# Patient Record
Sex: FEMALE | Race: OTHER | HISPANIC OR LATINO | Employment: OTHER | URBAN - METROPOLITAN AREA
[De-identification: names, ages, dates, MRNs, and addresses within clinical notes are randomized per-mention and may not be internally consistent; named-entity substitution may affect disease eponyms.]

---

## 2017-01-17 ENCOUNTER — TELEPHONE (OUTPATIENT)
Dept: FAMILY MEDICINE | Facility: CLINIC | Age: 77
End: 2017-01-17

## 2017-01-17 NOTE — TELEPHONE ENCOUNTER
----- Message from Joshua Aleman sent at 1/17/2017  1:03 PM CST -----  Contact: [phone]/self  Pt would like to be seen on Wednesday.  Please advise

## 2017-01-18 ENCOUNTER — OFFICE VISIT (OUTPATIENT)
Dept: FAMILY MEDICINE | Facility: CLINIC | Age: 77
End: 2017-01-18
Payer: MEDICARE

## 2017-01-18 ENCOUNTER — HOSPITAL ENCOUNTER (OUTPATIENT)
Dept: RADIOLOGY | Facility: HOSPITAL | Age: 77
Discharge: HOME | End: 2017-01-18
Attending: FAMILY MEDICINE
Payer: MEDICARE

## 2017-01-18 VITALS
SYSTOLIC BLOOD PRESSURE: 122 MMHG | HEIGHT: 61 IN | BODY MASS INDEX: 26.73 KG/M2 | HEART RATE: 76 BPM | DIASTOLIC BLOOD PRESSURE: 58 MMHG | TEMPERATURE: 99 F | WEIGHT: 141.56 LBS | OXYGEN SATURATION: 94 %

## 2017-01-18 DIAGNOSIS — K21.9 GASTROESOPHAGEAL REFLUX DISEASE, ESOPHAGITIS PRESENCE NOT SPECIFIED: ICD-10-CM

## 2017-01-18 DIAGNOSIS — N18.30 CHRONIC KIDNEY DISEASE, STAGE III (MODERATE): ICD-10-CM

## 2017-01-18 DIAGNOSIS — J41.0 SIMPLE CHRONIC BRONCHITIS: ICD-10-CM

## 2017-01-18 DIAGNOSIS — I67.9 CVD (CEREBROVASCULAR DISEASE): ICD-10-CM

## 2017-01-18 DIAGNOSIS — R73.02 GLUCOSE INTOLERANCE (IMPAIRED GLUCOSE TOLERANCE): ICD-10-CM

## 2017-01-18 DIAGNOSIS — R10.10 PAIN OF UPPER ABDOMEN: Primary | ICD-10-CM

## 2017-01-18 DIAGNOSIS — I73.9 PVD (PERIPHERAL VASCULAR DISEASE) WITH CLAUDICATION: ICD-10-CM

## 2017-01-18 DIAGNOSIS — E03.9 ACQUIRED HYPOTHYROIDISM: ICD-10-CM

## 2017-01-18 DIAGNOSIS — F41.9 ANXIETY: ICD-10-CM

## 2017-01-18 DIAGNOSIS — E78.5 HYPERLIPIDEMIA, UNSPECIFIED HYPERLIPIDEMIA TYPE: ICD-10-CM

## 2017-01-18 DIAGNOSIS — R53.83 FATIGUE, UNSPECIFIED TYPE: ICD-10-CM

## 2017-01-18 DIAGNOSIS — R10.10 PAIN OF UPPER ABDOMEN: ICD-10-CM

## 2017-01-18 PROCEDURE — 99999 PR PBB SHADOW E&M-EST. PATIENT-LVL III: CPT | Mod: PBBFAC,,, | Performed by: FAMILY MEDICINE

## 2017-01-18 PROCEDURE — 74150 CT ABDOMEN W/O CONTRAST: CPT | Mod: 26,,, | Performed by: RADIOLOGY

## 2017-01-18 PROCEDURE — 74150 CT ABDOMEN W/O CONTRAST: CPT | Mod: TC

## 2017-01-18 PROCEDURE — 99214 OFFICE O/P EST MOD 30 MIN: CPT | Mod: S$GLB,,, | Performed by: FAMILY MEDICINE

## 2017-01-18 PROCEDURE — 99499 UNLISTED E&M SERVICE: CPT | Mod: S$GLB,,, | Performed by: FAMILY MEDICINE

## 2017-01-18 PROCEDURE — 1160F RVW MEDS BY RX/DR IN RCRD: CPT | Mod: S$GLB,,, | Performed by: FAMILY MEDICINE

## 2017-01-18 PROCEDURE — 3078F DIAST BP <80 MM HG: CPT | Mod: S$GLB,,, | Performed by: FAMILY MEDICINE

## 2017-01-18 PROCEDURE — 1157F ADVNC CARE PLAN IN RCRD: CPT | Mod: S$GLB,,, | Performed by: FAMILY MEDICINE

## 2017-01-18 PROCEDURE — 3074F SYST BP LT 130 MM HG: CPT | Mod: S$GLB,,, | Performed by: FAMILY MEDICINE

## 2017-01-18 PROCEDURE — 1159F MED LIST DOCD IN RCRD: CPT | Mod: S$GLB,,, | Performed by: FAMILY MEDICINE

## 2017-01-18 PROCEDURE — 1126F AMNT PAIN NOTED NONE PRSNT: CPT | Mod: S$GLB,,, | Performed by: FAMILY MEDICINE

## 2017-01-18 RX ORDER — HYDROCODONE BITARTRATE AND ACETAMINOPHEN 5; 325 MG/1; MG/1
1 TABLET ORAL EVERY 8 HOURS PRN
Qty: 21 TABLET | Refills: 0 | Status: SHIPPED | OUTPATIENT
Start: 2017-01-18 | End: 2017-08-28 | Stop reason: SDUPTHER

## 2017-01-18 NOTE — PROGRESS NOTES
Subjective:       Patient ID: Brittany Haile is a 76 y.o. female.    Chief Complaint: Fatigue    HPI Comments: 76 yr old pleasant female with CVA, Hypothyroidism, HTN, HLD, COPD, GERD, carotid artery disease/stenosis, anxiety, presents today for her 3 month follow up and nc/o upper abdominal pain. Onset 1 month ago and gradually worsening. She made appointment with GI next week but she could not wait. The pain is aching type, 8/10 in severity and aggravated by palpation, lying down and no relieving factors. She tried some GI meds with no relief. She has nausea but no vomiting/diarrhea/constipation. Details as follows -    CVA - had stroke many years ago and has no deficits - it was TIA and they could not find any problem - she is continuing to smoke and no desire to quit as of yet    CKD III - follows nephrology - labs done today      Hypothyroidism - controlled -   TSH                      1.663               01/20/2016            -      on synthroid daily - compliant - no side effects      HTN - controlled- on ACE and CCB - no side effects - labs reassuring      HLD - controlled - was on statin - she was asked to stop and she will decide after she has an appointment with vascular medicine      PAD - she had ultrasound done to check her legs and she is found to have blockage in superficial femoral artery on right - she is currently c/o leg pain which is worse with walking - will follow vascular medicine - still c/o myalgia - pain med and muscle relaxant not helping    COPD - controlled - on advair daily and home oxygen - still smokes and no desire to quit - she drops her sats when she walks and she will benefit from ambulatory or portable oxygen      History as below - reviewed      Health maintenance  -labs UTD  -vaccines due                        Leg Pain    There was no injury mechanism. The pain is present in the left leg and right leg. The quality of the pain is described as aching. The pain is at a severity of  8/10. The pain is severe. The pain has been intermittent since onset. Pertinent negatives include no inability to bear weight, loss of motion, muscle weakness or numbness. The symptoms are aggravated by movement. She has tried nothing for the symptoms. The treatment provided no relief.   Medication Refill   This is a chronic problem. The current episode started more than 1 year ago. The problem occurs constantly. The problem has been gradually improving. Associated symptoms include abdominal pain, arthralgias, myalgias, nausea and neck pain. Pertinent negatives include no chest pain, chills, congestion, diaphoresis, fatigue, headaches, numbness, rash, sore throat or weakness. Nothing aggravates the symptoms. Treatments tried: as below. The treatment provided significant relief.   Hypertension   This is a chronic problem. The current episode started more than 1 year ago. The problem has been gradually improving since onset. The problem is controlled. Associated symptoms include anxiety and neck pain. Pertinent negatives include no chest pain, headaches, malaise/fatigue, orthopnea, palpitations, peripheral edema or shortness of breath. There are no associated agents to hypertension. Past treatments include ACE inhibitors and calcium channel blockers. The current treatment provides significant improvement. There are no compliance problems.  Hypertensive end-organ damage includes a thyroid problem. There is no history of angina, CAD/MI, CVA, left ventricular hypertrophy, PVD, renovascular disease or retinopathy. There is no history of chronic renal disease, coarctation of the aorta, hypercortisolism, hyperparathyroidism or pheochromocytoma.   Hyperlipidemia   This is a chronic problem. The current episode started more than 1 year ago. The problem is controlled. Recent lipid tests were reviewed and are normal. She has no history of chronic renal disease, diabetes, hypothyroidism or liver disease. There are no known  factors aggravating her hyperlipidemia. Associated symptoms include leg pain and myalgias. Pertinent negatives include no chest pain or shortness of breath. Current antihyperlipidemic treatment includes statins. The current treatment provides significant improvement of lipids. There are no compliance problems.  Risk factors for coronary artery disease include dyslipidemia, hypertension and post-menopausal.   Thyroid Problem   Presents for follow-up visit. Patient reports no anxiety, cold intolerance, constipation, depressed mood, diaphoresis, diarrhea, dry skin, fatigue, hair loss, hoarse voice, menstrual problem, nail problem, palpitations, tremors or weight loss. The symptoms have been stable. Past treatments include levothyroxine. The treatment provided significant relief. Prior procedures include thyroid ultrasound. The following procedures have not been performed: thyroid FNA. Her past medical history is significant for hyperlipidemia. There is no history of diabetes, Graves' ophthalmopathy or neuropathy. There are no known risk factors.   Gastroesophageal Reflux   She complains of abdominal pain, heartburn and nausea. She reports no chest pain, no choking, no dysphagia, no early satiety, no hoarse voice, no sore throat, no tooth decay or no wheezing. This is a chronic problem. The current episode started more than 1 year ago. The problem occurs constantly. The problem has been gradually improving. The heartburn is located in the abdomen. The heartburn is of moderate intensity. The heartburn does not wake her from sleep. The heartburn does not limit her activity. The heartburn doesn't change with position. Nothing aggravates the symptoms. Pertinent negatives include no fatigue, muscle weakness or weight loss. She has tried a PPI for the symptoms. The treatment provided significant relief. Past procedures do not include an abdominal ultrasound, esophageal manometry or H. pylori antibody titer. Past invasive  treatments do not include gastroplasty, gastroplication or reflux surgery.   Anxiety   Presents for follow-up visit. Symptoms include nausea. Patient reports no chest pain, confusion, decreased concentration, depressed mood, dizziness, excessive worry, feeling of choking, impotence, irritability, nervous/anxious behavior, palpitations, shortness of breath or suicidal ideas. Symptoms occur occasionally. The severity of symptoms is mild. Nothing aggravates the symptoms. The quality of sleep is fair. Nighttime awakenings: occasional.     There are no known risk factors. Her past medical history is significant for anxiety/panic attacks. There is no history of anemia, asthma, bipolar disorder, CAD, chronic lung disease, depression, hyperthyroidism or suicide attempts. Past treatments include SSRIs. The treatment provided significant relief. Compliance with prior treatments has been good. Compliance with medications is %.   Neck Pain    This is a chronic problem. The current episode started more than 1 year ago. The problem occurs constantly. The problem has been gradually worsening. The pain is associated with nothing. The pain is present in the occipital region. The quality of the pain is described as shooting and cramping. The pain is at a severity of 7/10. The pain is severe. The symptoms are aggravated by bending, twisting and stress. The pain is worse during the night. Associated symptoms include leg pain. Pertinent negatives include no chest pain, headaches, numbness, weakness or weight loss. She has tried heat, muscle relaxants and NSAIDs for the symptoms. The treatment provided mild relief.   Back Pain   This is a chronic problem. The current episode started more than 1 year ago. The problem occurs constantly. The problem has been gradually worsening since onset. The pain is present in the lumbar spine. The quality of the pain is described as aching. The pain radiates to the right foot and left foot. The  pain is at a severity of 8/10. The pain is severe. The pain is worse during the night. The symptoms are aggravated by bending, sitting and twisting. Associated symptoms include abdominal pain and leg pain. Pertinent negatives include no chest pain, dysuria, headaches, numbness, pelvic pain, weakness or weight loss. She has tried heat, ice, muscle relaxant and NSAIDs for the symptoms. The treatment provided moderate relief.   Abdominal Pain   This is a chronic problem. The current episode started more than 1 month ago. The onset quality is gradual. The problem occurs constantly. The problem has been unchanged. The pain is located in the epigastric region and RUQ. The pain is at a severity of 8/10. The pain is severe. The quality of the pain is aching, colicky and cramping. The abdominal pain does not radiate. Associated symptoms include arthralgias, myalgias and nausea. Pertinent negatives include no constipation, diarrhea, dysuria, headaches or weight loss. The pain is aggravated by certain positions, movement and belching. The pain is relieved by nothing. She has tried nothing for the symptoms. The treatment provided no relief. Prior diagnostic workup includes CT scan. Her past medical history is significant for GERD. There is no history of gallstones, irritable bowel syndrome, pancreatitis or PUD.     Review of Systems   Constitutional: Negative for chills, diaphoresis, fatigue, irritability, malaise/fatigue and weight loss.   HENT: Negative for congestion, hoarse voice and sore throat.    Respiratory: Negative for choking, shortness of breath and wheezing.    Cardiovascular: Negative for chest pain, palpitations and orthopnea.   Gastrointestinal: Positive for abdominal pain, heartburn and nausea. Negative for constipation, diarrhea and dysphagia.   Endocrine: Negative for cold intolerance.   Genitourinary: Negative for dysuria, impotence, menstrual problem and pelvic pain.   Musculoskeletal: Positive for  arthralgias, back pain, myalgias and neck pain. Negative for muscle weakness.   Skin: Negative for rash.   Neurological: Negative for dizziness, tremors, weakness, numbness and headaches.   Psychiatric/Behavioral: Negative for confusion, decreased concentration and suicidal ideas. The patient is not nervous/anxious.        PMH/PSH/FH/SH/MED/ALLERGY reviewed    Objective:       Vitals:    01/18/17 0903   BP: (!) 122/58   Pulse: 76   Temp: 98.8 °F (37.1 °C)       Physical Exam   Constitutional: She is oriented to person, place, and time. She appears well-developed and well-nourished. No distress.   HENT:   Head: Normocephalic and atraumatic.   Right Ear: External ear normal.   Left Ear: External ear normal.   Nose: Nose normal.   Mouth/Throat: Oropharynx is clear and moist. No oropharyngeal exudate.   Eyes: Conjunctivae and EOM are normal. Pupils are equal, round, and reactive to light. Right eye exhibits no discharge. Left eye exhibits no discharge. No scleral icterus.   Neck: Normal range of motion. Neck supple. No JVD present. No tracheal deviation present. No thyromegaly present.   Cardiovascular: Normal rate, regular rhythm, normal heart sounds and intact distal pulses.  Exam reveals no gallop and no friction rub.    No murmur heard.  Pulmonary/Chest: Effort normal and breath sounds normal. No stridor. She has no wheezes. She has no rales. She exhibits no tenderness.   Abdominal: Soft. Bowel sounds are normal. She exhibits no distension and no mass. There is no hepatosplenomegaly. There is tenderness in the right upper quadrant and epigastric area. There is no rigidity, no rebound, no guarding and no CVA tenderness. No hernia.       Musculoskeletal: Normal range of motion. She exhibits no edema or tenderness.   Lymphadenopathy:     She has no cervical adenopathy.   Neurological: She is alert and oriented to person, place, and time. She has normal reflexes. She displays normal reflexes. No cranial nerve deficit.  She exhibits normal muscle tone. Coordination normal.   Skin: Skin is warm and dry. No rash noted. She is not diaphoretic. No erythema. No pallor.   Psychiatric: She has a normal mood and affect. Her behavior is normal. Judgment and thought content normal.       CT Abdomen Without Contrast 01/18/2017 upper abdominal pain          RESULTS:    Clinical indication: 76 year-old female with upper abdominal pain.     Comparison: CT abdomen and pelvis at 8/24/16.     Technique: 5 mm transaxial images were obtained through the abdomen without the use of p.o. or IV contrast.     Findings:  Visualized portion of the heart is unremarkable. Lung bases are clear.     Calcified granuloma noted within the anterior aspect of the liver. No focal abnormalities are identified. Gallbladder has been removed. Spleen, pancreas, and left adrenal gland are unremarkable. There is stable thickening of the right adrenal gland.     There is diffuse gastric wall thickening throughout the gastric fundus and antrum which may in part relate to incomplete distention, however gastritis or potential neoplastic process are possibilities. Small hiatal hernia noted.     Right kidney is unremarkable. Simple cyst is visualized within the left kidney. No evidence of hydronephrosis.     Visualized loops of small large bowel show no evidence of obstruction or inflammation.     There is extensive atherosclerosis throughout the aorta and its branch vessels with suspected severe stenosis involving the celiac and SMA origins. Significant luminal narrowing visualized within the infrarenal aorta (greater than 50%).     Degenerative changes are seen within the spine. Subcutaneous soft tissues are unremarkable.    IMPRESSION:      1. Diffuse abnormal gastric wall thickening which may in part relate to incomplete distention, however potential gastritis or underlying neoplastic process should be considered. Small hiatal hernia also visualized. Consider further  evaluation with upper endoscopy.     2. Severe atherosclerosis involving aorta and its branch vessels with suspected severe hemodynamically stenosis at the celiac and SMA origins.       Assessment:       1. Pain of upper abdomen    2. Anxiety    3. CVD (cerebrovascular disease)    4. Simple chronic bronchitis    5. PVD (peripheral vascular disease) with claudication    6. Chronic kidney disease, stage III (moderate)    7. Acquired hypothyroidism    8. Gastroesophageal reflux disease, esophagitis presence not specified    9. Hyperlipidemia, unspecified hyperlipidemia type    10. Glucose intolerance (impaired glucose tolerance)    11. Fatigue, unspecified type        Plan:       Brittany was seen today for fatigue.    Diagnoses and all orders for this visit:    Pain of upper abdomen  -     hydrocodone-acetaminophen 5-325mg (NORCO) 5-325 mg per tablet; Take 1 tablet by mouth every 8 (eight) hours as needed for Pain.  -     CBC auto differential; Future  -     Comprehensive metabolic panel; Future  -     CT Abdomen Without Contrast; Future    Anxiety    CVD (cerebrovascular disease)    Simple chronic bronchitis    PVD (peripheral vascular disease) with claudication    Chronic kidney disease, stage III (moderate)    Acquired hypothyroidism    Gastroesophageal reflux disease, esophagitis presence not specified    Hyperlipidemia, unspecified hyperlipidemia type  -     CBC auto differential; Future  -     Comprehensive metabolic panel; Future    Glucose intolerance (impaired glucose tolerance)    Fatigue, unspecified type  -     Vitamin B12; Future  -     Vitamin D; Future  -     TSH; Future      Abdominal pain  -CT showed thickening of gastric wall  -follow GI next week  -pain med until then  -ER precautions given    HTN  -controlled  -continue benicar and norvasc    carotod artery disease/stenosis/bruit/PAD  -follows vascular medicine/cardiology  -statin for HLD    GERD  -controlled  -refilled  nexium    Hypothyroidism  -controlled    CVA  -stable and baseline and no deficits    COPD  -stable and controlled  -on advair/spiriva  -recommend portable or ambulatory oxygen    Muscle spasm  -flexeril prn nightly    CKD III  -baseline  -follows nephrology    Osteopenia  -vit D and dexa    Spent adequate time in obtaining history and explaining differentials    40 minutes spent during this visit of which greater than 50% devoted to face-face counseling and coordination of care regarding diagnosis and management plan    Return in about 3 months (around 4/18/2017), or if symptoms worsen or fail to improve.

## 2017-01-19 ENCOUNTER — TELEPHONE (OUTPATIENT)
Dept: FAMILY MEDICINE | Facility: CLINIC | Age: 77
End: 2017-01-19

## 2017-01-19 NOTE — TELEPHONE ENCOUNTER
Called patient to review lab results. The house number on file is the wrong number. The mobile number listed is continuously busy

## 2017-01-25 ENCOUNTER — TELEPHONE (OUTPATIENT)
Dept: NEUROLOGY | Facility: HOSPITAL | Age: 77
End: 2017-01-25

## 2017-01-25 ENCOUNTER — OFFICE VISIT (OUTPATIENT)
Dept: NEUROLOGY | Facility: HOSPITAL | Age: 77
End: 2017-01-25
Attending: INTERNAL MEDICINE
Payer: MEDICARE

## 2017-01-25 VITALS
SYSTOLIC BLOOD PRESSURE: 133 MMHG | WEIGHT: 141 LBS | DIASTOLIC BLOOD PRESSURE: 53 MMHG | TEMPERATURE: 97 F | HEIGHT: 61 IN | BODY MASS INDEX: 26.62 KG/M2 | HEART RATE: 72 BPM

## 2017-01-25 DIAGNOSIS — D50.9 IRON DEFICIENCY ANEMIA, UNSPECIFIED IRON DEFICIENCY ANEMIA TYPE: Primary | ICD-10-CM

## 2017-01-25 PROCEDURE — 99215 OFFICE O/P EST HI 40 MIN: CPT | Performed by: INTERNAL MEDICINE

## 2017-01-25 RX ORDER — POLYETHYLENE GLYCOL 3350, SODIUM SULFATE ANHYDROUS, SODIUM BICARBONATE, SODIUM CHLORIDE, POTASSIUM CHLORIDE 236; 22.74; 6.74; 5.86; 2.97 G/4L; G/4L; G/4L; G/4L; G/4L
4 POWDER, FOR SOLUTION ORAL ONCE
Qty: 4000 ML | Refills: 0 | Status: SHIPPED | OUTPATIENT
Start: 2017-01-25 | End: 2017-01-25

## 2017-01-25 NOTE — PROGRESS NOTES
"[facility]    CC: abdominal pain    HPI 76 y.o. female with past medical history of PVD, COPD, hypothyroidism, DELIA on oral iron therapy who presents with one month of progressively worsening, severe generalized cramping and stabbing abdominal pain not associated with meals, nausea, vomiting, melena or hematochezia.    Previous records reviewed.    Collateral information obtained from her daughter present during the patient interview.      Past Medical History   Diagnosis Date    Chronic kidney disease, stage III (moderate) 8/19/2015    COPD (chronic obstructive pulmonary disease)     COPD (chronic obstructive pulmonary disease)     CVA (cerebral infarction)     GERD (gastroesophageal reflux disease)     Hyperlipidemia     Hypertension     PVD (peripheral vascular disease) with claudication 10/13/2015    Stroke     Thyroid disease          Review of Systems  General ROS: negative for chills, fever or weight loss  Cardiovascular ROS: no chest pain or dyspnea on exertion  Gastrointestinal ROS: positive for abdominal pain, no nausea or vomiting    Physical Examination  Visit Vitals    BP (!) 133/53    Pulse 72    Temp 97 °F (36.1 °C) (Oral)    Ht 5' 1" (1.549 m)    Wt 64 kg (141 lb)    BMI 26.64 kg/m2     General appearance: alert, cooperative, no distress  HENT: Normocephalic, atraumatic, neck symmetrical, no nasal discharge   Lungs: clear to auscultation bilaterally, no dullness to percussion bilaterally  Heart: regular rate and rhythm without rub; no displacement of the PMI   Abdomen: soft, non-tender; bowel sounds normoactive; no organomegaly  Extremities: extremities symmetric; no clubbing, cyanosis, or edema  Neurologic: Alert and oriented X 3, normal strength, normal coordination and gait    Labs:  Alb 4.1  BUN/Cr 18/1.1  H/H 13.6/42.2  MCV 98  2015 ferritin 7    Imaging:  CT abdomen and pelvis without contrast 1/18/17 - thickening of the stomach wall noted but this is possibly secondary " to under distension.  Also positive for atherosclerosis of her aorta, celiac and SMA.    Assessment:   The patient is a 75 yo female with iron deficiency anemia on oral iron who presents with one month of severe progressively worsening abdominal pain.  Her recent abdominal imaging shows thickening of the stomach wall and severe atherosclerosis of her celiac and SMA.  Her previous colonoscopy in 2014 showed patchy erythema of the ascending and transverse colon.  Biopsies showed chronic colitis.  Her abdominal pain is possibly secondary to gastritis but also could be secondary to ischemia vs IBD vs malignancy vs IBS. Her symptoms have actually been present for 3-4 years but have only been a little worse over the past month. She has not observed improvement in the past with bentyl. She takes a daily PPI which improves her symptoms somewhat.     Plan:  Start trial of carafate and I will ask the patient if she has had improvement in her symptoms with carafate when endoscopy is performed.    I will schedule her for EGD and colonoscopy on March 2, 2017 to further evaluate her abnormal gastric imaging, abdominal pain and DELIA.    Nulytely prep sent to her pharmacy.    Cecil Crooks MD   [address] [place] [address], [address]   [place] [id number] [phone]

## 2017-01-25 NOTE — MR AVS SNAPSHOT
[facility] [address]  [place] [address]  Phone: [phone]  Fax: [phone]                  Brittany Haile   2017 9:45 AM   Office Visit    Descripción:  Female : 1940   Personal Médico:  Cecil Crooks MD   Departamento:  [facility]           Razón de la jono     Follow-up           Diagnósticos de Esta Visita        Comentarios    Iron deficiency anemia, unspecified iron deficiency anemia type    -  Primario            Lista de tareas           Metas (5 Years of Data)     Ninguna      Recetas para recoger        Disp Refills Start End    polyethylene glycol (GOLYTELY,NULYTELY) 236-22.74-6.74 -5.86 gram suspension 4000 mL 0 2017    Take 4,000 mLs (4 L total) by mouth once. - Oral    [facility]arm[facility]: [email or web address] [address] - [place]  [address] AT [facility] No. de tlfo: #: [phone]         Aprilthelma en Llamada     [facility] L[facility]nea de Enfermeras - Asistencia   Enfermeras registradas de [facility] pueden ayudarle a reservar keegan jono, proveer educación para la martinez, asesoría clínica, y otros servicios de asesoramiento.   Llame para preethi servicio gratuito a [phone].             Medicamentos           Mensaje sobre Medicamentos     Verificar los cambios y / o adiciones a merritt régimen de medicación son los mismos que discutir con merritt médico. Si cualquiera de estos cambios o adiciones son incorrectos, por favor notifique a merritt proveedor de atención médica.        EMPEZAR a janet estos medicamentos NUEVOS        Refills    polyethylene glycol (GOLYTELY,NULYTELY) 236-22.74-6.74 -5.86 gram suspension 0    Sig: Take 4,000 mLs (4 L total) by mouth once.    Categoría: Normal    Vía: Oral           Verifique que la siguiente lista de medicamentos es keegan representación exacta de los medicamentos que está tomando actualmente. Si no hay ningunos reportados, la lista puede estar en acevedo. Si no es  correcta, por favor póngase en contacto con merritt proveedor de atención médica. Lleve esta lista con usted en rafaela de emergencia.           Medicamentos Actuales     albuterol (PROVENTIL HFA) 90 mcg/actuation inhaler Inhale 2 puffs into the lungs every 4 to 6 hours as needed.    amlodipine (NORVASC) 5 MG tablet Take 2 tablets (10 mg total) by mouth once daily.    dicyclomine (BENTYL) 20 mg tablet Take 1 tablet (20 mg total) by mouth 4 (four) times daily as needed (abdominal pain/spasm).    dipyridamole-aspirin 200-25 mg (AGGRENOX)  mg CM12 Take 1 capsule by mouth 2 (two) times daily.    docusate sodium (COLACE) 100 MG capsule Take 1 capsule (100 mg total) by mouth 2 (two) times daily as needed for Constipation.    DUREZOL 0.05 % Drop ophthalmic solution     escitalopram oxalate (LEXAPRO) 5 MG Tab TAKE 1 TABLET BY MOUTH DAILY    ferrous sulfate 325 mg (65 mg iron) Tab tablet     fluticasone-salmeterol 250-50 mcg/dose (ADVAIR) 250-50 mcg/dose diskus inhaler Inhale 1 puff into the lungs 2 (two) times daily.    hydrochlorothiazide (MICROZIDE) 12.5 mg capsule Take 1 capsule (12.5 mg total) by mouth once daily.    hydrocodone-acetaminophen 5-325mg (NORCO) 5-325 mg per tablet Take 1 tablet by mouth every 8 (eight) hours as needed for Pain.    levothyroxine (SYNTHROID) 88 MCG tablet TAKE 1 TABLET BY MOUTH DAILY BEFORE BREAKFAST    meclizine (ANTIVERT) 25 mg tablet Take 1 tablet (25 mg total) by mouth every 6 (six) hours as needed for Dizziness.    NEXIUM 40 mg capsule Take 1 capsule (40 mg total) by mouth once daily.    olmesartan (BENICAR) 40 MG tablet Take 1 tablet (40 mg total) by mouth once daily.    tiotropium (SPIRIVA WITH HANDIHALER) 18 mcg inhalation capsule Inhale 1 capsule (18 mcg total) into the lungs once daily.    fluticasone (FLONASE) 50 mcg/actuation nasal spray SPRAY ONCE IN EACH NOSTRIL ONCE DAILY    polyethylene glycol (GOLYTELY,NULYTELY) 236-22.74-6.74 -5.86 gram suspension Take 4,000 mLs (4 L total)  "by mouth once.           Información de referencia clínica           Signos vitales - más recientes  Última actualización: 1/25/2017 10:09 AM por Briana Kaiser MA    PS Pulso Temperatura [place] Peso BMI (IMC)    (!) 133/53 72 97 °F (36.1 °C) (Oral) 5' 1" (1.549 m) 64 kg (141 lb) 26.64 kg/m2      Blood Pressure          Most Recent Value    BP  (!)  133/53      Alergias     A partir del:  1/25/2017        No Known Allergies      Vacunas     Administradas en la fecha de la visita:  1/25/2017        None      Orders Placed During Today's Visit      Órdenes normales de esta visita    Case request GI: ESOPHAGOGASTRODUODENOSCOPY (EGD), COLONOSCOPY       Registrarse para [facility]     La activación de merritt cuenta [facility] es tan fácil kwaku 1-2-3!    1) Ir a [email or web address], seleccione Registrarse Ahora, meter el código de activación y merritt fecha de nacimiento, y seleccione Próximo.    [id number]  Expires: 3/4/2017  9:33 AM      2) Crear un nombre de usuario y contraseña para usar cuando se visita [facility] en el futuro y selecciona keegan pregunta de seguridad en rafaela de que pierda merritt contraseña y seleccione Próximo.    3) Introduzca merritt dirección de correo electrónico y ainsley clic en Registrarse!    Información Adicional  Si tiene alguna pregunta, por favor, e-mail [email or web address] o llame al [phone] para hablar con nuestro personal. Recuerde, [facility] no debe ser usada para necesidades urgentes. En rafaela de emergencia médica, llame al [phone].        Instrucciones    Nulytely Colonoscopy Prep Instructions    [facility]   [address] [place] [address]  [phone]       PROCEDURE DAY:3/2/17    *Your procedure time many change.  The hospital will contact you the day prior to   the procedure to confirm your procedure time and arrival.       CLEAR LIQUID DIET (START THE DAY BEFORE PROCEDURE): 3/1/17      Clear Liquid Diet means any liquid from the list below that is not red or purple in " color:   Gatorade, Js-Aid, Lemonade (Yellow ONLY)-Gatorade is the preferred liquid   Tea (no milk or dairy)   Carbonated beverages (soft drink), regular or diet   Apple juice, white grape juice, white cranberry juice   Jell-O (orange, lemon, or lime flavors ONLY)   Clear, fat-free, beef or chicken broth   Bouillon, clear consommé   Snowball, Popsicles (NOT red or purple)  * No Solid Food or Alcohol     ITEMS TO BE PURCHASED FOR PREP (Nu-Lytely requires a prescription):         Nu-Lytely preparation solution.          Gas tablets (Gas-X, Mylanta Gas, Simethicone)    BOWEL PREP INSTRUCTIONS THE DAY BEFORE THE EXAM: 3/1/17  1. Drink only clear liquids (see the above diet) all day. Gatorade is the best liquid.   Drink an extra 8 ounces of clear liquid every hour from 11am to 5pm.         2.   At 6pm, mix Nu-Lytely powder according to the directions on the container and               drink 8 ounces of solution every 10 minutes until about half of the solution is                consumed. Place the remainder of the solution in the refrigerator.         3.   At 9pm, take two gas tablets with 8 ounces of clear liquid.        4.   At 10pm, take two gas tablets with 8 ounces of clear liquid.      THE DAY OF THE EXAM: 3/2/17        1.  Beginning 5 hours before your procedure time, drink the remaining half of              Nu-Lytely solution. Drink 8 ounces of solution every 10 minutes until the solution              is gone.              *If your procedure is scheduled for the early morning, you will need to get up in               the middle of the night to take this dose of preparation. The correct timing of this               dose is essential to an effective preparation. If you do not take this dose, your               exam may be incomplete and need to be repeated.         2.  Have nothing to eat or drink for 3 hours before the procedure.         3.  Take your morning medications, if any, with a small sip of  water.         4.  Bring someone to drive you home (you should not drive for 12 hrs after the exam)        5.  Report to Admitting, 1st floor hospital entrance 2 hours before procedure time.       If you are diabetic, do not take insulin or oral medications the morning of the procedure. Take only a half dose of insulin the day before your procedure. Do not take your diabetic pills the day of your procedure               Colonoscopy is used to view the inside of your lower digestive tract (colon and rectum). It can help screen for colon cancer and can also help find the source of abdominal pain, bleeding, and changes in bowel habits. The test is usually done in the hospital on an outpatient basis. During the exam, the doctor can remove a small tissue sample ( a biopsy) for testing. Small growths, such as polyps, may also be removed during colonoscopy.     A camera attached to a flexible tube with a viewing lens is used to take video pictures.    Getting Ready    Be sure to tell your doctor about any medications you take. Also tell your doctor about any health conditions you may have.   Discuss the risks of the test with your doctor. These include bleeding and bowel puncture.   Your rectum and colon must be empty for the test. So be sure to follow the diet and bowel prep instructions exactly. If you dont, the test may need to be rescheduled.   You will need to have a friend or family member prepared to drive you home after the test.     Colonoscopy provides an inside view of the entire colon.    During the Test    You are given sedating (relaxing) medication through an IV line. You may be drowsy or completely asleep.   The procedure takes 30 minutes or longer.   The doctor performs a digital rectal exam to check for anal and rectal problems. The rectum is lubricated and the scope inserted.   If you are awake, you may have a feeling similar to needing to have a bowel movement. You may also feel pressure as air  is pumped into the colon. Its okay to pass gas during the procedure.  After the Test    You may discuss the results with your doctor right away or at a future visit.   Try to pass all the gas right after the test to help prevent bloating and cramping.   After the test, you can go back to your normal eating and other activities.  Risks and Possible Complications Include:   Bleeding  A puncture or tear in the colon  Risks of anesthesia         Please come to first floor admissions desk the day of the procedure.    Endoscopy Dept. Will call you a day or so before your procedure to let you know what time you need to report to the hospital.          Smoking Cessation     Si desea dejar de fumar:  · Usted puede ser elegible para recibir servicios gratuitos si usted es un residente de [place] y comenzó a fumar cigarrillos antes del 1988. Llame al Smoking Cessation Trust (SCT) a [phone] o [phone].   Llame -QUIT-NOW si usted no cumple con los criterios anteriores.                     Brittany Haile   2017 9:45 AM   Office Visit    Description:  Female : 1940   Provider:  Cecil Crooks MD   Department:  [facility]           Reason for Visit     Follow-up           Diagnoses this Visit        Comments    Iron deficiency anemia, unspecified iron deficiency anemia type    -  Primary            To Do List           Goals     None       These Medications        Disp Refills Start End    polyethylene glycol (GOLYTELY,NULYTELY) 236-22.74-6.74 -5.86 gram suspension 4000 mL 0 2017    Take 4,000 mLs (4 L total) by mouth once. - Oral    Pharmacy: [facility] - [place] - [address] AT [facility] [address]  #: [phone]         [facility]     [facility] Care [facility] -  Assistance  Registered nurses in the [facility] provide clinical advisement, health education,  appointment booking, and other advisory services.  Call for this free service at [phone].             Medications           Message regarding Medications     Verify the changes and/or additions to your medication regime listed below are the same as discussed with your clinician today.  If any of these changes or additions are incorrect, please notify your healthcare provider.        START taking these NEW medications        Refills    polyethylene glycol (GOLYTELY,NULYTELY) 236-22.74-6.74 -5.86 gram suspension 0    Sig: Take 4,000 mLs (4 L total) by mouth once.    Class: Normal    Route: Oral           Verify that the below list of medications is an accurate representation of the medications you are currently taking.  If none reported, the list may be blank. If incorrect, please contact your healthcare provider. Carry this list with you in case of emergency.           Current Medications     albuterol (PROVENTIL HFA) 90 mcg/actuation inhaler Inhale 2 puffs into the lungs every 4 to 6 hours as needed.    amlodipine (NORVASC) 5 MG tablet Take 2 tablets (10 mg total) by mouth once daily.    dicyclomine (BENTYL) 20 mg tablet Take 1 tablet (20 mg total) by mouth 4 (four) times daily as needed (abdominal pain/spasm).    dipyridamole-aspirin 200-25 mg (AGGRENOX)  mg CM12 Take 1 capsule by mouth 2 (two) times daily.    docusate sodium (COLACE) 100 MG capsule Take 1 capsule (100 mg total) by mouth 2 (two) times daily as needed for Constipation.    DUREZOL 0.05 % Drop ophthalmic solution     escitalopram oxalate (LEXAPRO) 5 MG Tab TAKE 1 TABLET BY MOUTH DAILY    ferrous sulfate 325 mg (65 mg iron) Tab tablet     fluticasone-salmeterol 250-50 mcg/dose (ADVAIR) 250-50 mcg/dose diskus inhaler Inhale 1 puff into the lungs 2 (two) times daily.    hydrochlorothiazide (MICROZIDE) 12.5 mg capsule Take 1 capsule (12.5 mg total) by mouth once daily.    hydrocodone-acetaminophen 5-325mg (NORCO) 5-325 mg per tablet Take 1  "tablet by mouth every 8 (eight) hours as needed for Pain.    levothyroxine (SYNTHROID) 88 MCG tablet TAKE 1 TABLET BY MOUTH DAILY BEFORE BREAKFAST    meclizine (ANTIVERT) 25 mg tablet Take 1 tablet (25 mg total) by mouth every 6 (six) hours as needed for Dizziness.    NEXIUM 40 mg capsule Take 1 capsule (40 mg total) by mouth once daily.    olmesartan (BENICAR) 40 MG tablet Take 1 tablet (40 mg total) by mouth once daily.    tiotropium (SPIRIVA WITH HANDIHALER) 18 mcg inhalation capsule Inhale 1 capsule (18 mcg total) into the lungs once daily.    fluticasone (FLONASE) 50 mcg/actuation nasal spray SPRAY ONCE IN EACH NOSTRIL ONCE DAILY    polyethylene glycol (GOLYTELY,NULYTELY) 236-22.74-6.74 -5.86 gram suspension Take 4,000 mLs (4 L total) by mouth once.           Clinical Reference Information           Vital Signs - Last Recorded  Most recent update: 1/25/2017 10:09 AM by Briana Kaiser MA    BP Pulse Temp Ht Wt BMI    (!) 133/53 72 97 °F (36.1 °C) (Oral) 5' 1" (1.549 m) 64 kg (141 lb) 26.64 kg/m2      Blood Pressure          Most Recent Value    BP  (!)  133/53      Allergies as of 1/25/2017     No Known Allergies      Immunizations Administered on Date of Encounter - 1/25/2017     None      Orders Placed During Today's Visit      Normal Orders This Visit    Case request GI: ESOPHAGOGASTRODUODENOSCOPY (EGD), COLONOSCOPY       [facility] Sign-Up     Activating your [facility] account is as easy as 1-2-3!     1) Visit [email or web address], select Sign Up Now, enter this activation code and your date of birth, then select Next.  [email or web address]  Expires: 3/4/2017  9:33 AM      2) Create a username and password to use when you visit [facility] in the future and select a security question in case you lose your password and select Next.    3) Enter your e-mail address and click Sign Up!    Additional Information  If you have questions, please e-mail [email or web address] or call [phone] to talk to our [facility] staff. " Remember, [facility] is NOT to be used for urgent needs. For medical emergencies, dial 911.         Instructions    Nulytely Colonoscopy Prep Instructions    [facility]   [address] [place] [address]  [phone]       PROCEDURE DAY:3/2/17    *Your procedure time many change.  The hospital will contact you the day prior to   the procedure to confirm your procedure time and arrival.       CLEAR LIQUID DIET (START THE DAY BEFORE PROCEDURE): 3/1/17      Clear Liquid Diet means any liquid from the list below that is not red or purple in color:   Gatorade, Js-Aid, Lemonade (Yellow ONLY)-Gatorade is the preferred liquid   Tea (no milk or dairy)   Carbonated beverages (soft drink), regular or diet   Apple juice, white grape juice, white cranberry juice   Jell-O (orange, lemon, or lime flavors ONLY)   Clear, fat-free, beef or chicken broth   Bouillon, clear consommé   Snowball, Popsicles (NOT red or purple)  * No Solid Food or Alcohol     ITEMS TO BE PURCHASED FOR PREP (Nu-Lytely requires a prescription):         Nu-Lytely preparation solution.          Gas tablets (Gas-X, Mylanta Gas, Simethicone)    BOWEL PREP INSTRUCTIONS THE DAY BEFORE THE EXAM: 3/1/17  1. Drink only clear liquids (see the above diet) all day. Gatorade is the best liquid.   Drink an extra 8 ounces of clear liquid every hour from 11am to 5pm.         2.   At 6pm, mix Nu-Lytely powder according to the directions on the container and               drink 8 ounces of solution every 10 minutes until about half of the solution is                consumed. Place the remainder of the solution in the refrigerator.         3.   At 9pm, take two gas tablets with 8 ounces of clear liquid.        4.   At 10pm, take two gas tablets with 8 ounces of clear liquid.      THE DAY OF THE EXAM: 3/2/17        1.  Beginning 5 hours before your procedure time, drink the remaining half of              Nu-Lytely solution. Drink 8 ounces  of solution every 10 minutes until the solution              is gone.              *If your procedure is scheduled for the early morning, you will need to get up in               the middle of the night to take this dose of preparation. The correct timing of this               dose is essential to an effective preparation. If you do not take this dose, your               exam may be incomplete and need to be repeated.         2.  Have nothing to eat or drink for 3 hours before the procedure.         3.  Take your morning medications, if any, with a small sip of water.         4.  Bring someone to drive you home (you should not drive for 12 hrs after the exam)        5.  Report to Admitting, 1st floor hospital entrance 2 hours before procedure time.       If you are diabetic, do not take insulin or oral medications the morning of the procedure. Take only a half dose of insulin the day before your procedure. Do not take your diabetic pills the day of your procedure               Colonoscopy is used to view the inside of your lower digestive tract (colon and rectum). It can help screen for colon cancer and can also help find the source of abdominal pain, bleeding, and changes in bowel habits. The test is usually done in the hospital on an outpatient basis. During the exam, the doctor can remove a small tissue sample ( a biopsy) for testing. Small growths, such as polyps, may also be removed during colonoscopy.     A camera attached to a flexible tube with a viewing lens is used to take video pictures.    Getting Ready    Be sure to tell your doctor about any medications you take. Also tell your doctor about any health conditions you may have.   Discuss the risks of the test with your doctor. These include bleeding and bowel puncture.   Your rectum and colon must be empty for the test. So be sure to follow the diet and bowel prep instructions exactly. If you dont, the test may need to be rescheduled.   You will  need to have a friend or family member prepared to drive you home after the test.     Colonoscopy provides an inside view of the entire colon.    During the Test    You are given sedating (relaxing) medication through an IV line. You may be drowsy or completely asleep.   The procedure takes 30 minutes or longer.   The doctor performs a digital rectal exam to check for anal and rectal problems. The rectum is lubricated and the scope inserted.   If you are awake, you may have a feeling similar to needing to have a bowel movement. You may also feel pressure as air is pumped into the colon. Its okay to pass gas during the procedure.  After the Test    You may discuss the results with your doctor right away or at a future visit.   Try to pass all the gas right after the test to help prevent bloating and cramping.   After the test, you can go back to your normal eating and other activities.  Risks and Possible Complications Include:   Bleeding  A puncture or tear in the colon  Risks of anesthesia         Please come to first floor admissions desk the day of the procedure.    Endoscopy Dept. Will call you a day or so before your procedure to let you know what time you need to report to the hospital.          Smoking Cessation     If you would like to quit smoking:   You may be eligible for free services if you are a [place] resident and started smoking cigarettes before September 1, 1988.  Call the Smoking Cessation Trust (SCT) toll free at ([phone] 770-2268 or [phone].   Call 5-282-QUIT-NOW if you do not meet the above criteria.

## 2017-01-25 NOTE — PATIENT INSTRUCTIONS
Nulytely Colonoscopy Prep Instructions    [facility]   [address], [place] [address]  [phone]       PROCEDURE DAY:3/2/17    *Your procedure time many change.  The hospital will contact you the day prior to   the procedure to confirm your procedure time and arrival.       CLEAR LIQUID DIET (START THE DAY BEFORE PROCEDURE): 3/1/17      Clear Liquid Diet means any liquid from the list below that is not red or purple in color:   Gatorade, Js-Aid, Lemonade (Yellow ONLY)-Gatorade is the preferred liquid   Tea (no milk or dairy)   Carbonated beverages (soft drink), regular or diet   Apple juice, white grape juice, white cranberry juice   Jell-O (orange, lemon, or lime flavors ONLY)   Clear, fat-free, beef or chicken broth   Bouillon, clear consommé   Snowball, Popsicles (NOT red or purple)  * No Solid Food or Alcohol     ITEMS TO BE PURCHASED FOR PREP (Nu-Lytely requires a prescription):         Nu-Lytely preparation solution.          Gas tablets (Gas-X, Mylanta Gas, Simethicone)    BOWEL PREP INSTRUCTIONS THE DAY BEFORE THE EXAM: 3/1/17  1. Drink only clear liquids (see the above diet) all day. Gatorade is the best liquid.   Drink an extra 8 ounces of clear liquid every hour from 11am to 5pm.         2.   At 6pm, mix Nu-Lytely powder according to the directions on the container and               drink 8 ounces of solution every 10 minutes until about half of the solution is                consumed. Place the remainder of the solution in the refrigerator.         3.   At 9pm, take two gas tablets with 8 ounces of clear liquid.        4.   At 10pm, take two gas tablets with 8 ounces of clear liquid.      THE DAY OF THE EXAM: 3/2/17        1.  Beginning 5 hours before your procedure time, drink the remaining half of              Nu-Lytely solution. Drink 8 ounces of solution every 10 minutes until the solution              is gone.              *If your procedure is scheduled  for the early morning, you will need to get up in               the middle of the night to take this dose of preparation. The correct timing of this               dose is essential to an effective preparation. If you do not take this dose, your               exam may be incomplete and need to be repeated.         2.  Have nothing to eat or drink for 3 hours before the procedure.         3.  Take your morning medications, if any, with a small sip of water.         4.  Bring someone to drive you home (you should not drive for 12 hrs after the exam)        5.  Report to Admitting, 1st floor hospital entrance 2 hours before procedure time.       If you are diabetic, do not take insulin or oral medications the morning of the procedure. Take only a half dose of insulin the day before your procedure. Do not take your diabetic pills the day of your procedure               Colonoscopy is used to view the inside of your lower digestive tract (colon and rectum). It can help screen for colon cancer and can also help find the source of abdominal pain, bleeding, and changes in bowel habits. The test is usually done in the hospital on an outpatient basis. During the exam, the doctor can remove a small tissue sample ( a biopsy) for testing. Small growths, such as polyps, may also be removed during colonoscopy.     A camera attached to a flexible tube with a viewing lens is used to take video pictures.    Getting Ready    Be sure to tell your doctor about any medications you take. Also tell your doctor about any health conditions you may have.   Discuss the risks of the test with your doctor. These include bleeding and bowel puncture.   Your rectum and colon must be empty for the test. So be sure to follow the diet and bowel prep instructions exactly. If you dont, the test may need to be rescheduled.   You will need to have a friend or family member prepared to drive you home after the test.     Colonoscopy provides an inside  view of the entire colon.    During the Test    You are given sedating (relaxing) medication through an IV line. You may be drowsy or completely asleep.   The procedure takes 30 minutes or longer.   The doctor performs a digital rectal exam to check for anal and rectal problems. The rectum is lubricated and the scope inserted.   If you are awake, you may have a feeling similar to needing to have a bowel movement. You may also feel pressure as air is pumped into the colon. Its okay to pass gas during the procedure.  After the Test    You may discuss the results with your doctor right away or at a future visit.   Try to pass all the gas right after the test to help prevent bloating and cramping.   After the test, you can go back to your normal eating and other activities.  Risks and Possible Complications Include:   Bleeding  A puncture or tear in the colon  Risks of anesthesia         Please come to first floor admissions desk the day of the procedure.    Endoscopy Dept. Will call you a day or so before your procedure to let you know what time you need to report to the hospital.

## 2017-01-25 NOTE — TELEPHONE ENCOUNTER
----- Message from Shauna Angel sent at 1/25/2017 11:45 AM CST -----  Contact: Thierry MORALES- Pharmacy called looking carfate prescription. Pharmacy call back number is [phone]

## 2017-01-26 ENCOUNTER — TELEPHONE (OUTPATIENT)
Dept: NEUROLOGY | Facility: HOSPITAL | Age: 77
End: 2017-01-26

## 2017-01-26 NOTE — TELEPHONE ENCOUNTER
Spoke with pt and informed her that prescription for Carafate was sent over to the pharmacy on file. No other concerns noted.

## 2017-01-26 NOTE — TELEPHONE ENCOUNTER
----- Message from Shauna Angel sent at 1/26/2017  9:37 AM CST -----  Contact: Thierry Dempsey      ----- Message -----     From: Shauna Angel     Sent: 1/25/2017  11:45 AM       To: Daksha Jacob Staff    GI- Pharmacy called looking carfate prescription. Pharmacy call back number is [phone]

## 2017-03-01 ENCOUNTER — ANESTHESIA EVENT (OUTPATIENT)
Dept: ENDOSCOPY | Facility: HOSPITAL | Age: 77
End: 2017-03-01
Payer: MEDICARE

## 2017-03-02 ENCOUNTER — SURGERY (OUTPATIENT)
Age: 77
End: 2017-03-02

## 2017-03-02 ENCOUNTER — HOSPITAL ENCOUNTER (OUTPATIENT)
Facility: HOSPITAL | Age: 77
Discharge: HOME | End: 2017-03-02
Attending: INTERNAL MEDICINE | Admitting: INTERNAL MEDICINE
Payer: MEDICARE

## 2017-03-02 ENCOUNTER — ANESTHESIA (OUTPATIENT)
Dept: ENDOSCOPY | Facility: HOSPITAL | Age: 77
End: 2017-03-02
Payer: MEDICARE

## 2017-03-02 DIAGNOSIS — R10.9 ABDOMINAL PAIN: ICD-10-CM

## 2017-03-02 DIAGNOSIS — K21.9 GASTROESOPHAGEAL REFLUX DISEASE, ESOPHAGITIS PRESENCE NOT SPECIFIED: Primary | ICD-10-CM

## 2017-03-02 DIAGNOSIS — R10.84 GENERALIZED ABDOMINAL PAIN: ICD-10-CM

## 2017-03-02 DIAGNOSIS — K29.70 GASTRITIS, PRESENCE OF BLEEDING UNSPECIFIED, UNSPECIFIED CHRONICITY, UNSPECIFIED GASTRITIS TYPE: ICD-10-CM

## 2017-03-02 DIAGNOSIS — E03.4 HYPOTHYROIDISM DUE TO ACQUIRED ATROPHY OF THYROID: ICD-10-CM

## 2017-03-02 PROCEDURE — 45378 DIAGNOSTIC COLONOSCOPY: CPT | Performed by: INTERNAL MEDICINE

## 2017-03-02 PROCEDURE — 25000003 PHARM REV CODE 250: Performed by: NURSE ANESTHETIST, CERTIFIED REGISTERED

## 2017-03-02 PROCEDURE — 88305 TISSUE EXAM BY PATHOLOGIST: CPT | Mod: 26,,, | Performed by: PATHOLOGY

## 2017-03-02 PROCEDURE — 43239 EGD BIOPSY SINGLE/MULTIPLE: CPT | Performed by: INTERNAL MEDICINE

## 2017-03-02 PROCEDURE — 37000008 HC ANESTHESIA 1ST 15 MINUTES: Performed by: INTERNAL MEDICINE

## 2017-03-02 PROCEDURE — 63600175 PHARM REV CODE 636 W HCPCS: Performed by: NURSE ANESTHETIST, CERTIFIED REGISTERED

## 2017-03-02 PROCEDURE — 27201012 HC FORCEPS, HOT/COLD, DISP: Performed by: INTERNAL MEDICINE

## 2017-03-02 PROCEDURE — 88305 TISSUE EXAM BY PATHOLOGIST: CPT | Performed by: PATHOLOGY

## 2017-03-02 PROCEDURE — 37000009 HC ANESTHESIA EA ADD 15 MINS: Performed by: INTERNAL MEDICINE

## 2017-03-02 PROCEDURE — 25000003 PHARM REV CODE 250: Performed by: INTERNAL MEDICINE

## 2017-03-02 RX ORDER — PROPOFOL 10 MG/ML
VIAL (ML) INTRAVENOUS CONTINUOUS PRN
Status: DISCONTINUED | OUTPATIENT
Start: 2017-03-02 | End: 2017-03-02

## 2017-03-02 RX ORDER — SUCRALFATE 1 G/1
1 TABLET ORAL 4 TIMES DAILY
Qty: 120 TABLET | Refills: 1 | Status: SHIPPED | OUTPATIENT
Start: 2017-03-02 | End: 2017-03-29

## 2017-03-02 RX ORDER — PROPOFOL 10 MG/ML
VIAL (ML) INTRAVENOUS
Status: DISCONTINUED | OUTPATIENT
Start: 2017-03-02 | End: 2017-03-02

## 2017-03-02 RX ORDER — LEVOTHYROXINE SODIUM 88 UG/1
TABLET ORAL
Qty: 90 TABLET | Refills: 0 | Status: SHIPPED | OUTPATIENT
Start: 2017-03-02 | End: 2017-03-29 | Stop reason: SDUPTHER

## 2017-03-02 RX ORDER — SODIUM CHLORIDE 9 MG/ML
INJECTION, SOLUTION INTRAVENOUS CONTINUOUS
Status: DISCONTINUED | OUTPATIENT
Start: 2017-03-02 | End: 2017-03-02 | Stop reason: HOSPADM

## 2017-03-02 RX ORDER — LIDOCAINE HCL/PF 100 MG/5ML
SYRINGE (ML) INTRAVENOUS
Status: DISCONTINUED | OUTPATIENT
Start: 2017-03-02 | End: 2017-03-02

## 2017-03-02 RX ORDER — ESCITALOPRAM OXALATE 5 MG/1
TABLET ORAL
Qty: 90 TABLET | Refills: 0 | Status: SHIPPED | OUTPATIENT
Start: 2017-03-02 | End: 2017-04-07 | Stop reason: SDUPTHER

## 2017-03-02 RX ADMIN — PROPOFOL 20 MG: 10 INJECTION, EMULSION INTRAVENOUS at 08:03

## 2017-03-02 RX ADMIN — TOPICAL ANESTHETIC 1 EACH: 200 SPRAY DENTAL; PERIODONTAL at 07:03

## 2017-03-02 RX ADMIN — PROPOFOL 100 MCG/KG/MIN: 10 INJECTION, EMULSION INTRAVENOUS at 07:03

## 2017-03-02 RX ADMIN — SODIUM CHLORIDE: 0.9 INJECTION, SOLUTION INTRAVENOUS at 07:03

## 2017-03-02 RX ADMIN — LIDOCAINE HYDROCHLORIDE 50 MG: 20 INJECTION, SOLUTION INTRAVENOUS at 07:03

## 2017-03-02 RX ADMIN — PROPOFOL 30 MG: 10 INJECTION, EMULSION INTRAVENOUS at 08:03

## 2017-03-02 RX ADMIN — PROPOFOL 40 MG: 10 INJECTION, EMULSION INTRAVENOUS at 07:03

## 2017-03-02 RX ADMIN — PROPOFOL 20 MG: 10 INJECTION, EMULSION INTRAVENOUS at 07:03

## 2017-03-02 NOTE — IP AVS SNAPSHOT
[facility]  [address]  [place] [address]  Phone: [phone]           Instrucciones de Eva de Pacientes    Nuestro objetivo es que te prepara para el éxito. Thelma paquete incluye información sobre merritt enfermedad, medicamentos y atención médica a domicilio. [place] le ayudará a cuidar y que no se enferman más y necesita volver al hospital.     Por favor, pregunte a merritt enfermera si tiene alguna pregunta.       Hay muchos detalles a recordar cuando se prepara para salir del hospital. [place] es lo que necesita hacer:    1. [place] merritt medicina. Si usted tiene keegan receta, revise la lista de medicamentos en las siguientes páginas. Es posible que tenga nuevos medicamentos para recoger en la farmacia y otros que tendrá que dejar de janet. Revise las instrucciones sobre cómo y cuándo janet michael medicamentos. Consulte con el médico o el enfermeras si no está seguro de qué hacer.    2. Ir a michael citas de seguimiento. La información específica de seguimiento aparece en las siguientes páginas. Usted puede ser contactado por keegan enfermera o proveedor clínico sobre las próximas citas. Estar seguro de que tiene todos los números de teléfono para comunicarse si es necesario. Por favor, póngase en contacto con la oficina de merritt profesional médico si no puede hacer keegan jono.     3. Esté atento a señales de advertencia. El médico o la enfermera le dará señales de alarma detallados que debe observar y cuándo llamar para la ayuda. Estas instrucciones también pueden incluir información educativa acerca de merritt condición. Si usted experimenta cualquiera de las señales de advertencia para merritt martinez, llame a merritt médico.           ** Verificar la lista de medicamentos es correcta y está actualizada. Llevar esto con usted en rafaela de emergencia. Si michael medicamentos figueroa cambiado, por favor notifique a merritt proveedor de atención médica.             Lista de medicamentos      EMPIECE a janet estos medicamentos        Additional Info                       sucralfate 1 gram tablet   También conocido kwaku:  CARAFATE   Cantidad:  120 tablet   Recargas:  1   Dosis:  1 g    Instrucciones:  Take 1 tablet (1 g total) by mouth 4 (four) times daily.     Begin Date    AM    Noon    PM    Bedtime         SIGA tomando estos medicamentos        Additional Info                      albuterol 90 mcg/actuation inhaler   También conocido kwaku:  PROVENTIL HFA   Cantidad:  8.5 g   Recargas:  6   Dosis:  2 puff    Instrucciones:  Inhale 2 puffs into the lungs every 4 to 6 hours as needed.     Begin Date    AM    Noon    PM    Bedtime       amlodipine 5 MG tablet   También conocido kwaku:  NORVASC   Cantidad:  180 tablet   Recargas:  3   Dosis:  10 mg    Instrucciones:  Take 2 tablets (10 mg total) by mouth once daily.     Begin Date    AM    Noon    PM    Bedtime       dicyclomine 20 mg tablet   También conocido kwaku:  BENTYL   Cantidad:  30 tablet   Recargas:  1   Dosis:  20 mg    Instrucciones:  Take 1 tablet (20 mg total) by mouth 4 (four) times daily as needed (abdominal pain/spasm).     Begin Date    AM    Noon    PM    Bedtime       dipyridamole-aspirin 200-25 mg  mg Cm12   También conocido kwaku:  AGGRENOX   Cantidad:  180 capsule   Recargas:  3   Dosis:  1 capsule    Instrucciones:  Take 1 capsule by mouth 2 (two) times daily.     Begin Date    AM    Noon    PM    Bedtime       docusate sodium 100 MG capsule   También conocido kwaku:  COLACE   Cantidad:  90 capsule   Recargas:  3   Dosis:  100 mg    Instrucciones:  Take 1 capsule (100 mg total) by mouth 2 (two) times daily as needed for Constipation.     Begin Date    AM    Noon    PM    Bedtime       DUREZOL 0.05 % Drop ophthalmic solution   Recargas:  0   Medicamento genérico:  difluprednate      Begin Date    AM    Noon    PM    Bedtime       escitalopram oxalate 5 MG Tab   También conocido kwaku:  LEXAPRO   Cantidad:  90 tablet   Recargas:  3    Instrucciones:  TAKE 1 TABLET BY MOUTH DAILY     Begin Date     AM    Noon    PM    Bedtime       ferrous sulfate 325 mg (65 mg iron) Tab tablet   Recargas:  3      Begin Date    AM    Noon    PM    Bedtime       fluticasone 50 mcg/actuation nasal spray   También conocido kwaku:  FLONASE   Cantidad:  48 g   Recargas:  1   Comentarios:  **Patient requests 90 days supply**    Instrucciones:  SPRAY ONCE IN EACH NOSTRIL ONCE DAILY     Begin Date    AM    Noon    PM    Bedtime       fluticasone-salmeterol 250-50 mcg/dose 250-50 mcg/dose diskus inhaler   También conocido kwaku:  ADVAIR   Cantidad:  60 each   Recargas:  6   Dosis:  1 puff    Instrucciones:  Inhale 1 puff into the lungs 2 (two) times daily.     Begin Date    AM    Noon    PM    Bedtime       hydrochlorothiazide 12.5 mg capsule   También conocido kwaku:  MICROZIDE   Cantidad:  90 capsule   Recargas:  3   Dosis:  12.5 mg    Instrucciones:  Take 1 capsule (12.5 mg total) by mouth once daily.     Begin Date    AM    Noon    PM    Bedtime       hydrocodone-acetaminophen 5-325mg 5-325 mg per tablet   También conocido kwaku:  NORCO   Cantidad:  21 tablet   Recargas:  0   Dosis:  1 tablet    Instrucciones:  Take 1 tablet by mouth every 8 (eight) hours as needed for Pain.     Begin Date    AM    Noon    PM    Bedtime       levothyroxine 88 MCG tablet   También conocido kwaku:  SYNTHROID   Cantidad:  90 tablet   Recargas:  0    Instrucciones:  TAKE 1 TABLET BY MOUTH DAILY BEFORE BREAKFAST     Begin Date    AM    Noon    PM    Bedtime       meclizine 25 mg tablet   También conocido kwaku:  ANTIVERT   Cantidad:  30 tablet   Recargas:  0   Dosis:  25 mg    Instrucciones:  Take 1 tablet (25 mg total) by mouth every 6 (six) hours as needed for Dizziness.     Begin Date    AM    Noon    PM    Bedtime       NEXIUM 40 MG capsule   Cantidad:  90 capsule   Recargas:  3   Dosis:  40 mg   Comentarios:  BRAND NAME ONLY   Medicamento genérico:  esomeprazole    Instrucciones:  Take 1 capsule (40 mg total) by mouth once daily.     Begin Date    AM     Noon    PM    Bedtime       olmesartan 40 MG tablet   También conocido kwaku:  BENICAR   Cantidad:  90 tablet   Recargas:  3   Dosis:  40 mg    Instrucciones:  Take 1 tablet (40 mg total) by mouth once daily.     Begin Date    AM    Noon    PM    Bedtime       tiotropium 18 mcg inhalation capsule   También conocido kwaku:  SPIRIVA WITH HANDIHALER   Cantidad:  90 capsule   Recargas:  3   Dosis:  18 mcg    Instrucciones:  Inhale 1 capsule (18 mcg total) into the lungs once daily.     Begin Date    AM    Noon    PM    Bedtime            Dónde puede recoger shaniqua medicamentos      Estos medicamentos fueron enviados a [facility] [employer] [address] [place] [address] VARGAS LUCAS  [address] AT [facility]  [address] [address]    Horas:  24-horas Teléfono:  [phone]     sucralfate 1 gram tablet                  Por favor traiga a todos las citas de seguimiento:    1. Emmie copia de las instrucciones de shaina.  2. Todos los medicamentos que está tomando preethi momento, en shaniqua envases originales.  3. Identificación y tarjeta de seguro.    Por favor llegue 15 minutos antes de la hora de la jono.    Por favor llame con 24 horas de antelación si tiene que cambiar merritt jono y / o tiempo.        Shaniqua Citas Programadas     Apr 21, 2017 12:00 PM CDT   Follow Up with Kurtis Stockton MD   [facility] ([facility])    [address]  [address]  [place] [address] [phone]              Información de seguimiento     Realice un seguimiento con:  Wayne Frazier MD    Especialidad:  Internal Medicine    Por qué:  As needed    Información de contacto:    [address]  [address]  [place] [address] [phone]          Instrucciones de shaina     [place]es [place]    CT Virtual Colonoscopy Screening     Preguntas:    Reason for Exam:      Is the patient allergic to iodine or contrast? Has a steroid / antihistamine prep been administered?:  No    Is the patient on ANY Metformin  drug such as Glugophage/Glucovance?           Should be off drug 48 hours after contrast. Check renal function before restart.:  No    Age > 60 years?:  Yes    History of Kidney Disease - including: decreased kidney function, dialysis, kidney transplay, single kidney, kidney cancer, kidney surgery?:  None    Does the patient have high blood preasure requiring medical treatment?:  Yes    Diabetes?:  No    May the Radiologist modify the order per protocol to meet the clinical needs of the patient?:  Yes    Recist criteria?:  No    Will this service be billed to a Worker's Comp policy?:  No    Activity as tolerated     Diet general     Preguntas:    Total calories:      Fat restriction, if any:      Protein restriction, if any:      Na restriction, if any:      Fluid restriction:      Additional restrictions:          Instrucciones a mary de shaina       Discharge Summary/Instructions for EGD and Colonoscopy  Brittany Haile  3/2/2017  Cecil Crooks MD    Restrictions on Activity:    - Do not drive car or operate machinery until the day after the procedure.  - The following day: return to full activity including work.  - For 3 days: No heavy lifting, straining or running.  - Diet: Eat and drink normally unless instructed otherwise.    Treatment for Common Side Effects:  - Mild abdominal pain and bloating or excessive gas: rest, eat lightly and use a heating pad.   -Sore Throat - treat with throat lozenges, gargle with warm salt water.    Symptoms to watch for and report to your physician:  1. Severe abdominal pain.  2. Fever within 24 hours after a procedure.  3. A large amount of rectal bleeding. (A small amount of blood from the rectum is not serious, especially if hemorrhoids are present.)  4. Because air was put into your colon during the procedure, expelling large amount of air from your rectum is normal.  5. You may not have a bowel movement for 1-3 days because of the colonoscopy prep. This is normal.  6. Do not  take any products containing aspirin for 10 days.  7. Go directly to the emergency room if you notice any of the following:     Chills and/orfever over 101   Persistent vomiting   Severe abdominal pain, other than gas cramps   Severe chest pain   Black, tarry stools   Any bleeding - exceeding one tablespoon    If you have any questions or problems, please call your Physician:    Cecil Crooks MD    Lab Results: Contact Physician's Office    If a complication or emergency situation arises and you are unable to reach your Physician - GO TO THE EMERGENCY ROOM.          Información de Admisión     Fecha y hora Proveedor Departamento CSN    3/2/2017  6:36 AM Cecil Crooks MD [facility] [id number]      Los proveedores de cuidado     Personal Médico Rol Especialidad Teléfono principal de la oficina    Cecil Crooks MD Attending Provider Gastroenterology [phone]    Henri Ramos MD Consulting Physician  Anesthesiology [phone]    Cecil Crooks MD Surgeon  Gastroenterology [phone]      Shaniqua signos vitales yazmin     PS                   129/46 (BP Location: Right arm, Patient Position: Lying, BP Method: Automatic)           Recent Lab Values        10/21/2015 1/20/2016 11/18/2016                    10:30 PM  9:41 AM  9:41 AM         A1C 6.5 (H) 5.5 5.4         Comment for A1C at  9:41 AM on 11/18/2016:  According to ADA guidelines, hemoglobin A1C <7.0% represents  optimal control in non-pregnant diabetic patients.  Different  metrics may apply to specific populations.   Standards of Medical Care in Diabetes - 2016.  For the purpose of screening for the presence of diabetes:  <5.7%     Consistent with the absence of diabetes  5.7-6.4%  Consistent with increasing risk for diabetes   (prediabetes)  >or=6.5%  Consistent with diabetes  Currently no consensus exists for use of hemoglobin A1C  for diagnosis of diabetes for children.        Pending Labs     Order Current Status     Specimen to Pathology - Surgery Collected (03/02/17 1956)      Alergias     A partir del:  3/2/2017        No Known Allergies      [facility]     [facility] - 24/7    Si merritt médico no le ha indicado a lo contrário, por favor contactar a Ambersthelma de Radha, nuestra línea de cuidado de enfermeras está disponible para asistirle 24/7.    Enfermeras registradas de [facility] pueden ayudarle a reservar emmie jono, proveer educación para la martinez, asesoría clínica, y otros servicios de asesoramiento.     Llame para preethi servicio gratuito a [phone].        Directiva Anticipada     Emmie directiva anticipada es un documento que, en rafaela de que ya no capaz de hacer decisiones por sí mismo, le dice a merritt equipo médico qué tipo de tratamiento quiere o no quiere recibir, o que le gustaría jnaet esas decisiones para usted . Si actualmente no tiene emmie directiva anticipada, [facility] le anima a crear zina. Para más información llame al: [phone] ([phone]), [phone] ([phone]), o entrando en [email or web address].        Smoking Cessation     Si desea dejar de fumar:  · Usted puede ser elegible para recibir servicios gratuitos si usted es un residente de [place] y comenzó a fumar cigarrillos antes del 1 de septiembre de 1988. Llame al Smoking Cessation Trust (SCT) a [phone] o [phone].   Llame 1-800-QUIT-NOW si usted no cumple con los criterios anteriores.            Language Assistance Services     ATTENTION: Language assistance services are available, free of charge. Please call [phone].      ATENCIÓN: Si habla español, tiene a merritt disposición servicios gratuitos de asistencia lingüística. Llame al [phone].     THALIA Ý: N?u b?n nói Ti?ng Vi?t, có các d?ch v? h? tr? ngôn ng? mi?n phí dành cho b?n. G?i s? [phone].        Educación para accidente cerebrovascular       Instrucciones de shaina para un ataque cerebral  Le figueroa diagnosticado un ataque cerebral (llamado también  accidente cerebrovascular). Terrence un ataque cerebral, la cesia su de circular en parte del cerebro, lo cual puede dañar zonas del cerebro que controlan otras partes del cuerpo. Los síntomas después de un ataque cerebral dependen de la parte del cerebro afectada.  Factores de riesgo para un ataque cerebral  Emmie vez que ha tenido un ataque cerebral, tiene mayor riesgo de tener otro. A continuación, se enumeran otros factores que pueden aumentar merritt riesgo de tener otro ataque cerebral:  · Presión arterial shaina  · Colesterol en cesia alto  · Fumar cigarrillos o cigarros  · Diabetes  · Enfermedad de la arteria carótida u otras arterias  · Fibrilación auricular, aleteo auricular u otra enfermedad cardíaca  · Falta de actividad física  · Obesidad  · Ciertos trastornos de la cesia (kwaku la anemia de células falciformes)  · Consumo excesivo de alcohol  · Abuso de drogas ilegales  · Merritt edilberto  · Merritt sexo  · Antecedentes familiares de ataque cerebral  · Emmie dieta con alto contenido de alimentos fritos, grasosos o salados  Cambios en la rutina diaria  Realizar michael tareas habituales quizás le resulte difícil después de bernardo tenido un ataque cerebral, rosalie puede aprender nuevas maneras de manejar michael actividades diarias. De hecho, hacer las actividades diarias puede ayudarle a recuperar la fortaleza muscular y a devolverle el funcionamiento a las extremidades afectadas. Sea paciente, dese tiempo para adaptarse y valore el avance que vaya haciendo.  Actividades diarias    Puede correr el riesgo de caerse. Hkai cambios en merritt casa que le permitan caminar con mayor facilidad. Un terapeuta decidirá si necesita algún dispositivo auxiliar para caminar de manera kc.  Quizás deba adolfo a un terapeuta ocupacional o un fisioterapeuta para aprender nuevas maneras de hacer las cosas. Por ejemplo, puede que necesite hacer ajustes al darse un baño o vestirse.  · Tenga en cuenta las siguientes sugerencias al ducharse o bañarse:  ¨ Pruebe  la temperatura del agua con la mano o el pie no afectados en el ataque cerebral.  ¨ Emplee barras de sujeción, un asiento para la ducha, un regador manual y un cepillo de christina michael.  · Tenga en cuenta las siguientes sugerencias al vestirse:  ¨ Vístase sentado, comenzando por la extremidad o el lado afectados.  ¨ Use camisetas que pueda quitarse fácilmente por encima de la lucy y pantalones o faldas con cintura elástica.  ¨ Use cierres con jose f amarrados a la lengüeta del cierre.  Cambios en merritt estilo de zan  · [place] michael medicamentos exactamente según lo indicado. No se saltee ninguna dosis.  · Merritt proveedor de atención médica le dará información sobre los cambios en la dieta que probablemente necesite hacer según merritt situación. Probablemente le recomiende que consulte a un dietista certificado para que le ayude con esos cambios. Los cambios pueden incluir:  ¨ Reducir el consumo de grasas y colesterol  ¨ Reducir el consumo de sodio (sal), especialmente si tiene la presión arterial shaina  ¨ Julian más verduras y frutas frescas  ¨ Julian proteínas magras, de blake tales kwaku pescado, carne de ave y legumbres (frijoles y chícharos o arvejas) y comer menos lisette sue y procesadas  ¨ Consumir productos lácteos con menos contenido de grasa  ¨ Consumir aceites vegetales o de nueces en cantidad moderada  ¨ Limitar los dulces y los alimentos procesados tales kwaku las patricia fritas (chips), las galletas o los alimentos panificados  · Comience un programa de ejercicios. Consulte con merritt médico sobre la forma en que puede comenzar y qué cantidad de actividad le recomienda que ainsley por día o por semana. Actividades simples, kwaku caminar o trabajar en el jardín, pueden serle de mucho beneficio.  · Reduzca la cantidad de bebidas alcohólicas que lisa. Los hombres no deben beber más de dos copas de bebidas alcohólicas al día. Las mujeres deben limitarse a keegan copa de bebida alcohólica al día.  · Conozca cuál es merritt nivel de  colesterol. Siga las recomendaciones de merritt médico sobre cómo mantener merritt nivel de colesterol controlado.  · Si fuma, deje de hacerlo. Inscríbase en un programa para dejar de fumar, lo que le permitirá tener mayores probabilidades de lograrlo. Hable con merritt médico para adolfo qué medicamentos o qué otros métodos pueden ayudarle a dejar de fumar.  · Aprenda técnicas de manejo del estrés que le ayudarán a sobrellevar la tensión del hogar y del trabajo.  Visitas de control  · Cumpla con michael citas médicas. Un seguimiento cercano es importante para la rehabilitación y recuperación después de un ataque cerebral.  · Algunos medicamentos requieren análisis de cesia para adolfo cuál es merritt avance o si surge algún problema. Cumpla las citas de control para hacerse los análisis de cesia ordenados por michael médicos.     Cuándo debe buscar atención médica  Llame al 911 de inmediato si tiene cualquiera de estos síntomas:  · Debilidad, cosquilleo o pérdida de sensación en un lado de merritt eliel o merritt cuerpo.  · Repentina visión doble o dificultad para adolfo con zina o los dos ojos.  · Problema repentino para hablar o hablar arrastrando las palabras.  · Problemas para comprender lo que otros dicen.  · Dolor de lucy barby y repentino.  · Mareo, pérdida del equilibrio o sensación de caerse.  · Desmayos o convulsiones.  Hay marie signos principales que le indicarán que puede tratarse de un ataque cerebral. Si los ve, tiene que llamar al 911 rápido. Estos signos son:  Caída de la eliel: Un lado de la eliel se  o está adormecido. Cuando la persona sonríe, merritt sonrisa no es uniforme.  Debilidad en un brazo: Un brazo está débil o adormecido. Cuando la persona levanta ambos brazos al mismo tiempo, zina puede caerse.  Dificultad para hablar: Puede notar que la persona tiene problemas para hablar o que arrastra las palabras. La persona no puede repetir correctamente keegan oración simple cuando se lo piden.  Momento de llamar al 911: Si alguien tiene  cualquiera de esos síntomas, incluso aunque desaparezcan, llamen al 911 de inmediato. [place] nota de la hora en la que aparecieron los síntomas por primera vez.   Date Last Reviewed: 8/26/2015  © 0027-2139 [employer]. [address] [place] [address]. Todos los derechos reservados. Esta información no pretende sustituir la atención médica profesional. Sólo merritt médico puede diagnosticar y tratar un problema de martinez.              Instrucciones de shaina para Pneumonmia       Instrucciones de shaina para la neumonía  A usted le figueroa diagnosticado neumonía, keegan infección pulmonar grave. La mayoría de los casos de neumonía son causados por bacterias. La neumonía es más frecuente en las personas de edad avanzada, los niños pequeños y las personas con problemas de martinez crónicos.  Cuidados en la casa  · [place] los medicamentos exactamente kwaku le indiquen, sin omitir ninguna dosis. Continúe tomando los antibióticos kwaku le indiquen hasta que se terminen, aun cuando comience a sentirse mejor, a fin de prevenir la reaparición de la neumonía.  · [place] kwaku mínimo 8 vasos de agua diarios para diluir las secreciones a fin de facilitar merritt expectoración.  · Utilice un humidificador de vapor frío en merritt habitación y límpielo todos los días.  · La expulsión de mucosidad al toser es normal. No tome medicamentos para suprimir la tos, a menos que la tos sea demasiado seca, le produzca dolor o le impida dormir. Podría usar un expectorante si merritt médico lo prescribe.  · Aprenda las técnicas de percusión y drenaje postural. Estas técnicas le ayudarán a expulsar más mucosidad, la cual puede atrapar microbios en los pulmones. Pida instrucciones a merritt proveedor de atención médica y utilice estos métodos 3 veces por día hasta que michael pulmones estén limpios.  · Puede usar compresas tibias o keegan almohadilla calefactora graduada en el nivel más bajo para aliviar el malestar en el pecho. Utilícelas varias veces al día neal 15-20  minutos cada vez. (Para prevenir lesiones a merritt piel, asegúrese de que la temperatura de las compresas o la almohadilla sea tibia y no caliente.)  · Descanse lo suficiente hasta que la fiebre, la falta de aliento y el dolor en el pecho desaparezcan.  · Vacúnese contra la gripe keegan vez al año.  · Consulte con merritt médico sobre la posibilidad de vacunarse contra la neumonía.  Visitas de control  Programe keegan visita de control según le indique el personal médico.     Cuándo debe obtener atención médica  Llame de inmediato al 911 si tiene cualquiera de estos síntomas:  · Dolor en el pecho  · Dificultad para respirar  · Labios o uñas azulados  En otros casos, llame a merritt médico si tiene cualquiera de estos síntomas:  · Fiebre por encima de 101.5°F  · Secreción amarillenta, verdosa, con cesia o maloliente  · Mucosidad excesiva  · Vómito   Date Last Reviewed: 8/7/2014  © 1952-0621 [employer]. [address] [place] [address]. Todos los derechos reservados. Esta información no pretende sustituir la atención médica profesional. Sólo merritt médico puede diagnosticar y tratar un problema de martinez.              Educación Enfermedad Renal Crónica       Instrucciones de shaina para la enfermedad renal crónica  A usted le figueroa diagnosticado enfermedad renal crónica. La enfermedad renal puede ocurrir por muchas causas que incluyen infecciones, diabetes, presión arterial shaina, cálculos renales, problemas circulatorios y reacciones a medicamentos. Tener enfermedad renal significa que deberá hacer muchos cambios en merritt zan. Aprenda todo lo que pueda acerca de la enfermedad renal para adaptarse mejor a esos cambios. Es importante recordar que el objetivo principal del tratatimiento es detener la enfemedad renal crónica (CKD, por michael siglas en inglés) para que no avance hasta provocar insuficiencia renal total. Los tratamientos pueden variar según el avance de esta afección, así que siga siempre las indicaciones de merritt  médico con respecto a los cuidados y al manejo de enfermedad. Estas son algunas medidas que puede janet para ayudar a aliviar merritt enfermedad.  Cambios en la dieta  Siempre hable de merritt dieta con merritt médico antes de hacer cualquier cambio.  Sal (sodio) en merritt dieta  · Según merritt afección, se le puede recomendar comer 1,500 miligramos o menos de sodio por día  · Limite los alimentos procesados, tales kwaku:  ¨ Comidas congeladas y empaquetadas  ¨ Conservas de pescado y lisette  ¨ Alimentos en escabeche  ¨ Aperitivos salados  ¨ Fiambres  ¨ Salsas  ¨ La mayoría de los quesos  ¨ Las comidas rápidas          · No agregue sal a michael alimentos mientras cocina ni antes de comer en la almanza.  · Coma alimentos sin procesar para disminuir el sodio, tales kwaku:  ¨ Lantry y johan fresco  ¨ Carne de res magra  ¨ Atún sin sal  ¨ Pescado fresco  ¨ Frutas y verduras frescas  · Condimente los alimentos con hierbas frescas, ajo, cebollas, cítricos, vinagres aromatizados y mezclas de especias sin sodio en lugar de sal cuando cocine.  · Evite el uso de sustitutos de la sal que son altos en potasio. Consulte con merritt médico o con un dietista registrado sobre el uso de un sustituto de la sal aceptable en merritt dieta.  · Evite beber agua mineralizada debido a merritt contenido de sodio. Asegúrese de leer la etiqueta de la botella de agua para conocer merritt contenido de sodio.  · Evite los medicamentos de venta katherine que contienen bicarbonato de sodio o carbonato de sodio. Susana las etiquetas cuidadosamente.  Potasio en merritt dieta  · Según merritt afección, se le puede recomendar que coma menos de 1,500 miligramos a 2,700 miligramos de potasio al día.  · Vacíe siempre el líquido de los alimentos enlatados (verduras, frutas y lisette) antes de servirlos.  · Evite los panes integrales, el salvado de mavis y los cereales.  · Evite la leche, el gosia de leche y el yogur.  · Evite las nueces, las semillas, la mantequilla de maní, los frijoles secos y los chícharos  (arvejas).  · Evite las galletas de higo, el chocolate y la melaza.  · Evite el uso de sustitutos de la sal que son altos en potasio. Consulte con merritt médico o con un dietista registrado sobre el uso de un sustituto de la sal aceptable en merritt dieta.  Proteína en merritt dieta  · Según merritt afección, merritt médico le explicará las ventajas y desventajas sobre la limitación de proteína de merritt dieta.  · Reduzca el consumo de proteínas. Coma menos carne, productos lácteos, yogur, huevos y queso.  Fósforo en merritt dieta  · Evite la cerveza, el cacao, los refrescos de cola oscura, la cerveza, las bebidas de chocolate, y los tés fríos enlatados.  · Evite el queso, la leche, el helado, el pudín y el yogur.  · Evite el hígado (carne de res, johan), lisette de órganos, ostras, cangrejos y barbara.  · Evite los frijoles (soja, riñón, latoya, garbanzo y del norte), chícharos (johan y split), cereales de salvado, las nueces y los caramelos de leche.  Coma comidas pequeñas y frecuentes que tengan mucha fibra y calorías. También es posible que le recomienden limitar merritt consumo de líquidos.  Otros cuidados en la casa  · Evite desgastarse o fatigarse en extremo.  · Descanse mucho y aumente la cantidad de horas de sueño nocturno.   · Muévase y flexione michael piernas para evitar coágulos de cesia cuando descansa mucho tiempo.   · Pésese todos los días, a la misma hora y con el mismo tipo de ropa. Lleve un registro de merritt peso diario.  · [place] michael medicamentos exactamente kwaku le indiquen.  · Asista a todas michael visitas de control.    · [place] medidas para controlar merritt presión arterial shaina o diabetes. Pídale consejo a merritt médico.  · Hable con merritt médico acerca de la diálisis. Es posible que se beneficie de preethi procedimiento si merritt enfemedad renal crónica está avanzando hacia keegan enfemedad renal terminal.  Visitas de control  Programe keegan visita de control según le indique nuestro personal.  Cuándo debe llamar a merritt médico  Llame a merritt médico de inmediato si nota  cualquiera de estos síntomas:  · Dificultad para comer o janet líquidos  · Pérdida de peso de más de 2 libras en 24 horas o más de 5 libras en 7 días  · [place] o ninguna cantidad de orina  · Dificultad para respirar  · Jessica musculares  · Fiebre de 100.4°F o más shaina, o escalofríos  · Elvis en la orina o en las heces  · Flujo sanguinolento de merritt nariz, boca u oídos  · Dolor de lucy severo o convulsiones  · Vómitos  · Hinchazón de las piernas o los tobillos  · Dolor de pecho (steven simms 911)   Date Last Reviewed: 1/6/2015  © 9038-1666 [employer]. [address] [place] [address]. Todos los derechos reservados. Esta información no pretende sustituir la atención médica profesional. Sólo merritt médico puede diagnosticar y tratar un problema de martinez.              Registrarse para [facility]     La activación de merritt cuenta [facility] es tan fácil kwaku 1-2-3!    1) Ir a [email or web address], seleccione Registrarse Ahora, meter el código de activación y merritt fecha de nacimiento, y seleccione Próximo.    [id number]  Expires: 3/4/2017  9:33 AM      2) Crear un nombre de usuario y contraseña para usar cuando se visita [facility] en el futuro y selecciona keegan pregunta de seguridad en rafaela de que pierda merritt contraseña y seleccione Próximo.    3) Introduzca merritt dirección de correo electrónico y ainsley clic en Registrarse!    Información Adicional  Si tiene alguna pregunta, por favor, e-mail [email or web address] o steven [email or web address] [phone] para hablar con nuestro personal. Recuerde, [facility] no debe ser usada para necesidades urgentes. En rafaela de emergencia médica, llame al 911.         [facility] cumple con las leyes federales aplicables de derechos civiles y no discrimina por motivos de edilberto, color, origen nacional, edad, discapacidad, o sexo.                        [facility]  [address]  [place] [address]  Phone: [phone]           Patient Discharge Instructions     Our goal is to  set you up for success. This packet includes information on your condition, medications, and your home care. It will help you to care for yourself so you don't get sicker and need to go back to the hospital.     Please ask your nurse if you have any questions.        There are many details to remember when preparing to leave the hospital. Here is what you will need to do:    1. Take your medicine. If you are prescribed medications, review your Medication List in the following pages. You may have new medications to  at the pharmacy and others that you'll need to stop taking. Review the instructions for how and when to take your medications. Talk with your doctor or nurses if you are unsure of what to do.     2. Go to your follow-up appointments. Specific follow-up information is listed in the following pages. Your may be contacted by a transition nurse or clinical provider about future appointments. Be sure we have all of the phone numbers to reach you, if needed. Please contact your provider's office if you are unable to make an appointment.     3. Watch for warning signs. Your doctor or nurse will give you detailed warning signs to watch for and when to call for assistance. These instructions may also include educational information about your condition. If you experience any of warning signs to your health, call your doctor.           ** Verificar la lista de medicamentos es correcta y está actualizada. Llevar esto con usted en rafaela de emergencia. Si michael medicamentos figueroa cambiado, por favor notifique a merritt proveedor de atención médica.             Medication List      START taking these medications        Additional Info                      sucralfate 1 gram tablet   Commonly known as:  CARAFATE   Quantity:  120 tablet   Refills:  1   Dose:  1 g    Instructions:  Take 1 tablet (1 g total) by mouth 4 (four) times daily.     Begin Date    AM    Noon    PM    Bedtime         CONTINUE taking these medications         Additional Info                      albuterol 90 mcg/actuation inhaler   Commonly known as:  PROVENTIL HFA   Quantity:  8.5 g   Refills:  6   Dose:  2 puff    Instructions:  Inhale 2 puffs into the lungs every 4 to 6 hours as needed.     Begin Date    AM    Noon    PM    Bedtime       amlodipine 5 MG tablet   Commonly known as:  NORVASC   Quantity:  180 tablet   Refills:  3   Dose:  10 mg    Instructions:  Take 2 tablets (10 mg total) by mouth once daily.     Begin Date    AM    Noon    PM    Bedtime       dicyclomine 20 mg tablet   Commonly known as:  BENTYL   Quantity:  30 tablet   Refills:  1   Dose:  20 mg    Instructions:  Take 1 tablet (20 mg total) by mouth 4 (four) times daily as needed (abdominal pain/spasm).     Begin Date    AM    Noon    PM    Bedtime       dipyridamole-aspirin 200-25 mg  mg Cm12   Commonly known as:  AGGRENOX   Quantity:  180 capsule   Refills:  3   Dose:  1 capsule    Instructions:  Take 1 capsule by mouth 2 (two) times daily.     Begin Date    AM    Noon    PM    Bedtime       docusate sodium 100 MG capsule   Commonly known as:  COLACE   Quantity:  90 capsule   Refills:  3   Dose:  100 mg    Instructions:  Take 1 capsule (100 mg total) by mouth 2 (two) times daily as needed for Constipation.     Begin Date    AM    Noon    PM    Bedtime       DUREZOL 0.05 % Drop ophthalmic solution   Refills:  0   Generic drug:  difluprednate      Begin Date    AM    Noon    PM    Bedtime       escitalopram oxalate 5 MG Tab   Commonly known as:  LEXAPRO   Quantity:  90 tablet   Refills:  3    Instructions:  TAKE 1 TABLET BY MOUTH DAILY     Begin Date    AM    Noon    PM    Bedtime       ferrous sulfate 325 mg (65 mg iron) Tab tablet   Refills:  3      Begin Date    AM    Noon    PM    Bedtime       fluticasone 50 mcg/actuation nasal spray   Commonly known as:  FLONASE   Quantity:  48 g   Refills:  1   Comments:  **Patient requests 90 days supply**    Instructions:  SPRAY ONCE IN EACH  NOSTRIL ONCE DAILY     Begin Date    AM    Noon    PM    Bedtime       fluticasone-salmeterol 250-50 mcg/dose 250-50 mcg/dose diskus inhaler   Commonly known as:  ADVAIR   Quantity:  60 each   Refills:  6   Dose:  1 puff    Instructions:  Inhale 1 puff into the lungs 2 (two) times daily.     Begin Date    AM    Noon    PM    Bedtime       hydrochlorothiazide 12.5 mg capsule   Commonly known as:  MICROZIDE   Quantity:  90 capsule   Refills:  3   Dose:  12.5 mg    Instructions:  Take 1 capsule (12.5 mg total) by mouth once daily.     Begin Date    AM    Noon    PM    Bedtime       hydrocodone-acetaminophen 5-325mg 5-325 mg per tablet   Commonly known as:  NORCO   Quantity:  21 tablet   Refills:  0   Dose:  1 tablet    Instructions:  Take 1 tablet by mouth every 8 (eight) hours as needed for Pain.     Begin Date    AM    Noon    PM    Bedtime       levothyroxine 88 MCG tablet   Commonly known as:  SYNTHROID   Quantity:  90 tablet   Refills:  0    Instructions:  TAKE 1 TABLET BY MOUTH DAILY BEFORE BREAKFAST     Begin Date    AM    Noon    PM    Bedtime       meclizine 25 mg tablet   Commonly known as:  ANTIVERT   Quantity:  30 tablet   Refills:  0   Dose:  25 mg    Instructions:  Take 1 tablet (25 mg total) by mouth every 6 (six) hours as needed for Dizziness.     Begin Date    AM    Noon    PM    Bedtime       NEXIUM 40 MG capsule   Quantity:  90 capsule   Refills:  3   Dose:  40 mg   Comments:  BRAND NAME ONLY   Generic drug:  esomeprazole    Instructions:  Take 1 capsule (40 mg total) by mouth once daily.     Begin Date    AM    Noon    PM    Bedtime       olmesartan 40 MG tablet   Commonly known as:  BENICAR   Quantity:  90 tablet   Refills:  3   Dose:  40 mg    Instructions:  Take 1 tablet (40 mg total) by mouth once daily.     Begin Date    AM    Noon    PM    Bedtime       tiotropium 18 mcg inhalation capsule   Commonly known as:  SPIRIVA WITH HANDIHALER   Quantity:  90 capsule   Refills:  3   Dose:  18 mcg     Instructions:  Inhale 1 capsule (18 mcg total) into the lungs once daily.     Begin Date    AM    Noon    PM    Bedtime            Where to Get Your Medications      These medications were sent to [id number] - [place] - [address] AT [facility]  [address] [address]    Hours:  24-hours Phone:  [phone]     sucralfate 1 gram tablet                  Por favor traiga a todos las citas de seguimiento:    1. Emmie copia de las instrucciones de shaina.  2. Todos los medicamentos que está tomando preethi momento, en michael envases originales.  3. Identificación y tarjeta de seguro.    Por favor llegue 15 minutos antes de la hora de la jono.    Por favor llame con 24 horas de antelación si tiene que cambiar merritt jono y / o tiempo.        Your Scheduled Appointments     Apr 21, 2017 12:00 PM CDT   Follow Up with Kurtis Stockton MD   [facility]    [address]  [address]  [place] [address] [phone]              Follow-up Information     Follow up with Wayne Frazier MD.    Specialty:  Internal Medicine    Why:  As needed    Contact information:    [address]  [address]  [place] [address] [phone]          Discharge Instructions     Future Orders    CT Virtual Colonoscopy Screening     Questions:    Reason for Exam:      Is the patient allergic to iodine or contrast? Has a steroid / antihistamine prep been administered?:  No    Is the patient on ANY Metformin drug such as Glugophage/Glucovance?           Should be off drug 48 hours after contrast. Check renal function before restart.:  No    Age > 60 years?:  Yes    History of Kidney Disease - including: decreased kidney function, dialysis, kidney transplay, single kidney, kidney cancer, kidney surgery?:  None    Does the patient have high blood preasure requiring medical treatment?:  Yes    Diabetes?:  No    May the Radiologist modify the order per  protocol to meet the clinical needs of the patient?:  Yes    Recist criteria?:  No    Will this service be billed to a Worker's Comp policy?:  No    Activity as tolerated     Diet general     Questions:    Total calories:      Fat restriction, if any:      Protein restriction, if any:      Na restriction, if any:      Fluid restriction:      Additional restrictions:          Discharge Instructions       Discharge Summary/Instructions for EGD and Colonoscopy  Brittany Haile  3/2/2017  Cecil Crooks MD    Restrictions on Activity:    - Do not drive car or operate machinery until the day after the procedure.  - The following day: return to full activity including work.  - For 3 days: No heavy lifting, straining or running.  - Diet: Eat and drink normally unless instructed otherwise.    Treatment for Common Side Effects:  - Mild abdominal pain and bloating or excessive gas: rest, eat lightly and use a heating pad.   -Sore Throat - treat with throat lozenges, gargle with warm salt water.    Symptoms to watch for and report to your physician:  1. Severe abdominal pain.  2. Fever within 24 hours after a procedure.  3. A large amount of rectal bleeding. (A small amount of blood from the rectum is not serious, especially if hemorrhoids are present.)  4. Because air was put into your colon during the procedure, expelling large amount of air from your rectum is normal.  5. You may not have a bowel movement for 1-3 days because of the colonoscopy prep. This is normal.  6. Do not take any products containing aspirin for 10 days.  7. Go directly to the emergency room if you notice any of the following:     Chills and/orfever over 101   Persistent vomiting   Severe abdominal pain, other than gas cramps   Severe chest pain   Black, tarry stools   Any bleeding - exceeding one tablespoon    If you have any questions or problems, please call your Physician:    Cecil Crooks MD    Lab Results: Contact Physician's Office    If a  complication or emergency situation arises and you are unable to reach your Physician - GO TO THE EMERGENCY ROOM.          Admission Information     Date & Time Provider Department CSN    3/2/2017  6:36 AM Cecil Crooks MD [facility] [id number]      Care Providers     Provider Role Specialty Primary office phone    Cecil Crooks MD Attending Provider Gastroenterology [phone]    Henri Ramos MD Consulting Physician  Anesthesiology [phone]    Cecil Crooks MD Surgeon  Gastroenterology [phone]      Your Vitals Were     BP                   129/46 (BP Location: Right arm, Patient Position: Lying, BP Method: Automatic)           Recent Lab Values        10/21/2015 1/20/2016 11/18/2016                    10:30 PM  9:41 AM  9:41 AM         A1C 6.5 (H) 5.5 5.4         Comment for A1C at  9:41 AM on 11/18/2016:  According to ADA guidelines, hemoglobin A1C <7.0% represents  optimal control in non-pregnant diabetic patients.  Different  metrics may apply to specific populations.   Standards of Medical Care in Diabetes - 2016.  For the purpose of screening for the presence of diabetes:  <5.7%     Consistent with the absence of diabetes  5.7-6.4%  Consistent with increasing risk for diabetes   (prediabetes)  >or=6.5%  Consistent with diabetes  Currently no consensus exists for use of hemoglobin A1C  for diagnosis of diabetes for children.        Pending Labs     Order Current Status    Specimen to Pathology - Surgery Collected (03/02/17 0830)      Allergies as of 3/2/2017     No Known Allergies      [facility]     [facility] Nurse Care Line - 24/7 Assistance  Unless otherwise directed by your provider, please contact [facility], our nurse care line that is available for 24/7 assistance.     Registered nurses in the [facility] provide clinical advisement, health education, appointment booking, and other advisory services.  Call for this free service at  [phone].        Advance Directives     An advance directive is a document which, in the event you are no longer able to make decisions for yourself, tells your healthcare team what kind of treatment you do or do not want to receive, or who you would like to make those decisions for you.  If you do not currently have an advance directive, [facility] encourages you to create one.  For more information call:  [phone]), [phone] ([phone]),  or log on to [email or web address].        Smoking Cessation     If you would like to quit smoking:   You may be eligible for free services if you are a [place] resident and started smoking cigarettes before September 1, 1988.  Call the Smoking Cessation Trust (SCT) toll free at (166) 792-8164 or (860) 240-1807.   Call 1-684-QUIT-NOW if you do not meet the above criteria.            Language Assistance Services     ATTENTION: Language assistance services are available, free of charge. Please call [phone].      ATENCIÓN: Si habla español, tiene a merritt disposición servicios gratuitos de asistencia lingüística. Llame al [phone].     CHÚ Ý: N?u b?n nói Ti?ng Vi?t, có các d?ch v? h? tr? ngôn ng? mi?n phí dành cho b?n. G?i s? [phone].        Stroke Education              Pneumonmia Discharge Instructions                Chronic [place] Disease Education             [facility] Sign-Up     Activating your [facility] account is as easy as 1-2-3!     1) Visit [email or web address], select Sign Up Now, enter this activation code and your date of birth, then select Next.  [email or web address]  Expires: 3/4/2017  9:33 AM      2) Create a username and password to use when you visit [facility] in the future and select a security question in case you lose your password and select Next.    3) Enter your e-mail address and click Sign Up!    Additional Information  If you have questions, please e-mail [email or web address] or call [phone] to talk to our  [facility] staff. Remember, [facility] is NOT to be used for urgent needs. For medical emergencies, dial 911.          [facility] complies with applicable Federal civil rights laws and does not discriminate on the basis of race, color, national origin, age, disability, or sex.

## 2017-03-02 NOTE — H&P
[facility]    CC: abdominal pain    HPI 76 y.o. female   with past medical history of PVD, COPD, hypothyroidism, DELIA on oral iron therapy who presents with one month of progressively worsening, severe generalized cramping and stabbing abdominal pain not associated with meals, nausea, vomiting, melena or hematochezia.    Past Medical History:   Diagnosis Date    Chronic kidney disease, stage III (moderate) 8/19/2015    COPD (chronic obstructive pulmonary disease)     COPD (chronic obstructive pulmonary disease)     CVA (cerebral infarction)     GERD (gastroesophageal reflux disease)     Hyperlipidemia     Hypertension     PVD (peripheral vascular disease) with claudication 10/13/2015    Stroke     Thyroid disease          Review of Systems  General ROS: negative for chills, fever or weight loss  Cardiovascular ROS: no chest pain or dyspnea on exertion  Gastrointestinal ROS: positive for abdominal pain, change in bowel habits, or black/ bloody stools    Physical Examination  There were no vitals taken for this visit.  General appearance: alert, cooperative, no distress  HENT: Normocephalic, atraumatic, neck symmetrical, no nasal discharge   Lungs: clear to auscultation bilaterally, no dullness to percussion bilaterally  Heart: regular rate and rhythm without rub; no displacement of the PMI   Abdomen: soft, non-tender; bowel sounds normoactive; no organomegaly  Extremities: extremities symmetric; no clubbing, cyanosis, or edema  Neurologic: Alert and oriented X 3, normal strength, normal coordination and gait    Labs:  Lab Results   Component Value Date    WBC 7.43 01/18/2017    HGB 13.6 01/18/2017    HCT 42.2 01/18/2017    MCV 98 01/18/2017     01/18/2017         Imaging:    Assessment:    The patient is a 75 yo female with iron deficiency anemia on oral iron who presents with one month of severe progressively worsening abdominal pain. 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 Her previous colonoscopy in 2014 showed patchy erythema of the ascending and transverse colon. Biopsies showed chronic colitis. Her abdominal pain is possibly secondary to gastritis but also could be secondary to ischemia vs IBD vs malignancy vs IBS. Her symptoms have actually been present for 3-4 years but have only been a little worse over the past month. She has not observed improvement in the past with bentyl. She takes a daily PPI which improves her symptoms somewhat.      Plan:  1. EGD/Colonoscopy today      Cecil Crooks MD   [address] [place] [address], [address]   [place] [address] [phone]

## 2017-03-02 NOTE — DISCHARGE INSTRUCTIONS
Discharge Summary/Instructions for EGD and Colonoscopy  Brittany Haile  3/2/2017  Cecil Crooks MD    Restrictions on Activity:    - Do not drive car or operate machinery until the day after the procedure.  - The following day: return to full activity including work.  - For 3 days: No heavy lifting, straining or running.  - Diet: Eat and drink normally unless instructed otherwise.    Treatment for Common Side Effects:  - Mild abdominal pain and bloating or excessive gas: rest, eat lightly and use a heating pad.   -Sore Throat - treat with throat lozenges, gargle with warm salt water.    Symptoms to watch for and report to your physician:  1. Severe abdominal pain.  2. Fever within 24 hours after a procedure.  3. A large amount of rectal bleeding. (A small amount of blood from the rectum is not serious, especially if hemorrhoids are present.)  4. Because air was put into your colon during the procedure, expelling large amount of air from your rectum is normal.  5. You may not have a bowel movement for 1-3 days because of the colonoscopy prep. This is normal.  6. Do not take any products containing aspirin for 10 days.  7. Go directly to the emergency room if you notice any of the following:     Chills and/orfever over 101   Persistent vomiting   Severe abdominal pain, other than gas cramps   Severe chest pain   Black, tarry stools   Any bleeding - exceeding one tablespoon    If you have any questions or problems, please call your Physician:    Cecil Crooks MD    Lab Results: Contact Physician's Office    If a complication or emergency situation arises and you are unable to reach your Physician - GO TO THE EMERGENCY ROOM.

## 2017-03-02 NOTE — ANESTHESIA PREPROCEDURE EVALUATION
2017  Brittany Haile is a 76 y.o., female w Fe deficiency anemia for EGD & colonoscopy.    PRIOR ANES IN EPIC  None    ANES-RELATED MED/SURG  HTN  HLD  Carotid stenosis  Cerebrovascular disease   - S/p CVA  Subclavian artery stenosis L  COPD  Hypothyroid  Glucose intolerance  CKD III    Past Surgical History:   Procedure Laterality Date     SECTION      CORONARY ANGIOPLASTY  2006    GALLBLADDER SURGERY      HYSTERECTOMY      OOPHORECTOMY       ALLERGIES 2017  No Known Allergies    ANES-RELATED HOME Rx  HCTZ   Spriva  Olmesaratan  Tiotropiuim     Fluticasone-salmeterol (Advair)     Albuterol    [facility] Anesthesia Evaluation         Review of Systems    Wt Readings from Last 3 Encounters:   17 64 kg (141 lb)   17 64.2 kg (141 lb 8.6 oz)   16 65 kg (143 lb 4.8 oz)     Temp Readings from Last 3 Encounters:   17 36.1 °C (97 °F) (Oral)   17 37.1 °C (98.8 °F) (Oral)   16 36.6 °C (97.9 °F) (Oral)     BP Readings from Last 3 Encounters:   17 (!) 133/53   17 (!) 122/58   16 (!) 162/74     Pulse Readings from Last 3 Encounters:   17 72   17 76   16 80         Lab Results   Component Value Date    WBC 7.43 2017    HGB 13.6 2017    HCT 42.2 2017    MCV 98 2017     2017       Chemistry        Component Value Date/Time     2017 0953    K 4.6 2017 0953     2017 0953    CO2 27 2017 0953    BUN 18 2017 0953    CREATININE 1.1 2017 0953    GLU 98 2017 0953        Component Value Date/Time    CALCIUM 9.7 2017 0953    ALKPHOS 116 2017 0953    AST 20 2017 0953    ALT 13 2017 0953    BILITOT 0.2 2017 0953        Lab Results   Component Value Date    ALBUMIN 4.1 2017     CXR 2015-12-10  1.  No significant changes in the  two small pulmonary nodules in the right lung.  2.  No acute cardiopulmonary disease.    EKG 2015-10-21  Normal sinus rhythm  Nonspecific T wave abnormality  Abnormal ECG  When compared with ECG of 21-OCT-2015 14:40,  No significant change was found  Confirmed by Vorhoff    ECHO 2015-10-22    1 - Normal left ventricular systolic function (EF 65-70%).     2 - Left ventricular diastolic dysfunction.     3 - Normal right ventricular systolic function .     4 - The estimated PA systolic pressure is 19 mmHg.      Anesthesia Plan  Type of Anesthesia, risks & benefits discussed:  Anesthesia Type:  MAC  Patient's Preference: MAC  Intra-op Monitoring Plan:   Intra-op Monitoring Plan Comments:   Post Op Pain Control Plan:   Post Op Pain Control Plan Comments:   Induction:   IV  Beta Blocker:         Informed Consent: Patient understands risks and agrees with Anesthesia plan.  Questions answered. Anesthesia consent signed with patient.  ASA Score: 2     Day of Surgery Review of History & Physical:        Anesthesia Plan Notes: 2017-03-01   - no anemia on recent labs   - subclavian artery stenosis L (maybe BP more accurate in R arm)        Ready For Surgery From Anesthesia Perspective.

## 2017-03-02 NOTE — TRANSFER OF CARE
"Anesthesia Transfer of Care Note    Patient: Brittany Haile    Procedure(s) Performed: Procedure(s) (LRB):  ESOPHAGOGASTRODUODENOSCOPY (EGD) (N/A)  COLONOSCOPY (N/A)    Patient location: GI    Anesthesia Type: MAC    Transport from OR: Transported from OR on room air with adequate spontaneous ventilation    Post pain: adequate analgesia    Post assessment: no apparent anesthetic complications    Post vital signs: stable    Level of consciousness: awake and oriented    Nausea/Vomiting: no nausea/vomiting    Complications: none          Last vitals:   Visit Vitals    BP (!) 150/65 (Patient Position: Lying, BP Method: Automatic)    Pulse 77    Temp 36.8 °C (98.3 °F) (Oral)    Resp 20    Ht 5' 1" (1.549 m)    Wt 64.9 kg (143 lb)    SpO2 (!) 92%    BMI 27.02 kg/m2     "

## 2017-03-02 NOTE — ANESTHESIA POSTPROCEDURE EVALUATION
"Anesthesia Post Evaluation    Patient: Brittany Haile    Procedure(s) Performed: Procedure(s) (LRB):  ESOPHAGOGASTRODUODENOSCOPY (EGD) (N/A)  COLONOSCOPY (N/A)    Final Anesthesia Type: MAC  Patient location during evaluation: GI PACU  Patient participation: Yes- Able to Participate  Level of consciousness: oriented and awake  Post-procedure vital signs: reviewed and stable  Pain management: adequate  Airway patency: patent  PONV status at discharge: No PONV  Anesthetic complications: no      Cardiovascular status: blood pressure returned to baseline and hemodynamically stable  Respiratory status: unassisted, spontaneous ventilation and room air  Hydration status: euvolemic  Follow-up not needed.        Visit Vitals    BP (!) 150/65 (Patient Position: Lying, BP Method: Automatic)    Pulse 77    Temp 36.8 °C (98.3 °F) (Oral)    Resp 20    Ht 5' 1" (1.549 m)    Wt 64.9 kg (143 lb)    SpO2 (!) 92%    BMI 27.02 kg/m2       Pain/Cruz Score: Presence of Pain: leeanne (3/2/2017  7:25 AM)      "

## 2017-03-03 VITALS
OXYGEN SATURATION: 94 % | WEIGHT: 143 LBS | BODY MASS INDEX: 27 KG/M2 | TEMPERATURE: 98 F | HEIGHT: 61 IN | SYSTOLIC BLOOD PRESSURE: 140 MMHG | DIASTOLIC BLOOD PRESSURE: 47 MMHG | HEART RATE: 89 BPM | RESPIRATION RATE: 18 BRPM

## 2017-03-07 ENCOUNTER — TELEPHONE (OUTPATIENT)
Dept: NEUROLOGY | Facility: HOSPITAL | Age: 77
End: 2017-03-07

## 2017-03-07 NOTE — TELEPHONE ENCOUNTER
----- Message from Cecil Crooks MD sent at 3/7/2017 10:49 AM CST -----  Please call patient and let her know her stomach biopsy was normal.  She will need to be scheduled for virtual colonoscopy which has been ordered.

## 2017-03-11 DIAGNOSIS — K21.9 GASTROESOPHAGEAL REFLUX DISEASE WITHOUT ESOPHAGITIS: ICD-10-CM

## 2017-03-12 RX ORDER — ESOMEPRAZOLE MAGNESIUM 40 MG/1
CAPSULE, DELAYED RELEASE ORAL
Qty: 90 CAPSULE | Refills: 0 | Status: SHIPPED | OUTPATIENT
Start: 2017-03-12 | End: 2017-03-29 | Stop reason: SDUPTHER

## 2017-03-15 ENCOUNTER — TELEPHONE (OUTPATIENT)
Dept: FAMILY MEDICINE | Facility: CLINIC | Age: 77
End: 2017-03-15

## 2017-03-15 DIAGNOSIS — K21.9 GASTROESOPHAGEAL REFLUX DISEASE WITHOUT ESOPHAGITIS: ICD-10-CM

## 2017-03-15 NOTE — TELEPHONE ENCOUNTER
Called [facility] to verify prescription was received.  Script ready per [facility].  Called patient to let her know she could  her prescription from there at any time.  Patient verbalized understanding.

## 2017-03-15 NOTE — TELEPHONE ENCOUNTER
----- Message from Pia Shabazz sent at 3/15/2017  9:31 AM CDT -----  Contact: [phone] Tiki (daughter)  The patient is out of her Nexium. The pharmacy has been trying to get in touch with Dr. Frazier's staff for a few days now. She has a pain in her stomach from not having her medication. The patient would like a return call.

## 2017-03-27 ENCOUNTER — TELEPHONE (OUTPATIENT)
Dept: NEUROLOGY | Facility: HOSPITAL | Age: 77
End: 2017-03-27

## 2017-03-27 NOTE — TELEPHONE ENCOUNTER
----- Message from Rossi Leon sent at 3/27/2017  9:32 AM CDT -----  GI- Patient called stating she is having a lot of stomach pain. She states she doesn't have a ride to come in today. Please call patient back at [phone]. Thanks

## 2017-03-29 ENCOUNTER — OFFICE VISIT (OUTPATIENT)
Dept: NEUROLOGY | Facility: HOSPITAL | Age: 77
End: 2017-03-29
Attending: INTERNAL MEDICINE
Payer: MEDICARE

## 2017-03-29 VITALS
BODY MASS INDEX: 25.86 KG/M2 | DIASTOLIC BLOOD PRESSURE: 59 MMHG | HEART RATE: 83 BPM | SYSTOLIC BLOOD PRESSURE: 142 MMHG | TEMPERATURE: 98 F | HEIGHT: 61 IN | WEIGHT: 137 LBS

## 2017-03-29 DIAGNOSIS — R10.10 PAIN OF UPPER ABDOMEN: Primary | ICD-10-CM

## 2017-03-29 PROCEDURE — 99215 OFFICE O/P EST HI 40 MIN: CPT | Performed by: INTERNAL MEDICINE

## 2017-03-29 RX ORDER — CHLORDIAZEPOXIDE HYDROCHLORIDE AND CLIDINIUM BROMIDE 5; 2.5 MG/1; MG/1
1 CAPSULE ORAL EVERY 8 HOURS PRN
Qty: 50 CAPSULE | Refills: 2 | Status: SHIPPED | OUTPATIENT
Start: 2017-03-29 | End: 2017-05-23

## 2017-03-29 NOTE — PROGRESS NOTES
[facility]    CC: abdominal pain    HPI 76 y.o. female with past medical history of PVD, COPD, hypothyroidism, DELIA on oral iron therapy who presents with 4 year history of progressively worsening, severe, generalized cramping and stabbing abdominal pain not associated with vomiting, melena or hematochezia.  Pain is associated with nausea, no vomiting.  She states she has chronically experienced early satiety and is only able to eat small eats or she will have abdominal discomfort and eventually pain if she overeats. Her symptoms have actually been present for 3-4 years but have only been a little worse over the past month.    Patient was seen for the same issue almost a month ago where she had a EGD showing erosive gastritis and colonoscopy showing extensive diverticulosis and resultant poorly visualized R colon.  Patient has a virtual colonoscopy pending for eval of right colon.    Chart history reviewed.    Past Medical History:   Diagnosis Date    Chronic kidney disease, stage III (moderate) 8/19/2015    COPD (chronic obstructive pulmonary disease)     COPD (chronic obstructive pulmonary disease)     CVA (cerebral infarction)     GERD (gastroesophageal reflux disease)     Hyperlipidemia     Hypertension     PVD (peripheral vascular disease) with claudication 10/13/2015    Stroke     Thyroid disease      Review of Systems  General ROS: negative for chills, fever or weight loss  Cardiovascular ROS: no chest pain or dyspnea on exertion  Gastrointestinal ROS: positive abdominal pain; no vomiting     Physical Examination    General appearance: alert, cooperative, no distress  HENT: Normocephalic, atraumatic, neck symmetrical, no nasal discharge   Lungs: clear to auscultation bilaterally, no dullness to percussion bilaterally  Heart: regular rate and rhythm without rub; no displacement of the PMI   Abdomen: soft, non-tender; bowel sounds normoactive; no organomegaly  Extremities: extremities  symmetric; no clubbing, cyanosis, or edema  Neurologic: Alert and oriented X 3, normal strength    Labs:  Lab Results   Component Value Date    WBC 7.43 01/18/2017    HGB 13.6 01/18/2017    HCT 42.2 01/18/2017    MCV 98 01/18/2017     01/18/2017     BMP  Lab Results   Component Value Date     01/18/2017    K 4.6 01/18/2017     01/18/2017    CO2 27 01/18/2017    BUN 18 01/18/2017    CREATININE 1.1 01/18/2017    CALCIUM 9.7 01/18/2017    ANIONGAP 12 01/18/2017    ESTGFRAFRICA 56 (A) 01/18/2017    EGFRNONAA 48 (A) 01/18/2017     Imaging:  Previous EGD and Cscope reports reviewed.    Ct Abd reviewed. Radiology read reviewed.    CT virtual cscope pending    Assessment:     The patient is a 77 yo female with iron deficiency anemia on oral iron who presents with 4 years history of recurrent abdominal pain. 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  Concern that symptoms are ischemia related.  She has not observed improvement in the past with bentyl. She takes a daily PPI which improves her symptoms somewhat.     Plan:  Stop bentyl and carafate   Trial of Librax   Refer to Dr. Brooks for evaluation for possible mesenteric artery stent although her symptoms are somewhat atypical  Trial of NTG potentially in the future then TCA         Cecil Crooks MD   [address] [place] [address], [address]   [place] [id number] [phone]

## 2017-03-29 NOTE — MR AVS SNAPSHOT
[facility] [address]  [place] [address]  Phone: [phone]  Fax: [phone]                  Brittany Haile   3/29/2017 1:15 PM   Office Visit    Descripción:  Female : 1940   Personal Médico:  Cecil Crooks MD   Departamento:  [facility]           Razón de la jono     Follow-up           Diagnósticos de Esta Visita        Comentarios    Pain of upper abdomen    -  Primario            Lista de tareas           Metas (5 Years of Data)     Ninguna      Recetas para recoger        Disp Refills Start End    chlordiazepoxide-clidinium 5-2.5 mg (LIBRAX) 5-2.5 mg Cap 50 capsule 2 3/29/2017     Take 1 capsule by mouth every 8 (eight) hours as needed. - Oral    Farmacia: [facility] - [place], [place] - [address] AT [facility] No[address] de tlfo: #: [phone]         Aprilthelma en Llamada     [facility] de Enfermeras - Asistencia   Enfermeras registradas de [facility] pueden ayudarle a reservar keegan jono, proveer educación para la martinez, asesoría clínica, y otros servicios de asesoramiento.   Llame para preethi servicio gratuito a [phone].             Medicamentos           Mensaje sobre Medicamentos     Verificar los cambios y / o adiciones a merritt régimen de medicación son los mismos que discutir con merritt médico. Si cualquiera de estos cambios o adiciones son incorrectos, por favor notifique a merritt proveedor de atención médica.        EMPEZAR a janet estos medicamentos NUEVOS        Refills    chlordiazepoxide-clidinium 5-2.5 mg (LIBRAX) 5-2.5 mg Cap 2    Sig: Take 1 capsule by mouth every 8 (eight) hours as needed.    Categoría: Print    Vía: Oral      DEJAR de janet estos medicamentos     dicyclomine (BENTYL) 20 mg tablet Take 1 tablet (20 mg total) by mouth 4 (four) times daily as needed (abdominal pain/spasm).    sucralfate (CARAFATE) 1 gram tablet Take 1 tablet (1 g total) by mouth 4 (four) times daily.     olmesartan (BENICAR) 40 MG tablet Take 1 tablet (40 mg total) by mouth once daily.           Verifique que la siguiente lista de medicamentos es keegan representación exacta de los medicamentos que está tomando actualmente. Si no hay ningunos reportados, la lista puede estar en acevedo. Si no es correcta, por favor póngase en contacto con merritt proveedor de atención médica. Lleve esta lista con usted en rafaela de emergencia.           Medicamentos Actuales     albuterol (PROVENTIL HFA) 90 mcg/actuation inhaler Inhale 2 puffs into the lungs every 4 to 6 hours as needed.    amlodipine (NORVASC) 5 MG tablet Take 2 tablets (10 mg total) by mouth once daily.    chlordiazepoxide-clidinium 5-2.5 mg (LIBRAX) 5-2.5 mg Cap Take 1 capsule by mouth every 8 (eight) hours as needed.    dipyridamole-aspirin 200-25 mg (AGGRENOX)  mg CM12 Take 1 capsule by mouth 2 (two) times daily.    docusate sodium (COLACE) 100 MG capsule Take 1 capsule (100 mg total) by mouth 2 (two) times daily as needed for Constipation.    DUREZOL 0.05 % Drop ophthalmic solution     escitalopram oxalate (LEXAPRO) 5 MG Tab TAKE 1 TABLET BY MOUTH DAILY    ferrous sulfate 325 mg (65 mg iron) Tab tablet     fluticasone (FLONASE) 50 mcg/actuation nasal spray SPRAY ONCE IN EACH NOSTRIL ONCE DAILY    fluticasone-salmeterol 250-50 mcg/dose (ADVAIR) 250-50 mcg/dose diskus inhaler Inhale 1 puff into the lungs 2 (two) times daily.    hydrochlorothiazide (MICROZIDE) 12.5 mg capsule Take 1 capsule (12.5 mg total) by mouth once daily.    hydrocodone-acetaminophen 5-325mg (NORCO) 5-325 mg per tablet Take 1 tablet by mouth every 8 (eight) hours as needed for Pain.    levothyroxine (SYNTHROID) 88 MCG tablet TAKE 1 TABLET BY MOUTH DAILY BEFORE BREAKFAST    meclizine (ANTIVERT) 25 mg tablet Take 1 tablet (25 mg total) by mouth every 6 (six) hours as needed for Dizziness.    NEXIUM 40 mg capsule Take 1 capsule (40 mg total) by mouth once daily.    tiotropium (SPIRIVA WITH  "HANDIHALER) 18 mcg inhalation capsule Inhale 1 capsule (18 mcg total) into the lungs once daily.           Información de referencia clínica           Shaniqua signos vitales yazmin     PS Pulso Temperatura [place] Peso BMI (IMC)    142/59 83 97.6 °F (36.4 °C) (Oral) 5' 1" (1.549 m) 62.1 kg (137 lb) 25.89 kg/m2      Blood Pressure          Most Recent Value    BP  (!)  142/59      Alergias     A partir del:  3/29/2017        No Known Allergies      Vacunas     Administradas en la fecha de la visita:  3/29/2017        None      Orders Placed During Today's Visit      Órdenes normales de esta visita    Ambulatory Consult to Cardiology       Registrarse para [facility]     La activación de merritt cuenta [facility] es tan fácil kwaku 1-2-3!    1) Ir a [email or web address], seleccione Registrarse Ahora, meter el código de activación y merritt fecha de nacimiento, y seleccione Próximo.    [id number]  Expires: 5/13/2017  2:29 PM      2) Crear un nombre de usuario y contraseña para usar cuando se visita [facility] en el futuro y selecciona keegan pregunta de seguridad en rafaela de que pierda merritt contraseña y seleccione Próximo.    3) Introduzca merritt dirección de correo electrónico y [facility] en Registrarse!    Información Adicional  Si tiene alguna pregunta, por favor, e-mail [email or web address] o llame al [phone] para hablar con nuestro personal. Recuerde, [facility] no debe ser usada para necesidades urgentes. En rafaela de emergencia médica, llame al [phone].        Instrucciones    RX given for Librax to bring to your pharmacy    Someone from Cardiology will contact you to schedule an appointment with Dr. Brooks            Smoking Cessation     Si desea dejar de fumar:  · Usted puede ser elegible para recibir servicios gratuitos si usted es un residente de [place] y comenzó a fumar cigarrillos antes del 1 de septiembre de 1988. Llame al Smoking Cessation Trust (SCT) a [phone] o [phone].   Llame 1-800-QUIT-NOW si usted no cumple " con los criterios anteriores.            Language Assistance Services     ATTENTION: Language assistance services are available, free of charge. Please call [phone].      ATENCIÓN: Si habla estuardo, tiene a merritt disposición servicios gratuitos de asistencia lingüística. Llame al [phone].     CHÚ Ý: N?u b?n nói Ti?ng Vi?t, có các d?ch v? h? tr? ngôn ng? mi?n phí dành cho b?n. G?i s? [phone].         [facility] cumple con las leyes federales aplicables de derechos civiles y no discrimina por motivos de edilberto, color, origen nacional, edad, discapacidad, o sexo.                 Brittany Haile   3/29/2017 1:15 PM   Office Visit    Description:  Female : 1940   Provider:  Cecil Crooks MD   Department:  [facility]           Reason for Visit     Follow-up           Diagnoses this Visit        Comments    Pain of upper abdomen    -  Primary            To Do List           Goals     None       These Medications        Disp Refills Start End    chlordiazepoxide-clidinium 5-2.5 mg (LIBRAX) 5-2.5 mg Cap 50 capsule 2 3/29/2017     Take 1 capsule by mouth every 8 (eight) hours as needed. - Oral    Pharmacy: [facility] [address] [place] [address] [place] [address] AT [facility] [address] Ph #: [phone]         [facility]     [facility] Nurse Care Line -  Assistance  Unless otherwise directed by your provider, please contact [facility], our nurse care line that is available for  assistance.     Registered nurses in the [facility] provide: appointment scheduling, clinical advisement, health education, and other advisory services.  Call: [phone] (toll free)               Medications           Message regarding Medications     Verify the changes and/or additions to your medication regime listed below are the same as discussed with your clinician today.  If any of these changes or additions are  incorrect, please notify your healthcare provider.        START taking these NEW medications        Refills    chlordiazepoxide-clidinium 5-2.5 mg (LIBRAX) 5-2.5 mg Cap 2    Sig: Take 1 capsule by mouth every 8 (eight) hours as needed.    Class: Print    Route: Oral      STOP taking these medications     dicyclomine (BENTYL) 20 mg tablet Take 1 tablet (20 mg total) by mouth 4 (four) times daily as needed (abdominal pain/spasm).    sucralfate (CARAFATE) 1 gram tablet Take 1 tablet (1 g total) by mouth 4 (four) times daily.    olmesartan (BENICAR) 40 MG tablet Take 1 tablet (40 mg total) by mouth once daily.           Verify that the below list of medications is an accurate representation of the medications you are currently taking.  If none reported, the list may be blank. If incorrect, please contact your healthcare provider. Carry this list with you in case of emergency.           Current Medications     albuterol (PROVENTIL HFA) 90 mcg/actuation inhaler Inhale 2 puffs into the lungs every 4 to 6 hours as needed.    amlodipine (NORVASC) 5 MG tablet Take 2 tablets (10 mg total) by mouth once daily.    chlordiazepoxide-clidinium 5-2.5 mg (LIBRAX) 5-2.5 mg Cap Take 1 capsule by mouth every 8 (eight) hours as needed.    dipyridamole-aspirin 200-25 mg (AGGRENOX)  mg CM12 Take 1 capsule by mouth 2 (two) times daily.    docusate sodium (COLACE) 100 MG capsule Take 1 capsule (100 mg total) by mouth 2 (two) times daily as needed for Constipation.    DUREZOL 0.05 % Drop ophthalmic solution     escitalopram oxalate (LEXAPRO) 5 MG Tab TAKE 1 TABLET BY MOUTH DAILY    ferrous sulfate 325 mg (65 mg iron) Tab tablet     fluticasone (FLONASE) 50 mcg/actuation nasal spray SPRAY ONCE IN EACH NOSTRIL ONCE DAILY    fluticasone-salmeterol 250-50 mcg/dose (ADVAIR) 250-50 mcg/dose diskus inhaler Inhale 1 puff into the lungs 2 (two) times daily.    hydrochlorothiazide (MICROZIDE) 12.5 mg capsule Take 1 capsule (12.5 mg total) by  "mouth once daily.    hydrocodone-acetaminophen 5-325mg (NORCO) 5-325 mg per tablet Take 1 tablet by mouth every 8 (eight) hours as needed for Pain.    levothyroxine (SYNTHROID) 88 MCG tablet TAKE 1 TABLET BY MOUTH DAILY BEFORE BREAKFAST    meclizine (ANTIVERT) 25 mg tablet Take 1 tablet (25 mg total) by mouth every 6 (six) hours as needed for Dizziness.    NEXIUM 40 mg capsule Take 1 capsule (40 mg total) by mouth once daily.    tiotropium (SPIRIVA WITH HANDIHALER) 18 mcg inhalation capsule Inhale 1 capsule (18 mcg total) into the lungs once daily.           Clinical Reference Information           Your Vitals Were     BP Pulse Temp Height Weight BMI    142/59 83 97.6 °F (36.4 °C) (Oral) 5' 1" (1.549 m) 62.1 kg (137 lb) 25.89 kg/m2      Blood Pressure          Most Recent Value    BP  (!)  142/59      Allergies as of 3/29/2017     No Known Allergies      Immunizations Administered on Date of Encounter - 3/29/2017     None      Orders Placed During Today's Visit      Normal Orders This Visit    Ambulatory Consult to Cardiology       [facility] Sign-Up     Activating your [facility] account is as easy as 1-2-3!     1) Visit [email or web address], select Sign Up Now, enter this activation code and your date of birth, then select Next.  [id number]  Expires: 5/13/2017  2:29 PM      2) Create a username and password to use when you visit [facility] in the future and select a security question in case you lose your password and select Next.    3) Enter your e-mail address and click Sign Up!    Additional Information  If you have questions, please e-mail [email or web address] or call [phone] to talk to our [facility] staff. Remember, [facility] is NOT to be used for urgent needs. For medical emergencies, dial 911.         Instructions    RX given for Librax to bring to your pharmacy    Someone from Cardiology will contact you to schedule an appointment with Dr. Brooks            Smoking Cessation     If you would like to " quit smoking:   You may be eligible for free services if you are a [place] resident and started smoking cigarettes before September 1, 1988.  Call the Smoking Cessation Trust (SCT) toll free at (767) 222-3411 or ([phone] [phone].   Call 1-800-QUIT-NOW if you do not meet the above criteria.   Contact us via email: [email or web address]   View our website for more information: [email or web address]        Language Assistance Services     ATTENTION: Language assistance services are available, free of charge. Please call [phone].      ATENCIÓN: Si habla español, tiene a merritt disposición servicios gratuitos de asistencia lingüística. Llame al [phone].     CHÚ Ý: N?u b?n nói Ti?ng Vi?t, có các d?ch v? h? tr? ngôn ng? mi?n phí dành cho b?n. G?i s? [phone].         [facility] complies with applicable Federal civil rights laws and does not discriminate on the basis of race, color, national origin, age, disability, or sex.

## 2017-03-29 NOTE — PATIENT INSTRUCTIONS
RX given for Librax to bring to your pharmacy    Someone from Cardiology will contact you to schedule an appointment with Dr. Brooks

## 2017-03-30 ENCOUNTER — TELEPHONE (OUTPATIENT)
Dept: CARDIOLOGY | Facility: CLINIC | Age: 77
End: 2017-03-30

## 2017-03-30 NOTE — TELEPHONE ENCOUNTER
----- Message from Marilee Metcalf LPN sent at 3/30/2017  2:13 PM CDT -----  Pt is in need of cardiology consult per request of Dr. Crooks for concern for Ischemia driven abdominal pain, consider stent. Please contact pt to schedule for an appointment as soon as possible.     Thanks!   Marilee

## 2017-04-07 ENCOUNTER — TELEPHONE (OUTPATIENT)
Dept: FAMILY MEDICINE | Facility: CLINIC | Age: 77
End: 2017-04-07

## 2017-04-07 RX ORDER — ESCITALOPRAM OXALATE 10 MG/1
10 TABLET ORAL DAILY
Qty: 30 TABLET | Refills: 2 | Status: SHIPPED | OUTPATIENT
Start: 2017-04-07 | End: 2017-10-03

## 2017-04-07 NOTE — TELEPHONE ENCOUNTER
Patient extremely depressed. She is currently on Lexapro 5 mg every day. Would you consider increasing her dose. She did verbalize that she is not suicidal.

## 2017-04-07 NOTE — TELEPHONE ENCOUNTER
----- Message from Milagros Spears sent at 4/7/2017  8:43 AM CDT -----  Patient's daughter, [place], called.   No. [phone]   Patient is depressed.  [place] would like to speak to you about adjusting her medication.   She is on Lexapro.

## 2017-04-13 ENCOUNTER — OFFICE VISIT (OUTPATIENT)
Dept: CARDIOLOGY | Facility: CLINIC | Age: 77
End: 2017-04-13
Payer: MEDICARE

## 2017-04-13 VITALS
SYSTOLIC BLOOD PRESSURE: 146 MMHG | WEIGHT: 133.38 LBS | HEIGHT: 61 IN | DIASTOLIC BLOOD PRESSURE: 70 MMHG | OXYGEN SATURATION: 94 % | HEART RATE: 80 BPM | BODY MASS INDEX: 25.18 KG/M2

## 2017-04-13 DIAGNOSIS — E78.2 MIXED HYPERLIPIDEMIA: ICD-10-CM

## 2017-04-13 DIAGNOSIS — R10.84 GENERALIZED ABDOMINAL PAIN: ICD-10-CM

## 2017-04-13 DIAGNOSIS — Z72.0 TOBACCO ABUSE: ICD-10-CM

## 2017-04-13 DIAGNOSIS — I73.9 PVD (PERIPHERAL VASCULAR DISEASE) WITH CLAUDICATION: ICD-10-CM

## 2017-04-13 DIAGNOSIS — K55.1 ANGINA MESENTERIC: ICD-10-CM

## 2017-04-13 DIAGNOSIS — I25.10 CORONARY ARTERY DISEASE INVOLVING NATIVE CORONARY ARTERY OF NATIVE HEART WITHOUT ANGINA PECTORIS: ICD-10-CM

## 2017-04-13 DIAGNOSIS — I65.22 STENOSIS OF LEFT CAROTID ARTERY: ICD-10-CM

## 2017-04-13 DIAGNOSIS — N18.30 CHRONIC KIDNEY DISEASE, STAGE III (MODERATE): ICD-10-CM

## 2017-04-13 DIAGNOSIS — I77.1 SUBCLAVIAN ARTERY STENOSIS, LEFT: Primary | ICD-10-CM

## 2017-04-13 PROCEDURE — 3077F SYST BP >= 140 MM HG: CPT | Mod: S$GLB,,, | Performed by: INTERNAL MEDICINE

## 2017-04-13 PROCEDURE — 1125F AMNT PAIN NOTED PAIN PRSNT: CPT | Mod: S$GLB,,, | Performed by: INTERNAL MEDICINE

## 2017-04-13 PROCEDURE — 1160F RVW MEDS BY RX/DR IN RCRD: CPT | Mod: S$GLB,,, | Performed by: INTERNAL MEDICINE

## 2017-04-13 PROCEDURE — 3078F DIAST BP <80 MM HG: CPT | Mod: S$GLB,,, | Performed by: INTERNAL MEDICINE

## 2017-04-13 PROCEDURE — 99999 PR PBB SHADOW E&M-EST. PATIENT-LVL III: CPT | Mod: PBBFAC,,, | Performed by: INTERNAL MEDICINE

## 2017-04-13 PROCEDURE — 1159F MED LIST DOCD IN RCRD: CPT | Mod: S$GLB,,, | Performed by: INTERNAL MEDICINE

## 2017-04-13 PROCEDURE — 99499 UNLISTED E&M SERVICE: CPT | Mod: S$GLB,,, | Performed by: INTERNAL MEDICINE

## 2017-04-13 PROCEDURE — 99204 OFFICE O/P NEW MOD 45 MIN: CPT | Mod: S$GLB,,, | Performed by: INTERNAL MEDICINE

## 2017-04-13 PROCEDURE — 1157F ADVNC CARE PLAN IN RCRD: CPT | Mod: S$GLB,,, | Performed by: INTERNAL MEDICINE

## 2017-04-13 RX ORDER — OLMESARTAN MEDOXOMIL 20 MG/1
20 TABLET, FILM COATED ORAL DAILY
Refills: 1 | COMMUNITY
Start: 2017-04-06 | End: 2017-05-23

## 2017-04-13 NOTE — PROGRESS NOTES
Subjective:   Patient ID:  Brittany Haile is a 76 y.o. female who presents for evaluation and treatment of Claudication; Hypertension; Hyperlipidemia; Peripheral Arterial Disease; and mesenteric angina      HPI:       She is here today with her daughter [place] by recommendation of Dr. Crooks (GI), Dr. Frazier (PCP), and Dr. Stockton (CAR) for treatment of abdominal pain concerning for mesenteric angina. It mostly occurs after meals. She lost 20 lbs in the past 18 months. She has extensive PAD. She is s/p R CEA. She has bilateral marlee IIb claudication. She has L subclavian stenosis with a 50 mmHg gradient with intermittent claudication. She has coronary artery calcification noted on CT scan. She has CT scan without contrast that revealed aortic atherosclerosis but unable to tell for sure about the degree of stenosis. She smokes. She has HTN, HLP, PAD, and glucose intolerance. She did not tolerate lipitor in the past.        Echo and stress test 10/2015 -negative ischemic work up        I reviewed her CT of abdomen and previous arterial ultrasound            Patient Active Problem List    Diagnosis Date Noted    Angina mesenteric 04/13/2017    Tobacco abuse 04/13/2017    Coronary artery disease involving native coronary artery of native heart without angina pectoris 04/13/2017         Arterial calcification noted incidentally on CT scan  No angina            Abdominal pain 03/02/2017    Gastritis 03/02/2017    CVD (cerebrovascular disease) 10/14/2016    Subclavian artery stenosis, left 04/15/2016    Glucose intolerance (impaired glucose tolerance) 11/11/2015    Leg pain, bilateral 10/13/2015    PVD (peripheral vascular disease) with claudication 10/13/2015         Diminished pedal pulses        Chronic kidney disease, stage III (moderate) 08/19/2015    COPD (chronic obstructive pulmonary disease) 06/04/2015    HLD (hyperlipidemia) 06/04/2015    Incidental pulmonary nodule, > 3mm and < 8mm 08/01/2013     Stroke 2013    Carotid stenosis 2013     \    S/p L CEA Dr. Mckinley      Gastroesophageal reflux disease 2013    Hypothyroid 2013    Hypertension 2013    Anxiety 2013           Right Arm BP - Sittin/70  Left Arm BP - Sittin/40        LABS    LAST HbA1c  Lab Results   Component Value Date    HGBA1C 5.4 2016       Lipid panel  Lab Results   Component Value Date    CHOL 256 (H) 2016    CHOL 160 10/22/2015    CHOL 160 2013     Lab Results   Component Value Date    HDL 53 2016    HDL 45 10/22/2015    HDL 35 (L) 2013     Lab Results   Component Value Date    LDLCALC 175.6 (H) 2016    LDLCALC 85.2 10/22/2015    LDLCALC 108.2 2013     Lab Results   Component Value Date    TRIG 137 2016    TRIG 149 10/22/2015    TRIG 84 2013     Lab Results   Component Value Date    CHOLHDL 20.7 2016    CHOLHDL 28.1 10/22/2015    CHOLHDL 21.9 2013            Review of Systems   Constitution: Positive for weight loss (20 lbs in the past 18 months). Negative for diaphoresis, weakness, night sweats and weight gain.   HENT: Negative for congestion.    Eyes: Negative for blurred vision, discharge and double vision.   Cardiovascular: Positive for claudication (bilteral legs and L arm). Negative for chest pain, cyanosis, dyspnea on exertion, irregular heartbeat, leg swelling, near-syncope, orthopnea, palpitations, paroxysmal nocturnal dyspnea and syncope.   Respiratory: Negative for cough, shortness of breath and wheezing.    Endocrine: Negative for cold intolerance, heat intolerance and polyphagia.   Hematologic/Lymphatic: Negative for adenopathy and bleeding problem. Does not bruise/bleed easily.   Skin: Negative for dry skin and nail changes.   Musculoskeletal: Negative for arthritis, back pain, falls, joint pain, myalgias and neck pain.   Gastrointestinal: Positive for abdominal pain (after meals). Negative for bloating, change  in bowel habit and constipation.   Genitourinary: Negative for bladder incontinence, dysuria, flank pain, genital sores and missed menses.   Neurological: Negative for aphonia, brief paralysis, difficulty with concentration and dizziness.   Psychiatric/Behavioral: Negative for altered mental status and memory loss. The patient does not have insomnia.    Allergic/Immunologic: Negative for environmental allergies.       Objective:   Physical Exam   Constitutional: She is oriented to person, place, and time. She appears well-developed and well-nourished. She is not intubated.   Uses a wheelchair to ambulate   HENT:   Head: Normocephalic and atraumatic.   Right Ear: External ear normal.   Left Ear: External ear normal.   Mouth/Throat: Oropharynx is clear and moist.   Eyes: Conjunctivae and EOM are normal. Pupils are equal, round, and reactive to light. Right eye exhibits no discharge. Left eye exhibits no discharge. No scleral icterus.   Neck: Normal range of motion. Neck supple. Normal carotid pulses, no hepatojugular reflux and no JVD present. Carotid bruit is not present. No tracheal deviation present. No thyromegaly present.   Cardiovascular: Normal rate, regular rhythm, S1 normal and S2 normal.   No extrasystoles are present. PMI is not displaced.  Exam reveals no gallop, no S3, no distant heart sounds, no friction rub and no midsystolic click.    No murmur heard.  Pulses:       Carotid pulses are 2+ on the right side with bruit, and 2+ on the left side with bruit.       Radial pulses are 2+ on the right side, and 1+ on the left side.        Femoral pulses are 2+ on the right side, and 2+ on the left side.       Popliteal pulses are 2+ on the right side, and 2+ on the left side.        Dorsalis pedis pulses are 0 on the right side, and 0 on the left side.        Posterior tibial pulses are 0 on the right side, and 0 on the left side.   Monophasic R DP without  R PT doppler signals    Monophasic L DP and biphasic L  PT doppler signals   Pulmonary/Chest: Effort normal and breath sounds normal. No accessory muscle usage or stridor. No apnea, no tachypnea and no bradypnea. She is not intubated. No respiratory distress. She has no decreased breath sounds. She has no wheezes. She has no rales. She exhibits no tenderness and no bony tenderness.   Abdominal: She exhibits no distension, no pulsatile liver, no abdominal bruit, no ascites, no pulsatile midline mass and no mass. There is no tenderness. There is no rebound and no guarding.   Musculoskeletal: Normal range of motion. She exhibits no edema or tenderness.   Lymphadenopathy:     She has no cervical adenopathy.   Neurological: She is alert and oriented to person, place, and time. She has normal reflexes. No cranial nerve deficit. Coordination normal.   Skin: Skin is warm. No rash noted. No erythema. No pallor.   Dependent rubor    No ulcers   Psychiatric: She has a normal mood and affect. Her behavior is normal. Judgment and thought content normal.       Assessment:     1. Subclavian artery stenosis, left    2. PVD (peripheral vascular disease) with claudication    3. Stenosis of left carotid artery    4. Mixed hyperlipidemia    5. Generalized abdominal pain    6. Chronic kidney disease, stage III (moderate)    7. Angina mesenteric    8. Tobacco abuse    9. Coronary artery disease involving native coronary artery of native heart without angina pectoris            Abdominal pain is consistent with mesenteric angina   Weight loss   Fear of eating   Pain after each meal        Plan:           Smoking cessation  Mesenteric artery ultrasound   If ultrasound is inconclusive, she will have a CTA of abdominal aorta      Angioplasty via R radial access  125 MP guide  150 cm shaft balloons and stents      Later she will have left subclavian PTAS for arm claudication        Statin therapy  She will ultimately stop aggrenox  She will start aspirin and plavix after PTAS        Continue with  current medical plan and lifestyle changes.  Return sooner for concerns or questions. If symptoms persist go to the ED  I have reviewed all pertinent data on this patient       I have reviewed the patient's medical history in detail and updated the computerized patient record.    Orders Placed This Encounter   Procedures    US Upper Extremity Arteries Bilateral     Standing Status:   Future     Standing Expiration Date:   4/13/2018    US Lower Extremity Arteries Bilateral     Standing Status:   Future     Standing Expiration Date:   4/13/2018    US Mesenteric Ischemia Study (xpd)     Standing Status:   Future     Standing Expiration Date:   4/13/2018     Order Specific Question:   May the Radiologist modify the order per protocol to meet the clinical needs of the patient?     Answer:   Yes    Cardiology Lab [facility] Resting, Lower Extremities     Standing Status:   Future     Standing Expiration Date:   4/13/2018       Follow up as scheduled. Return sooner for concerns or questions            She expressed verbal understanding and agreed with the plan        Patient's Medications   New Prescriptions    No medications on file   Previous Medications    ALBUTEROL (PROVENTIL HFA) 90 MCG/ACTUATION INHALER    Inhale 2 puffs into the lungs every 4 to 6 hours as needed.    AMLODIPINE (NORVASC) 5 MG TABLET    Take 2 tablets (10 mg total) by mouth once daily.    BENICAR 20 MG TABLET    TK 2 T PO D    CHLORDIAZEPOXIDE-CLIDINIUM 5-2.5 MG (LIBRAX) 5-2.5 MG CAP    Take 1 capsule by mouth every 8 (eight) hours as needed.    DIPYRIDAMOLE-ASPIRIN 200-25 MG (AGGRENOX)  MG CM12    Take 1 capsule by mouth 2 (two) times daily.    DOCUSATE SODIUM (COLACE) 100 MG CAPSULE    Take 1 capsule (100 mg total) by mouth 2 (two) times daily as needed for Constipation.    DUREZOL 0.05 % DROP OPHTHALMIC SOLUTION        ESCITALOPRAM OXALATE (LEXAPRO) 10 MG TABLET    Take 1 tablet (10 mg total) by mouth once daily.    FERROUS SULFATE 325 MG (65  MG IRON) TAB TABLET        FLUTICASONE-SALMETEROL 250-50 MCG/DOSE (ADVAIR) 250-50 MCG/DOSE DISKUS INHALER    Inhale 1 puff into the lungs 2 (two) times daily.    HYDROCODONE-ACETAMINOPHEN 5-325MG (NORCO) 5-325 MG PER TABLET    Take 1 tablet by mouth every 8 (eight) hours as needed for Pain.    LEVOTHYROXINE (SYNTHROID) 88 MCG TABLET    TAKE 1 TABLET BY MOUTH DAILY BEFORE BREAKFAST    MECLIZINE (ANTIVERT) 25 MG TABLET    Take 1 tablet (25 mg total) by mouth every 6 (six) hours as needed for Dizziness.    NEXIUM 40 MG CAPSULE    Take 1 capsule (40 mg total) by mouth once daily.    TIOTROPIUM (SPIRIVA WITH HANDIHALER) 18 MCG INHALATION CAPSULE    Inhale 1 capsule (18 mcg total) into the lungs once daily.   Modified Medications    No medications on file   Discontinued Medications    FLUTICASONE (FLONASE) 50 MCG/ACTUATION NASAL SPRAY    SPRAY ONCE IN EACH NOSTRIL ONCE DAILY    HYDROCHLOROTHIAZIDE (MICROZIDE) 12.5 MG CAPSULE    Take 1 capsule (12.5 mg total) by mouth once daily.

## 2017-04-16 ENCOUNTER — HOSPITAL ENCOUNTER (INPATIENT)
Facility: HOSPITAL | Age: 77
LOS: 2 days | Discharge: HOME | DRG: 357 | End: 2017-04-20
Attending: EMERGENCY MEDICINE | Admitting: INTERNAL MEDICINE
Payer: MEDICARE

## 2017-04-16 DIAGNOSIS — K55.1 MESENTERIC ANGINA: Primary | ICD-10-CM

## 2017-04-16 DIAGNOSIS — I50.20 SYSTOLIC CONGESTIVE HEART FAILURE: ICD-10-CM

## 2017-04-16 DIAGNOSIS — I63.9 CEREBRAL INFARCTION: ICD-10-CM

## 2017-04-16 DIAGNOSIS — I73.9 PVD (PERIPHERAL VASCULAR DISEASE) WITH CLAUDICATION: ICD-10-CM

## 2017-04-16 DIAGNOSIS — K55.1 ANGINA MESENTERIC: ICD-10-CM

## 2017-04-16 DIAGNOSIS — I10 ESSENTIAL HYPERTENSION: ICD-10-CM

## 2017-04-16 DIAGNOSIS — R10.9 ABDOMINAL PAIN: ICD-10-CM

## 2017-04-16 DIAGNOSIS — Z72.0 TOBACCO ABUSE: ICD-10-CM

## 2017-04-16 PROCEDURE — 96365 THER/PROPH/DIAG IV INF INIT: CPT

## 2017-04-16 PROCEDURE — 99285 EMERGENCY DEPT VISIT HI MDM: CPT | Mod: 25

## 2017-04-16 PROCEDURE — 11000001 HC ACUTE MED/SURG PRIVATE ROOM

## 2017-04-16 PROCEDURE — 93010 ELECTROCARDIOGRAM REPORT: CPT | Mod: ,,, | Performed by: INTERNAL MEDICINE

## 2017-04-16 PROCEDURE — 93005 ELECTROCARDIOGRAM TRACING: CPT

## 2017-04-16 PROCEDURE — 96366 THER/PROPH/DIAG IV INF ADDON: CPT

## 2017-04-16 NOTE — IP AVS SNAPSHOT
[facility]  [address]  [place] [address]  Phone: [phone]           Instrucciones de Eva de Pacientes  Nuestra meta es prepararlo para el éxito. Thelma paquete incluye información sobre merritt condición, medicamentos e instrucciones para cuidados en el hogar. [place] le ayudará a cuidarse y prevenir la necesidad de volver al hospital.     Por favor, hable con merritt enfermero o enfermera si tiene alguna pregunta..     Hay muchos detalles a recordar cuando se prepara para salir del hospital. [place] es lo que necesita hacer:    1. [place] merritt medicina. Si usted tiene keegan receta, revise la lista de medicamentos en las siguientes páginas. Es posible que tenga nuevos medicamentos para recoger en la farmacia y otros que tendrá que dejar de janet. Revise las instrucciones sobre cómo y cuándo janet michael medicamentos. Consulte con el médico o el enfermeras si no está seguro de qué hacer.    2. Ir a michael citas de seguimiento. La información específica de seguimiento aparece en las siguientes páginas. Usted puede ser contactado por keegan enfermera o proveedor clínico sobre las próximas citas. Estar seguro de que tiene todos los números de teléfono para comunicarse si es necesario. Por favor, póngase en contacto con la oficina de merritt profesional médico si no puede hacer keegan jono.     3. Esté atento a señales de advertencia. El médico o la enfermera le dará señales de alarma detallados que debe observar y cuándo llamar para la ayuda. Estas instrucciones también pueden incluir información educativa acerca de merritt condición. Si usted experimenta cualquiera de las señales de advertencia para merritt martinez, llame a merritt médico.             [facility]    Si merritt médico no le ha indicado a lo contrário, por favor   contactar a [facility], nuestra línea de cuidado de enfermeras está disponible para asistirle 24/7.    [phone] ([facility]icio [facility])    Enfermeras registradas de [facility] pueden ayudarle a reservar keegan jono,  proveer educación para la martinez, asesoría clínica, y otros servicios de asesoramiento.                  ** Verificar la lista de medicamentos es correcta y está actualizada. Llevar esto con usted en rafaela de emergencia. Si michael medicamentos figueroa cambiado, por favor notifique a merritt proveedor de atención médica.             Lista de medicamentos      EMPIECE a janet estos medicamentos        Additional Info                      aspirin 81 MG EC tablet   También conocido kwaku:  ECOTRIN   Recargas:  0   Dosis:  81 mg    Última administración:  81 mg on 4/20/2017  9:23 AM   Instrucciones:  Take 1 tablet (81 mg total) by mouth once daily.     Begin Date    AM    Noon    PM    Bedtime       atorvastatin 40 MG tablet   También conocido kwaku:  LIPITOR   Cantidad:  90 tablet   Recargas:  5   Dosis:  40 mg    Última administración:  40 mg on 4/20/2017  9:23 AM   Instrucciones:  Take 1 tablet (40 mg total) by mouth once daily.     Begin Date    AM    Noon    PM    Bedtime       clopidogrel 75 mg tablet   También conocido kwaku:  PLAVIX   Cantidad:  90 tablet   Recargas:  3   Dosis:  75 mg    Última administración:  75 mg on 4/20/2017  9:23 AM   Instrucciones:  Take 1 tablet (75 mg total) by mouth once daily.     Begin Date    AM    Noon    PM    Bedtime         SIGA tomando estos medicamentos        Additional Info                      albuterol 90 mcg/actuation inhaler   También conocido kwaku:  PROVENTIL HFA   Cantidad:  8.5 g   Recargas:  6   Dosis:  2 puff    Instrucciones:  Inhale 2 puffs into the lungs every 4 to 6 hours as needed.     Begin Date    AM    Noon    PM    Bedtime       amlodipine 5 MG tablet   También conocido kwaku:  NORVASC   Cantidad:  180 tablet   Recargas:  3   Dosis:  10 mg    Última administración:  10 mg on 4/20/2017  9:23 AM   Instrucciones:  Take 2 tablets (10 mg total) by mouth once daily.     Begin Date    AM    Noon    PM    Bedtime       BENICAR 20 MG tablet   Recargas:  1   Medicamento genérico:   olmesartan    Última administración:  20 mg on 4/20/2017  9:23 AM   Instrucciones:  TK 2 T PO D     Begin Date    AM    Noon    PM    Bedtime       chlordiazepoxide-clidinium 5-2.5 mg 5-2.5 mg Cap   También conocido kwaku:  LIBRAX   Cantidad:  50 capsule   Recargas:  2   Dosis:  1 capsule    Última administración:  1 capsule on 4/19/2017  8:06 AM   Instrucciones:  Take 1 capsule by mouth every 8 (eight) hours as needed.     Begin Date    AM    Noon    PM    Bedtime       docusate sodium 100 MG capsule   También conocido kwaku:  COLACE   Cantidad:  90 capsule   Recargas:  3   Dosis:  100 mg    Instrucciones:  Take 1 capsule (100 mg total) by mouth 2 (two) times daily as needed for Constipation.     Begin Date    AM    Noon    PM    Bedtime       DUREZOL 0.05 % Drop ophthalmic solution   Recargas:  0   Medicamento genérico:  difluprednate      Begin Date    AM    Noon    PM    Bedtime       escitalopram oxalate 10 MG tablet   También conocido kwaku:  LEXAPRO   Cantidad:  30 tablet   Recargas:  2   Dosis:  10 mg    Última administración:  10 mg on 4/20/2017  9:23 AM   Instrucciones:  Take 1 tablet (10 mg total) by mouth once daily.     Begin Date    AM    Noon    PM    Bedtime       ferrous sulfate 325 mg (65 mg iron) Tab tablet   Recargas:  3      Begin Date    AM    Noon    PM    Bedtime       fluticasone-salmeterol 250-50 mcg/dose 250-50 mcg/dose diskus inhaler   También conocido kwaku:  ADVAIR   Cantidad:  60 each   Recargas:  6   Dosis:  1 puff    Instrucciones:  Inhale 1 puff into the lungs 2 (two) times daily.     Begin Date    AM    Noon    PM    Bedtime       hydrocodone-acetaminophen 5-325mg 5-325 mg per tablet   También conocido kwaku:  NORCO   Cantidad:  21 tablet   Recargas:  0   Dosis:  1 tablet    Instrucciones:  Take 1 tablet by mouth every 8 (eight) hours as needed for Pain.     Begin Date    AM    Noon    PM    Bedtime       levothyroxine 88 MCG tablet   También conocido kwaku:  SYNTHROID   Cantidad:  90  tablet   Recargas:  0    Última administración:  88 mcg on 4/20/2017  6:47 AM   Instrucciones:  TAKE 1 TABLET BY MOUTH DAILY BEFORE BREAKFAST     Begin Date    AM    Noon    PM    Bedtime       meclizine 25 mg tablet   También conocido kwaku:  ANTIVERT   Cantidad:  30 tablet   Recargas:  0   Dosis:  25 mg    Instrucciones:  Take 1 tablet (25 mg total) by mouth every 6 (six) hours as needed for Dizziness.     Begin Date    AM    Noon    PM    Bedtime       NEXIUM 40 MG capsule   Cantidad:  90 capsule   Recargas:  3   Dosis:  40 mg   Comentarios:  BRAND NAME ONLY   Medicamento genérico:  esomeprazole    Instrucciones:  Take 1 capsule (40 mg total) by mouth once daily.     Begin Date    AM    Noon    PM    Bedtime       tiotropium 18 mcg inhalation capsule   También conocido kwaku:  SPIRIVA WITH HANDIHALER   Cantidad:  90 capsule   Recargas:  3   Dosis:  18 mcg    Última administración:  18 mcg on 4/20/2017  8:14 AM   Instrucciones:  Inhale 1 capsule (18 mcg total) into the lungs once daily.     Begin Date    AM    Noon    PM    Bedtime         DEJE de janet estos medicamentos     dipyridamole-aspirin 200-25 mg  mg Cm12   También conocido kwaku:  AGGRENOX            Dónde puede recoger michael medicamentos      Estos medicamentos fueron enviados a [facility] [place] [place] - [address]  [address], [place] [address]     Teléfono:  [phone]     atorvastatin 40 MG tablet    clopidogrel 75 mg tablet         Puede obtener estos medicamentos en cualquier farmacia     No necesita receta para estos medicamentos     aspirin 81 MG EC tablet                  Por favor traiga a todos las citas de seguimiento:    1. Emmie copia de las instrucciones de shaina.  2. Todos los medicamentos que está tomando preethi momento, en michael envases originales.  3. Identificación y tarjeta de seguro.    Por favor llegue 15 minutos antes de la hora de la jono.    Por favor llame con 24 horas de  antelación si tiene que cambiar merritt jono y / o tiempo.        Shaniqua Citas Programadas     Apr 21, 2017 12:00 PM CDT   Follow Up with Kurtis Stockton MD   [facility] ([facility])    [address]  [place] [address]   [phone]            May 01, 2017 11:00 AM CDT   Hospital Follow Up with Carol Robertson MD   [place] [facility] ([facility])    [address]  [place] [address]   [phone]            May 04, 2017  8:45 AM CDT   Ankle Brachial Resting with CARDIOLOGY, STRESS/TILT TABLE/JUAN CARLOS   [facility] ([facility])    [address]  [place] [address]   [phone]            May 04, 2017  9:45 AM CDT   Us Extremity Artery Bilat with [facility] US2   [facility] ([facility]    [address]  [place] [address]   [phone]            May 04, 2017 10:45 AM CDT   Us Extremity Artery Bilat with [facility] US2   [facility] ([facility])    [address]  [place] [address]   [phone]              Información de seguimiento     Realice un seguimiento con:  Anthony Brooks MD    Cuándo:  5/8/2017    Especialidad:  Cardiology    Por qué:  11am    Información de contacto:    [address]  [place] [address]  [phone]          Realice un seguimiento con:  Cecil Crooks MD    Cuándo:  5/15/2017    Especialidad:  Gastroenterology    Por qué:  2pm    Información de contacto:    [address]  [place] [address]  [phone]          Realice un seguimiento con:  Wayne Frazier MD    Cuándo:  5/17/2017    Especialidad:  Internal Medicine    Por qué:  2:20pm    Información de contacto:    [address]  [address]  [place] [address]  [phone]          Instrucciones de shaina     [place]    Activity as tolerated     Diet general     Preguntas:    Total calories:      Fat restriction, if any:       Protein restriction, if any:      Na restriction, if any:      Fluid restriction:      Additional restrictions:  Cardiac (Low Na/Chol)        Instrucciones a mary de shaina       -No driving the day of procedure  -Remove dressing tomorrow after shower. May apply band-aid for 2 days to the site.  -If radial, do not use arm at all for remainder of day  -No strenuous activity or exercise for 3 days following procedure  -No tub bath; do not submerge wound (access site) in water for 3 days  -Do not lift anything over 5lbs for 3 days after procedure  -If site swells or bleeds hold direct pressure to area for 10 full minutes. IF SITE CONTINUES TO BLEED, HAVE SOMEONE BRING YOU TO THE ER OR CALL 911.    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      Información de Admisión     Fecha y hora Proveedor Departamento CSN    4/16/2017 11:51 PM Anthony Brooks MD [facility] [id number]      Los proveedores de cuidado     Personal Médico Rol Especialidad Teléfono principal de la oficina    Anthony Brooks MD Attending Provider Cardiology [phone]    Anthony Brooks MD Consulting Physician  Cardiology [phone]    Bruce Montilla MD Consulting Physician  INTERVENTIONAL CARDIOLOGY [phone]    Jaskaran Ugarte MD Consulting Physician  Cardiology [phone]    Jayy Holland MD Consulting Physician  Cardiology [phone]      Shaniqua signos vitales yazmin     PS Pulso Temperatura Resp [place]    116/55 (BP Location: Right arm, Patient Position: Lying, BP Method: Automatic) 70 99 °F (37.2 °C) (Oral) 20 5' (1.524 m) 59 kg (130 lb)    SpO2 BMI (IM[facility])                90% 25.39 kg/m2          Recent Lab Values        10/21/2015 1/20/2016  11/18/2016                    10:30 PM  9:41 AM  9:41 AM         A1C 6.5 (H) 5.5 5.4         Comment for A1C at  9:41 AM on 11/18/2016:  According to ADA guidelines, hemoglobin A1C <7.0% represents  optimal control in non-pregnant diabetic patients.  Different  metrics may apply to specific populations.   Standards of Medical Care in Diabetes - 2016.  For the purpose of screening for the presence of diabetes:  <5.7%     Consistent with the absence of diabetes  5.7-6.4%  Consistent with increasing risk for diabetes   (prediabetes)  >or=6.5%  Consistent with diabetes  Currently no consensus exists for use of hemoglobin A1C  for diagnosis of diabetes for children.        Pending Labs     Order Current Status    Urine culture In process      Alergias     A partir del:  4/20/2017        No Known Allergies      Directiva Anticipada     Emmie directiva anticipada es un documento que, en rafaela de que ya no capaz de hacer decisiones por sí mismo, le dice a merritt equipo médico qué tipo de tratamiento quiere o no quiere recibir, o que le gustaría janet esas decisiones para usted . Si actualmente no tiene emmie directiva anticipada, [facility] le anima a crear zina. Para más información llame al: [phone]), [phone] ([phone]), o entrando en [email or web address].        Smoking Cessation     Si desea dejar de fumar:  · Usted puede ser elegible para recibir servicios gratuitos si usted es un residente de [place] y comenzó a fumar cigarrillos antes del 1 de septiembre de 1988. Llame al Smoking Cessation Trust (SCT) a [phone] o [phone].   Llame 1-800-QUIT-NOW si usted no cumple con los criterios anteriores.   Póngase en contacto con nosotros por correo electrónico: [email or web address]   Visite nuestro sitio web para obtener más información: [email or web address]            Language Assistance Services     ATTENTION: Language assistance services are available, free of charge. Please  call [phone].      ATENCIÓN: Si habla español, tiene a merritt disposición servicios gratuitos de asistencia lingüística. Llame al [phone].     CHÚ Ý: N?u b?n nói Ti?ng Vi?t, có các d?ch v? h? tr? ngôn ng? mi?n phí dành cho b?n. G?i s? [phone].        Educación para accidente cerebrovascular       Instrucciones de shaina para un ataque cerebral  Le figueroa diagnosticado un ataque cerebral (llamado también accidente cerebrovascular). [place] un ataque cerebral, la cesia su de circular en parte del cerebro, lo cual puede dañar zonas del cerebro que controlan otras partes del cuerpo. Los síntomas después de un ataque cerebral dependen de la parte del cerebro afectada.  Factores de riesgo para un ataque cerebral  Emmie vez que ha tenido un ataque cerebral, tiene mayor riesgo de tener otro. A continuación, se enumeran otros factores que pueden aumentar merritt riesgo de tener otro ataque cerebral:  · Presión arterial shaina  · Colesterol en cesia alto  · Fumar cigarrillos o cigarros  · Diabetes  · Enfermedad de la arteria carótida u otras arterias  · Fibrilación auricular, aleteo auricular u otra enfermedad cardíaca  · Falta de actividad física  · Obesidad  · Ciertos trastornos de la cesia (kwaku la anemia de células falciformes)  · Consumo excesivo de alcohol  · Abuso de drogas ilegales  · Merritt edilberto  · Merritt sexo  · Antecedentes familiares de ataque cerebral  · Emmie dieta con alto contenido de alimentos fritos, grasosos o salados  Cambios en la rutina diaria  Realizar michael tareas habituales quizás le resulte difícil después de bernardo tenido un ataque cerebral, rosalie puede aprender nuevas maneras de manejar michael actividades diarias. De hecho, hacer las actividades diarias puede ayudarle a recuperar la fortaleza muscular y a devolverle el funcionamiento a las extremidades afectadas. Sea paciente, dese tiempo para adaptarse y valore el avance que vaya haciendo.  Actividades diarias    Puede correr el riesgo de caerse. Khai  cambios en valdez casa que le permitan caminar con mayor facilidad. Un terapeuta decidirá si necesita algún dispositivo auxiliar para caminar de manera kc.  Quizás deba adolfo a un terapeuta ocupacional o un fisioterapeuta para aprender nuevas maneras de hacer las cosas. Por ejemplo, puede que necesite hacer ajustes al darse un baño o vestirse.  · Tenga en cuenta las siguientes sugerencias al ducharse o bañarse:  ¨ Pruebe la temperatura del agua con la mano o el pie no afectados en el ataque cerebral.  ¨ Emplee barras de sujeción, un asiento para la ducha, un regador manual y un cepillo de christina michael.  · Tenga en cuenta las siguientes sugerencias al vestirse:  ¨ Vístase sentado, comenzando por la extremidad o el lado afectados.  ¨ Use camisetas que pueda quitarse fácilmente por encima de la lucy y pantalones o faldas con cintura elástica.  ¨ Use cierres con jose f amarrados a la lengüeta del cierre.  Cambios en valdez estilo de zan  · [place] michael medicamentos exactamente según lo indicado. No se saltee ninguna dosis.  · Valdez proveedor de atención médica le dará información sobre los cambios en la dieta que probablemente necesite hacer según valdez situación. Probablemente le recomiende que consulte a un dietista certificado para que le ayude con esos cambios. Los cambios pueden incluir:  ¨ Reducir el consumo de grasas y colesterol  ¨ Reducir el consumo de sodio (sal), especialmente si tiene la presión arterial shaina  ¨ [place] más verduras y frutas frescas  ¨ [place] proteínas magras, de blake tales kwaku pescado, carne de ave y legumbres (frijoles y chícharos o arvejas) y comer menos lisette sue y procesadas  ¨ Consumir productos lácteos con menos contenido de grasa  ¨ Consumir aceites vegetales o de nueces en cantidad moderada  ¨ Limitar los dulces y los alimentos procesados tales kwaku las patricia fritas (chips), las galletas o los alimentos panificados  · Comience un programa de ejercicios. Consulte con valdez médico sobre la forma en  que puede comenzar y qué cantidad de actividad le recomienda que ainsley por día o por semana. Actividades simples, kwaku caminar o trabajar en el socorro, pueden serle de mucho beneficio.  · Reduzca la cantidad de bebidas alcohólicas que lisa. Los hombres no deben beber más de dos copas de bebidas alcohólicas al día. Las mujeres deben limitarse a keegan copa de bebida alcohólica al día.  · Conozca cuál es merritt nivel de colesterol. Siga las recomendaciones de merritt médico sobre cómo mantener merritt nivel de colesterol controlado.  · Si fuma, deje de hacerlo. Inscríbase en un programa para dejar de fumar, lo que le permitirá tener mayores probabilidades de lograrlo. Hable con merritt médico para adolfo qué medicamentos o qué otros métodos pueden ayudarle a dejar de fumar.  · Aprenda técnicas de manejo del estrés que le ayudarán a sobrellevar la tensión del hogar y del trabajo.  Visitas de control  · Cumpla con michael citas médicas. Un seguimiento cercano es importante para la rehabilitación y recuperación después de un ataque cerebral.  · Algunos medicamentos requieren análisis de cesia para adolfo cuál es merritt avance o si surge algún problema. Cumpla las citas de control para hacerse los análisis de cesia ordenados por michael médicos.     Cuándo debe buscar atención médica  Llame al 911 de inmediato si tiene cualquiera de estos síntomas:  · Debilidad, cosquilleo o pérdida de sensación en un lado de merritt eliel o merritt cuerpo.  · Repentina visión doble o dificultad para adolfo con zina o los dos ojos.  · Problema repentino para hablar o hablar arrastrando las palabras.  · Problemas para comprender lo que otros dicen.  · Dolor de lucy barby y repentino.  · Mareo, pérdida del equilibrio o sensación de caerse.  · Desmayos o convulsiones.  Hay marie signos principales que le indicarán que puede tratarse de un ataque cerebral. Si los ve, tiene que llamar al 911 rápido. Estos signos son:  Caída de la eliel: Un lado de la eliel se  o está adormecido. Cuando la  persona sonríe, merritt sonrisa no es uniforme.  Debilidad en un brazo: Un brazo está débil o adormecido. Cuando la persona levanta ambos brazos al mismo tiempo, zina puede caerse.  Dificultad para hablar: Puede notar que la persona tiene problemas para hablar o que arrastra las palabras. La persona no puede repetir correctamente keegan oración simple cuando se lo piden.  Momento de llamar al 911: Si alguien tiene cualquiera de esos síntomas, incluso aunque desaparezcan, llamen al 911 de inmediato. [place] nota de la hora en la que aparecieron los síntomas por primera vez.   Date Last Reviewed: 8/26/2015  © 7637-3596 [employer]. [address] [place] [address]. Todos los derechos reservados. Esta información no pretende sustituir la atención médica profesional. Sólo merritt médico puede diagnosticar y tratar un problema de martinez.              Instrucciones de shaina para Pneumonmia       Instrucciones de shaina para la neumonía  A usted le figueroa diagnosticado neumonía, keegan infección pulmonar grave. La mayoría de los casos de neumonía son causados por bacterias. La neumonía es más frecuente en las personas de edad avanzada, los niños pequeños y las personas con problemas de martinez crónicos.  Cuidados en la casa  · [place] los medicamentos exactamente kwaku le indiquen, sin omitir ninguna dosis. Continúe tomando los antibióticos kwaku le indiquen hasta que se terminen, aun cuando comience a sentirse mejor, a fin de prevenir la reaparición de la neumonía.  · [place] kwaku mínimo 8 vasos de agua diarios para diluir las secreciones a fin de facilitar merritt expectoración.  · Utilice un humidificador de vapor frío en merritt habitación y límpielo todos los días.  · La expulsión de mucosidad al toser es normal. No tome medicamentos para suprimir la tos, a menos que la tos sea demasiado seca, le produzca dolor o le impida dormir. Podría usar un expectorante si merritt médico lo prescribe.  · Aprenda las técnicas de percusión y drenaje postural.  Estas técnicas le ayudarán a expulsar más mucosidad, la cual puede atrapar microbios en los pulmones. Pida instrucciones a merritt proveedor de atención médica y utilice estos métodos 3 veces por día hasta que michael pulmones estén limpios.  · Puede usar compresas tibias o keegan almohadilla calefactora graduada en el nivel más bajo para aliviar el malestar en el pecho. Utilícelas varias veces al día neal 15-20 minutos cada vez. (Para prevenir lesiones a merritt piel, asegúrese de que la temperatura de las compresas o la almohadilla sea tibia y no caliente.)  · Descanse lo suficiente hasta que la fiebre, la falta de aliento y el dolor en el pecho desaparezcan.  · Vacúnese contra la gripe keegan vez al año.  · Consulte con merritt médico sobre la posibilidad de vacunarse contra la neumonía.  Visitas de control  Programe keegan visita de control según le indique el personal médico.     Cuándo debe obtener atención médica  Llame de inmediato al 911 si tiene cualquiera de estos síntomas:  · Dolor en el pecho  · Dificultad para respirar  · Labios o uñas azulados  En otros casos, llame a merritt médico si tiene cualquiera de estos síntomas:  · Fiebre por encima de 101.5°F  · Secreción amarillenta, verdosa, con cesia o maloliente  · Mucosidad excesiva  · Vómito   Date Last Reviewed: 8/7/2014 © 2000-2016 [employer]. [address] [place] [address]. Todos los derechos reservados. Esta información no pretende sustituir la atención médica profesional. Sólo merritt médico puede diagnosticar y tratar un problema de martinez.              Educación Enfermedad Renal Crónica       Instrucciones de shaina para la enfermedad renal crónica  A usted le figueroa diagnosticado enfermedad renal crónica. La enfermedad renal puede ocurrir por muchas causas que incluyen infecciones, diabetes, presión arterial shaina, cálculos renales, problemas circulatorios y reacciones a medicamentos. Tener enfermedad renal significa que deberá hacer muchos cambios en merritt  zan. Aprenda todo lo que pueda acerca de la enfermedad renal para adaptarse mejor a esos cambios. Es importante recordar que el objetivo principal del tratatimiento es detener la enfemedad renal crónica (CKD, por imchael siglas en inglés) para que no avance hasta provocar insuficiencia renal total. Los tratamientos pueden variar según el avance de esta afección, así que siga siempre las indicaciones de merritt médico con respecto a los cuidados y al manejo de enfermedad. Estas son algunas medidas que puede janet para ayudar a aliviar merritt enfermedad.  Cambios en la dieta  Siempre hable de merritt dieta con merritt médico antes de hacer cualquier cambio.  Sal (sodio) en merritt dieta  · Según merritt afección, se le puede recomendar comer 1,500 miligramos o menos de sodio por día  · Limite los alimentos procesados, tales kwaku:  ¨ Comidas congeladas y empaquetadas  ¨ Conservas de pescado y lisette  ¨ Alimentos en escabeche  ¨ Aperitivos salados  ¨ Fiambres  ¨ Salsas  ¨ La mayoría de los quesos  ¨ Las comidas rápidas          · No agregue sal a michael alimentos mientras cocina ni antes de comer en la almanza.  · Coma alimentos sin procesar para disminuir el sodio, tales kwaku:  ¨ [place] y johan fresco  ¨ Carne de res magra  ¨ Atún sin sal  ¨ Pescado fresco  ¨ Frutas y verduras frescas  · Condimente los alimentos con hierbas frescas, ajo, cebollas, cítricos, vinagres aromatizados y mezclas de especias sin sodio en lugar de sal cuando cocine.  · Evite el uso de sustitutos de la sal que son altos en potasio. Consulte con merritt médico o con un dietista registrado sobre el uso de un sustituto de la sal aceptable en merritt dieta.  · Evite beber agua mineralizada debido a merritt contenido de sodio. Asegúrese de leer la etiqueta de la botella de agua para conocer merritt contenido de sodio.  · Evite los medicamentos de venta katherine que contienen bicarbonato de sodio o carbonato de sodio. Susana las etiquetas cuidadosamente.  Potasio en merritt dieta  · Según merritt afección, se le puede  recomendar que coma menos de 1,500 miligramos a 2,700 miligramos de potasio al día.  · Vacíe siempre el líquido de los alimentos enlatados (verduras, frutas y lisette) antes de servirlos.  · Evite los panes integrales, el salvado de mavis y los cereales.  · Evite la leche, el gosia de leche y el yogur.  · Evite las nueces, las semillas, la mantequilla de maní, los frijoles secos y los chícharos (arvejas).  · Evite las galletas de higo, el chocolate y la melaza.  · Evite el uso de sustitutos de la sal que son altos en potasio. Consulte con merritt médico o con un dietista registrado sobre el uso de un sustituto de la sal aceptable en merritt dieta.  Proteína en merritt dieta  · Según merritt afección, merritt médico le explicará las ventajas y desventajas sobre la limitación de proteína de merritt dieta.  · Reduzca el consumo de proteínas. Coma menos carne, productos lácteos, yogur, huevos y queso.  Fósforo en merritt dieta  · Evite la cerveza, el cacao, los refrescos de cola oscura, la cerveza, las bebidas de chocolate, y los tés fríos enlatados.  · Evite el queso, la leche, el helado, el pudín y el yogur.  · Evite el hígado (carne de res, johan), lisette de órganos, ostras, cangrejos y barbara.  · Evite los frijoles (soja, riñón, latoya, garbanzo y del norte), chícharos (johan y split), cereales de salvado, las nueces y los caramelos de leche.  Coma comidas pequeñas y frecuentes que tengan mucha fibra y calorías. También es posible que le recomienden limitar merritt consumo de líquidos.  Otros cuidados en la casa  · Evite desgastarse o fatigarse en extremo.  · Descanse mucho y aumente la cantidad de horas de sueño nocturno.   · Muévase y flexione michael piernas para evitar coágulos de cesia cuando descansa mucho tiempo.   · Pésese todos los días, a la misma hora y con el mismo tipo de ropa. Lleve un registro de merritt peso diario.  · [place] michael medicamentos exactamente kwaku le indiquen.  · Asista a todas michael visitas de control.    · [place] medidas para controlar merritt  presión arterial shaina o diabetes. Pídale consejo a merritt médico.  · Hable con merritt médico acerca de la diálisis. Es posible que se beneficie de preethi procedimiento si merritt enfemedad renal crónica está avanzando hacia keegan enfemedad renal terminal.  Visitas de control  Programe keegan visita de control según le indique nuestro personal.  Cuándo debe llamar a merritt médico  Llame a merritt médico de inmediato si nota cualquiera de estos síntomas:  · Dificultad para comer o janet líquidos  · Pérdida de peso de más de 2 libras en 24 horas o más de 5 libras en 7 días  · [place] o ninguna cantidad de orina  · Dificultad para respirar  · Jessica musculares  · Fiebre de 100.4°F o más shaina, o escalofríos  · Elvis en la orina o en las heces  · Flujo sanguinolento de merritt nariz, boca u oídos  · Dolor de lucy severo o convulsiones  · Vómitos  · Hinchazón de las piernas o los tobillos  · Dolor de pecho (llame al 911)   Date Last Reviewed: 1/6/2015  © 7147-0919 [employer]. [address] [place] [id number]. Todos los derechos reservados. Esta información no pretende sustituir la atención médica profesional. Sólo merritt médico puede diagnosticar y tratar un problema de martinez.              Registrarse para [facility]     La activación de mreritt cuenta [facility] es tan fácil kwaku 1-2-3!    1) Ir a [email or web address], seleccione Registrarse Ahora, meter el código de activación y merritt fecha de nacimiento, y seleccione Próximo.    [id number]  Expires: 5/13/2017  2:29 PM      2) Crear un nombre de usuario y contraseña para usar cuando se visita [facility] en el futuro y selecciona keegan pregunta de seguridad en rafaela de que pierda merritt contraseña y seleccione Próximo.    3) Introduzca merritt dirección de correo electrónico y ainsley radha en Registrarse!    Información Adicional  Si tiene alguna pregunta, por favor, e-mail [email or web address] o llame al [phone] para hablar con nuestro personal. Recuerde, [facility] no debe ser usada para necesidades  urgentes. En rafaela de emergencia médica, llame al 911.         [facility] cumple con las leyes federales aplicables de derechos civiles y no discrimina por motivos de edilberto, color, origen nacional, edad, discapacidad, o sexo.                        \[facility]\""  [address]  [place] [address]  Phone: [phone]           Patient Discharge Instructions   Our goal is to set you up for success. This packet includes information on your condition, medications, and your home care.  It will help you care for yourself to prevent having to return to the hospital.     Please ask your nurse if you have any questions.      There are many details to remember when preparing to leave the hospital. Here is what you will need to do:    1. Take your medicine. If you are prescribed medications, review your Medication List on the following pages. You may have new medications to  at the pharmacy and others that you'll need to stop taking. Review the instructions for how and when to take your medications. Talk with your doctor or nurses if you are unsure of what to do.     2. Go to your follow-up appointments. Specific follow-up information is listed in the following pages. Your may be contacted by a nurse or clinical provider about future appointments. Be sure we have all of the phone numbers to reach you. Please contact your provider's office if you are unable to make an appointment.     3. Watch for warning signs. Your doctor or nurse will give you detailed warning signs to watch for and when to call for assistance. These instructions may also include educational information about your condition. If you experience any of warning signs to your health, call your doctor.           [facility]  Unless otherwise directed by your provider, please   contact [facility], our nurse care line   that is available for 24/7 assistance.     [phone] (toll-free)     Registered nurses in the [facility]   provide: appointment scheduling, clinical advisement, health education, and other advisory services.              ** Verificar la lista de medicamentos es correcta y está actualizada. Llevar esto con usted en rafaela de emergencia. Si michael medicamentos figueroa cambiado, por favor notifique a merritt proveedor de atención médica.             Medication List      START taking these medications        Additional Info                      aspirin 81 MG EC tablet   Commonly known as:  ECOTRIN   Refills:  0   Dose:  81 mg    Last time this was given:  81 mg on 4/20/2017  9:23 AM   Instructions:  Take 1 tablet (81 mg total) by mouth once daily.     Begin Date    AM    Noon    PM    Bedtime       atorvastatin 40 MG tablet   Commonly known as:  LIPITOR   Quantity:  90 tablet   Refills:  5   Dose:  40 mg    Last time this was given:  40 mg on 4/20/2017  9:23 AM   Instructions:  Take 1 tablet (40 mg total) by mouth once daily.     Begin Date    AM    Noon    PM    Bedtime       clopidogrel 75 mg tablet   Commonly known as:  PLAVIX   Quantity:  90 tablet   Refills:  3   Dose:  75 mg    Last time this was given:  75 mg on 4/20/2017  9:23 AM   Instructions:  Take 1 tablet (75 mg total) by mouth once daily.     Begin Date    AM    Noon    PM    Bedtime         CONTINUE taking these medications        Additional Info                      albuterol 90 mcg/actuation inhaler   Commonly known as:  PROVENTIL HFA   Quantity:  8.5 g   Refills:  6   Dose:  2 puff    Instructions:  Inhale 2 puffs into the lungs every 4 to 6 hours as needed.     Begin Date    AM    Noon    PM    Bedtime       amlodipine 5 MG tablet   Commonly known as:  NORVASC   Quantity:  180 tablet   Refills:  3   Dose:  10 mg    Last time this was given:  10 mg on 4/20/2017  9:23 AM   Instructions:  Take 2 tablets (10 mg total) by mouth once daily.     Begin Date    AM    Noon    PM    Bedtime       BENICAR 20 MG tablet   Refills:  1   Generic drug:  olmesartan    Last  time this was given:  20 mg on 4/20/2017  9:23 AM   Instructions:  TK 2 T PO D     Begin Date    AM    Noon    PM    Bedtime       chlordiazepoxide-clidinium 5-2.5 mg 5-2.5 mg Cap   Commonly known as:  LIBRAX   Quantity:  50 capsule   Refills:  2   Dose:  1 capsule    Last time this was given:  1 capsule on 4/19/2017  8:06 AM   Instructions:  Take 1 capsule by mouth every 8 (eight) hours as needed.     Begin Date    AM    Noon    PM    Bedtime       docusate sodium 100 MG capsule   Commonly known as:  COLACE   Quantity:  90 capsule   Refills:  3   Dose:  100 mg    Instructions:  Take 1 capsule (100 mg total) by mouth 2 (two) times daily as needed for Constipation.     Begin Date    AM    Noon    PM    Bedtime       DUREZOL 0.05 % Drop ophthalmic solution   Refills:  0   Generic drug:  difluprednate      Begin Date    AM    Noon    PM    Bedtime       escitalopram oxalate 10 MG tablet   Commonly known as:  LEXAPRO   Quantity:  30 tablet   Refills:  2   Dose:  10 mg    Last time this was given:  10 mg on 4/20/2017  9:23 AM   Instructions:  Take 1 tablet (10 mg total) by mouth once daily.     Begin Date    AM    Noon    PM    Bedtime       ferrous sulfate 325 mg (65 mg iron) Tab tablet   Refills:  3      Begin Date    AM    Noon    PM    Bedtime       fluticasone-salmeterol 250-50 mcg/dose 250-50 mcg/dose diskus inhaler   Commonly known as:  ADVAIR   Quantity:  60 each   Refills:  6   Dose:  1 puff    Instructions:  Inhale 1 puff into the lungs 2 (two) times daily.     Begin Date    AM    Noon    PM    Bedtime       hydrocodone-acetaminophen 5-325mg 5-325 mg per tablet   Commonly known as:  NORCO   Quantity:  21 tablet   Refills:  0   Dose:  1 tablet    Instructions:  Take 1 tablet by mouth every 8 (eight) hours as needed for Pain.     Begin Date    AM    Noon    PM    Bedtime       levothyroxine 88 MCG tablet   Commonly known as:  SYNTHROID   Quantity:  90 tablet   Refills:  0    Last time this was given:  88 mcg on  4/20/2017  6:47 AM   Instructions:  TAKE 1 TABLET BY MOUTH DAILY BEFORE BREAKFAST     Begin Date    AM    Noon    PM    Bedtime       meclizine 25 mg tablet   Commonly known as:  ANTIVERT   Quantity:  30 tablet   Refills:  0   Dose:  25 mg    Instructions:  Take 1 tablet (25 mg total) by mouth every 6 (six) hours as needed for Dizziness.     Begin Date    AM    Noon    PM    Bedtime       NEXIUM 40 MG capsule   Quantity:  90 capsule   Refills:  3   Dose:  40 mg   Comments:  BRAND NAME ONLY   Generic drug:  esomeprazole    Instructions:  Take 1 capsule (40 mg total) by mouth once daily.     Begin Date    AM    Noon    PM    Bedtime       tiotropium 18 mcg inhalation capsule   Commonly known as:  SPIRIVA WITH HANDIHALER   Quantity:  90 capsule   Refills:  3   Dose:  18 mcg    Last time this was given:  18 mcg on 4/20/2017  8:14 AM   Instructions:  Inhale 1 capsule (18 mcg total) into the lungs once daily.     Begin Date    AM    Noon    PM    Bedtime         STOP taking these medications     dipyridamole-aspirin 200-25 mg  mg Cm12   Commonly known as:  AGGRENOX            Where to Get Your Medications      These medications were sent to [facility] - [address]  [address], [place] [address]     Phone:  [phone]     atorvastatin 40 MG tablet    clopidogrel 75 mg tablet         You can get these medications from any pharmacy     You don't need a prescription for these medications     aspirin 81 MG EC tablet                  Por favor traiga a todos las citas de seguimiento:    1. Emmie copia de las instrucciones de shaina.  2. Todos los medicamentos que está tomando preethi momento, en michael envases originales.  3. Identificación y tarjeta de seguro.    Por favor llegue 15 minutos antes de la hora de la jono.    Por favor llame con 24 horas de antelación si tiene que cambiar merritt jono y / o tiempo.        Your Scheduled Appointments     Apr 21, 2017  12:00 PM CDT   Follow Up with Kurtis Stockton MD   [facility] KURTIS STOCKTON M.D. [place] ([facility])    [address]  [place] [address]   [phone]            May 01, 2017 11:00 AM CDT   Hospital Follow Up with Carol Robertson MD   [place] [facility] ([facility])    [address]  [place] [address] [phone]            May 04, 2017  8:45 AM CDT   Ankle Brachial Resting with CARDIOLOGY, STRESS/TILT TABLE/JUAN CARLOS   [facility] ([facility])    [address]  [place] [address] [phone]            May 04, 2017  9:45 AM CDT   Us Extremity Artery Bilat with [facility]   [facility]    [address]  [place] [address] [phone]            May 04, 2017 10:45 AM CDT   Us Extremity Artery Bilat with [facility] ([facility]    [address]  [place] [address] [phone]              Follow-up Information     Follow up with Anthony Brooks MD On 5/8/2017.    Specialty:  Cardiology    Why:  11am    Contact information:    [address]  [place] [address]  [phone]          Follow up with Cecil Crooks MD On 5/15/2017.    Specialty:  Gastroenterology    Why:  2pm    Contact information:    [address]  [place] [address]  [phone]          Follow up with Wayne Frazier MD On 5/17/2017.    Specialty:  Internal Medicine    Why:  2:20pm    Contact information:    [address]  [address]  [place] [address]  [phone]          Discharge Instructions     Future Orders    Activity as tolerated     Diet general     Questions:    Total calories:      Fat restriction, if any:      Protein restriction, if any:      Na restriction, if any:      Fluid restriction:      Additional restrictions:  Cardiac (Low Na/Chol)        Discharge Instructions       -No driving the day of  procedure  -Remove dressing tomorrow after shower. May apply band-aid for 2 days to the site.  -If radial, do not use arm at all for remainder of day  -No strenuous activity or exercise for 3 days following procedure  -No tub bath; do not submerge wound (access site) in water for 3 days  -Do not lift anything over 5lbs for 3 days after procedure  -If site swells or bleeds hold direct pressure to area for 10 full minutes. IF SITE CONTINUES TO BLEED, HAVE SOMEONE BRING YOU TO THE ER OR CALL 911.    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      Admission Information     Date & Time Provider Department CSN    4/16/2017 11:51 PM Anthony Brooks MD [facility] [id number]      Care Providers     Provider Role Specialty Primary office phone    Anthony Brooks MD Attending Provider Cardiology [phone]    Anthony Brooks MD Consulting Physician  Cardiology [phone]    Bruce Montilla MD Consulting Physician  INTERVENTIONAL CARDIOLOGY [phone]    Jaskaran Ugarte MD Consulting Physician  Cardiology [phone]    Jayy Holland MD Consulting Physician  Cardiology [phone]      Your Vitals Were     BP Pulse Temp Resp Height Weight    116/55 (BP Location: Right arm, Patient Position: Lying, BP Method: Automatic) 70 99 °F (37.2 °C) (Oral) 20 5' (1.524 m) 59 kg (130 lb)    SpO2 BMI                90% 25.39 kg/m2          Recent Lab Values        10/21/2015 1/20/2016 11/18/2016                    10:30 PM  9:41 AM  9:41 AM         A1C 6.5 (H) 5.5 5.4         Comment for A1C at  9:41 AM on 11/18/2016:  According to ADA guidelines, hemoglobin A1C <7.0% represents  optimal control in non-pregnant diabetic patients.   Different  metrics may apply to specific populations.   Standards of Medical Care in Diabetes - 2016.  For the purpose of screening for the presence of diabetes:  <5.7%     Consistent with the absence of diabetes  5.7-6.4%  Consistent with increasing risk for diabetes   (prediabetes)  >or=6.5%  Consistent with diabetes  Currently no consensus exists for use of hemoglobin A1C  for diagnosis of diabetes for children.        Pending Labs     Order Current Status    Urine culture In process      Allergies as of 4/20/2017     No Known Allergies      Advance Directives     An advance directive is a document which, in the event you are no longer able to make decisions for yourself, tells your healthcare team what kind of treatment you do or do not want to receive, or who you would like to make those decisions for you.  If you do not currently have an advance directive, [facility] encourages you to create one.  For more information call:  [phone]), [phone] ([phone]),  or log on to [email or web address].        Smoking Cessation     If you would like to quit smoking:   You may be eligible for free services if you are a [place] resident and started smoking cigarettes before September 1, 1988.  Call the Smoking Cessation Trust ([facility]) toll free at [phone] or [phone].   Call 0-914-QUIT-NOW if you do not meet the above criteria.   Contact us via email: [email or web address]   View our website for more information: [email or web address]        Language Assistance Services     ATTENTION: Language assistance services are available, free of charge. Please call [phone].      ATENCIÓN: Si habla español, tiene a merritt disposición servicios gratuitos de asistencia lingüística. Llame al [phone].     CHÚ Ý: N?u b?n nói Ti?ng Vi?t, có các d?ch v? h? tr? ngôn ng? mi?n phí dành cho b?n. G?i s? [phone].        Stroke Education              Pneumonmia Discharge  Instructions                Chronic [place] Disease Education             [facility] Sign-Up     Activating your [facility] account is as easy as 1-2-3!     1) Visit [email or web address], select Sign Up Now, enter this activation code and your date of birth, then select Next.  [email or web address]  Expires: 5/13/2017  2:29 PM      2) Create a username and password to use when you visit [facility] in the future and select a security question in case you lose your password and select Next.    3) Enter your e-mail address and click Sign Up!    Additional Information  If you have questions, please e-mail [employer] [email or web address] or call [phone] to talk to our [facility] staff. Remember, [facility] is NOT to be used for urgent needs. For medical emergencies, dial 911.          [facility] complies with applicable Federal civil rights laws and does not discriminate on the basis of race, color, national origin, age, disability, or sex.

## 2017-04-17 PROBLEM — K55.1 MESENTERIC ANGINA: Status: ACTIVE | Noted: 2017-04-17

## 2017-04-17 LAB
ALBUMIN SERPL BCP-MCNC: 4 G/DL
ALP SERPL-CCNC: 108 U/L
ALT SERPL W/O P-5'-P-CCNC: 11 U/L
ANION GAP SERPL CALC-SCNC: 11 MMOL/L
APTT BLDCRRT: 32.1 SEC
AST SERPL-CCNC: 18 U/L
BASOPHILS # BLD AUTO: 0.03 K/UL
BASOPHILS NFR BLD: 0.4 %
BILIRUB SERPL-MCNC: 0.3 MG/DL
BUN SERPL-MCNC: 17 MG/DL
CALCIUM SERPL-MCNC: 9.5 MG/DL
CHLORIDE SERPL-SCNC: 98 MMOL/L
CO2 SERPL-SCNC: 29 MMOL/L
CREAT SERPL-MCNC: 1 MG/DL
DIASTOLIC DYSFUNCTION: YES
DIFFERENTIAL METHOD: ABNORMAL
EOSINOPHIL # BLD AUTO: 0.3 K/UL
EOSINOPHIL NFR BLD: 3.9 %
ERYTHROCYTE [DISTWIDTH] IN BLOOD BY AUTOMATED COUNT: 14.3 %
EST. GFR  (AFRICAN AMERICAN): >60 ML/MIN/1.73 M^2
EST. GFR  (NON AFRICAN AMERICAN): 54 ML/MIN/1.73 M^2
ESTIMATED PA SYSTOLIC PRESSURE: 7.16
GLUCOSE SERPL-MCNC: 110 MG/DL
HCT VFR BLD AUTO: 44.1 %
HGB BLD-MCNC: 14.7 G/DL
INR PPP: 1
LACTATE SERPL-SCNC: 0.7 MMOL/L
LIPASE SERPL-CCNC: 22 U/L
LYMPHOCYTES # BLD AUTO: 2.3 K/UL
LYMPHOCYTES NFR BLD: 29.6 %
MCH RBC QN AUTO: 32.2 PG
MCHC RBC AUTO-ENTMCNC: 33.3 %
MCV RBC AUTO: 97 FL
MITRAL VALVE MOBILITY: NORMAL
MONOCYTES # BLD AUTO: 0.9 K/UL
MONOCYTES NFR BLD: 11.1 %
NEUTROPHILS # BLD AUTO: 4.2 K/UL
NEUTROPHILS NFR BLD: 54.6 %
PLATELET # BLD AUTO: 313 K/UL
PMV BLD AUTO: 10 FL
POTASSIUM SERPL-SCNC: 3.9 MMOL/L
PROT SERPL-MCNC: 7.2 G/DL
PROTHROMBIN TIME: 10.2 SEC
RBC # BLD AUTO: 4.56 M/UL
RETIRED EF AND QEF - SEE NOTES: 65 (ref 55–65)
SODIUM SERPL-SCNC: 138 MMOL/L
TRICUSPID VALVE REGURGITATION: ABNORMAL
WBC # BLD AUTO: 7.69 K/UL

## 2017-04-17 PROCEDURE — 93306 TTE W/DOPPLER COMPLETE: CPT

## 2017-04-17 PROCEDURE — 25000003 PHARM REV CODE 250: Performed by: NURSE PRACTITIONER

## 2017-04-17 PROCEDURE — 25000003 PHARM REV CODE 250: Performed by: EMERGENCY MEDICINE

## 2017-04-17 PROCEDURE — 27000221 HC OXYGEN, UP TO 24 HOURS

## 2017-04-17 PROCEDURE — 93010 ELECTROCARDIOGRAM REPORT: CPT | Mod: 76,,, | Performed by: INTERNAL MEDICINE

## 2017-04-17 PROCEDURE — 85025 COMPLETE CBC W/AUTO DIFF WBC: CPT

## 2017-04-17 PROCEDURE — 99223 1ST HOSP IP/OBS HIGH 75: CPT | Mod: ,,, | Performed by: INTERNAL MEDICINE

## 2017-04-17 PROCEDURE — 11000001 HC ACUTE MED/SURG PRIVATE ROOM

## 2017-04-17 PROCEDURE — 85730 THROMBOPLASTIN TIME PARTIAL: CPT

## 2017-04-17 PROCEDURE — 83605 ASSAY OF LACTIC ACID: CPT

## 2017-04-17 PROCEDURE — 63600175 PHARM REV CODE 636 W HCPCS: Performed by: EMERGENCY MEDICINE

## 2017-04-17 PROCEDURE — 83690 ASSAY OF LIPASE: CPT

## 2017-04-17 PROCEDURE — 80053 COMPREHEN METABOLIC PANEL: CPT

## 2017-04-17 PROCEDURE — 93005 ELECTROCARDIOGRAM TRACING: CPT

## 2017-04-17 PROCEDURE — 94761 N-INVAS EAR/PLS OXIMETRY MLT: CPT

## 2017-04-17 PROCEDURE — 25500020 PHARM REV CODE 255: Performed by: EMERGENCY MEDICINE

## 2017-04-17 PROCEDURE — 93010 ELECTROCARDIOGRAM REPORT: CPT | Mod: ,,, | Performed by: INTERNAL MEDICINE

## 2017-04-17 PROCEDURE — 93306 TTE W/DOPPLER COMPLETE: CPT | Mod: 26,,, | Performed by: INTERNAL MEDICINE

## 2017-04-17 PROCEDURE — 85610 PROTHROMBIN TIME: CPT

## 2017-04-17 PROCEDURE — C9113 INJ PANTOPRAZOLE SODIUM, VIA: HCPCS | Performed by: EMERGENCY MEDICINE

## 2017-04-17 RX ORDER — HYDROMORPHONE HYDROCHLORIDE 1 MG/ML
1 INJECTION, SOLUTION INTRAMUSCULAR; INTRAVENOUS; SUBCUTANEOUS
Status: COMPLETED | OUTPATIENT
Start: 2017-04-17 | End: 2017-04-17

## 2017-04-17 RX ORDER — MORPHINE SULFATE 2 MG/ML
2 INJECTION, SOLUTION INTRAMUSCULAR; INTRAVENOUS EVERY 4 HOURS PRN
Status: DISCONTINUED | OUTPATIENT
Start: 2017-04-17 | End: 2017-04-19

## 2017-04-17 RX ORDER — DOCUSATE SODIUM 100 MG/1
100 CAPSULE, LIQUID FILLED ORAL 2 TIMES DAILY PRN
Status: DISCONTINUED | OUTPATIENT
Start: 2017-04-17 | End: 2017-04-20 | Stop reason: HOSPADM

## 2017-04-17 RX ORDER — PANTOPRAZOLE SODIUM 40 MG/10ML
40 INJECTION, POWDER, LYOPHILIZED, FOR SOLUTION INTRAVENOUS DAILY
Status: DISCONTINUED | OUTPATIENT
Start: 2017-04-17 | End: 2017-04-17

## 2017-04-17 RX ORDER — HYDROMORPHONE HYDROCHLORIDE 1 MG/ML
1 INJECTION, SOLUTION INTRAMUSCULAR; INTRAVENOUS; SUBCUTANEOUS EVERY 4 HOURS PRN
Status: DISCONTINUED | OUTPATIENT
Start: 2017-04-17 | End: 2017-04-19

## 2017-04-17 RX ORDER — OLMESARTAN MEDOXOMIL 20 MG/1
20 TABLET ORAL DAILY
Status: DISCONTINUED | OUTPATIENT
Start: 2017-04-18 | End: 2017-04-20 | Stop reason: HOSPADM

## 2017-04-17 RX ORDER — SODIUM CHLORIDE 9 MG/ML
INJECTION, SOLUTION INTRAVENOUS CONTINUOUS
Status: DISCONTINUED | OUTPATIENT
Start: 2017-04-17 | End: 2017-04-19

## 2017-04-17 RX ORDER — AMLODIPINE BESYLATE 5 MG/1
10 TABLET ORAL DAILY
Status: DISCONTINUED | OUTPATIENT
Start: 2017-04-18 | End: 2017-04-20 | Stop reason: HOSPADM

## 2017-04-17 RX ORDER — FLUTICASONE FUROATE AND VILANTEROL 100; 25 UG/1; UG/1
1 POWDER RESPIRATORY (INHALATION) DAILY
Status: DISCONTINUED | OUTPATIENT
Start: 2017-04-18 | End: 2017-04-20 | Stop reason: HOSPADM

## 2017-04-17 RX ORDER — TIOTROPIUM BROMIDE 18 UG/1
18 CAPSULE ORAL; RESPIRATORY (INHALATION) DAILY
Status: DISCONTINUED | OUTPATIENT
Start: 2017-04-18 | End: 2017-04-20 | Stop reason: HOSPADM

## 2017-04-17 RX ORDER — ONDANSETRON 2 MG/ML
4 INJECTION INTRAMUSCULAR; INTRAVENOUS EVERY 12 HOURS PRN
Status: DISCONTINUED | OUTPATIENT
Start: 2017-04-17 | End: 2017-04-20 | Stop reason: HOSPADM

## 2017-04-17 RX ORDER — ASPIRIN AND DIPYRIDAMOLE 25; 200 MG/1; MG/1
1 CAPSULE, EXTENDED RELEASE ORAL 2 TIMES DAILY
Status: DISCONTINUED | OUTPATIENT
Start: 2017-04-17 | End: 2017-04-18

## 2017-04-17 RX ORDER — ONDANSETRON 2 MG/ML
4 INJECTION INTRAMUSCULAR; INTRAVENOUS
Status: COMPLETED | OUTPATIENT
Start: 2017-04-17 | End: 2017-04-17

## 2017-04-17 RX ORDER — ALBUTEROL SULFATE 90 UG/1
2 AEROSOL, METERED RESPIRATORY (INHALATION) EVERY 6 HOURS PRN
Status: DISCONTINUED | OUTPATIENT
Start: 2017-04-17 | End: 2017-04-20 | Stop reason: HOSPADM

## 2017-04-17 RX ORDER — ESCITALOPRAM OXALATE 10 MG/1
10 TABLET ORAL DAILY
Status: DISCONTINUED | OUTPATIENT
Start: 2017-04-18 | End: 2017-04-20 | Stop reason: HOSPADM

## 2017-04-17 RX ORDER — CHLORDIAZEPOXIDE HYDROCHLORIDE AND CLIDINIUM BROMIDE 5; 2.5 MG/1; MG/1
1 CAPSULE ORAL
Status: DISCONTINUED | OUTPATIENT
Start: 2017-04-17 | End: 2017-04-20 | Stop reason: HOSPADM

## 2017-04-17 RX ORDER — MORPHINE SULFATE 2 MG/ML
4 INJECTION, SOLUTION INTRAMUSCULAR; INTRAVENOUS
Status: COMPLETED | OUTPATIENT
Start: 2017-04-17 | End: 2017-04-17

## 2017-04-17 RX ORDER — LEVOTHYROXINE SODIUM 88 UG/1
88 TABLET ORAL
Status: DISCONTINUED | OUTPATIENT
Start: 2017-04-18 | End: 2017-04-20 | Stop reason: HOSPADM

## 2017-04-17 RX ORDER — PANTOPRAZOLE SODIUM 40 MG/1
40 TABLET, DELAYED RELEASE ORAL DAILY
Status: DISCONTINUED | OUTPATIENT
Start: 2017-04-18 | End: 2017-04-20 | Stop reason: HOSPADM

## 2017-04-17 RX ORDER — DIFLUPREDNATE OPHTHALMIC 0.5 MG/ML
1 EMULSION OPHTHALMIC DAILY
Status: DISCONTINUED | OUTPATIENT
Start: 2017-04-18 | End: 2017-04-17

## 2017-04-17 RX ADMIN — PROMETHAZINE HYDROCHLORIDE 25 MG: 25 INJECTION, SOLUTION INTRAMUSCULAR; INTRAVENOUS at 04:04

## 2017-04-17 RX ADMIN — MORPHINE SULFATE 4 MG: 2 INJECTION, SOLUTION INTRAMUSCULAR; INTRAVENOUS at 02:04

## 2017-04-17 RX ADMIN — SODIUM CHLORIDE: 0.9 INJECTION, SOLUTION INTRAVENOUS at 11:04

## 2017-04-17 RX ADMIN — CHLORDIAZEPOXIDE HYDROCHLORIDE AND CLIDINIUM BROMIDE 1 CAPSULE: 5; 2.5 CAPSULE ORAL at 05:04

## 2017-04-17 RX ADMIN — SODIUM CHLORIDE: 0.9 INJECTION, SOLUTION INTRAVENOUS at 06:04

## 2017-04-17 RX ADMIN — ASPIRIN AND DIPYRIDAMOLE 1 CAPSULE: 25; 200 CAPSULE, EXTENDED RELEASE ORAL at 09:04

## 2017-04-17 RX ADMIN — PANTOPRAZOLE SODIUM 40 MG: 40 INJECTION, POWDER, FOR SOLUTION INTRAVENOUS at 10:04

## 2017-04-17 RX ADMIN — IOHEXOL 100 ML: 350 INJECTION, SOLUTION INTRAVENOUS at 03:04

## 2017-04-17 RX ADMIN — HYDROMORPHONE HYDROCHLORIDE 1 MG: 1 INJECTION, SOLUTION INTRAMUSCULAR; INTRAVENOUS; SUBCUTANEOUS at 03:04

## 2017-04-17 RX ADMIN — ONDANSETRON HYDROCHLORIDE 4 MG: 2 SOLUTION INTRAMUSCULAR; INTRAVENOUS at 02:04

## 2017-04-17 NOTE — H&P
[facility]  Cardiology  History and Physical     Patient Name: Brittany Haile  MRN: [id number]  Admission Date: 4/16/2017  Code Status: Full Code   Attending Provider: Anthony Brooks MD   Primary Care Physician: Wayne Frazier MD  Principal Problem:<principal problem not specified>    Patient information was obtained from patient and ER records.     Subjective:     Chief Complaint:  Abdominal pain with nausea      HPI: Brittany Haile is a 76 y.o. Female who presented to the ED with worsening abdominal pain and nausea.  She lost 20 lbs in the past 18 months. She has extensive PAD. She is s/p R CEA. She has bilateral marlee IIb claudication. She has Left subclavian stenosis with a 50 mmHg gradient with intermittent claudication. She has coronary artery calcification noted on CT scan. She is a smoker. Abdominal imaging revealed extensive calcified plaque throughout the aortic lumen, with associated ectasia. Infrarenal abdominal aorta lumen appears to narrow to approximately 0.8 cm. Extensive atherosclerotic calcification at the origin of the celiac artery with associated high grade stenosis.  Extensive atherosclerosis at the origin of the superior mesenteric artery, as well as along the proximal aspect of the vessel resulting in high-grade stenosis, and poststenotic dilatation.  The DENISE appears patent.  Moderate high-grade stenosis of the right renal artery and high grade stenosis of the origin of the left renal artery.  There is extensive atherosclerosis through the iliac vessels, most notably at the origin of the aortic bifurcation. Patient admitted with mesenteric angina and is NPO for angiogram this afternoon. Patient was given Dilaudid in the ED for pain and is groggy at time of interview.     Past Medical History:   Diagnosis Date    Chronic kidney disease, stage III (moderate) 8/19/2015    COPD (chronic obstructive pulmonary disease)     COPD (chronic obstructive pulmonary disease)     CVA  (cerebral infarction)     GERD (gastroesophageal reflux disease)     Hyperlipidemia     Hypertension     PVD (peripheral vascular disease) with claudication 10/13/2015    Stroke     Thyroid disease        Past Surgical History:   Procedure Laterality Date     SECTION      COLONOSCOPY N/A 3/2/2017    Procedure: COLONOSCOPY;  Surgeon: Cecil Crooks MD;  Location: [facility];  Service: Endoscopy;  Laterality: N/A;    CORONARY ANGIOPLASTY  2006    GALLBLADDER SURGERY      HYSTERECTOMY      OOPHORECTOMY         Review of patient's allergies indicates:  No Known Allergies    No current facility-administered medications on file prior to encounter.      Current Outpatient Prescriptions on File Prior to Encounter   Medication Sig    albuterol (PROVENTIL HFA) 90 mcg/actuation inhaler Inhale 2 puffs into the lungs every 4 to 6 hours as needed.    amlodipine (NORVASC) 5 MG tablet Take 2 tablets (10 mg total) by mouth once daily.    BENICAR 20 mg tablet TK 2 T PO D    chlordiazepoxide-clidinium 5-2.5 mg (LIBRAX) 5-2.5 mg Cap Take 1 capsule by mouth every 8 (eight) hours as needed.    dipyridamole-aspirin 200-25 mg (AGGRENOX)  mg CM12 Take 1 capsule by mouth 2 (two) times daily.    docusate sodium (COLACE) 100 MG capsule Take 1 capsule (100 mg total) by mouth 2 (two) times daily as needed for Constipation.    DUREZOL 0.05 % Drop ophthalmic solution     escitalopram oxalate (LEXAPRO) 10 MG tablet Take 1 tablet (10 mg total) by mouth once daily.    ferrous sulfate 325 mg (65 mg iron) Tab tablet     fluticasone-salmeterol 250-50 mcg/dose (ADVAIR) 250-50 mcg/dose diskus inhaler Inhale 1 puff into the lungs 2 (two) times daily.    hydrocodone-acetaminophen 5-325mg (NORCO) 5-325 mg per tablet Take 1 tablet by mouth every 8 (eight) hours as needed for Pain.    levothyroxine (SYNTHROID) 88 MCG tablet TAKE 1 TABLET BY MOUTH DAILY BEFORE BREAKFAST    meclizine (ANTIVERT) 25 mg tablet Take 1 tablet  (25 mg total) by mouth every 6 (six) hours as needed for Dizziness.    NEXIUM 40 mg capsule Take 1 capsule (40 mg total) by mouth once daily.    tiotropium (SPIRIVA WITH HANDIHALER) 18 mcg inhalation capsule Inhale 1 capsule (18 mcg total) into the lungs once daily.     Family History     None        Social History Main Topics    Smoking status: Current Every Day Smoker     Packs/day: 0.50     Years: 60.00     Types: Cigarettes    Smokeless tobacco: Never Used    Alcohol use No    Drug use: No    Sexual activity: Not on file     Review of Systems   Unable to perform ROS: other     Objective:     Vital Signs (Most Recent):  Temp: 97.7 °F (36.5 °C) (04/17/17 1236)  Pulse: 73 (04/17/17 1236)  Resp: 20 (04/17/17 1236)  BP: (!) 175/69 (04/17/17 1236)  SpO2: 99 % (04/17/17 1139) Vital Signs (24h Range):  Temp:  [97.5 °F (36.4 °C)-98.1 °F (36.7 °C)] 97.7 °F (36.5 °C)  Pulse:  [73-95] 73  Resp:  [15-24] 20  SpO2:  [92 %-99 %] 99 %  BP: (162-196)/(52-75) 175/69     Weight: 59 kg (130 lb)  Body mass index is 25.39 kg/(m^2).    SpO2: 99 %  O2 Device (Oxygen Therapy): nasal cannula    No intake or output data in the 24 hours ending 04/17/17 1237    Lines/Drains/Airways          No matching active lines, drains, or airways          Physical Exam   Constitutional: She is sleeping. She is easily aroused. No distress.   HENT:   Head: Normocephalic and atraumatic.   Eyes: Right eye exhibits no discharge. Left eye exhibits no discharge.   Neck: No JVD present.   Cardiovascular: Normal rate and regular rhythm.    Pulmonary/Chest: Effort normal and breath sounds normal.   Abdominal: Bowel sounds are normal. There is tenderness.   Musculoskeletal: She exhibits no edema.   Neurological: She is easily aroused.   Skin: Skin is warm and dry. She is not diaphoretic.       Significant Labs:   BMP:   Recent Labs  Lab 04/17/17  0221         K 3.9   CL 98   CO2 29   BUN 17   CREATININE 1.0   CALCIUM 9.5   , CMP   Recent  Labs  Lab 04/17/17 0221      K 3.9   CL 98   CO2 29      BUN 17   CREATININE 1.0   CALCIUM 9.5   PROT 7.2   ALBUMIN 4.0   BILITOT 0.3   ALKPHOS 108   AST 18   ALT 11   ANIONGAP 11   ESTGFRAFRICA >60   EGFRNONAA 54*   , CBC   Recent Labs  Lab 04/17/17 0221   WBC 7.69   HGB 14.7   HCT 44.1      , INR   Recent Labs  Lab 04/17/17 0221   INR 1.0    and All pertinent lab results from the last 24 hours have been reviewed.    Significant Imaging: Echocardiogram:   2D echo with color flow doppler:   Results for orders placed or performed during the hospital encounter of 04/16/17   2D echo with color flow doppler   Result Value Ref Range    EF 65 55 - 65    Diastolic Dysfunction Yes (A)     Est. PA Systolic Pressure 7.16     Mitral Valve Mobility NORMAL     Tricuspid Valve Regurgitation TRIVIAL      Assessment and Plan:     Active Diagnoses:    Diagnosis Date Noted POA    Mesenteric angina [K55.1] Patient with 20 pound weight loss over past 18 mo. Presented to the ED with worsening diffuse abd post prandial abdominal pain and nausea; Abdominal imaging revealed extensive calcified plaque throughout the aortic lumen, with associated ectasia. Infrarenal abdominal aorta lumen appears to narrow to approximately 0.8 cm. Extensive atherosclerotic calcification at the origin of the celiac artery with associated high grade stenosis.  Extensive atherosclerosis at the origin of the superior mesenteric artery, as well as along the proximal aspect of the vessel resulting in high-grade stenosis, and poststenotic dilatation.  The EDNISE appears patent.  Moderate high-grade stenosis of the right renal artery and high grade stenosis of the origin of the left renal artery.  There is extensive atherosclerosis through the iliac vessels, most notably at the origin of the aortic bifurcation. Patient admitted with mesenteric angina and is NPO for angiogram this afternoon. 04/17/2017 Yes      Problems Resolved During this  Admission:    Diagnosis Date Noted Date Resolved POA       VTE Risk Mitigation         Ordered     Medium Risk of VTE  Once      04/17/17 1055     Place RAFAELA hose  Until discontinued      04/17/17 1055          Ad Montanez NP  Cardiology   [facility]        I have seen the patient with Nurse Practitioner Ad Montanez. I agree with her assessment and plan as written above in the note.            Seen patient this afternoon  Reviewed her CTA        She will have revascularization tomorrow  Access via R radial  6 fr sheath  6 fr 125 cm MP guide  150 cm shaft balloons and stents        Risks, benefits and alternatives of peripheral catheterization and possible intervention were discussed with the patient. All questions were answered and informed consent obtained.     I discussed the importance of compliance with dual antiplatelet therapy with the patient to prevent acute or late stent thrombosis with premature discontinuation of the therapy.

## 2017-04-17 NOTE — ED NOTES
Pt resting in bed, eyes closed. No acute distress noted. Respirations even and unlabored. Family updated on plan of care. Will continue to monitor.

## 2017-04-17 NOTE — ED NOTES
Pt presents to ED c/o abd pain X months. Reports the pain has gotten progressively worse. Pt reports nausea, but denies vomiting today. Pt reports being prescribed Norco recently, states last dose was at 2100 this evening. Pt states that the Norco did not relieve her pain. Reports seeing Dr. Henning earlier this week. Family reports that cardiologist believes that the abd pain is due to decreased blood flow to abd organs from abd arteries. Family reports that cardiologist was to schedule appointment for ultrasound, but they were not called with appointment schedule yet. Pt reports that pain is over entire abd, but states pain is worse at epigastric region, and radiates to RUQ and RLQ. Denies urinary symptoms. Pt denies diarrhea. Pt reports left sided flank pain. Pt reports pain is 10/10, described as sharp pain.

## 2017-04-17 NOTE — PLAN OF CARE
Future Appointments  Date Time Provider Department Center   4/21/2017 12:00 PM Kurtis Stockton MD [facility]   5/4/2017 8:45 AM CARDIOLOGY, STRESS/TILT TABLE/JUAN CARLOS [facility]   5/4/2017 9:45 AM [address] [facility] [place] [facility]   5/4/2017 10:45 AM [facility]   5/4/2017 11:30 AM [address] Clini       Just started seeing Dr. Henning. Lives at home with son. Has 3 daughters that can help her at home.    [facility] brochure given to patient.       04/17/17 0000   Discharge Assessment   Assessment Type Discharge Planning Assessment   Confirmed/corrected address and phone number on facesheet? Yes   Assessment information obtained from? Patient   Communicated expected length of stay with patient/caregiver yes   Type of Healthcare Directive Received Durable power of  for health care;Living will  (daughter Mee James [phone])   If Healthcare Directive is received, is it scanned into Epic? no (comment)  (instructed daughter to bring copy of HCPOA)   Prior to hospitilization cognitive status: Alert/Oriented   Prior to hospitalization functional status: Independent;Assistive Equipment   Current cognitive status: Alert/Oriented   Current Functional Status: Independent   Arrived From home or self-care   Lives With child(martha), adult  (patient is primary caretaker of son)   Able to Return to Prior Arrangements yes   Is patient able to care for self after discharge? Yes   Patient's perception of discharge disposition home or selfcare   Readmission Within The Last 30 Days no previous admission in last 30 days   Patient currently being followed by outpatient case management? No   Patient currently receives home health services? No   Does the patient currently use HME? No   Patient currently receives private duty nursing? No   Patient currently receives any other outside agency services? No   Equipment Currently Used at Home walker,  rolling;wheelchair   Do you have any problems affording any of your prescribed medications? Yes  (nexium too expensive)   Is the patient taking medications as prescribed? yes   Do you have any financial concerns preventing you from receiving the healthcare you need? No   Does the patient have transportation to healthcare appointments? Yes   Transportation Available family or friend will provide   On Dialysis? No   Does the patient receive services at the [facility]? No   Discharge Plan A Home;Home with family   Discharge Plan B Home;Home Health;Home with family   Patient/Family In Agreement With Plan yes     Ana Hankins RN, CCM, CMSRN  RN Transition Navigator  [phone]

## 2017-04-17 NOTE — ED NOTES
Pt resting in bed, eyes closed. No acute distress noted. Respirations even and unlabored. Will continue to monitor.

## 2017-04-17 NOTE — ED NOTES
Pt lying in bed, moaning in pain. States that the pain is becoming worse than it was prior. Dr. Fracisco herrmann.

## 2017-04-18 ENCOUNTER — SURGERY (OUTPATIENT)
Age: 77
End: 2017-04-18

## 2017-04-18 LAB
BASOPHILS # BLD AUTO: 0.02 K/UL
BASOPHILS # BLD AUTO: 0.02 K/UL
BASOPHILS NFR BLD: 0.3 %
BASOPHILS NFR BLD: 0.4 %
DIFFERENTIAL METHOD: ABNORMAL
DIFFERENTIAL METHOD: ABNORMAL
EOSINOPHIL # BLD AUTO: 0.2 K/UL
EOSINOPHIL # BLD AUTO: 0.2 K/UL
EOSINOPHIL NFR BLD: 1.9 %
EOSINOPHIL NFR BLD: 3.1 %
ERYTHROCYTE [DISTWIDTH] IN BLOOD BY AUTOMATED COUNT: 14 %
ERYTHROCYTE [DISTWIDTH] IN BLOOD BY AUTOMATED COUNT: 14 %
HCT VFR BLD AUTO: 27.6 %
HCT VFR BLD AUTO: 33.5 %
HGB BLD-MCNC: 11 G/DL
HGB BLD-MCNC: 8.9 G/DL
LYMPHOCYTES # BLD AUTO: 1 K/UL
LYMPHOCYTES # BLD AUTO: 1.8 K/UL
LYMPHOCYTES NFR BLD: 21.4 %
LYMPHOCYTES NFR BLD: 22.8 %
MCH RBC QN AUTO: 31.3 PG
MCH RBC QN AUTO: 32 PG
MCHC RBC AUTO-ENTMCNC: 32.2 %
MCHC RBC AUTO-ENTMCNC: 32.8 %
MCV RBC AUTO: 97 FL
MCV RBC AUTO: 97 FL
MONOCYTES # BLD AUTO: 0.3 K/UL
MONOCYTES # BLD AUTO: 0.5 K/UL
MONOCYTES NFR BLD: 6.5 %
MONOCYTES NFR BLD: 6.8 %
NEUTROPHILS # BLD AUTO: 3.3 K/UL
NEUTROPHILS # BLD AUTO: 5.3 K/UL
NEUTROPHILS NFR BLD: 68.1 %
NEUTROPHILS NFR BLD: 68.2 %
PLATELET # BLD AUTO: 189 K/UL
PLATELET # BLD AUTO: 234 K/UL
PMV BLD AUTO: 9.1 FL
PMV BLD AUTO: 9.5 FL
RBC # BLD AUTO: 2.84 M/UL
RBC # BLD AUTO: 3.44 M/UL
WBC # BLD AUTO: 4.82 K/UL
WBC # BLD AUTO: 7.8 K/UL

## 2017-04-18 PROCEDURE — 04713DZ DILATION OF CELIAC ARTERY WITH INTRALUMINAL DEVICE, PERCUTANEOUS APPROACH: ICD-10-PCS | Performed by: INTERNAL MEDICINE

## 2017-04-18 PROCEDURE — 63600175 PHARM REV CODE 636 W HCPCS: Performed by: EMERGENCY MEDICINE

## 2017-04-18 PROCEDURE — 75726 ARTERY X-RAYS ABDOMEN: CPT | Mod: 26,59,, | Performed by: INTERNAL MEDICINE

## 2017-04-18 PROCEDURE — 99152 MOD SED SAME PHYS/QHP 5/>YRS: CPT | Mod: ,,, | Performed by: INTERNAL MEDICINE

## 2017-04-18 PROCEDURE — 94640 AIRWAY INHALATION TREATMENT: CPT

## 2017-04-18 PROCEDURE — 25000003 PHARM REV CODE 250

## 2017-04-18 PROCEDURE — 94761 N-INVAS EAR/PLS OXIMETRY MLT: CPT

## 2017-04-18 PROCEDURE — 25500020 PHARM REV CODE 255

## 2017-04-18 PROCEDURE — 86900 BLOOD TYPING SEROLOGIC ABO: CPT

## 2017-04-18 PROCEDURE — 25000003 PHARM REV CODE 250: Performed by: INTERNAL MEDICINE

## 2017-04-18 PROCEDURE — 37236 OPEN/PERQ PLACE STENT 1ST: CPT | Mod: ,,, | Performed by: INTERNAL MEDICINE

## 2017-04-18 PROCEDURE — 36415 COLL VENOUS BLD VENIPUNCTURE: CPT

## 2017-04-18 PROCEDURE — 37237 OPEN/PERQ PLACE STENT EA ADD: CPT | Mod: ,,, | Performed by: INTERNAL MEDICINE

## 2017-04-18 PROCEDURE — 04753DZ DILATION OF SUPERIOR MESENTERIC ARTERY WITH INTRALUMINAL DEVICE, PERCUTANEOUS APPROACH: ICD-10-PCS | Performed by: INTERNAL MEDICINE

## 2017-04-18 PROCEDURE — 85025 COMPLETE CBC W/AUTO DIFF WBC: CPT

## 2017-04-18 PROCEDURE — 63600175 PHARM REV CODE 636 W HCPCS

## 2017-04-18 PROCEDURE — 36245 INS CATH ABD/L-EXT ART 1ST: CPT | Mod: ,,, | Performed by: INTERNAL MEDICINE

## 2017-04-18 PROCEDURE — G0378 HOSPITAL OBSERVATION PER HR: HCPCS

## 2017-04-18 PROCEDURE — 25000242 PHARM REV CODE 250 ALT 637 W/ HCPCS: Performed by: NURSE PRACTITIONER

## 2017-04-18 PROCEDURE — 86850 RBC ANTIBODY SCREEN: CPT

## 2017-04-18 PROCEDURE — 27000221 HC OXYGEN, UP TO 24 HOURS

## 2017-04-18 PROCEDURE — 25000003 PHARM REV CODE 250: Performed by: NURSE PRACTITIONER

## 2017-04-18 PROCEDURE — 11000001 HC ACUTE MED/SURG PRIVATE ROOM

## 2017-04-18 PROCEDURE — C1769 GUIDE WIRE: HCPCS

## 2017-04-18 PROCEDURE — B410YZZ FLUOROSCOPY OF ABDOMINAL AORTA USING OTHER CONTRAST: ICD-10-PCS | Performed by: INTERNAL MEDICINE

## 2017-04-18 RX ORDER — SODIUM CHLORIDE 9 MG/ML
3 INJECTION, SOLUTION INTRAVENOUS CONTINUOUS
Status: ACTIVE | OUTPATIENT
Start: 2017-04-18 | End: 2017-04-18

## 2017-04-18 RX ORDER — ASPIRIN 81 MG/1
81 TABLET ORAL DAILY
Refills: 0 | COMMUNITY
Start: 2017-04-19 | End: 2019-07-31

## 2017-04-18 RX ORDER — ATORVASTATIN CALCIUM 40 MG/1
40 TABLET, FILM COATED ORAL DAILY
Qty: 90 TABLET | Refills: 5 | Status: SHIPPED | OUTPATIENT
Start: 2017-04-18 | End: 2018-04-20 | Stop reason: SDUPTHER

## 2017-04-18 RX ORDER — CLOPIDOGREL BISULFATE 75 MG/1
300 TABLET ORAL ONCE
Status: COMPLETED | OUTPATIENT
Start: 2017-04-18 | End: 2017-04-18

## 2017-04-18 RX ORDER — PANTOPRAZOLE SODIUM 40 MG/1
40 TABLET, DELAYED RELEASE ORAL ONCE
Status: COMPLETED | OUTPATIENT
Start: 2017-04-18 | End: 2017-04-18

## 2017-04-18 RX ORDER — ACETAMINOPHEN 325 MG/1
650 TABLET ORAL EVERY 6 HOURS PRN
Status: DISCONTINUED | OUTPATIENT
Start: 2017-04-18 | End: 2017-04-20 | Stop reason: HOSPADM

## 2017-04-18 RX ORDER — ASPIRIN 81 MG/1
81 TABLET ORAL DAILY
Status: DISCONTINUED | OUTPATIENT
Start: 2017-04-19 | End: 2017-04-20 | Stop reason: HOSPADM

## 2017-04-18 RX ORDER — CLOPIDOGREL BISULFATE 75 MG/1
75 TABLET ORAL DAILY
Status: DISCONTINUED | OUTPATIENT
Start: 2017-04-19 | End: 2017-04-20 | Stop reason: HOSPADM

## 2017-04-18 RX ORDER — CLOPIDOGREL BISULFATE 75 MG/1
75 TABLET ORAL DAILY
Qty: 90 TABLET | Refills: 3 | Status: SHIPPED | OUTPATIENT
Start: 2017-04-19 | End: 2017-10-25 | Stop reason: SDUPTHER

## 2017-04-18 RX ORDER — ASPIRIN 325 MG
325 TABLET ORAL ONCE
Status: COMPLETED | OUTPATIENT
Start: 2017-04-18 | End: 2017-04-18

## 2017-04-18 RX ADMIN — ACETAMINOPHEN 650 MG: 325 TABLET ORAL at 11:04

## 2017-04-18 RX ADMIN — PANTOPRAZOLE SODIUM 40 MG: 40 TABLET, DELAYED RELEASE ORAL at 10:04

## 2017-04-18 RX ADMIN — SODIUM CHLORIDE 3 ML/KG/HR: 0.9 INJECTION, SOLUTION INTRAVENOUS at 03:04

## 2017-04-18 RX ADMIN — PANTOPRAZOLE SODIUM 40 MG: 40 TABLET, DELAYED RELEASE ORAL at 09:04

## 2017-04-18 RX ADMIN — OLMESARTAN MEDOXOMIL 20 MG: 20 TABLET, FILM COATED ORAL at 09:04

## 2017-04-18 RX ADMIN — ASPIRIN 325 MG ORAL TABLET 325 MG: 325 PILL ORAL at 09:04

## 2017-04-18 RX ADMIN — AMLODIPINE BESYLATE 10 MG: 5 TABLET ORAL at 09:04

## 2017-04-18 RX ADMIN — TIOTROPIUM BROMIDE 18 MCG: 18 CAPSULE ORAL; RESPIRATORY (INHALATION) at 07:04

## 2017-04-18 RX ADMIN — HYDROMORPHONE HYDROCHLORIDE 1 MG: 1 INJECTION, SOLUTION INTRAMUSCULAR; INTRAVENOUS; SUBCUTANEOUS at 10:04

## 2017-04-18 RX ADMIN — LEVOTHYROXINE SODIUM 88 MCG: 0.09 TABLET ORAL at 06:04

## 2017-04-18 RX ADMIN — ESCITALOPRAM 10 MG: 10 TABLET, FILM COATED ORAL at 09:04

## 2017-04-18 RX ADMIN — CLOPIDOGREL BISULFATE 300 MG: 75 TABLET ORAL at 09:04

## 2017-04-18 RX ADMIN — FLUTICASONE FUROATE AND VILANTEROL TRIFENATATE 1 PUFF: 100; 25 POWDER RESPIRATORY (INHALATION) at 07:04

## 2017-04-18 NOTE — ED PROVIDER NOTES
Encounter Date: 4/16/2017       History     Chief Complaint   Patient presents with    Abdominal Pain     reports abd pain for 3 months, that has become increasing worse tonight, reports decresed blood flow to abd per cardilogist      Review of patient's allergies indicates:  No Known Allergies  HPI Comments: 77 y/o F with history of severe CAD and atherosclerotic disease who presents with severe worsening abdominal pain with suspicion of intestinal angina as yet unconfirmed by imaging. Patient's symptoms have rapidly worsened and she is in distress from pain.  Taking pain medication at home with little to no relief at this point.     Patient is a 76 y.o. female presenting with the following complaint: abdominal pain.   Abdominal Pain   The current episode started several weeks ago. The onset of the illness was gradual. The problem has been rapidly worsening. The abdominal pain is located in the LUQ, epigastric region and RUQ. The abdominal pain does not radiate. The severity of the abdominal pain is 10/10. The other symptoms of the illness include fatigue, nausea and vomiting. The other symptoms of the illness do not include fever, shortness of breath or dysuria. Primary symptoms comment: And weight loss of 20 lbs. .   Risk factors for an acute abdominal problem include being elderly (And severe athersclerotic disease, smoker. ). Symptoms associated with the illness do not include back pain. Significant associated medical issues include cardiac disease.     Past Medical History:   Diagnosis Date    Chronic kidney disease, stage III (moderate) 8/19/2015    COPD (chronic obstructive pulmonary disease)     COPD (chronic obstructive pulmonary disease)     CVA (cerebral infarction)     GERD (gastroesophageal reflux disease)     Hyperlipidemia     Hypertension     PVD (peripheral vascular disease) with claudication 10/13/2015    Stroke     Thyroid disease      Past Surgical History:   Procedure Laterality Date      SECTION      COLONOSCOPY N/A 3/2/2017    Procedure: COLONOSCOPY;  Surgeon: Cecil Crooks MD;  Location: [facility];  Service: Endoscopy;  Laterality: N/A;    CORONARY ANGIOPLASTY  2006    GALLBLADDER SURGERY      HYSTERECTOMY      OOPHORECTOMY       History reviewed. No pertinent family history.  Social History   Substance Use Topics    Smoking status: Current Every Day Smoker     Packs/day: 0.50     Years: 60.00     Types: Cigarettes    Smokeless tobacco: Never Used    Alcohol use No     Review of Systems   Constitutional: Positive for fatigue. Negative for fever.   HENT: Negative for sore throat.    Respiratory: Negative for chest tightness and shortness of breath.    Cardiovascular: Negative for chest pain.   Gastrointestinal: Positive for abdominal pain, nausea and vomiting.   Genitourinary: Negative for dysuria.   Musculoskeletal: Negative for back pain.   Skin: Negative for rash.   Neurological: Negative for weakness.   Hematological: Does not bruise/bleed easily.   All other systems reviewed and are negative.      Physical Exam   Initial Vitals   BP Pulse Resp Temp SpO2   17 2353 17 2353 17 2353 17 2353 17 2353   180/75 77 24 98.1 °F (36.7 °C) 99 %     Physical Exam    Nursing note and vitals reviewed.  Constitutional: She appears well-developed and well-nourished. She appears distressed.   HENT:   Head: Normocephalic and atraumatic.   Eyes: Conjunctivae and EOM are normal. Pupils are equal, round, and reactive to light. Right eye exhibits no discharge. Left eye exhibits no discharge. No scleral icterus.   Neck: Normal range of motion. Neck supple.   Cardiovascular: Normal rate, regular rhythm and normal heart sounds. Exam reveals no gallop and no friction rub.    No murmur heard.  Pulmonary/Chest: Breath sounds normal. No stridor. No respiratory distress. She has no wheezes. She has no rhonchi. She has no rales.   Abdominal: Soft. Bowel sounds are normal.  She exhibits no distension and no mass. There is no hepatosplenomegaly. There is tenderness in the right upper quadrant, epigastric area and left upper quadrant. There is no rebound, no guarding, no tenderness at McBurney's point and negative Gross's sign. No hernia.   Neurological: She is alert and oriented to person, place, and time. She has normal strength. No cranial nerve deficit or sensory deficit.   Skin: Skin is warm and dry.   Psychiatric: She has a normal mood and affect. Her behavior is normal. Judgment and thought content normal.         ED Course   Procedures  Labs Reviewed   CBC W/ AUTO DIFFERENTIAL - Abnormal; Notable for the following:        Result Value    MCH 32.2 (*)     All other components within normal limits   COMPREHENSIVE METABOLIC PANEL - Abnormal; Notable for the following:     eGFR if non  54 (*)     All other components within normal limits   APTT - Abnormal; Notable for the following:     aPTT 32.1 (*)     All other components within normal limits   LIPASE   PROTIME-INR   LACTIC ACID, PLASMA     EKG Readings: (Independently Interpreted)   Initial Reading: No STEMI.     ECG Results         EKG 12-lead (Final result) Result time:  04/17/17 16:48:53    Final result by Interface, Lab In [facility] (04/17/17 16:48:53)    Narrative:    Test Reason : R10.9  Vent. Rate : 090 BPM     Atrial Rate : 090 BPM     P-R Int : 146 ms          QRS Dur : 072 ms      QT Int : 388 ms       P-R-T Axes : 042 063 071 degrees     QTc Int : 474 ms    Normal sinus rhythm  Normal ECG    Confirmed by Jayy Holland MD ([id number]) on 4/17/2017 4:48:44 PM    Referred By: GEOVANNA SANCHEZ           Confirmed By:Jayy Holland MD            EKG 12-LEAD (Final result) Result time:  04/17/17 15:50:48    Final result by Interface, Lab In [facility] (04/17/17 15:50:48)    Narrative:    Test Reason : R10.9    Vent. Rate : 083 BPM     Atrial Rate : 083 BPM     P-R Int : 176 ms          QRS Dur : 072 ms      QT Int : 376 ms        P-R-T Axes : 069 060 077 degrees     QTc Int : 441 ms    Normal sinus rhythm  Nonspecific T wave abnormality  Abnormal ECG    Confirmed by Jayy Holland MD [id number]) on 4/17/2017 3:50:38 PM    Referred By: [email or web address]   SELF           Confirmed By:Jayy Holland MD            X-Rays:   Independently Interpreted Readings:   Abdomen:   Abdomen and Pelvis CT with Contrast - CTA done with evidence of stenosis of celiac and SMA     Imaging Results         X-Ray Chest AP Portable (Final result) Result time:  04/17/17 04:02:02    Final result by Blanca Villarreal MD (04/17/17 04:02:02)    Impression:      No acute abnormality on this single radiographic view of the chest.        Electronically signed by: BLANCA VILLARREAL  Date:     04/17/17  Time:    04:02     Narrative:    Comparison:Chest radiograph 12/10/2015.    Technique: Single AP portable chest radiograph.    Findings: Cardiomediastinal silhouette is within normal limits.  There is atherosclerosis of the aorta.  There are coarse interstitial markings, relatively unchanged in prior examination.  There is redemonstration of a pulmonary nodule within the right midlung zone, similar to prior exam.  No evidence of focal consolidation, significant pleural effusion or pneumothorax.  Osseous structures demonstrate no acute abnormality.            CTA Abdomen and Pelvis (Final result) Result time:  04/17/17 04:30:47    Final result by Blanca Villarreal MD (04/17/17 04:30:47)    Impression:       1.  Extensive atherosclerosis of the aortic branch vessels and visceral vessels.  Subsequent hemodynamically significant high grade stenosis at the origins of the celiac artery and the superior mesenteric artery as detailed above.  There is also significant stenosis involving the origins of the bilateral renal arteries.    2.  Extensive calcified and noncalcified atheromatous plaque of the abdominal aorta with subsequent severe luminal narrowing of the infrarenal abdominal aorta     3. Nonspecific  gastric wall thickening.  Clinical correlation and further evaluation as warranted.  The  4.  Multiple additional findings as above.      Electronically signed by: ARAM YADAV  Date:     04/17/17  Time:    04:30     Narrative:    TECHNIQUE: 2.5 mm axial CT images of the pelvis were obtained before and after the administration of 100 cc Omni 350 IV contrast.  Coronal and sagittal reformatted images reviewed.    COMPARISON: CT 1/18/2017.    FINDINGS:     VASCULAR:   There is extensive calcified plaque throughout the aortic lumen, with associated ectasia.  There are multiple small outpouchings within regions of atheromatous plaque involving the thoracic aorta.  This atherosclerosis results in severe aortic luminal narrowing, most notably within the region of the infrarenal abdominal aorta, where the lumen appears to narrow to approximately 0.8 cm.      There is extensive atherosclerotic calcification at the origin of the celiac artery with associated high grade stenosis.  Artifact from extensive atherosclerosis does limit evaluation of the lumen.  However there does appear to be contrast within celiac branch vessels.  There is also extensive atherosclerosis at the origin of the superior mesenteric artery, as well as along the proximal aspect of the vessel resulting in high-grade stenosis, and poststenotic dilatation.  The DENISE appears patent.      The renal arteries also demonstrate extensive atherosclerosis at their origins (left greater than right).  Moderate high-grade stenosis of the right renal artery and high grade stenosis of the origin of the left renal artery.  There is extensive atherosclerosis through the iliac vessels, most notably at the origin of the aortic bifurcation.    NON-VASCULAR:     There is emphysematous change of the pulmonary parenchyma.  Visualized portions of heart and pericardium are unremarkable.    The liver is enlarged.  There is a calcified hepatic granuloma once again identified.  The  gallbladder is surgically absent.  There is mild-extrahepatic biliary ductal dilatation, likely relating to patient's cholecystectomy status.  The pancreas, spleen, and left adrenal gland are unremarkable.  There is a relatively unchanged appearing right adrenal gland adenoma.    The left kidney is slightly diminutive in size compared to the right.  No hydroureteronephrosis.  There is a stable left renal cysts.  The urinary bladder is markedly distended.  The uterus is not visualized, likely surgically absent.      The visualized loops of large and small bowel demonstrate no evidence of obstruction or inflammatory change.  Colonic diverticula are identified.  There is a large volume of retained fecal material throughout the colon.  Findings could be seen with constipation.    Visualized osseous structures demonstrate multilevel degenerative change.  Extraperitoneal soft tissues are unremarkable.              Medical Decision Making:   Initial Assessment:   Severe episodic abdominal pain c/w intestinal angina in this patient with severe diffuse atherosclerotic disease who continues to smoke. Will tx pain and obtain more definitive imaging with CTA of abdomen.    Differential Diagnosis:   Intestinal angina, Bowel ischemia, GERD, billiary disease, SBO, pancreatitis, MI, gastritis   Clinical Tests:   Lab Tests: Ordered and Reviewed  Other:   I have discussed this case with another health care provider.       <> Summary of the Discussion: Dr. 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 Diagnoses of Abdominal pain and Systolic congestive heart failure were also pertinent to this visit.    Disposition:   Disposition: Admitted  Condition: Stable       Lelo Yap MD  04/18/17 1171

## 2017-04-18 NOTE — INTERVAL H&P NOTE
The patient has been examined and the H&P has been reviewed:        Anesthesia/Surgery risks, benefits and alternative options discussed and understood by patient/family.          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

## 2017-04-18 NOTE — PLAN OF CARE
Problem: Patient Care Overview  Goal: Plan of Care Review  Outcome: Ongoing (interventions implemented as appropriate)  Reviewed plan of care with pt. Pt denies any pain or nausea. Pt NPO since midnight. Safety measures are in place, bed low and in lock position, call light in reach, bed alarm is on, and family remains at the bedside. Pt verbalizes full understanding of their plan of care.

## 2017-04-18 NOTE — PLAN OF CARE
Problem: Patient Care Overview  Goal: Plan of Care Review  Outcome: Ongoing (interventions implemented as appropriate)  Pt on documented O2, no respiratory distress noted. Will continue to monitor.

## 2017-04-18 NOTE — PROGRESS NOTES
Pt arrived to cath lab recovery, aaox4, denies pain or discomfort. Denies NV. R radial access cdi, vasc band in place. No bleeding or hematoma. Vss. See flow sheet. Will cont to monitor.

## 2017-04-18 NOTE — PROGRESS NOTES
.Pharmacy New Medication Education    Patient accepted medication education.    Pharmacy educated patient on the following medications, using the teach-back method.   Albuterol  Amlodipine  Asa  Librax  Plavix  Colace  Lexapro  Breo  Dilaudid  Synthroid  Morphine  Olmesartan  Zofran  Pantoprazole  Phenergan  spirva  Learners of pharmacy medication education included:  patient    Patient +/- learner response:  verbalize understanding

## 2017-04-18 NOTE — PROGRESS NOTES
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

## 2017-04-19 LAB
ABO + RH BLD: NORMAL
ALBUMIN SERPL BCP-MCNC: 3 G/DL
ALP SERPL-CCNC: 103 U/L
ALT SERPL W/O P-5'-P-CCNC: 32 U/L
ANION GAP SERPL CALC-SCNC: 6 MMOL/L
ANION GAP SERPL CALC-SCNC: 7 MMOL/L
AST SERPL-CCNC: 28 U/L
BASOPHILS # BLD AUTO: 0.02 K/UL
BASOPHILS # BLD AUTO: 0.02 K/UL
BASOPHILS NFR BLD: 0.3 %
BASOPHILS NFR BLD: 0.3 %
BILIRUB SERPL-MCNC: 0.3 MG/DL
BLD GP AB SCN CELLS X3 SERPL QL: NORMAL
BUN SERPL-MCNC: 18 MG/DL
BUN SERPL-MCNC: 20 MG/DL
CALCIUM SERPL-MCNC: 8.1 MG/DL
CALCIUM SERPL-MCNC: 8.3 MG/DL
CHLORIDE SERPL-SCNC: 103 MMOL/L
CHLORIDE SERPL-SCNC: 103 MMOL/L
CO2 SERPL-SCNC: 24 MMOL/L
CO2 SERPL-SCNC: 26 MMOL/L
CREAT SERPL-MCNC: 0.7 MG/DL
CREAT SERPL-MCNC: 0.9 MG/DL
DIFFERENTIAL METHOD: ABNORMAL
DIFFERENTIAL METHOD: ABNORMAL
EOSINOPHIL # BLD AUTO: 0.1 K/UL
EOSINOPHIL # BLD AUTO: 0.2 K/UL
EOSINOPHIL NFR BLD: 1.6 %
EOSINOPHIL NFR BLD: 2.6 %
ERYTHROCYTE [DISTWIDTH] IN BLOOD BY AUTOMATED COUNT: 13.7 %
ERYTHROCYTE [DISTWIDTH] IN BLOOD BY AUTOMATED COUNT: 13.8 %
EST. GFR  (AFRICAN AMERICAN): >60 ML/MIN/1.73 M^2
EST. GFR  (AFRICAN AMERICAN): >60 ML/MIN/1.73 M^2
EST. GFR  (NON AFRICAN AMERICAN): >60 ML/MIN/1.73 M^2
EST. GFR  (NON AFRICAN AMERICAN): >60 ML/MIN/1.73 M^2
GLUCOSE SERPL-MCNC: 102 MG/DL
GLUCOSE SERPL-MCNC: 98 MG/DL
HCT VFR BLD AUTO: 30.9 %
HCT VFR BLD AUTO: 31.1 %
HGB BLD-MCNC: 10.2 G/DL
HGB BLD-MCNC: 10.2 G/DL
LACTATE SERPL-SCNC: 0.7 MMOL/L
LYMPHOCYTES # BLD AUTO: 0.9 K/UL
LYMPHOCYTES # BLD AUTO: 1.7 K/UL
LYMPHOCYTES NFR BLD: 14.8 %
LYMPHOCYTES NFR BLD: 25.2 %
MCH RBC QN AUTO: 31.3 PG
MCH RBC QN AUTO: 31.6 PG
MCHC RBC AUTO-ENTMCNC: 32.8 %
MCHC RBC AUTO-ENTMCNC: 33 %
MCV RBC AUTO: 95 FL
MCV RBC AUTO: 96 FL
MONOCYTES # BLD AUTO: 0.6 K/UL
MONOCYTES # BLD AUTO: 0.6 K/UL
MONOCYTES NFR BLD: 10 %
MONOCYTES NFR BLD: 9.2 %
NEUTROPHILS # BLD AUTO: 4.1 K/UL
NEUTROPHILS # BLD AUTO: 4.6 K/UL
NEUTROPHILS NFR BLD: 62.5 %
NEUTROPHILS NFR BLD: 73.1 %
PLATELET # BLD AUTO: 214 K/UL
PLATELET # BLD AUTO: 229 K/UL
PMV BLD AUTO: 9 FL
PMV BLD AUTO: 9.1 FL
POCT GLUCOSE: 119 MG/DL (ref 70–110)
POTASSIUM SERPL-SCNC: 3.7 MMOL/L
POTASSIUM SERPL-SCNC: 3.8 MMOL/L
PROT SERPL-MCNC: 5.3 G/DL
RBC # BLD AUTO: 3.23 M/UL
RBC # BLD AUTO: 3.26 M/UL
SODIUM SERPL-SCNC: 134 MMOL/L
SODIUM SERPL-SCNC: 135 MMOL/L
WBC # BLD AUTO: 6.27 K/UL
WBC # BLD AUTO: 6.62 K/UL

## 2017-04-19 PROCEDURE — 85025 COMPLETE CBC W/AUTO DIFF WBC: CPT

## 2017-04-19 PROCEDURE — 80048 BASIC METABOLIC PNL TOTAL CA: CPT

## 2017-04-19 PROCEDURE — 27000221 HC OXYGEN, UP TO 24 HOURS

## 2017-04-19 PROCEDURE — 80053 COMPREHEN METABOLIC PANEL: CPT

## 2017-04-19 PROCEDURE — 94640 AIRWAY INHALATION TREATMENT: CPT

## 2017-04-19 PROCEDURE — 36415 COLL VENOUS BLD VENIPUNCTURE: CPT

## 2017-04-19 PROCEDURE — 63600175 PHARM REV CODE 636 W HCPCS: Performed by: EMERGENCY MEDICINE

## 2017-04-19 PROCEDURE — 99233 SBSQ HOSP IP/OBS HIGH 50: CPT | Mod: ,,, | Performed by: INTERNAL MEDICINE

## 2017-04-19 PROCEDURE — 83605 ASSAY OF LACTIC ACID: CPT

## 2017-04-19 PROCEDURE — 11000001 HC ACUTE MED/SURG PRIVATE ROOM

## 2017-04-19 PROCEDURE — 94761 N-INVAS EAR/PLS OXIMETRY MLT: CPT

## 2017-04-19 PROCEDURE — 99217 PR OBSERVATION CARE DISCHARGE: CPT | Mod: ,,, | Performed by: NURSE PRACTITIONER

## 2017-04-19 PROCEDURE — 25000003 PHARM REV CODE 250: Performed by: NURSE PRACTITIONER

## 2017-04-19 PROCEDURE — 25000003 PHARM REV CODE 250: Performed by: INTERNAL MEDICINE

## 2017-04-19 RX ORDER — IBUPROFEN 200 MG
1 TABLET ORAL DAILY
Status: DISCONTINUED | OUTPATIENT
Start: 2017-04-20 | End: 2017-04-19

## 2017-04-19 RX ORDER — ATORVASTATIN CALCIUM 40 MG/1
40 TABLET, FILM COATED ORAL DAILY
Status: DISCONTINUED | OUTPATIENT
Start: 2017-04-20 | End: 2017-04-20 | Stop reason: HOSPADM

## 2017-04-19 RX ORDER — SODIUM CHLORIDE 9 MG/ML
INJECTION, SOLUTION INTRAVENOUS CONTINUOUS
Status: DISCONTINUED | OUTPATIENT
Start: 2017-04-19 | End: 2017-04-19

## 2017-04-19 RX ORDER — IBUPROFEN 200 MG
1 TABLET ORAL DAILY
Status: DISCONTINUED | OUTPATIENT
Start: 2017-04-19 | End: 2017-04-20 | Stop reason: HOSPADM

## 2017-04-19 RX ADMIN — CHLORDIAZEPOXIDE HYDROCHLORIDE AND CLIDINIUM BROMIDE 1 CAPSULE: 5; 2.5 CAPSULE ORAL at 08:04

## 2017-04-19 RX ADMIN — ESCITALOPRAM 10 MG: 10 TABLET, FILM COATED ORAL at 09:04

## 2017-04-19 RX ADMIN — LEVOTHYROXINE SODIUM 88 MCG: 0.09 TABLET ORAL at 06:04

## 2017-04-19 RX ADMIN — TIOTROPIUM BROMIDE 18 MCG: 18 CAPSULE ORAL; RESPIRATORY (INHALATION) at 08:04

## 2017-04-19 RX ADMIN — ACETAMINOPHEN 650 MG: 325 TABLET ORAL at 06:04

## 2017-04-19 RX ADMIN — ONDANSETRON 4 MG: 2 INJECTION INTRAMUSCULAR; INTRAVENOUS at 06:04

## 2017-04-19 RX ADMIN — MORPHINE SULFATE 2 MG: 2 INJECTION, SOLUTION INTRAMUSCULAR; INTRAVENOUS at 07:04

## 2017-04-19 RX ADMIN — ASPIRIN 81 MG: 81 TABLET, COATED ORAL at 09:04

## 2017-04-19 RX ADMIN — OLMESARTAN MEDOXOMIL 20 MG: 20 TABLET, FILM COATED ORAL at 09:04

## 2017-04-19 RX ADMIN — SODIUM CHLORIDE: 0.9 INJECTION, SOLUTION INTRAVENOUS at 11:04

## 2017-04-19 RX ADMIN — PANTOPRAZOLE SODIUM 40 MG: 40 TABLET, DELAYED RELEASE ORAL at 09:04

## 2017-04-19 RX ADMIN — CLOPIDOGREL BISULFATE 75 MG: 75 TABLET ORAL at 09:04

## 2017-04-19 RX ADMIN — AMLODIPINE BESYLATE 10 MG: 5 TABLET ORAL at 09:04

## 2017-04-19 RX ADMIN — FLUTICASONE FUROATE AND VILANTEROL TRIFENATATE 1 PUFF: 100; 25 POWDER RESPIRATORY (INHALATION) at 08:04

## 2017-04-19 NOTE — PLAN OF CARE
Patient refuses nicotine patch.  Her daughter said the patient said she is going down stairs to smoke and no one is going to stop her.  Patient is sitting in the room right now with her son-in-law.  Daughter advised against bring the patient downstairs to smoke.  I will continue to monitor.

## 2017-04-19 NOTE — PLAN OF CARE
11:15 Patient's bed alarm went off the student nurse was nearby and went into the room.  The patient was leaning on the bathroom door confused, and off O2 SATS 88. Patient placed back on O2 .  SATS 93%. Nurse called to the room.  Patient with a mental status change.  Patient confused and now only speaking Kiswahili, but mainly mumbling.    B/P 100/44 on monitor, Manually 90/40.  11:13 Myriam NP and Harley NP called to bedside.  Japanese speaking nurse talking to patient.  Patient does not know her name or date of birth.  This is a mental status change from patient baseline. Blood sugar 119.  Patient placed on tele monitor.  11:30 Patient's b/p 110/50  11:35 Fluids hung per order at 250 ml/hr  11:45 Patient wanting to get up and go into the kitchen.  Patient reoriented to being in the hospital.    11:50 Patient's daughter arrived at bedside, and filled in on the situation.  12:00 Patient taken for STAT CT.

## 2017-04-19 NOTE — NURSING
Upon arrival to floor pt AAOX4, w/o distress. R radial site dressing CDI. No hematoma or ecchymosis noted. Respirations even and unlabored. S1 S2 heart sounds. Pt denies pain. +2 bilateral radial pulses. <3 sec cap refill.  ml/hr infusing. VSS stable.

## 2017-04-19 NOTE — DISCHARGE INSTRUCTIONS
-No driving the day of procedure  -Remove dressing tomorrow after shower. May apply band-aid for 2 days to the site.  -If radial, do not use arm at all for remainder of day  -No strenuous activity or exercise for 3 days following procedure  -No tub bath; do not submerge wound (access site) in water for 3 days  -Do not lift anything over 5lbs for 3 days after procedure  -If site swells or bleeds hold direct pressure to area for 10 full minutes. IF SITE CONTINUES TO BLEED, HAVE SOMEONE BRING YOU TO THE ER OR CALL 911.

## 2017-04-19 NOTE — PLAN OF CARE
Problem: Patient Care Overview  Goal: Plan of Care Review  Patient on RA, no respiratory distress noted. Will continue to monitor.   Outcome: Ongoing (interventions implemented as appropriate)  Plan of care reviewed with patient daughter and son-in-law. Patient verbalized understanding, but she is not happy with the plan.  She wants to go outside and smoke.  She refuses the nicotine patch. Fall precautions maintained. Bed in lowest position, locked, call light within reach, and bed alarm on. Side rails up x's 2 with slip resistant socks on. Nurse instructed patient to notify staff for any assistance and pt verbalized complete understanding. Patient on telemetry throughout  the middle of the shift with no ectopy noted. Will continue to monitor.

## 2017-04-19 NOTE — NURSING
Notified Dr. Brooks of H/H 8.9 and 27.6. STAT H/H is to be ordered and a type and screen. He would like the H/H to be drawn from the left arm once.

## 2017-04-19 NOTE — PLAN OF CARE
04/19/17 1154   Final Note   Assessment Type Final Discharge Note   Discharge Disposition Home   Discharge planning education complete? Yes   Hospital Follow Up  Appt(s) scheduled? Yes   Discharge plans and expectations educations in teach back method with documentation complete? Yes   [facility] Yes   Discharge/Hospital Encounter Summary to (non-[facility]) PCP No   Referral to Outpatient Case Management complete? No   Referral to / orders for Home Health Complete? No   30 day supply of medicines given at discharge, if documented non-compliance / non-adherence? No   Any social issues identified prior to discharge? No   Did you assess the readiness or willingness of the family or caregiver to support self management of care? No   Right Care Referral Info   Post Acute Recommendation No Care   .  Future Appointments  Date Time Provider Department Center   4/21/2017 12:00 PM Kurtis Stockton MD [facility]   5/1/2017 11:00 AM Carol Robertson MD [facility]   5/4/2017 8:45 AM CARDIOLOGY, STRESS/TILT TABLE/JUAN CARLOS [facility] CARD [facility]   5/4/2017 9:45 [address] [facility]   5/4/2017 10:45 AM [address] [facility]   5/4/2017 11:30 [address] [facility]   5/8/2017 11:00 AM Anthony Brooks MD [facility] CARDIO [facility]   5/15/2017 2:00 PM Cecil Crooks MD [facility] TUMOR [facility]   5/17/2017 2:20 PM Wayne Frazier MD On license of [facility] MED [facility]       Ana Hankins, RN, CCM, CMSRN  RN Transition Navigator  [phone]

## 2017-04-19 NOTE — SIGNIFICANT EVENT
Called to bedside at approx 11AM for reports of AMS. Patient noted confused but able to follow instruction. Hand grasp equal, smile symmetrical, BLE strength equal. Hypotensive on manual BP  Stat CT head performed without acute process noted  Stat CBC drawn without acute drop in HH or elevated WBC  Stat Lactate WNL  Stat CMP without significant abnormality   Patient given 500 cc NS bolus with return to normotensive state and mentation improved  Daughter at bedside and updated on status.  Called again at 1430 for reports of waxing and waning mentation. Neuro check without deficit, VSS   Stat UA ordered and currently pending  Nursing to continue neuro checks as ordered, patient to be seen by attending MD

## 2017-04-19 NOTE — PROGRESS NOTES
Pt had x2 bm, dark blood noted. MD andrade notified. bp 122/59, hr 85. Pt denies pain.   Md Andrade ordered to d/c discharge orders and to resume prev orders. MD andrade ordered CBC to be drawn at 2130 and to call him and notified of results. MD ordred protonix 40mg PO x1. Report given to camelia Perez RN, verbalized understanding.

## 2017-04-19 NOTE — NURSING
Notified Dr. Brooks that the H/H drawn from the left arm was 11/33.5. VSS. Pain controlled with dilaudid 1 mg.

## 2017-04-19 NOTE — PLAN OF CARE
Patient doing well this A.M..  Patient denies any pain and has not had any stools this am,  Will continue to monitor.

## 2017-04-19 NOTE — PLAN OF CARE
Son-in-law at bedside.  Patient calm and sleeping.  Patient missed urine hat when urinating in toilet.  Will collet urine next time she goes.

## 2017-04-19 NOTE — PLAN OF CARE
Problem: Patient Care Overview  Goal: Plan of Care Review  Patient on RA, no respiratory distress noted. Will continue to monitor.   Outcome: Ongoing (interventions implemented as appropriate)  Reviewed plan of care with pt. R radial site CDI. Pt complains of headaches and abdominal pain overnight. Pt had 2 bloody BMs. Last bloody stool was at 2200 4/18/2017. Safety measures are in place, bed low and in lock position, call light in reach, bed alarm is on, and family remains at the bedside. Pt verbalizes full understanding of their plan of care.

## 2017-04-19 NOTE — PLAN OF CARE
Problem: Patient Care Overview  Goal: Plan of Care Review  Patient on RA, no respiratory distress noted. Will continue to monitor.   Outcome: Ongoing (interventions implemented as appropriate)  The proper method of use, as well as anticipated side effects, of this metered-dose inhaler are discussed and demonstrated to the patient.

## 2017-04-19 NOTE — PROGRESS NOTES
[facility]  Cardiology  Progress Note    Patient Name: Brittany Haile  MRN: [id number]  Admission Date: 4/16/2017  Hospital Length of Stay: 1 days  Code Status: Full Code   Attending Physician: Anthony Brooks MD   Primary Care Physician: Wayne Frazier MD  Expected Discharge Date: 4/19/2017  Principal Problem:Angina mesenteric    Subjective:     Hospital Course: Brittany Haile is a 76 y.o. Female who presented to the ED with worsening abdominal pain and nausea. She lost 20 lbs in the past 18 months. She has extensive PAD. She is s/p R CEA. She has bilateral marlee IIb claudication. She has Left subclavian stenosis with a 50 mmHg gradient with intermittent claudication. She has coronary artery calcification noted on CT scan. She is a smoker. Abdominal imaging revealed extensive calcified plaque throughout the aortic lumen, with associated ectasia. Infrarenal abdominal aorta lumen appears to narrow to approximately 0.8 cm. Extensive atherosclerotic calcification at the origin of the celiac artery with associated high grade stenosis.  Extensive atherosclerosis at the origin of the superior mesenteric artery, as well as along the proximal aspect of the vessel resulting in high-grade stenosis, and poststenotic dilatation.  The DENISE appears patent.  Moderate high-grade stenosis of the right renal artery and high grade stenosis of the origin of the left renal artery.  There is extensive atherosclerosis through the iliac vessels, most notably at the origin of the aortic bifurcation. Patient admitted with mesenteric angina. 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 CBC was performed without significant drop in HH and her vital signs were stable. Patient seen on the morning on 04/19/2017 and found to be tolerating diet and ADLs at baseline without recurrence of bloody stools. Her abdominal pain was improved and she was found to be appropriate for discharge to follow up with Dr Brooks. Follow up with Dr Crooks was arranged as well for continued evaluation of abdominal pain and GIB in patient with history of diverticular disease.       Review of Systems   Constitution: Positive for weight loss.   HENT: Negative.    Eyes: Negative.    Cardiovascular: Positive for claudication. Negative for chest pain, leg swelling, orthopnea, palpitations, paroxysmal nocturnal dyspnea and syncope.   Respiratory: Negative.    Endocrine: Negative.    Hematologic/Lymphatic: Negative.    Skin: Positive for unusual hair distribution.   Musculoskeletal: Negative.    Gastrointestinal: Positive for abdominal pain.        Bloody stool x 2 overnight    Genitourinary: Negative.    Neurological: Negative.    Psychiatric/Behavioral: Negative.    Allergic/Immunologic: Negative.      Objective:     Vital Signs (Most Recent):  Temp: 99.1 °F (37.3 °C) (04/19/17 0800)  Pulse: 87 (04/19/17 0830)  Resp: 16 (04/19/17 0830)  BP: 134/61 (04/19/17 0800)  SpO2: (!) 94 % (04/19/17 0830) Vital Signs (24h Range):  Temp:  [97.8 °F (36.6 °C)-99.1 °F (37.3 °C)] 99.1 °F (37.3 °C)  Pulse:  [69-90] 87  Resp:  [14-18] 16  SpO2:  [81 %-99 %] 94 %  BP: ()/(50-65) 134/61     Weight: 59 kg (130 lb)  Body mass index is 25.39 kg/(m^2).    SpO2: (!) 94 %  O2 Device (Oxygen Therapy): nasal cannula      Intake/Output Summary (Last 24 hours) at 04/19/17 0925  Last data filed at 04/19/17 0400   Gross per 24 hour   Intake             1240 ml   Output              600 ml   Net              640 ml       Lines/Drains/Airways     Peripheral Intravenous Line                 Peripheral IV - Single Lumen 04/18/17 2200 Right  Antecubital less than 1 day                Physical Exam   Constitutional: She is oriented to person, place, and time. She appears well-developed and well-nourished. No distress.   Eyes: Right eye exhibits no discharge. Left eye exhibits no discharge.   Neck: No JVD present.   Cardiovascular: Normal rate, regular rhythm and normal heart sounds.    Pulmonary/Chest: Effort normal and breath sounds normal.   Abdominal: Soft. Bowel sounds are normal. There is tenderness (generalized ).   Musculoskeletal: She exhibits no edema.   Neurological: She is alert and oriented to person, place, and time.   Skin: Skin is warm and dry. She is not diaphoretic.   Psychiatric: She has a normal mood and affect. Her behavior is normal. Judgment and thought content normal.       Significant Labs:   BMP:   Recent Labs  Lab 04/19/17  0454   GLU 98   *   K 3.8      CO2 24   BUN 18   CREATININE 0.7   CALCIUM 8.1*   , CBC   Recent Labs  Lab 04/18/17  2058 04/18/17  2227 04/19/17  0454   WBC 4.82 7.80 6.27   HGB 8.9* 11.0* 10.2*   HCT 27.6* 33.5* 31.1*    234 229   , INR No results for input(s): INR, PROTIME in the last 48 hours. and All pertinent lab results from the last 24 hours have been reviewed.    Significant Imaging: Echocardiogram:   2D echo with color flow doppler:   Results for orders placed or performed during the hospital encounter of 04/16/17   2D echo with color flow doppler   Result Value Ref Range    EF 65 55 - 65    Diastolic Dysfunction Yes (A)     Est. PA Systolic Pressure 7.16     Mitral Valve Mobility NORMAL     Tricuspid Valve Regurgitation TRIVIAL      Assessment and Plan:     Brief HPI: Patient seen this morning on rounds. No further bleeding from rectum overnight and tolerating diet with reports of decreased abd pain. Found appropriate for discharge to follow up with Dr Brooks and Dr Crooks as outpatient.     Active Diagnoses:    Diagnosis Date Noted POA    Mesenteric angina [K55.1] Patient with  20 pound weight loss over past 18 mo. Presented to the ED with worsening diffuse abd post prandial abdominal pain and nausea; Abdominal imaging revealed extensive calcified plaque throughout the aortic lumen, with associated ectasia. Infrarenal abdominal aorta lumen appears to narrow to approximately 0.8 cm. Extensive atherosclerotic calcification at the origin of the celiac artery with associated high grade stenosis.  Extensive atherosclerosis at the origin of the superior mesenteric artery, as well as along the proximal aspect of the vessel resulting in high-grade stenosis, and poststenotic dilatation.  The DENISE appears patent.  Moderate high-grade stenosis of the right renal artery and high grade stenosis of the origin of the left renal artery.  There is extensive atherosclerosis through the iliac vessels, most notably at the origin of the aortic bifurcation. 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 04/13/2017 Yes    Coronary artery disease involving native coronary artery of native heart without angina pectoris [I25.10] Noted on imaging; patient without chest pain; Continue ASA, Plavix, statin, and ARB; smoking cessation encouraged 04/13/2017 Yes      Problems Resolved During this Admission:    Diagnosis Date Noted Date Resolved POA     Called to bedside at approx 11AM for reports of AMS. Patient noted confused but able to follow instruction. Hand grasp equal, smile symmetrical, BLE strength equal. Hypotensive on manual BP  Stat CT head performed without acute process noted  Stat CBC drawn without acute drop in HH or elevated WBC  Stat Lactate WNL  Stat CMP without significant abnormality    Patient given 500 cc NS bolus with return to normotensive state and mentation improved  Daughter at bedside and updated on status.  Called again at 1430 for reports of waxing and waning mentation. Neuro check without deficit, VSS   Stat UA ordered and currently pending  Nursing to continue neuro checks as ordered, patient to be seen by attending MD   All narcotics discontinued as well as PRN Phenergan and Zofran  Nicotine patch ordered     VTE Risk Mitigation         Ordered     Medium Risk of VTE  Once      04/17/17 1055     Place RAFAELA hose  Until discontinued      04/17/17 1055          Ad Montanez NP  Cardiology  [facility]    I have seen the patient with Nurse Practitioner Ad Montanez. I agree with her assessment and plan as written above in the note.  Withhold all narcs/BZ's for 24 hours and observe mentation.          Jaskaran Ugarte MD, FACC, CCDS  Interventional Cardiology  [facility]

## 2017-04-19 NOTE — DISCHARGE SUMMARY
[facility]  Cardiology  Discharge Summary      Patient Name: Brittany Haile  MRN: [id number]  Admission Date: 4/16/2017  Hospital Length of Stay: 1 days  Discharge Date and Time:  04/19/2017 9:49 AM  Attending Physician: Anthony Brooks MD  Discharging Provider: Ad Montanez NP  Primary Care Physician: Wayne Frazier MD    HPI: Brittany Haile is a 76 y.o. Female who presented to the ED with worsening abdominal pain and nausea. She lost 20 lbs in the past 18 months. She has extensive PAD. She is s/p R CEA. She has bilateral marlee IIb claudication. She has Left subclavian stenosis with a 50 mmHg gradient with intermittent claudication. She has coronary artery calcification noted on CT scan. She is a smoker. Abdominal imaging revealed extensive calcified plaque throughout the aortic lumen, with associated ectasia. Infrarenal abdominal aorta lumen appears to narrow to approximately 0.8 cm. Extensive atherosclerotic calcification at the origin of the celiac artery with associated high grade stenosis.  Extensive atherosclerosis at the origin of the superior mesenteric artery, as well as along the proximal aspect of the vessel resulting in high-grade stenosis, and poststenotic dilatation.  The DENISE appears patent.  Moderate high-grade stenosis of the right renal artery and high grade stenosis of the origin of the left renal artery.  There is extensive atherosclerosis through the iliac vessels, most notably at the origin of the aortic bifurcation. Patient admitted with mesenteric angina.    Procedure(s) (LRB):  AORTOGRAM-ABDOMINAL (N/A)     Indwelling Lines/Drains at time of discharge:  Lines/Drains/Airways          No matching active lines, drains, or airways          Hospital Course Brittany Haile is a 76 y.o. Female who presented to the ED with worsening abdominal pain and nausea. She lost 20 lbs in the past 18 months. She has extensive PAD. She is s/p R CEA. She has bilateral marlee IIb claudication. She  has Left subclavian stenosis with a 50 mmHg gradient with intermittent claudication. She has coronary artery calcification noted on CT scan. She is a smoker. Abdominal imaging revealed extensive calcified plaque throughout the aortic lumen, with associated ectasia. Infrarenal abdominal aorta lumen appears to narrow to approximately 0.8 cm. Extensive atherosclerotic calcification at the origin of the celiac artery with associated high grade stenosis.  Extensive atherosclerosis at the origin of the superior mesenteric artery, as well as along the proximal aspect of the vessel resulting in high-grade stenosis, and poststenotic dilatation.  The DENISE appears patent.  Moderate high-grade stenosis of the right renal artery and high grade stenosis of the origin of the left renal artery.  There is extensive atherosclerosis through the iliac vessels, most notably at the origin of the aortic bifurcation. Patient admitted with mesenteric angina. 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 CBC was performed without significant drop in HH and her vital signs were stable. Patient seen on the morning on 04/19/2017 and found to be tolerating diet and ADLs at baseline without recurrence of bloody stools. Her abdominal pain was improved and she was found to be appropriate for discharge to follow up with Dr Brooks. Follow up with Dr Crooks was arranged as well for continued evaluation of abdominal pain and GIB in patient with history of diverticular disease.   Called to bedside at approx 11AM for reports of AMS. Patient noted confused but able to follow instruction. Hand  grasp equal, smile symmetrical, BLE strength equal. Hypotensive on manual BP  Stat CT head performed without acute process noted  Stat CBC drawn without acute drop in HH or elevated WBC  Stat Lactate WNL  UA normal   Stat CMP without significant abnormality   Patient given 500 cc NS bolus with return to normotensive state and mentation improved  Called again at 1430 for reports of waxing and waning mentation. Neuro check without deficit, VSS   Nursing continued neuro checks overnight without deficit reported.  Patient was monitored overnight with noted resolution in AMS after discontinuation of all narcotics and patient left the floor several times overnight to smoke per nursing. VSS and found to be appropriate for discharge.      Consults:     Significant Diagnostic Studies: Labs:   BMP:   Recent Labs  Lab 04/19/17  0454   GLU 98   *   K 3.8      CO2 24   BUN 18   CREATININE 0.7   CALCIUM 8.1*   , CBC   Recent Labs  Lab 04/18/17  2058 04/18/17  2227 04/19/17  0454   WBC 4.82 7.80 6.27   HGB 8.9* 11.0* 10.2*   HCT 27.6* 33.5* 31.1*    234 229   , INR   Lab Results   Component Value Date    INR 1.0 04/17/2017    INR 1.0 10/21/2015    INR 1.0 05/13/2014   , Lipid Panel   Lab Results   Component Value Date    CHOL 256 (H) 11/18/2016    HDL 53 11/18/2016    LDLCALC 175.6 (H) 11/18/2016    TRIG 137 11/18/2016    CHOLHDL 20.7 11/18/2016    and All labs within the past 24 hours have been reviewed  Cardiac Graphics: Echocardiogram:   2D echo with color flow doppler:   Results for orders placed or performed during the hospital encounter of 04/16/17   2D echo with color flow doppler   Result Value Ref Range    EF 65 55 - 65    Diastolic Dysfunction Yes (A)     Est. PA Systolic Pressure 7.16     Mitral Valve Mobility NORMAL     Tricuspid Valve Regurgitation TRIVIAL        Pending Diagnostic Studies:     None          Final Active Diagnoses:    Diagnosis Date Noted POA    PRINCIPAL PROBLEM:  Angina  mesenteric [K55.1] 04/13/2017 Yes    Mesenteric angina [K55.1] 04/17/2017 Yes    Coronary artery disease involving native coronary artery of native heart without angina pectoris [I25.10] 04/13/2017 Yes    Tobacco abuse [Z72.0] 04/13/2017 Yes    Subclavian artery stenosis, left [I77.1] 04/15/2016 Yes    Leg pain, bilateral [M79.604, M79.605] 10/13/2015 Yes    PVD (peripheral vascular disease) with claudication [I73.9] 10/13/2015 Yes    COPD (chronic obstructive pulmonary disease) [J44.9] 06/04/2015 Yes    Hypertension [I10] 04/12/2013 Yes    Carotid stenosis [I65.29] 04/12/2013 Yes      Problems Resolved During this Admission:    Diagnosis Date Noted Date Resolved POA       Discharged Condition: good    Follow Up:  Follow-up Information     Follow up with Anthony Brooks MD On 5/8/2017.    Specialty:  Cardiology    Why:  11am    Contact information:    [address]  [facility] [address]  [facility] [id number] [phone]          Patient Instructions:   No discharge procedures on file.  Medications:  Reconciled Home Medications:   Current Discharge Medication List      START taking these medications    Details   aspirin (ECOTRIN) 81 MG EC tablet Take 1 tablet (81 mg total) by mouth once daily.  Refills: 0      atorvastatin (LIPITOR) 40 MG tablet Take 1 tablet (40 mg total) by mouth once daily.  Qty: 90 tablet, Refills: 5      clopidogrel (PLAVIX) 75 mg tablet Take 1 tablet (75 mg total) by mouth once daily.  Qty: 90 tablet, Refills: 3         CONTINUE these medications which have NOT CHANGED    Details   albuterol (PROVENTIL HFA) 90 mcg/actuation inhaler Inhale 2 puffs into the lungs every 4 to 6 hours as needed.  Qty: 8.5 g, Refills: 6    Associated Diagnoses: Chronic obstructive pulmonary disease, unspecified COPD type      amlodipine (NORVASC) 5 MG tablet Take 2 tablets (10 mg total) by mouth once daily.  Qty: 180 tablet, Refills: 3    Associated Diagnoses: Essential hypertension      BENICAR 20 mg tablet TK 2 T PO  D  Refills: 1      chlordiazepoxide-clidinium 5-2.5 mg (LIBRAX) 5-2.5 mg Cap Take 1 capsule by mouth every 8 (eight) hours as needed.  Qty: 50 capsule, Refills: 2      docusate sodium (COLACE) 100 MG capsule Take 1 capsule (100 mg total) by mouth 2 (two) times daily as needed for Constipation.  Qty: 90 capsule, Refills: 3    Associated Diagnoses: Constipation, unspecified constipation type      DUREZOL 0.05 % Drop ophthalmic solution       escitalopram oxalate (LEXAPRO) 10 MG tablet Take 1 tablet (10 mg total) by mouth once daily.  Qty: 30 tablet, Refills: 2      ferrous sulfate 325 mg (65 mg iron) Tab tablet Refills: 3      fluticasone-salmeterol 250-50 mcg/dose (ADVAIR) 250-50 mcg/dose diskus inhaler Inhale 1 puff into the lungs 2 (two) times daily.  Qty: 60 each, Refills: 6    Associated Diagnoses: Chronic obstructive pulmonary disease, unspecified COPD type      hydrocodone-acetaminophen 5-325mg (NORCO) 5-325 mg per tablet Take 1 tablet by mouth every 8 (eight) hours as needed for Pain.  Qty: 21 tablet, Refills: 0    Associated Diagnoses: Pain of upper abdomen      levothyroxine (SYNTHROID) 88 MCG tablet TAKE 1 TABLET BY MOUTH DAILY BEFORE BREAKFAST  Qty: 90 tablet, Refills: 0    Associated Diagnoses: Hypothyroidism due to acquired atrophy of thyroid      meclizine (ANTIVERT) 25 mg tablet Take 1 tablet (25 mg total) by mouth every 6 (six) hours as needed for Dizziness.  Qty: 30 tablet, Refills: 0      NEXIUM 40 mg capsule Take 1 capsule (40 mg total) by mouth once daily.  Qty: 90 capsule, Refills: 3    Comments: BRAND NAME ONLY  Associated Diagnoses: Gastroesophageal reflux disease without esophagitis      tiotropium (SPIRIVA WITH HANDIHALER) 18 mcg inhalation capsule Inhale 1 capsule (18 mcg total) into the lungs once daily.  Qty: 90 capsule, Refills: 3    Associated Diagnoses: Chronic obstructive pulmonary disease, unspecified COPD type         STOP taking these medications       dipyridamole-aspirin 200-25 mg  (AGGRENOX)  mg CM12 Comments:   Reason for Stopping:               Time spent on the discharge of patient: 30 minutes    Ad Montanez NP  Cardiology  [facility]

## 2017-04-20 VITALS
OXYGEN SATURATION: 90 % | TEMPERATURE: 99 F | RESPIRATION RATE: 20 BRPM | HEART RATE: 70 BPM | BODY MASS INDEX: 25.52 KG/M2 | WEIGHT: 130 LBS | HEIGHT: 60 IN | SYSTOLIC BLOOD PRESSURE: 116 MMHG | DIASTOLIC BLOOD PRESSURE: 55 MMHG

## 2017-04-20 LAB
ANION GAP SERPL CALC-SCNC: 6 MMOL/L
BACTERIA #/AREA URNS HPF: NORMAL /HPF
BASOPHILS # BLD AUTO: 0.01 K/UL
BASOPHILS NFR BLD: 0.2 %
BILIRUB UR QL STRIP: NEGATIVE
BUN SERPL-MCNC: 15 MG/DL
CALCIUM SERPL-MCNC: 8.3 MG/DL
CHLORIDE SERPL-SCNC: 104 MMOL/L
CLARITY UR: CLEAR
CO2 SERPL-SCNC: 28 MMOL/L
COLOR UR: YELLOW
CREAT SERPL-MCNC: 0.9 MG/DL
DIFFERENTIAL METHOD: ABNORMAL
EOSINOPHIL # BLD AUTO: 0.2 K/UL
EOSINOPHIL NFR BLD: 4.9 %
ERYTHROCYTE [DISTWIDTH] IN BLOOD BY AUTOMATED COUNT: 13.9 %
EST. GFR  (AFRICAN AMERICAN): >60 ML/MIN/1.73 M^2
EST. GFR  (NON AFRICAN AMERICAN): >60 ML/MIN/1.73 M^2
GLUCOSE SERPL-MCNC: 87 MG/DL
GLUCOSE UR QL STRIP: NEGATIVE
HCT VFR BLD AUTO: 30.8 %
HGB BLD-MCNC: 10.2 G/DL
HGB UR QL STRIP: NEGATIVE
KETONES UR QL STRIP: NEGATIVE
LEUKOCYTE ESTERASE UR QL STRIP: ABNORMAL
LYMPHOCYTES # BLD AUTO: 1.5 K/UL
LYMPHOCYTES NFR BLD: 31.7 %
MCH RBC QN AUTO: 31.4 PG
MCHC RBC AUTO-ENTMCNC: 33.1 %
MCV RBC AUTO: 95 FL
MICROSCOPIC COMMENT: NORMAL
MONOCYTES # BLD AUTO: 0.4 K/UL
MONOCYTES NFR BLD: 8.2 %
NEUTROPHILS # BLD AUTO: 2.6 K/UL
NEUTROPHILS NFR BLD: 54.8 %
NITRITE UR QL STRIP: NEGATIVE
PH UR STRIP: 6 [PH] (ref 5–8)
PLATELET # BLD AUTO: 231 K/UL
PMV BLD AUTO: 9.5 FL
POTASSIUM SERPL-SCNC: 3.9 MMOL/L
PROT UR QL STRIP: NEGATIVE
RBC # BLD AUTO: 3.25 M/UL
RBC #/AREA URNS HPF: 1 /HPF (ref 0–4)
SODIUM SERPL-SCNC: 138 MMOL/L
SP GR UR STRIP: 1.01 (ref 1–1.03)
URN SPEC COLLECT METH UR: ABNORMAL
UROBILINOGEN UR STRIP-ACNC: NEGATIVE EU/DL
WBC # BLD AUTO: 4.73 K/UL
WBC #/AREA URNS HPF: 4 /HPF (ref 0–5)

## 2017-04-20 PROCEDURE — 94761 N-INVAS EAR/PLS OXIMETRY MLT: CPT

## 2017-04-20 PROCEDURE — 85025 COMPLETE CBC W/AUTO DIFF WBC: CPT

## 2017-04-20 PROCEDURE — 25000003 PHARM REV CODE 250: Performed by: NURSE PRACTITIONER

## 2017-04-20 PROCEDURE — 36415 COLL VENOUS BLD VENIPUNCTURE: CPT

## 2017-04-20 PROCEDURE — 87086 URINE CULTURE/COLONY COUNT: CPT

## 2017-04-20 PROCEDURE — 94640 AIRWAY INHALATION TREATMENT: CPT

## 2017-04-20 PROCEDURE — 80048 BASIC METABOLIC PNL TOTAL CA: CPT

## 2017-04-20 PROCEDURE — 25000003 PHARM REV CODE 250: Performed by: INTERNAL MEDICINE

## 2017-04-20 PROCEDURE — 81000 URINALYSIS NONAUTO W/SCOPE: CPT

## 2017-04-20 RX ADMIN — ASPIRIN 81 MG: 81 TABLET, COATED ORAL at 09:04

## 2017-04-20 RX ADMIN — TIOTROPIUM BROMIDE 18 MCG: 18 CAPSULE ORAL; RESPIRATORY (INHALATION) at 08:04

## 2017-04-20 RX ADMIN — ATORVASTATIN CALCIUM 40 MG: 40 TABLET, FILM COATED ORAL at 09:04

## 2017-04-20 RX ADMIN — FLUTICASONE FUROATE AND VILANTEROL TRIFENATATE 1 PUFF: 100; 25 POWDER RESPIRATORY (INHALATION) at 08:04

## 2017-04-20 RX ADMIN — AMLODIPINE BESYLATE 10 MG: 5 TABLET ORAL at 09:04

## 2017-04-20 RX ADMIN — OLMESARTAN MEDOXOMIL 20 MG: 20 TABLET, FILM COATED ORAL at 09:04

## 2017-04-20 RX ADMIN — PANTOPRAZOLE SODIUM 40 MG: 40 TABLET, DELAYED RELEASE ORAL at 09:04

## 2017-04-20 RX ADMIN — ESCITALOPRAM 10 MG: 10 TABLET, FILM COATED ORAL at 09:04

## 2017-04-20 RX ADMIN — LEVOTHYROXINE SODIUM 88 MCG: 0.09 TABLET ORAL at 06:04

## 2017-04-20 RX ADMIN — CLOPIDOGREL BISULFATE 75 MG: 75 TABLET ORAL at 09:04

## 2017-04-20 NOTE — PLAN OF CARE
Future Appointments  Date Time Provider Department Center   4/21/2017 12:00 PM Kurtis Stockton MD [facility]   5/1/2017 11:00 AM Carol Robertson MD [facility] IM SULEMAN [facility]   5/4/2017 8:45 AM CARDIOLOGY, STRESS/TILT TABLE/JUAN CARLOS [facility] CARD [facility]   5/4/2017 9:45 AM [facility] US2 [facility]   5/4/2017 10:45 AM [facility] US2 [facility] USOUNDO [facility]   5/4/2017 11:30 AM [facility]   5/8/2017 11:00 AM Anthony Brooks MD [facility] CARDIO [facility]   5/15/2017 2:00 PM Cecil Crooks MD [facility] TUMOR [facility]   5/17/2017 2:20 PM Wayne Frazier MD [facility] FAM MED [facility]     Priority clinic brochure given to patient.       04/20/17 1126   Final Note   Assessment Type Final Discharge Note   Discharge Disposition Home   Discharge planning education complete? Yes   Hospital Follow Up  Appt(s) scheduled? Yes   Discharge plans and expectations educations in teach back method with documentation complete? No   [facility]? n/a   Discharge/Hospital Encounter Summary to (non-[facility]) PCP No   Referral to Outpatient Case Management complete? No   Referral to / orders for Home Health Complete? No   30 day supply of medicines given at discharge, if documented non-compliance / non-adherence? No   Any social issues identified prior to discharge? No   Did you assess the readiness or willingness of the family or caregiver to support self management of care? No   Right Care Referral Info   Post Acute Recommendation No Care     Ana Hankins, RN, CCM, CMSRN  RN Transition Navigator  [phone]

## 2017-04-20 NOTE — PLAN OF CARE
Problem: Patient Care Overview  Goal: Plan of Care Review  Patient on RA, no respiratory distress noted. Will continue to monitor.   Outcome: Ongoing (interventions implemented as appropriate)  Plan of care reviewed with patient. Discussed smoking issues with patient and family. Patient reports smoking for long time and not ready to stop smoking. Instructed to keep oxygen on as previous to prevent lower of oxygen. Verbalizes understanding of teaching. Telemetry remains sinus. No complaints of pain or distress. Instructed on need for urine specimen. Side rails up x2. Bed alarm on. No evidence of ectopy during shift.

## 2017-04-20 NOTE — PROGRESS NOTES
[facility]  Cardiology  Progress Note    Patient Name: Brittany Haile  MRN: [id number]  Admission Date: 4/16/2017  Hospital Length of Stay: 2 days  Code Status: Full Code   Attending Physician: Anthony Brooks MD   Primary Care Physician: Wayne Frazier MD  Expected Discharge Date: 4/20/2017  Principal Problem:Angina mesenteric    Subjective:     Hospital Course: Brittany Haile is a 76 y.o. Female who presented to the ED with worsening abdominal pain and nausea. She lost 20 lbs in the past 18 months. She has extensive PAD. She is s/p R CEA. She has bilateral marlee IIb claudication. She has Left subclavian stenosis with a 50 mmHg gradient with intermittent claudication. She has coronary artery calcification noted on CT scan. She is a smoker. Abdominal imaging revealed extensive calcified plaque throughout the aortic lumen, with associated ectasia. Infrarenal abdominal aorta lumen appears to narrow to approximately 0.8 cm. Extensive atherosclerotic calcification at the origin of the celiac artery with associated high grade stenosis.  Extensive atherosclerosis at the origin of the superior mesenteric artery, as well as along the proximal aspect of the vessel resulting in high-grade stenosis, and poststenotic dilatation.  The DENISE appears patent.  Moderate high-grade stenosis of the right renal artery and high grade stenosis of the origin of the left renal artery.  There is extensive atherosclerosis through the iliac vessels, most notably at the origin of the aortic bifurcation. Patient admitted with mesenteric angina. 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 CBC was performed without significant drop in HH and her vital signs were stable. Patient seen on the morning on 04/19/2017 and found to be tolerating diet and ADLs at baseline without recurrence of bloody stools. Her abdominal pain was improved and she was found to be appropriate for discharge to follow up with Dr Brooks. Follow up with Dr Crooks was arranged as well for continued evaluation of abdominal pain and GIB in patient with history of diverticular disease.   Called to bedside at approx 11AM for reports of AMS. Patient noted confused but able to follow instruction. Hand grasp equal, smile symmetrical, BLE strength equal. Hypotensive on manual BP  Stat CT head performed without acute process noted  Stat CBC drawn without acute drop in HH or elevated WBC  Stat Lactate WNL  Stat CMP without significant abnormality   Patient given 500 cc NS bolus with return to normotensive state and mentation improved  Daughter at bedside and updated on status.  Called again at 1430 for reports of waxing and waning mentation. Neuro check without deficit, VSS   Stat UA ordered and currently pending  Nursing to continue neuro checks as ordered, patient to be seen by attending MD   Discharge on hold  Patient was monitored overnight with noted resolution in AMS after discontinuation of all narcotics and patient left the floor several times overnight to smoke per nursing. VSS and found to be appropriate for discharge.    Review of Systems   Constitution: Negative.   HENT: Negative.    Eyes: Negative.    Cardiovascular: Positive for claudication. Negative for chest pain, dyspnea on exertion, irregular heartbeat, leg swelling, near-syncope, orthopnea, palpitations, paroxysmal nocturnal dyspnea and syncope.   Respiratory: Positive for shortness of breath. Negative for cough, sputum production and wheezing.    Endocrine: Negative.    Hematologic/Lymphatic: Negative.    Skin: Negative.    Musculoskeletal: Negative.     Gastrointestinal: Negative.    Genitourinary: Negative.    Neurological: Negative.    Psychiatric/Behavioral: Negative for altered mental status.   Allergic/Immunologic: Negative.      Objective:     Vital Signs (Most Recent):  Temp: 99 °F (37.2 °C) (04/20/17 0833)  Pulse: 70 (04/20/17 0833)  Resp: 20 (04/20/17 0833)  BP: (!) 116/55 (04/20/17 0833)  SpO2: (!) 90 % (04/20/17 0815) Vital Signs (24h Range):  Temp:  [98.5 °F (36.9 °C)-99.6 °F (37.6 °C)] 99 °F (37.2 °C)  Pulse:  [70-94] 70  Resp:  [16-20] 20  SpO2:  [88 %-96 %] 90 %  BP: (110-150)/(50-98) 116/55     Weight: 59 kg (130 lb)  Body mass index is 25.39 kg/(m^2).    SpO2: (!) 90 %  O2 Device (Oxygen Therapy): room air      Intake/Output Summary (Last 24 hours) at 04/20/17 1116  Last data filed at 04/20/17 0030   Gross per 24 hour   Intake              185 ml   Output              500 ml   Net             -315 ml       Lines/Drains/Airways     Peripheral Intravenous Line                 Peripheral IV - Single Lumen 04/18/17 2200 Right Antecubital 1 day                Physical Exam   Constitutional: She is oriented to person, place, and time. She appears well-developed and well-nourished. No distress.   HENT:   Head: Normocephalic and atraumatic.   Eyes: Right eye exhibits no discharge. Left eye exhibits no discharge.   Neck: No JVD present.   Cardiovascular: Normal rate and regular rhythm.    Pulmonary/Chest: Effort normal and breath sounds normal.   Abdominal: Soft. Bowel sounds are normal. There is no tenderness.   Musculoskeletal: She exhibits no edema.   Neurological: She is alert and oriented to person, place, and time.   Skin: Skin is warm and dry. She is not diaphoretic.   Psychiatric: She has a normal mood and affect. Her behavior is normal.       Significant Labs:   BMP:   Recent Labs  Lab 04/19/17  0454 04/19/17  1307 04/20/17  0512   GLU 98 102 87   * 135* 138   K 3.8 3.7 3.9    103 104   CO2 24 26 28   BUN 18 20 15   CREATININE 0.7 0.9  0.9   CALCIUM 8.1* 8.3* 8.3*   , CBC   Recent Labs  Lab 04/19/17  0454 04/19/17  1307 04/20/17  0512   WBC 6.27 6.62 4.73   HGB 10.2* 10.2* 10.2*   HCT 31.1* 30.9* 30.8*    214 231   , Lipid Panel No results for input(s): CHOL, HDL, LDLCALC, TRIG, CHOLHDL in the last 48 hours. and All pertinent lab results from the last 24 hours have been reviewed.    Significant Imaging: Echocardiogram:   2D echo with color flow doppler:   Results for orders placed or performed during the hospital encounter of 04/16/17   2D echo with color flow doppler   Result Value Ref Range    EF 65 55 - 65    Diastolic Dysfunction Yes (A)     Est. PA Systolic Pressure 7.16     Mitral Valve Mobility NORMAL     Tricuspid Valve Regurgitation TRIVIAL     and X-Ray: CXR: X-Ray Chest 1 View (CXR): No results found for this visit on 04/16/17.  Assessment and Plan:     Brief HPI: Patient seen this morning on rounds and noted back at her baseline. No further confusion overnight and no recurrence of abdominal pain. Found appropriate for discharge.     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 Presented to the ED with worsening diffuse abd post prandial abdominal pain and nausea; Abdominal imaging revealed extensive calcified plaque throughout the aortic lumen, with associated ectasia. Infrarenal abdominal aorta lumen appears to narrow to approximately 0.8 cm. Extensive atherosclerotic calcification at the origin of the celiac artery with associated high grade stenosis.  Extensive atherosclerosis at the origin of the superior mesenteric artery, as well as along the proximal aspect of the vessel resulting in high-grade stenosis, and poststenotic dilatation.  The DENISE appears patent.  Moderate high-grade stenosis of the right renal artery and high grade stenosis of the origin of the left renal artery.  There is extensive atherosclerosis through the iliac vessels, most  notably at the origin of the aortic bifurcation. 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 treated with 6.0 x 14 mm Express SD   SMA 80-85% with 80 mmHg gradient treated with 6.0 x 18 mm Express SD  DENISE-patent on CTA   Plan:   Aspirin  Plavix  Statin   Acei  Smoking cessation 04/13/2017 Yes    Coronary artery disease involving native coronary artery of native heart without angina pectoris [I25.10] Noted on imaging; patient without chest pain; Continue ASA, Plavix, statin, and ARB; smoking cessation encouraged 04/13/2017 Yes    Tobacco abuse [Z72.0] Smoking cessation encouraged  04/13/2017 Yes    AMS Patient developed AMS on 04/19/2017; CT head negative, UA negative, Lactate normal; strength to BUE and BLE equal, smile symmetrical; all narcotics discontinued; symptoms resolved overnight and neuro checks remain normal; appropriate for discharge  04/15/2016 Yes      Problems Resolved During this Admission:    Diagnosis Date Noted Date Resolved POA       VTE Risk Mitigation         Ordered     Medium Risk of VTE  Once      04/17/17 1055     Place RAFAELA hose  Until discontinued      04/17/17 1055          Ad Montanez NP  Cardiology  [facility]

## 2017-04-20 NOTE — PLAN OF CARE
Problem: Fall Risk (Adult)  Goal: Absence of Falls  Patient will demonstrate the desired outcomes by discharge/transition of care.   Outcome: Ongoing (interventions implemented as appropriate)  Bed alarm on. Patient has not attempt to get out of bed. No evidence of falls.side rails up times 2.

## 2017-04-20 NOTE — PLAN OF CARE
Daughters left . Bed alarm restarted. Patient asleep . Will continue to make rounds. No complaints of pain or distress at this time.

## 2017-04-20 NOTE — NURSING
Pt received DC home orders and verbalized understanding. Cardiac monitor and IV access removed. Pressure and dressing applied. No s/s of Iv site complications noted. Pt left with daughter and transporter via wheelchair. Pt denied discomfort. No distress noted at this time.

## 2017-04-20 NOTE — PLAN OF CARE
Problem: Patient Care Overview  Goal: Plan of Care Review  Patient on RA, no respiratory distress noted. Will continue to monitor.   Outcome: Ongoing (interventions implemented as appropriate)  The proper method of use, as well as anticipated side effects, of this metered-dose inhaler are discussed and demonstrated to the patient.  Pt on RA with sats of 91%.  Will continue to monitor.

## 2017-04-20 NOTE — PHYSICIAN QUERY
PT Name: Brittany Haile  MR #: [id number]    Physician Query Form - Neurological Condition Clarification       CDS/: Vivi Jones               Contact information: [phone]    This form is a permanent document in the medical record.     Query Date: April 20, 2017    By submitting this query, we are merely seeking further clarification of documentation. Please utilize your independent clinical judgment when addressing the question(s) below.    The Medical record contains the following:   Indicators   Supporting Clinical Findings Location in Medical Record   X AMS, Confusion, LOC, etc. Called to bedside at approx 11AM for reports of AMS  Patient noted confused but followed commands PN 4/20   X Acute / Chronic Illness Mesenteric angina  H&P 4/17    Radiology Findings     X Electrolyte Imbalance NA= 134 LAB 4/19    Medication     X Treatment Patient given 500 cc NS bolus with return to normotensive state and mentation improved PN 4/20   X Other Patient was monitored overnight with noted resolution in AMS after discontinuation of all narcotics and patient left the floor several times overnight to smoke per nursing. VSS and found to be appropriate for discharge.    PN 4/20     Provider, please specify the diagnosis or diagnoses associated with above clinical findings.    [  ] Metabolic Encephalopathy    [  ] Toxic Encephalopathy    [  ] Other Encephalopathy    [  ] Unspecified Encephalopathy    [  ] Other (please specify): _____________________________________    [x  ] Clinically Undetermined      Please document in your progress notes daily for the duration of treatment until resolved, and  include in your discharge summary.

## 2017-04-20 NOTE — PLAN OF CARE
Patient returned to room in wheelchair with daughters. Returned to bed. Telemetry remains sinus. Pulse ox 95. Instructed to remain in bed. Oxygen restarted per nasal cannula.

## 2017-04-20 NOTE — PLAN OF CARE
Daughter attempted to bring patient out to smoke. Explained all consequences of patient not having oxygen on. Daughter stated if i don;t take patient out to smoke she will walk  Out of hospital tonight. Daughters wheelchair patient out to smoke.

## 2017-04-21 ENCOUNTER — TELEPHONE (OUTPATIENT)
Dept: PRIMARY CARE CLINIC | Facility: CLINIC | Age: 77
End: 2017-04-21

## 2017-04-21 ENCOUNTER — NURSE TRIAGE (OUTPATIENT)
Dept: ADMINISTRATIVE | Facility: CLINIC | Age: 77
End: 2017-04-21

## 2017-04-21 LAB
BACTERIA UR CULT: NO GROWTH
PERIPHERAL STENOSIS: ABNORMAL
PERIPHERAL STENT: YES

## 2017-04-21 NOTE — TELEPHONE ENCOUNTER
"Rec'd referral from WHITNEY Lopez,[facility] to see pt today. Sent to Dr. Robertson for review, she advised pt to go to the ER for stat labs and stat imaging explaining too risky for clinical eval. Notified WHITNEY Lopez via IM and call placed to pt. Pt stated, " I feel a little better, I took a pain pill". I explained the above and that the the physican has advised that she goes to the emergency room for an evaluation due to her medical history. She asked, " even though I feel a little better ?" I explained that it is important to evaluate what is going on with labs and imaging, on the inside, and that the pain medicine may be masking symptoms. She stated, " Ok honey, I'll call my daughter and tell her, thank you".  "

## 2017-04-21 NOTE — TELEPHONE ENCOUNTER
Reason for Disposition   [1] MILD-MODERATE pain AND [2] constant AND [3] present > 2 hours    Protocols used: ST ABDOMINAL PAIN - FEMALE-A-AH

## 2017-04-23 ENCOUNTER — HOSPITAL ENCOUNTER (OUTPATIENT)
Facility: HOSPITAL | Age: 77
Discharge: HOME | End: 2017-04-24
Attending: EMERGENCY MEDICINE | Admitting: INTERNAL MEDICINE
Payer: MEDICARE

## 2017-04-23 DIAGNOSIS — N18.30 CHRONIC KIDNEY DISEASE, STAGE III (MODERATE): ICD-10-CM

## 2017-04-23 DIAGNOSIS — K55.1 ANGINA MESENTERIC: ICD-10-CM

## 2017-04-23 DIAGNOSIS — K92.2 GASTROINTESTINAL HEMORRHAGE, UNSPECIFIED GASTROINTESTINAL HEMORRHAGE TYPE: ICD-10-CM

## 2017-04-23 DIAGNOSIS — K92.2 GIB (GASTROINTESTINAL BLEEDING): ICD-10-CM

## 2017-04-23 DIAGNOSIS — J41.0 SIMPLE CHRONIC BRONCHITIS: ICD-10-CM

## 2017-04-23 DIAGNOSIS — K55.1 MESENTERIC ANGINA: Primary | ICD-10-CM

## 2017-04-23 LAB
ABO + RH BLD: NORMAL
ALBUMIN SERPL BCP-MCNC: 3.8 G/DL
ALP SERPL-CCNC: 120 U/L
ALT SERPL W/O P-5'-P-CCNC: 41 U/L
ANION GAP SERPL CALC-SCNC: 10 MMOL/L
AST SERPL-CCNC: 40 U/L
BACTERIA #/AREA URNS HPF: ABNORMAL /HPF
BASOPHILS # BLD AUTO: 0.02 K/UL
BASOPHILS NFR BLD: 0.4 %
BILIRUB SERPL-MCNC: 0.4 MG/DL
BILIRUB UR QL STRIP: NEGATIVE
BLD GP AB SCN CELLS X3 SERPL QL: NORMAL
BUN SERPL-MCNC: 13 MG/DL
CALCIUM SERPL-MCNC: 9.5 MG/DL
CHLORIDE SERPL-SCNC: 97 MMOL/L
CLARITY UR: CLEAR
CO2 SERPL-SCNC: 33 MMOL/L
COLOR UR: YELLOW
CREAT SERPL-MCNC: 1 MG/DL
DIFFERENTIAL METHOD: ABNORMAL
EOSINOPHIL # BLD AUTO: 0.2 K/UL
EOSINOPHIL NFR BLD: 4.9 %
ERYTHROCYTE [DISTWIDTH] IN BLOOD BY AUTOMATED COUNT: 13.9 %
EST. GFR  (AFRICAN AMERICAN): >60 ML/MIN/1.73 M^2
EST. GFR  (NON AFRICAN AMERICAN): 54 ML/MIN/1.73 M^2
GLUCOSE SERPL-MCNC: 102 MG/DL
GLUCOSE UR QL STRIP: NEGATIVE
HCT VFR BLD AUTO: 32.6 %
HGB BLD-MCNC: 10.4 G/DL
HGB UR QL STRIP: NEGATIVE
INR PPP: 1
KETONES UR QL STRIP: NEGATIVE
LACTATE SERPL-SCNC: 0.8 MMOL/L
LEUKOCYTE ESTERASE UR QL STRIP: ABNORMAL
LYMPHOCYTES # BLD AUTO: 1.1 K/UL
LYMPHOCYTES NFR BLD: 23.2 %
MCH RBC QN AUTO: 31.4 PG
MCHC RBC AUTO-ENTMCNC: 31.9 %
MCV RBC AUTO: 99 FL
MICROSCOPIC COMMENT: ABNORMAL
MONOCYTES # BLD AUTO: 0.4 K/UL
MONOCYTES NFR BLD: 9.2 %
NEUTROPHILS # BLD AUTO: 2.9 K/UL
NEUTROPHILS NFR BLD: 62.3 %
NITRITE UR QL STRIP: NEGATIVE
OB PNL STL: POSITIVE
PH UR STRIP: 8 [PH] (ref 5–8)
PLATELET # BLD AUTO: 306 K/UL
PMV BLD AUTO: 8.9 FL
POTASSIUM SERPL-SCNC: 4 MMOL/L
PROT SERPL-MCNC: 6.9 G/DL
PROT UR QL STRIP: NEGATIVE
PROTHROMBIN TIME: 10.3 SEC
RBC # BLD AUTO: 3.31 M/UL
RBC #/AREA URNS HPF: 2 /HPF (ref 0–4)
SODIUM SERPL-SCNC: 140 MMOL/L
SP GR UR STRIP: 1.01 (ref 1–1.03)
SQUAMOUS #/AREA URNS HPF: 1 /HPF
URN SPEC COLLECT METH UR: ABNORMAL
UROBILINOGEN UR STRIP-ACNC: NEGATIVE EU/DL
WBC # BLD AUTO: 4.66 K/UL
WBC #/AREA URNS HPF: 7 /HPF (ref 0–5)

## 2017-04-23 PROCEDURE — 86900 BLOOD TYPING SEROLOGIC ABO: CPT

## 2017-04-23 PROCEDURE — 25500020 PHARM REV CODE 255: Performed by: EMERGENCY MEDICINE

## 2017-04-23 PROCEDURE — 80053 COMPREHEN METABOLIC PANEL: CPT

## 2017-04-23 PROCEDURE — 85610 PROTHROMBIN TIME: CPT

## 2017-04-23 PROCEDURE — 96366 THER/PROPH/DIAG IV INF ADDON: CPT

## 2017-04-23 PROCEDURE — G0378 HOSPITAL OBSERVATION PER HR: HCPCS

## 2017-04-23 PROCEDURE — 93010 ELECTROCARDIOGRAM REPORT: CPT | Mod: ,,, | Performed by: INTERNAL MEDICINE

## 2017-04-23 PROCEDURE — 96375 TX/PRO/DX INJ NEW DRUG ADDON: CPT

## 2017-04-23 PROCEDURE — 81000 URINALYSIS NONAUTO W/SCOPE: CPT

## 2017-04-23 PROCEDURE — C9113 INJ PANTOPRAZOLE SODIUM, VIA: HCPCS | Performed by: HOSPITALIST

## 2017-04-23 PROCEDURE — 96374 THER/PROPH/DIAG INJ IV PUSH: CPT | Mod: 59

## 2017-04-23 PROCEDURE — 96376 TX/PRO/DX INJ SAME DRUG ADON: CPT | Mod: 59

## 2017-04-23 PROCEDURE — C9113 INJ PANTOPRAZOLE SODIUM, VIA: HCPCS | Performed by: EMERGENCY MEDICINE

## 2017-04-23 PROCEDURE — 63600175 PHARM REV CODE 636 W HCPCS: Performed by: HOSPITALIST

## 2017-04-23 PROCEDURE — 86850 RBC ANTIBODY SCREEN: CPT

## 2017-04-23 PROCEDURE — 25000003 PHARM REV CODE 250: Performed by: HOSPITALIST

## 2017-04-23 PROCEDURE — 83605 ASSAY OF LACTIC ACID: CPT

## 2017-04-23 PROCEDURE — 93005 ELECTROCARDIOGRAM TRACING: CPT

## 2017-04-23 PROCEDURE — 96365 THER/PROPH/DIAG IV INF INIT: CPT

## 2017-04-23 PROCEDURE — 99285 EMERGENCY DEPT VISIT HI MDM: CPT | Mod: 25

## 2017-04-23 PROCEDURE — 82272 OCCULT BLD FECES 1-3 TESTS: CPT

## 2017-04-23 PROCEDURE — 63600175 PHARM REV CODE 636 W HCPCS: Performed by: EMERGENCY MEDICINE

## 2017-04-23 PROCEDURE — 85025 COMPLETE CBC W/AUTO DIFF WBC: CPT

## 2017-04-23 RX ORDER — ALBUTEROL SULFATE 90 UG/1
2 AEROSOL, METERED RESPIRATORY (INHALATION) EVERY 4 HOURS PRN
Status: DISCONTINUED | OUTPATIENT
Start: 2017-04-23 | End: 2017-04-24 | Stop reason: HOSPADM

## 2017-04-23 RX ORDER — OLMESARTAN MEDOXOMIL 20 MG/1
20 TABLET ORAL DAILY
Status: DISCONTINUED | OUTPATIENT
Start: 2017-04-24 | End: 2017-04-24 | Stop reason: HOSPADM

## 2017-04-23 RX ORDER — DOCUSATE SODIUM 100 MG/1
100 CAPSULE, LIQUID FILLED ORAL 2 TIMES DAILY PRN
Status: DISCONTINUED | OUTPATIENT
Start: 2017-04-23 | End: 2017-04-24 | Stop reason: HOSPADM

## 2017-04-23 RX ORDER — AMLODIPINE BESYLATE 5 MG/1
10 TABLET ORAL DAILY
Status: DISCONTINUED | OUTPATIENT
Start: 2017-04-24 | End: 2017-04-24 | Stop reason: HOSPADM

## 2017-04-23 RX ORDER — ONDANSETRON 2 MG/ML
4 INJECTION INTRAMUSCULAR; INTRAVENOUS EVERY 12 HOURS PRN
Status: DISCONTINUED | OUTPATIENT
Start: 2017-04-23 | End: 2017-04-24 | Stop reason: HOSPADM

## 2017-04-23 RX ORDER — PANTOPRAZOLE SODIUM 40 MG/10ML
40 INJECTION, POWDER, LYOPHILIZED, FOR SOLUTION INTRAVENOUS
Status: COMPLETED | OUTPATIENT
Start: 2017-04-23 | End: 2017-04-23

## 2017-04-23 RX ORDER — AMOXICILLIN 250 MG
1 CAPSULE ORAL 2 TIMES DAILY
Status: DISCONTINUED | OUTPATIENT
Start: 2017-04-23 | End: 2017-04-24 | Stop reason: HOSPADM

## 2017-04-23 RX ORDER — ESCITALOPRAM OXALATE 10 MG/1
10 TABLET ORAL DAILY
Status: DISCONTINUED | OUTPATIENT
Start: 2017-04-24 | End: 2017-04-24 | Stop reason: HOSPADM

## 2017-04-23 RX ORDER — FLUTICASONE FUROATE AND VILANTEROL 100; 25 UG/1; UG/1
1 POWDER RESPIRATORY (INHALATION) DAILY
Status: DISCONTINUED | OUTPATIENT
Start: 2017-04-24 | End: 2017-04-24 | Stop reason: HOSPADM

## 2017-04-23 RX ORDER — ATORVASTATIN CALCIUM 40 MG/1
40 TABLET, FILM COATED ORAL DAILY
Status: DISCONTINUED | OUTPATIENT
Start: 2017-04-24 | End: 2017-04-24 | Stop reason: HOSPADM

## 2017-04-23 RX ORDER — CHLORDIAZEPOXIDE HYDROCHLORIDE AND CLIDINIUM BROMIDE 5; 2.5 MG/1; MG/1
1 CAPSULE ORAL
Status: DISCONTINUED | OUTPATIENT
Start: 2017-04-24 | End: 2017-04-24 | Stop reason: HOSPADM

## 2017-04-23 RX ORDER — ACETAMINOPHEN 325 MG/1
325 TABLET ORAL EVERY 6 HOURS PRN
Status: DISCONTINUED | OUTPATIENT
Start: 2017-04-23 | End: 2017-04-24 | Stop reason: HOSPADM

## 2017-04-23 RX ADMIN — PANTOPRAZOLE SODIUM 40 MG: 40 INJECTION, POWDER, FOR SOLUTION INTRAVENOUS at 08:04

## 2017-04-23 RX ADMIN — DEXTROSE 8 MG/HR: 50 INJECTION, SOLUTION INTRAVENOUS at 10:04

## 2017-04-23 RX ADMIN — IOHEXOL 75 ML: 350 INJECTION, SOLUTION INTRAVENOUS at 07:04

## 2017-04-23 NOTE — IP AVS SNAPSHOT
[facility]  [address]  [place] [address]  Phone: [phone]           Patient Discharge Instructions   Our goal is to set you up for success. This packet includes information on your condition, medications, and your home care.  It will help you care for yourself to prevent having to return to the hospital.     Please ask your nurse if you have any questions.      There are many details to remember when preparing to leave the hospital. Here is what you will need to do:    1. Take your medicine. If you are prescribed medications, review your Medication List on the following pages. You may have new medications to  at the pharmacy and others that you'll need to stop taking. Review the instructions for how and when to take your medications. Talk with your doctor or nurses if you are unsure of what to do.     2. Go to your follow-up appointments. Specific follow-up information is listed in the following pages. Your may be contacted by a nurse or clinical provider about future appointments. Be sure we have all of the phone numbers to reach you. Please contact your provider's office if you are unable to make an appointment.     3. Watch for warning signs. Your doctor or nurse will give you detailed warning signs to watch for and when to call for assistance. These instructions may also include educational information about your condition. If you experience any of warning signs to your health, call your doctor.           ** Verificar la lista de medicamentos es correcta y está actualizada. Llevar esto con usted en rafaela de emergencia. Si michael medicamentos figueroa cambiado, por favor notifique a merritt proveedor de atención médica.             Medication List      CONTINUE taking these medications        Additional Info                      albuterol 90 mcg/actuation inhaler   Commonly known as:  PROVENTIL HFA   Quantity:  8.5 g   Refills:  6   Dose:  2 puff    Instructions:  Inhale 2 puffs into the  lungs every 4 to 6 hours as needed.     Begin Date    AM    Noon    PM    Bedtime       amlodipine 5 MG tablet   Commonly known as:  NORVASC   Quantity:  180 tablet   Refills:  3   Dose:  10 mg    Last time this was given:  10 mg on 4/24/2017  8:53 AM   Instructions:  Take 2 tablets (10 mg total) by mouth once daily.     Begin Date    AM    Noon    PM    Bedtime       aspirin 81 MG EC tablet   Commonly known as:  ECOTRIN   Refills:  0   Dose:  81 mg    Instructions:  Take 1 tablet (81 mg total) by mouth once daily.     Begin Date    AM    Noon    PM    Bedtime       atorvastatin 40 MG tablet   Commonly known as:  LIPITOR   Quantity:  90 tablet   Refills:  5   Dose:  40 mg    Last time this was given:  40 mg on 4/24/2017  8:53 AM   Instructions:  Take 1 tablet (40 mg total) by mouth once daily.     Begin Date    AM    Noon    PM    Bedtime       BENICAR 20 MG tablet   Refills:  1   Generic drug:  olmesartan    Last time this was given:  20 mg on 4/24/2017  8:54 AM   Instructions:  TK 2 T PO D     Begin Date    AM    Noon    PM    Bedtime       chlordiazepoxide-clidinium 5-2.5 mg 5-2.5 mg Cap   Commonly known as:  LIBRAX   Quantity:  50 capsule   Refills:  2   Dose:  1 capsule    Instructions:  Take 1 capsule by mouth every 8 (eight) hours as needed.     Begin Date    AM    Noon    PM    Bedtime       clopidogrel 75 mg tablet   Commonly known as:  PLAVIX   Quantity:  90 tablet   Refills:  3   Dose:  75 mg    Last time this was given:  75 mg on 4/24/2017 11:22 AM   Instructions:  Take 1 tablet (75 mg total) by mouth once daily.     Begin Date    AM    Noon    PM    Bedtime       docusate sodium 100 MG capsule   Commonly known as:  COLACE   Quantity:  90 capsule   Refills:  3   Dose:  100 mg    Instructions:  Take 1 capsule (100 mg total) by mouth 2 (two) times daily as needed for Constipation.     Begin Date    AM    Noon    PM    Bedtime       DUREZOL 0.05 % Drop ophthalmic solution   Refills:  0   Generic drug:   difluprednate      Begin Date    AM    Noon    PM    Bedtime       escitalopram oxalate 10 MG tablet   Commonly known as:  LEXAPRO   Quantity:  30 tablet   Refills:  2   Dose:  10 mg    Last time this was given:  10 mg on 4/24/2017  8:54 AM   Instructions:  Take 1 tablet (10 mg total) by mouth once daily.     Begin Date    AM    Noon    PM    Bedtime       ferrous sulfate 325 mg (65 mg iron) Tab tablet   Refills:  3      Begin Date    AM    Noon    PM    Bedtime       fluticasone-salmeterol 250-50 mcg/dose 250-50 mcg/dose diskus inhaler   Commonly known as:  ADVAIR   Quantity:  60 each   Refills:  6   Dose:  1 puff    Instructions:  Inhale 1 puff into the lungs 2 (two) times daily.     Begin Date    AM    Noon    PM    Bedtime       hydrocodone-acetaminophen 5-325mg 5-325 mg per tablet   Commonly known as:  NORCO   Quantity:  21 tablet   Refills:  0   Dose:  1 tablet    Instructions:  Take 1 tablet by mouth every 8 (eight) hours as needed for Pain.     Begin Date    AM    Noon    PM    Bedtime       levothyroxine 88 MCG tablet   Commonly known as:  SYNTHROID   Quantity:  90 tablet   Refills:  0    Last time this was given:  88 mcg on 4/24/2017  5:53 AM   Instructions:  TAKE 1 TABLET BY MOUTH DAILY BEFORE BREAKFAST     Begin Date    AM    Noon    PM    Bedtime       meclizine 25 mg tablet   Commonly known as:  ANTIVERT   Quantity:  30 tablet   Refills:  0   Dose:  25 mg    Instructions:  Take 1 tablet (25 mg total) by mouth every 6 (six) hours as needed for Dizziness.     Begin Date    AM    Noon    PM    Bedtime       NEXIUM 40 MG capsule   Quantity:  90 capsule   Refills:  3   Dose:  40 mg   Comments:  BRAND NAME ONLY   Generic drug:  esomeprazole    Instructions:  Take 1 capsule (40 mg total) by mouth once daily.     Begin Date    AM    Noon    PM    Bedtime       tiotropium 18 mcg inhalation capsule   Commonly known as:  SPIRIVA WITH HANDIHALER   Quantity:  90 capsule   Refills:  3   Dose:  18 mcg    Last time  this was given:  18 mcg on 4/24/2017  9:19 AM   Instructions:  Inhale 1 capsule (18 mcg total) into the lungs once daily.     Begin Date    AM    Noon    PM    Bedtime                  Por favor traiga a todos las citas de seguimiento:    1. Emmie copia de las instrucciones de shaina.  2. Todos los medicamentos que está tomando preethi momento, en michael envases originales.  3. Identificación y tarjeta de seguro.    Por favor llegue 15 minutos antes de la hora de la jono.    Por favor llame con 24 horas de antelación si tiene que cambiar merritt jono y / o tiempo.        Your Scheduled Appointments     May 01, 2017 11:00 AM CDT   Hospital Follow Up with Carol Robertson MD   [place] [facility] ([facility])    [address]  [place] [id number] [phone]            May 04, 2017  8:45 AM CDT   Ankle Brachial Resting with CARDIOLOGY, STRESS/TILT TABLE/JUAN CARLOS   [facility] ([facility])    [address]  [place] [address]   [phone]            May 04, 2017  9:45 AM CDT   Us Extremity Artery Bilat with [facility] US2 [facility] ([facility]    [address]  [place] [address]   [phone]            May 04, 2017 10:45 AM CDT   Us Extremity Artery Bilat with [facility] US2 [facility] ([facility]    [address]  [place] [address]   [phone]            May 04, 2017 11:30 AM CDT   Us Abdomen with [facility] US2 [facility] ([facility]    [address]  [place] [address]   [phone]              Follow-up Information     Follow up with Wayne Frazier MD On 5/17/2017.    Specialty:  Internal Medicine    Contact information:    [address]  [address]  [place] [address]  [phone]          Follow up with Cceil Crooks MD On 5/15/2017.    Specialty:  Gastroenterology    Contact information:    [address]  [place]  [address]  [phone]          Follow up with Anthony Brooks MD On 5/8/2017.    Specialties:  Cardiology, INTERVENTIONAL CARDIOLOGY    Contact information:    [facility] [address] [phone]          Discharge Instructions     Future Orders    Activity as tolerated     Call MD for:  persistent nausea and vomiting or diarrhea         Discharge Instructions       GASTROINTESTINAL (GI) BLEEDING, WHEN YOU HAVE ([place]izen [place]) View Edit Remove  HEART FAILURE, DISCHARGE INSTRUCTIONS FOR ([place]izen [place]) View Edit Remove  HEART FAILURE, WHAT IS ([place]izen [place]) View Edit Remove  HEART FAILURE: BEING ACTIVE ([place]izen [place]) View Edit Remove  HEART FAILURE: EVALUATING YOUR HEART (Citizen [place]) View Edit Remove  HEART FAILURE: MAKING CHANGES TO YOUR DIET ([place]izen [place]) View Edit Remove  HEART FAILURE: MEDICINES TO HELP YOUR HEART (Citizen [place]) View Edit Remove  HEART FAILURE: PROCEDURES THAT MAY HELP ([place]izen [place]) View Edit Remove  HEART FAILURE: TAKING MEDICATION TO CONTROL ([place]izen [place]) View Edit Remove  HEART FAILURE: TRACKING YOUR WEIGHT ([place]izen [place]) View Edit Remove  HEART FAILURE: WARNING SIGNS OF A FLARE-UP ([place]izen [place])LOWER GI BLEEDING (STABLE) ([place]izen [place]) View Edit Remove  LOWER GI BLEEDING (STABLE) (Citizen [place]) View Edit Remove                                                           Discharge References/Attachments     GASTROINTESTINAL (GI) BLEEDING, WHEN YOU HAVE ([place]izen [place])    HEART FAILURE, DISCHARGE INSTRUCTIONS FOR ([place]izen [place])    HEART FAILURE, WHAT IS ([place]izen [place])    HEART FAILURE: BEING ACTIVE ([place]izen [place])    HEART FAILURE: EVALUATING YOUR HEART ([place]izen [place])    HEART FAILURE: MAKING CHANGES TO YOUR DIET ([place]izen [place])    HEART FAILURE: MEDICINES TO HELP YOUR HEART ([place]izen [place])    HEART FAILURE: PROCEDURES THAT MAY HELP ([place]izen [place])    HEART FAILURE: TAKING MEDICATION TO CONTROL  ([place]izen [place])    HEART FAILURE: TRACKING YOUR WEIGHT ([place]izen [place])    HEART FAILURE: WARNING SIGNS OF A FLARE-UP ([place]izen [place])    LOWER GI BLEEDING (STABLE) ([place]izen [place])        Primary Diagnosis      "Your primary diagnosis was:  Bleeding Of The Stomach And Intestines      Admission Information     Date & Time Provider Department CSN    4/23/2017  5:58 PM Clifton Ramirez MD [facility] [id number]      Care Providers     Provider Role Specialty Primary office phone    Clifton Ramirez MD Attending Provider Internal Medicine [phone]    Cecil Crooks MD Consulting Physician  Gastroenterology [phone]    Clifton Ramirez MD Team Attending  Internal Medicine  [phone]      Your Vitals Were     BP Pulse Temp Resp Height Weight    127/78 (BP Location: Right arm, Patient Position: Lying, BP Method: Automatic) 88 98.1 °F (36.7 °C) (Oral) 18 5' 1" (1.549 m) 61.9 kg (136 lb 7.4 oz)    SpO2 BMI             97% 25.78 kg/m2         Recent Lab Values        10/21/2015 1/20/2016 11/18/2016                    10:30 PM  9:41 AM  9:41 AM         A1C 6.5 (H) 5.5 5.4         Comment for A1C at  9:41 AM on 11/18/2016:  According to ADA guidelines, hemoglobin A1C <7.0% represents  optimal control in non-pregnant diabetic patients.  Different  metrics may apply to specific populations.   Standards of Medical Care in Diabetes - 2016.  For the purpose of screening for the presence of diabetes:  <5.7%     Consistent with the absence of diabetes  5.7-6.4%  Consistent with increasing risk for diabetes   (prediabetes)  >or=6.5%  Consistent with diabetes  Currently no consensus exists for use of hemoglobin A1C  for diagnosis of diabetes for children.        Allergies as of 4/24/2017     No Known Allergies      [facility]     [facility] Nurse Care Line - 24/7 Assistance  Unless otherwise directed by your provider, please contact [facility], our nurse care line that is available for 24/7 assistance.     Registered nurses in the [facility] provide clinical advisement, health education, appointment booking, and other advisory services.  Call for this free service at [phone].   "      Advance Directives     An advance directive is a document which, in the event you are no longer able to make decisions for yourself, tells your healthcare team what kind of treatment you do or do not want to receive, or who you would like to make those decisions for you.  If you do not currently have an advance directive, [facility] encourages you to create one.  For more information call:  [phone]), [phone] ([phone]),  or log on to [email or web address].        Smoking Cessation     If you would like to quit smoking:   You may be eligible for free services if you are a [place] resident and started smoking cigarettes before September 1, 1988.  Call the Smoking Cessation [employer] (SCT) toll free at (976) 764-7464 or ([phone] 015-7020.   Call 2-275-QUIT-NOW if you do not meet the above criteria.   Contact us via email: [email or web address]   View our website for more information: [email or web address]        Language Assistance Services     ATTENTION: Language assistance services are available, free of charge. Please call [phone].      ATENCIÓN: Si habla español, tiene a merritt disposición servicios gratuitos de asistencia lingüística. Llame al [phone].     CHÚ Ý: N?u b?n nói Ti?ng Vi?t, có các d?ch v? h? tr? ngôn ng? mi?n phí dành cho b?n. G?i s? [phone].        Stroke Education              Pneumonmia Discharge Instructions                Chronic Kindey Disease Education             [facility] Sign-Up     Activating your [facility] account is as easy as 1-2-3!     1) Visit [email or web address], select Sign Up Now, enter this activation code and your date of birth, then select Next.  [email or web address]  Expires: 5/13/2017  2:29 PM      2) Create a username and password to use when you visit [facility] in the future and select a security question in case you lose your password and select Next.    3) Enter your e-mail address and click Sign Up!    Additional  Information  If you have questions, please e-mail [email or web address] or call [phone] to talk to our [facility] staff. Remember, [facility] is NOT to be used for urgent needs. For medical emergencies, dial 911.          [facility] complies with applicable Federal civil rights laws and does not discriminate on the basis of race, color, national origin, age, disability, or sex.

## 2017-04-24 VITALS
OXYGEN SATURATION: 97 % | HEART RATE: 76 BPM | RESPIRATION RATE: 16 BRPM | TEMPERATURE: 98 F | SYSTOLIC BLOOD PRESSURE: 127 MMHG | WEIGHT: 136.44 LBS | DIASTOLIC BLOOD PRESSURE: 78 MMHG | BODY MASS INDEX: 25.76 KG/M2 | HEIGHT: 61 IN

## 2017-04-24 LAB
ALBUMIN SERPL BCP-MCNC: 3.6 G/DL
ALP SERPL-CCNC: 120 U/L
ALT SERPL W/O P-5'-P-CCNC: 34 U/L
ANION GAP SERPL CALC-SCNC: 14 MMOL/L
AST SERPL-CCNC: 26 U/L
BASOPHILS # BLD AUTO: 0.02 K/UL
BASOPHILS NFR BLD: 0.4 %
BILIRUB SERPL-MCNC: 0.4 MG/DL
BUN SERPL-MCNC: 12 MG/DL
CALCIUM SERPL-MCNC: 8.7 MG/DL
CHLORIDE SERPL-SCNC: 100 MMOL/L
CO2 SERPL-SCNC: 23 MMOL/L
CREAT SERPL-MCNC: 0.9 MG/DL
DIFFERENTIAL METHOD: ABNORMAL
EOSINOPHIL # BLD AUTO: 0.3 K/UL
EOSINOPHIL NFR BLD: 5.6 %
ERYTHROCYTE [DISTWIDTH] IN BLOOD BY AUTOMATED COUNT: 14.1 %
EST. GFR  (AFRICAN AMERICAN): >60 ML/MIN/1.73 M^2
EST. GFR  (NON AFRICAN AMERICAN): >60 ML/MIN/1.73 M^2
GLUCOSE SERPL-MCNC: 108 MG/DL
HCT VFR BLD AUTO: 34.6 %
HGB BLD-MCNC: 11.3 G/DL
LYMPHOCYTES # BLD AUTO: 0.8 K/UL
LYMPHOCYTES NFR BLD: 17.9 %
MCH RBC QN AUTO: 31.1 PG
MCHC RBC AUTO-ENTMCNC: 32.7 %
MCV RBC AUTO: 95 FL
MONOCYTES # BLD AUTO: 0.4 K/UL
MONOCYTES NFR BLD: 8.5 %
NEUTROPHILS # BLD AUTO: 3.2 K/UL
NEUTROPHILS NFR BLD: 67.4 %
PLATELET # BLD AUTO: 299 K/UL
PMV BLD AUTO: 8.8 FL
POTASSIUM SERPL-SCNC: 3.9 MMOL/L
PROT SERPL-MCNC: 6.5 G/DL
RBC # BLD AUTO: 3.63 M/UL
SODIUM SERPL-SCNC: 137 MMOL/L
WBC # BLD AUTO: 4.68 K/UL

## 2017-04-24 PROCEDURE — 63600175 PHARM REV CODE 636 W HCPCS: Performed by: HOSPITALIST

## 2017-04-24 PROCEDURE — G0378 HOSPITAL OBSERVATION PER HR: HCPCS

## 2017-04-24 PROCEDURE — 36415 COLL VENOUS BLD VENIPUNCTURE: CPT

## 2017-04-24 PROCEDURE — 25000242 PHARM REV CODE 250 ALT 637 W/ HCPCS: Performed by: HOSPITALIST

## 2017-04-24 PROCEDURE — 85025 COMPLETE CBC W/AUTO DIFF WBC: CPT

## 2017-04-24 PROCEDURE — 80053 COMPREHEN METABOLIC PANEL: CPT

## 2017-04-24 PROCEDURE — 94640 AIRWAY INHALATION TREATMENT: CPT

## 2017-04-24 PROCEDURE — 25000003 PHARM REV CODE 250: Performed by: HOSPITALIST

## 2017-04-24 PROCEDURE — C9113 INJ PANTOPRAZOLE SODIUM, VIA: HCPCS | Performed by: HOSPITALIST

## 2017-04-24 RX ORDER — TIOTROPIUM BROMIDE 18 UG/1
18 CAPSULE ORAL; RESPIRATORY (INHALATION) DAILY
Status: DISCONTINUED | OUTPATIENT
Start: 2017-04-24 | End: 2017-04-24 | Stop reason: HOSPADM

## 2017-04-24 RX ORDER — CLOPIDOGREL BISULFATE 75 MG/1
75 TABLET ORAL DAILY
Status: DISCONTINUED | OUTPATIENT
Start: 2017-04-24 | End: 2017-04-24 | Stop reason: HOSPADM

## 2017-04-24 RX ORDER — NAPROXEN SODIUM 220 MG/1
81 TABLET, FILM COATED ORAL DAILY
Status: DISCONTINUED | OUTPATIENT
Start: 2017-04-24 | End: 2017-04-24 | Stop reason: HOSPADM

## 2017-04-24 RX ORDER — LEVOTHYROXINE SODIUM 88 UG/1
88 TABLET ORAL
Status: DISCONTINUED | OUTPATIENT
Start: 2017-04-24 | End: 2017-04-24 | Stop reason: HOSPADM

## 2017-04-24 RX ADMIN — LEVOTHYROXINE SODIUM 88 MCG: 0.09 TABLET ORAL at 05:04

## 2017-04-24 RX ADMIN — FLUTICASONE FUROATE AND VILANTEROL TRIFENATATE 1 PUFF: 100; 25 POWDER RESPIRATORY (INHALATION) at 09:04

## 2017-04-24 RX ADMIN — TIOTROPIUM BROMIDE 18 MCG: 18 CAPSULE ORAL; RESPIRATORY (INHALATION) at 09:04

## 2017-04-24 RX ADMIN — DEXTROSE 8 MG/HR: 50 INJECTION, SOLUTION INTRAVENOUS at 04:04

## 2017-04-24 RX ADMIN — DEXTROSE 8 MG/HR: 50 INJECTION, SOLUTION INTRAVENOUS at 08:04

## 2017-04-24 RX ADMIN — DEXTROSE 8 MG/HR: 50 INJECTION, SOLUTION INTRAVENOUS at 12:04

## 2017-04-24 RX ADMIN — CLOPIDOGREL BISULFATE 75 MG: 75 TABLET ORAL at 11:04

## 2017-04-24 RX ADMIN — ESCITALOPRAM 10 MG: 10 TABLET, FILM COATED ORAL at 08:04

## 2017-04-24 RX ADMIN — ASPIRIN 81 MG 81 MG: 81 TABLET ORAL at 11:04

## 2017-04-24 RX ADMIN — AMLODIPINE BESYLATE 10 MG: 5 TABLET ORAL at 08:04

## 2017-04-24 RX ADMIN — ATORVASTATIN CALCIUM 40 MG: 40 TABLET, FILM COATED ORAL at 08:04

## 2017-04-24 RX ADMIN — OLMESARTAN MEDOXOMIL 20 MG: 20 TABLET, FILM COATED ORAL at 08:04

## 2017-04-24 NOTE — CONSULTS
"[facility]    CC: dark stool    HPI 76 y.o. female with PMH PVD, COPD, hypothyroidism, DELIA on oral iron who presents with complaints of multiple episodes of dark stool, not associated with abdominal pain, nausea or hematemesis ongoing since recent procedure on 4/18/17. 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 DENISE appeared patent on angiogram. Post-procedure, she had bloody bowel movements twice and was monitored while in the hospital. She was discharged on 4/21/17 and then had four to five more bloody bowel movements and returned 4/23/17.    She had recent EGD 3/2/17 with findings of scattered erosive gastropathy of hte antrum and body. She was advised to continue daily PPI. She also had incomplete colonoscopy due to excessive looping and diverticulosis in the sigmoid colon.      Past Medical History:   Diagnosis Date    Abdominal pain     CHF (congestive heart failure)     Chronic kidney disease, stage III (moderate) 8/19/2015    COPD (chronic obstructive pulmonary disease)     COPD (chronic obstructive pulmonary disease)     CVA (cerebral infarction)     Esophagitis     Gastritis     GERD (gastroesophageal reflux disease)     GI bleed     Hyperlipidemia     Hypertension     Mesenteric angina     PVD (peripheral vascular disease) with claudication 10/13/2015    Stroke     Subclavian artery stenosis, left     Thyroid disease          Review of Systems  General ROS: negative for chills, fever or weight loss  Cardiovascular ROS: no chest pain or dyspnea on exertion  Gastrointestinal ROS: no abdominal pain, change in bowel habits, history of black stools     Physical Examination  BP (!) 147/70 (BP Location: Right leg, Patient Position: Lying, BP Method: Automatic)  Pulse 78  Temp 98.2 °F (36.8 °C) (Oral)   Resp 18  Ht 5' 1" (1.549 m)  Wt 61.9 kg (136 lb 7.4 oz)  SpO2 97% "  Breastfeeding? No  BMI 25.78 kg/m2  General appearance: alert, cooperative, no distress, elderly female, sitting in chair  HENT: Normocephalic, atraumatic, neck symmetrical, no nasal discharge   Lungs: clear to auscultation bilaterally, no dullness to percussion bilaterally  Heart: regular rate and rhythm without rub; no displacement of the PMI   Abdomen: soft, non-tender; bowel sounds normoactive; no organomegaly  Extremities: extremities symmetric; no clubbing, cyanosis, or edema  Neurologic: Alert and oriented X 3, normal strength, normal coordination and gait    Labs:  H/H 11.3/34.6  Plt 299  BUN 12, Cr 0.9    Imaging:  CT A/P:   -Liquid stool within the large bowel, a nonspecific finding that can be associated with diarrheal illness, correlation for any symptoms of the same recommended.  -Hepatomegaly, correlation with LFTs recommended.  -Extensive atherosclerotic calcification of the aorta and its branches noting probable multifocal regions of high-grade stenosis involving the celiac, SMA, and bilateral renal arteries, better evaluated on CTA for/17/2017  -Several additional findings above.    Assessment:   76 y.o. female with PMH PVD, COPD, hypothyroidism, DELIA on oral iron who presents with complaints of multiple episodes of dark stool since recent procedure on 4/18/17 for celiac artery and SMA stenosis s/p stenting. H/H 11.3/34.6 from 10.2/30.8. HD stable. No concern for acute bleeding at this time given improvement in H/H since discharge. Suspect GI losses of old blood present in bowel from recent stenting procedure.     Plan:  -Recommend re-starting patient's anti-platelet medications (ASA, Plavix)  -Would continue oral iron supplementation daily  -Recommend advancing diet as tolerates  -Agree with discharge to home with follow up in GI clinic and cardiology clinic    Cecil Crooks MD   [address], [address]   [place] [address] [phone]

## 2017-04-24 NOTE — PROGRESS NOTES
"[facility] Resident HO-III Progress Note    Subjective:      No complaints. Denies abd pain, n/v/d/c. Denies further instances of hematochezia or melena.    States she feels good and would like to go home.     Objective:   Last 24 Hour Vital Signs:  BP  Min: 122/59  Max: 163/70  Temp  Av.4 °F (36.9 °C)  Min: 98.2 °F (36.8 °C)  Max: 98.5 °F (36.9 °C)  Pulse  Av.1  Min: 70  Max: 85  Resp  Av  Min: 16  Max: 21  SpO2  Av.3 %  Min: 92 %  Max: 94 %  Height  Av' 1" (154.9 cm)  Min: 5' 1" (154.9 cm)  Max: 5' 1" (154.9 cm)  Weight  Av.6 kg (133 lb 8.7 oz)  Min: 59.4 kg (131 lb)  Max: 61.9 kg (136 lb 7.4 oz)     Physical Examination:  Gen: WDWN, pleasant  Head: NCAT  Eyes: no scleral icterus or conjunctival pallor  Neck: trachea midline, no LAD  CV: RRR no mgr  Lungs: CTAB, nonlabored breathing, equal chest rise  Abd: soft, NT, ND  Ext: wwp no cce  Psych: normal mood and affect  Skin: no obvious rashes or lesions  Neuro: AAOx3, afocal to brief exam    Laboratory:  Urinalysis: Lab Results   Component Value Date    LABURIN No growth 2017    COLORU Yellow 2017    SPECGRAV 1.010 2017    NITRITE Negative 2017    KETONESU Negative 2017    UROBILINOGEN Negative 2017    WBCUA 7 (H) 2017       Recent Labs  Lab 17  1307 17  0512 17  1855   WBC 6.62 4.73 4.66   HGB 10.2* 10.2* 10.4*   HCT 30.9* 30.8* 32.6*    231 306   MCV 96 95 99*   RDW 13.7 13.9 13.9   * 138 140   K 3.7 3.9 4.0    104 97   CO2 26 28 33*   BUN 20 15 13   CREATININE 0.9 0.9 1.0    87 102   PROT 5.3*  --  6.9   ALBUMIN 3.0*  --  3.8   BILITOT 0.3  --  0.4   AST 28  --  40   ALKPHOS 103  --  120   ALT 32  --  41     Radiology: old imaging reviewed  Imaging Results         CT Abdomen Pelvis With Contrast (Final result) Result time:  17 20:46:59    Final result by Christopher Fernandes MD (17 20:46:59)    Impression:      1.  Liquid stool " within the large bowel, a nonspecific finding that can be associated with diarrheal illness, correlation for any symptoms of the same recommended.    2.  Hepatomegaly, correlation with LFTs recommended.    3.  Extensive atherosclerotic calcification of the aorta and its branches noting probable multifocal regions of high-grade stenosis involving the celiac, SMA, and bilateral renal arteries, better evaluated on CTA for/17/2017    4.  Several additional findings above.      Electronically signed by: RUT OLSON MD  Date:     04/23/17  Time:    20:46     Narrative:    CT abdomen and pelvis with contrast    Clinical history: Melena    Comparison: 4/17/2017    Technique:  Axial images of the abdomen and pelvis were obtained at 5 mm intervals following a demonstration of 75 cc Omni 350 IV contrast.  Coronal and sagittal reformatted images were reviewed.    Findings:  Images of the lower thorax are remarkable for bilateral dependent atelectasis/scarring, including the lingula.    There is a calcified granuloma either within the inferior aspect of the right lower lobe versus right hepatic dome.  The liver is enlarged.  The spleen, left adrenal gland and pancreas are grossly unremarkable noting pancreatic atrophy, similar to the previous exam without ductal dilation.  There is a low attenuating lesion arising from the medial aspect of the right adrenal gland, attenuation of which is nonspecific however attenuation was that of simple fluid on examination 4/17/2017, and suggests adenoma.  The portal vein, splenic vein, SMV, celiac axis and SMA all are patent noting significant atherosclerotic calcification of the aorta and its branches, with significant calcification at the origin of the mesenteric vessels high-grade stenosis suspected.  No significant abdominal lymphadenopathy.    The kidneys enhance symmetrically and excrete contrast appropriately without hydronephrosis or nephrolithiasis.  There is a low attenuating  lesion arising from the interpolar region of the left kidney, attenuation of which suggests cyst.  The bilateral ureters are difficult to follow in their entirety to the urinary bladder however no calculi along their visualized courses and no secondary signs of obstructive uropathy.  The urinary bladder is unremarkable.  The uterus is absent the adnexa is unremarkable.    There are several scattered colonic diverticula, primarily involving the sigmoid colon without surrounding inflammation or colonic wall thickening.  There is fatty hypertrophy of the ileocecal valve, the terminal ileum is otherwise unremarkable.  The appendix is not confidently identified noting no inflammation in its expected location.  There is primarily liquid stool within the colon, nonspecific, however could reflect sequela of diarrheal illness.  A small bowel is grossly unremarkable noting scattered fluid filled nondilated loops.  Scattered shotty periaortic and paracaval lymph nodes are noted.  No focal organized pelvic fluid collection.    There is diffuse osteopenia.  Degenerative changes are noted of the bilateral hips, pubic symphysis, and lower lumbar spine without focal osseous destructive process.    Postsurgical changes are noted of the anterior abdominal wall without inflammation.            Current Medications:     Infusions:   pantoprazole 40 mg in dextrose 5 % 100 mL infusion (ready to mix system) 8 mg/hr (04/24/17 0402)        Scheduled:   amlodipine  10 mg Oral Daily    atorvastatin  40 mg Oral Daily    chlordiazepoxide-clidinium 5-2.5 mg  1 capsule Oral TID WM    escitalopram oxalate  10 mg Oral Daily    fluticasone-vilanterol  1 puff Inhalation Daily    levothyroxine  88 mcg Oral Before breakfast    olmesartan  20 mg Oral Daily    senna-docusate 8.6-50 mg  1 tablet Oral BID    tiotropium  18 mcg Inhalation Daily        PRN:  acetaminophen, albuterol, docusate sodium, ondansetron    Assessment:     Brittany oro a  76 y.o.female with  Patient Active Problem List    Diagnosis Date Noted    GIB (gastrointestinal bleeding) 04/23/2017    Mesenteric angina 04/17/2017    Angina mesenteric 04/13/2017    Tobacco abuse 04/13/2017    Coronary artery disease involving native coronary artery of native heart without angina pectoris 04/13/2017    Abdominal pain 03/02/2017    Gastritis 03/02/2017    CVD (cerebrovascular disease) 10/14/2016    Subclavian artery stenosis, left 04/15/2016    Glucose intolerance (impaired glucose tolerance) 11/11/2015    Leg pain, bilateral 10/13/2015    PVD (peripheral vascular disease) with claudication 10/13/2015    Chronic kidney disease, stage III (moderate) 08/19/2015    COPD (chronic obstructive pulmonary disease) 06/04/2015    HLD (hyperlipidemia) 06/04/2015    Incidental pulmonary nodule, > 3mm and < 8mm 08/01/2013    Stroke 04/12/2013    Carotid stenosis 04/12/2013    Hypothyroid 04/12/2013    Hypertension 04/12/2013    Anxiety 04/12/2013     Plan:     Chronic Mesenteric Ischemia with stable GI bleed s/p aortogram/stents  - Discharged 3/20/17 with interventional cardiology with follow up with Dr. Crooks for continued eval of abdominal pain and GIB. Then admitted on 4/17 found to have significant abdominal atherosclerosis and chronic mesenteric ischemia. Discharged 3 days ago from Cardiology, patient with maroon colored stools very similar to presentation, however patient discharged with stable H/H. This presentation likely represents the same process, however patient admitted for further GI eval.  - 5 episodes of dark red bloody BM s/p MgOx after discharge, intermittent abdominal pain  - H/H on admit 10/32, increased since discharge  - 3/17 EGD with scattered erosive gastropathy of antrum  3/2017 C-scope with diverticulosis in sigmoid colon   - CT abd  w/liquid stool within the large bowel, a nonspecific finding that can be associated with diarrheal illness  - hold ASA/Plavix for   continue Protonix gtt  NPO, consulted [facility]      CAD  - will hold ASA in setting of ?GI bleed  cont atorvastatin      H/o CVA with carotid stenosis   - no acute issue  continue atorvastatin      CKDIII  - Cr 1.0 on admit  baseline 0.7-1.0  - avoid nephrotoxins, renal dose meds     COPD  - stable on RA  Breo, albuterol prn     HTN  - continue Norvasc, olmesartan     HLD  - 11/2016 lipid panel: chol 256,   continue atorvastatin      Hypothyroidism  - continue synthroid     Anxiety   - continue escitalopram      Pulmonary Nodules  - CT Chest 6/2014 with 3 stable noncalcified pulmonary nodules within the right lower lobe the largest of which measures 0.7 cm as seen on previous CT dated 12/16/2013  - will need follow up CTs with PCP     Tobacco Abuse  - 1ppd since 13 yo  counseled on cessation      F:   E:   N: NPO pending GI eval  PPx: SCDs  Dispo: pending GI evaluation, likely stable process     Code Status: Full    Air Mendosa  [facility] HO-III  [facility] Hospitalist Service Team B    [facility] Hospitalist Pager numbers:   [facility] Hospitalist Medicine Team A (Bradley/Kristin): [phone]  [facility] Hospitalist Medicine Team B (Homer/James):  [phone]

## 2017-04-24 NOTE — DISCHARGE INSTRUCTIONS
GASTROINTESTINAL (GI) BLEEDING, WHEN YOU HAVE ([place]) View Edit Remove  HEART FAILURE, DISCHARGE INSTRUCTIONS FOR ([place]) View Edit Remove  HEART FAILURE, WHAT IS ([place]) View Edit Remove  HEART FAILURE: BEING ACTIVE ([place]) View Edit Remove  HEART FAILURE: EVALUATING YOUR HEART ([place]) View Edit Remove  HEART FAILURE: MAKING CHANGES TO YOUR DIET ([place]) View Edit Remove  HEART FAILURE: MEDICINES TO HELP YOUR HEART ([place]) View Edit Remove  HEART FAILURE: PROCEDURES THAT MAY HELP ([place]) View Edit Remove  HEART FAILURE: TAKING MEDICATION TO CONTROL ([place]) View Edit Remove  HEART FAILURE: TRACKING YOUR WEIGHT ([place]) View Edit Remove  HEART FAILURE: WARNING SIGNS OF A FLARE-UP ([place])LOWER GI BLEEDING (STABLE) ([place]) View Edit Remove  LOWER GI BLEEDING (STABLE) ([place]) View Edit Remove

## 2017-04-24 NOTE — PLAN OF CARE
Pt stable, discharge instructions reviewed with pt's daughter, pt's daughter verbalized understanding, Pt educated on continuing medications. Heart failure/GI bleeding education given. Follow up info given to pt daughter.  Tele discontinued. Pt awaiting to be discharged via wheelchair with personal belongings with staff to main entrance.    Pt /49, HR 78 on BP machine upon discharge, pt asymptomatic,  and team informed, MD stated to recheck pt BP manually, pt /48 manually, pt remains asymptomatic,  and team informed, MD stated OK for pt to be discharged.

## 2017-04-24 NOTE — PLAN OF CARE
Problem: Patient Care Overview  Goal: Plan of Care Review  Outcome: Ongoing (interventions implemented as appropriate)  Tele monitor continue. Free of falls. Dark bloody stool x1. Patient AAOx4. RAFAELA & SCD applied. Patient verbalized understanding the plan of care. Room near nursing station. Bed alarm set, bed in low and locked position, side rails up x 2, call light within reach. Continue to monitor

## 2017-04-24 NOTE — PLAN OF CARE
Future Appointments  Date Time Provider Department Center   5/1/2017 11:00 AM Carol Robertson MD [facility] IM SULEMAN [facility]   5/4/2017 8:45 AM CARDIOLOGY, STRESS/TILT TABLE/JUAN CARLOS [facility] CARD [facility]   5/4/2017 9:45 AM [facility]   5/4/2017 10:45 AM [facility]   5/4/2017 11:30 AM [address] [facility]   5/8/2017 11:00 AM Anthony Brooks MD [facility] CARDIO [facility]   5/15/2017 2:00 PM Cecil Crooks MD [facility] TUMOR [facility]   5/17/2017 2:20 PM Wayne Frazier MD [facility] FAM MED [facility]        04/24/17 1206   Discharge Assessment   Assessment Type Discharge Planning Assessment   Confirmed/corrected address and phone number on facesheet? Yes   Assessment information obtained from? Patient   Prior to hospitilization cognitive status: Alert/Oriented   Prior to hospitalization functional status: Independent   Current cognitive status: Alert/Oriented   Current Functional Status: Independent   Able to Return to Prior Arrangements yes   Is patient able to care for self after discharge? Yes   Patient's perception of discharge disposition home or selfcare   Readmission Within The Last 30 Days no previous admission in last 30 days   Patient currently being followed by outpatient case management? No   Patient currently receives home health services? No   Does the patient currently use HME? Yes   Patient currently receives private duty nursing? No   Patient currently receives any other outside agency services? No   Equipment Currently Used at Home walker, rolling;cane, quad   Do you have any problems affording any of your prescribed medications? No   Is the patient taking medications as prescribed? yes   Do you have any financial concerns preventing you from receiving the healthcare you need? No   Does the patient have transportation to healthcare appointments? Yes   Discharge Plan A Home;Home with family   Discharge Plan B Home;Home with family;Home Health    Patient/Family In Agreement With Plan yes     Ana Hankins, RN, CCM, CMSRN  RN Transition Navigator  [phone]

## 2017-04-24 NOTE — ED NOTES
Pt sitting on end of the bed; called floor to give report and will call back; pt is stable; no bleeding; pt is alert;pt has no c/o pain at present

## 2017-04-24 NOTE — ED NOTES
Pt to room per ch with ed tech; pt is stable; pt is alert; pt has protonix drip in progress; pt has no pain; pt maintained npo status; color pink skin warm and dry

## 2017-04-24 NOTE — H&P
[facility] Medicine History and Physical - Resident Note    Admitting Team: [facility] Team B  Attending Physician: Dr. Ramirez  Resident: Navya  Interns: Masoud    Date of Admit: 4/23/2017    Chief Complaint and Duration     Melena (States having black stools x4 episodes today, recently discharged on Wednesday for mesenteric stents)   for 1 day     Subjective:      History of Present Illness:  Brittany Haile is a 76 y.o. female who  has a past medical history of Chronic kidney disease, stage III (moderate) (8/19/2015); COPD (chronic obstructive pulmonary disease); COPD (chronic obstructive pulmonary disease); CVA (cerebral infarction); GERD (gastroesophageal reflux disease); Hyperlipidemia; Hypertension; PVD (peripheral vascular disease) with claudication (10/13/2015); Stroke; and Thyroid disease. The patient presented to [facility] on 4/23/2017 with a primary complaint of Melena (States having black stools x4 episodes today, recently discharged on Wednesday for mesenteric stents)    Patient was recently discharged from interventional cardiology for severe mesenteric angina s/p aortogram and stents. She had 2 episode of bloody BM before discharge after procedure, however, H/H was stable at 10/30s. Patient reports doing well without BM since discharged. She has been taking pain medications and felt constipated since Tuesday. Today, patient took some MgOx and started having diarrhea associated with dark red blood. She had 4 episodes of dark red bloody stools after taking MgOx today and another episode in the ED. She endorses intermittent abdominal pain with each bowel movement associated with some rectal pain. Denies hemoptysis, lightheadedness/dizziness, CP, SOB, fevers, chills, N/V. She has been taking ASA and Plavix after discharge per cardiology, but no Pepto Bismol.     Medical History:  Past Medical History:   Diagnosis Date    Chronic kidney disease, stage III (moderate)  2015    COPD (chronic obstructive pulmonary disease)     COPD (chronic obstructive pulmonary disease)     CVA (cerebral infarction)     GERD (gastroesophageal reflux disease)     Hyperlipidemia     Hypertension     PVD (peripheral vascular disease) with claudication 10/13/2015    Stroke     Thyroid disease        Past Surgical History:  Past Surgical History:   Procedure Laterality Date     SECTION      COLONOSCOPY N/A 3/2/2017    Procedure: COLONOSCOPY;  Surgeon: Cecil Crooks MD;  Location: [facility];  Service: Endoscopy;  Laterality: N/A;    CORONARY ANGIOPLASTY  2006    GALLBLADDER SURGERY      HYSTERECTOMY      OOPHORECTOMY         Allergies:  Review of patient's allergies indicates:  No Known Allergies    Home Medications:  Prior to Admission medications    Medication Sig Start Date End Date Taking? Authorizing Provider   albuterol (PROVENTIL HFA) 90 mcg/actuation inhaler Inhale 2 puffs into the lungs every 4 to 6 hours as needed. 16   Wayne Frazier MD   amlodipine (NORVASC) 5 MG tablet Take 2 tablets (10 mg total) by mouth once daily. 16   Delvin Valle MD   aspirin (ECOTRIN) 81 MG EC tablet Take 1 tablet (81 mg total) by mouth once daily. 17  Anthony Brooks MD   atorvastatin (LIPITOR) 40 MG tablet Take 1 tablet (40 mg total) by mouth once daily. 17   Anthnoy Brooks MD   BENICAR 20 mg tablet TK 2 T PO D 17   Historical Provider, MD   chlordiazepoxide-clidinium 5-2.5 mg (LIBRAX) 5-2.5 mg Cap Take 1 capsule by mouth every 8 (eight) hours as needed. 3/29/17   Cecil Crooks MD   clopidogrel (PLAVIX) 75 mg tablet Take 1 tablet (75 mg total) by mouth once daily. 17  Anthony Brooks MD   docusate sodium (COLACE) 100 MG capsule Take 1 capsule (100 mg total) by mouth 2 (two) times daily as needed for Constipation. 16   Wayne Frazier MD   DUREZOL 0.05 % Drop ophthalmic solution  16   Historical Provider, MD    escitalopram oxalate (LEXAPRO) 10 MG tablet Take 1 tablet (10 mg total) by mouth once daily. 17   Wayne Frazier MD   ferrous sulfate 325 mg (65 mg iron) Tab tablet  16   Jayden Camacho MD   fluticasone-salmeterol 250-50 mcg/dose (ADVAIR) 250-50 mcg/dose diskus inhaler Inhale 1 puff into the lungs 2 (two) times daily. 16   Wayne Frazier MD   hydrocodone-acetaminophen 5-325mg (NORCO) 5-325 mg per tablet Take 1 tablet by mouth every 8 (eight) hours as needed for Pain. 17   Wayne Frazier MD   levothyroxine (SYNTHROID) 88 MCG tablet TAKE 1 TABLET BY MOUTH DAILY BEFORE BREAKFAST 16   Wayne Frazier MD   meclizine (ANTIVERT) 25 mg tablet Take 1 tablet (25 mg total) by mouth every 6 (six) hours as needed for Dizziness. 16   Wayne Frazier MD   NEXIUM 40 mg capsule Take 1 capsule (40 mg total) by mouth once daily. 16   Wayne Frazier MD   tiotropium (SPIRIVA WITH HANDIHALER) 18 mcg inhalation capsule Inhale 1 capsule (18 mcg total) into the lungs once daily. 16   Wayne Frazier MD       Family History:  History reviewed. No pertinent family history.    Social History:  Social History   Substance Use Topics    Smoking status: Current Every Day Smoker     Packs/day: 0.50     Years: 60.00     Types: Cigarettes    Smokeless tobacco: Never Used    Alcohol use No       Review of Systems:  Pertinent items are noted in HPI.    Health Maintaince:   Primary Care Physician: Wayne Frazier MD  Immunizations:   Currently on File:   Most Recent Immunizations   Administered Date(s) Administered    Influenza - High Dose 2016    Pneumococcal Conjugate - 13 Valent 2016    Pneumococcal Polysaccharide - 23 Valent 2013    Tdap 10/22/2015     Cancer Screening:  PAP: is up to date.  Mammogram: is up to date.  Colonoscopy: is up to date.    Objective:     Last 24 Hour Vital Signs:  BP  Min: 151/54  Max: 163/70  Temp  Av.2 °F (36.8 °C)  Min: 98.2 °F (36.8 °C)  Max: 98.2  "°F (36.8 °C)  Pulse  Av.5  Min: 80  Max: 83  Resp  Av  Min: 16  Max: 16  SpO2  Av %  Min: 90 %  Max: 92 %  Height  Av' 1" (154.9 cm)  Min: 5' 1" (154.9 cm)  Max: 5' 1" (154.9 cm)  Weight  Av.4 kg (131 lb)  Min: 59.4 kg (131 lb)  Max: 59.4 kg (131 lb)  Body mass index is 24.75 kg/(m^2).       Physical Examination:  Triage Vitals:  ED Triage Vitals   Enc Vitals Group      BP 17 1741 163/70      Pulse 17 1741 80      Resp 17 1741 16      Temp 17 1741 98.2 °F (36.8 °C)      Temp src 17 1741 Oral      SpO2 17 1741 90 %      Weight 17 1741 131 lb      Height 17 1741 5' 1"      Head Cir --       Peak Flow --       Pain Score --       Pain Loc --       Pain Edu? --       Excl. in GC? --        Admit Vitals:  BP (!) 151/54  Pulse 83  Temp 98.2 °F (36.8 °C) (Oral)   Resp 16  Ht 5' 1" (1.549 m)  Wt 59.4 kg (131 lb)  SpO2 (!) 92%  BMI 24.75 kg/m2    General: alert, cooperative, and no acute distress  Head: Normocephalic, without obvious abnormality, atraumatic  Eyes: conjunctivae/corneas clear; PERRL, EOM's intact  Nose: Nares normal; septum midline; mucosa normal; no drainage or sinus tenderness  Throat: lips, mucosa, and tongue normal; teeth and gums normal  Neck: no adenopathy, no JVD, supple, symmetrical, trachea midline and thyroid not enlarged, symmetric, no tenderness/mass/nodules  Lung: clear to auscultation throughout; no increased work of breathing  Heart: regular rate and rhythm, no M/R/G, normal S1/S2.  Abdomen: soft, non-tender; bowel sounds normal; no masses, no organomegaly  Extremities: extremities normal, atraumatic, no cyanosis or edema  Pulses: 2+ and symmetric  Rectal: some dark red blood, good tone, no masses/fissures/hemorrhoids noted  Skin: Skin color, texture, turgor normal; no rashes or lesions      Laboratory:  Most Recent Data:  CBC:  Lab Results   Component Value Date    WBC 4.66 2017    RBC 3.31 (L) 2017    " HGB 10.4 (L) 04/23/2017    HCT 32.6 (L) 04/23/2017    MCV 99 (H) 04/23/2017    MCH 31.4 (H) 04/23/2017    MCHC 31.9 (L) 04/23/2017    RDW 13.9 04/23/2017     04/23/2017    MPV 8.9 (L) 04/23/2017    GRAN 2.9 04/23/2017    GRAN 62.3 04/23/2017    LYMPH 1.1 04/23/2017    LYMPH 23.2 04/23/2017    MONO 0.4 04/23/2017    MONO 9.2 04/23/2017    EOS 0.2 04/23/2017    BASO 0.02 04/23/2017    EOSINOPHIL 4.9 04/23/2017    BASOPHIL 0.4 04/23/2017     No additional cells seen    BMP: Lab Results   Component Value Date     04/23/2017    K 4.0 04/23/2017    CL 97 04/23/2017    CO2 33 (H) 04/23/2017    BUN 13 04/23/2017    CREATININE 1.0 04/23/2017     04/23/2017    CALCIUM 9.5 04/23/2017    MG 2.5 03/08/2015    PHOS 3.3 11/18/2016     LFTs: Lab Results   Component Value Date    PROT 6.9 04/23/2017    ALBUMIN 3.8 04/23/2017    BILITOT 0.4 04/23/2017    AST 40 04/23/2017    ALKPHOS 120 04/23/2017    ALT 41 04/23/2017     Coags:   Lab Results   Component Value Date    INR 1.0 04/23/2017     FLP: Lab Results   Component Value Date    CHOL 256 (H) 11/18/2016    HDL 53 11/18/2016    LDLCALC 175.6 (H) 11/18/2016    TRIG 137 11/18/2016    CHOLHDL 20.7 11/18/2016     DM: Lab Results   Component Value Date    HGBA1C 5.4 11/18/2016    HGBA1C 5.5 01/20/2016    HGBA1C 6.5 (H) 10/21/2015    LDLCALC 175.6 (H) 11/18/2016    CREATININE 1.0 04/23/2017     Thyroid: Lab Results   Component Value Date    TSH 1.807 01/18/2017    FREET4 1.11 07/27/2013     Anemia: Lab Results   Component Value Date    IRON 12 (L) 10/21/2015    TIBC 448 10/21/2015    FERRITIN 7 (L) 10/21/2015    CSLZNUWQ17 272 01/18/2017    FOLATE 9.2 01/20/2016     Cardiac: Lab Results   Component Value Date    TROPONINI 0.018 10/22/2015    BNP 52 10/21/2015     Urinalysis: Lab Results   Component Value Date    LABURIN No growth 04/20/2017    COLORU Yellow 04/23/2017    SPECGRAV 1.010 04/23/2017    NITRITE Negative 04/23/2017    KETONESU Negative 04/23/2017     UROBILINOGEN Negative 04/23/2017    WBCUA 7 (H) 04/23/2017       Trended Lab Data:    Recent Labs  Lab 04/17/17  0221  04/19/17  1307 04/20/17  0512 04/23/17  1855   WBC 7.69  < > 6.62 4.73 4.66   HGB 14.7  < > 10.2* 10.2* 10.4*   HCT 44.1  < > 30.9* 30.8* 32.6*     < > 214 231 306   MCV 97  < > 96 95 99*   RDW 14.3  < > 13.7 13.9 13.9     < > 135* 138 140   K 3.9  < > 3.7 3.9 4.0   CL 98  < > 103 104 97   CO2 29  < > 26 28 33*   BUN 17  < > 20 15 13   CREATININE 1.0  < > 0.9 0.9 1.0     < > 102 87 102   PROT 7.2  --  5.3*  --  6.9   ALBUMIN 4.0  --  3.0*  --  3.8   BILITOT 0.3  --  0.3  --  0.4   AST 18  --  28  --  40   ALKPHOS 108  --  103  --  120   ALT 11  --  32  --  41   < > = values in this interval not displayed.    Trended Cardiac Data:  No results for input(s): TROPONINI, CKTOTAL, CKMB, BNP in the last 168 hours.    Microbiology Data:  None    Other Laboratory Data:      Other Results:  EKG (my interpretation):     Radiology:  Imaging Results         CT Abdomen Pelvis With Contrast (Final result) Result time:  04/23/17 20:46:59    Final result by Rut Fernandes MD (04/23/17 20:46:59)    Impression:      1.  Liquid stool within the large bowel, a nonspecific finding that can be associated with diarrheal illness, correlation for any symptoms of the same recommended.    2.  Hepatomegaly, correlation with LFTs recommended.    3.  Extensive atherosclerotic calcification of the aorta and its branches noting probable multifocal regions of high-grade stenosis involving the celiac, SMA, and bilateral renal arteries, better evaluated on CTA for/17/2017    4.  Several additional findings above.      Electronically signed by: RUT FERNANDES MD  Date:     04/23/17  Time:    20:46     Narrative:    CT abdomen and pelvis with contrast    Clinical history: Melena    Comparison: 4/17/2017    Technique:  Axial images of the abdomen and pelvis were obtained at 5 mm intervals following a demonstration  of 75 cc Omni 350 IV contrast.  Coronal and sagittal reformatted images were reviewed.    Findings:  Images of the lower thorax are remarkable for bilateral dependent atelectasis/scarring, including the lingula.    There is a calcified granuloma either within the inferior aspect of the right lower lobe versus right hepatic dome.  The liver is enlarged.  The spleen, left adrenal gland and pancreas are grossly unremarkable noting pancreatic atrophy, similar to the previous exam without ductal dilation.  There is a low attenuating lesion arising from the medial aspect of the right adrenal gland, attenuation of which is nonspecific however attenuation was that of simple fluid on examination 4/17/2017, and suggests adenoma.  The portal vein, splenic vein, SMV, celiac axis and SMA all are patent noting significant atherosclerotic calcification of the aorta and its branches, with significant calcification at the origin of the mesenteric vessels high-grade stenosis suspected.  No significant abdominal lymphadenopathy.    The kidneys enhance symmetrically and excrete contrast appropriately without hydronephrosis or nephrolithiasis.  There is a low attenuating lesion arising from the interpolar region of the left kidney, attenuation of which suggests cyst.  The bilateral ureters are difficult to follow in their entirety to the urinary bladder however no calculi along their visualized courses and no secondary signs of obstructive uropathy.  The urinary bladder is unremarkable.  The uterus is absent the adnexa is unremarkable.    There are several scattered colonic diverticula, primarily involving the sigmoid colon without surrounding inflammation or colonic wall thickening.  There is fatty hypertrophy of the ileocecal valve, the terminal ileum is otherwise unremarkable.  The appendix is not confidently identified noting no inflammation in its expected location.  There is primarily liquid stool within the colon, nonspecific,  however could reflect sequela of diarrheal illness.  A small bowel is grossly unremarkable noting scattered fluid filled nondilated loops.  Scattered shotty periaortic and paracaval lymph nodes are noted.  No focal organized pelvic fluid collection.    There is diffuse osteopenia.  Degenerative changes are noted of the bilateral hips, pubic symphysis, and lower lumbar spine without focal osseous destructive process.    Postsurgical changes are noted of the anterior abdominal wall without inflammation.               Assessment:     Brittany Haile is a 76 y.o. female with:    Plan:     Chronic Mesenteric Ischemia with stable GI bleed s/p aortogram/stents  - Discharged 3/20/17 with interventional cardiology with follow up with Dr. Crooks for continued eval of abdominal pain and GIB  - 5 episodes of dark red bloody BM s/p MgOx after discharge, intermittent abdominal pain  - H/H on admit 10/32, stable since discharge  - 3/17 EGD with scattered erosive gastropathy of antrum  3/2017 C-scope with diverticulosis in sigmoid colon   - CT abd  w/liquid stool within the large bowel, a nonspecific finding that can be associated with diarrheal illness  - hold ASA/Plavix  continue Protonix gtt  NPO, consulted L-GI     CAD  - will hold ASA in setting of ?GI bleed  cont atorvastatin     H/o CVA with carotid stenosis   - no acute issue  continue atorvastatin     CKDIII  - Cr 1.0 on admit  baseline 0.7-1.0  - avoid nephrotoxins, renal dose meds    COPD  - stable on RA  Breo, albuterol prn    HTN  - continue Norvasc, olmesartan    HLD  - 11/2016 lipid panel: chol 256,   continue atorvastatin     Hypothyroidism  - continue synthroid    Anxiety   - continue escitalopram     Pulmonary Nodules  - CT Chest 6/2014 with 3 stable noncalcified pulmonary nodules within the right lower lobe the largest of which measures 0.7 cm as seen on previous CT dated 12/16/2013  - will need follow up CTs with PCP    Tobacco Abuse  - 1ppd since 14  yo  counseled on cessation     F:   E:   N: NPO  PPx: SCD  Dispo: pending GI evaluation     Code Status: Nupur Cha  [facility] HO-I    hospitals Medicine Hospitalist Pager Numbers:   hospitals Hospitalist Medicine Team A (Bradely/Kristin): [phone]  hospitals Hospitalist Medicine Team B (Homer/James):  [phone]

## 2017-04-24 NOTE — ED TRIAGE NOTES
complants of bloody stool x 4 on today. Pt states she was admitted in hosp and d/c on Tuesday and noticed bloody stool at that time. Pt with first bowel movement at home since Tuesday,  she had 4 bloody bowel movements since then

## 2017-04-24 NOTE — PLAN OF CARE
Future Appointments  Date Time Provider Department Center   5/1/2017 11:00 AM Carol Robertson MD [facility] IM SULEMAN [facility]   5/4/2017 8:45 AM CARDIOLOGY, STRESS/TILT TABLE/JUAN CARLOS [facility] CARD [facility]   5/4/2017 9:45 AM [address] [facility]   5/4/2017 10:45 AM [address] [facility]   5/4/2017 11:30 AM [address] [facility]   5/8/2017 11:00 AM Anthony Brooks MD [facility] CARDIO [facility]   5/15/2017 2:00 PM Cecil Crokos MD [facility] TUMOR [facility]   5/17/2017 2:20 PM Wayne Frazier MD [facility] FAM MED [facility]     [facility] Brochure given to patient.       04/24/17 1354   Final Note   Assessment Type Final Discharge Note   Discharge Disposition Home   Discharge planning education complete? Yes   Hospital Follow Up  Appt(s) scheduled? Yes   Discharge plans and expectations educations in teach back method with documentation complete? No   Offered [facility] -- Bedside Delivery? Yes   Discharge/Hospital Encounter Summary to (non-[facility]) PCP No   Referral to Outpatient Case Management complete? No   Referral to / orders for Home Health Complete? No   30 day supply of medicines given at discharge, if documented non-compliance / non-adherence? No   Any social issues identified prior to discharge? No   Did you assess the readiness or willingness of the family or caregiver to support self management of care? No   Right Care Referral Info   Post Acute Recommendation No Care     Ana Hankins, RN, CCM, CMSRN  RN Transition Navigator  [phone]

## 2017-04-24 NOTE — ED PROVIDER NOTES
Encounter Date: 4/23/2017       History     Chief Complaint   Patient presents with    Melena     States having black stools x4 episodes today, recently discharged on Wednesday for mesenteric stents     Review of patient's allergies indicates:  No Known Allergies  HPI Comments: 76-year-old female with a history of mesenteric ischemia, status post recent stents after a long protracted course of abdominal pain, comes into the emergency department with multiple episodes of dark black tarry stools.  She had the stents placed on Tuesday, was discharged on Wednesday.  She had a dark tarry stool upon discharge, however was feeling better and was safe for discharge.  Her hemoglobin stayed stable, and was discharged with arrangements for outpatient workup with gastroenterology.  Patient has had 5 large dark black stools today, which prompted her family to bring her to the emergency department.  After making the bowel movements or abdominal pain has improved, however she is been having multiple episodes of this, and feels dry.  She is taking aspirin and Plavix.  She denies chest pain, shortness of breath, lightheadedness or dizziness, though does feel fatigued.    Patient is a 76 y.o. female presenting with the following complaint: diarrhea. The history is provided by the patient, a parent, a caregiver and a relative.   Diarrhea    This is a recurrent problem. The current episode started several days ago. The problem occurs 5 to 10 times per day. The problem has been unchanged. The stool consistency is described as blood tinged (dark and melenatic). The patient states that diarrhea does not awaken her from sleep. Associated symptoms include chills.     Past Medical History:   Diagnosis Date    Chronic kidney disease, stage III (moderate) 8/19/2015    COPD (chronic obstructive pulmonary disease)     COPD (chronic obstructive pulmonary disease)     CVA (cerebral infarction)     GERD (gastroesophageal reflux disease)      Hyperlipidemia     Hypertension     PVD (peripheral vascular disease) with claudication 10/13/2015    Stroke     Thyroid disease      Past Surgical History:   Procedure Laterality Date     SECTION      COLONOSCOPY N/A 3/2/2017    Procedure: COLONOSCOPY;  Surgeon: Cecil Crooks MD;  Location: [facility];  Service: Endoscopy;  Laterality: N/A;    CORONARY ANGIOPLASTY      GALLBLADDER SURGERY      HYSTERECTOMY      OOPHORECTOMY       History reviewed. No pertinent family history.  Social History   Substance Use Topics    Smoking status: Current Every Day Smoker     Packs/day: 0.50     Years: 60.00     Types: Cigarettes    Smokeless tobacco: Never Used    Alcohol use No     Review of Systems   Constitutional: Positive for chills and fatigue.   HENT: Negative.    Eyes: Negative.    Respiratory: Negative.    Gastrointestinal: Positive for diarrhea.   All other systems reviewed and are negative.      Physical Exam   Initial Vitals   BP Pulse Resp Temp SpO2   17 1741 17 1741 17 1741 17 1741 17 1741   163/70 80 16 98.2 °F (36.8 °C) 90 %     Physical Exam    Nursing note and vitals reviewed.  Constitutional: She appears well-developed and well-nourished. She appears distressed.   HENT:   Head: Normocephalic and atraumatic.   Eyes: EOM are normal. Pupils are equal, round, and reactive to light.   Neck: Normal range of motion. Neck supple.   Cardiovascular: Normal rate and normal heart sounds.   Pulmonary/Chest: Breath sounds normal.   Abdominal: Soft.   Her abdomen is soft, nontender nondistended.   Genitourinary:   Genitourinary Comments: She has obviously black tarry stools in her rectum.  No evidence of fissures or hemorrhoids.   Musculoskeletal: Normal range of motion.   Neurological: She is alert and oriented to person, place, and time. She has normal strength. No cranial nerve deficit.   Skin: Skin is warm and dry.   Psychiatric: She has a normal mood and affect.  Her behavior is normal. Thought content normal.         ED Course   Procedures  Labs Reviewed   CBC W/ AUTO DIFFERENTIAL - Abnormal; Notable for the following:        Result Value    RBC 3.31 (*)     Hemoglobin 10.4 (*)     Hematocrit 32.6 (*)     MCV 99 (*)     MCH 31.4 (*)     MCHC 31.9 (*)     MPV 8.9 (*)     All other components within normal limits   COMPREHENSIVE METABOLIC PANEL - Abnormal; Notable for the following:     CO2 33 (*)     eGFR if non  54 (*)     All other components within normal limits   URINALYSIS - Abnormal; Notable for the following:     Leukocytes, UA Trace (*)     All other components within normal limits   OCCULT BLOOD X 1, STOOL - Abnormal; Notable for the following:     Occult Blood Positive (*)     All other components within normal limits   URINALYSIS MICROSCOPIC - Abnormal; Notable for the following:     WBC, UA 7 (*)     All other components within normal limits   PROTIME-INR   LACTIC ACID, PLASMA   TYPE & SCREEN             Medical Decision Making:   Initial Assessment:   77 yo F with recent mesenteric stents here with melena and diarrhea x 3 days  Differential Diagnosis:   GI bleed, (on aspirin/plavix) dissection of stent  She is not on coumadin or xarelto  Clinical Tests:   Lab Tests: Ordered and Reviewed  The following lab test(s) were unremarkable: CBC and CMP  Radiological Study: Ordered and Reviewed  ED Management:  Patient was given IV fluids IV PPI.  Patient had a large bloody dark bowel movement in the bathroom here.  Stool is clearly guaiac positive.  Patient was already arranged an outpatient basis for an upper/lower scope by Dr Crooks.   Patient will be admitted to the hospital for serial CBCs and arrange for endoscopy. I spoke with Dr Escoto, who has put her on the schedule for an upper endoscopy in the morning.  Dr. Holland, the interventional cardiologist, on-call for Dr. Andujar, agreed that since patient has no abdominal pain, she does not need to  be admitted to their services there is no intervention needed.  She is nothing by mouth.                   ED Course     Clinical Impression:   The primary encounter diagnosis was Mesenteric angina. A diagnosis of Gastrointestinal hemorrhage, unspecified gastrointestinal hemorrhage type was also pertinent to this visit.          Uma Ward MD  04/23/17 2067

## 2017-04-24 NOTE — ED NOTES
Pt informed of the delay; pt again instructed is npo and cannot eat or drink per orders; she had asked ed tech for water po; pt has protonix drip in progress and will continue to the floor; pt is sitting in chair in room

## 2017-04-24 NOTE — ED NOTES
Spoke with hospitalist Dr. Escalante team would like pt to receive Protonix drip 40 mg in addition to 40 mg Protonix given IVP earlier

## 2017-04-25 ENCOUNTER — TELEPHONE (OUTPATIENT)
Dept: PRIMARY CARE CLINIC | Facility: CLINIC | Age: 77
End: 2017-04-25

## 2017-04-25 ENCOUNTER — TELEPHONE (OUTPATIENT)
Dept: NEUROLOGY | Facility: HOSPITAL | Age: 77
End: 2017-04-25

## 2017-04-25 DIAGNOSIS — E03.4 HYPOTHYROIDISM DUE TO ACQUIRED ATROPHY OF THYROID: ICD-10-CM

## 2017-04-25 LAB
POC ACTIVATED CLOTTING TIME K: 219 SEC (ref 74–137)
POC ACTIVATED CLOTTING TIME K: 240 SEC (ref 74–137)
POC ACTIVATED CLOTTING TIME K: 250 SEC (ref 74–137)
POC ACTIVATED CLOTTING TIME K: 255 SEC (ref 74–137)
SAMPLE: ABNORMAL

## 2017-04-25 NOTE — TELEPHONE ENCOUNTER
Rec'd VM msg from dtr Tiki Lemons to r/s appt to the next avail. Tues. Call placed to dtr, and appt r/s'd. Denied further needs.

## 2017-04-25 NOTE — TELEPHONE ENCOUNTER
----- Message from Marilee Metcalf LPN sent at 4/25/2017  9:04 AM CDT -----  Cas Villatoro,   Can you please contact this pt to schedule CT virtual colonoscopy? It needs to be done within a month, before coming back to clinic, which is already scheduled. There is already an order in epic, was ordered early March.     Thanks!  Marilee

## 2017-04-25 NOTE — TELEPHONE ENCOUNTER
----- Message from Cecil Crooks MD sent at 4/24/2017  4:28 PM CDT -----  Patient will need CT Colonography in upcoming several weeks to one month. She has an appointment scheduled in clinic but will need the CT Colonography before seeing us again in clinic.

## 2017-04-25 NOTE — DISCHARGE SUMMARY
[facility] Medicine Discharge Summary    Primary Team: [facility] Hospitalist Team B  Attending Physician: Dr. Clifton Ramirez  Resident: Navya  Intern: Masoud    Date of Admit: 4/23/2017  Date of Discharge: 4/25/2017    Discharge to: home/self-care  Condition: good    Discharge Diagnoses     Patient Active Problem List   Diagnosis    Stroke    Carotid stenosis    Hypothyroid    Hypertension    Anxiety    Incidental pulmonary nodule, > 3mm and < 8mm    COPD (chronic obstructive pulmonary disease)    HLD (hyperlipidemia)    Chronic kidney disease, stage III (moderate)    Leg pain, bilateral    PVD (peripheral vascular disease) with claudication    Glucose intolerance (impaired glucose tolerance)    Subclavian artery stenosis, left    CVD (cerebrovascular disease)    Abdominal pain    Gastritis    Angina mesenteric    Tobacco abuse    Coronary artery disease involving native coronary artery of native heart without angina pectoris    Mesenteric angina    GIB (gastrointestinal bleeding)     Consultants and Procedures     Consultants:  Gastroenterology    Procedures:   none    Brief History of Present Illness      Brittany Haile is a 76 y.o. female who  has a past medical history of Chronic kidney disease, stage III (moderate) (8/19/2015); COPD (chronic obstructive pulmonary disease); COPD (chronic obstructive pulmonary disease); CVA (cerebral infarction); GERD (gastroesophageal reflux disease); Hyperlipidemia; Hypertension; PVD (peripheral vascular disease) with claudication (10/13/2015); Stroke; and Thyroid disease. The patient presented to [facility] on 4/23/2017 with a primary complaint of Melena (States having black stools x4 episodes today, recently discharged on Wednesday for mesenteric stents).    For the full HPI please refer to the History & Physical from this admission.    Hospital Course By Problem with Pertinent Findings     Chronic Mesenteric Ischemia with stable GI bleed  s/p aortogram/stents  - Discharged 3/20/17 with interventional cardiology with follow up with Dr. Crooks for continued eval of abdominal pain and GIB. Then admitted on 4/17 found to have significant abdominal atherosclerosis and chronic mesenteric ischemia. Discharged 3 days ago from Cardiology, patient with maroon colored stools very similar to presentation, however patient discharged with stable H/H. This presentation likely represents the same process, however patient admitted for further GI eval.  - Per family, 5 episodes of dark red bloody BM s/p MgOx after discharge, intermittent abdominal pain.  - H/H on admit 10/32, increased since discharge and increased to 11.4 on 4/24.  - 3/17 EGD with scattered erosive gastropathy of antrum  3/2017 C-scope with diverticulosis in sigmoid colon   - CT abd  w/liquid stool within the large bowel, a nonspecific finding that can be associated with diarrheal illness  - Held ASA/Plavix for concern for GIB.  Started Protonix gtt and patient was made NPO, consulted [facility]. GI felt her stool either held old blood from previous bleed as her blood counts increased since discharge or that it was an significant bleed.  GI approved patient discharge with close follow up.      CAD  - Held ASA and plavix in setting of questionable GI bleed. Restarted on discharge. Continued atorvastatin       H/o CVA with carotid stenosis   - No acute issues  Continued home atorvastatin       CKDIII  - Cr 1.0 on admit  baseline 0.7-1.0  - Medications were renally dosed and nephrotoxic meds were held.      COPD  - Patient consistently stable on RA  Breo, albuterol prn      HTN  - Continued home Norvasc, olmesartan as patient was hemodynamically stable with improved H/H from discharge and there was minimal concern for active GIB.      HLD  - 11/2016 lipid panel: chol 256,   continue atorvastatin       Hypothyroidism  - continued home synthroid without issues      Anxiety   - continued home  escitalopram without issues      Pulmonary Nodules  - CT Chest 6/2014 with 3 stable noncalcified pulmonary nodules within the right lower lobe the largest of which measures 0.7 cm as seen on previous CT dated 12/16/2013  - will need follow up CTs with PCP      Tobacco Abuse  - 1ppd since 13 yo  counseled on cessation     Discharge Medications        Medication List      CONTINUE taking these medications          albuterol 90 mcg/actuation inhaler   Commonly known as:  PROVENTIL HFA   Inhale 2 puffs into the lungs every 4 to 6 hours as needed.       amlodipine 5 MG tablet   Commonly known as:  NORVASC   Take 2 tablets (10 mg total) by mouth once daily.       aspirin 81 MG EC tablet   Commonly known as:  ECOTRIN   Take 1 tablet (81 mg total) by mouth once daily.       atorvastatin 40 MG tablet   Commonly known as:  LIPITOR   Take 1 tablet (40 mg total) by mouth once daily.       BENICAR 20 MG tablet   Generic drug:  olmesartan       chlordiazepoxide-clidinium 5-2.5 mg 5-2.5 mg Cap   Commonly known as:  LIBRAX   Take 1 capsule by mouth every 8 (eight) hours as needed.       clopidogrel 75 mg tablet   Commonly known as:  PLAVIX   Take 1 tablet (75 mg total) by mouth once daily.       docusate sodium 100 MG capsule   Commonly known as:  COLACE   Take 1 capsule (100 mg total) by mouth 2 (two) times daily as needed for Constipation.       DUREZOL 0.05 % Drop ophthalmic solution   Generic drug:  difluprednate       escitalopram oxalate 10 MG tablet   Commonly known as:  LEXAPRO   Take 1 tablet (10 mg total) by mouth once daily.       ferrous sulfate 325 mg (65 mg iron) Tab tablet       fluticasone-salmeterol 250-50 mcg/dose 250-50 mcg/dose diskus inhaler   Commonly known as:  ADVAIR   Inhale 1 puff into the lungs 2 (two) times daily.       hydrocodone-acetaminophen 5-325mg 5-325 mg per tablet   Commonly known as:  NORCO   Take 1 tablet by mouth every 8 (eight) hours as needed for Pain.       levothyroxine 88 MCG tablet    Commonly known as:  SYNTHROID   TAKE 1 TABLET BY MOUTH DAILY BEFORE BREAKFAST       meclizine 25 mg tablet   Commonly known as:  ANTIVERT   Take 1 tablet (25 mg total) by mouth every 6 (six) hours as needed for Dizziness.       NEXIUM 40 MG capsule   Generic drug:  esomeprazole   Take 1 capsule (40 mg total) by mouth once daily.       tiotropium 18 mcg inhalation capsule   Commonly known as:  SPIRIVA WITH HANDIHALER   Inhale 1 capsule (18 mcg total) into the lungs once daily.           Discharge Information:   Diet:  Cardiac, low cholesterol, low Na    Physical Activity:  As tolerated    Instructions:  1. Take all medications as prescribed  2. Keep all follow-up appointments  3. Return to the hospital or call your primary care physicians if any worsening symptoms such as large bloody or melanotic bowel movements occur.    Follow-Up Appointments:  Future Appointments  Date Time Provider Department Center   5/1/2017 11:00 AM Carol Robertson MD [facility] SULEMAN [facility]   5/4/2017 8:45 AM CARDIOLOGY, STRESS/TILT TABLE/JUAN CARLOS [facility] CARD [facility]   5/4/2017 9:45 AM [address] [facility]   5/4/2017 10:45 AM [address] [facility]   5/4/2017 11:30 AM [address] [facility]   5/8/2017 11:00 AM Anthony Brooks MD [facility] CARDIO [facility]   5/15/2017 2:00 PM Cecil Crooks MD [facility] TUMOR [facility]   5/17/2017 2:20 PM Wayne Frazier MD [facility] MED [facility]     Ari Mendosa  [facility] Internal Medicine, [facility]

## 2017-04-25 NOTE — TELEPHONE ENCOUNTER
Marilee I had to schedule patient for [place] because the tech down in ct [place]. Stated that we dont do those here unfortunatley. I did however explained that to patient.

## 2017-04-26 RX ORDER — LEVOTHYROXINE SODIUM 88 UG/1
TABLET ORAL
Qty: 90 TABLET | Refills: 0 | Status: SHIPPED | OUTPATIENT
Start: 2017-04-26 | End: 2017-06-14 | Stop reason: SDUPTHER

## 2017-05-02 DIAGNOSIS — D50.9 IRON DEFICIENCY ANEMIA: ICD-10-CM

## 2017-05-02 RX ORDER — FERROUS SULFATE 325(65) MG
TABLET ORAL
Qty: 180 TABLET | Refills: 0 | Status: SHIPPED | OUTPATIENT
Start: 2017-05-02 | End: 2017-05-23

## 2017-05-03 RX ORDER — FERROUS SULFATE 325(65) MG
325 TABLET ORAL 2 TIMES DAILY
Refills: 3 | COMMUNITY
Start: 2017-05-03 | End: 2019-01-31

## 2017-05-04 ENCOUNTER — TELEPHONE (OUTPATIENT)
Dept: NEUROLOGY | Facility: HOSPITAL | Age: 77
End: 2017-05-04

## 2017-05-04 NOTE — TELEPHONE ENCOUNTER
----- Message from Amandeep Cruz sent at 4/27/2017  1:39 PM CDT -----  Please call patient and have them  VIRTUAL COLONOSCOPY Prep for CT Scan scheduled for  5/9/2017@ 1:20 PM

## 2017-05-08 ENCOUNTER — TELEPHONE (OUTPATIENT)
Dept: RADIOLOGY | Facility: HOSPITAL | Age: 77
End: 2017-05-08

## 2017-05-09 ENCOUNTER — LAB VISIT (OUTPATIENT)
Dept: LAB | Facility: HOSPITAL | Age: 77
End: 2017-05-09
Attending: INTERNAL MEDICINE
Payer: MEDICARE

## 2017-05-09 ENCOUNTER — OFFICE VISIT (OUTPATIENT)
Dept: PRIMARY CARE CLINIC | Facility: CLINIC | Age: 77
End: 2017-05-09
Payer: MEDICARE

## 2017-05-09 ENCOUNTER — TELEPHONE (OUTPATIENT)
Dept: FAMILY MEDICINE | Facility: CLINIC | Age: 77
End: 2017-05-09

## 2017-05-09 VITALS
WEIGHT: 135.38 LBS | OXYGEN SATURATION: 95 % | SYSTOLIC BLOOD PRESSURE: 105 MMHG | BODY MASS INDEX: 25.56 KG/M2 | HEART RATE: 92 BPM | DIASTOLIC BLOOD PRESSURE: 43 MMHG | HEIGHT: 61 IN | TEMPERATURE: 97 F

## 2017-05-09 DIAGNOSIS — K92.1 GASTROINTESTINAL HEMORRHAGE WITH MELENA: ICD-10-CM

## 2017-05-09 DIAGNOSIS — E03.9 HYPOTHYROIDISM, UNSPECIFIED TYPE: ICD-10-CM

## 2017-05-09 DIAGNOSIS — I10 ESSENTIAL HYPERTENSION: ICD-10-CM

## 2017-05-09 DIAGNOSIS — Z72.0 TOBACCO ABUSE: ICD-10-CM

## 2017-05-09 DIAGNOSIS — K55.1 ANGINA MESENTERIC: Primary | ICD-10-CM

## 2017-05-09 DIAGNOSIS — K59.00 CONSTIPATION, UNSPECIFIED CONSTIPATION TYPE: ICD-10-CM

## 2017-05-09 DIAGNOSIS — N18.30 CHRONIC KIDNEY DISEASE, STAGE III (MODERATE): ICD-10-CM

## 2017-05-09 DIAGNOSIS — K55.1 ANGINA MESENTERIC: ICD-10-CM

## 2017-05-09 DIAGNOSIS — R91.1 PULMONARY NODULE: ICD-10-CM

## 2017-05-09 DIAGNOSIS — E78.2 MIXED HYPERLIPIDEMIA: ICD-10-CM

## 2017-05-09 LAB
ANION GAP SERPL CALC-SCNC: 9 MMOL/L
BASOPHILS # BLD AUTO: 0.03 K/UL
BASOPHILS NFR BLD: 0.5 %
BUN SERPL-MCNC: 19 MG/DL
CALCIUM SERPL-MCNC: 9.4 MG/DL
CHLORIDE SERPL-SCNC: 100 MMOL/L
CO2 SERPL-SCNC: 29 MMOL/L
CREAT SERPL-MCNC: 1.2 MG/DL
DIFFERENTIAL METHOD: ABNORMAL
EOSINOPHIL # BLD AUTO: 0.3 K/UL
EOSINOPHIL NFR BLD: 3.9 %
ERYTHROCYTE [DISTWIDTH] IN BLOOD BY AUTOMATED COUNT: 13.7 %
EST. GFR  (AFRICAN AMERICAN): 50 ML/MIN/1.73 M^2
EST. GFR  (NON AFRICAN AMERICAN): 43 ML/MIN/1.73 M^2
GLUCOSE SERPL-MCNC: 97 MG/DL
HCT VFR BLD AUTO: 37.4 %
HGB BLD-MCNC: 12 G/DL
LYMPHOCYTES # BLD AUTO: 1.7 K/UL
LYMPHOCYTES NFR BLD: 26.8 %
MCH RBC QN AUTO: 30.7 PG
MCHC RBC AUTO-ENTMCNC: 32.1 %
MCV RBC AUTO: 96 FL
MONOCYTES # BLD AUTO: 0.4 K/UL
MONOCYTES NFR BLD: 6.4 %
NEUTROPHILS # BLD AUTO: 4 K/UL
NEUTROPHILS NFR BLD: 62.2 %
PLATELET # BLD AUTO: 297 K/UL
PMV BLD AUTO: 8.8 FL
POTASSIUM SERPL-SCNC: 4.6 MMOL/L
RBC # BLD AUTO: 3.91 M/UL
SODIUM SERPL-SCNC: 138 MMOL/L
WBC # BLD AUTO: 6.38 K/UL

## 2017-05-09 PROCEDURE — 1159F MED LIST DOCD IN RCRD: CPT | Mod: S$GLB,,, | Performed by: INTERNAL MEDICINE

## 2017-05-09 PROCEDURE — 1157F ADVNC CARE PLAN IN RCRD: CPT | Mod: S$GLB,,, | Performed by: INTERNAL MEDICINE

## 2017-05-09 PROCEDURE — 99499 UNLISTED E&M SERVICE: CPT | Mod: S$GLB,,, | Performed by: INTERNAL MEDICINE

## 2017-05-09 PROCEDURE — 3078F DIAST BP <80 MM HG: CPT | Mod: S$GLB,,, | Performed by: INTERNAL MEDICINE

## 2017-05-09 PROCEDURE — 3074F SYST BP LT 130 MM HG: CPT | Mod: S$GLB,,, | Performed by: INTERNAL MEDICINE

## 2017-05-09 PROCEDURE — 1160F RVW MEDS BY RX/DR IN RCRD: CPT | Mod: S$GLB,,, | Performed by: INTERNAL MEDICINE

## 2017-05-09 PROCEDURE — 99999 PR PBB SHADOW E&M-EST. PATIENT-LVL IV: CPT | Mod: PBBFAC,,, | Performed by: INTERNAL MEDICINE

## 2017-05-09 PROCEDURE — 1126F AMNT PAIN NOTED NONE PRSNT: CPT | Mod: S$GLB,,, | Performed by: INTERNAL MEDICINE

## 2017-05-09 PROCEDURE — 99205 OFFICE O/P NEW HI 60 MIN: CPT | Mod: S$GLB,,, | Performed by: INTERNAL MEDICINE

## 2017-05-09 PROCEDURE — 80048 BASIC METABOLIC PNL TOTAL CA: CPT

## 2017-05-09 PROCEDURE — 85025 COMPLETE CBC W/AUTO DIFF WBC: CPT

## 2017-05-09 PROCEDURE — 36415 COLL VENOUS BLD VENIPUNCTURE: CPT

## 2017-05-09 RX ORDER — AMOXICILLIN 250 MG
1 CAPSULE ORAL DAILY
COMMUNITY
Start: 2017-05-09 | End: 2017-05-23

## 2017-05-09 NOTE — MR AVS SNAPSHOT
[facility]  [address]  [place] [address]  Phone: [phone]  Fax: [phone]                  Brittanylyndsay Haile   2017 11:00 AM   Office Visit    Descripción:  Female : 1940   Personal Médico:  Carol Robertson MD   Departamento:  [facility]           Diagnósticos de Esta Visita        Comentarios    Angina mesenteric    -  Primario     Gastrointestinal hemorrhage with melena         Tobacco abuse         Chronic kidney disease, stage III (moderate)         Mixed hyperlipidemia         Essential hypertension         Hypothyroidism, unspecified type         Pulmonary nodule         Constipation, unspecified constipation type                Lista de tareas           Citas próximas        Personal Médico Departamento Tfno del dpto    2017 12:45 PM CARDIOLOGY, STRESS/TILT TABLE/JUAN CARLOS [facility] [phone]    2017 1:45 PM [facility] US[facility] [phone]    2017 2:30 PM [facility] US[facility] [phone]    2017 9:00 AM [facility] CT6 MOBILE LIMIT [address] [facility] [phone]    2017 2:40 PM Wayne Frazier MD [facility] Family Medicine [phone]      Metas (5 Years of Data)     Ninguna      COMPRA estos Medicamentos (No requiere prescripción)        Start End    senna-docusate 8.6-50 mg (PERICOLACE) 8.6-50 mg per tablet 2017     Sig - vía: Take 1 tablet by mouth once daily. - Oral    Categoría: OTC      [facility]     [facility] Enfermeras - Asistencia   Enfermeras registradas de [facility] pueden ayudarle a reservar keegan jono, proveer educación para la martinez, asesoría clínica, y otros servicios de asesoramiento.   Llame para preethi servicio gratuito a [phone].             Medicamentos           Mensaje sobre Medicamentos     Verificar los cambios y / o adiciones a merritt régimen de medicación son los mismos  que discutir con merritt médico. Si cualquiera de estos cambios o adiciones son incorrectos, por favor notifique a merritt proveedor de atención médica.        EMPEZAR a janet estos medicamentos NUEVOS        Refills    senna-docusate 8.6-50 mg (PERICOLACE) 8.6-50 mg per tablet     Sig: Take 1 tablet by mouth once daily.    Categoría: OTC    Vía: Oral      DEJAR de janet estos medicamentos     docusate sodium (COLACE) 100 MG capsule Take 1 capsule (100 mg total) by mouth 2 (two) times daily as needed for Constipation.    amlodipine (NORVASC) 5 MG tablet Take 2 tablets (10 mg total) by mouth once daily.           Verifique que la siguiente lista de medicamentos es keegan representación exacta de los medicamentos que está tomando actualmente. Si no hay ningunos reportados, la lista puede estar en acevedo. Si no es correcta, por favor póngase en contacto con merritt proveedor de atención médica. Lleve esta lista con usted en rafaela de emergencia.           Medicamentos Actuales     albuterol (PROVENTIL HFA) 90 mcg/actuation inhaler Inhale 2 puffs into the lungs every 4 to 6 hours as needed.    aspirin (ECOTRIN) 81 MG EC tablet Take 1 tablet (81 mg total) by mouth once daily.    atorvastatin (LIPITOR) 40 MG tablet Take 1 tablet (40 mg total) by mouth once daily.    BENICAR 20 mg tablet Take 20 mg by mouth once daily at 6am.    chlordiazepoxide-clidinium 5-2.5 mg (LIBRAX) 5-2.5 mg Cap Take 1 capsule by mouth every 8 (eight) hours as needed.    clopidogrel (PLAVIX) 75 mg tablet Take 1 tablet (75 mg total) by mouth once daily.    DUREZOL 0.05 % Drop ophthalmic solution     escitalopram oxalate (LEXAPRO) 10 MG tablet Take 1 tablet (10 mg total) by mouth once daily.    ferrous sulfate 325 mg (65 mg iron) Tab tablet TAKE 1 TABLET BY MOUTH TWICE DAILY    ferrous sulfate 325 mg (65 mg iron) Tab tablet Take 1 tablet (325 mg total) by mouth 2 (two) times daily.    fluticasone-salmeterol 250-50 mcg/dose (ADVAIR) 250-50 mcg/dose diskus inhaler Inhale 1  "puff into the lungs 2 (two) times daily.    hydrocodone-acetaminophen 5-325mg (NORCO) 5-325 mg per tablet Take 1 tablet by mouth every 8 (eight) hours as needed for Pain.    levothyroxine (SYNTHROID) 88 MCG tablet TAKE 1 TABLET BY MOUTH DAILY BEFORE BREAKFAST    levothyroxine (SYNTHROID) 88 MCG tablet TAKE 1 TABLET BY MOUTH DAILY BEFORE BREAKFAST    meclizine (ANTIVERT) 25 mg tablet Take 1 tablet (25 mg total) by mouth every 6 (six) hours as needed for Dizziness.    NEXIUM 40 mg capsule Take 1 capsule (40 mg total) by mouth once daily.    senna-docusate 8.6-50 mg (PERICOLACE) 8.6-50 mg per tablet Take 1 tablet by mouth once daily.    tiotropium (SPIRIVA WITH HANDIHALER) 18 mcg inhalation capsule Inhale 1 capsule (18 mcg total) into the lungs once daily.           Información de referencia clínica           Shaniqua signos vitales yazmin     PS Pulso Temperatura [place] Peso SpO2    105/43 (BP Location: Right arm, Patient Position: Sitting) 92 97.2 °F (36.2 °C) (Oral) 5' 1" (1.549 m) 61.4 kg (135 lb 5.8 oz) 95%    BMI (IMC)                   25.58 kg/m2           Blood Pressure          Most Recent Value    BP  (!)  105/43      Alergias     A partir del:  5/9/2017        No Known Allergies      Vacunas     Administradas en la fecha de la visita:  5/9/2017        None      Orders Placed During Today's Visit     Exámenes/Procedimientos futuros Se espera el Vence    Basic metabolic panel  5/8/2017 7/7/2018    CBC auto differential  5/8/2017 7/7/2018      Smoking Cessation     Si desea dejar de fumar:  · Usted puede ser elegible para recibir servicios gratuitos si usted es un residente de [place] y comenzó a fumar cigarrillos antes del 1 de septiembre de 1988. Llame al Smoking Cessation Trust (SCT) a [phone] o [phone].   Llame 1-800-QUIT-NOW si usted no cumple con los criterios anteriores.   Póngase en contacto con nosotros por correo electrónico: [email or web address]   Visite nuestro sitio web para obtener " más información: [email or web address]            Language Assistance Services     ATTENTION: Language assistance services are available, free of charge. Please call [phone].      ATENCIÓN: Si habla estuardo, tiene a merritt disposición servicios gratuitos de asistencia lingüística. Llame al [phone].     CHÚ Ý: N?u b?n nói Ti?ng Vi?t, có các d?ch v? h? tr? ngôn ng? mi?n phí dành cho b?n. G?i s? [phone].         [place] [facility] cumple con las leyes federales aplicables de derechos civiles y no discrimina por motivos de edilberto, color, origen nacional, edad, discapacidad, o sexo.                 Brittany Haile   2017 11:00 AM   Office Visit    Description:  Female : 1940   Provider:  Carol Robertson MD   Department:  [place] [facility]           Diagnoses this Visit        Comments    Angina mesenteric    -  Primary     Gastrointestinal hemorrhage with melena         Tobacco abuse         Chronic kidney disease, stage III (moderate)         Mixed hyperlipidemia         Essential hypertension         Hypothyroidism, unspecified type         Pulmonary nodule         Constipation, unspecified constipation type                To Do List           Future Appointments        Provider Department Dept Phone    2017 12:45 PM CARDIOLOGY, STRESS/TILT TABLE/JUAN CARLOS [facility] [phone]    2017 1:45 PM [facility] US[facility] [phone]    2017 2:30 PM [facility] US2 [facility] [phone]    2017 9:00 AM [facility] CT6 MOBILE LIMIT [address] [facility] [phone]    2017 2:40 PM Wayne Frazier MD [facility] Family Medicine [phone]      Goals     None      PURCHASE these Medications (No prescription required)        Start End    senna-docusate 8.6-50 mg (PERICOLACE) 8.6-50 mg per tablet 2017     Sig - Route: Take 1 tablet by mouth once daily. - Oral    Class: OTC       [facility]     [facility] Nurse Care Line - 24/7 Assistance  Unless otherwise directed by your provider, please contact [facility], our nurse care line that is available for 24/7 assistance.     Registered nurses in the [facility] provide: appointment scheduling, clinical advisement, health education, and other advisory services.  Call: [phone] (toll free)               Medications           Message regarding Medications     Verify the changes and/or additions to your medication regime listed below are the same as discussed with your clinician today.  If any of these changes or additions are incorrect, please notify your healthcare provider.        START taking these NEW medications        Refills    senna-docusate 8.6-50 mg (PERICOLACE) 8.6-50 mg per tablet     Sig: Take 1 tablet by mouth once daily.    Class: OTC    Route: Oral      STOP taking these medications     docusate sodium (COLACE) 100 MG capsule Take 1 capsule (100 mg total) by mouth 2 (two) times daily as needed for Constipation.    amlodipine (NORVASC) 5 MG tablet Take 2 tablets (10 mg total) by mouth once daily.           Verify that the below list of medications is an accurate representation of the medications you are currently taking.  If none reported, the list may be blank. If incorrect, please contact your healthcare provider. Carry this list with you in case of emergency.           Current Medications     albuterol (PROVENTIL HFA) 90 mcg/actuation inhaler Inhale 2 puffs into the lungs every 4 to 6 hours as needed.    aspirin (ECOTRIN) 81 MG EC tablet Take 1 tablet (81 mg total) by mouth once daily.    atorvastatin (LIPITOR) 40 MG tablet Take 1 tablet (40 mg total) by mouth once daily.    BENICAR 20 mg tablet Take 20 mg by mouth once daily at 6am.    chlordiazepoxide-clidinium 5-2.5 mg (LIBRAX) 5-2.5 mg Cap Take 1 capsule by mouth every 8 (eight) hours as needed.    clopidogrel (PLAVIX) 75 mg tablet Take 1  "tablet (75 mg total) by mouth once daily.    DUREZOL 0.05 % Drop ophthalmic solution     escitalopram oxalate (LEXAPRO) 10 MG tablet Take 1 tablet (10 mg total) by mouth once daily.    ferrous sulfate 325 mg (65 mg iron) Tab tablet TAKE 1 TABLET BY MOUTH TWICE DAILY    ferrous sulfate 325 mg (65 mg iron) Tab tablet Take 1 tablet (325 mg total) by mouth 2 (two) times daily.    fluticasone-salmeterol 250-50 mcg/dose (ADVAIR) 250-50 mcg/dose diskus inhaler Inhale 1 puff into the lungs 2 (two) times daily.    hydrocodone-acetaminophen 5-325mg (NORCO) 5-325 mg per tablet Take 1 tablet by mouth every 8 (eight) hours as needed for Pain.    levothyroxine (SYNTHROID) 88 MCG tablet TAKE 1 TABLET BY MOUTH DAILY BEFORE BREAKFAST    levothyroxine (SYNTHROID) 88 MCG tablet TAKE 1 TABLET BY MOUTH DAILY BEFORE BREAKFAST    meclizine (ANTIVERT) 25 mg tablet Take 1 tablet (25 mg total) by mouth every 6 (six) hours as needed for Dizziness.    NEXIUM 40 mg capsule Take 1 capsule (40 mg total) by mouth once daily.    senna-docusate 8.6-50 mg (PERICOLACE) 8.6-50 mg per tablet Take 1 tablet by mouth once daily.    tiotropium (SPIRIVA WITH HANDIHALER) 18 mcg inhalation capsule Inhale 1 capsule (18 mcg total) into the lungs once daily.           Clinical Reference Information           Your Vitals Were     BP Pulse Temp Height Weight SpO2    105/43 (BP Location: Right arm, Patient Position: Sitting) 92 97.2 °F (36.2 °C) (Oral) 5' 1" (1.549 m) 61.4 kg (135 lb 5.8 oz) 95%    BMI                   25.58 kg/m2           Blood Pressure          Most Recent Value    BP  (!)  105/43      Allergies as of 5/9/2017     No Known Allergies      Immunizations Administered on Date of Encounter - 5/9/2017     None      Orders Placed During Today's Visit     Future Labs/Procedures Expected by Expires    Basic metabolic panel  5/8/2017 7/7/2018    CBC auto differential  5/8/2017 7/7/2018      Smoking Cessation     If you would like to quit smoking:   You " may be eligible for free services if you are a [place] resident and started smoking cigarettes before September 1, 1988.  Call the Smoking Cessation Trust ([facility]) toll free at ([phone] or [phone].   Call 1-800-QUIT-NOW if you do not meet the above criteria.   Contact us via email: [email or web address]   View our website for more information: [email or web address]        Language Assistance Services     ATTENTION: Language assistance services are available, free of charge. Please call [phone].      ATENCIÓN: Si habla español, tiene a merritt disposición servicios gratuitos de asistencia lingüística. Llame al [phone].     CHÚ Ý: N?u b?n nói Ti?ng Vi?t, có các d?ch v? h? tr? ngôn ng? mi?n phí dành cho b?n. G?i s? [phone].         [place] [facility] complies with applicable Federal civil rights laws and does not discriminate on the basis of race, color, national origin, age, disability, or sex.

## 2017-05-09 NOTE — TELEPHONE ENCOUNTER
Spoke to pt's daughter, Tiki and informed her according to pt's last clinical note from 4/7. Pt should be taking Lexapro 10mg daily.

## 2017-05-09 NOTE — PROGRESS NOTES
[facility]  New Visit Progress Note   Recent Hospital Discharge     PRESENTING HISTORY     Chief Complaint/Reason for Admission:  Follow up Hospital Discharge   No chief complaint on file.    PCP: Wayne Frazier MD    History of Present Illness:  Ms. Brittany Haile is a 76 y.o. female who was recently admitted to the hospital.  Discharge Summaries  Ari Mendosa MD   [facility] Medicine   Cosigned by: Clifton Ramirez MD at 4/29/2017 11:22 AM      []Hide copied text  [facility] Internal Medicine Discharge Summary     Primary Team: [facility] Hospitalist Team B  Attending Physician: Dr. Clifton Ramirez  Resident: Navya  Intern: Masoud     Date of Admit: 4/23/2017  Date of Discharge: 4/25/2017     Discharge to: home/self-care  Condition: good     Discharge Diagnoses          Patient Active Problem List   Diagnosis    Stroke    Carotid stenosis    Hypothyroid    Hypertension    Anxiety    Incidental pulmonary nodule, > 3mm and < 8mm    COPD (chronic obstructive pulmonary disease)    HLD (hyperlipidemia)    Chronic kidney disease, stage III (moderate)    Leg pain, bilateral    PVD (peripheral vascular disease) with claudication    Glucose intolerance (impaired glucose tolerance)    Subclavian artery stenosis, left    CVD (cerebrovascular disease)    Abdominal pain    Gastritis    Angina mesenteric    Tobacco abuse    Coronary artery disease involving native coronary artery of native heart without angina pectoris    Mesenteric angina    GIB (gastrointestinal bleeding)      Consultants and Procedures      Consultants:  Gastroenterology     Procedures:   none     Brief History of Present Illness       Brittany Haile is a 76 y.o. female who has a past medical history of Chronic kidney disease, stage III (moderate) (8/19/2015); COPD (chronic obstructive pulmonary disease); COPD (chronic obstructive pulmonary disease); CVA (cerebral infarction); GERD (gastroesophageal reflux disease); Hyperlipidemia;  Hypertension; PVD (peripheral vascular disease) with claudication (10/13/2015); Stroke; and Thyroid disease. The patient presented to [facility] on 4/23/2017 with a primary complaint of Melena (States having black stools x4 episodes today, recently discharged on Wednesday for mesenteric stents).     For the full HPI please refer to the History & Physical from this admission.     Hospital Course By Problem with Pertinent Findings      Chronic Mesenteric Ischemia with stable GI bleed s/p aortogram/stents  - Discharged 3/20/17 with interventional cardiology with follow up with Dr. Crooks for continued eval of abdominal pain and GIB. Then admitted on 4/17 found to have significant abdominal atherosclerosis and chronic mesenteric ischemia. Discharged 3 days ago from Cardiology, patient with maroon colored stools very similar to presentation, however patient discharged with stable H/H. This presentation likely represents the same process, however patient admitted for further GI eval.  - Per family, 5 episodes of dark red bloody BM s/p MgOx after discharge, intermittent abdominal pain.  - H/H on admit 10/32, increased since discharge and increased to 11.4 on 4/24.  - 3/17 EGD with scattered erosive gastropathy of antrum  3/2017 C-scope with diverticulosis in sigmoid colon   - CT abd  w/liquid stool within the large bowel, a nonspecific finding that can be associated with diarrheal illness  - Held ASA/Plavix for concern for GIB. Started Protonix gtt and patient was made NPO, consulted [facility]. GI felt her stool either held old blood from previous bleed as her blood counts increased since discharge or that it was an significant bleed. GI approved patient discharge with close follow up.      CAD  - Held ASA and plavix in setting of questionable GI bleed. Restarted on discharge. Continued atorvastatin       H/o CVA with carotid stenosis   - No acute issues  Continued home atorvastatin       CKDIII  - Cr 1.0  on admit  baseline 0.7-1.0  - Medications were renally dosed and nephrotoxic meds were held.      COPD  - Patient consistently stable on RA  Breo, albuterol prn      HTN  - Continued home Norvasc, olmesartan as patient was hemodynamically stable with improved H/H from discharge and there was minimal concern for active GIB.      HLD  - 11/2016 lipid panel: chol 256,   continue atorvastatin       Hypothyroidism  - continued home synthroid without issues      Anxiety   - continued home escitalopram without issues      Pulmonary Nodules  - CT Chest 6/2014 with 3 stable noncalcified pulmonary nodules within the right lower lobe the largest of which measures 0.7 cm as seen on previous CT dated 12/16/2013  - will need follow up CTs with PCP      Tobacco Abuse  - 1ppd since 13 yo  counseled on cessation      Discharge Medications          Medication List       CONTINUE taking these medications            albuterol 90 mcg/actuation inhaler   Commonly known as: PROVENTIL HFA   Inhale 2 puffs into the lungs every 4 to 6 hours as needed.         amlodipine 5 MG tablet   Commonly known as: NORVASC   Take 2 tablets (10 mg total) by mouth once daily.         aspirin 81 MG EC tablet   Commonly known as: ECOTRIN   Take 1 tablet (81 mg total) by mouth once daily.         atorvastatin 40 MG tablet   Commonly known as: LIPITOR   Take 1 tablet (40 mg total) by mouth once daily.         BENICAR 20 MG tablet   Generic drug: olmesartan         chlordiazepoxide-clidinium 5-2.5 mg 5-2.5 mg Cap   Commonly known as: LIBRAX   Take 1 capsule by mouth every 8 (eight) hours as needed.         clopidogrel 75 mg tablet   Commonly known as: PLAVIX   Take 1 tablet (75 mg total) by mouth once daily.         docusate sodium 100 MG capsule   Commonly known as: COLACE   Take 1 capsule (100 mg total) by mouth 2 (two) times daily as needed for Constipation.         DUREZOL 0.05 % Drop ophthalmic solution   Generic drug: difluprednate          escitalopram oxalate 10 MG tablet   Commonly known as: LEXAPRO   Take 1 tablet (10 mg total) by mouth once daily.         ferrous sulfate 325 mg (65 mg iron) Tab tablet         fluticasone-salmeterol 250-50 mcg/dose 250-50 mcg/dose diskus inhaler   Commonly known as: ADVAIR   Inhale 1 puff into the lungs 2 (two) times daily.         hydrocodone-acetaminophen 5-325mg 5-325 mg per tablet   Commonly known as: NORCO   Take 1 tablet by mouth every 8 (eight) hours as needed for Pain.         levothyroxine 88 MCG tablet   Commonly known as: SYNTHROID   TAKE 1 TABLET BY MOUTH DAILY BEFORE BREAKFAST         meclizine 25 mg tablet   Commonly known as: ANTIVERT   Take 1 tablet (25 mg total) by mouth every 6 (six) hours as needed for Dizziness.         NEXIUM 40 MG capsule   Generic drug: esomeprazole   Take 1 capsule (40 mg total) by mouth once daily.         tiotropium 18 mcg inhalation capsule   Commonly known as: SPIRIVA WITH HANDIHALER   Inhale 1 capsule (18 mcg total) into the lungs once daily.             Discharge Information:   Diet:  Cardiac, low cholesterol, low Na     Physical Activity:  As tolerated     Instructions:  1. Take all medications as prescribed  2. Keep all follow-up appointments  3. Return to the hospital or call your primary care physicians if any worsening symptoms such as large bloody or melanotic bowel movements occur.     Follow-Up Appointments:  Future Appointments  Date Time Provider Department Center   5/1/2017 11:00 AM Carol Robertson MD [facility] IM SULEMAN [facility]   5/4/2017 8:45 AM CARDIOLOGY, STRESS/TILT TABLE/JUAN CARLOS [facility] CARD [facility]   5/4/2017 9:45 AM [address] USOUNDO [facility]   5/4/2017 10:45 AM [address] [facility]   5/4/2017 11:30 AM [address] [facility]   5/8/2017 11:00 AM Anthony Brooks MD [facility] CARDIO [facility]   5/15/2017 2:00 PM Cecil Crooks MD [facility] TUMOR [facility]   5/17/2017 2:20 PM Wayne Frazier MD [facility] MED [facility]       Ari Mendosa  [facility] Internal Medicine, HO-III                  ___________________________________________________________________    Today:  Presents to [facility] for Hospital FU.  Recently hospitalized for mesenteric ischemia.  Had EGD and colonoscopy by GI team.   Underwent intervention by Cardiology team.  Readmitted soon after for recurrent abdominal pain and melena.   During readmission was treated supportively, no further intervention performed.     Since hospital discharge is doing fairly well.  Chief complaint is fatigue and low blood pressure.  Tolerating meals though reports lack of appetite and early satiety.  Denies N/V, abdominal pain, diarrhea, blood in stool.  Having regular, formed, brown bowel movements.     Ambulatory and independent with ADL's.   Lives with her son.  Accompanied today by her daughter.   Not getting home health services at present- not in need of this service at this time.         Review of Systems  General ROS: negative for chills, fever or weight loss +excessive fatigue  Psychological ROS: negative for hallucination, depression or suicidal ideation  Ophthalmic ROS: negative for blurry vision, photophobia or eye pain  ENT ROS: negative for epistaxis, sore throat or rhinorrhea  Respiratory ROS: no cough, shortness of breath, or wheezing  Cardiovascular ROS: no chest pain or dyspnea on exertion  Gastrointestinal ROS: no abdominal pain, change in bowel habits, or black/ bloody stools  + early satiety + decreased appetite   Genito-Urinary ROS: no dysuria, trouble voiding, or hematuria  Musculoskeletal ROS: negative for gait disturbance   Neurological ROS: no syncope or seizures; no ataxia +generalized weakness  Dermatological ROS: negative for pruritis, rash and jaundice          PAST HISTORY:     Past Medical History:   Diagnosis Date    Abdominal pain     CHF (congestive heart failure)     Chronic kidney disease, stage III (moderate) 8/19/2015    COPD (chronic  obstructive pulmonary disease)     COPD (chronic obstructive pulmonary disease)     CVA (cerebral infarction)     Esophagitis     Gastritis     GERD (gastroesophageal reflux disease)     GI bleed     Hyperlipidemia     Hypertension     Mesenteric angina     PVD (peripheral vascular disease) with claudication 10/13/2015    Stroke     Subclavian artery stenosis, left     Thyroid disease        Past Surgical History:   Procedure Laterality Date     SECTION      COLONOSCOPY N/A 3/2/2017    Procedure: COLONOSCOPY;  Surgeon: Cecil Crooks MD;  Location: [facility];  Service: Endoscopy;  Laterality: N/A;    CORONARY ANGIOPLASTY      GALLBLADDER SURGERY      HYSTERECTOMY      OOPHORECTOMY         No family history on file.    Social History     Social History    Marital status:      Spouse name: N/A    Number of children: N/A    Years of education: N/A     Social History Main Topics    Smoking status: Current Every Day Smoker     Packs/day: 0.50     Years: 60.00     Types: Cigarettes    Smokeless tobacco: Never Used    Alcohol use No    Drug use: No    Sexual activity: Not Asked     Other Topics Concern    None     Social History Narrative       MEDICATIONS & ALLERGIES:     Current Outpatient Prescriptions on File Prior to Visit   Medication Sig Dispense Refill    albuterol (PROVENTIL HFA) 90 mcg/actuation inhaler Inhale 2 puffs into the lungs every 4 to 6 hours as needed. 8.5 g 6    amlodipine (NORVASC) 5 MG tablet Take 2 tablets (10 mg total) by mouth once daily. 180 tablet 3    aspirin (ECOTRIN) 81 MG EC tablet Take 1 tablet (81 mg total) by mouth once daily.  0    atorvastatin (LIPITOR) 40 MG tablet Take 1 tablet (40 mg total) by mouth once daily. 90 tablet 5    BENICAR 20 mg tablet TK 2 T PO D  1    chlordiazepoxide-clidinium 5-2.5 mg (LIBRAX) 5-2.5 mg Cap Take 1 capsule by mouth every 8 (eight) hours as needed. 50 capsule 2    clopidogrel (PLAVIX) 75 mg tablet  "Take 1 tablet (75 mg total) by mouth once daily. 90 tablet 3    docusate sodium (COLACE) 100 MG capsule Take 1 capsule (100 mg total) by mouth 2 (two) times daily as needed for Constipation. 90 capsule 3    DUREZOL 0.05 % Drop ophthalmic solution       escitalopram oxalate (LEXAPRO) 10 MG tablet Take 1 tablet (10 mg total) by mouth once daily. 30 tablet 2    ferrous sulfate 325 mg (65 mg iron) Tab tablet TAKE 1 TABLET BY MOUTH TWICE DAILY 180 tablet 0    ferrous sulfate 325 mg (65 mg iron) Tab tablet Take 1 tablet (325 mg total) by mouth 2 (two) times daily.  3    fluticasone-salmeterol 250-50 mcg/dose (ADVAIR) 250-50 mcg/dose diskus inhaler Inhale 1 puff into the lungs 2 (two) times daily. 60 each 6    hydrocodone-acetaminophen 5-325mg (NORCO) 5-325 mg per tablet Take 1 tablet by mouth every 8 (eight) hours as needed for Pain. 21 tablet 0    levothyroxine (SYNTHROID) 88 MCG tablet TAKE 1 TABLET BY MOUTH DAILY BEFORE BREAKFAST 90 tablet 0    levothyroxine (SYNTHROID) 88 MCG tablet TAKE 1 TABLET BY MOUTH DAILY BEFORE BREAKFAST 90 tablet 0    meclizine (ANTIVERT) 25 mg tablet Take 1 tablet (25 mg total) by mouth every 6 (six) hours as needed for Dizziness. 30 tablet 0    tiotropium (SPIRIVA WITH HANDIHALER) 18 mcg inhalation capsule Inhale 1 capsule (18 mcg total) into the lungs once daily. 90 capsule 3    NEXIUM 40 mg capsule Take 1 capsule (40 mg total) by mouth once daily. 90 capsule 3     No current facility-administered medications on file prior to visit.         Review of patient's allergies indicates:  No Known Allergies    OBJECTIVE:     Vital Signs:  Vitals:    05/09/17 1049   BP: (!) 105/43   Pulse: 92   Temp: 97.2 °F (36.2 °C)     Wt Readings from Last 1 Encounters:   05/09/17 1049 61.4 kg (135 lb 5.8 oz)     Body mass index is 25.58 kg/(m^2).       Physical Exam:  BP (!) 105/43 (BP Location: Right arm, Patient Position: Sitting)  Pulse 92  Temp 97.2 °F (36.2 °C) (Oral)   Ht 5' 1" (1.549 m)  " Wt 61.4 kg (135 lb 5.8 oz)  SpO2 95%  BMI 25.58 kg/m2  General appearance: alert, cooperative, no distress  Constitutional:Oriented to person, place, and time.appears well-developed and well-nourished.   HEENT: Normocephalic, atraumatic, neck symmetrical, no nasal discharge   Eyes: conjunctivae/corneas clear, PERRL, EOM's intact  Lungs: clear to auscultation bilaterally, no dullness to percussion bilaterally  Heart: regular rate and rhythm without rub; no displacement of the PMI   Abdomen: soft, non-tender; bowel sounds normoactive; no organomegaly  Extremities: extremities symmetric; no clubbing, cyanosis, or edema  Integument: Skin color, texture, turgor normal; no rashes; hair distrubution normal  Neurologic: Alert and oriented X 3, normal strength, normal coordination and gait  Psychiatric: no pressured speech; normal affect; no evidence of impaired cognition     Laboratory  Lab Results   Component Value Date    WBC 6.38 05/09/2017    HGB 12.0 05/09/2017    HCT 37.4 05/09/2017    MCV 96 05/09/2017     05/09/2017     BMP  Lab Results   Component Value Date     05/09/2017    K 4.6 05/09/2017     05/09/2017    CO2 29 05/09/2017    BUN 19 05/09/2017    CREATININE 1.2 05/09/2017    CALCIUM 9.4 05/09/2017    ANIONGAP 9 05/09/2017    ESTGFRAFRICA 50 (A) 05/09/2017    EGFRNONAA 43 (A) 05/09/2017     Lab Results   Component Value Date    ALT 34 04/24/2017    AST 26 04/24/2017    ALKPHOS 120 04/24/2017    BILITOT 0.4 04/24/2017     Lab Results   Component Value Date    INR 1.0 04/23/2017    INR 1.0 04/17/2017    INR 1.0 10/21/2015     Lab Results   Component Value Date    HGBA1C 5.4 11/18/2016     No results for input(s): POCTGLUCOSE in the last 72 hours.    ASSESSMENT & PLAN:     Angina mesenteric  - symptoms have resolved s/p cardiology intervention  - has cardiology FU and GI FU scheduled  -     CBC auto differential; Reviewed  -     Basic metabolic panel; Reviewed    Gastrointestinal hemorrhage  with melena  - H&H stable  - no further dark or bloody stool since hospital discharge    Tobacco abuse    Chronic kidney disease, stage III (moderate)    Mixed hyperlipidemia  - tolerating statin therapy    Essential hypertension  - blood pressure running low and patient is symptomatic  - Discontinue Norvasc 10 mg  - Change Benicar to 20 mg Q Day (down from bid)    Hypothyroidism, unspecified type  - last TSH in acceptable range, Jan 2017    Pulmonary nodule  - noted on CT 6/2014  - patient is a long time smoker and a current smoker  - needs repeat imaging  - will defer to PCP for scheduling; patient currently with multiple pending appts and testing with the cardiology team    Constipation, unspecified constipation type  - likely from oral iron therapy  - currently on colace but not with significant relief of constipation- taking Mag Citrate several times a week   -     senna-docusate 8.6-50 mg (PERICOLACE) 8.6-50 mg per tablet; Take 1 tablet by mouth once daily.      Patient will be released from [facility].  Has FU with PCP- Dr Frazier 5/23/17.   Instructions for the patient:      Scheduled Follow-up :  Future Appointments  Date Time Provider Department Center   5/16/2017 12:45 PM CARDIOLOGY, STRESS/TILT TABLE/JUAN CARLOS [facility] CARD [facility]   5/16/2017 1:45 PM [address] [facility]   5/16/2017 2:30 PM [address] [facility]   5/23/2017 9:00 AM [facility] CT6 MOBILE LIMIT 450 LBS [facility] CT SCAN [address]   5/23/2017 2:40 PM Wayne Frazier MD [facility] FAM MED [facility]   5/24/2017 9:00 AM Cecil Crooks MD [facility] TUMOR [facility]   5/30/2017 9:00 AM [address] [facility]   6/5/2017 2:20 PM Anthony Brooks MD [facility] CARDIO [facility]       Post Visit Medication List:     Medication List          This list is accurate as of: 5/9/17 10:58 AM.  Always use your most recent med list.                     albuterol 90 mcg/actuation inhaler   Commonly known as:  PROVENTIL HFA   Inhale 2 puffs  into the lungs every 4 to 6 hours as needed.       amlodipine 5 MG tablet   Commonly known as:  NORVASC   Take 2 tablets (10 mg total) by mouth once daily.       aspirin 81 MG EC tablet   Commonly known as:  ECOTRIN   Take 1 tablet (81 mg total) by mouth once daily.       atorvastatin 40 MG tablet   Commonly known as:  LIPITOR   Take 1 tablet (40 mg total) by mouth once daily.       BENICAR 20 MG tablet   Generic drug:  olmesartan       chlordiazepoxide-clidinium 5-2.5 mg 5-2.5 mg Cap   Commonly known as:  LIBRAX   Take 1 capsule by mouth every 8 (eight) hours as needed.       clopidogrel 75 mg tablet   Commonly known as:  PLAVIX   Take 1 tablet (75 mg total) by mouth once daily.       docusate sodium 100 MG capsule   Commonly known as:  COLACE   Take 1 capsule (100 mg total) by mouth 2 (two) times daily as needed for Constipation.       DUREZOL 0.05 % Drop ophthalmic solution   Generic drug:  difluprednate       escitalopram oxalate 10 MG tablet   Commonly known as:  LEXAPRO   Take 1 tablet (10 mg total) by mouth once daily.       * ferrous sulfate 325 mg (65 mg iron) Tab tablet   TAKE 1 TABLET BY MOUTH TWICE DAILY       * ferrous sulfate 325 mg (65 mg iron) Tab tablet   Take 1 tablet (325 mg total) by mouth 2 (two) times daily.       fluticasone-salmeterol 250-50 mcg/dose 250-50 mcg/dose diskus inhaler   Commonly known as:  ADVAIR   Inhale 1 puff into the lungs 2 (two) times daily.       hydrocodone-acetaminophen 5-325mg 5-325 mg per tablet   Commonly known as:  NORCO   Take 1 tablet by mouth every 8 (eight) hours as needed for Pain.       * levothyroxine 88 MCG tablet   Commonly known as:  SYNTHROID   TAKE 1 TABLET BY MOUTH DAILY BEFORE BREAKFAST       * levothyroxine 88 MCG tablet   Commonly known as:  SYNTHROID   TAKE 1 TABLET BY MOUTH DAILY BEFORE BREAKFAST       meclizine 25 mg tablet   Commonly known as:  ANTIVERT   Take 1 tablet (25 mg total) by mouth every 6 (six) hours as needed for Dizziness.        NEXIUM 40 MG capsule   Generic drug:  esomeprazole   Take 1 capsule (40 mg total) by mouth once daily.       tiotropium 18 mcg inhalation capsule   Commonly known as:  SPIRIVA WITH HANDIHALER   Inhale 1 capsule (18 mcg total) into the lungs once daily.       * Notice:  This list has 4 medication(s) that are the same as other medications prescribed for you. Read the directions carefully, and ask your doctor or other care provider to review them with you.          Signing Physician:  Carol Robertson MD

## 2017-05-09 NOTE — PROGRESS NOTES
[facility]  New Visit Progress Note   Recent Hospital Discharge     PRESENTING HISTORY     Chief Complaint/Reason for Admission:  Follow up Hospital Discharge   No chief complaint on file.    PCP: Wayne Frazier MD    History of Present Illness:  Ms. Brittany Haile is a 76 y.o. female who was recently admitted to the hospital.    *** Load admit and discharge dates & short hospital course here  ***  ___________________________________________________________________    Today:        Review of Systems  General ROS: negative for chills, fever or weight loss  Psychological ROS: negative for hallucination, depression or suicidal ideation  Ophthalmic ROS: negative for blurry vision, photophobia or eye pain  ENT ROS: negative for epistaxis, sore throat or rhinorrhea  Respiratory ROS: no cough, shortness of breath, or wheezing  Cardiovascular ROS: no chest pain or dyspnea on exertion  Gastrointestinal ROS: no abdominal pain, change in bowel habits, or black/ bloody stools  Genito-Urinary ROS: no dysuria, trouble voiding, or hematuria  Musculoskeletal ROS: negative for gait disturbance or muscular weakness  Neurological ROS: no syncope or seizures; no ataxia  Dermatological ROS: negative for pruritis, rash and jaundice          PAST HISTORY:     Past Medical History:   Diagnosis Date    Abdominal pain     CHF (congestive heart failure)     Chronic kidney disease, stage III (moderate) 2015    COPD (chronic obstructive pulmonary disease)     COPD (chronic obstructive pulmonary disease)     CVA (cerebral infarction)     Esophagitis     Gastritis     GERD (gastroesophageal reflux disease)     GI bleed     Hyperlipidemia     Hypertension     Mesenteric angina     PVD (peripheral vascular disease) with claudication 10/13/2015    Stroke     Subclavian artery stenosis, left     Thyroid disease        Past Surgical History:   Procedure Laterality Date     SECTION      COLONOSCOPY N/A 3/2/2017     Procedure: COLONOSCOPY;  Surgeon: Cecil Crooks MD;  Location: [facility];  Service: Endoscopy;  Laterality: N/A;    CORONARY ANGIOPLASTY  2006    GALLBLADDER SURGERY      HYSTERECTOMY      OOPHORECTOMY         No family history on file.    Social History     Social History    Marital status:      Spouse name: N/A    Number of children: N/A    Years of education: N/A     Social History Main Topics    Smoking status: Current Every Day Smoker     Packs/day: 0.50     Years: 60.00     Types: Cigarettes    Smokeless tobacco: Never Used    Alcohol use No    Drug use: No    Sexual activity: Not Asked     Other Topics Concern    None     Social History Narrative       MEDICATIONS & ALLERGIES:     Current Outpatient Prescriptions on File Prior to Visit   Medication Sig Dispense Refill    albuterol (PROVENTIL HFA) 90 mcg/actuation inhaler Inhale 2 puffs into the lungs every 4 to 6 hours as needed. 8.5 g 6    amlodipine (NORVASC) 5 MG tablet Take 2 tablets (10 mg total) by mouth once daily. 180 tablet 3    aspirin (ECOTRIN) 81 MG EC tablet Take 1 tablet (81 mg total) by mouth once daily.  0    atorvastatin (LIPITOR) 40 MG tablet Take 1 tablet (40 mg total) by mouth once daily. 90 tablet 5    BENICAR 20 mg tablet TK 2 T PO D  1    chlordiazepoxide-clidinium 5-2.5 mg (LIBRAX) 5-2.5 mg Cap Take 1 capsule by mouth every 8 (eight) hours as needed. 50 capsule 2    clopidogrel (PLAVIX) 75 mg tablet Take 1 tablet (75 mg total) by mouth once daily. 90 tablet 3    DUREZOL 0.05 % Drop ophthalmic solution       escitalopram oxalate (LEXAPRO) 10 MG tablet Take 1 tablet (10 mg total) by mouth once daily. 30 tablet 2    ferrous sulfate 325 mg (65 mg iron) Tab tablet TAKE 1 TABLET BY MOUTH TWICE DAILY 180 tablet 0    ferrous sulfate 325 mg (65 mg iron) Tab tablet Take 1 tablet (325 mg total) by mouth 2 (two) times daily.  3    fluticasone-salmeterol 250-50 mcg/dose (ADVAIR) 250-50 mcg/dose diskus inhaler  "Inhale 1 puff into the lungs 2 (two) times daily. 60 each 6    hydrocodone-acetaminophen 5-325mg (NORCO) 5-325 mg per tablet Take 1 tablet by mouth every 8 (eight) hours as needed for Pain. 21 tablet 0    levothyroxine (SYNTHROID) 88 MCG tablet TAKE 1 TABLET BY MOUTH DAILY BEFORE BREAKFAST 90 tablet 0    levothyroxine (SYNTHROID) 88 MCG tablet TAKE 1 TABLET BY MOUTH DAILY BEFORE BREAKFAST 90 tablet 0    meclizine (ANTIVERT) 25 mg tablet Take 1 tablet (25 mg total) by mouth every 6 (six) hours as needed for Dizziness. 30 tablet 0    tiotropium (SPIRIVA WITH HANDIHALER) 18 mcg inhalation capsule Inhale 1 capsule (18 mcg total) into the lungs once daily. 90 capsule 3    [DISCONTINUED] docusate sodium (COLACE) 100 MG capsule Take 1 capsule (100 mg total) by mouth 2 (two) times daily as needed for Constipation. 90 capsule 3    NEXIUM 40 mg capsule Take 1 capsule (40 mg total) by mouth once daily. 90 capsule 3     No current facility-administered medications on file prior to visit.         Review of patient's allergies indicates:  No Known Allergies    OBJECTIVE:     Vital Signs:  Vitals:    05/09/17 1049   BP: (!) 105/43   Pulse: 92   Temp: 97.2 °F (36.2 °C)     Wt Readings from Last 1 Encounters:   05/09/17 1049 61.4 kg (135 lb 5.8 oz)     Body mass index is 25.58 kg/(m^2).       Physical Exam:  BP (!) 105/43 (BP Location: Right arm, Patient Position: Sitting)  Pulse 92  Temp 97.2 °F (36.2 °C) (Oral)   Ht 5' 1" (1.549 m)  Wt 61.4 kg (135 lb 5.8 oz)  SpO2 95%  BMI 25.58 kg/m2  General appearance: alert, cooperative, no distress  Constitutional:Oriented to person, place, and time.appears well-developed and well-nourished.   HEENT: Normocephalic, atraumatic, neck symmetrical, no nasal discharge   Eyes: conjunctivae/corneas clear, PERRL, EOM's intact  Lungs: clear to auscultation bilaterally, no dullness to percussion bilaterally  Heart: regular rate and rhythm without rub; no displacement of the PMI   Abdomen: " soft, non-tender; bowel sounds normoactive; no organomegaly  Extremities: extremities symmetric; no clubbing, cyanosis, or edema  Integument: Skin color, texture, turgor normal; no rashes; hair distrubution normal  Neurologic: Alert and oriented X 3, normal strength, normal coordination and gait  Psychiatric: no pressured speech; normal affect; no evidence of impaired cognition     Laboratory  Lab Results   Component Value Date    WBC 6.38 05/09/2017    HGB 12.0 05/09/2017    HCT 37.4 05/09/2017    MCV 96 05/09/2017     05/09/2017     BMP  Lab Results   Component Value Date     04/24/2017    K 3.9 04/24/2017     04/24/2017    CO2 23 04/24/2017    BUN 12 04/24/2017    CREATININE 0.9 04/24/2017    CALCIUM 8.7 04/24/2017    ANIONGAP 14 04/24/2017    ESTGFRAFRICA >60 04/24/2017    EGFRNONAA >60 04/24/2017     Lab Results   Component Value Date    ALT 34 04/24/2017    AST 26 04/24/2017    ALKPHOS 120 04/24/2017    BILITOT 0.4 04/24/2017     Lab Results   Component Value Date    INR 1.0 04/23/2017    INR 1.0 04/17/2017    INR 1.0 10/21/2015     Lab Results   Component Value Date    HGBA1C 5.4 11/18/2016     No results for input(s): POCTGLUCOSE in the last 72 hours.    Diagnostic Results:  ***      TRANSITION OF CARE:     [facility] On Call Contact Note: ***date***    Family and/or Caretaker present at visit?  {YES:98708}.  Diagnostic tests reviewed/disposition: {[facility] DIAGNOSTIC TESTS:07892}.  Disease/illness education: ***  Home health/community services discussion/referrals: {[facility] HOME HEALTH:96192}.   Establishment or re-establishment of referral orders for community resources: {[facility] ESTABLISHMENT:66522}.   Discussion with other health care providers: {[facility] DISCUSSION:83948}.     ASSESSMENT & PLAN:     HIGH RISK CONDITION(S):  {HIGH RISK CONDITIONS:59369}    Angina mesenteric  -     CBC auto differential; Future; Expected date: 5/8/17  -     Basic metabolic panel; Future; Expected date:  5/8/17    Gastrointestinal hemorrhage with melena    Tobacco abuse    Chronic kidney disease, stage III (moderate)    Mixed hyperlipidemia    Essential hypertension    Hypothyroidism, unspecified type    Pulmonary nodule    Constipation, unspecified constipation type  -     senna-docusate 8.6-50 mg (PERICOLACE) 8.6-50 mg per tablet; Take 1 tablet by mouth once daily.        Instructions for the patient:      Scheduled Follow-up :  Future Appointments  Date Time Provider Department Center   5/16/2017 12:45 PM CARDIOLOGY, STRESS/TILT TABLE/JUAN ACRLOS [facility] CARD [facility]   5/16/2017 1:45 PM [address] [facility]   5/16/2017 2:30 PM [address] [facility]   5/23/2017 9:00 AM [facility] CT6 MOBILE LIMIT 450 LBS [facility] CT SCAN [address]   5/23/2017 2:40 PM Wayne Frazier MD [facility] FAM MED [facility]   5/24/2017 9:00 AM Cecil Crooks MD [facility] TUMOR [facility]   5/30/2017 9:00 [address] [facility]   6/5/2017 2:20 PM Anthony Brooks MD [facility] CARDIO [facility]       Post Visit Medication List:     Medication List          This list is accurate as of: 5/9/17 11:12 AM.  Always use your most recent med list.                     albuterol 90 mcg/actuation inhaler   Commonly known as:  PROVENTIL HFA   Inhale 2 puffs into the lungs every 4 to 6 hours as needed.       amlodipine 5 MG tablet   Commonly known as:  NORVASC   Take 2 tablets (10 mg total) by mouth once daily.       aspirin 81 MG EC tablet   Commonly known as:  ECOTRIN   Take 1 tablet (81 mg total) by mouth once daily.       atorvastatin 40 MG tablet   Commonly known as:  LIPITOR   Take 1 tablet (40 mg total) by mouth once daily.       BENICAR 20 MG tablet   Generic drug:  olmesartan       chlordiazepoxide-clidinium 5-2.5 mg 5-2.5 mg Cap   Commonly known as:  LIBRAX   Take 1 capsule by mouth every 8 (eight) hours as needed.       clopidogrel 75 mg tablet   Commonly known as:  PLAVIX   Take 1 tablet (75 mg total) by mouth once daily.        DUREZOL 0.05 % Drop ophthalmic solution   Generic drug:  difluprednate       escitalopram oxalate 10 MG tablet   Commonly known as:  LEXAPRO   Take 1 tablet (10 mg total) by mouth once daily.       * ferrous sulfate 325 mg (65 mg iron) Tab tablet   TAKE 1 TABLET BY MOUTH TWICE DAILY       * ferrous sulfate 325 mg (65 mg iron) Tab tablet   Take 1 tablet (325 mg total) by mouth 2 (two) times daily.       fluticasone-salmeterol 250-50 mcg/dose 250-50 mcg/dose diskus inhaler   Commonly known as:  ADVAIR   Inhale 1 puff into the lungs 2 (two) times daily.       hydrocodone-acetaminophen 5-325mg 5-325 mg per tablet   Commonly known as:  NORCO   Take 1 tablet by mouth every 8 (eight) hours as needed for Pain.       * levothyroxine 88 MCG tablet   Commonly known as:  SYNTHROID   TAKE 1 TABLET BY MOUTH DAILY BEFORE BREAKFAST       * levothyroxine 88 MCG tablet   Commonly known as:  SYNTHROID   TAKE 1 TABLET BY MOUTH DAILY BEFORE BREAKFAST       meclizine 25 mg tablet   Commonly known as:  ANTIVERT   Take 1 tablet (25 mg total) by mouth every 6 (six) hours as needed for Dizziness.       NEXIUM 40 MG capsule   Generic drug:  esomeprazole   Take 1 capsule (40 mg total) by mouth once daily.       senna-docusate 8.6-50 mg 8.6-50 mg per tablet   Commonly known as:  PERICOLACE   Take 1 tablet by mouth once daily.       tiotropium 18 mcg inhalation capsule   Commonly known as:  SPIRIVA WITH HANDIHALER   Inhale 1 capsule (18 mcg total) into the lungs once daily.       * Notice:  This list has 4 medication(s) that are the same as other medications prescribed for you. Read the directions carefully, and ask your doctor or other care provider to review them with you.         Where to Get Your Medications      You can get these medications from any pharmacy     You don't need a prescription for these medications     senna-docusate 8.6-50 mg 8.6-50 mg per tablet             Signing Physician:  Carol Robertson MD

## 2017-05-09 NOTE — TELEPHONE ENCOUNTER
----- Message from Alla Benavidez sent at 5/9/2017 11:56 AM CDT -----  Contact: [phone]/ Tiki pt's daughter   Pt's daughter its requesting to speak with you in regarding  pt's mediations . Please advise

## 2017-05-16 ENCOUNTER — HOSPITAL ENCOUNTER (OUTPATIENT)
Dept: CARDIOLOGY | Facility: HOSPITAL | Age: 77
Discharge: HOME | End: 2017-05-16
Attending: INTERNAL MEDICINE
Payer: MEDICARE

## 2017-05-16 ENCOUNTER — HOSPITAL ENCOUNTER (OUTPATIENT)
Dept: RADIOLOGY | Facility: HOSPITAL | Age: 77
Discharge: HOME | End: 2017-05-16
Attending: INTERNAL MEDICINE
Payer: MEDICARE

## 2017-05-16 DIAGNOSIS — I77.1 SUBCLAVIAN ARTERY STENOSIS, LEFT: ICD-10-CM

## 2017-05-16 DIAGNOSIS — I73.9 PVD (PERIPHERAL VASCULAR DISEASE) WITH CLAUDICATION: ICD-10-CM

## 2017-05-16 PROCEDURE — 93930 UPPER EXTREMITY STUDY: CPT | Mod: TC

## 2017-05-16 PROCEDURE — 93925 LOWER EXTREMITY STUDY: CPT | Mod: 26,,, | Performed by: RADIOLOGY

## 2017-05-16 PROCEDURE — 93922 UPR/L XTREMITY ART 2 LEVELS: CPT | Mod: 26,,, | Performed by: INTERNAL MEDICINE

## 2017-05-16 PROCEDURE — 93922 UPR/L XTREMITY ART 2 LEVELS: CPT

## 2017-05-16 PROCEDURE — 93930 UPPER EXTREMITY STUDY: CPT | Mod: 26,,, | Performed by: RADIOLOGY

## 2017-05-16 PROCEDURE — 93925 LOWER EXTREMITY STUDY: CPT | Mod: TC

## 2017-05-18 LAB — VASCULAR ANKLE BRACHIAL INDEX (ABI) RIGHT: 0.67 (ref 0.9–1.2)

## 2017-05-18 NOTE — PROGRESS NOTES
Study confirmed you have left subclavian stenosis        We can discuss the treatment plan during your follow up        Thanks        ZN

## 2017-05-18 NOTE — PROGRESS NOTES
You have significant blockages in your leg arteries        We can discuss the treatment options during your follow up        Thanks        ZN

## 2017-05-21 RX ORDER — OLMESARTAN MEDOXOMIL 40 MG/1
TABLET ORAL
Refills: 1 | COMMUNITY
Start: 2017-04-06 | End: 2017-05-23

## 2017-05-23 ENCOUNTER — HOSPITAL ENCOUNTER (OUTPATIENT)
Dept: RADIOLOGY | Facility: HOSPITAL | Age: 77
Discharge: HOME | End: 2017-05-23
Attending: INTERNAL MEDICINE
Payer: MEDICARE

## 2017-05-23 ENCOUNTER — OFFICE VISIT (OUTPATIENT)
Dept: FAMILY MEDICINE | Facility: CLINIC | Age: 77
End: 2017-05-23
Payer: MEDICARE

## 2017-05-23 VITALS
WEIGHT: 129.88 LBS | OXYGEN SATURATION: 97 % | HEART RATE: 97 BPM | SYSTOLIC BLOOD PRESSURE: 120 MMHG | DIASTOLIC BLOOD PRESSURE: 65 MMHG | BODY MASS INDEX: 24.52 KG/M2 | HEIGHT: 61 IN

## 2017-05-23 DIAGNOSIS — N18.30 CHRONIC KIDNEY DISEASE, STAGE III (MODERATE): ICD-10-CM

## 2017-05-23 DIAGNOSIS — R73.02 GLUCOSE INTOLERANCE (IMPAIRED GLUCOSE TOLERANCE): ICD-10-CM

## 2017-05-23 DIAGNOSIS — Z72.0 TOBACCO ABUSE: ICD-10-CM

## 2017-05-23 DIAGNOSIS — E03.9 HYPOTHYROIDISM, UNSPECIFIED TYPE: ICD-10-CM

## 2017-05-23 DIAGNOSIS — F41.9 ANXIETY: ICD-10-CM

## 2017-05-23 DIAGNOSIS — R10.9 ABDOMINAL PAIN: ICD-10-CM

## 2017-05-23 DIAGNOSIS — E78.2 MIXED HYPERLIPIDEMIA: ICD-10-CM

## 2017-05-23 DIAGNOSIS — I73.9 PVD (PERIPHERAL VASCULAR DISEASE) WITH CLAUDICATION: ICD-10-CM

## 2017-05-23 DIAGNOSIS — J41.0 SIMPLE CHRONIC BRONCHITIS: ICD-10-CM

## 2017-05-23 DIAGNOSIS — I10 ESSENTIAL HYPERTENSION: Primary | ICD-10-CM

## 2017-05-23 PROCEDURE — 99999 PR PBB SHADOW E&M-EST. PATIENT-LVL III: CPT | Mod: PBBFAC,,, | Performed by: FAMILY MEDICINE

## 2017-05-23 PROCEDURE — 1126F AMNT PAIN NOTED NONE PRSNT: CPT | Mod: S$GLB,,, | Performed by: FAMILY MEDICINE

## 2017-05-23 PROCEDURE — 3074F SYST BP LT 130 MM HG: CPT | Mod: S$GLB,,, | Performed by: FAMILY MEDICINE

## 2017-05-23 PROCEDURE — 3078F DIAST BP <80 MM HG: CPT | Mod: S$GLB,,, | Performed by: FAMILY MEDICINE

## 2017-05-23 PROCEDURE — 1157F ADVNC CARE PLAN IN RCRD: CPT | Mod: S$GLB,,, | Performed by: FAMILY MEDICINE

## 2017-05-23 PROCEDURE — 99499 UNLISTED E&M SERVICE: CPT | Mod: S$GLB,,, | Performed by: FAMILY MEDICINE

## 2017-05-23 PROCEDURE — 1159F MED LIST DOCD IN RCRD: CPT | Mod: S$GLB,,, | Performed by: FAMILY MEDICINE

## 2017-05-23 PROCEDURE — 99214 OFFICE O/P EST MOD 30 MIN: CPT | Mod: S$GLB,,, | Performed by: FAMILY MEDICINE

## 2017-05-23 PROCEDURE — 74263 CT COLONOGRAPHY SCREENING: CPT | Mod: 26,,, | Performed by: RADIOLOGY

## 2017-05-23 PROCEDURE — 1160F RVW MEDS BY RX/DR IN RCRD: CPT | Mod: S$GLB,,, | Performed by: FAMILY MEDICINE

## 2017-05-23 RX ORDER — HYDROCHLOROTHIAZIDE 12.5 MG/1
12.5 CAPSULE ORAL DAILY
COMMUNITY
End: 2017-10-03

## 2017-05-23 RX ORDER — AMLODIPINE BESYLATE 5 MG/1
5 TABLET ORAL DAILY
COMMUNITY
End: 2017-09-07

## 2017-05-23 NOTE — PROGRESS NOTES
Subjective:       Patient ID: Brittany Haile is a 77 y.o. female.    Chief Complaint: Follow-up    77 yr old pleasant female with CVA, Hypothyroidism, HTN, HLD, COPD, GERD, carotid artery disease/stenosis, anxiety, presents today for her 3 month follow up and no new complaints today.      CVA - had stroke many years ago and has no deficits - it was TIA and they could not find any problem - she is continuing to smoke and no desire to quit as of yet    CKD III - follows nephrology - labs due      Hypothyroidism - controlled -    TSH                      1.807               01/18/2017                    -      on synthroid daily - compliant - no side effects      HTN - controlled- on HCTZ and CCB - no side effects - labs reassuring      HLD - uncontrolled - was on statin - she was asked to stop and she will decide after she has an appointment with vascular medicine      PAD - improved after procedure - still smoking though    COPD - controlled - on advair daily and home oxygen - still smokes and no desire to quit - she drops her sats when she walks and she will benefit from ambulatory or portable oxygen      History as below - reviewed      Health maintenance  -labs UTD  -vaccines due                          Leg Pain    There was no injury mechanism. The pain is present in the left leg and right leg. The quality of the pain is described as aching. The pain is at a severity of 8/10. The pain is severe. The pain has been intermittent since onset. Pertinent negatives include no inability to bear weight, loss of motion, muscle weakness or numbness. The symptoms are aggravated by movement. She has tried nothing for the symptoms. The treatment provided no relief.   Medication Refill   This is a chronic problem. The current episode started more than 1 year ago. The problem occurs constantly. The problem has been gradually improving. Associated symptoms include arthralgias, myalgias and neck pain. Pertinent negatives include no  abdominal pain, chest pain, chills, congestion, diaphoresis, fatigue, headaches, nausea, numbness, rash, sore throat or weakness. Nothing aggravates the symptoms. Treatments tried: as below. The treatment provided significant relief.   Hypertension   This is a chronic problem. The current episode started more than 1 year ago. The problem has been gradually improving since onset. The problem is controlled. Associated symptoms include anxiety and neck pain. Pertinent negatives include no chest pain, headaches, malaise/fatigue, orthopnea, palpitations, peripheral edema or shortness of breath. There are no associated agents to hypertension. Past treatments include ACE inhibitors and calcium channel blockers. The current treatment provides significant improvement. There are no compliance problems.  Hypertensive end-organ damage includes a thyroid problem. There is no history of angina, CAD/MI, CVA, left ventricular hypertrophy, PVD, renovascular disease or retinopathy. There is no history of chronic renal disease, coarctation of the aorta, hypercortisolism, hyperparathyroidism or pheochromocytoma.   Hyperlipidemia   This is a chronic problem. The current episode started more than 1 year ago. The problem is controlled. Recent lipid tests were reviewed and are normal. She has no history of chronic renal disease, diabetes, hypothyroidism or liver disease. There are no known factors aggravating her hyperlipidemia. Associated symptoms include leg pain and myalgias. Pertinent negatives include no chest pain or shortness of breath. Current antihyperlipidemic treatment includes statins. The current treatment provides significant improvement of lipids. There are no compliance problems.  Risk factors for coronary artery disease include dyslipidemia, hypertension and post-menopausal.   Thyroid Problem   Presents for follow-up visit. Patient reports no anxiety, cold intolerance, constipation, depressed mood, diaphoresis, diarrhea,  dry skin, fatigue, hair loss, hoarse voice, menstrual problem, nail problem, palpitations, tremors or weight loss. The symptoms have been stable. Past treatments include levothyroxine. The treatment provided significant relief. Prior procedures include thyroid ultrasound. The following procedures have not been performed: thyroid FNA. Her past medical history is significant for hyperlipidemia. There is no history of diabetes, Graves' ophthalmopathy or neuropathy. There are no known risk factors.   Gastroesophageal Reflux   She complains of heartburn. She reports no abdominal pain, no chest pain, no choking, no dysphagia, no early satiety, no hoarse voice, no nausea, no sore throat, no tooth decay or no wheezing. This is a chronic problem. The current episode started more than 1 year ago. The problem occurs constantly. The problem has been gradually improving. The heartburn is located in the abdomen. The heartburn is of moderate intensity. The heartburn does not wake her from sleep. The heartburn does not limit her activity. The heartburn doesn't change with position. Nothing aggravates the symptoms. Pertinent negatives include no fatigue, muscle weakness or weight loss. She has tried a PPI for the symptoms. The treatment provided significant relief. Past procedures do not include an abdominal ultrasound, esophageal manometry or H. pylori antibody titer. Past invasive treatments do not include gastroplasty, gastroplication or reflux surgery.   Anxiety   Presents for follow-up visit. Patient reports no chest pain, confusion, decreased concentration, depressed mood, dizziness, excessive worry, feeling of choking, impotence, irritability, nausea, nervous/anxious behavior, palpitations, shortness of breath or suicidal ideas. Symptoms occur occasionally. The severity of symptoms is mild. Nothing aggravates the symptoms. The quality of sleep is fair. Nighttime awakenings: occasional.     There are no known risk factors. Her  past medical history is significant for anxiety/panic attacks. There is no history of anemia, asthma, bipolar disorder, CAD, chronic lung disease, depression, hyperthyroidism or suicide attempts. Past treatments include SSRIs. The treatment provided significant relief. Compliance with prior treatments has been good. Compliance with medications is %.   Neck Pain    This is a chronic problem. The current episode started more than 1 year ago. The problem occurs constantly. The problem has been gradually worsening. The pain is associated with nothing. The pain is present in the occipital region. The quality of the pain is described as shooting and cramping. The pain is at a severity of 7/10. The pain is severe. The symptoms are aggravated by bending, twisting and stress. The pain is worse during the night. Associated symptoms include leg pain. Pertinent negatives include no chest pain, headaches, numbness, weakness or weight loss. She has tried heat, muscle relaxants and NSAIDs for the symptoms. The treatment provided mild relief.   Back Pain   This is a chronic problem. The current episode started more than 1 year ago. The problem occurs constantly. The problem has been gradually worsening since onset. The pain is present in the lumbar spine. The quality of the pain is described as aching. The pain radiates to the right foot and left foot. The pain is at a severity of 8/10. The pain is severe. The pain is worse during the night. The symptoms are aggravated by bending, sitting and twisting. Associated symptoms include leg pain. Pertinent negatives include no abdominal pain, chest pain, dysuria, headaches, numbness, pelvic pain, weakness or weight loss. She has tried heat, ice, muscle relaxant and NSAIDs for the symptoms. The treatment provided moderate relief.     Review of Systems   Constitutional: Negative.  Negative for activity change, chills, diaphoresis, fatigue, irritability, malaise/fatigue, unexpected  weight change and weight loss.   HENT: Negative.  Negative for congestion, ear discharge, hearing loss, hoarse voice, rhinorrhea, sore throat and voice change.    Eyes: Negative.  Negative for pain, discharge and visual disturbance.   Respiratory: Negative.  Negative for choking, chest tightness, shortness of breath and wheezing.    Cardiovascular: Negative.  Negative for chest pain, palpitations and orthopnea.   Gastrointestinal: Positive for heartburn. Negative for abdominal distention, abdominal pain, anal bleeding, constipation, diarrhea, dysphagia and nausea.   Endocrine: Negative.  Negative for cold intolerance, polydipsia and polyuria.   Genitourinary: Negative.  Negative for decreased urine volume, difficulty urinating, dysuria, frequency, impotence, menstrual problem, pelvic pain and vaginal pain.   Musculoskeletal: Positive for arthralgias, back pain, myalgias and neck pain. Negative for gait problem and muscle weakness.   Skin: Negative.  Negative for color change, pallor, rash and wound.   Allergic/Immunologic: Negative.  Negative for environmental allergies and immunocompromised state.   Neurological: Negative.  Negative for dizziness, tremors, seizures, speech difficulty, weakness, numbness and headaches.   Hematological: Negative.  Negative for adenopathy. Does not bruise/bleed easily.   Psychiatric/Behavioral: Negative.  Negative for agitation, confusion, decreased concentration, hallucinations, self-injury and suicidal ideas. The patient is not nervous/anxious.        PMH/PSH/FH/SH/MED/ALLERGY reviewed    Objective:       Vitals:    05/23/17 1443   BP: 120/65   Pulse: 97       Physical Exam   Constitutional: She is oriented to person, place, and time. She appears well-developed and well-nourished. No distress.   HENT:   Head: Normocephalic and atraumatic.   Right Ear: External ear normal.   Left Ear: External ear normal.   Nose: Nose normal.   Mouth/Throat: Oropharynx is clear and moist. No  oropharyngeal exudate.   Eyes: Conjunctivae and EOM are normal. Pupils are equal, round, and reactive to light. Right eye exhibits no discharge. Left eye exhibits no discharge. No scleral icterus.   Neck: Normal range of motion. Neck supple. No JVD present. No tracheal deviation present. No thyromegaly present.   Cardiovascular: Normal rate, regular rhythm and normal heart sounds.  Exam reveals no gallop and no friction rub.    No murmur heard.  Pulmonary/Chest: Effort normal and breath sounds normal. No stridor. She has no wheezes. She has no rales. She exhibits no tenderness.   Abdominal: Soft. Bowel sounds are normal. She exhibits no distension and no mass. There is no tenderness. There is no rebound and no guarding. No hernia.   Musculoskeletal: Normal range of motion. She exhibits no edema or tenderness.   Lymphadenopathy:     She has no cervical adenopathy.   Neurological: She is alert and oriented to person, place, and time. She has normal reflexes. She displays normal reflexes. No cranial nerve deficit. She exhibits normal muscle tone. Coordination normal.   Skin: Skin is warm and dry. No rash noted. She is not diaphoretic. No erythema. No pallor.   Psychiatric: She has a normal mood and affect. Her behavior is normal. Judgment and thought content normal.       Assessment:       1. Essential hypertension    2. Anxiety    3. Simple chronic bronchitis    4. PVD (peripheral vascular disease) with claudication    5. Chronic kidney disease, stage III (moderate)    6. Mixed hyperlipidemia    7. Tobacco abuse    8. Hypothyroidism, unspecified type    9. Glucose intolerance (impaired glucose tolerance)        Plan:       Brittany was seen today for follow-up.    Diagnoses and all orders for this visit:    Essential hypertension  -     Lipid panel; Future  -     Comprehensive metabolic panel; Future  -     Vitamin D; Future    Anxiety    Simple chronic bronchitis    PVD (peripheral vascular disease) with  claudication    Chronic kidney disease, stage III (moderate)  -     Comprehensive metabolic panel; Future    Mixed hyperlipidemia  -     Lipid panel; Future  -     Comprehensive metabolic panel; Future  -     Vitamin D; Future    Tobacco abuse    Hypothyroidism, unspecified type    Glucose intolerance (impaired glucose tolerance)    Other orders  -     Cancel: Zoster Vaccine - Live        HTN  -controlled  -continue HCTZ and norvasc    carotod artery disease/stenosis/bruit/PAD  -follows vascular medicine/cardiology  -statin for HLD    GERD  -controlled  -refilled nexium    Hypothyroidism  -controlled    CVA  -stable and baseline and no deficits    COPD  -stable and controlled  -on advair/spiriva  -recommend portable or ambulatory oxygen    Muscle spasm  -flexeril prn nightly    CKD III  -baseline  -follows nephrology    Osteopenia  -vit D and dexa    Spent adequate time in obtaining history and explaining differentials    40 minutes spent during this visit of which greater than 50% devoted to face-face counseling and coordination of care regarding diagnosis and management plan    Return in about 3 months (around 8/23/2017), or if symptoms worsen or fail to improve.

## 2017-05-24 ENCOUNTER — PATIENT MESSAGE (OUTPATIENT)
Dept: FAMILY MEDICINE | Facility: CLINIC | Age: 77
End: 2017-05-24

## 2017-05-24 ENCOUNTER — OFFICE VISIT (OUTPATIENT)
Dept: NEUROLOGY | Facility: HOSPITAL | Age: 77
End: 2017-05-24
Attending: INTERNAL MEDICINE
Payer: MEDICARE

## 2017-05-24 ENCOUNTER — TELEPHONE (OUTPATIENT)
Dept: FAMILY MEDICINE | Facility: CLINIC | Age: 77
End: 2017-05-24

## 2017-05-24 VITALS
WEIGHT: 130 LBS | DIASTOLIC BLOOD PRESSURE: 58 MMHG | BODY MASS INDEX: 24.55 KG/M2 | HEIGHT: 61 IN | SYSTOLIC BLOOD PRESSURE: 112 MMHG

## 2017-05-24 DIAGNOSIS — R10.84 ABDOMINAL PAIN, GENERALIZED: Primary | ICD-10-CM

## 2017-05-24 DIAGNOSIS — E55.9 VITAMIN D DEFICIENCY: Primary | ICD-10-CM

## 2017-05-24 PROCEDURE — 99214 OFFICE O/P EST MOD 30 MIN: CPT | Performed by: INTERNAL MEDICINE

## 2017-05-24 RX ORDER — ERGOCALCIFEROL 1.25 MG/1
50000 CAPSULE ORAL
Qty: 4 CAPSULE | Refills: 2 | Status: SHIPPED | OUTPATIENT
Start: 2017-05-24 | End: 2017-10-03 | Stop reason: SDUPTHER

## 2017-05-24 RX ORDER — PANTOPRAZOLE SODIUM 40 MG/1
40 TABLET, DELAYED RELEASE ORAL DAILY
Qty: 30 TABLET | Refills: 11 | Status: SHIPPED | OUTPATIENT
Start: 2017-05-24 | End: 2017-10-03 | Stop reason: SDUPTHER

## 2017-05-24 NOTE — PROGRESS NOTES
[facility]    CC: abdominal pain    HPI 77 y.o. female here for follow up 4 years of chronic, episodic, moderate abdominal pain associated with mesenteric ischemia.  Her medical history includes PVD, COPD, hypothyroidism, and DELIA on oral iron therapy.  Her presentation and imaging were concerning for chronic mesenteric ischemia, and referral to Dr. 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  She was admitted for melena following that procedure, which self resolved without intervention.      Currently, she feels well and reports no further GI bleeding.  Her abdominal pain has resolved.  She continues to take aspirin and plavix.  She has ongoing workup for PAD with Dr. Brooks, and may require additional stenting/intervention.  She has chronic GERD which was previously well controlled on a PPI, but Nexium is not covered and thus she is not currently taking one.  No NSAID use.    Her daughter is present and provided corollary information.    Chart review pertient for EGD and colonoscopy completed 3/2017, which revealed erosive gastropathy and an incomplete colonoscopy: poorly visualized right colon.  A virtual colonoscopy was completed 5/23/17 with read pending.      Past Medical History:   Diagnosis Date    Abdominal pain     CHF (congestive heart failure)     Chronic kidney disease, stage III (moderate) 8/19/2015    COPD (chronic obstructive pulmonary disease)     COPD (chronic obstructive pulmonary disease)     CVA (cerebral infarction)     Esophagitis     Gastritis     GERD (gastroesophageal reflux disease)     GI bleed     Hyperlipidemia     Hypertension     Mesenteric angina     PVD (peripheral vascular disease) with claudication 10/13/2015    Stroke     Subclavian artery stenosis, left     Thyroid disease          Review of Systems  General ROS: negative for chills, fever or weight loss  Cardiovascular  "ROS: no chest pain or dyspnea on exertion  Gastrointestinal ROS: no abdominal pain, change in bowel habits, or black/ bloody stools    Physical Examination  BP (!) 112/58   Ht 5' 1" (1.549 m)   Wt 59 kg (130 lb)   BMI 24.56 kg/m²   General appearance: alert, cooperative, no distress  HENT: Normocephalic, atraumatic, neck symmetrical, no nasal discharge   Lungs: clear to auscultation bilaterally, no dullness to percussion bilaterally  Heart: regular rate and rhythm without rub; palpable peripheral   Abdomen: soft, non-tender; bowel sounds normoactive; no organomegaly  Extremities: extremities symmetric; no clubbing, cyanosis, or edema  Neurologic: Alert and oriented X 3, normal strength, normal coordination and gait    Labs:    From 5/9/17:    H/H 12/37  MCV 96  Platelets 297    Imaging:    CTA A/P 4/17/17:    1.  Extensive atherosclerosis of the aortic branch vessels and visceral vessels.  Subsequent hemodynamically significant high grade stenosis at the origins of the celiac artery and the superior mesenteric artery as detailed above.  There is also significant stenosis involving the origins of the bilateral renal arteries.    2.  Extensive calcified and noncalcified atheromatous plaque of the abdominal aorta with subsequent severe luminal narrowing of the infrarenal abdominal aorta     3. Nonspecific gastric wall thickening.  Clinical correlation and further evaluation as warranted.  The    I have personally reviewed these images.    Assessment:   76 y.o. 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 Doing well without abdominal pain on aspirin and plavix.  History of chronic GERD controlled with PPI, and incomplete evaluation of the right colon s/p virtual colonoscopy (read pending).  Ongoing PAD workup with Dr. Henning.    Plan:  - Continue daily PPI therapy (pantoprazole) for chronic " GERD, which has been well controlled in the past.    - I will follow up the results of the virtual colonoscopy, and consider repeat colonoscopy depending on the findings.    Cecil Crooks MD   [address], [address]   [place] [address] [phone]

## 2017-05-24 NOTE — TELEPHONE ENCOUNTER
VitD low, advised patient that prescription was sent to pharmacy.  Patient verbalized understanding.

## 2017-05-25 ENCOUNTER — TELEPHONE (OUTPATIENT)
Dept: NEUROLOGY | Facility: HOSPITAL | Age: 77
End: 2017-05-25

## 2017-05-25 NOTE — TELEPHONE ENCOUNTER
I called and discussed the results of her Virtual colonoscopy, which did not reveal any polyps and 5 year follow up was recommended.

## 2017-05-30 ENCOUNTER — HOSPITAL ENCOUNTER (OUTPATIENT)
Dept: RADIOLOGY | Facility: HOSPITAL | Age: 77
Discharge: HOME | End: 2017-05-30
Attending: INTERNAL MEDICINE
Payer: MEDICARE

## 2017-05-30 DIAGNOSIS — K55.1 ANGINA MESENTERIC: ICD-10-CM

## 2017-06-01 NOTE — PROGRESS NOTES
Your JUAN CARLOS is abnormal consistent with blockages in your leg arteries          We will discuss this test result in detail during your follow up        Thanks        ZN

## 2017-06-05 ENCOUNTER — OFFICE VISIT (OUTPATIENT)
Dept: CARDIOLOGY | Facility: CLINIC | Age: 77
End: 2017-06-05
Payer: MEDICARE

## 2017-06-05 VITALS
WEIGHT: 132 LBS | HEART RATE: 72 BPM | HEIGHT: 64 IN | BODY MASS INDEX: 22.53 KG/M2 | SYSTOLIC BLOOD PRESSURE: 150 MMHG | DIASTOLIC BLOOD PRESSURE: 70 MMHG

## 2017-06-05 DIAGNOSIS — I77.1 SUBCLAVIAN ARTERY STENOSIS, LEFT: ICD-10-CM

## 2017-06-05 DIAGNOSIS — E78.2 MIXED HYPERLIPIDEMIA: ICD-10-CM

## 2017-06-05 DIAGNOSIS — I25.10 CORONARY ARTERY DISEASE INVOLVING NATIVE CORONARY ARTERY OF NATIVE HEART WITHOUT ANGINA PECTORIS: ICD-10-CM

## 2017-06-05 DIAGNOSIS — I73.9 PVD (PERIPHERAL VASCULAR DISEASE) WITH CLAUDICATION: ICD-10-CM

## 2017-06-05 DIAGNOSIS — I65.29 STENOSIS OF CAROTID ARTERY, UNSPECIFIED LATERALITY: ICD-10-CM

## 2017-06-05 DIAGNOSIS — I10 ESSENTIAL HYPERTENSION: ICD-10-CM

## 2017-06-05 DIAGNOSIS — K55.1 ANGINA MESENTERIC: Primary | ICD-10-CM

## 2017-06-05 PROCEDURE — 1126F AMNT PAIN NOTED NONE PRSNT: CPT | Mod: S$GLB,,, | Performed by: INTERNAL MEDICINE

## 2017-06-05 PROCEDURE — 1159F MED LIST DOCD IN RCRD: CPT | Mod: S$GLB,,, | Performed by: INTERNAL MEDICINE

## 2017-06-05 PROCEDURE — 99214 OFFICE O/P EST MOD 30 MIN: CPT | Mod: S$GLB,,, | Performed by: INTERNAL MEDICINE

## 2017-06-05 PROCEDURE — 99499 UNLISTED E&M SERVICE: CPT | Mod: S$GLB,,, | Performed by: INTERNAL MEDICINE

## 2017-06-05 PROCEDURE — 99999 PR PBB SHADOW E&M-EST. PATIENT-LVL II: CPT | Mod: PBBFAC,,, | Performed by: INTERNAL MEDICINE

## 2017-06-05 PROCEDURE — 1157F ADVNC CARE PLAN IN RCRD: CPT | Mod: S$GLB,,, | Performed by: INTERNAL MEDICINE

## 2017-06-06 NOTE — PATIENT INSTRUCTIONS
Heart Disease Education    The heart beats 60 to 100 times per minute, 24 hours a day. This equals almost 1000,000 times a day. It pumps blood with oxygen and nutrients to the tissues and organs of the body. But the heart is a muscle and needs its own supply of blood. Blood flow to the heart is supplied by the coronary arteries. Coronary artery disease (atherosclerosis) is a result of cholesterol, saturated fat, and calcium deposits (plaques) that build up inside the walls. This causes inflammation within the coronary arteries. These plaques narrow the artery and reduce blood flow to the heart muscle. The reduction in blood flow to the heart muscle decreases oxygen supply to the heart. If the narrowing is significant enough, the oxygen supply to one or more regions of the heart can be temporarily or permanently shut down. This can cause chest pain, and possibly death of heart tissue (heart attack).  Types of chest pain  Angina is the name for pain in the heart muscle. Angina is a warning sign of serious heart disease. When untreated it can lead to a heart attack, also known as acute myocardial infarction, or AMI. Angina occurs when there is not enough blood and oxygen flowing to the heart for the amount of work it is doing. This most often happens during physical exertion, when the heart is working hardest. It is usually relieved by rest or nitroglycerin. Angina may also occur after a large meal when extra blood is sent to the digestive organs and less goes to the heart. In the case of advanced or unstable heart disease, angina can occur at rest or awaken you from sleep. Angina usually lasts from a few minutes up to 20 minutes or more. When treated early, the effects of angina can be reversed without permanent damage to the heart. Angina is a serious condition and needs to be evaluated by a medical professional immediately.  There are two types of angina -- stable and unstable:  · Stable angina usually occurs  with a predictable level of activity. Being stable, its character, severity, and occurrence do not change much over time. It usually starts with activity, and resolves with rest or taking your medicine as instructed by your doctor. The symptoms usually do not last long.  · Unstable angina changes or gets worse over time. It is different from whatever you are used to. It may feel different or worse, begin without cause, occur with exercise or exertion, wake you up from sleep, and last longer. It may not respond in the same way as it does when you take your usual medicines for an attack. This type of angina can be a warning sign of an impending heart attack.     A heart attack is usually the result of a blood clot that suddenly forms in a coronary artery that has been narrowed with plaque. When this occurs, blood flow may be cut off to a part of the heart muscle, causing the cells to die. This weakens the pumping action of the heart, which affects the delivery of blood to all the other organs in the body including the brain. This damage is not reversible. However, early treatment can limit the amount of damage.  The pain you feel with angina and a heart attack may have a similar quality. However, it is usually different in intensity and duration. Here are some typical descriptions of a heart attack:  · It is most often experienced as a squeezing, crushing, pressure-like sensation in the center of the chest.  · It is sometimes described as something heavy sitting on my chest.  · It may feel more like a bad case of indigestion.  · The pain may spread from the chest to the arm, shoulder, throat or jaw.  · Sometimes the pain is not felt in the chest at all, but only in the arm, shoulder, throat or jaw.  · There may also be nausea, vomiting, dizziness or light-headedness, sweating and trouble breathing.  · Palpitations, or your heart beating rapidly  · A new, irregular heart beat  · Unexplained weakness  You may not be  "able to tell the difference between "bad" angina and a heart attack at home. Seek help if your symptoms are different than usual. Do not be in denial or just try to "tough it out."  Call 911  This is the fastest and safest way to get to the emergency department. The paramedics can also start treatment on the way to the hospital, saving valuable time for your heart.  · If the angina gets worse, if it continues, or if it stops and returns, call 911 immediately. Do not delay. You may be having a heart attack.  · After you call 911, take a second tablet or spray unless instructed otherwise. When repeating doses, sit down if possible, because it can make you feel lightheaded or dizzy. Wait another 5 minutes. If the angina still does not go away, take a third tablet or spray. Do not take more than 3 tablets or sprays within 15 minutes. Stay on the phone with 911 for further instruction.  · Your healthcare provider may give you slightly different instructions than those above. If so, follow them carefully.  Do not wait until symptoms become severe to call 911.  Other reasons to call 911 include:  · Trouble breathing  · Feeling lightheaded, faint, or dizzy  · Rapid heart beat  · Slower than usual heart rate compared to your normal  · Angina with weakness, dizziness, fainting, heavy sweating, nausea, or vomiting  · Extreme drowsiness, confusion  · Weakness of an arm or leg or one side of the face  · Difficulty with speech or vision  When to seek medical care  Remember, the signs and symptoms of a heart attack are not always like they are on TV. Sometimes they are not so obvious. You may only feel weak, or just not right. If it is not clear or if you have any doubt, call for advice.  · Seek help if there is a change in the type of pain, if it feels different, or if your symptoms are mild.  · Do not drive yourself. Have someone else drive you. If no one can drive, call 911.  · Do not delay. Fast diagnosis and treatment can " "prevent or limit the amount of heart damage during a heart attack.  · Do not go to your doctor's office or a clinic as they may not be able to provide all the testing and treatment required for this condition.  · If your doctor has given you medicine to take when symptoms occur, take them but don't delay getting help trying to locate medicines.  What happens in the emergency department  The emergency department is connected to your local emergency medical system (EMS) through 911. That's why during a cardiac emergency, calling 911 is the fastest way to get help. The goal of the emergency department is to rapidly screen, evaluate, and treat people.  Once you are there, an electrocardiogram (ECG or heart tracing) will be done. Blood samples may be taken to look for the presence of heart enzymes that leak from damaged heart cells and show if a heart attack is occurring. You will often be evaluated by a heart specialist (cardiologist) who decides the best course of action. In the case of severe angina or early heart attack, and depending on the circumstances, powerful "clot busting" medicines can be used to dissolve blood clots in the coronary artery. In other cases, you may be taken to a cardiac catheterization lab. Here, a tiny balloon-tipped catheter is advanced through blood vessels to the heart. There the balloon is inflated pushing open the blood vessel restoring blood flow.  Risk factors for heart disease  Risk factors for heart disease are a combination of genetic and lifestyle. Many risk factors work by either directly or indirectly damaging the blood vessels of the heart, or by increasing the risk of forming blood or cholesterol clots, which then clog up and block the arteries.     Examples of physical lifestyle risk factors:  · Cigarette smoking  · High blood pressure  · High blood cholesterol  · Use of stimulant drugs such as cocaine, crack, and amphetamines  · Eating a high-fat, high-cholesterol " meal  · Diabetes   · Obesity which increases risk for diabetes and high blood pressure  · Lack of regular physical activity     Examples of emotional lifestyle factors:  · Chronic high stress levels release stress hormones. These raise blood pressure and cholesterol level and makes blood clot more easily.  · Held-in anger, hostile or cynical attitude  · Social and emotional isolation, lack of intimacy  · Loss of relationship  · Depression  Other factors that increase the risk of heart attack that you cannot control :  · Age. The older you get beyond 40, the greater is your risk of significant coronary artery disease.  · Gender. More men than women get heart disease; but once past menopause, women who are not taking estrogen replacement have the same risk as men for a heart attack.  · Family history. If your mother, father, brother or sister has coronary artery disease, your risk of having it is higher than a person your age without this family history.  What can you do to decrease your risk  To reduce your risk of heart disease:  · Get regular checkups with your doctor.  · Take your medicines for blood pressure, cholesterol or diabetes as directed.  · Watch your diet. Eat a heart healthy diet choosing fresh foods, less salt, cholesterol, and fat  · Stop smoking. Get help if needed.  · Get regular exercise.  · Manage stress.  · Carry a list of medicines and doses in your wallet.  Date Last Reviewed: 12/30/2015  © 0346-9524 [employer]. [address] [place] [address], [place] [address]. All rights reserved. This information is not intended as a substitute for professional medical care. Always follow your healthcare professional's instructions.        Taking Aspirin for Atherosclerosis       Aspirin is a medicine often prescribed to treat atherosclerosis. This condition affects your arteries. These are the blood vessels that carry blood away from your heart. Having atherosclerosis means youre at greater risk of  a heart attack or stroke. Aspirin can help prevent these from happening.  How atherosclerosis affects your arteries   A fatty material (plaque) can build up in your arteries. This makes it harder for blood to flow through them. A blood clot can then form on the plaque. This may block the artery, cutting off blood flow. This can cause conditions such as coronary artery disease (CAD) and peripheral arterial disease (PAD):  · CAD occurs when plaque builds up in the coronary artery. This artery supplies the heart with oxygen-rich blood.  · PAD occurs when plaque forms in leg arteries.  The same things that cause CAD and PAD can also cause plaque to form in other arteries in your body, such as those in the brain. When plaque occurs in any of these arteries, it raises your risk of heart attack or stroke.  What aspirin does  Aspirin is a blood-thinner (antiplatelet medicine). It helps keep blood clots from forming. This reduces the risk of blockage. Aspirin can be taken daily if you are at high risk of or have already had a heart attack or stroke. It is also used after a procedure called a stent placement. This is when a tiny wire mesh tube, or stent, is placed in an artery to keep it open. Aspirin helps prevent blood clots from forming on the stent.  Taking aspirin safely  Tell your healthcare provider about any medicines you are taking. This includes:  · All prescription medicines  · Over-the-counter medicines  · Herbs, vitamins, and other supplements  Also mention if you have a history of ulcers or bleeding problems. Ask whether you will need to stop taking aspirin before having surgery or dental work. Always take medicines as directed.  Tips for taking aspirin  · Have a routine. For example, take aspirin with the same meal each day.  · Dont take more than prescribed. A low dose gives the same benefit as a higher one, with a lower risk of side effects.  · Dont skip doses. Aspirin needs to be taken each day to work  well.  · Keep track of what you take. A pillbox with days of the week can help, especially if you take several medicines. Or use a list or chart to keep track.  When to call your healthcare provider  Side effects of aspirin are not usually serious. If you do have problems, changing your dose may help. Call your healthcare provider if you have any of the following:  · Excessive bruising (some bruising is normal)  · Nosebleeds, bleeding gums, or other excessive bleeding  · An upset stomach or stomach pain   Date Last Reviewed: 6/1/2016 © 2000-2016 [employer]. [address] [place] [address]. All rights reserved. This information is not intended as a substitute for professional medical care. Always follow your healthcare professional's instructions.        Kicking the Smoking Habit  If you smoke, quitting is one of the best changes you can make for your heart and your overall health. Your risk of heart attack goes down within one day of putting out that last cigarette. As you go longer without smoking, your risk goes down even more. Quitting isnt easy, but millions of people have done it. You can, too. Its never too late to quit.  Getting started  Boost your chances of success by deciding on your quit plan. Your health care provider and cardiac rehab team can help you develop this plan. Even if youve already quit, its easy to slip back into smoking.  Your plan can help you avoid and recover from relapse.  In any case, start by setting a date to quit within a month, and do it.    Keys to your quit plan  · Talk to your healthcare provider about prescription medicines and nicotine replacement products that help stop the urge to smoke.   · Join a support group or quit smoking program. Talking with others about the challenges of quitting can help you get through them.  · Ask other smokers in your household to quit with you.  · Look for the cues in your life that you associate with smoking and  avoid them.  Track your triggers  What gives you that Q-kiik-t-cigarette feeling? List all the situations that make you want a cigarette. Then think of other ways to deal with these situations. Here are some examples:  Situation How I'll handle it   Finishing a meal Get up from the table and take a walk   Having an argument Find a quiet place and breathe deeply   Feeling lonely or bored Call a friend to talk         Tips for quitting successfully  · List the benefits of quitting such as reducing heart risks and saving money. Keep this list and review it whenever you feel like smoking.  · Get support. Let your friends know you may call them to chat when you have an urge to smoke.  · If youve tried to quit before without success, this time avoid the triggers that may cause the relapse.  · Make the most of slip-ups. Try to learn from them, and then get back on track.  · Be accountable to your friends and your calendar so that you stay on track.  For family and friends  · Be supportive and patient. Quitting smoking can be difficult and stressful.  · If you smoke, nows a great time to quit. Even if you dont quit, never smoke around your loved one. Secondhand smoke is dangerous to his or her heart.  · The best goals are accomplished in teams. Remember that when your loved one states he or she wants to stop smoking.  Date Last Reviewed: 7/1/2016  © 7483-3912 [employer]. [address] [place] [address], [place] [address]. All rights reserved. This information is not intended as a substitute for professional medical care. Always follow your healthcare professional's instructions.

## 2017-06-06 NOTE — PROGRESS NOTES
Subjective:   Patient ID:  Brittany Haile is a 77 y.o. female who presents for evaluation and treatment of Coronary Artery Disease; Carotid Artery Disease; Peripheral Arterial Disease; Hyperlipidemia; Hypertension; and tobacco use      HPI:       She is here today with her daughter China for follow up after percutaneous intervention for mesenteric angina. She was seen in the office on 4/13/2017 and presented to the ED 4/16/2017 due to intractable pain. Her work up was completed in the hospital. CTA confirmed celiac and SMA stenosis with a patent DENISE.       Her celiac artery with 99% stenosis was treated with 6.0 x 14 mm Express SD. SMA with 80-85% and 80 mmHg peak to peak gradient was treated with 6.0 x 18 mm Express SD. She tolerated the procedure well. Her mesenteric symptoms have resolved.       She is s/p R CEA without any recent ultrasound or symptoms. She has bilateral marlee IIb claudication, R >> L.. She has L subclavian stenosis with a 50 mmHg gradient with intermittent claudication. She has coronary artery calcification noted on CT scan. She smokes. She has HTN, HLP, PAD, and glucose intolerance. She did not tolerate lipitor in the past but currently tolerating 40 mg nightly. She is taking aspirin and plavix for DAPT.       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/2017    Angina mesenteric 04/13/2017         S/p aortogram for severe mesenteric angina 4/18/2017     99% celiac artery PTAS with 6.0 x 14 mm Express SD   85% SMA with 80 mmHg gradient treated with 6.0 x 18 mm Express SD  DENISE-patent on CTA                    Tobacco abuse 04/13/2017    Coronary artery disease involving native coronary  artery of native heart without angina pectoris 04/13/2017         Arterial calcification noted incidentally on CT scan  No angina            Abdominal pain 03/02/2017    Gastritis 03/02/2017    CVD (cerebrovascular disease) 10/14/2016    Subclavian artery stenosis, left 04/15/2016         50 mmHg gradient  Asymptomatic          Glucose intolerance (impaired glucose tolerance) 11/11/2015    Leg pain, bilateral 10/13/2015    PVD (peripheral vascular disease) with claudication 10/13/2015         Diminished pedal pulses  JUAN CARLOS R 0.67 and L 0.88 5/2017      Bilateral SFA disease on ultrasound 4/2017        Chronic kidney disease, stage III (moderate) 08/19/2015    COPD (chronic obstructive pulmonary disease) 06/04/2015    HLD (hyperlipidemia) 06/04/2015    Incidental pulmonary nodule, > 3mm and < 8mm 08/01/2013    Stroke 04/12/2013    Carotid stenosis 04/12/2013         S/p R CEA Dr. Mckinley        Hypothyroid 04/12/2013    Hypertension 04/12/2013    Anxiety 04/12/2013                    LABS    LAST HbA1c  Lab Results   Component Value Date    HGBA1C 5.4 11/18/2016       Lipid panel  Lab Results   Component Value Date    CHOL 139 05/24/2017    CHOL 256 (H) 11/18/2016    CHOL 160 10/22/2015     Lab Results   Component Value Date    HDL 43 05/24/2017    HDL 53 11/18/2016    HDL 45 10/22/2015     Lab Results   Component Value Date    LDLCALC 78.6 05/24/2017    LDLCALC 175.6 (H) 11/18/2016    LDLCALC 85.2 10/22/2015     Lab Results   Component Value Date    TRIG 87 05/24/2017    TRIG 137 11/18/2016    TRIG 149 10/22/2015     Lab Results   Component Value Date    CHOLHDL 30.9 05/24/2017    CHOLHDL 20.7 11/18/2016    CHOLHDL 28.1 10/22/2015            Review of Systems   Constitution: Negative for diaphoresis, weakness, night sweats and weight gain. Weight loss: 20 lbs in the past 18 months.   HENT: Negative for congestion.    Eyes: Negative for blurred vision, discharge and double vision.   Cardiovascular:  Positive for claudication (bilteral legs and L arm). Negative for chest pain, cyanosis, dyspnea on exertion, irregular heartbeat, leg swelling, near-syncope, orthopnea, palpitations, paroxysmal nocturnal dyspnea and syncope.   Respiratory: Negative for cough, shortness of breath and wheezing.    Endocrine: Negative for cold intolerance, heat intolerance and polyphagia.   Hematologic/Lymphatic: Negative for adenopathy and bleeding problem. Does not bruise/bleed easily.   Skin: Negative for dry skin and nail changes.   Musculoskeletal: Negative for arthritis, back pain, falls, joint pain, myalgias and neck pain.   Gastrointestinal: Positive for abdominal pain (after meals). Negative for bloating, change in bowel habit and constipation.   Genitourinary: Negative for bladder incontinence, dysuria, flank pain, genital sores and missed menses.   Neurological: Negative for aphonia, brief paralysis, difficulty with concentration and dizziness.   Psychiatric/Behavioral: Negative for altered mental status and memory loss. The patient does not have insomnia.    Allergic/Immunologic: Negative for environmental allergies.       Objective:   Physical Exam   Constitutional: She is oriented to person, place, and time. She appears well-developed and well-nourished. She is not intubated.   Uses a wheelchair to ambulate   HENT:   Head: Normocephalic and atraumatic.   Right Ear: External ear normal.   Left Ear: External ear normal.   Mouth/Throat: Oropharynx is clear and moist.   Eyes: Conjunctivae and EOM are normal. Pupils are equal, round, and reactive to light. Right eye exhibits no discharge. Left eye exhibits no discharge. No scleral icterus.   Neck: Normal range of motion. Neck supple. Normal carotid pulses, no hepatojugular reflux and no JVD present. Carotid bruit is not present. No tracheal deviation present. No thyromegaly present.   Cardiovascular: Normal rate, regular rhythm, S1 normal and S2 normal.   No extrasystoles are  present. PMI is not displaced.  Exam reveals no gallop, no S3, no distant heart sounds, no friction rub and no midsystolic click.    No murmur heard.  Pulses:       Carotid pulses are 2+ on the right side with bruit, and 2+ on the left side with bruit.       Radial pulses are 2+ on the right side, and 1+ on the left side.        Femoral pulses are 2+ on the right side, and 2+ on the left side.       Popliteal pulses are 2+ on the right side, and 2+ on the left side.        Dorsalis pedis pulses are 0 on the right side, and 0 on the left side.        Posterior tibial pulses are 0 on the right side, and 0 on the left side.   Monophasic R DP without  R PT doppler signals    Monophasic L DP and biphasic L PT doppler signals   Pulmonary/Chest: Effort normal and breath sounds normal. No accessory muscle usage or stridor. No apnea, no tachypnea and no bradypnea. She is not intubated. No respiratory distress. She has no decreased breath sounds. She has no wheezes. She has no rales. She exhibits no tenderness and no bony tenderness.   Abdominal: She exhibits no distension, no pulsatile liver, no abdominal bruit, no ascites, no pulsatile midline mass and no mass. There is no tenderness. There is no rebound and no guarding.   Musculoskeletal: Normal range of motion. She exhibits no edema or tenderness.   Lymphadenopathy:     She has no cervical adenopathy.   Neurological: She is alert and oriented to person, place, and time. She has normal reflexes. No cranial nerve deficit. Coordination normal.   Skin: Skin is warm. No rash noted. No erythema. No pallor.   Dependent rubor    No ulcers   Psychiatric: She has a normal mood and affect. Her behavior is normal. Judgment and thought content normal.       Assessment:     1. Angina mesenteric    2. Stenosis of carotid artery, unspecified laterality    3. Coronary artery disease involving native coronary artery of native heart without angina pectoris    4. Essential hypertension    5.  PVD (peripheral vascular disease) with claudication    6. Subclavian artery stenosis, left    7. Mixed hyperlipidemia                Plan:           Smoking cessation  She opted for medical therapy for PAD   Pletal with exercise program   She will notify us if her symptoms deteriorate for an intervention   Proper foot care   Establish care with podiatrty         Medical therapy for L subclavian stenosis        Continue with current medical plan and lifestyle changes.  Return sooner for concerns or questions. If symptoms persist go to the ED  I have reviewed all pertinent data on this patient       I have reviewed the patient's medical history in detail and updated the computerized patient record.    Orders Placed This Encounter   Procedures    Cardiology Lab Carotid US Bilateral     Standing Status:   Future     Standing Expiration Date:   6/5/2018       Follow up as scheduled. Return sooner for concerns or questions  Follow up in 6 months          She expressed verbal understanding and agreed with the plan        Patient's Medications   New Prescriptions    No medications on file   Previous Medications    ALBUTEROL (PROVENTIL HFA) 90 MCG/ACTUATION INHALER    Inhale 2 puffs into the lungs every 4 to 6 hours as needed.    AMLODIPINE (NORVASC) 5 MG TABLET    Take 5 mg by mouth once daily.    ASPIRIN (ECOTRIN) 81 MG EC TABLET    Take 1 tablet (81 mg total) by mouth once daily.    ATORVASTATIN (LIPITOR) 40 MG TABLET    Take 1 tablet (40 mg total) by mouth once daily.    CLOPIDOGREL (PLAVIX) 75 MG TABLET    Take 1 tablet (75 mg total) by mouth once daily.    ERGOCALCIFEROL (ERGOCALCIFEROL) 50,000 UNIT CAP    Take 1 capsule (50,000 Units total) by mouth every 7 days.    ESCITALOPRAM OXALATE (LEXAPRO) 10 MG TABLET    Take 1 tablet (10 mg total) by mouth once daily.    FERROUS SULFATE 325 MG (65 MG IRON) TAB TABLET    Take 1 tablet (325 mg total) by mouth 2 (two) times daily.    FLUTICASONE-SALMETEROL 250-50 MCG/DOSE  (ADVAIR) 250-50 MCG/DOSE DISKUS INHALER    Inhale 1 puff into the lungs 2 (two) times daily.    HYDROCHLOROTHIAZIDE (MICROZIDE) 12.5 MG CAPSULE    Take 12.5 mg by mouth once daily.    HYDROCODONE-ACETAMINOPHEN 5-325MG (NORCO) 5-325 MG PER TABLET    Take 1 tablet by mouth every 8 (eight) hours as needed for Pain.    LEVOTHYROXINE (SYNTHROID) 88 MCG TABLET    TAKE 1 TABLET BY MOUTH DAILY BEFORE BREAKFAST    MECLIZINE (ANTIVERT) 25 MG TABLET    Take 1 tablet (25 mg total) by mouth every 6 (six) hours as needed for Dizziness.    PANTOPRAZOLE (PROTONIX) 40 MG TABLET    Take 1 tablet (40 mg total) by mouth once daily.    TIOTROPIUM (SPIRIVA WITH HANDIHALER) 18 MCG INHALATION CAPSULE    Inhale 1 capsule (18 mcg total) into the lungs once daily.   Modified Medications    No medications on file   Discontinued Medications    No medications on file

## 2017-06-14 DIAGNOSIS — E03.4 HYPOTHYROIDISM DUE TO ACQUIRED ATROPHY OF THYROID: ICD-10-CM

## 2017-06-14 RX ORDER — MECLIZINE HYDROCHLORIDE 25 MG/1
TABLET ORAL
Qty: 30 TABLET | Refills: 0 | Status: SHIPPED | OUTPATIENT
Start: 2017-06-14 | End: 2017-06-14 | Stop reason: SDUPTHER

## 2017-06-14 RX ORDER — ESCITALOPRAM OXALATE 5 MG/1
TABLET ORAL
Qty: 90 TABLET | Refills: 3 | Status: SHIPPED | OUTPATIENT
Start: 2017-06-14 | End: 2017-10-03 | Stop reason: SDUPTHER

## 2017-06-14 RX ORDER — LEVOTHYROXINE SODIUM 88 UG/1
88 TABLET ORAL DAILY
Qty: 90 TABLET | Refills: 0 | Status: SHIPPED | OUTPATIENT
Start: 2017-06-14 | End: 2017-10-03 | Stop reason: SDUPTHER

## 2017-06-14 RX ORDER — MECLIZINE HYDROCHLORIDE 25 MG/1
TABLET ORAL
Qty: 30 TABLET | Refills: 0 | Status: ON HOLD | OUTPATIENT
Start: 2017-06-14 | End: 2019-10-26 | Stop reason: HOSPADM

## 2017-06-14 NOTE — TELEPHONE ENCOUNTER
----- Message from Georgia Gutierrez sent at 6/14/2017  9:38 AM CDT -----  Contact: Self [phone]  Patient is calling to get refills on her medication sent to [facility] [id number] - [place] - [address] AT [facility] [place] [address] [phone] (Phone)  [phone] (Fax)    1. meclizine (ANTIVERT) 25 mg tablet 30 tablet     2. levothyroxine (SYNTHROID) 88 MCG tablet 90 tablet

## 2017-06-27 ENCOUNTER — TELEPHONE (OUTPATIENT)
Dept: FAMILY MEDICINE | Facility: CLINIC | Age: 77
End: 2017-06-27

## 2017-06-27 ENCOUNTER — HOSPITAL ENCOUNTER (OUTPATIENT)
Dept: RADIOLOGY | Facility: HOSPITAL | Age: 77
Discharge: HOME | End: 2017-06-27
Attending: FAMILY MEDICINE
Payer: MEDICARE

## 2017-06-27 ENCOUNTER — HOSPITAL ENCOUNTER (OUTPATIENT)
Dept: RADIOLOGY | Facility: HOSPITAL | Age: 77
Discharge: HOME | End: 2017-06-27
Attending: INTERNAL MEDICINE
Payer: MEDICARE

## 2017-06-27 ENCOUNTER — OFFICE VISIT (OUTPATIENT)
Dept: FAMILY MEDICINE | Facility: CLINIC | Age: 77
End: 2017-06-27
Payer: MEDICARE

## 2017-06-27 VITALS
DIASTOLIC BLOOD PRESSURE: 64 MMHG | TEMPERATURE: 98 F | OXYGEN SATURATION: 92 % | HEART RATE: 81 BPM | BODY MASS INDEX: 23.07 KG/M2 | SYSTOLIC BLOOD PRESSURE: 124 MMHG | WEIGHT: 135.13 LBS | HEIGHT: 64 IN

## 2017-06-27 DIAGNOSIS — J44.9 CHRONIC OBSTRUCTIVE PULMONARY DISEASE, UNSPECIFIED COPD TYPE: ICD-10-CM

## 2017-06-27 DIAGNOSIS — R09.02 HYPOXEMIA: ICD-10-CM

## 2017-06-27 DIAGNOSIS — J43.9 PULMONARY EMPHYSEMA, UNSPECIFIED EMPHYSEMA TYPE: Primary | ICD-10-CM

## 2017-06-27 DIAGNOSIS — J43.9 PULMONARY EMPHYSEMA, UNSPECIFIED EMPHYSEMA TYPE: ICD-10-CM

## 2017-06-27 PROCEDURE — 1159F MED LIST DOCD IN RCRD: CPT | Mod: S$GLB,,, | Performed by: FAMILY MEDICINE

## 2017-06-27 PROCEDURE — 76775 US EXAM ABDO BACK WALL LIM: CPT | Mod: 26,59,, | Performed by: RADIOLOGY

## 2017-06-27 PROCEDURE — 1125F AMNT PAIN NOTED PAIN PRSNT: CPT | Mod: S$GLB,,, | Performed by: FAMILY MEDICINE

## 2017-06-27 PROCEDURE — 99499 UNLISTED E&M SERVICE: CPT | Mod: S$GLB,,, | Performed by: FAMILY MEDICINE

## 2017-06-27 PROCEDURE — 99214 OFFICE O/P EST MOD 30 MIN: CPT | Mod: S$GLB,,, | Performed by: FAMILY MEDICINE

## 2017-06-27 PROCEDURE — 93976 VASCULAR STUDY: CPT | Mod: 26,,, | Performed by: RADIOLOGY

## 2017-06-27 PROCEDURE — 71020 XR CHEST PA AND LATERAL: CPT | Mod: 26,,, | Performed by: RADIOLOGY

## 2017-06-27 PROCEDURE — 1157F ADVNC CARE PLAN IN RCRD: CPT | Mod: S$GLB,,, | Performed by: FAMILY MEDICINE

## 2017-06-27 PROCEDURE — 71020 XR CHEST PA AND LATERAL: CPT | Mod: TC,PO

## 2017-06-27 PROCEDURE — 99999 PR PBB SHADOW E&M-EST. PATIENT-LVL III: CPT | Mod: PBBFAC,,, | Performed by: FAMILY MEDICINE

## 2017-06-27 PROCEDURE — 93976 VASCULAR STUDY: CPT | Mod: TC

## 2017-06-27 RX ORDER — PREDNISONE 20 MG/1
20 TABLET ORAL DAILY
Qty: 7 TABLET | Refills: 0 | Status: SHIPPED | OUTPATIENT
Start: 2017-06-27 | End: 2017-07-05 | Stop reason: ALTCHOICE

## 2017-06-27 RX ORDER — LEVOFLOXACIN 500 MG/1
500 TABLET, FILM COATED ORAL DAILY
Qty: 10 TABLET | Refills: 0 | Status: SHIPPED | OUTPATIENT
Start: 2017-06-27 | End: 2017-07-05

## 2017-06-27 RX ORDER — CODEINE PHOSPHATE AND GUAIFENESIN 10; 100 MG/5ML; MG/5ML
5 SOLUTION ORAL 3 TIMES DAILY PRN
Qty: 118 ML | Refills: 0 | Status: SHIPPED | OUTPATIENT
Start: 2017-06-27 | End: 2017-07-07

## 2017-06-27 RX ORDER — FLUTICASONE PROPIONATE AND SALMETEROL 250; 50 UG/1; UG/1
1 POWDER RESPIRATORY (INHALATION) 2 TIMES DAILY
Qty: 60 EACH | Refills: 0 | Status: SHIPPED | OUTPATIENT
Start: 2017-06-27 | End: 2018-01-03 | Stop reason: SDUPTHER

## 2017-06-27 RX ORDER — TIOTROPIUM BROMIDE 18 UG/1
18 CAPSULE ORAL; RESPIRATORY (INHALATION) DAILY
Qty: 90 CAPSULE | Refills: 0 | Status: SHIPPED | OUTPATIENT
Start: 2017-06-27 | End: 2018-01-03 | Stop reason: SDUPTHER

## 2017-06-27 NOTE — TELEPHONE ENCOUNTER
----- Message from Ioana Beltran sent at 6/27/2017  7:52 AM CDT -----  Contact: ms gonsalves pt's jung/[phone]  Ms gonsalves is requesting meds. For a cough for patient or ab appt. This morning

## 2017-06-27 NOTE — PROGRESS NOTES
Subjective:       Patient ID: Brittany Haile is a 77 y.o. female.    Chief Complaint: Cough; Nasal Congestion; Ear Fullness; Sinus Problem; Sore Throat; Rhinitis; and URI    77 years old female came to the clinic with cough and congestion for the past couple of weeks.  Patient with low oxygen today.  Patient was not using her oxygen.  Patient previously diagnosed with COPD.  No fever or chills.  Patient reports sore throat.      Cough   Associated symptoms include a sore throat. Pertinent negatives include no chills, fever, shortness of breath or wheezing.   Ear Fullness    Associated symptoms include coughing and a sore throat.   Sinus Problem   Associated symptoms include coughing and a sore throat. Pertinent negatives include no chills or shortness of breath.   Sore Throat    Associated symptoms include coughing and a plugged ear sensation. Pertinent negatives include no shortness of breath or stridor.   URI    Associated symptoms include coughing, a plugged ear sensation and a sore throat. Pertinent negatives include no wheezing.     Review of Systems   Constitutional: Negative for chills and fever.   HENT: Positive for sore throat.    Respiratory: Positive for cough. Negative for apnea, choking, chest tightness, shortness of breath, wheezing and stridor.        Objective:      Physical Exam   Constitutional: She is oriented to person, place, and time. She appears well-developed and well-nourished. No distress.   HENT:   Head: Normocephalic and atraumatic.   Right Ear: External ear normal.   Left Ear: External ear normal.   Nose: Nose normal.   Mouth/Throat: Oropharynx is clear and moist. No oropharyngeal exudate.   Eyes: Conjunctivae and EOM are normal. Pupils are equal, round, and reactive to light. Right eye exhibits no discharge. Left eye exhibits no discharge. No scleral icterus.   Neck: Normal range of motion. Neck supple. No JVD present. No tracheal deviation present. No thyromegaly present.    Cardiovascular: Normal rate, regular rhythm, normal heart sounds and intact distal pulses.  Exam reveals no gallop and no friction rub.    No murmur heard.  Pulmonary/Chest: Effort normal. No stridor. No respiratory distress. She has no wheezes. She has rhonchi in the right middle field and the left middle field. She has no rales. She exhibits no tenderness.   Abdominal: Soft. Bowel sounds are normal. She exhibits no distension and no mass. There is no tenderness. There is no rebound and no guarding.   Musculoskeletal: Normal range of motion. She exhibits no edema or tenderness.   Lymphadenopathy:     She has no cervical adenopathy.   Neurological: She is alert and oriented to person, place, and time. She has normal reflexes. No cranial nerve deficit. She exhibits normal muscle tone. Coordination normal.   Skin: Skin is warm and dry. No rash noted. She is not diaphoretic. No erythema. No pallor.   Psychiatric: She has a normal mood and affect. Her behavior is normal. Judgment and thought content normal.       Assessment:       1. Pulmonary emphysema, unspecified emphysema type    2. Hypoxemia    3. Chronic obstructive pulmonary disease, unspecified COPD type        Plan:         Brittany was seen today for cough, nasal congestion, ear fullness, sinus problem, sore throat, rhinitis and uri.    Diagnoses and all orders for this visit:    Pulmonary emphysema, unspecified emphysema type  -     X-Ray Chest PA And Lateral; Future    Hypoxemia  -     X-Ray Chest PA And Lateral; Future    Chronic obstructive pulmonary disease, unspecified COPD type  -     X-Ray Chest PA And Lateral; Future  -     fluticasone-salmeterol 250-50 mcg/dose (ADVAIR) 250-50 mcg/dose diskus inhaler; Inhale 1 puff into the lungs 2 (two) times daily.  -     tiotropium (SPIRIVA WITH HANDIHALER) 18 mcg inhalation capsule; Inhale 1 capsule (18 mcg total) into the lungs once daily.    Other orders  -     levoFLOXacin (LEVAQUIN) 500 MG tablet; Take 1  tablet (500 mg total) by mouth once daily.  -     predniSONE (DELTASONE) 20 MG tablet; Take 1 tablet (20 mg total) by mouth once daily.  -     guaifenesin-codeine 100-10 mg/5 ml (TUSSI-ORGANIDIN NR)  mg/5 mL syrup; Take 5 mLs by mouth 3 (three) times daily as needed for Cough.

## 2017-06-28 NOTE — PROGRESS NOTES
Unfortunately the targeted vessel could not be visualized due to gas          Perhaps we can repeat this in the future but you will fast more than 4 hours        Otherwise I am glad you are doing well        Thanks        ZN

## 2017-07-05 ENCOUNTER — HOSPITAL ENCOUNTER (OUTPATIENT)
Dept: RADIOLOGY | Facility: HOSPITAL | Age: 77
Discharge: HOME | End: 2017-07-05
Attending: FAMILY MEDICINE
Payer: MEDICARE

## 2017-07-05 ENCOUNTER — OFFICE VISIT (OUTPATIENT)
Dept: FAMILY MEDICINE | Facility: CLINIC | Age: 77
End: 2017-07-05
Payer: MEDICARE

## 2017-07-05 VITALS
HEART RATE: 79 BPM | SYSTOLIC BLOOD PRESSURE: 126 MMHG | TEMPERATURE: 99 F | DIASTOLIC BLOOD PRESSURE: 59 MMHG | OXYGEN SATURATION: 93 % | BODY MASS INDEX: 25.48 KG/M2 | HEIGHT: 61 IN | WEIGHT: 134.94 LBS

## 2017-07-05 DIAGNOSIS — R91.1 INCIDENTAL PULMONARY NODULE, > 3MM AND < 8MM: ICD-10-CM

## 2017-07-05 DIAGNOSIS — Z86.73 HISTORY OF STROKE WITHOUT RESIDUAL DEFICITS: ICD-10-CM

## 2017-07-05 DIAGNOSIS — I10 ESSENTIAL HYPERTENSION: ICD-10-CM

## 2017-07-05 DIAGNOSIS — J44.1 COPD WITH ACUTE EXACERBATION: ICD-10-CM

## 2017-07-05 DIAGNOSIS — R93.89 ABNORMAL CT SCAN, CHEST: Primary | ICD-10-CM

## 2017-07-05 DIAGNOSIS — J44.1 COPD WITH ACUTE EXACERBATION: Primary | ICD-10-CM

## 2017-07-05 DIAGNOSIS — I25.10 CORONARY ARTERY DISEASE INVOLVING NATIVE CORONARY ARTERY OF NATIVE HEART WITHOUT ANGINA PECTORIS: ICD-10-CM

## 2017-07-05 DIAGNOSIS — J41.0 SIMPLE CHRONIC BRONCHITIS: ICD-10-CM

## 2017-07-05 PROCEDURE — 99214 OFFICE O/P EST MOD 30 MIN: CPT | Mod: 25,S$GLB,, | Performed by: FAMILY MEDICINE

## 2017-07-05 PROCEDURE — 99999 PR PBB SHADOW E&M-EST. PATIENT-LVL IV: CPT | Mod: PBBFAC,,, | Performed by: FAMILY MEDICINE

## 2017-07-05 PROCEDURE — 96372 THER/PROPH/DIAG INJ SC/IM: CPT | Mod: S$GLB,,, | Performed by: FAMILY MEDICINE

## 2017-07-05 PROCEDURE — 1157F ADVNC CARE PLAN IN RCRD: CPT | Mod: S$GLB,,, | Performed by: FAMILY MEDICINE

## 2017-07-05 PROCEDURE — 1159F MED LIST DOCD IN RCRD: CPT | Mod: S$GLB,,, | Performed by: FAMILY MEDICINE

## 2017-07-05 PROCEDURE — 99499 UNLISTED E&M SERVICE: CPT | Mod: S$GLB,,, | Performed by: FAMILY MEDICINE

## 2017-07-05 PROCEDURE — 71250 CT THORAX DX C-: CPT | Mod: TC

## 2017-07-05 PROCEDURE — 1126F AMNT PAIN NOTED NONE PRSNT: CPT | Mod: S$GLB,,, | Performed by: FAMILY MEDICINE

## 2017-07-05 PROCEDURE — 71250 CT THORAX DX C-: CPT | Mod: 26,,, | Performed by: RADIOLOGY

## 2017-07-05 RX ORDER — IPRATROPIUM BROMIDE AND ALBUTEROL SULFATE 2.5; .5 MG/3ML; MG/3ML
SOLUTION RESPIRATORY (INHALATION)
Qty: 1080 ML | Refills: 3 | Status: SHIPPED | OUTPATIENT
Start: 2017-07-05 | End: 2018-10-26 | Stop reason: SDUPTHER

## 2017-07-05 RX ORDER — TRIAMCINOLONE ACETONIDE 40 MG/ML
80 INJECTION, SUSPENSION INTRA-ARTICULAR; INTRAMUSCULAR
Status: COMPLETED | OUTPATIENT
Start: 2017-07-05 | End: 2017-07-05

## 2017-07-05 RX ORDER — IPRATROPIUM BROMIDE AND ALBUTEROL SULFATE 2.5; .5 MG/3ML; MG/3ML
3 SOLUTION RESPIRATORY (INHALATION) EVERY 6 HOURS PRN
Qty: 30 VIAL | Refills: 3 | Status: SHIPPED | OUTPATIENT
Start: 2017-07-05 | End: 2017-07-05 | Stop reason: SDUPTHER

## 2017-07-05 RX ORDER — ALBUTEROL SULFATE 90 UG/1
2 AEROSOL, METERED RESPIRATORY (INHALATION)
Qty: 8.5 G | Refills: 6
Start: 2017-07-05 | End: 2018-01-03 | Stop reason: SDUPTHER

## 2017-07-05 RX ORDER — PREDNISONE 50 MG/1
50 TABLET ORAL DAILY
Qty: 5 TABLET | Refills: 0 | Status: SHIPPED | OUTPATIENT
Start: 2017-07-05 | End: 2017-07-15

## 2017-07-05 RX ADMIN — TRIAMCINOLONE ACETONIDE 80 MG: 40 INJECTION, SUSPENSION INTRA-ARTICULAR; INTRAMUSCULAR at 12:07

## 2017-07-05 NOTE — TELEPHONE ENCOUNTER
----- Message from Georgia Gutierrez sent at 7/5/2017  2:00 PM CDT -----  Contact: Self [phone]  Patient is calling to see if her albuterol (PROVENTIL HFA) 90 mcg/actuation inhaler 8.5 g was called in to the pharmacy. Please advice

## 2017-07-05 NOTE — TELEPHONE ENCOUNTER
Returned patient's call to inform her the prescription has been sent. Left a voicemail requesting a call back.

## 2017-07-05 NOTE — PROGRESS NOTES
" Office Visit    Patient Name: Brittany Haile    : 1940  MRN: [id number]    Subjective:  Brittany is a 77 y.o. female who presents today for:    Cough (ongoing for 3 weeks) and Nasal Congestion    77-year-old patient of Dr. Frazier who is here today for evaluation of chronic worsening cough over the last 3 weeks.  She has a past medical history significant for chronic COPD that is generally well managed on daily Advair and Spiriva, without need for regular rescue medication.  In fact, she does not even have an active prescription for albuterol at this time.  She has a nebulizer machine that she has not used in a very long time and has no current prescription for this.  She also has a past medical history significant for hypertension, coronary artery disease, history of congestive heart failure, lung nodules, and history of prior stroke with no residual deficit (no dysphagia).    This current cough has been ongoing over about 3 weeks.  She saw Dr. Sanford about 1 week ago and he ordered a chest x-ray that did show some emphysematous changes but no acute process.  He prescribed her Levaquin and a burst of 20 mg prednisone pills.  He also tried prescribing some guaifenesin-based cough syrup, but it sounds like insurance did not cover this.  Nothing has helped the cough which has been very severe.  She coughs almost constantly and it is causing significant muscle spasms and fatigue.  Up until last night she did not feel feverish or ill, but last night she felt "like she was burning up."  She has not had chest pain or worsening leg swelling    Past Medical History  Past Medical History:   Diagnosis Date    Abdominal pain     CHF (congestive heart failure)     Chronic kidney disease, stage III (moderate) 2015    COPD (chronic obstructive pulmonary disease)     COPD (chronic obstructive pulmonary disease)     CVA (cerebral infarction)     Esophagitis     Gastritis     GERD (gastroesophageal reflux disease)     GI " "bleed     Hyperlipidemia     Hypertension     Mesenteric angina     PVD (peripheral vascular disease) with claudication 10/13/2015    Subclavian artery stenosis, left     Thyroid disease        Past Surgical History  Past Surgical History:   Procedure Laterality Date     SECTION      COLONOSCOPY N/A 3/2/2017    Procedure: COLONOSCOPY;  Surgeon: Cecil Crooks MD;  Location: [facility] ENDO;  Service: Endoscopy;  Laterality: N/A;    CORONARY ANGIOPLASTY  2006    GALLBLADDER SURGERY      HYSTERECTOMY      OOPHORECTOMY         Family History  No family history on file.    Social History  Social History     Social History    Marital status:      Spouse name: N/A    Number of children: 4    Years of education: N/A     Occupational History    Not on file.     Social History Main Topics    Smoking status: Current Every Day Smoker     Packs/day: 0.50     Years: 60.00     Types: Cigarettes    Smokeless tobacco: Never Used    Alcohol use No    Drug use: No    Sexual activity: Not on file     Other Topics Concern    Not on file     Social History Narrative    Lives with son, 1 story house       Current Medications  Medications reviewed and updated.     Allergies   Review of patient's allergies indicates:  No Known Allergies    Review of Systems (Pertinent positives)  Review of Systems   Constitutional: Positive for fever (was not feeling feverish until last night). Negative for chills.   HENT: Negative for trouble swallowing.    Respiratory: Positive for cough and chest tightness.    Cardiovascular: Negative for chest pain and leg swelling.   Musculoskeletal: Negative for myalgias (no body aches).       BP (!) 126/59   Pulse 79   Temp 99.2 °F (37.3 °C)   Ht 5' 1" (1.549 m)   Wt 61.2 kg (134 lb 14.7 oz)   SpO2 (!) 93%   BMI 25.49 kg/m²     Physical Exam   Constitutional: She is oriented to person, place, and time. She appears well-developed and well-nourished. No distress.   HENT: "   Head: Normocephalic and atraumatic.   Eyes: Conjunctivae are normal.   Cardiovascular: Normal rate and regular rhythm.    Pulmonary/Chest: Effort normal. She has decreased breath sounds. She has rhonchi (scattered).   Musculoskeletal: She exhibits no edema.   Neurological: She is alert and oriented to person, place, and time.   Skin: Skin is warm and dry.   Psychiatric: She has a normal mood and affect.   Vitals reviewed.        Assessment/Plan:  Brittany Haile is a 77 y.o. female who presents today for :    Brittany was seen today for cough and nasal congestion.    Diagnoses and all orders for this visit:    COPD with acute exacerbation  Comments:  CT scan for further eval given ongoing severe symptoms, steroid burst and albuterol and/or duoneb every 4 hours as needed, MUCINEX for congestion  Orders:  -     CT Chest Without Contrast; Future  -     triamcinolone acetonide injection 80 mg; Inject 2 mLs (80 mg total) into the muscle one time.  -     predniSONE (DELTASONE) 50 MG Tab; Take 1 tablet (50 mg total) by mouth once daily.  -     albuterol (PROVENTIL HFA) 90 mcg/actuation inhaler; Inhale 2 puffs into the lungs every 4 to 6 hours as needed.  -     albuterol-ipratropium 2.5mg-0.5mg/3mL (DUO-NEB) 0.5 mg-3 mg(2.5 mg base)/3 mL nebulizer solution; Take 3 mLs by nebulization every 6 (six) hours as needed for Wheezing. Rescue    Simple chronic bronchitis  Comments:  continue daily advair and spiriva, along with management of ALLERGIC rhinitis symptoms previously well controlled.  Start albuterol and DuoNeb nebs as needed.    History of stroke without residual deficits  Comments:  No trouble swallowing or increased aspiration risk.    Coronary artery disease involving native coronary artery of native heart without angina pectoris  Comments:  No current angina, progressive leg swelling, or signs of volume overload.    Incidental pulmonary nodule, > 3mm and < 8mm  Comments:  CT of chest today    Essential  hypertension            ICD-10-CM ICD-9-CM    1. COPD with acute exacerbation J44.1 491.21 CT Chest Without Contrast      triamcinolone acetonide injection 80 mg      predniSONE (DELTASONE) 50 MG Tab      albuterol (PROVENTIL HFA) 90 mcg/actuation inhaler      albuterol-ipratropium 2.5mg-0.5mg/3mL (DUO-NEB) 0.5 mg-3 mg(2.5 mg base)/3 mL nebulizer solution    CT scan for further eval given ongoing severe symptoms, steroid burst and albuterol and/or duoneb every 4 hours as needed, MUCINEX for congestion   2. Simple chronic bronchitis J41.0 491.0     continue daily advair and spiriva, along with management of ALLERGIC rhinitis symptoms previously well controlled.  Start albuterol and DuoNeb nebs as needed.   3. History of stroke without residual deficits Z86.73 V12.54     No trouble swallowing or increased aspiration risk.   4. Coronary artery disease involving native coronary artery of native heart without angina pectoris I25.10 414.01     No current angina, progressive leg swelling, or signs of volume overload.   5. Incidental pulmonary nodule, > 3mm and < 8mm R91.1 793.11     CT of chest today   6. Essential hypertension I10 401.9        Return for return as needed for new concerns.

## 2017-07-06 ENCOUNTER — TELEPHONE (OUTPATIENT)
Dept: FAMILY MEDICINE | Facility: CLINIC | Age: 77
End: 2017-07-06

## 2017-07-07 ENCOUNTER — TELEPHONE (OUTPATIENT)
Dept: FAMILY MEDICINE | Facility: CLINIC | Age: 77
End: 2017-07-07

## 2017-07-07 NOTE — TELEPHONE ENCOUNTER
Spoke with the patients daughter and reviewed results. She verbalized understanding. She stated the albuterol inhaler was not at the pharmacy. Called pharmacy and gave verbal on the prescription.

## 2017-07-07 NOTE — TELEPHONE ENCOUNTER
Patient was able to  the inhaler but stated the antibiotics were not at the pharmacy. I didn't see the new ones on file. Please advise.

## 2017-07-07 NOTE — TELEPHONE ENCOUNTER
----- Message from Kane Daniel sent at 7/7/2017 10:25 AM CDT -----  Contact: Tiki Lemons/ [phone]  She is returning the nurse call.

## 2017-07-07 NOTE — TELEPHONE ENCOUNTER
----- Message from Ioana Beltran sent at 7/7/2017  3:00 PM CDT -----  Contact: [phone] ms clay pt's daught.  [facility] on [address]    Ms clay states the pharm. Does not have the inhaler and the antibiotic

## 2017-07-08 ENCOUNTER — PATIENT MESSAGE (OUTPATIENT)
Dept: FAMILY MEDICINE | Facility: CLINIC | Age: 77
End: 2017-07-08

## 2017-07-10 ENCOUNTER — TELEPHONE (OUTPATIENT)
Dept: FAMILY MEDICINE | Facility: CLINIC | Age: 77
End: 2017-07-10

## 2017-07-10 RX ORDER — DOXYCYCLINE 100 MG/1
100 CAPSULE ORAL EVERY 12 HOURS
Qty: 20 CAPSULE | Refills: 0 | Status: SHIPPED | OUTPATIENT
Start: 2017-07-10 | End: 2017-10-03

## 2017-07-10 NOTE — TELEPHONE ENCOUNTER
Sorry about this I thought it had been sent.  A prescription for doxycycline antibiotic to take for 10 days was sent to the [facility] on [address]

## 2017-07-10 NOTE — TELEPHONE ENCOUNTER
----- Message from Beverly Kathleen sent at 7/7/2017  4:57 PM CDT -----  Contact: daughter/ [phone]  Patient would like to speak with you about getting a refill. Please advise.

## 2017-07-10 NOTE — TELEPHONE ENCOUNTER
Called patient's daughter to inform her that the prescription has been sent to the pharmacy. She verbalized understanding.

## 2017-07-10 NOTE — TELEPHONE ENCOUNTER
Spoke with patients daughter. She stated she was informed her mother would be prescribed another antibiotic, but the pharmacy has nothing on file. Please advise.

## 2017-07-26 DIAGNOSIS — J44.9 CHRONIC OBSTRUCTIVE PULMONARY DISEASE, UNSPECIFIED COPD TYPE: Primary | ICD-10-CM

## 2017-08-28 ENCOUNTER — PATIENT MESSAGE (OUTPATIENT)
Dept: FAMILY MEDICINE | Facility: CLINIC | Age: 77
End: 2017-08-28

## 2017-08-28 DIAGNOSIS — R10.10 PAIN OF UPPER ABDOMEN: ICD-10-CM

## 2017-08-28 RX ORDER — HYDROCODONE BITARTRATE AND ACETAMINOPHEN 5; 325 MG/1; MG/1
1 TABLET ORAL EVERY 8 HOURS PRN
Qty: 21 TABLET | Refills: 0 | Status: SHIPPED | OUTPATIENT
Start: 2017-08-28 | End: 2019-07-31

## 2017-08-29 ENCOUNTER — TELEPHONE (OUTPATIENT)
Dept: FAMILY MEDICINE | Facility: CLINIC | Age: 77
End: 2017-08-29

## 2017-08-29 NOTE — TELEPHONE ENCOUNTER
Spoke to pt's daugher, China Alexandre. States that she will be picking up her mother medication.

## 2017-09-07 ENCOUNTER — HOSPITAL ENCOUNTER (EMERGENCY)
Facility: HOSPITAL | Age: 77
Discharge: HOME | End: 2017-09-07
Attending: EMERGENCY MEDICINE
Payer: MEDICARE

## 2017-09-07 VITALS
RESPIRATION RATE: 16 BRPM | BODY MASS INDEX: 27.56 KG/M2 | HEART RATE: 63 BPM | DIASTOLIC BLOOD PRESSURE: 54 MMHG | HEIGHT: 61 IN | SYSTOLIC BLOOD PRESSURE: 189 MMHG | TEMPERATURE: 98 F | OXYGEN SATURATION: 94 % | WEIGHT: 146 LBS

## 2017-09-07 DIAGNOSIS — I10 ESSENTIAL HYPERTENSION: Primary | ICD-10-CM

## 2017-09-07 DIAGNOSIS — R51.9 HEADACHE, UNSPECIFIED HEADACHE TYPE: ICD-10-CM

## 2017-09-07 LAB
ALBUMIN SERPL BCP-MCNC: 3.9 G/DL
ALP SERPL-CCNC: 109 U/L
ALT SERPL W/O P-5'-P-CCNC: 16 U/L
ANION GAP SERPL CALC-SCNC: 12 MMOL/L
AST SERPL-CCNC: 28 U/L
BASOPHILS # BLD AUTO: 0.02 K/UL
BASOPHILS NFR BLD: 0.4 %
BILIRUB SERPL-MCNC: 0.4 MG/DL
BUN SERPL-MCNC: 19 MG/DL
CALCIUM SERPL-MCNC: 9 MG/DL
CHLORIDE SERPL-SCNC: 101 MMOL/L
CO2 SERPL-SCNC: 23 MMOL/L
CREAT SERPL-MCNC: 0.9 MG/DL
DIFFERENTIAL METHOD: ABNORMAL
EOSINOPHIL # BLD AUTO: 0.1 K/UL
EOSINOPHIL NFR BLD: 2.1 %
ERYTHROCYTE [DISTWIDTH] IN BLOOD BY AUTOMATED COUNT: 15.9 %
EST. GFR  (AFRICAN AMERICAN): >60 ML/MIN/1.73 M^2
EST. GFR  (NON AFRICAN AMERICAN): >60 ML/MIN/1.73 M^2
GLUCOSE SERPL-MCNC: 104 MG/DL
HCT VFR BLD AUTO: 43.7 %
HGB BLD-MCNC: 14.2 G/DL
LYMPHOCYTES # BLD AUTO: 1.3 K/UL
LYMPHOCYTES NFR BLD: 24.3 %
MCH RBC QN AUTO: 29.5 PG
MCHC RBC AUTO-ENTMCNC: 32.5 G/DL
MCV RBC AUTO: 91 FL
MONOCYTES # BLD AUTO: 0.5 K/UL
MONOCYTES NFR BLD: 9.7 %
NEUTROPHILS # BLD AUTO: 3.4 K/UL
NEUTROPHILS NFR BLD: 63.5 %
PLATELET # BLD AUTO: 207 K/UL
PMV BLD AUTO: 9.9 FL
POTASSIUM SERPL-SCNC: 5 MMOL/L
PROT SERPL-MCNC: 7.5 G/DL
RBC # BLD AUTO: 4.82 M/UL
SODIUM SERPL-SCNC: 136 MMOL/L
WBC # BLD AUTO: 5.36 K/UL

## 2017-09-07 PROCEDURE — 85025 COMPLETE CBC W/AUTO DIFF WBC: CPT

## 2017-09-07 PROCEDURE — 25000003 PHARM REV CODE 250: Performed by: EMERGENCY MEDICINE

## 2017-09-07 PROCEDURE — 93010 ELECTROCARDIOGRAM REPORT: CPT | Mod: ,,, | Performed by: INTERNAL MEDICINE

## 2017-09-07 PROCEDURE — 80053 COMPREHEN METABOLIC PANEL: CPT

## 2017-09-07 PROCEDURE — 96374 THER/PROPH/DIAG INJ IV PUSH: CPT

## 2017-09-07 PROCEDURE — 99284 EMERGENCY DEPT VISIT MOD MDM: CPT | Mod: 25

## 2017-09-07 PROCEDURE — 93005 ELECTROCARDIOGRAM TRACING: CPT

## 2017-09-07 PROCEDURE — 96376 TX/PRO/DX INJ SAME DRUG ADON: CPT

## 2017-09-07 RX ORDER — AMLODIPINE BESYLATE 5 MG/1
5 TABLET ORAL 2 TIMES DAILY
Qty: 30 TABLET | Refills: 0 | Status: SHIPPED | OUTPATIENT
Start: 2017-09-07 | End: 2017-10-03 | Stop reason: SDUPTHER

## 2017-09-07 RX ORDER — LABETALOL HYDROCHLORIDE 5 MG/ML
20 INJECTION, SOLUTION INTRAVENOUS
Status: COMPLETED | OUTPATIENT
Start: 2017-09-07 | End: 2017-09-07

## 2017-09-07 RX ORDER — AMLODIPINE BESYLATE 5 MG/1
5 TABLET ORAL
Status: COMPLETED | OUTPATIENT
Start: 2017-09-07 | End: 2017-09-07

## 2017-09-07 RX ADMIN — LABETALOL HYDROCHLORIDE 20 MG: 5 INJECTION, SOLUTION INTRAVENOUS at 07:09

## 2017-09-07 RX ADMIN — AMLODIPINE BESYLATE 5 MG: 5 TABLET ORAL at 09:09

## 2017-09-07 RX ADMIN — LABETALOL HYDROCHLORIDE 20 MG: 5 INJECTION, SOLUTION INTRAVENOUS at 09:09

## 2017-09-08 NOTE — ED NOTES
Pt and her daughter in room arguing.  Pt pulling leads and everything off stating she is leaving.  Daughter states patient is not leaving and her  will be here to stay with patient.  Dr. Sotelo in room talking to patient.  Patient agreed to get back in bed and wait for results.  Pt crying.

## 2017-09-08 NOTE — ED PROVIDER NOTES
Encounter Date: 2017       History     Chief Complaint   Patient presents with    Hypertension     hx of HTN. took BP at home which read 228/80. pt. then took PO BP around 1700. reports R sided HA. HA radiates down neck. denies dizzness or light headedness. denies vision changes. denies n.v.      78 Y/O FEMALE PRESENTS WITH a dull right frontal headche which began today.  This is a recurrent problem for her when her BP goes up.  She has been taking her medication.  She also complains of right sided neck pain which has been present for the past 10 years ever since she had carotid stent.  She is a vasculopath and does not have palpable pulses in her feet or left wrist.  She is taking her medications well and no cold medications.   She wants to get on soemthing for her blood pressure and is very concerned about not being hospitalized.  She wants to go home tonight.          Review of patient's allergies indicates:  No Known Allergies  Past Medical History:   Diagnosis Date    Abdominal pain     CHF (congestive heart failure)     Chronic kidney disease, stage III (moderate) 2015    COPD (chronic obstructive pulmonary disease)     COPD (chronic obstructive pulmonary disease)     CVA (cerebral infarction)     Esophagitis     Gastritis     GERD (gastroesophageal reflux disease)     GI bleed     Hyperlipidemia     Hypertension     Mesenteric angina     PVD (peripheral vascular disease) with claudication 10/13/2015    Stroke     Subclavian artery stenosis, left     Thyroid disease      Past Surgical History:   Procedure Laterality Date    CAROTID STENT       SECTION      COLONOSCOPY N/A 3/2/2017    Procedure: COLONOSCOPY;  Surgeon: Cecil Crooks MD;  Location: [facility];  Service: Endoscopy;  Laterality: N/A;    CORONARY ANGIOPLASTY  2006    GALLBLADDER SURGERY      HYSTERECTOMY      OOPHORECTOMY      SUPERIOR MESTENTERIC ARTERY STENT       History reviewed. No pertinent family  history.  Social History   Substance Use Topics    Smoking status: Current Every Day Smoker     Packs/day: 0.50     Years: 60.00     Types: Cigarettes    Smokeless tobacco: Current User    Alcohol use No     Review of Systems   Constitutional: Negative for chills and fever.   HENT: Negative for congestion.    Eyes: Negative for pain.   Respiratory: Negative for shortness of breath.    Cardiovascular: Negative for chest pain and palpitations.   Gastrointestinal: Negative for abdominal pain.   Endocrine: Negative for polydipsia and polyuria.   Genitourinary: Negative for difficulty urinating.   Musculoskeletal: Positive for neck pain.   Neurological: Positive for headaches.   Hematological: Negative for adenopathy.   Psychiatric/Behavioral: Negative for confusion.       Physical Exam     Initial Vitals [09/07/17 1901]   BP Pulse Resp Temp SpO2   (!) 227/95 81 17 98.4 °F (36.9 °C) 95 %      MAP       139         Physical Exam    Constitutional: She appears well-developed. No distress.   HENT:   Head: Normocephalic.   Eyes: EOM are normal. Pupils are equal, round, and reactive to light.   Neck: Normal range of motion. Neck supple. No thyromegaly present. No tracheal deviation present. No JVD present.   Cardiovascular: Normal rate, regular rhythm and normal heart sounds. Exam reveals no gallop and no friction rub.    No murmur heard.  Pulmonary/Chest: Breath sounds normal. No stridor. No respiratory distress. She has no wheezes. She has no rhonchi. She has no rales. She exhibits no tenderness.   Abdominal: Soft. Bowel sounds are normal. She exhibits no distension and no mass. There is no tenderness. There is no rebound and no guarding.   Musculoskeletal: Normal range of motion. She exhibits no edema or tenderness.   Legs well perfused but faint pulses to DP.    Arms well perfused.  Has right radial pulse   Lymphadenopathy:     She has no cervical adenopathy.   Neurological: She is alert. She has normal strength. No  cranial nerve deficit or sensory deficit.   Skin: Skin is warm and dry. Capillary refill takes less than 2 seconds. No rash noted.   Psychiatric: She has a normal mood and affect.         ED Course   Procedures  Labs Reviewed   CBC W/ AUTO DIFFERENTIAL - Abnormal; Notable for the following:        Result Value    RDW 15.9 (*)     All other components within normal limits   COMPREHENSIVE METABOLIC PANEL             Medical Decision Making:   Initial Assessment:   Hypertension with headache.  Neck symptoms are chronic.  Doubt SAH, but will CT for ICH.  We'll give IV labetolol and PO norvasc.  ED Management:  Patient had argument with daughter which got her upset.    BP settled down and she has no headache  Will discharge home                   ED Course      Clinical Impression:   The primary encounter diagnosis was Essential hypertension. A diagnosis of Headache, unspecified headache type was also pertinent to this visit.    Disposition:   Disposition: Discharged  Condition: Stable                        Thomas Sotelo MD  09/08/17 4462

## 2017-09-08 NOTE — ED NOTES
Patient identifiers for Brittany Haile checked and correct.  LOC: The patient is awake, alert and aware of environment with an appropriate affect, the patient is oriented x 3 and speaking appropriately.  APPEARANCE: Patient resting comfortably and in no acute distress, patient is clean and well groomed, patient's clothing are properly fastened.  SKIN: The skin is warm and dry, patient has normal skin turgor and moist mucus membranes.  MUSKULOSKELETAL: Patient moving all extremities well, no obvious swelling or deformities noted.  RESPIRATORY: Airway is open and patent, respirations are spontaneous, patient has a normal effort and rate.  CARDIAC: Patient has a normal rate and rhythm, no periphreal edema noted, capillary refill < 3 seconds.  ABDOMEN: Soft and non tender to palpation, no distention noted.  NEUROLOGIC: PERRL, facial expression is symmetrical, bilateral hand grasp equal and even, normal sensation in all extremities when touched with a finger.

## 2017-10-03 ENCOUNTER — TELEPHONE (OUTPATIENT)
Dept: FAMILY MEDICINE | Facility: CLINIC | Age: 77
End: 2017-10-03

## 2017-10-03 ENCOUNTER — LAB VISIT (OUTPATIENT)
Dept: LAB | Facility: HOSPITAL | Age: 77
End: 2017-10-03
Attending: FAMILY MEDICINE
Payer: MEDICARE

## 2017-10-03 ENCOUNTER — OFFICE VISIT (OUTPATIENT)
Dept: FAMILY MEDICINE | Facility: CLINIC | Age: 77
End: 2017-10-03
Attending: FAMILY MEDICINE
Payer: MEDICARE

## 2017-10-03 VITALS
WEIGHT: 135.13 LBS | BODY MASS INDEX: 25.51 KG/M2 | OXYGEN SATURATION: 95 % | SYSTOLIC BLOOD PRESSURE: 139 MMHG | DIASTOLIC BLOOD PRESSURE: 72 MMHG | HEART RATE: 75 BPM | HEIGHT: 61 IN

## 2017-10-03 DIAGNOSIS — I63.9 CEREBROVASCULAR ACCIDENT (CVA), UNSPECIFIED MECHANISM: ICD-10-CM

## 2017-10-03 DIAGNOSIS — R73.02 GLUCOSE INTOLERANCE (IMPAIRED GLUCOSE TOLERANCE): ICD-10-CM

## 2017-10-03 DIAGNOSIS — I67.9 CVD (CEREBROVASCULAR DISEASE): ICD-10-CM

## 2017-10-03 DIAGNOSIS — E78.2 MIXED HYPERLIPIDEMIA: ICD-10-CM

## 2017-10-03 DIAGNOSIS — N18.30 CHRONIC KIDNEY DISEASE, STAGE III (MODERATE): ICD-10-CM

## 2017-10-03 DIAGNOSIS — I10 ESSENTIAL HYPERTENSION: Primary | ICD-10-CM

## 2017-10-03 DIAGNOSIS — Z72.0 TOBACCO ABUSE: ICD-10-CM

## 2017-10-03 DIAGNOSIS — E03.9 HYPOTHYROIDISM, UNSPECIFIED TYPE: ICD-10-CM

## 2017-10-03 DIAGNOSIS — F41.9 ANXIETY: ICD-10-CM

## 2017-10-03 DIAGNOSIS — K21.9 GASTROESOPHAGEAL REFLUX DISEASE, ESOPHAGITIS PRESENCE NOT SPECIFIED: ICD-10-CM

## 2017-10-03 DIAGNOSIS — E03.4 HYPOTHYROIDISM DUE TO ACQUIRED ATROPHY OF THYROID: ICD-10-CM

## 2017-10-03 DIAGNOSIS — J41.0 SIMPLE CHRONIC BRONCHITIS: ICD-10-CM

## 2017-10-03 DIAGNOSIS — E55.9 VITAMIN D DEFICIENCY: ICD-10-CM

## 2017-10-03 DIAGNOSIS — I70.0 AORTIC ATHEROSCLEROSIS: ICD-10-CM

## 2017-10-03 DIAGNOSIS — I73.9 PVD (PERIPHERAL VASCULAR DISEASE) WITH CLAUDICATION: ICD-10-CM

## 2017-10-03 DIAGNOSIS — I25.10 CORONARY ARTERY DISEASE INVOLVING NATIVE CORONARY ARTERY OF NATIVE HEART WITHOUT ANGINA PECTORIS: ICD-10-CM

## 2017-10-03 LAB
ESTIMATED AVG GLUCOSE: 120 MG/DL
HBA1C MFR BLD HPLC: 5.8 %
T4 FREE SERPL-MCNC: 0.99 NG/DL
THYROPEROXIDASE IGG SERPL-ACNC: <6 IU/ML
TSH SERPL DL<=0.005 MIU/L-ACNC: 5.82 UIU/ML

## 2017-10-03 PROCEDURE — 84443 ASSAY THYROID STIM HORMONE: CPT

## 2017-10-03 PROCEDURE — 99214 OFFICE O/P EST MOD 30 MIN: CPT | Mod: S$GLB,,, | Performed by: FAMILY MEDICINE

## 2017-10-03 PROCEDURE — 83036 HEMOGLOBIN GLYCOSYLATED A1C: CPT

## 2017-10-03 PROCEDURE — 84439 ASSAY OF FREE THYROXINE: CPT

## 2017-10-03 PROCEDURE — 86376 MICROSOMAL ANTIBODY EACH: CPT

## 2017-10-03 PROCEDURE — 99499 UNLISTED E&M SERVICE: CPT | Mod: S$GLB,,, | Performed by: FAMILY MEDICINE

## 2017-10-03 PROCEDURE — 36415 COLL VENOUS BLD VENIPUNCTURE: CPT

## 2017-10-03 PROCEDURE — 99999 PR PBB SHADOW E&M-EST. PATIENT-LVL III: CPT | Mod: PBBFAC,,, | Performed by: FAMILY MEDICINE

## 2017-10-03 RX ORDER — OLMESARTAN MEDOXOMIL 40 MG/1
40 TABLET ORAL DAILY
Qty: 90 TABLET | Refills: 0 | Status: SHIPPED | OUTPATIENT
Start: 2017-10-03 | End: 2017-11-09 | Stop reason: SDUPTHER

## 2017-10-03 RX ORDER — AMLODIPINE BESYLATE 10 MG/1
10 TABLET ORAL DAILY
Qty: 90 TABLET | Refills: 3 | Status: SHIPPED | OUTPATIENT
Start: 2017-10-03 | End: 2018-09-12 | Stop reason: SDUPTHER

## 2017-10-03 RX ORDER — LEVOTHYROXINE SODIUM 88 UG/1
88 TABLET ORAL DAILY
Qty: 90 TABLET | Refills: 0 | Status: SHIPPED | OUTPATIENT
Start: 2017-10-03 | End: 2017-10-03 | Stop reason: SDUPTHER

## 2017-10-03 RX ORDER — LEVOTHYROXINE SODIUM 88 UG/1
88 TABLET ORAL DAILY
Qty: 90 TABLET | Refills: 3 | Status: SHIPPED | OUTPATIENT
Start: 2017-10-03 | End: 2018-09-12 | Stop reason: SDUPTHER

## 2017-10-03 RX ORDER — PANTOPRAZOLE SODIUM 40 MG/1
40 TABLET, DELAYED RELEASE ORAL DAILY
Qty: 90 TABLET | Refills: 3 | Status: SHIPPED | OUTPATIENT
Start: 2017-10-03 | End: 2018-09-12 | Stop reason: SDUPTHER

## 2017-10-03 RX ORDER — ERGOCALCIFEROL 1.25 MG/1
50000 CAPSULE ORAL
Qty: 12 CAPSULE | Refills: 3 | Status: SHIPPED | OUTPATIENT
Start: 2017-10-03 | End: 2018-05-27 | Stop reason: SDUPTHER

## 2017-10-03 RX ORDER — ESCITALOPRAM OXALATE 5 MG/1
5 TABLET ORAL DAILY
Qty: 90 TABLET | Refills: 3 | Status: SHIPPED | OUTPATIENT
Start: 2017-10-03 | End: 2018-07-25 | Stop reason: SDUPTHER

## 2017-10-03 NOTE — TELEPHONE ENCOUNTER
----- Message from Wayne Frazier MD sent at 10/3/2017  1:08 PM CDT -----  Call    TSh is high so we have to adjust synthroid - take 2 synthroid 88 mcg tablets on sundays and one tablet daily rest of the week

## 2017-10-03 NOTE — PROGRESS NOTES
Subjective:       Patient ID: Brittany Haile is a 77 y.o. female.    Chief Complaint: Hypertension and Knee Pain (Right Knee)    77 yr old pleasant female with CVA, Hypothyroidism, HTN, HLD, COPD, GERD, carotid artery disease/stenosis, anxiety, presents today for her 3 month follow up and no new complaints today.      CVA - had stroke many years ago and has no deficits - it was TIA and they could not find any problem - she is continuing to smoke and no desire to quit as of yet    CKD III - follows nephrology - labs UTD and reassuring      Hypothyroidism - controlled -    TSH                      1.807               01/18/2017                         -      on synthroid daily - compliant - no side effects      HTN - controlled- on Benicar 40 and norvasc 10 - no side effects - labs reassuring      HLD - controlled - on statin - follows cardiology -   LDLCALC                  78.6                05/24/2017                PAD/PVD - improved after procedure - still smoking though    COPD - controlled - on advair daily and home oxygen - still smokes and no desire to quit - she drops her sats when she walks and she will benefit from ambulatory or portable oxygen      History as below - reviewed      Health maintenance  -labs UTD  -vaccines due                          Leg Pain    There was no injury mechanism. The pain is present in the left leg and right leg. The quality of the pain is described as aching. The pain is at a severity of 8/10. The pain is severe. The pain has been intermittent since onset. Pertinent negatives include no inability to bear weight, loss of motion, muscle weakness or numbness. The symptoms are aggravated by movement. She has tried nothing for the symptoms. The treatment provided no relief.   Medication Refill   This is a chronic problem. The current episode started more than 1 year ago. The problem occurs constantly. The problem has been gradually improving. Associated symptoms include arthralgias,  myalgias and neck pain. Pertinent negatives include no abdominal pain, chest pain, chills, congestion, diaphoresis, fatigue, headaches, nausea, numbness, rash, sore throat or weakness. Nothing aggravates the symptoms. Treatments tried: as below. The treatment provided significant relief.   Hypertension   This is a chronic problem. The current episode started more than 1 year ago. The problem has been gradually improving since onset. The problem is controlled. Associated symptoms include anxiety and neck pain. Pertinent negatives include no chest pain, headaches, malaise/fatigue, orthopnea, palpitations, peripheral edema or shortness of breath. There are no associated agents to hypertension. Past treatments include ACE inhibitors and calcium channel blockers. The current treatment provides significant improvement. There are no compliance problems.  Hypertensive end-organ damage includes a thyroid problem. There is no history of angina, CAD/MI, CVA, left ventricular hypertrophy, PVD, renovascular disease or retinopathy. There is no history of chronic renal disease, coarctation of the aorta, hypercortisolism, hyperparathyroidism or pheochromocytoma.   Hyperlipidemia   This is a chronic problem. The current episode started more than 1 year ago. The problem is controlled. Recent lipid tests were reviewed and are normal. She has no history of chronic renal disease, diabetes, hypothyroidism or liver disease. There are no known factors aggravating her hyperlipidemia. Associated symptoms include leg pain and myalgias. Pertinent negatives include no chest pain or shortness of breath. Current antihyperlipidemic treatment includes statins. The current treatment provides significant improvement of lipids. There are no compliance problems.  Risk factors for coronary artery disease include dyslipidemia, hypertension and post-menopausal.   Thyroid Problem   Presents for follow-up visit. Patient reports no anxiety, cold intolerance,  constipation, depressed mood, diaphoresis, diarrhea, dry skin, fatigue, hair loss, hoarse voice, menstrual problem, nail problem, palpitations, tremors or weight loss. The symptoms have been stable. Past treatments include levothyroxine. The treatment provided significant relief. Prior procedures include thyroid ultrasound. The following procedures have not been performed: thyroid FNA. Her past medical history is significant for hyperlipidemia. There is no history of diabetes, Graves' ophthalmopathy or neuropathy. There are no known risk factors.   Gastroesophageal Reflux   She complains of heartburn. She reports no abdominal pain, no chest pain, no choking, no dysphagia, no early satiety, no hoarse voice, no nausea, no sore throat, no tooth decay or no wheezing. This is a chronic problem. The current episode started more than 1 year ago. The problem occurs constantly. The problem has been gradually improving. The heartburn is located in the abdomen. The heartburn is of moderate intensity. The heartburn does not wake her from sleep. The heartburn does not limit her activity. The heartburn doesn't change with position. Nothing aggravates the symptoms. Pertinent negatives include no fatigue, muscle weakness or weight loss. She has tried a PPI for the symptoms. The treatment provided significant relief. Past procedures do not include an abdominal ultrasound, esophageal manometry or H. pylori antibody titer. Past invasive treatments do not include gastroplasty, gastroplication or reflux surgery.   Anxiety   Presents for follow-up visit. Patient reports no chest pain, confusion, decreased concentration, depressed mood, dizziness, excessive worry, feeling of choking, impotence, irritability, nausea, nervous/anxious behavior, palpitations, shortness of breath or suicidal ideas. Symptoms occur occasionally. The severity of symptoms is mild. Nothing aggravates the symptoms. The quality of sleep is fair. Nighttime awakenings:  occasional.     There are no known risk factors. Her past medical history is significant for anxiety/panic attacks. There is no history of anemia, asthma, bipolar disorder, CAD, chronic lung disease, depression, hyperthyroidism or suicide attempts. Past treatments include SSRIs. The treatment provided significant relief. Compliance with prior treatments has been good. Compliance with medications is %.   Neck Pain    This is a chronic problem. The current episode started more than 1 year ago. The problem occurs constantly. The problem has been gradually worsening. The pain is associated with nothing. The pain is present in the occipital region. The quality of the pain is described as shooting and cramping. The pain is at a severity of 7/10. The pain is severe. The symptoms are aggravated by bending, twisting and stress. The pain is worse during the night. Associated symptoms include leg pain. Pertinent negatives include no chest pain, headaches, numbness, weakness or weight loss. She has tried heat, muscle relaxants and NSAIDs for the symptoms. The treatment provided mild relief.   Back Pain   This is a chronic problem. The current episode started more than 1 year ago. The problem occurs constantly. The problem has been gradually worsening since onset. The pain is present in the lumbar spine. The quality of the pain is described as aching. The pain radiates to the right foot and left foot. The pain is at a severity of 8/10. The pain is severe. The pain is worse during the night. The symptoms are aggravated by bending, sitting and twisting. Associated symptoms include leg pain. Pertinent negatives include no abdominal pain, chest pain, dysuria, headaches, numbness, pelvic pain, weakness or weight loss. She has tried heat, ice, muscle relaxant and NSAIDs for the symptoms. The treatment provided moderate relief.     Review of Systems   Constitutional: Negative.  Negative for activity change, chills, diaphoresis,  fatigue, irritability, malaise/fatigue, unexpected weight change and weight loss.   HENT: Negative.  Negative for congestion, ear discharge, hearing loss, hoarse voice, rhinorrhea, sore throat and voice change.    Eyes: Negative.  Negative for pain, discharge and visual disturbance.   Respiratory: Negative.  Negative for choking, chest tightness, shortness of breath and wheezing.    Cardiovascular: Negative.  Negative for chest pain, palpitations and orthopnea.   Gastrointestinal: Positive for heartburn. Negative for abdominal distention, abdominal pain, anal bleeding, constipation, diarrhea, dysphagia and nausea.   Endocrine: Negative.  Negative for cold intolerance, polydipsia and polyuria.   Genitourinary: Negative.  Negative for decreased urine volume, difficulty urinating, dysuria, frequency, impotence, menstrual problem, pelvic pain and vaginal pain.   Musculoskeletal: Positive for arthralgias, back pain, myalgias and neck pain. Negative for gait problem and muscle weakness.   Skin: Negative.  Negative for color change, pallor, rash and wound.   Allergic/Immunologic: Negative.  Negative for environmental allergies and immunocompromised state.   Neurological: Negative.  Negative for dizziness, tremors, seizures, speech difficulty, weakness, numbness and headaches.   Hematological: Negative.  Negative for adenopathy. Does not bruise/bleed easily.   Psychiatric/Behavioral: Negative.  Negative for agitation, confusion, decreased concentration, hallucinations, self-injury and suicidal ideas. The patient is not nervous/anxious.        PMH/PSH/FH/SH/MED/ALLERGY reviewed    Objective:       Vitals:    10/03/17 0816   BP: 139/72   Pulse: 75       Physical Exam   Constitutional: She is oriented to person, place, and time. She appears well-developed and well-nourished. No distress.   HENT:   Head: Normocephalic and atraumatic.   Right Ear: External ear normal.   Left Ear: External ear normal.   Nose: Nose normal.    Mouth/Throat: Oropharynx is clear and moist. No oropharyngeal exudate.   Eyes: Conjunctivae and EOM are normal. Pupils are equal, round, and reactive to light. Right eye exhibits no discharge. Left eye exhibits no discharge. No scleral icterus.   Neck: Normal range of motion. Neck supple. No JVD present. No tracheal deviation present. No thyromegaly present.   Cardiovascular: Normal rate, regular rhythm and normal heart sounds.  Exam reveals no gallop and no friction rub.    No murmur heard.  Pulmonary/Chest: Effort normal and breath sounds normal. No stridor. She has no wheezes. She has no rales. She exhibits no tenderness.   Abdominal: Soft. Bowel sounds are normal. She exhibits no distension and no mass. There is no tenderness. There is no rebound and no guarding. No hernia.   Musculoskeletal: Normal range of motion. She exhibits tenderness (mild TTP left patellar tendon). She exhibits no edema.   Lymphadenopathy:     She has no cervical adenopathy.   Neurological: She is alert and oriented to person, place, and time. She has normal reflexes. She displays normal reflexes. No cranial nerve deficit. She exhibits normal muscle tone. Coordination normal.   Skin: Skin is warm and dry. No rash noted. She is not diaphoretic. No erythema. No pallor.   Psychiatric: She has a normal mood and affect. Her behavior is normal. Judgment and thought content normal.       Assessment:       1. Essential hypertension    2. Mixed hyperlipidemia    3. PVD (peripheral vascular disease) with claudication    4. Coronary artery disease involving native coronary artery of native heart without angina pectoris    5. Anxiety    6. CVD (cerebrovascular disease)    7. Hypothyroidism, unspecified type    8. Glucose intolerance (impaired glucose tolerance)    9. Tobacco abuse    10. Simple chronic bronchitis    11. Cerebrovascular accident (CVA), unspecified mechanism    12. Hypothyroidism due to acquired atrophy of thyroid    13. Vitamin D  deficiency    14. Gastroesophageal reflux disease, esophagitis presence not specified    15. Chronic kidney disease, stage III (moderate)    16. Aortic atherosclerosis        Plan:       Brittany was seen today for hypertension and knee pain.    Diagnoses and all orders for this visit:    Essential hypertension  -     olmesartan (BENICAR) 40 MG tablet; Take 1 tablet (40 mg total) by mouth once daily.  -     amlodipine (NORVASC) 10 MG tablet; Take 1 tablet (10 mg total) by mouth once daily.    Mixed hyperlipidemia    PVD (peripheral vascular disease) with claudication    Coronary artery disease involving native coronary artery of native heart without angina pectoris    Anxiety  -     escitalopram oxalate (LEXAPRO) 5 MG Tab; Take 1 tablet (5 mg total) by mouth once daily.    CVD (cerebrovascular disease)    Hypothyroidism, unspecified type    Glucose intolerance (impaired glucose tolerance)  -     Hemoglobin A1c; Future    Tobacco abuse    Simple chronic bronchitis    Cerebrovascular accident (CVA), unspecified mechanism    Hypothyroidism due to acquired atrophy of thyroid  -     Discontinue: levothyroxine (SYNTHROID) 88 MCG tablet; Take 1 tablet (88 mcg total) by mouth once daily.  -     levothyroxine (SYNTHROID) 88 MCG tablet; Take 1 tablet (88 mcg total) by mouth once daily.  -     TSH; Future  -     THYROID PEROXIDASE ANTIBODY; Future    Vitamin D deficiency  -     ergocalciferol (ERGOCALCIFEROL) 50,000 unit Cap; Take 1 capsule (50,000 Units total) by mouth every 7 days.  -     Vitamin D; Future    Gastroesophageal reflux disease, esophagitis presence not specified  -     pantoprazole (PROTONIX) 40 MG tablet; Take 1 tablet (40 mg total) by mouth once daily.    Chronic kidney disease, stage III (moderate)    Aortic atherosclerosis        HTN  -controlled  -continue Benicar and Norvasc    carotod artery disease/stenosis/bruit/PAD  -follows vascular medicine/cardiology  -statin for HLD    GERD  -controlled  -refilled  PPI    Hypothyroidism  -controlled    CVA  -stable and baseline and no deficits    COPD  -stable and controlled  -on advair/spiriva  -recommend portable or ambulatory oxygen    Muscle spasm  -flexeril prn nightly    CKD III  -baseline  -follows nephrology    Osteopenia  -vit D and dexa    Spent adequate time in obtaining history and explaining differentials    40 minutes spent during this visit of which greater than 50% devoted to face-face counseling and coordination of care regarding diagnosis and management plan    Return in about 3 months (around 1/3/2018), or if symptoms worsen or fail to improve.

## 2017-10-27 RX ORDER — CLOPIDOGREL BISULFATE 75 MG/1
TABLET ORAL
Qty: 90 TABLET | Refills: 3 | Status: SHIPPED | OUTPATIENT
Start: 2017-10-27 | End: 2018-10-10 | Stop reason: SDUPTHER

## 2017-11-17 ENCOUNTER — TELEPHONE (OUTPATIENT)
Dept: FAMILY MEDICINE | Facility: CLINIC | Age: 77
End: 2017-11-17

## 2017-11-17 NOTE — TELEPHONE ENCOUNTER
Spoke to pt and states that she has right ear pain, sore throat, nasal drip, fever and cough. Pt requesting antibiotics. Pt was informed that she has to come in for an appt so that the doctor can look in her ear and because of the fever. Pt did not want to schedule an appt.

## 2017-11-17 NOTE — TELEPHONE ENCOUNTER
----- Message from Ioana Beltran sent at 11/17/2017  8:26 AM CST -----  Contact: [phone]  Patient is requesting meds. For cold sx

## 2017-12-11 DIAGNOSIS — I10 ESSENTIAL HYPERTENSION: Primary | ICD-10-CM

## 2018-01-03 ENCOUNTER — OFFICE VISIT (OUTPATIENT)
Dept: FAMILY MEDICINE | Facility: CLINIC | Age: 78
End: 2018-01-03
Attending: FAMILY MEDICINE
Payer: MEDICARE

## 2018-01-03 ENCOUNTER — HOSPITAL ENCOUNTER (OUTPATIENT)
Dept: RADIOLOGY | Facility: HOSPITAL | Age: 78
Discharge: HOME | End: 2018-01-03
Attending: FAMILY MEDICINE
Payer: MEDICARE

## 2018-01-03 VITALS
BODY MASS INDEX: 26.49 KG/M2 | OXYGEN SATURATION: 96 % | DIASTOLIC BLOOD PRESSURE: 61 MMHG | TEMPERATURE: 98 F | HEART RATE: 73 BPM | SYSTOLIC BLOOD PRESSURE: 136 MMHG | WEIGHT: 140.19 LBS

## 2018-01-03 DIAGNOSIS — Z72.0 TOBACCO ABUSE: ICD-10-CM

## 2018-01-03 DIAGNOSIS — Z23 IMMUNIZATION DUE: ICD-10-CM

## 2018-01-03 DIAGNOSIS — I50.32 CHRONIC DIASTOLIC HEART FAILURE: ICD-10-CM

## 2018-01-03 DIAGNOSIS — R73.02 GLUCOSE INTOLERANCE (IMPAIRED GLUCOSE TOLERANCE): ICD-10-CM

## 2018-01-03 DIAGNOSIS — M79.604 LEG PAIN, BILATERAL: ICD-10-CM

## 2018-01-03 DIAGNOSIS — I67.9 CVD (CEREBROVASCULAR DISEASE): ICD-10-CM

## 2018-01-03 DIAGNOSIS — J44.1 COPD WITH ACUTE EXACERBATION: ICD-10-CM

## 2018-01-03 DIAGNOSIS — I25.10 CORONARY ARTERY DISEASE INVOLVING NATIVE CORONARY ARTERY OF NATIVE HEART WITHOUT ANGINA PECTORIS: ICD-10-CM

## 2018-01-03 DIAGNOSIS — I73.9 PVD (PERIPHERAL VASCULAR DISEASE) WITH CLAUDICATION: ICD-10-CM

## 2018-01-03 DIAGNOSIS — J41.0 SIMPLE CHRONIC BRONCHITIS: ICD-10-CM

## 2018-01-03 DIAGNOSIS — I70.0 AORTIC ATHEROSCLEROSIS: ICD-10-CM

## 2018-01-03 DIAGNOSIS — J44.9 CHRONIC OBSTRUCTIVE PULMONARY DISEASE, UNSPECIFIED COPD TYPE: ICD-10-CM

## 2018-01-03 DIAGNOSIS — R91.1 INCIDENTAL PULMONARY NODULE, > 3MM AND < 8MM: ICD-10-CM

## 2018-01-03 DIAGNOSIS — M79.605 LEG PAIN, BILATERAL: ICD-10-CM

## 2018-01-03 DIAGNOSIS — N18.30 CHRONIC KIDNEY DISEASE, STAGE III (MODERATE): ICD-10-CM

## 2018-01-03 DIAGNOSIS — E03.9 HYPOTHYROIDISM, UNSPECIFIED TYPE: Primary | ICD-10-CM

## 2018-01-03 DIAGNOSIS — I63.9 CEREBROVASCULAR ACCIDENT (CVA), UNSPECIFIED MECHANISM: ICD-10-CM

## 2018-01-03 PROCEDURE — 99214 OFFICE O/P EST MOD 30 MIN: CPT | Mod: 25,S$GLB,, | Performed by: FAMILY MEDICINE

## 2018-01-03 PROCEDURE — 99999 PR PBB SHADOW E&M-EST. PATIENT-LVL III: CPT | Mod: PBBFAC,,, | Performed by: FAMILY MEDICINE

## 2018-01-03 PROCEDURE — G0008 ADMIN INFLUENZA VIRUS VAC: HCPCS | Mod: S$GLB,,, | Performed by: FAMILY MEDICINE

## 2018-01-03 PROCEDURE — 90662 IIV NO PRSV INCREASED AG IM: CPT | Mod: S$GLB,,, | Performed by: FAMILY MEDICINE

## 2018-01-03 PROCEDURE — 71250 CT THORAX DX C-: CPT | Mod: 26,,, | Performed by: RADIOLOGY

## 2018-01-03 PROCEDURE — 71250 CT THORAX DX C-: CPT | Mod: TC

## 2018-01-03 RX ORDER — ALBUTEROL SULFATE 90 UG/1
2 AEROSOL, METERED RESPIRATORY (INHALATION)
Qty: 8.5 G | Refills: 6
Start: 2018-01-03 | End: 2019-01-31 | Stop reason: SDUPTHER

## 2018-01-03 RX ORDER — FLUTICASONE PROPIONATE AND SALMETEROL 250; 50 UG/1; UG/1
1 POWDER RESPIRATORY (INHALATION) 2 TIMES DAILY
Qty: 60 EACH | Refills: 10 | Status: SHIPPED | OUTPATIENT
Start: 2018-01-03 | End: 2019-11-04 | Stop reason: SDUPTHER

## 2018-01-03 RX ORDER — TIOTROPIUM BROMIDE 18 UG/1
18 CAPSULE ORAL; RESPIRATORY (INHALATION) DAILY
Qty: 90 CAPSULE | Refills: 3 | Status: SHIPPED | OUTPATIENT
Start: 2018-01-03 | End: 2019-01-31 | Stop reason: SDUPTHER

## 2018-01-03 NOTE — PROGRESS NOTES
Subjective:       Patient ID: Brittany Haile is a 77 y.o. female.    Chief Complaint: Follow-up (3 month) and Medication Refill    77 yr old pleasant female with CVA, Hypothyroidism, HTN, HLD, COPD, GERD, carotid artery disease/stenosis, anxiety, presents today for her 3 month follow up and c.o leg pain bilateral. She has PAD and also had stents in her mesenteric blood vessels and on plavix. Her JUAN CARLOS test was abnormal and cardiology recommends more stents.    Lung nodules - several - found 6 months ago and treated with antibiotics and steroids - needs f/u CT for surveillance     CVA - had stroke many years ago and has no deficits - it was TIA and they could not find any problem - she is continuing to smoke and no desire to quit as of yet    CKD III - follows nephrology - labs UTD and reassuring      Hypothyroidism - uncontrolled -  TSH                      5.820 (H)           10/03/2017                          -      on synthroid daily - compliant - no side effects      HTN - controlled- on Benicar 40 and norvasc 10 - no side effects - labs reassuring      HLD - controlled - on statin - follows cardiology -   LDLCALC                  78.6                05/24/2017                PAD/PVD - not controlled  - still smoking though    COPD - controlled - on advair daily and home oxygen - still smokes and no desire to quit - she drops her sats when she walks and she will benefit from ambulatory or portable oxygen      History as below - reviewed      Health maintenance  -labs UTD  -vaccines due                          Leg Pain    There was no injury mechanism. The pain is present in the left leg and right leg. The quality of the pain is described as aching. The pain is at a severity of 8/10. The pain is severe. The pain has been intermittent since onset. Pertinent negatives include no inability to bear weight, loss of motion, muscle weakness or numbness. The symptoms are aggravated by movement. She has tried nothing for the  symptoms. The treatment provided no relief.   Medication Refill   This is a chronic problem. The current episode started more than 1 year ago. The problem occurs constantly. The problem has been gradually improving. Associated symptoms include arthralgias, myalgias and neck pain. Pertinent negatives include no abdominal pain, chest pain, chills, congestion, diaphoresis, fatigue, headaches, nausea, numbness, rash, sore throat or weakness. Nothing aggravates the symptoms. Treatments tried: as below. The treatment provided significant relief.   Hypertension   This is a chronic problem. The current episode started more than 1 year ago. The problem has been gradually improving since onset. The problem is controlled. Associated symptoms include anxiety and neck pain. Pertinent negatives include no chest pain, headaches, malaise/fatigue, orthopnea, palpitations, peripheral edema or shortness of breath. There are no associated agents to hypertension. Past treatments include ACE inhibitors and calcium channel blockers. The current treatment provides significant improvement. There are no compliance problems.  There is no history of angina, CAD/MI, CVA, left ventricular hypertrophy, PVD, renovascular disease or retinopathy. Identifiable causes of hypertension include a thyroid problem. There is no history of chronic renal disease, coarctation of the aorta, hypercortisolism, hyperparathyroidism or pheochromocytoma.   Hyperlipidemia   This is a chronic problem. The current episode started more than 1 year ago. The problem is controlled. Recent lipid tests were reviewed and are normal. She has no history of chronic renal disease, diabetes, hypothyroidism or liver disease. There are no known factors aggravating her hyperlipidemia. Associated symptoms include leg pain and myalgias. Pertinent negatives include no chest pain or shortness of breath. Current antihyperlipidemic treatment includes statins. The current treatment provides  significant improvement of lipids. There are no compliance problems.  Risk factors for coronary artery disease include dyslipidemia, hypertension and post-menopausal.   Thyroid Problem   Presents for follow-up visit. Patient reports no anxiety, cold intolerance, constipation, depressed mood, diaphoresis, diarrhea, dry skin, fatigue, hair loss, hoarse voice, menstrual problem, nail problem, palpitations, tremors or weight loss. The symptoms have been stable. Past treatments include levothyroxine. The treatment provided significant relief. Prior procedures include thyroid ultrasound. The following procedures have not been performed: thyroid FNA. Her past medical history is significant for hyperlipidemia. There is no history of diabetes, Graves' ophthalmopathy or neuropathy. There are no known risk factors.   Gastroesophageal Reflux   She complains of heartburn. She reports no abdominal pain, no chest pain, no choking, no dysphagia, no early satiety, no hoarse voice, no nausea, no sore throat, no tooth decay or no wheezing. This is a chronic problem. The current episode started more than 1 year ago. The problem occurs constantly. The problem has been gradually improving. The heartburn is located in the abdomen. The heartburn is of moderate intensity. The heartburn does not wake her from sleep. The heartburn does not limit her activity. The heartburn doesn't change with position. Nothing aggravates the symptoms. Pertinent negatives include no fatigue, muscle weakness or weight loss. She has tried a PPI for the symptoms. The treatment provided significant relief. Past procedures do not include an abdominal ultrasound, esophageal manometry or H. pylori antibody titer. Past invasive treatments do not include gastroplasty, gastroplication or reflux surgery.   Anxiety   Presents for follow-up visit. Patient reports no chest pain, confusion, decreased concentration, depressed mood, dizziness, excessive worry, feeling of  choking, impotence, irritability, nausea, nervous/anxious behavior, palpitations, shortness of breath or suicidal ideas. Symptoms occur occasionally. The severity of symptoms is mild. Nothing aggravates the symptoms. The quality of sleep is fair. Nighttime awakenings: occasional.     There are no known risk factors. Her past medical history is significant for anxiety/panic attacks. There is no history of anemia, asthma, bipolar disorder, CAD, chronic lung disease, depression, hyperthyroidism or suicide attempts. Past treatments include SSRIs. The treatment provided significant relief. Compliance with prior treatments has been good. Compliance with medications is %.   Neck Pain    This is a chronic problem. The current episode started more than 1 year ago. The problem occurs constantly. The problem has been gradually worsening. The pain is associated with nothing. The pain is present in the occipital region. The quality of the pain is described as shooting and cramping. The pain is at a severity of 7/10. The pain is severe. The symptoms are aggravated by bending, twisting and stress. The pain is worse during the night. Associated symptoms include leg pain. Pertinent negatives include no chest pain, headaches, numbness, weakness or weight loss. She has tried heat, muscle relaxants and NSAIDs for the symptoms. The treatment provided mild relief.   Back Pain   This is a chronic problem. The current episode started more than 1 year ago. The problem occurs constantly. The problem has been gradually worsening since onset. The pain is present in the lumbar spine. The quality of the pain is described as aching. The pain radiates to the right foot and left foot. The pain is at a severity of 8/10. The pain is severe. The pain is worse during the night. The symptoms are aggravated by bending, sitting and twisting. Associated symptoms include leg pain. Pertinent negatives include no abdominal pain, chest pain, dysuria,  headaches, numbness, pelvic pain, weakness or weight loss. She has tried heat, ice, muscle relaxant and NSAIDs for the symptoms. The treatment provided moderate relief.     Review of Systems   Constitutional: Negative.  Negative for activity change, chills, diaphoresis, fatigue, irritability, malaise/fatigue, unexpected weight change and weight loss.   HENT: Negative.  Negative for congestion, ear discharge, hearing loss, hoarse voice, rhinorrhea, sore throat and voice change.    Eyes: Negative.  Negative for pain, discharge and visual disturbance.   Respiratory: Negative.  Negative for choking, chest tightness, shortness of breath and wheezing.    Cardiovascular: Negative.  Negative for chest pain, palpitations and orthopnea.   Gastrointestinal: Positive for heartburn. Negative for abdominal distention, abdominal pain, anal bleeding, constipation, diarrhea, dysphagia and nausea.   Endocrine: Negative.  Negative for cold intolerance, polydipsia and polyuria.   Genitourinary: Negative.  Negative for decreased urine volume, difficulty urinating, dysuria, frequency, impotence, menstrual problem, pelvic pain and vaginal pain.   Musculoskeletal: Positive for arthralgias, back pain, myalgias and neck pain. Negative for gait problem and muscle weakness.   Skin: Negative.  Negative for color change, pallor, rash and wound.   Allergic/Immunologic: Negative.  Negative for environmental allergies and immunocompromised state.   Neurological: Negative.  Negative for dizziness, tremors, seizures, speech difficulty, weakness, numbness and headaches.   Hematological: Negative.  Negative for adenopathy. Does not bruise/bleed easily.   Psychiatric/Behavioral: Negative.  Negative for agitation, confusion, decreased concentration, hallucinations, self-injury and suicidal ideas. The patient is not nervous/anxious.        PMH/PSH/FH/SH/MED/ALLERGY reviewed    Objective:       Vitals:    01/03/18 0854   BP: 136/61   Pulse: 73   Temp: 97.9  °F (36.6 °C)       Physical Exam   Constitutional: She is oriented to person, place, and time. She appears well-developed and well-nourished. No distress.   HENT:   Head: Normocephalic and atraumatic.   Right Ear: External ear normal.   Left Ear: External ear normal.   Nose: Nose normal.   Mouth/Throat: Oropharynx is clear and moist. No oropharyngeal exudate.   Eyes: Conjunctivae and EOM are normal. Pupils are equal, round, and reactive to light. Right eye exhibits no discharge. Left eye exhibits no discharge. No scleral icterus.   Neck: Normal range of motion. Neck supple. No JVD present. No tracheal deviation present. No thyromegaly present.   Cardiovascular: Normal rate, regular rhythm and normal heart sounds.  Exam reveals no gallop and no friction rub.    No murmur heard.  Feeble DP pulse B/L   Pulmonary/Chest: Effort normal and breath sounds normal. No stridor. She has no wheezes. She has no rales. She exhibits no tenderness.   Abdominal: Soft. Bowel sounds are normal. She exhibits no distension and no mass. There is no tenderness. There is no rebound and no guarding. No hernia.   Musculoskeletal: Normal range of motion. She exhibits no edema or tenderness.   Lymphadenopathy:     She has no cervical adenopathy.   Neurological: She is alert and oriented to person, place, and time. She has normal reflexes. She displays normal reflexes. No cranial nerve deficit. She exhibits normal muscle tone. Coordination normal.   Skin: Skin is warm and dry. No rash noted. She is not diaphoretic. No erythema. No pallor.   Psychiatric: She has a normal mood and affect. Her behavior is normal. Judgment and thought content normal.       Assessment:       1. Hypothyroidism, unspecified type    2. CVD (cerebrovascular disease)    3. Incidental pulmonary nodule, > 3mm and < 8mm    4. Simple chronic bronchitis    5. PVD (peripheral vascular disease) with claudication    6. Coronary artery disease involving native coronary artery of  native heart without angina pectoris    7. Aortic atherosclerosis    8. Chronic kidney disease, stage III (moderate)    9. Glucose intolerance (impaired glucose tolerance)    10. Leg pain, bilateral    11. Tobacco abuse    12. Cerebrovascular accident (CVA), unspecified mechanism    13. Chronic diastolic heart failure    14. COPD with acute exacerbation    15. Chronic obstructive pulmonary disease, unspecified COPD type    16. Immunization due        Plan:       Brittany was seen today for follow-up and medication refill.    Diagnoses and all orders for this visit:    Hypothyroidism, unspecified type  -     TSH; Future    CVD (cerebrovascular disease)    Incidental pulmonary nodule, > 3mm and < 8mm  -     Cancel: CT Chest Without Contrast; Future  -     CT Chest Without Contrast; Future    Simple chronic bronchitis    PVD (peripheral vascular disease) with claudication  -     CBC auto differential; Future  -     Comprehensive metabolic panel; Future  -     Lipid panel; Future    Coronary artery disease involving native coronary artery of native heart without angina pectoris  -     CBC auto differential; Future  -     Comprehensive metabolic panel; Future  -     Lipid panel; Future    Aortic atherosclerosis    Chronic kidney disease, stage III (moderate)    Glucose intolerance (impaired glucose tolerance)  -     Hemoglobin A1c; Future    Leg pain, bilateral    Tobacco abuse    Cerebrovascular accident (CVA), unspecified mechanism  -     CBC auto differential; Future  -     Comprehensive metabolic panel; Future  -     TSH; Future    Chronic diastolic heart failure    COPD with acute exacerbation  Comments:  CT scan for further eval given ongoing severe symptoms, steroid burst and albuterol and/or duoneb every 4 hours as needed, MUCINEX for congestion  Orders:  -     albuterol (PROVENTIL HFA) 90 mcg/actuation inhaler; Inhale 2 puffs into the lungs every 4 to 6 hours as needed.    Chronic obstructive pulmonary disease,  unspecified COPD type  -     tiotropium (SPIRIVA WITH HANDIHALER) 18 mcg inhalation capsule; Inhale 1 capsule (18 mcg total) into the lungs once daily.  -     fluticasone-salmeterol 250-50 mcg/dose (ADVAIR) 250-50 mcg/dose diskus inhaler; Inhale 1 puff into the lungs 2 (two) times daily.  -     CT Chest Without Contrast; Future    Immunization due  -     Influenza - High Dose (65+) (PF) (IM)       HTN  -controlled  -continue Benicar and Norvasc    Pulmonary nodules  -CT for surveillance    PVD/PAD  -follow cardiology for ongoing leg pain  -continue plavix    carotod artery disease/stenosis/bruit/PAD  -follows vascular medicine/cardiology  -statin for HLD    GERD  -controlled  -refilled PPI    Hypothyroidism  -controlled    CVA  -stable and baseline and no deficits    COPD  -stable and controlled  -on advair/spiriva  -recommend portable or ambulatory oxygen    Muscle spasm  -flexeril prn nightly    CKD III  -baseline  -follows nephrology    Osteopenia  -vit D and dexa    Spent adequate time in obtaining history and explaining differentials    40 minutes spent during this visit of which greater than 50% devoted to face-face counseling and coordination of care regarding diagnosis and management plan    Return in about 3 months (around 4/3/2018), or if symptoms worsen or fail to improve.

## 2018-01-22 ENCOUNTER — OFFICE VISIT (OUTPATIENT)
Dept: CARDIOLOGY | Facility: CLINIC | Age: 78
End: 2018-01-22
Payer: MEDICARE

## 2018-01-22 DIAGNOSIS — I77.1 SUBCLAVIAN ARTERY STENOSIS, LEFT: ICD-10-CM

## 2018-01-22 DIAGNOSIS — K55.1 ANGINA MESENTERIC: ICD-10-CM

## 2018-01-22 DIAGNOSIS — I25.10 CORONARY ARTERY DISEASE INVOLVING NATIVE CORONARY ARTERY OF NATIVE HEART WITHOUT ANGINA PECTORIS: ICD-10-CM

## 2018-01-22 DIAGNOSIS — I70.0 AORTIC ATHEROSCLEROSIS: ICD-10-CM

## 2018-01-22 DIAGNOSIS — I65.29 STENOSIS OF CAROTID ARTERY, UNSPECIFIED LATERALITY: ICD-10-CM

## 2018-01-22 DIAGNOSIS — I73.9 PVD (PERIPHERAL VASCULAR DISEASE) WITH CLAUDICATION: ICD-10-CM

## 2018-01-22 DIAGNOSIS — I50.32 CHRONIC DIASTOLIC HEART FAILURE: ICD-10-CM

## 2018-01-22 DIAGNOSIS — I10 ESSENTIAL HYPERTENSION: ICD-10-CM

## 2018-01-22 DIAGNOSIS — I73.9 PAD (PERIPHERAL ARTERY DISEASE): Primary | ICD-10-CM

## 2018-01-22 DIAGNOSIS — E78.2 MIXED HYPERLIPIDEMIA: ICD-10-CM

## 2018-01-22 PROCEDURE — 99215 OFFICE O/P EST HI 40 MIN: CPT | Mod: S$GLB,,, | Performed by: INTERNAL MEDICINE

## 2018-01-22 PROCEDURE — 99999 PR PBB SHADOW E&M-EST. PATIENT-LVL III: CPT | Mod: PBBFAC,,, | Performed by: INTERNAL MEDICINE

## 2018-01-22 RX ORDER — CILOSTAZOL 50 MG/1
50 TABLET ORAL 2 TIMES DAILY
Qty: 180 TABLET | Refills: 3 | Status: SHIPPED | OUTPATIENT
Start: 2018-01-22 | End: 2019-01-03 | Stop reason: SDUPTHER

## 2018-01-22 NOTE — PROGRESS NOTES
Subjective:   Patient ID:  Brittany Haile is a 77 y.o. female who presents for evaluation and treatment of Hyperlipidemia; Hypertension; Peripheral Arterial Disease; Claudication; mesenteric intervention; and tobacco use      HPI:       She is here today with her daughter [place] for follow up after percutaneous intervention for mesenteric angina ane worsening limb pain. She has the combination of vascular and lumbar radiculopathy causing her pain. She continues to smoke without much interest in quitting.     She is s/p celiac artery PTAS with 6.0 x 14 mm Express SD. SMA with 80-85% and 80 mmHg peak to peak gradient was treated with 6.0 x 18 mm Express SD with complete resolution of her mesenteric symptoms.        She is s/p R CEA without any recent ultrasound or symptoms. She has bilateral marlee IIb claudication, R >> L. She has L subclavian stenosis with a 50 mmHg gradient with intermittent claudication. She has coronary artery calcification noted on CT scan. She smokes. She has HTN, HLP, PAD, and glucose intolerance. She did not tolerate lipitor in the past but currently tolerating 40 mg nightly. She is taking aspirin and plavix for DAPT. Echo and stress test 10/2015 -negative ischemic work up        JUAN CARLOS 5/2017: R 0.67 and L 0.88      Arterial ultrasound 4/2017: high grade SFA disease bilaterally       Echo 4/2017: normal EF with diastolic dysfunction          Patient Active Problem List    Diagnosis Date Noted    Chronic diastolic heart failure 01/03/2018    Stroke 10/03/2017    Aortic atherosclerosis 10/03/2017    GIB (gastrointestinal bleeding) 04/23/2017    Mesenteric angina 04/17/2017    Angina mesenteric 04/13/2017         S/p aortogram for severe mesenteric angina 4/18/2017     99% celiac artery PTAS with 6.0 x 14 mm Express SD   85% SMA with 80 mmHg gradient treated with 6.0 x 18 mm Express SD  DENISE-patent on CTA                    Tobacco abuse 04/13/2017    Coronary artery disease involving native  coronary artery of native heart without angina pectoris 2017         Arterial calcification noted incidentally on CT scan  No angina            Abdominal pain 2017    Gastritis 2017    CVD (cerebrovascular disease) 10/14/2016    Subclavian artery stenosis, left 04/15/2016         50 mmHg gradient  Asymptomatic          Glucose intolerance (impaired glucose tolerance) 2015    Leg pain, bilateral 10/13/2015    PVD (peripheral vascular disease) with claudication 10/13/2015         Diminished pedal pulses  JUAN CARLOS R 0.67 and L 0.88 2017      Bilateral SFA disease on ultrasound 2017        Chronic kidney disease, stage III (moderate) 2015    COPD (chronic obstructive pulmonary disease) 2015    HLD (hyperlipidemia) 2015    Incidental pulmonary nodule, > 3mm and < 8mm 2013    History of stroke without residual deficits 2013    Carotid stenosis 2013         S/p R CEA Dr. Mckinley        Hypothyroid 2013    Hypertension 2013    Anxiety 2013           Right Arm BP - Sittin/78  Left Arm BP - Sittin/78        LABS    LAST HbA1c  Lab Results   Component Value Date    HGBA1C 5.7 (H) 2018       Lipid panel  Lab Results   Component Value Date    CHOL 200 (H) 2018    CHOL 139 2017    CHOL 256 (H) 2016     Lab Results   Component Value Date    HDL 78 (H) 2018    HDL 43 2017    HDL 53 2016     Lab Results   Component Value Date    LDLCALC 102.4 2018    LDLCALC 78.6 2017    LDLCALC 175.6 (H) 2016     Lab Results   Component Value Date    TRIG 98 2018    TRIG 87 2017    TRIG 137 2016     Lab Results   Component Value Date    CHOLHDL 39.0 2018    CHOLHDL 30.9 2017    CHOLHDL 20.7 2016            Review of Systems   Constitution: Negative for diaphoresis, weakness, night sweats, weight gain and weight loss.   HENT: Negative for congestion.     Eyes: Negative for blurred vision, discharge and double vision.   Cardiovascular: Positive for claudication (bilteral legs and L arm). Negative for chest pain, cyanosis, dyspnea on exertion, irregular heartbeat, leg swelling, near-syncope, orthopnea, palpitations, paroxysmal nocturnal dyspnea and syncope.   Respiratory: Negative for cough, shortness of breath and wheezing.    Endocrine: Negative for cold intolerance, heat intolerance and polyphagia.   Hematologic/Lymphatic: Negative for adenopathy and bleeding problem. Does not bruise/bleed easily.   Skin: Negative for dry skin and nail changes.   Musculoskeletal: Positive for arthritis and back pain. Negative for falls, joint pain, myalgias and neck pain.   Gastrointestinal: Positive for abdominal pain (after meals). Negative for bloating, change in bowel habit and constipation.   Genitourinary: Negative for bladder incontinence, dysuria, flank pain, genital sores and missed menses.   Neurological: Negative for aphonia, brief paralysis, difficulty with concentration and dizziness.   Psychiatric/Behavioral: Negative for altered mental status and memory loss. The patient does not have insomnia.    Allergic/Immunologic: Negative for environmental allergies.       Objective:   Physical Exam   Constitutional: She is oriented to person, place, and time. She appears well-developed and well-nourished. She is not intubated.   Uses a cane to ambulate   HENT:   Head: Normocephalic and atraumatic.   Right Ear: External ear normal.   Left Ear: External ear normal.   Mouth/Throat: Oropharynx is clear and moist.   Eyes: Conjunctivae and EOM are normal. Pupils are equal, round, and reactive to light. Right eye exhibits no discharge. Left eye exhibits no discharge. No scleral icterus.   Neck: Normal range of motion. Neck supple. Normal carotid pulses, no hepatojugular reflux and no JVD present. Carotid bruit is not present. No tracheal deviation present. No thyromegaly present.    Cardiovascular: Normal rate, regular rhythm, S1 normal and S2 normal.   No extrasystoles are present. PMI is not displaced.  Exam reveals no gallop, no S3, no distant heart sounds, no friction rub and no midsystolic click.    No murmur heard.  Pulses:       Carotid pulses are 2+ on the right side with bruit, and 2+ on the left side with bruit.       Radial pulses are 2+ on the right side, and 1+ on the left side.        Femoral pulses are 2+ on the right side, and 2+ on the left side.       Popliteal pulses are 2+ on the right side, and 2+ on the left side.        Dorsalis pedis pulses are 0 on the right side, and 0 on the left side.        Posterior tibial pulses are 0 on the right side, and 0 on the left side.   Monophasic R DP without  R PT doppler signals    Monophasic L DP and biphasic L PT doppler signals   Pulmonary/Chest: Effort normal and breath sounds normal. No accessory muscle usage or stridor. No apnea, no tachypnea and no bradypnea. She is not intubated. No respiratory distress. She has no decreased breath sounds. She has no wheezes. She has no rales. She exhibits no tenderness and no bony tenderness.   Abdominal: She exhibits no distension, no pulsatile liver, no abdominal bruit, no ascites, no pulsatile midline mass and no mass. There is no tenderness. There is no rebound and no guarding.   Musculoskeletal: Normal range of motion. She exhibits no edema or tenderness.   Lymphadenopathy:     She has no cervical adenopathy.   Neurological: She is alert and oriented to person, place, and time. She has normal reflexes. No cranial nerve deficit. Coordination normal.   Skin: Skin is warm. No rash noted. No erythema. No pallor.   Dependent rubor    No ulcers   Psychiatric: She has a normal mood and affect. Her behavior is normal. Judgment and thought content normal.       Assessment:     1. PAD (peripheral artery disease)    2. PVD (peripheral vascular disease) with claudication    3. Subclavian artery  stenosis, left    4. Aortic atherosclerosis    5. Stenosis of carotid artery, unspecified laterality    6. Chronic diastolic heart failure    7. Coronary artery disease involving native coronary artery of native heart without angina pectoris    8. Mixed hyperlipidemia    9. Essential hypertension    10. Angina mesenteric                Plan:           Smoking cessation  She opted for medical therapy for PAD in the past   Her symptoms are worse but she also has back problems with radiculopathy       Will obtain ABIX with US   Will refer her to pain clinic    Start pletal 50 mg po bid        Will arrange for endovascular intervention if there is no improvement with her symptoms          Medical therapy for L subclavian stenosis        Continue with current medical plan and lifestyle changes.  Return sooner for concerns or questions. If symptoms persist go to the ED  I have reviewed all pertinent data on this patient       I have reviewed the patient's medical history in detail and updated the computerized patient record.    Orders Placed This Encounter   Procedures    US Lower Extremity Arteries Bilateral     Standing Status:   Future     Standing Expiration Date:   1/22/2019    Cardiology Lab Ankle Brachial Indices W Stress     Standing Status:   Future     Standing Expiration Date:   1/22/2019       Follow up as scheduled. Return sooner for concerns or questions  Further recommendations after tests          She expressed verbal understanding and agreed with the plan        Greater than 50% of the visit of 45 minutes was spent counseling, educating, and coordinating the care of the patient.      -In today's visit, at least 4 established conditions that pose a risk to life or bodily function have been addressed and the conditions are severe.    -In today's visit, monitoring for drug toxicity was accomplished.            Patient's Medications   New Prescriptions    CILOSTAZOL (PLETAL) 50 MG TAB    Take 1 tablet (50  mg total) by mouth 2 (two) times daily.   Previous Medications    ALBUTEROL (PROVENTIL HFA) 90 MCG/ACTUATION INHALER    Inhale 2 puffs into the lungs every 4 to 6 hours as needed.    ALBUTEROL-IPRATROPIUM 2.5MG-0.5MG/3ML (DUO-NEB) 0.5 MG-3 MG(2.5 MG BASE)/3 ML NEBULIZER SOLUTION    USE 1 VIAL VIA NEBULIZER EVERY 6 HOURS AS NEEDED FOR WHEEZING( RESCUE)    AMLODIPINE (NORVASC) 10 MG TABLET    Take 1 tablet (10 mg total) by mouth once daily.    ASPIRIN (ECOTRIN) 81 MG EC TABLET    Take 1 tablet (81 mg total) by mouth once daily.    ATORVASTATIN (LIPITOR) 40 MG TABLET    Take 1 tablet (40 mg total) by mouth once daily.    CLOPIDOGREL (PLAVIX) 75 MG TABLET    TAKE 1 TABLET BY MOUTH EVERY DAY    ERGOCALCIFEROL (ERGOCALCIFEROL) 50,000 UNIT CAP    Take 1 capsule (50,000 Units total) by mouth every 7 days.    ESCITALOPRAM OXALATE (LEXAPRO) 5 MG TAB    Take 1 tablet (5 mg total) by mouth once daily.    FERROUS SULFATE 325 MG (65 MG IRON) TAB TABLET    Take 1 tablet (325 mg total) by mouth 2 (two) times daily.    FLUTICASONE-SALMETEROL 250-50 MCG/DOSE (ADVAIR) 250-50 MCG/DOSE DISKUS INHALER    Inhale 1 puff into the lungs 2 (two) times daily.    HYDROCODONE-ACETAMINOPHEN 5-325MG (NORCO) 5-325 MG PER TABLET    Take 1 tablet by mouth every 8 (eight) hours as needed for Pain.    LEVOTHYROXINE (SYNTHROID) 88 MCG TABLET    Take 1 tablet (88 mcg total) by mouth once daily.    MECLIZINE (ANTIVERT) 25 MG TABLET    TAKE 1 TABLET(25 MG) BY MOUTH EVERY 6 HOURS AS NEEDED FOR DIZZINESS    OLMESARTAN (BENICAR) 40 MG TABLET    TAKE 1 TABLET BY MOUTH DAILY    PANTOPRAZOLE (PROTONIX) 40 MG TABLET    Take 1 tablet (40 mg total) by mouth once daily.    TIOTROPIUM (SPIRIVA WITH HANDIHALER) 18 MCG INHALATION CAPSULE    Inhale 1 capsule (18 mcg total) into the lungs once daily.   Modified Medications    No medications on file   Discontinued Medications    No medications on file

## 2018-01-24 VITALS
HEIGHT: 64 IN | DIASTOLIC BLOOD PRESSURE: 78 MMHG | HEART RATE: 78 BPM | SYSTOLIC BLOOD PRESSURE: 138 MMHG | WEIGHT: 134 LBS | BODY MASS INDEX: 22.88 KG/M2

## 2018-01-24 NOTE — PATIENT INSTRUCTIONS
Taking Aspirin for Atherosclerosis       Aspirin is a medicine often prescribed to treat atherosclerosis. This condition affects your arteries. These are the blood vessels that carry blood away from your heart. Having atherosclerosis means youre at greater risk of a heart attack or stroke. Aspirin can help prevent these from happening.  How atherosclerosis affects your arteries   A fatty material (plaque) can build up in your arteries. This makes it harder for blood to flow through them. A blood clot can then form on the plaque. This may block the artery, cutting off blood flow. This can cause conditions such as coronary artery disease (CAD) and peripheral arterial disease (PAD):  · CAD occurs when plaque builds up in the coronary artery. This artery supplies the heart with oxygen-rich blood.  · PAD occurs when plaque forms in leg arteries.  The same things that cause CAD and PAD can also cause plaque to form in other arteries in your body, such as those in the brain. When plaque occurs in any of these arteries, it raises your risk of heart attack or stroke.  What aspirin does  Aspirin is a blood-thinner (antiplatelet medicine). It helps keep blood clots from forming. This reduces the risk of blockage. Aspirin can be taken daily if you are at high risk of or have already had a heart attack or stroke. It is also used after a procedure called a stent placement. This is when a tiny wire mesh tube, or stent, is placed in an artery to keep it open. Aspirin helps prevent blood clots from forming on the stent.  Taking aspirin safely  Tell your healthcare provider about any medicines you are taking. This includes:  · All prescription medicines  · Over-the-counter medicines  · Herbs, vitamins, and other supplements  Also mention if you have a history of ulcers or bleeding problems. Ask whether you will need to stop taking aspirin before having surgery or dental work. Always take medicines as directed.  Tips for taking  aspirin  · Have a routine. For example, take aspirin with the same meal each day.  · Dont take more than prescribed. A low dose gives the same benefit as a higher one, with a lower risk of side effects.  · Dont skip doses. Aspirin needs to be taken each day to work well.  · Keep track of what you take. A pillbox with days of the week can help, especially if you take several medicines. Or use a list or chart to keep track.  When to call your healthcare provider  Side effects of aspirin are not usually serious. If you do have problems, changing your dose may help. Call your healthcare provider if you have any of the following:  · Excessive bruising (some bruising is normal)  · Nosebleeds, bleeding gums, or other excessive bleeding  · An upset stomach or stomach pain   Date Last Reviewed: 6/1/2016  © 3528-1580 [employer]. [address] [place] [address], [place] [address]. All rights reserved. This information is not intended as a substitute for professional medical care. Always follow your healthcare professional's instructions.

## 2018-01-30 ENCOUNTER — HOSPITAL ENCOUNTER (OUTPATIENT)
Dept: RADIOLOGY | Facility: HOSPITAL | Age: 78
Discharge: HOME | End: 2018-01-30
Attending: INTERNAL MEDICINE
Payer: MEDICARE

## 2018-01-30 ENCOUNTER — HOSPITAL ENCOUNTER (OUTPATIENT)
Dept: CARDIOLOGY | Facility: HOSPITAL | Age: 78
Discharge: HOME | End: 2018-01-30
Attending: INTERNAL MEDICINE
Payer: MEDICARE

## 2018-01-30 DIAGNOSIS — I73.9 PAD (PERIPHERAL ARTERY DISEASE): ICD-10-CM

## 2018-01-30 LAB — VASCULAR ANKLE BRACHIAL INDEX (ABI) RIGHT: 0.5 (ref 0.9–1.2)

## 2018-01-30 PROCEDURE — 93925 LOWER EXTREMITY STUDY: CPT | Mod: TC

## 2018-01-30 PROCEDURE — 93925 LOWER EXTREMITY STUDY: CPT | Mod: 26,,, | Performed by: RADIOLOGY

## 2018-01-30 PROCEDURE — 93922 UPR/L XTREMITY ART 2 LEVELS: CPT

## 2018-01-30 PROCEDURE — 93922 UPR/L XTREMITY ART 2 LEVELS: CPT | Mod: 26,,, | Performed by: INTERNAL MEDICINE

## 2018-02-07 DIAGNOSIS — I10 ESSENTIAL HYPERTENSION: ICD-10-CM

## 2018-02-07 RX ORDER — OLMESARTAN MEDOXOMIL 40 MG/1
TABLET ORAL
Qty: 90 TABLET | Refills: 0 | Status: SHIPPED | OUTPATIENT
Start: 2018-02-07 | End: 2018-06-14 | Stop reason: SDUPTHER

## 2018-02-26 NOTE — PROGRESS NOTES
Your ultrasound showed blockages in your leg arteries      If medications do not improve your symptoms we can discuss the pros and cons of revascularization      Thanks      ZN

## 2018-02-26 NOTE — PROGRESS NOTES
Your ankle brachial index is consistent with poor circulation      It correlates well with your ultrasound      Thanks      ZN

## 2018-03-28 ENCOUNTER — HOSPITAL ENCOUNTER (OUTPATIENT)
Dept: RADIOLOGY | Facility: HOSPITAL | Age: 78
Discharge: HOME | End: 2018-03-28
Attending: FAMILY MEDICINE
Payer: MEDICARE

## 2018-03-28 ENCOUNTER — OFFICE VISIT (OUTPATIENT)
Dept: FAMILY MEDICINE | Facility: CLINIC | Age: 78
End: 2018-03-28
Attending: FAMILY MEDICINE
Payer: MEDICARE

## 2018-03-28 VITALS
DIASTOLIC BLOOD PRESSURE: 51 MMHG | SYSTOLIC BLOOD PRESSURE: 108 MMHG | BODY MASS INDEX: 25.06 KG/M2 | OXYGEN SATURATION: 91 % | HEART RATE: 91 BPM | WEIGHT: 146.81 LBS | HEIGHT: 64 IN | TEMPERATURE: 99 F

## 2018-03-28 DIAGNOSIS — B96.89 ACUTE BACTERIAL BRONCHITIS: Primary | ICD-10-CM

## 2018-03-28 DIAGNOSIS — J20.8 ACUTE BACTERIAL BRONCHITIS: Primary | ICD-10-CM

## 2018-03-28 DIAGNOSIS — R05.9 COUGH: ICD-10-CM

## 2018-03-28 PROCEDURE — 71046 X-RAY EXAM CHEST 2 VIEWS: CPT | Mod: 26,,, | Performed by: RADIOLOGY

## 2018-03-28 PROCEDURE — 3074F SYST BP LT 130 MM HG: CPT | Mod: CPTII,S$GLB,, | Performed by: FAMILY MEDICINE

## 2018-03-28 PROCEDURE — 96372 THER/PROPH/DIAG INJ SC/IM: CPT | Mod: S$GLB,,, | Performed by: FAMILY MEDICINE

## 2018-03-28 PROCEDURE — 71046 X-RAY EXAM CHEST 2 VIEWS: CPT | Mod: TC,FY

## 2018-03-28 PROCEDURE — 99999 PR PBB SHADOW E&M-EST. PATIENT-LVL III: CPT | Mod: PBBFAC,,, | Performed by: FAMILY MEDICINE

## 2018-03-28 PROCEDURE — 99214 OFFICE O/P EST MOD 30 MIN: CPT | Mod: 25,S$GLB,, | Performed by: FAMILY MEDICINE

## 2018-03-28 PROCEDURE — 3078F DIAST BP <80 MM HG: CPT | Mod: CPTII,S$GLB,, | Performed by: FAMILY MEDICINE

## 2018-03-28 RX ORDER — TRIAMCINOLONE ACETONIDE 40 MG/ML
40 INJECTION, SUSPENSION INTRA-ARTICULAR; INTRAMUSCULAR
Status: COMPLETED | OUTPATIENT
Start: 2018-03-28 | End: 2018-03-28

## 2018-03-28 RX ORDER — CODEINE PHOSPHATE AND GUAIFENESIN 10; 100 MG/5ML; MG/5ML
5 SOLUTION ORAL 3 TIMES DAILY PRN
Qty: 180 ML | Refills: 0 | Status: SHIPPED | OUTPATIENT
Start: 2018-03-28 | End: 2018-04-04

## 2018-03-28 RX ORDER — DOXYCYCLINE 100 MG/1
100 CAPSULE ORAL 2 TIMES DAILY
Qty: 20 CAPSULE | Refills: 0 | Status: SHIPPED | OUTPATIENT
Start: 2018-03-28 | End: 2019-01-31

## 2018-03-28 RX ADMIN — TRIAMCINOLONE ACETONIDE 40 MG: 40 INJECTION, SUSPENSION INTRA-ARTICULAR; INTRAMUSCULAR at 11:03

## 2018-03-28 NOTE — PROGRESS NOTES
Subjective:       Patient ID: Brittany Haile is a 77 y.o. female.    Chief Complaint: Cough; Generalized Body Aches; and Fever    77 yr old pleasant female with CVA, Hypothyroidism, HTN, HLD, COPD, GERD, carotid artery disease/stenosis, anxiety, presents today for evaluation of cough and fever. Onset 4 weeks days ago and gradually worsening since last 2 weeks. She was febrile since last Saturday and taking tylenol. Mild SOB when she coughs. No sick contacts or recent travel. She is continuing to smoke but has decreased it. Details as follows -      History as below - reviewed       Cough   This is a recurrent problem. The current episode started more than 1 month ago. The problem has been gradually worsening. The problem occurs constantly. The cough is productive of sputum. Associated symptoms include nasal congestion and shortness of breath. Pertinent negatives include no chest pain, headaches, heartburn, hemoptysis, myalgias, rash, rhinorrhea, sore throat or wheezing. Nothing aggravates the symptoms. She has tried OTC cough suppressant for the symptoms. The treatment provided no relief. Her past medical history is significant for bronchitis, COPD and environmental allergies. There is no history of emphysema.     Review of Systems   Constitutional: Negative.  Negative for activity change, diaphoresis and unexpected weight change.   HENT: Negative.  Negative for congestion, ear discharge, hearing loss, rhinorrhea, sore throat and voice change.    Eyes: Negative.  Negative for pain, discharge and visual disturbance.   Respiratory: Positive for cough and shortness of breath. Negative for hemoptysis, chest tightness and wheezing.    Cardiovascular: Negative.  Negative for chest pain.   Gastrointestinal: Negative.  Negative for abdominal distention, anal bleeding, constipation, heartburn and nausea.   Endocrine: Negative.  Negative for cold intolerance, polydipsia and polyuria.   Genitourinary: Negative.  Negative for  decreased urine volume, difficulty urinating, dysuria, frequency, menstrual problem and vaginal pain.   Musculoskeletal: Negative.  Negative for arthralgias, gait problem and myalgias.   Skin: Negative.  Negative for color change, pallor, rash and wound.   Allergic/Immunologic: Positive for environmental allergies. Negative for immunocompromised state.   Neurological: Negative.  Negative for dizziness, tremors, seizures, speech difficulty and headaches.   Hematological: Negative.  Negative for adenopathy. Does not bruise/bleed easily.   Psychiatric/Behavioral: Negative.  Negative for agitation, confusion, decreased concentration, hallucinations, self-injury and suicidal ideas. The patient is not nervous/anxious.        PMH/PSH/FH/SH/MED/ALLERGY reviewed    Objective:       Vitals:    03/28/18 1038   BP: (!) 108/51   Pulse: 91   Temp: 99 °F (37.2 °C)       Physical Exam   Constitutional: She is oriented to person, place, and time. She appears well-developed and well-nourished. No distress.   HENT:   Head: Normocephalic and atraumatic.   Right Ear: External ear normal.   Left Ear: External ear normal.   Nose: Nose normal.   Mouth/Throat: Oropharynx is clear and moist. No oropharyngeal exudate.   Eyes: Conjunctivae and EOM are normal. Pupils are equal, round, and reactive to light. Right eye exhibits no discharge. Left eye exhibits no discharge. No scleral icterus.   Neck: Normal range of motion. Neck supple. No JVD present. No tracheal deviation present. No thyromegaly present.   Cardiovascular: Normal rate, regular rhythm, normal heart sounds and intact distal pulses.  Exam reveals no gallop and no friction rub.    No murmur heard.  Pulmonary/Chest: Effort normal. No stridor. She has wheezes. She has rales. She exhibits no tenderness.   B/L rales and wheezing   Abdominal: Soft. Bowel sounds are normal. She exhibits no distension and no mass. There is no tenderness. There is no rebound and no guarding. No hernia.    Musculoskeletal: Normal range of motion. She exhibits no edema or tenderness.   Lymphadenopathy:     She has no cervical adenopathy.   Neurological: She is alert and oriented to person, place, and time. She has normal reflexes. She displays normal reflexes. No cranial nerve deficit. She exhibits normal muscle tone. Coordination normal.   Skin: Skin is warm and dry. No rash noted. She is not diaphoretic. No erythema. No pallor.   Psychiatric: She has a normal mood and affect. Her behavior is normal. Judgment and thought content normal.       Assessment:       1. Acute bacterial bronchitis    2. Cough        Plan:       Brittany was seen today for cough, generalized body aches and fever.    Diagnoses and all orders for this visit:    Acute bacterial bronchitis  -     guaifenesin-codeine 100-10 mg/5 ml (CHERATUSSIN AC)  mg/5 mL syrup; Take 5 mLs by mouth 3 (three) times daily as needed for Cough or Congestion.  -     doxycycline (MONODOX) 100 MG capsule; Take 1 capsule (100 mg total) by mouth 2 (two) times daily.  -     triamcinolone acetonide injection 40 mg; Inject 1 mL (40 mg total) into the muscle one time.    Cough  -     X-Ray Chest PA And Lateral; Future  -     doxycycline (MONODOX) 100 MG capsule; Take 1 capsule (100 mg total) by mouth 2 (two) times daily.  -     triamcinolone acetonide injection 40 mg; Inject 1 mL (40 mg total) into the muscle one time.      Acute bronchitis/cough  -kenalog 40 mg IM Once  -doxy x 10 days  -cough med when needed  -CXr to r/o PNA    Spent adequate time in obtaining history and explaining differentials    40 minutes spent during this visit of which greater than 50% devoted to face-face counseling and coordination of care regarding diagnosis and management plan    Follow-up in about 3 months (around 6/28/2018), or if symptoms worsen or fail to improve.

## 2018-04-02 ENCOUNTER — PATIENT MESSAGE (OUTPATIENT)
Dept: CARDIOLOGY | Facility: CLINIC | Age: 78
End: 2018-04-02

## 2018-04-03 ENCOUNTER — TELEPHONE (OUTPATIENT)
Dept: PAIN MEDICINE | Facility: CLINIC | Age: 78
End: 2018-04-03

## 2018-04-03 ENCOUNTER — TELEPHONE (OUTPATIENT)
Dept: CARDIOLOGY | Facility: CLINIC | Age: 78
End: 2018-04-03

## 2018-04-03 DIAGNOSIS — M79.604 PAIN IN BOTH LOWER EXTREMITIES: Primary | ICD-10-CM

## 2018-04-03 DIAGNOSIS — M79.605 PAIN IN BOTH LOWER EXTREMITIES: Primary | ICD-10-CM

## 2018-04-03 DIAGNOSIS — R60.9 EDEMA, UNSPECIFIED TYPE: ICD-10-CM

## 2018-04-03 NOTE — TELEPHONE ENCOUNTER
Scheduled US and MRI on 4/11.      Patient is anticipating a call to schedule visit with Dr. Vincent after her MRI on 4/11.    Is it possible for you all to schedule appointment and contact patient?

## 2018-04-03 NOTE — TELEPHONE ENCOUNTER
Spoke with patient regarding scheduling an appt. Patient stated that she is having lower back and bilateral knee pain. Patient state that she is scheduled for an MRI on 4/11/18 and will need an appt after that date. Patient verbalized and confirmed appt date and time of arrival on 4/16/18 at 10 AM.

## 2018-04-03 NOTE — TELEPHONE ENCOUNTER
Please resend the result of JUAN CARLOS and US          It was already sent by my [facility] on 2/26/2018 but she requested them. We will address PAD after her back work up is completed.        She needs a MRI of her back and venous ultrasound        She wll be referred to Dr. Vincent        Thanks          ZN

## 2018-04-04 ENCOUNTER — TELEPHONE (OUTPATIENT)
Dept: FAMILY MEDICINE | Facility: CLINIC | Age: 78
End: 2018-04-04

## 2018-04-04 RX ORDER — PROMETHAZINE HYDROCHLORIDE AND DEXTROMETHORPHAN HYDROBROMIDE 6.25; 15 MG/5ML; MG/5ML
5 SYRUP ORAL 2 TIMES DAILY PRN
Qty: 180 ML | Refills: 1 | Status: SHIPPED | OUTPATIENT
Start: 2018-04-04 | End: 2018-04-14

## 2018-04-04 NOTE — TELEPHONE ENCOUNTER
----- Message from Ximena Escobar sent at 4/4/2018 12:20 PM CDT -----  Contact: [phone]  Patient no longer has fever but still has a cough and would like something called in. She says that it needs to be strong because the cough is so bad she can't sleep. Please advise.

## 2018-04-11 ENCOUNTER — HOSPITAL ENCOUNTER (OUTPATIENT)
Dept: RADIOLOGY | Facility: HOSPITAL | Age: 78
Discharge: HOME | End: 2018-04-11
Attending: INTERNAL MEDICINE
Payer: MEDICARE

## 2018-04-11 DIAGNOSIS — R60.9 EDEMA, UNSPECIFIED TYPE: ICD-10-CM

## 2018-04-11 DIAGNOSIS — M79.605 PAIN IN BOTH LOWER EXTREMITIES: ICD-10-CM

## 2018-04-11 DIAGNOSIS — M79.604 PAIN IN BOTH LOWER EXTREMITIES: ICD-10-CM

## 2018-04-11 PROCEDURE — 72158 MRI LUMBAR SPINE W/O & W/DYE: CPT | Mod: 26,,, | Performed by: RADIOLOGY

## 2018-04-11 PROCEDURE — 25500020 PHARM REV CODE 255: Performed by: INTERNAL MEDICINE

## 2018-04-11 PROCEDURE — A9585 GADOBUTROL INJECTION: HCPCS | Performed by: INTERNAL MEDICINE

## 2018-04-11 PROCEDURE — 93970 EXTREMITY STUDY: CPT | Mod: TC

## 2018-04-11 PROCEDURE — 93970 EXTREMITY STUDY: CPT | Mod: 26,,, | Performed by: RADIOLOGY

## 2018-04-11 PROCEDURE — 72158 MRI LUMBAR SPINE W/O & W/DYE: CPT | Mod: TC

## 2018-04-11 RX ORDER — GADOBUTROL 604.72 MG/ML
6 INJECTION INTRAVENOUS
Status: COMPLETED | OUTPATIENT
Start: 2018-04-11 | End: 2018-04-11

## 2018-04-11 RX ADMIN — GADOBUTROL 6 ML: 604.72 INJECTION INTRAVENOUS at 02:04

## 2018-04-12 NOTE — PROGRESS NOTES
Your MRI showed extensive back issues       You will be referred to pain management      Thanks      ZN

## 2018-04-19 NOTE — PROGRESS NOTES
[facility] New Patient Evaluation    Referred by: Anthony Brooks MD  Reason for referral: M79.604,M79.605 (ICD-10-CM) - Pain in both lower extremities    CC: back and leg pain    Last 3 PDI Scores 4/20/2018   Pain Disability Index (PDI) 55       HPI: Brittany Haile is a 77 y.o. female who complains of back and bilateral extremity pain as characterized below.      Location: lower back and bilateral leg pain  Severity: Currently: 5/10   Typical Range: 8/10     Exacerbation: 10/10   Onset: several years  Quality: weakness  Radiation: bilateral leg pain  Axial/Extremity Percentage of Pain: 50/50  Exacerbating Factors: standing, standing for more than 5 minutes and walking for more than 5 minutes  Mitigating Factors: nothing  Assoc: denies night fever/night sweats, urinary incontinence, bowel incontinence, significant weight loss and loss of sensations    Previous Therapies:  PT: Denies  HEP: Denies  TENS:  Injections: None  Surgery: None  Medications:   - NSAIDS:   - MSK Relaxants:   - TCAs:   - SNRIs:   - Topicals:   - Anticonvulsants:  - Opioids:     Current Pain Medications:  1. Norco     Full Medication List:    Current Outpatient Prescriptions:     albuterol (PROVENTIL HFA) 90 mcg/actuation inhaler, Inhale 2 puffs into the lungs every 4 to 6 hours as needed., Disp: 8.5 g, Rfl: 6    albuterol-ipratropium 2.5mg-0.5mg/3mL (DUO-NEB) 0.5 mg-3 mg(2.5 mg base)/3 mL nebulizer solution, USE 1 VIAL VIA NEBULIZER EVERY 6 HOURS AS NEEDED FOR WHEEZING( RESCUE), Disp: 1080 mL, Rfl: 3    amlodipine (NORVASC) 10 MG tablet, Take 1 tablet (10 mg total) by mouth once daily., Disp: 90 tablet, Rfl: 3    atorvastatin (LIPITOR) 40 MG tablet, Take 1 tablet (40 mg total) by mouth once daily., Disp: 90 tablet, Rfl: 5    cilostazol (PLETAL) 50 MG Tab, Take 1 tablet (50 mg total) by mouth 2 (two) times daily., Disp: 180 tablet, Rfl: 3    clopidogrel (PLAVIX) 75 mg tablet, TAKE 1 TABLET BY MOUTH EVERY DAY, Disp: 90 tablet, Rfl:  3    doxycycline (MONODOX) 100 MG capsule, Take 1 capsule (100 mg total) by mouth 2 (two) times daily., Disp: 20 capsule, Rfl: 0    ergocalciferol (ERGOCALCIFEROL) 50,000 unit Cap, Take 1 capsule (50,000 Units total) by mouth every 7 days., Disp: 12 capsule, Rfl: 3    escitalopram oxalate (LEXAPRO) 5 MG Tab, Take 1 tablet (5 mg total) by mouth once daily., Disp: 90 tablet, Rfl: 3    ferrous sulfate 325 mg (65 mg iron) Tab tablet, Take 1 tablet (325 mg total) by mouth 2 (two) times daily., Disp: , Rfl: 3    fluticasone-salmeterol 250-50 mcg/dose (ADVAIR) 250-50 mcg/dose diskus inhaler, Inhale 1 puff into the lungs 2 (two) times daily., Disp: 60 each, Rfl: 10    hydrocodone-acetaminophen 5-325mg (NORCO) 5-325 mg per tablet, Take 1 tablet by mouth every 8 (eight) hours as needed for Pain., Disp: 21 tablet, Rfl: 0    levothyroxine (SYNTHROID) 88 MCG tablet, Take 1 tablet (88 mcg total) by mouth once daily., Disp: 90 tablet, Rfl: 3    meclizine (ANTIVERT) 25 mg tablet, TAKE 1 TABLET(25 MG) BY MOUTH EVERY 6 HOURS AS NEEDED FOR DIZZINESS, Disp: 30 tablet, Rfl: 0    olmesartan (BENICAR) 40 MG tablet, TAKE 1 TABLET BY MOUTH DAILY, Disp: 90 tablet, Rfl: 0    olmesartan (BENICAR) 40 MG tablet, TAKE 1 TABLET(40 MG) BY MOUTH EVERY DAY, Disp: 90 tablet, Rfl: 0    pantoprazole (PROTONIX) 40 MG tablet, Take 1 tablet (40 mg total) by mouth once daily., Disp: 90 tablet, Rfl: 3    tiotropium (SPIRIVA WITH HANDIHALER) 18 mcg inhalation capsule, Inhale 1 capsule (18 mcg total) into the lungs once daily., Disp: 90 capsule, Rfl: 3    aspirin (ECOTRIN) 81 MG EC tablet, Take 1 tablet (81 mg total) by mouth once daily., Disp: , Rfl: 0     Review of Systems:  Review of Systems   Constitutional: Negative for chills and fever.   HENT: Negative for nosebleeds.    Eyes: Negative for pain.   Respiratory: Negative for hemoptysis.    Cardiovascular: Negative for chest pain.   Gastrointestinal: Negative for nausea and vomiting.    Genitourinary: Negative for dysuria.   Musculoskeletal: Positive for back pain, joint pain, myalgias and neck pain. Negative for falls.   Skin: Negative for rash.   Neurological: Negative for dizziness, tremors and focal weakness.   Endo/Heme/Allergies: Does not bruise/bleed easily.   Psychiatric/Behavioral: Negative for depression. The patient is not nervous/anxious.        Allergies:  Patient has no known allergies.     Medical History:  Past Medical History:   Diagnosis Date    Abdominal pain     CHF (congestive heart failure)     Chronic kidney disease, stage III (moderate) 2015    COPD (chronic obstructive pulmonary disease)     COPD (chronic obstructive pulmonary disease)     CVA (cerebral infarction)     Esophagitis     Gastritis     GERD (gastroesophageal reflux disease)     GI bleed     Hyperlipidemia     Hypertension     Mesenteric angina     PVD (peripheral vascular disease) with claudication 10/13/2015    Stroke     Subclavian artery stenosis, left     Thyroid disease         Surgical History:  Past Surgical History:   Procedure Laterality Date    CAROTID STENT       SECTION      COLONOSCOPY N/A 3/2/2017    Procedure: COLONOSCOPY;  Surgeon: Cecil Crooks MD;  Location: [facility];  Service: Endoscopy;  Laterality: N/A;    CORONARY ANGIOPLASTY      GALLBLADDER SURGERY      HYSTERECTOMY      OOPHORECTOMY      SUPERIOR MESTENTERIC ARTERY STENT          Social History:  Social History     Social History    Marital status:      Spouse name: N/A    Number of children: 4    Years of education: N/A     Occupational History    Not on file.     Social History Main Topics    Smoking status: Current Every Day Smoker     Packs/day: 0.50     Years: 60.00     Types: Cigarettes    Smokeless tobacco: Current User    Alcohol use No    Drug use: No    Sexual activity: Not on file     Other Topics Concern    Not on file     Social History Narrative    Lives  with son, 1 story [place]       Physical Exam:  Vitals:    18 1500   Weight: 64 kg (141 lb)   PainSc:   5     General    Nursing note and vitals reviewed.  Constitutional: She is oriented to person, place, and time. She appears well-developed and well-nourished. No distress.   HENT:   Head: Normocephalic and atraumatic.   Nose: Nose normal.   Eyes: Conjunctivae and EOM are normal. Pupils are equal, round, and reactive to light. Right eye exhibits no discharge. Left eye exhibits no discharge. No scleral icterus.   Neck: No JVD present.   Cardiovascular: Intact distal pulses.    Pulmonary/Chest: Effort normal. No respiratory distress.   Abdominal: She exhibits no distension.   Neurological: She is alert and oriented to person, place, and time. Coordination normal.   Psychiatric: She has a normal mood and affect. Her behavior is normal. Judgment and thought content normal.     General Musculoskeletal Exam   Gait: normal     Back (L-Spine & T-Spine) / Neck (C-Spine) Exam     Tenderness Right paramedian tenderness of the Lower L-Spine. Left paramedian tenderness of the Lower L-Spine.     Back (L-Spine & T-Spine) Range of Motion   Extension: abnormal   Flexion: abnormal     Spinal Sensation   Right Side Sensation  L-Spine Level: normal  Left Side Sensation  L-Spine Level: normal    Other She has no scoliosis .      Muscle Strength   Right Lower Extremity   Hip Flexion: 5/5   Hip Extensors: 5/5  Quadriceps:  5/5   Hamstrin/5   Gastrocsoleus:  5/5/5  Left Lower Extremity   Hip Flexion: 5/5   Hip Extensors: 5/5  Quadriceps:  5/5   Hamstrin/5   Gastrocsoleus:  5/5/5    Reflexes     Left Side  Quadriceps:  2+  Achilles:  2+    Right Side   Quadriceps:  2+  Achilles:  2+      Imaging:  MRI Lumbar Spine W WO Contrast 18  Narrative     EXAMINATION:  MRI LUMBAR SPINE W WO CONTRAST    CLINICAL HISTORY:  Low back pain, >6wks conservative tx, persistent-progressive sx, surgical candidate;  Pain in right  leg    TECHNIQUE:  Sagittal T1, T2, stir, axial T1-T2 imaging of the lumbar spine precontrast with postcontrast axial and sagittal T1 imaging following 6 mm in intravenous infusion of Gadavist contrast.    COMPARISON:  None    FINDINGS:  Slightly straightened lumbar lordosis with superimposed grade 1 retrolisthesis of L2 on L3.  There is variable degenerative disc disease with disc desiccation height loss and endplate degenerative change at all levels.  Allowing for degenerative changes lumbar vertebral body heights and contours are within normal limits without evidence for acute fracture.    The distal spinal cord and conus is normal in signal and contour the tip of the conus approximates the inferior T12.  No abnormal intrathecal enhancement.    Please note only included in the sagittal field of view is a small region of signal void within the ventral epidural space at the inferior T11 level most suggestive for migrated this fragment allowing for sagittal inclusion only there is no significant central canal or neural foraminal stenosis.  This presumed migrated fragment measures approximately 8 mm in craniocaudal dimension.    T12/L1 no significant disc bulge, central canal or neural foraminal stenosis.    L1/L2: Small bulging disc with ligamentum flavum hypertrophy without significant central canal or neural foraminal stenosis    L2/L3: Posterior disc osteophyte accentuated by anterolisthesis with ligamentum flavum hypertrophy and facet joint arthropathy with moderate central canal stenosis and moderate to severe neural foraminal stenosis bilaterally.    L3/L4: No significant disc bulge, or central canal stenosis.  There is facet joint arthropathy and mild neural foraminal stenosis.  Stenosis    L4/L5: Posterior disc osteophyte with facet joint arthropathy without significant central canal stenosis with moderate bilateral neural foraminal stenosis.    L5/S1: Posterior disc bulge with facet joint arthropathy  without significant central canal stenosis with moderate neural foraminal stenosis bilaterally.    Nonenhancing fluid signal foci within the left kidney compatible with cysts largest measuring 2.7 cm   Impression       Spondylo degenerative change lumbar spine most pronounced at L2/L3 with posterior disc osteophyte, ligamentum flavum hypertrophy and facet joint arthropathy with moderate resulting central canal stenosis with moderate to severe neural foraminal stenosis bilaterally.    Please note there is a probable migrated disc fragment in the ventral epidural space at the inferior T11 level without significant central canal stenosis allowing for inclusion the sagittal field of view only.    Please see above for additional details.           Labs:  BMP  Lab Results   Component Value Date     04/11/2018    K 4.7 04/11/2018     04/11/2018    CO2 32 (H) 04/11/2018    BUN 21 04/11/2018    CREATININE 1.2 04/11/2018    CREATININE 1.2 04/11/2018    CALCIUM 9.5 04/11/2018    ANIONGAP 7 (L) 04/11/2018    ESTGFRAFRICA 50 (A) 04/11/2018    ESTGFRAFRICA 50 (A) 04/11/2018    EGFRNONAA 43 (A) 04/11/2018    EGFRNONAA 43 (A) 04/11/2018     Lab Results   Component Value Date    ALT 21 01/03/2018    AST 21 01/03/2018    ALKPHOS 130 01/03/2018    BILITOT 0.5 01/03/2018       Assessment:  Problem List Items Addressed This Visit     Leg pain, bilateral - Primary    Chronic bilateral low back pain with bilateral sciatica    Lumbar spondylosis    Spinal stenosis of lumbar region with neurogenic claudication    DDD (degenerative disc disease), lumbar          She appears to have been referred by her cardiologist for assistance with delineating the cause of her lower extremity pain.  Imaging shows the presence of peripheral arterial disease that may be causing claudication; however, MRI of her lumbar spine shows bilateral neural foraminal stenosis and central canal stenosis which may also be mimicking the symptoms.  She does  have significant back pain as well which supports the diagnosis of lumbar radiculopathy due to multiple degenerative changes; however, her lower extremity symptoms are relieved by less than 5 minutes of rest which is more suggestive of an underlying vascular etiology.  I recommended a lumbar epidural steroid injection with a combination of local anesthetic and steroid as a diagnostic test to help distinguish between the 2 possible causes, and for therapeutic relief.    Treatment Plan:   PT/OT/HEP: None recommended at this time  Procedures: LESI @ L5-S1  Medications: No changes recommended at this time.  Imaging: Reviewed with patient and family.    Follow Up: RTC p je Ibarra Jr, MD  Interventional Pain Medicine / Anesthesiology    Disclaimer: This note was partly generated using dictation software which may occasionally result in transcription errors.

## 2018-04-20 ENCOUNTER — OFFICE VISIT (OUTPATIENT)
Dept: PAIN MEDICINE | Facility: CLINIC | Age: 78
End: 2018-04-20
Payer: MEDICARE

## 2018-04-20 VITALS — BODY MASS INDEX: 24.2 KG/M2 | WEIGHT: 141 LBS

## 2018-04-20 DIAGNOSIS — G89.29 CHRONIC BILATERAL LOW BACK PAIN WITH BILATERAL SCIATICA: ICD-10-CM

## 2018-04-20 DIAGNOSIS — M51.36 DDD (DEGENERATIVE DISC DISEASE), LUMBAR: ICD-10-CM

## 2018-04-20 DIAGNOSIS — M79.605 LEG PAIN, BILATERAL: Primary | ICD-10-CM

## 2018-04-20 DIAGNOSIS — M79.604 LEG PAIN, BILATERAL: Primary | ICD-10-CM

## 2018-04-20 DIAGNOSIS — M54.42 CHRONIC BILATERAL LOW BACK PAIN WITH BILATERAL SCIATICA: ICD-10-CM

## 2018-04-20 DIAGNOSIS — M54.41 CHRONIC BILATERAL LOW BACK PAIN WITH BILATERAL SCIATICA: ICD-10-CM

## 2018-04-20 DIAGNOSIS — M48.062 SPINAL STENOSIS OF LUMBAR REGION WITH NEUROGENIC CLAUDICATION: ICD-10-CM

## 2018-04-20 DIAGNOSIS — M47.816 LUMBAR SPONDYLOSIS: ICD-10-CM

## 2018-04-20 PROCEDURE — 99499 UNLISTED E&M SERVICE: CPT | Mod: S$GLB,,, | Performed by: PAIN MEDICINE

## 2018-04-20 PROCEDURE — 99204 OFFICE O/P NEW MOD 45 MIN: CPT | Mod: S$GLB,,, | Performed by: PAIN MEDICINE

## 2018-04-20 PROCEDURE — 99999 PR PBB SHADOW E&M-EST. PATIENT-LVL II: CPT | Mod: PBBFAC,,, | Performed by: PAIN MEDICINE

## 2018-04-20 NOTE — LETTER
April 20, 2018      Anthony Brooks MD  [address]  [address]  [facility] [address]           [place] [facility]  [address]  [place] [address]  Phone: [phone]          Patient: Brittany Haile   MR Number: [id number]   YOB: 1940   Date of Visit: 4/20/2018       Dear Dr. Anthony Brooks:    Thank you for referring Brittany Haile to me for evaluation. Attached you will find relevant portions of my assessment and plan of care.    If you have questions, please do not hesitate to call me. I look forward to following Brittany Haile along with you.    Sincerely,    Stiven Ibarra Jr., MD    Enclosure  CC:  No Recipients    If you would like to receive this communication electronically, please contact [email or web address] or [phone] to request more information on [facility] access.    For providers and/or their staff who would like to refer a patient to [facility], please contact us through our one-stop-shop provider referral line, [facility], at [phone].    If you feel you have received this communication in error or would no longer like to receive these types of communications, please e-mail [email or web address]

## 2018-04-21 RX ORDER — ATORVASTATIN CALCIUM 40 MG/1
TABLET, FILM COATED ORAL
Qty: 90 TABLET | Refills: 3 | Status: SHIPPED | OUTPATIENT
Start: 2018-04-21 | End: 2019-01-31 | Stop reason: SDUPTHER

## 2018-04-21 NOTE — TELEPHONE ENCOUNTER
----- Message from Stacia Gauthier LPN sent at 4/20/2018  4:02 PM CDT -----  Patient is scheduled for a L5-S1 Lumbar epidural steroid injection on 5/3/18 and need clearance to hold Pletal and Plavix 7 days prior to procedure.     Please advise thank you

## 2018-04-23 ENCOUNTER — TELEPHONE (OUTPATIENT)
Dept: PAIN MEDICINE | Facility: CLINIC | Age: 78
End: 2018-04-23

## 2018-04-23 NOTE — TELEPHONE ENCOUNTER
----- Message from Stiven Ibarra Jr., MD sent at 4/23/2018  8:30 AM CDT -----  Proceed with the injection based on his recommendation.    Stiven Ibarra Jr, MD  Interventional Pain Medicine / Anesthesiology      ----- Message -----  From: Stacia Gauthier LPN  Sent: 4/23/2018   8:23 AM  To: Stiven Ibarra Jr., MD    Patient is to hold Plavix and Pletal 5 days prior to injection per Dr. Brooks. Is this ok?    Please advise thank you

## 2018-04-23 NOTE — TELEPHONE ENCOUNTER
Spoke with patient's daughter (renzo) regarding medication clearance. Patient's daughter was informed that the patient will need to hold Plavix and Pletal 5 days prior to procedure. Patient's daughter verbalized understanding.

## 2018-04-30 NOTE — DISCHARGE INSTRUCTIONS
Home Care Instructions Pain Management:    1.  DIET:    You may resume your normal diet today.    2.  BATHING:    You may shower with luke warm water.    3.  DRESSING:    You may remove your bandage today.    4.  ACTIVITY LEVEL:      You may resume your normal activities 24 hours after your procedure.    5.  MEDICATIONS:    You may resume your normal medications today.    6.  SPECIAL INSTRUCTIONS:    No heat to the injection site for 24 hours including bath or shower, heating pad, moist heat or hot tubs.    Use an ice pack to the injection site for any pain or discomfort.  Apply ice packs for 20 minute intervals as needed.    If you have received any sedatives by mouth today, you can not drive for 12 hours.    If you have received sedation through an IV, you can not drive for 24 hours.    PLEASE CALL YOUR DOCTOR FOR THE FOLLOWIN.  Redness or swelling around the injection site.  2.  Fever of 101 degrees.  3.  Drainage (pus) from the injection site.  4.  For any continuous bleeding (some dried blood over the incision is normal.)    FOR EMERGENCIES:    If any unusual problems or difficulties occur during clinic hours, call [phone] or dial [phone].    Follow up with with your physician in 2-3 weeks.       Procedural Sedation  Procedural sedation is medicine to ease discomfort, pain, and anxiety during a procedure. The medicine is often given through an IV (intravenous) line in your arm or hand. In some cases, the medicine may be taken by mouth or inhaled. While you are under sedation, you will likely be awake. But you may not remember anything afterward.  Why procedural sedation is used  Sedation is used for many types of procedures. The goal is to reduce pain, anxiety, and stressful memories of a procedure. It can also help your healthcare provider treat you. For example, having a broken bone fixed may be easier if you feel relaxed.  Procedural sedation is used only for short, basic procedures. It is not used  for complex surgeries. Some procedures that use this type of sedation include:  · Dental surgery  · Breast biopsy, to take a sample of breast tissue  · Endoscopy, to look at gastrointestinal problems  · Bronchoscopy, to check for lung problems  · Bone or joint realignment, to fix a broken bone or dislocated joint  · Minor foot or skin surgery  · Electrical cardioversion, to restore a normal heart rhythm  · Lumbar puncture, to assess neurological disease  Risks of procedural sedation  Procedural sedation has some risks and possible side effects, such as:  · Headache  · Nausea and vomiting  · Unpleasant memory of the procedure  · Lowered rate of breathing  · Changes in heart rate and blood pressure (rare)  · Inhalation of stomach contents into your lungs (rare)  Side effects will likely go away shortly after the procedure. Your healthcare team will watch your heart rate and breathing during your sedation. This is to help prevent problems.  Your own risks may vary based on your age and your overall health. They also depend on the type of sedation you are given. Talk with your healthcare provider about the risks that apply most to you.  Getting ready for procedural sedation  Talk with your healthcare provider about how to get ready for your procedure. Tell him or her about all the medicines you take. This includes over-the-counter medicines such as ibuprofen. It also includes vitamins, herbs, and other supplements. You may need to stop taking some medicines before the procedure, such as blood thinners and aspirin. If you smoke, you should stop to lessen the chance of a lung issue. Talk with your healthcare provider if you need help to stop smoking.  Tell your healthcare provider if you:  · Have had any problems in the past with sedation or anesthesia  · Have had any recent changes in your health, such as an infection or fever  · Are pregnant or think you may be pregnant  Also be sure to:  · Ask a family member or friend  to take you home after the procedure. You cant drive on the day you receive sedation.  · Not eat or drink after midnight the night before your procedure, if advised.  · Not plan on making any important decisions, such as financial or legal, for the day after you receive the sedation.  · Follow all other instructions from your healthcare provider.  During your procedural sedation  You may have your procedure in a hospital or a medical clinic. Sedation is done by a trained healthcare provider. In general, you can expect the following:  · You will be given medicine through an IV line in your arm or hand. Or you may receive a shot. The medicine may also be given by mouth. Or you may inhale it through a mask.  · If you receive medicine through an IV, you may feel the effects very quickly. You will start to feel relaxed and drowsy.  · During the procedure, your heart rate, breathing, and blood pressure will be closely watched. Your breathing and blood pressure may decrease a little. But you will likely not need help with your breathing. You may receive a little extra oxygen through a mask or through some soft plastic prongs underneath your nose.  · You will probably be awake the entire time. If you do fall asleep, you should be easy to wake up, if needed. You should feel little or no pain.  · When your procedure is over, the sedative medicine will be stopped.  After your procedural sedation  You will begin to feel more awake and aware. But you will likely be drowsy for a while afterward. You will be closely watched as you become more alert. You may have a faint memory of the procedure. Or you may not remember it at all.  You should be able to return home within an hour or two after your procedure. Plan to have someone stay with you for a few hours. Side effects such as headache and nausea may go away quickly. Tell your healthcare provider if they continue.  Dont drive or make any important decisions for at least 24  hours. Be sure to follow all after-care instructions.     When to call your healthcare provider  Have someone call your healthcare provider right away if you have any of these:  · Drowsiness that gets worse  · Weakness or dizziness that gets worse  · Repeated vomiting  · You cant be awakened  · Severe or ongoing pain from the procedure, not relieved by the pain medicine   Date Last Reviewed: 2/1/2017  © 3288-7673 [employer]. [address] [place] [address], [place] [address]. All rights reserved. This information is not intended as a substitute for professional medical care. Always follow your healthcare professional's instructions.

## 2018-05-02 ENCOUNTER — TELEPHONE (OUTPATIENT)
Dept: PAIN MEDICINE | Facility: HOSPITAL | Age: 78
End: 2018-05-02

## 2018-05-03 ENCOUNTER — HOSPITAL ENCOUNTER (OUTPATIENT)
Facility: HOSPITAL | Age: 78
Discharge: HOME | End: 2018-05-03
Attending: PAIN MEDICINE | Admitting: PAIN MEDICINE
Payer: MEDICARE

## 2018-05-03 ENCOUNTER — SURGERY (OUTPATIENT)
Age: 78
End: 2018-05-03

## 2018-05-03 VITALS
DIASTOLIC BLOOD PRESSURE: 79 MMHG | HEIGHT: 61 IN | RESPIRATION RATE: 15 BRPM | HEART RATE: 84 BPM | BODY MASS INDEX: 26.43 KG/M2 | TEMPERATURE: 98 F | SYSTOLIC BLOOD PRESSURE: 198 MMHG | OXYGEN SATURATION: 95 % | WEIGHT: 140 LBS

## 2018-05-03 DIAGNOSIS — M48.062 SPINAL STENOSIS OF LUMBAR REGION WITH NEUROGENIC CLAUDICATION: ICD-10-CM

## 2018-05-03 DIAGNOSIS — M54.16 LUMBAR RADICULOPATHY: Primary | ICD-10-CM

## 2018-05-03 PROCEDURE — 63600175 PHARM REV CODE 636 W HCPCS: Performed by: PAIN MEDICINE

## 2018-05-03 PROCEDURE — 62323 NJX INTERLAMINAR LMBR/SAC: CPT | Mod: ,,, | Performed by: PAIN MEDICINE

## 2018-05-03 PROCEDURE — 25000003 PHARM REV CODE 250: Performed by: PAIN MEDICINE

## 2018-05-03 PROCEDURE — 25500020 PHARM REV CODE 255: Performed by: PAIN MEDICINE

## 2018-05-03 PROCEDURE — 62323 NJX INTERLAMINAR LMBR/SAC: CPT | Performed by: PAIN MEDICINE

## 2018-05-03 RX ORDER — LIDOCAINE HYDROCHLORIDE 10 MG/ML
INJECTION, SOLUTION EPIDURAL; INFILTRATION; INTRACAUDAL; PERINEURAL
Status: DISCONTINUED | OUTPATIENT
Start: 2018-05-03 | End: 2018-05-03 | Stop reason: HOSPADM

## 2018-05-03 RX ORDER — ALPRAZOLAM 0.25 MG/1
0.25 TABLET, ORALLY DISINTEGRATING ORAL ONCE AS NEEDED
Status: DISCONTINUED | OUTPATIENT
Start: 2018-05-03 | End: 2018-05-03 | Stop reason: HOSPADM

## 2018-05-03 RX ORDER — DEXAMETHASONE SODIUM PHOSPHATE 10 MG/ML
INJECTION INTRAMUSCULAR; INTRAVENOUS
Status: DISCONTINUED | OUTPATIENT
Start: 2018-05-03 | End: 2018-05-03 | Stop reason: HOSPADM

## 2018-05-03 RX ADMIN — DEXAMETHASONE SODIUM PHOSPHATE 10 MG: 10 INJECTION, SOLUTION INTRAMUSCULAR; INTRAVENOUS at 12:05

## 2018-05-03 RX ADMIN — IOHEXOL 5 ML: 300 INJECTION, SOLUTION INTRAVENOUS at 12:05

## 2018-05-03 RX ADMIN — LIDOCAINE HYDROCHLORIDE 5 ML: 10 INJECTION, SOLUTION EPIDURAL; INFILTRATION; INTRACAUDAL; PERINEURAL at 12:05

## 2018-05-03 NOTE — OP NOTE
"Procedure Note    Pre-operative Diagnosis: Lumbar radiculopathy  Post-operative Diagnosis: Lumbar radiculopathy  Procedure Date: 05/03/2018  Procedure: (1) Lumbar Epidural Steroid Injection and (2) Intraoperative fluoroscopy        Anesthesia: Local    Indications: To alleviate pain and suffering, and reduce functional impairment.    Procedure in Detail:   The patients history and physical exam were reviewed. The risks, benefits and alternatives to the procedure were discussed, and all questions were answered to the patients satisfaction. The patient agreed to proceed, and written informed consent was verified.    The patient was brought into the procedure room and placed in the prone position on the fluoroscopy table. The area of the lumbar spine was prepped with Chloraprep and draped in a sterile manner. The L5-S1 interspace was identified and marked under AP fluoroscopy. The skin and subcutaneous tissues overlying the targeted interspace were anesthetized with 3-5 mL of 1% lidocaine using a 25G, 1.5" needle. A 17G, 3.5" Tuohy epidural needle was directed toward the interspace under fluoroscopic guidance until the ligamentum flavum was engaged. From this point, a loss of resistance technique with a glass syringe and saline was used to identify entrance of the needle into the epidural space. Once loss of resistance was observed 1 mL of contrast solution was injected live. An appropriate epidurogram was noted.    A 5 mL mixture consisting of saline, 2 mL 1% Lidocaine and 15 mg of Dexamethasone was injected slowly and without resistance.  The needle was removed and a bandage applied to puncture site.    Blood Loss: nil    Disposition: The patient tolerated the procedure well, and there were no apparent complications. Vital signs remained stable throughout the procedure. The patient was taken to the recovery area where written discharge instructions for the procedure were given.     Follow-up: RTC as " scheduled      Stiven Ibarra Jr, MD  Interventional Pain Medicine / Anesthesiology

## 2018-05-03 NOTE — H&P (VIEW-ONLY)
[facility] New Patient Evaluation    Referred by: Anthony Brooks MD  Reason for referral: M79.604,M79.605 (ICD-10-CM) - Pain in both lower extremities    CC: back and leg pain    Last 3 PDI Scores 4/20/2018   Pain Disability Index (PDI) 55       HPI: Brtitany Haile is a 77 y.o. female who complains of back and bilateral extremity pain as characterized below.      Location: lower back and bilateral leg pain  Severity: Currently: 5/10   Typical Range: 8/10     Exacerbation: 10/10   Onset: several years  Quality: weakness  Radiation: bilateral leg pain  Axial/Extremity Percentage of Pain: 50/50  Exacerbating Factors: standing, standing for more than 5 minutes and walking for more than 5 minutes  Mitigating Factors: nothing  Assoc: denies night fever/night sweats, urinary incontinence, bowel incontinence, significant weight loss and loss of sensations    Previous Therapies:  PT: Denies  HEP: Denies  TENS:  Injections: None  Surgery: None  Medications:   - NSAIDS:   - MSK Relaxants:   - TCAs:   - SNRIs:   - Topicals:   - Anticonvulsants:  - Opioids:     Current Pain Medications:  1. Norco     Full Medication List:    Current Outpatient Prescriptions:     albuterol (PROVENTIL HFA) 90 mcg/actuation inhaler, Inhale 2 puffs into the lungs every 4 to 6 hours as needed., Disp: 8.5 g, Rfl: 6    albuterol-ipratropium 2.5mg-0.5mg/3mL (DUO-NEB) 0.5 mg-3 mg(2.5 mg base)/3 mL nebulizer solution, USE 1 VIAL VIA NEBULIZER EVERY 6 HOURS AS NEEDED FOR WHEEZING( RESCUE), Disp: 1080 mL, Rfl: 3    amlodipine (NORVASC) 10 MG tablet, Take 1 tablet (10 mg total) by mouth once daily., Disp: 90 tablet, Rfl: 3    atorvastatin (LIPITOR) 40 MG tablet, Take 1 tablet (40 mg total) by mouth once daily., Disp: 90 tablet, Rfl: 5    cilostazol (PLETAL) 50 MG Tab, Take 1 tablet (50 mg total) by mouth 2 (two) times daily., Disp: 180 tablet, Rfl: 3    clopidogrel (PLAVIX) 75 mg tablet, TAKE 1 TABLET BY MOUTH EVERY DAY, Disp: 90 tablet, Rfl:  3    doxycycline (MONODOX) 100 MG capsule, Take 1 capsule (100 mg total) by mouth 2 (two) times daily., Disp: 20 capsule, Rfl: 0    ergocalciferol (ERGOCALCIFEROL) 50,000 unit Cap, Take 1 capsule (50,000 Units total) by mouth every 7 days., Disp: 12 capsule, Rfl: 3    escitalopram oxalate (LEXAPRO) 5 MG Tab, Take 1 tablet (5 mg total) by mouth once daily., Disp: 90 tablet, Rfl: 3    ferrous sulfate 325 mg (65 mg iron) Tab tablet, Take 1 tablet (325 mg total) by mouth 2 (two) times daily., Disp: , Rfl: 3    fluticasone-salmeterol 250-50 mcg/dose (ADVAIR) 250-50 mcg/dose diskus inhaler, Inhale 1 puff into the lungs 2 (two) times daily., Disp: 60 each, Rfl: 10    hydrocodone-acetaminophen 5-325mg (NORCO) 5-325 mg per tablet, Take 1 tablet by mouth every 8 (eight) hours as needed for Pain., Disp: 21 tablet, Rfl: 0    levothyroxine (SYNTHROID) 88 MCG tablet, Take 1 tablet (88 mcg total) by mouth once daily., Disp: 90 tablet, Rfl: 3    meclizine (ANTIVERT) 25 mg tablet, TAKE 1 TABLET(25 MG) BY MOUTH EVERY 6 HOURS AS NEEDED FOR DIZZINESS, Disp: 30 tablet, Rfl: 0    olmesartan (BENICAR) 40 MG tablet, TAKE 1 TABLET BY MOUTH DAILY, Disp: 90 tablet, Rfl: 0    olmesartan (BENICAR) 40 MG tablet, TAKE 1 TABLET(40 MG) BY MOUTH EVERY DAY, Disp: 90 tablet, Rfl: 0    pantoprazole (PROTONIX) 40 MG tablet, Take 1 tablet (40 mg total) by mouth once daily., Disp: 90 tablet, Rfl: 3    tiotropium (SPIRIVA WITH HANDIHALER) 18 mcg inhalation capsule, Inhale 1 capsule (18 mcg total) into the lungs once daily., Disp: 90 capsule, Rfl: 3    aspirin (ECOTRIN) 81 MG EC tablet, Take 1 tablet (81 mg total) by mouth once daily., Disp: , Rfl: 0     Review of Systems:  Review of Systems   Constitutional: Negative for chills and fever.   HENT: Negative for nosebleeds.    Eyes: Negative for pain.   Respiratory: Negative for hemoptysis.    Cardiovascular: Negative for chest pain.   Gastrointestinal: Negative for nausea and vomiting.    Genitourinary: Negative for dysuria.   Musculoskeletal: Positive for back pain, joint pain, myalgias and neck pain. Negative for falls.   Skin: Negative for rash.   Neurological: Negative for dizziness, tremors and focal weakness.   Endo/Heme/Allergies: Does not bruise/bleed easily.   Psychiatric/Behavioral: Negative for depression. The patient is not nervous/anxious.        Allergies:  Patient has no known allergies.     Medical History:  Past Medical History:   Diagnosis Date    Abdominal pain     CHF (congestive heart failure)     Chronic kidney disease, stage III (moderate) 2015    COPD (chronic obstructive pulmonary disease)     COPD (chronic obstructive pulmonary disease)     CVA (cerebral infarction)     Esophagitis     Gastritis     GERD (gastroesophageal reflux disease)     GI bleed     Hyperlipidemia     Hypertension     Mesenteric angina     PVD (peripheral vascular disease) with claudication 10/13/2015    Stroke     Subclavian artery stenosis, left     Thyroid disease         Surgical History:  Past Surgical History:   Procedure Laterality Date    CAROTID STENT       SECTION      COLONOSCOPY N/A 3/2/2017    Procedure: COLONOSCOPY;  Surgeon: Cecil Crooks MD;  Location: [facility];  Service: Endoscopy;  Laterality: N/A;    CORONARY ANGIOPLASTY      GALLBLADDER SURGERY      HYSTERECTOMY      OOPHORECTOMY      SUPERIOR MESTENTERIC ARTERY STENT          Social History:  Social History     Social History    Marital status:      Spouse name: N/A    Number of children: 4    Years of education: N/A     Occupational History    Not on file.     Social History Main Topics    Smoking status: Current Every Day Smoker     Packs/day: 0.50     Years: 60.00     Types: Cigarettes    Smokeless tobacco: Current User    Alcohol use No    Drug use: No    Sexual activity: Not on file     Other Topics Concern    Not on file     Social History Narrative    Lives  with son, 1 story [place]       Physical Exam:  Vitals:    18 1500   Weight: 64 kg (141 lb)   PainSc:   5     General    Nursing note and vitals reviewed.  Constitutional: She is oriented to person, place, and time. She appears well-developed and well-nourished. No distress.   HENT:   Head: Normocephalic and atraumatic.   Nose: Nose normal.   Eyes: Conjunctivae and EOM are normal. Pupils are equal, round, and reactive to light. Right eye exhibits no discharge. Left eye exhibits no discharge. No scleral icterus.   Neck: No JVD present.   Cardiovascular: Intact distal pulses.    Pulmonary/Chest: Effort normal. No respiratory distress.   Abdominal: She exhibits no distension.   Neurological: She is alert and oriented to person, place, and time. Coordination normal.   Psychiatric: She has a normal mood and affect. Her behavior is normal. Judgment and thought content normal.     General Musculoskeletal Exam   Gait: normal     Back (L-Spine & T-Spine) / Neck (C-Spine) Exam     Tenderness Right paramedian tenderness of the Lower L-Spine. Left paramedian tenderness of the Lower L-Spine.     Back (L-Spine & T-Spine) Range of Motion   Extension: abnormal   Flexion: abnormal     Spinal Sensation   Right Side Sensation  L-Spine Level: normal  Left Side Sensation  L-Spine Level: normal    Other She has no scoliosis .      Muscle Strength   Right Lower Extremity   Hip Flexion: 5/5   Hip Extensors: 5/5  Quadriceps:  5/5   Hamstrin/5   Gastrocsoleus:  5/5/5  Left Lower Extremity   Hip Flexion: 5/5   Hip Extensors: 5/5  Quadriceps:  5/5   Hamstrin/5   Gastrocsoleus:  5/5/5    Reflexes     Left Side  Quadriceps:  2+  Achilles:  2+    Right Side   Quadriceps:  2+  Achilles:  2+      Imaging:  MRI Lumbar Spine W WO Contrast 18  Narrative     EXAMINATION:  MRI LUMBAR SPINE W WO CONTRAST    CLINICAL HISTORY:  Low back pain, >6wks conservative tx, persistent-progressive sx, surgical candidate;  Pain in right  leg    TECHNIQUE:  Sagittal T1, T2, stir, axial T1-T2 imaging of the lumbar spine precontrast with postcontrast axial and sagittal T1 imaging following 6 mm in intravenous infusion of Gadavist contrast.    COMPARISON:  None    FINDINGS:  Slightly straightened lumbar lordosis with superimposed grade 1 retrolisthesis of L2 on L3.  There is variable degenerative disc disease with disc desiccation height loss and endplate degenerative change at all levels.  Allowing for degenerative changes lumbar vertebral body heights and contours are within normal limits without evidence for acute fracture.    The distal spinal cord and conus is normal in signal and contour the tip of the conus approximates the inferior T12.  No abnormal intrathecal enhancement.    Please note only included in the sagittal field of view is a small region of signal void within the ventral epidural space at the inferior T11 level most suggestive for migrated this fragment allowing for sagittal inclusion only there is no significant central canal or neural foraminal stenosis.  This presumed migrated fragment measures approximately 8 mm in craniocaudal dimension.    T12/L1 no significant disc bulge, central canal or neural foraminal stenosis.    L1/L2: Small bulging disc with ligamentum flavum hypertrophy without significant central canal or neural foraminal stenosis    L2/L3: Posterior disc osteophyte accentuated by anterolisthesis with ligamentum flavum hypertrophy and facet joint arthropathy with moderate central canal stenosis and moderate to severe neural foraminal stenosis bilaterally.    L3/L4: No significant disc bulge, or central canal stenosis.  There is facet joint arthropathy and mild neural foraminal stenosis.  Stenosis    L4/L5: Posterior disc osteophyte with facet joint arthropathy without significant central canal stenosis with moderate bilateral neural foraminal stenosis.    L5/S1: Posterior disc bulge with facet joint arthropathy  without significant central canal stenosis with moderate neural foraminal stenosis bilaterally.    Nonenhancing fluid signal foci within the left kidney compatible with cysts largest measuring 2.7 cm   Impression       Spondylo degenerative change lumbar spine most pronounced at L2/L3 with posterior disc osteophyte, ligamentum flavum hypertrophy and facet joint arthropathy with moderate resulting central canal stenosis with moderate to severe neural foraminal stenosis bilaterally.    Please note there is a probable migrated disc fragment in the ventral epidural space at the inferior T11 level without significant central canal stenosis allowing for inclusion the sagittal field of view only.    Please see above for additional details.           Labs:  BMP  Lab Results   Component Value Date     04/11/2018    K 4.7 04/11/2018     04/11/2018    CO2 32 (H) 04/11/2018    BUN 21 04/11/2018    CREATININE 1.2 04/11/2018    CREATININE 1.2 04/11/2018    CALCIUM 9.5 04/11/2018    ANIONGAP 7 (L) 04/11/2018    ESTGFRAFRICA 50 (A) 04/11/2018    ESTGFRAFRICA 50 (A) 04/11/2018    EGFRNONAA 43 (A) 04/11/2018    EGFRNONAA 43 (A) 04/11/2018     Lab Results   Component Value Date    ALT 21 01/03/2018    AST 21 01/03/2018    ALKPHOS 130 01/03/2018    BILITOT 0.5 01/03/2018       Assessment:  Problem List Items Addressed This Visit     Leg pain, bilateral - Primary    Chronic bilateral low back pain with bilateral sciatica    Lumbar spondylosis    Spinal stenosis of lumbar region with neurogenic claudication    DDD (degenerative disc disease), lumbar          She appears to have been referred by her cardiologist for assistance with delineating the cause of her lower extremity pain.  Imaging shows the presence of peripheral arterial disease that may be causing claudication; however, MRI of her lumbar spine shows bilateral neural foraminal stenosis and central canal stenosis which may also be mimicking the symptoms.  She does  have significant back pain as well which supports the diagnosis of lumbar radiculopathy due to multiple degenerative changes; however, her lower extremity symptoms are relieved by less than 5 minutes of rest which is more suggestive of an underlying vascular etiology.  I recommended a lumbar epidural steroid injection with a combination of local anesthetic and steroid as a diagnostic test to help distinguish between the 2 possible causes, and for therapeutic relief.    Treatment Plan:   PT/OT/HEP: None recommended at this time  Procedures: LESI @ L5-S1  Medications: No changes recommended at this time.  Imaging: Reviewed with patient and family.    Follow Up: RTC p je Ibarra Jr, MD  Interventional Pain Medicine / Anesthesiology    Disclaimer: This note was partly generated using dictation software which may occasionally result in transcription errors.

## 2018-05-03 NOTE — DISCHARGE SUMMARY
[facility]  Discharge Note  Short Stay     Admit Date: 5/3/2018    Discharge Date: 5/3/2018     Attending Physician: Stiven Ibarra Jr, MD    Diagnoses:  Active Hospital Problems    Diagnosis  POA    *Lumbar radiculopathy [M54.16]  Yes      Resolved Hospital Problems    Diagnosis Date Resolved POA   No resolved problems to display.     Discharged Condition: Good     Hospital Course: Patient was admitted for an outpatient interventional pain management procedure and tolerated the procedure well with no complications.     Final Diagnoses: Same as principal problem.     Disposition: Home or Self Care     Follow up/Patient Instructions:    Follow-up Information     Stiven Ibarra Jr, MD. Go in 2 weeks.    Specialty:  Pain Medicine  Why:  Post-procedural Follow Up As Scheduled, Call to make an appointment if you do not have one  Contact information:  [address]  [address]  [place] [address]  [phone]                   Reconciled Medications:     Medication List      CONTINUE taking these medications    albuterol 90 mcg/actuation inhaler  Commonly known as:  PROVENTIL HFA  Inhale 2 puffs into the lungs every 4 to 6 hours as needed.     albuterol-ipratropium 2.5mg-0.5mg/3mL 0.5 mg-3 mg(2.5 mg base)/3 mL nebulizer solution  Commonly known as:  DUO-NEB  USE 1 VIAL VIA NEBULIZER EVERY 6 HOURS AS NEEDED FOR WHEEZING( RESCUE)     amLODIPine 10 MG tablet  Commonly known as:  NORVASC  Take 1 tablet (10 mg total) by mouth once daily.     aspirin 81 MG EC tablet  Commonly known as:  ECOTRIN  Take 1 tablet (81 mg total) by mouth once daily.     atorvastatin 40 MG tablet  Commonly known as:  LIPITOR  TAKE 1 TABLET BY MOUTH EVERY DAY     cilostazol 50 MG Tab  Commonly known as:  PLETAL  Take 1 tablet (50 mg total) by mouth 2 (two) times daily.     clopidogrel 75 mg tablet  Commonly known as:  PLAVIX  TAKE 1 TABLET BY MOUTH EVERY DAY     doxycycline 100 MG capsule  Commonly known as:  MONODOX  Take 1  capsule (100 mg total) by mouth 2 (two) times daily.     ergocalciferol 50,000 unit Cap  Commonly known as:  ERGOCALCIFEROL  Take 1 capsule (50,000 Units total) by mouth every 7 days.     escitalopram oxalate 5 MG Tab  Commonly known as:  LEXAPRO  Take 1 tablet (5 mg total) by mouth once daily.     ferrous sulfate 325 mg (65 mg iron) Tab tablet  Take 1 tablet (325 mg total) by mouth 2 (two) times daily.     fluticasone-salmeterol 250-50 mcg/dose 250-50 mcg/dose diskus inhaler  Commonly known as:  ADVAIR  Inhale 1 puff into the lungs 2 (two) times daily.     hydrocodone-acetaminophen 5-325mg 5-325 mg per tablet  Commonly known as:  NORCO  Take 1 tablet by mouth every 8 (eight) hours as needed for Pain.     levothyroxine 88 MCG tablet  Commonly known as:  SYNTHROID  Take 1 tablet (88 mcg total) by mouth once daily.     meclizine 25 mg tablet  Commonly known as:  ANTIVERT  TAKE 1 TABLET(25 MG) BY MOUTH EVERY 6 HOURS AS NEEDED FOR DIZZINESS     * olmesartan 40 MG tablet  Commonly known as:  BENICAR  TAKE 1 TABLET BY MOUTH DAILY     * olmesartan 40 MG tablet  Commonly known as:  BENICAR  TAKE 1 TABLET(40 MG) BY MOUTH EVERY DAY     pantoprazole 40 MG tablet  Commonly known as:  PROTONIX  Take 1 tablet (40 mg total) by mouth once daily.     tiotropium 18 mcg inhalation capsule  Commonly known as:  SPIRIVA WITH HANDIHALER  Inhale 1 capsule (18 mcg total) into the lungs once daily.        * This list has 2 medication(s) that are the same as other medications prescribed for you. Read the directions carefully, and ask your doctor or other care provider to review them with you.                Discharge Procedure Orders (must include Diet, Follow-up, Activity)  Call MD for:  temperature >100.4     Call MD for:  severe uncontrolled pain     Call MD for:  redness, tenderness, or signs of infection (pain, swelling, redness, odor or green/yellow discharge around incision site)     Call MD for:  difficulty breathing or increased  cough     Call MD for:  severe persistent headache     Call MD for:  worsening rash     Remove dressing in 24 hours

## 2018-05-27 DIAGNOSIS — E55.9 VITAMIN D DEFICIENCY: ICD-10-CM

## 2018-05-27 RX ORDER — ERGOCALCIFEROL 1.25 MG/1
CAPSULE ORAL
Qty: 12 CAPSULE | Refills: 0 | Status: SHIPPED | OUTPATIENT
Start: 2018-05-27 | End: 2019-01-31

## 2018-05-31 NOTE — PROGRESS NOTES
[facility] Established Patient Evaluation    Referred by: Anthony Brooks MD  Reason for referral: M79.604,M79.605 (ICD-10-CM) - Pain in both lower extremities    CC: back and leg pain    Last 3 PDI Scores 4/20/2018   Pain Disability Index (PDI) 55     Interval Update:  5/15/18 - Ms. Haile returns to clinic s/p Lumbar Epidural Steroid Injection done 5/3/18 with 0% relief.  Her current pain score is 10/10 and complains of new numbness and tingling in lower back into legs and feet.    Background:  Brittany Haile is a 78 y.o. female who complains of back and bilateral extremity pain as characterized below.      Location: lower back and bilateral leg pain  Severity: Currently: 5/10   Typical Range: 8/10     Exacerbation: 10/10   Onset: several years  Quality: weakness  Radiation: bilateral leg pain  Axial/Extremity Percentage of Pain: 50/50  Exacerbating Factors: standing, standing for more than 5 minutes and walking for more than 5 minutes  Mitigating Factors: nothing  Assoc: denies night fever/night sweats, urinary incontinence, bowel incontinence, significant weight loss and loss of sensations    Previous Therapies:  PT: Denies  HEP: Denies  TENS:  Injections:    - Lumbar Epidural Steroid Injection done 5/3/18 with 0% relief  Surgery: None  Medications:   - NSAIDS:   - MSK Relaxants:   - TCAs:   - SNRIs:   - Topicals:   - Anticonvulsants:  - Opioids:     Current Pain Medications:  1. Tylenol  2. Other: Pletal & Plavix    Full Medication List:    Current Outpatient Prescriptions:     albuterol (PROVENTIL HFA) 90 mcg/actuation inhaler, Inhale 2 puffs into the lungs every 4 to 6 hours as needed., Disp: 8.5 g, Rfl: 6    albuterol-ipratropium 2.5mg-0.5mg/3mL (DUO-NEB) 0.5 mg-3 mg(2.5 mg base)/3 mL nebulizer solution, USE 1 VIAL VIA NEBULIZER EVERY 6 HOURS AS NEEDED FOR WHEEZING( RESCUE), Disp: 1080 mL, Rfl: 3    amlodipine (NORVASC) 10 MG tablet, Take 1 tablet (10 mg total) by mouth once daily., Disp: 90 tablet,  Rfl: 3    aspirin (ECOTRIN) 81 MG EC tablet, Take 1 tablet (81 mg total) by mouth once daily., Disp: , Rfl: 0    atorvastatin (LIPITOR) 40 MG tablet, TAKE 1 TABLET BY MOUTH EVERY DAY, Disp: 90 tablet, Rfl: 3    cilostazol (PLETAL) 50 MG Tab, Take 1 tablet (50 mg total) by mouth 2 (two) times daily., Disp: 180 tablet, Rfl: 3    clopidogrel (PLAVIX) 75 mg tablet, TAKE 1 TABLET BY MOUTH EVERY DAY, Disp: 90 tablet, Rfl: 3    doxycycline (MONODOX) 100 MG capsule, Take 1 capsule (100 mg total) by mouth 2 (two) times daily., Disp: 20 capsule, Rfl: 0    ergocalciferol (ERGOCALCIFEROL) 50,000 unit Cap, TAKE 1 CAPSULE BY MOUTH EVERY 7 DAYS, Disp: 12 capsule, Rfl: 0    escitalopram oxalate (LEXAPRO) 5 MG Tab, Take 1 tablet (5 mg total) by mouth once daily., Disp: 90 tablet, Rfl: 3    ferrous sulfate 325 mg (65 mg iron) Tab tablet, Take 1 tablet (325 mg total) by mouth 2 (two) times daily., Disp: , Rfl: 3    fluticasone-salmeterol 250-50 mcg/dose (ADVAIR) 250-50 mcg/dose diskus inhaler, Inhale 1 puff into the lungs 2 (two) times daily., Disp: 60 each, Rfl: 10    hydrocodone-acetaminophen 5-325mg (NORCO) 5-325 mg per tablet, Take 1 tablet by mouth every 8 (eight) hours as needed for Pain., Disp: 21 tablet, Rfl: 0    levothyroxine (SYNTHROID) 88 MCG tablet, Take 1 tablet (88 mcg total) by mouth once daily., Disp: 90 tablet, Rfl: 3    meclizine (ANTIVERT) 25 mg tablet, TAKE 1 TABLET(25 MG) BY MOUTH EVERY 6 HOURS AS NEEDED FOR DIZZINESS, Disp: 30 tablet, Rfl: 0    olmesartan (BENICAR) 40 MG tablet, TAKE 1 TABLET BY MOUTH DAILY, Disp: 90 tablet, Rfl: 0    olmesartan (BENICAR) 40 MG tablet, TAKE 1 TABLET(40 MG) BY MOUTH EVERY DAY, Disp: 90 tablet, Rfl: 0    pantoprazole (PROTONIX) 40 MG tablet, Take 1 tablet (40 mg total) by mouth once daily., Disp: 90 tablet, Rfl: 3    tiotropium (SPIRIVA WITH HANDIHALER) 18 mcg inhalation capsule, Inhale 1 capsule (18 mcg total) into the lungs once daily., Disp: 90 capsule, Rfl: 3      Review of Systems:  Review of Systems   Constitutional: Negative for chills and fever.   HENT: Negative for nosebleeds.    Eyes: Negative for pain.   Respiratory: Negative for hemoptysis.    Cardiovascular: Negative for chest pain.   Gastrointestinal: Negative for nausea and vomiting.   Genitourinary: Negative for dysuria.   Musculoskeletal: Positive for back pain, joint pain, myalgias and neck pain. Negative for falls.   Skin: Negative for rash.   Neurological: Negative for dizziness, tremors and focal weakness.   Endo/Heme/Allergies: Does not bruise/bleed easily.   Psychiatric/Behavioral: Negative for depression. The patient is not nervous/anxious.        Allergies:  Patient has no known allergies.     Medical History:  Past Medical History:   Diagnosis Date    Abdominal pain     CHF (congestive heart failure)     Chronic kidney disease, stage III (moderate) 2015    COPD (chronic obstructive pulmonary disease)     COPD (chronic obstructive pulmonary disease)     CVA (cerebral infarction)     Esophagitis     Gastritis     GERD (gastroesophageal reflux disease)     GI bleed     Hyperlipidemia     Hypertension     Mesenteric angina     PVD (peripheral vascular disease) with claudication 10/13/2015    Stroke     Subclavian artery stenosis, left     Thyroid disease         Surgical History:  Past Surgical History:   Procedure Laterality Date    CAROTID STENT       SECTION      COLONOSCOPY N/A 3/2/2017    Procedure: COLONOSCOPY;  Surgeon: Cecil Crooks MD;  [facility];  Service: Endoscopy;  Laterality: N/A;    CORONARY ANGIOPLASTY  2006    GALLBLADDER SURGERY      HYSTERECTOMY      OOPHORECTOMY      SUPERIOR MESTENTERIC ARTERY STENT          Social History:  Social History     Social History    Marital status:      Spouse name: N/A    Number of children: 4    Years of education: N/A     Occupational History    Not on file.     Social History Main Topics     Smoking status: Current Every Day Smoker     Packs/day: 0.50     Years: 60.00     Types: Cigarettes    Smokeless tobacco: Current User    Alcohol use No    Drug use: No    Sexual activity: Not on file     Other Topics Concern    Not on file     Social History Narrative    Lives with son, 1 story house       Physical Exam:  There were no vitals filed for this visit.  General    Nursing note and vitals reviewed.  Constitutional: She is oriented to person, place, and time. She appears well-developed and well-nourished. No distress.   HENT:   Head: Normocephalic and atraumatic.   Nose: Nose normal.   Eyes: Conjunctivae and EOM are normal. Pupils are equal, round, and reactive to light. Right eye exhibits no discharge. Left eye exhibits no discharge. No scleral icterus.   Neck: No JVD present.   Cardiovascular: Intact distal pulses.    Pulmonary/Chest: Effort normal. No respiratory distress.   Abdominal: She exhibits no distension.   Neurological: She is alert and oriented to person, place, and time. Coordination normal.   Psychiatric: She has a normal mood and affect. Her behavior is normal. Judgment and thought content normal.     General Musculoskeletal Exam   Gait: normal     Back (L-Spine & T-Spine) / Neck (C-Spine) Exam     Tenderness Right paramedian tenderness of the Lower L-Spine. Left paramedian tenderness of the Lower L-Spine.     Back (L-Spine & T-Spine) Range of Motion   Extension: abnormal   Flexion: abnormal     Spinal Sensation   Right Side Sensation  L-Spine Level: normal  Left Side Sensation  L-Spine Level: normal    Other She has no scoliosis .      Muscle Strength   Right Lower Extremity   Hip Flexion: 5/5   Hip Extensors: 5/5  Quadriceps:  5/5   Hamstrin/5   Gastrocsoleus:  5/5/5  Left Lower Extremity   Hip Flexion: 5/5   Hip Extensors: 5/5  Quadriceps:  5/5   Hamstrin/5   Gastrocsoleus:  5/5/5    Reflexes     Left Side  Quadriceps:  2+  Achilles:  2+    Right Side   Quadriceps:   2+  Achilles:  2+      Imaging:  MRI Lumbar Spine W WO Contrast 4/11/18  Narrative     EXAMINATION:  MRI LUMBAR SPINE W WO CONTRAST    CLINICAL HISTORY:  Low back pain, >6wks conservative tx, persistent-progressive sx, surgical candidate;  Pain in right leg    TECHNIQUE:  Sagittal T1, T2, stir, axial T1-T2 imaging of the lumbar spine precontrast with postcontrast axial and sagittal T1 imaging following 6 mm in intravenous infusion of Gadavist contrast.    COMPARISON:  None    FINDINGS:  Slightly straightened lumbar lordosis with superimposed grade 1 retrolisthesis of L2 on L3.  There is variable degenerative disc disease with disc desiccation height loss and endplate degenerative change at all levels.  Allowing for degenerative changes lumbar vertebral body heights and contours are within normal limits without evidence for acute fracture.    The distal spinal cord and conus is normal in signal and contour the tip of the conus approximates the inferior T12.  No abnormal intrathecal enhancement.    Please note only included in the sagittal field of view is a small region of signal void within the ventral epidural space at the inferior T11 level most suggestive for migrated this fragment allowing for sagittal inclusion only there is no significant central canal or neural foraminal stenosis.  This presumed migrated fragment measures approximately 8 mm in craniocaudal dimension.    T12/L1 no significant disc bulge, central canal or neural foraminal stenosis.    L1/L2: Small bulging disc with ligamentum flavum hypertrophy without significant central canal or neural foraminal stenosis    L2/L3: Posterior disc osteophyte accentuated by anterolisthesis with ligamentum flavum hypertrophy and facet joint arthropathy with moderate central canal stenosis and moderate to severe neural foraminal stenosis bilaterally.    L3/L4: No significant disc bulge, or central canal stenosis.  There is facet joint arthropathy and mild neural  foraminal stenosis.  Stenosis    L4/L5: Posterior disc osteophyte with facet joint arthropathy without significant central canal stenosis with moderate bilateral neural foraminal stenosis.    L5/S1: Posterior disc bulge with facet joint arthropathy without significant central canal stenosis with moderate neural foraminal stenosis bilaterally.    Nonenhancing fluid signal foci within the left kidney compatible with cysts largest measuring 2.7 cm     Impression   Spondylo degenerative change lumbar spine most pronounced at L2/L3 with posterior disc osteophyte, ligamentum flavum hypertrophy and facet joint arthropathy with moderate resulting central canal stenosis with moderate to severe neural foraminal stenosis bilaterally.    Please note there is a probable migrated disc fragment in the ventral epidural space at the inferior T11 level without significant central canal stenosis allowing for inclusion the sagittal field of view only.    Please see above for additional details.     Labs:  BMP  Lab Results   Component Value Date     04/11/2018    K 4.7 04/11/2018     04/11/2018    CO2 32 (H) 04/11/2018    BUN 21 04/11/2018    CREATININE 1.2 04/11/2018    CREATININE 1.2 04/11/2018    CALCIUM 9.5 04/11/2018    ANIONGAP 7 (L) 04/11/2018    ESTGFRAFRICA 50 (A) 04/11/2018    ESTGFRAFRICA 50 (A) 04/11/2018    EGFRNONAA 43 (A) 04/11/2018    EGFRNONAA 43 (A) 04/11/2018     Lab Results   Component Value Date    ALT 21 01/03/2018    AST 21 01/03/2018    ALKPHOS 130 01/03/2018    BILITOT 0.5 01/03/2018       Assessment:  Problem List Items Addressed This Visit     PVD (peripheral vascular disease) with claudication - Primary    Lumbar spondylosis    DDD (degenerative disc disease), lumbar          4/20/18 - She appears to have been referred by her cardiologist for assistance with delineating the cause of her lower extremity pain.  Imaging shows the presence of peripheral arterial disease that may be causing  claudication; however, MRI of her lumbar spine shows bilateral neural foraminal stenosis and central canal stenosis which may also be mimicking the symptoms.  She does have significant back pain as well which supports the diagnosis of lumbar radiculopathy due to multiple degenerative changes; however, her lower extremity symptoms are relieved by less than 5 minutes of rest which is more suggestive of an underlying vascular etiology.  I recommended a lumbar epidural steroid injection with a combination of local anesthetic and steroid as a diagnostic test to help distinguish between the 2 possible causes, and for therapeutic relief.    6/4/18 - Patient returns today reporting no relief from lumbar epidural steroid injection which is suggestive of peripheral vascular disease as the etiology of her bilateral lower extremity pain. Given that we have ruled out degenerative lumbar changes as the cause, I referred her back to her cardiologist for consideration of interventional vascular procedures for management of her symptoms.  In the meantime, I have recommended a trial of gabapentin titrated from 100 mg q.h.s. to 100 mg t.i.d..  Her dual anti-platelet therapy precludes the use of NSAIDs and SNRIs as they would also contribute to coagulopathy.    Treatment Plan:   PT/OT/HEP: None recommended at this time  Procedures: None  Medications:    - Start Trial of Gabapentin 100 mg QHS titrated to 100 mg TID over 3 weeks   - Continue per PCP if stable  Imaging: Reviewed with patient and family.    Follow Up: RTC PRN    Stiven Ibarra Jr, MD  Interventional Pain Medicine / Anesthesiology    Disclaimer: This note was partly generated using dictation software which may occasionally result in transcription errors.

## 2018-06-04 ENCOUNTER — OFFICE VISIT (OUTPATIENT)
Dept: PAIN MEDICINE | Facility: CLINIC | Age: 78
End: 2018-06-04
Payer: MEDICARE

## 2018-06-04 DIAGNOSIS — M51.36 DDD (DEGENERATIVE DISC DISEASE), LUMBAR: ICD-10-CM

## 2018-06-04 DIAGNOSIS — I73.9 PVD (PERIPHERAL VASCULAR DISEASE) WITH CLAUDICATION: Primary | ICD-10-CM

## 2018-06-04 DIAGNOSIS — M47.816 LUMBAR SPONDYLOSIS: ICD-10-CM

## 2018-06-04 PROCEDURE — 99214 OFFICE O/P EST MOD 30 MIN: CPT | Mod: S$GLB,,, | Performed by: PAIN MEDICINE

## 2018-06-04 PROCEDURE — 99999 PR PBB SHADOW E&M-EST. PATIENT-LVL I: CPT | Mod: PBBFAC,,, | Performed by: PAIN MEDICINE

## 2018-06-04 PROCEDURE — 99499 UNLISTED E&M SERVICE: CPT | Mod: S$GLB,,, | Performed by: PAIN MEDICINE

## 2018-06-04 RX ORDER — GABAPENTIN 100 MG/1
CAPSULE ORAL
Qty: 90 CAPSULE | Refills: 2 | Status: SHIPPED | OUTPATIENT
Start: 2018-06-04 | End: 2019-01-31

## 2018-07-16 ENCOUNTER — OFFICE VISIT (OUTPATIENT)
Dept: CARDIOLOGY | Facility: CLINIC | Age: 78
End: 2018-07-16
Payer: MEDICARE

## 2018-07-16 VITALS
DIASTOLIC BLOOD PRESSURE: 68 MMHG | SYSTOLIC BLOOD PRESSURE: 148 MMHG | HEART RATE: 95 BPM | BODY MASS INDEX: 27.16 KG/M2 | HEIGHT: 61 IN | WEIGHT: 143.88 LBS

## 2018-07-16 DIAGNOSIS — Z72.0 TOBACCO ABUSE: ICD-10-CM

## 2018-07-16 DIAGNOSIS — I25.10 CORONARY ARTERY DISEASE INVOLVING NATIVE CORONARY ARTERY OF NATIVE HEART WITHOUT ANGINA PECTORIS: Primary | ICD-10-CM

## 2018-07-16 DIAGNOSIS — I77.1 SUBCLAVIAN ARTERY STENOSIS, LEFT: ICD-10-CM

## 2018-07-16 DIAGNOSIS — E78.2 MIXED HYPERLIPIDEMIA: ICD-10-CM

## 2018-07-16 DIAGNOSIS — M79.605 LEG PAIN, BILATERAL: ICD-10-CM

## 2018-07-16 DIAGNOSIS — M79.604 LEG PAIN, BILATERAL: ICD-10-CM

## 2018-07-16 DIAGNOSIS — I73.9 PVD (PERIPHERAL VASCULAR DISEASE) WITH CLAUDICATION: ICD-10-CM

## 2018-07-16 DIAGNOSIS — K55.1 MESENTERIC ANGINA: ICD-10-CM

## 2018-07-16 PROCEDURE — 3078F DIAST BP <80 MM HG: CPT | Mod: CPTII,S$GLB,, | Performed by: INTERNAL MEDICINE

## 2018-07-16 PROCEDURE — 99999 PR PBB SHADOW E&M-EST. PATIENT-LVL III: CPT | Mod: PBBFAC,,, | Performed by: INTERNAL MEDICINE

## 2018-07-16 PROCEDURE — 99215 OFFICE O/P EST HI 40 MIN: CPT | Mod: S$GLB,,, | Performed by: INTERNAL MEDICINE

## 2018-07-16 PROCEDURE — 3077F SYST BP >= 140 MM HG: CPT | Mod: CPTII,S$GLB,, | Performed by: INTERNAL MEDICINE

## 2018-07-16 NOTE — PATIENT INSTRUCTIONS
Taking Aspirin for Atherosclerosis       Aspirin is a medicine often prescribed to treat atherosclerosis. This condition affects your arteries. These are the blood vessels that carry blood away from your heart. Having atherosclerosis means youre at greater risk of a heart attack or stroke. Aspirin can help prevent these from happening.  How atherosclerosis affects your arteries   A fatty material (plaque) can build up in your arteries. This makes it harder for blood to flow through them. A blood clot can then form on the plaque. This may block the artery, cutting off blood flow. This can cause conditions such as coronary artery disease (CAD) and peripheral arterial disease (PAD):  · CAD occurs when plaque builds up in the coronary artery. This artery supplies the heart with oxygen-rich blood.  · PAD occurs when plaque forms in leg arteries.  The same things that cause CAD and PAD can also cause plaque to form in other arteries in your body, such as those in the brain. When plaque occurs in any of these arteries, it raises your risk of heart attack or stroke.  What aspirin does  Aspirin is a blood-thinner (antiplatelet medicine). It helps keep blood clots from forming. This reduces the risk of blockage. Aspirin can be taken daily if you are at high risk of or have already had a heart attack or stroke. It is also used after a procedure called a stent placement. This is when a tiny wire mesh tube, or stent, is placed in an artery to keep it open. Aspirin helps prevent blood clots from forming on the stent.  Taking aspirin safely  Tell your healthcare provider about any medicines you are taking. This includes:  · All prescription medicines  · Over-the-counter medicines  · Herbs, vitamins, and other supplements  Also mention if you have a history of ulcers or bleeding problems. Ask whether you will need to stop taking aspirin before having surgery or dental work. Always take medicines as directed.  Tips for taking  aspirin  · Have a routine. For example, take aspirin with the same meal each day.  · Dont take more than prescribed. A low dose gives the same benefit as a higher one, with a lower risk of side effects.  · Dont skip doses. Aspirin needs to be taken each day to work well.  · Keep track of what you take. A pillbox with days of the week can help, especially if you take several medicines. Or use a list or chart to keep track.  When to call your healthcare provider  Side effects of aspirin are not usually serious. If you do have problems, changing your dose may help. Call your healthcare provider if you have any of the following:  · Excessive bruising (some bruising is normal)  · Nosebleeds, bleeding gums, or other excessive bleeding  · An upset stomach or stomach pain   Date Last Reviewed: 6/1/2016  © 3578-4139 [employer]. [address] [place] [address], [place] [address]. All rights reserved. This information is not intended as a substitute for professional medical care. Always follow your healthcare professional's instructions.

## 2018-07-16 NOTE — PROGRESS NOTES
Subjective:   Patient ID:  Brittany Haile is a 78 y.o. female who presents for evaluation and treatment of Coronary Artery Disease; Hyperlipidemia; Hypertension; Peripheral Arterial Disease; and bilateral limb pain      HPI:       She is here today with her grand daughter for follow up after percutaneous intervention for mesenteric angina and worsening limb pain. She has the combination of vascular and lumbar radiculopathy causing her pain. She continues to smoke without much interest in quitting. She underwent a steroid injection of L5-S1 on 5/3/2018. She has noted an improvement with leg pain (below mid thigh). However her back, pelvic crest, and bilateral bursa hurt with ambulation. All symptoms are reproduced with palpation. A week ago she noted an acute change with lower back pain.         She is s/p celiac artery PTAS with 6.0 x 14 mm Express SD. SMA with 80-85% and 80 mmHg peak to peak gradient was treated with 6.0 x 18 mm Express SD with complete resolution of her mesenteric symptoms.        She is s/p R CEA without any recent ultrasound or symptoms. She had bilateral marlee IIb claudication, R >> L. During her visit with me today she denies any calf claudication with ambulation. She has L subclavian stenosis with a 50 mmHg gradient with intermittent claudication. She has coronary artery calcification noted on CT scan. She smokes without interest in quitting. She has HTN, HLP, PAD, and glucose intolerance. She did not tolerate lipitor in the past but currently tolerating 40 mg nightly. She is taking aspirin and plavix for DAPT. Echo and stress test 10/2015 -negative ischemic work up.        JUAN CARLOS 5/2017: R 0.67 and L 0.88      Arterial ultrasound 4/2017: high grade SFA disease bilaterally       Echo 4/2017: normal EF with diastolic dysfunction          Patient Active Problem List    Diagnosis Date Noted    Lumbar radiculopathy 05/03/2018    Chronic bilateral low back pain with bilateral sciatica 04/20/2018     Lumbar spondylosis 2018    Spinal stenosis of lumbar region with neurogenic claudication 2018    DDD (degenerative disc disease), lumbar 2018    Chronic diastolic heart failure 2018    Stroke 10/03/2017    Aortic atherosclerosis 10/03/2017    GIB (gastrointestinal bleeding) 2017    Mesenteric angina 2017    Angina mesenteric 2017         S/p aortogram for severe mesenteric angina 2017     99% celiac artery PTAS with 6.0 x 14 mm Express SD   85% SMA with 80 mmHg gradient treated with 6.0 x 18 mm Express SD  DENISE-patent on CTA                    Tobacco abuse 2017    Coronary artery disease involving native coronary artery of native heart without angina pectoris 2017         Arterial calcification noted incidentally on CT scan  No angina            Abdominal pain 2017    Gastritis 2017    CVD (cerebrovascular disease) 10/14/2016    Subclavian artery stenosis, left 04/15/2016         50 mmHg gradient  Asymptomatic          Glucose intolerance (impaired glucose tolerance) 2015    Leg pain, bilateral 10/13/2015    PVD (peripheral vascular disease) with claudication 10/13/2015         Diminished pedal pulses  JUAN CARLOS R 0.67 and L 0.88 2017      Bilateral SFA disease on ultrasound 2017        Chronic kidney disease, stage III (moderate) 2015    COPD (chronic obstructive pulmonary disease) 2015    HLD (hyperlipidemia) 2015    Incidental pulmonary nodule, > 3mm and < 8mm 2013    History of stroke without residual deficits 2013    Carotid stenosis 2013         S/p R CEA Dr. Mckinley        Hypothyroid 2013    Hypertension 2013    Anxiety 2013           Right Arm BP - Sittin/68  Left Arm BP - Sittin/40        LABS    LAST HbA1c  Lab Results   Component Value Date    HGBA1C 5.7 (H) 2018       Lipid panel  Lab Results   Component Value Date    CHOL 200 (H)  01/03/2018    CHOL 139 05/24/2017    CHOL 256 (H) 11/18/2016     Lab Results   Component Value Date    HDL 78 (H) 01/03/2018    HDL 43 05/24/2017    HDL 53 11/18/2016     Lab Results   Component Value Date    LDLCALC 102.4 01/03/2018    LDLCALC 78.6 05/24/2017    LDLCALC 175.6 (H) 11/18/2016     Lab Results   Component Value Date    TRIG 98 01/03/2018    TRIG 87 05/24/2017    TRIG 137 11/18/2016     Lab Results   Component Value Date    CHOLHDL 39.0 01/03/2018    CHOLHDL 30.9 05/24/2017    CHOLHDL 20.7 11/18/2016            Review of Systems   Constitution: Negative for diaphoresis, weakness, night sweats, weight gain and weight loss.   HENT: Negative for congestion.    Eyes: Negative for blurred vision, discharge and double vision.   Cardiovascular: Positive for claudication (bilteral legs and L arm). Negative for chest pain, cyanosis, dyspnea on exertion, irregular heartbeat, leg swelling, near-syncope, orthopnea, palpitations, paroxysmal nocturnal dyspnea and syncope.   Respiratory: Negative for cough, shortness of breath and wheezing.    Endocrine: Negative for cold intolerance, heat intolerance and polyphagia.   Hematologic/Lymphatic: Negative for adenopathy and bleeding problem. Does not bruise/bleed easily.   Skin: Negative for dry skin and nail changes.   Musculoskeletal: Positive for arthritis and back pain. Negative for falls, joint pain, myalgias and neck pain.   Gastrointestinal: Positive for abdominal pain (after meals). Negative for bloating, change in bowel habit and constipation.   Genitourinary: Negative for bladder incontinence, dysuria, flank pain, genital sores and missed menses.   Neurological: Negative for aphonia, brief paralysis, difficulty with concentration and dizziness.   Psychiatric/Behavioral: Negative for altered mental status and memory loss. The patient does not have insomnia.    Allergic/Immunologic: Negative for environmental allergies.       Objective:   Physical Exam    Constitutional: She is oriented to person, place, and time. She appears well-developed and well-nourished. She is not intubated.   Uses a cane to ambulate   HENT:   Head: Normocephalic and atraumatic.   Right Ear: External ear normal.   Left Ear: External ear normal.   Mouth/Throat: Oropharynx is clear and moist.   Eyes: Conjunctivae and EOM are normal. Pupils are equal, round, and reactive to light. Right eye exhibits no discharge. Left eye exhibits no discharge. No scleral icterus.   Neck: Normal range of motion. Neck supple. Normal carotid pulses, no hepatojugular reflux and no JVD present. Carotid bruit is not present. No tracheal deviation present. No thyromegaly present.   Cardiovascular: Normal rate, regular rhythm, S1 normal and S2 normal.   No extrasystoles are present. PMI is not displaced.  Exam reveals no gallop, no S3, no distant heart sounds, no friction rub and no midsystolic click.    No murmur heard.  Pulses:       Carotid pulses are 2+ on the right side with bruit, and 2+ on the left side with bruit.       Radial pulses are 2+ on the right side, and 1+ on the left side.        Femoral pulses are 2+ on the right side, and 2+ on the left side.       Popliteal pulses are 2+ on the right side, and 2+ on the left side.        Dorsalis pedis pulses are 0 on the right side, and 0 on the left side.        Posterior tibial pulses are 0 on the right side, and 0 on the left side.   Monophasic R DP without  R PT doppler signals    Monophasic L DP and biphasic L PT doppler signals   Pulmonary/Chest: Effort normal and breath sounds normal. No accessory muscle usage or stridor. No apnea, no tachypnea and no bradypnea. She is not intubated. No respiratory distress. She has no decreased breath sounds. She has no wheezes. She has no rales. She exhibits no tenderness and no bony tenderness.   Abdominal: She exhibits no distension, no pulsatile liver, no abdominal bruit, no ascites, no pulsatile midline mass and no  mass. There is no tenderness. There is no rebound and no guarding.   Musculoskeletal: Normal range of motion. She exhibits no edema or tenderness.   Lymphadenopathy:     She has no cervical adenopathy.   Neurological: She is alert and oriented to person, place, and time. She has normal reflexes. No cranial nerve deficit. Coordination normal.   Skin: Skin is warm. No rash noted. No erythema. No pallor.   Dependent rubor    No ulcers   Psychiatric: She has a normal mood and affect. Her behavior is normal. Judgment and thought content normal.       Assessment:     1. Coronary artery disease involving native coronary artery of native heart without angina pectoris    2. Subclavian artery stenosis, left    3. PVD (peripheral vascular disease) with claudication    4. Mixed hyperlipidemia    5. Tobacco abuse    6. Mesenteric angina    7. Leg pain, bilateral          Her presentation is complex. She has PAD but also has lumbar radiculopathy with bursitis. Her last ultrasound revealed bilateral SFA disease which can cause calf claudication which denies at this time. She is complaining of bilateral hip, back, and buttock pain which will be associated with aorto-iliac disease which was not seen in her vascular studies in the past ([place] + CTA)      Plan:           Smoking cessation  She opted for medical therapy for PAD in the past   Her symptoms are worse but she also has back problems with radiculopathy        We will have a multidisciplinary discussion with pain management  If DJD is completely excluded as the etiology of her symptoms she will proceed with revascularization          Continue wit pletal 50 mg po bid  Will arrange for endovascular intervention if there is no improvement with her symptoms  Medical therapy for L subclavian stenosis  Target BP < 130/80 mmHg  If BP remains above target she will add hctz 12.5 mg daily           Continue with current medical plan and lifestyle changes.  Return sooner for concerns or  questions. If symptoms persist go to the ED  I have reviewed all pertinent data on this patient         Follow up as scheduled. Return sooner for concerns or questions  Further recommendations after tests          She expressed verbal understanding and agreed with the plan        Greater than 50% of the visit of 45 minutes was spent counseling, educating, and coordinating the care of the patient.      -In today's visit, at least 4 established conditions that pose a risk to life or bodily function have been addressed and the conditions are severe.    -In today's visit, monitoring for drug toxicity was accomplished.            Patient's Medications   New Prescriptions    No medications on file   Previous Medications    ALBUTEROL (PROVENTIL HFA) 90 MCG/ACTUATION INHALER    Inhale 2 puffs into the lungs every 4 to 6 hours as needed.    ALBUTEROL-IPRATROPIUM 2.5MG-0.5MG/3ML (DUO-NEB) 0.5 MG-3 MG(2.5 MG BASE)/3 ML NEBULIZER SOLUTION    USE 1 VIAL VIA NEBULIZER EVERY 6 HOURS AS NEEDED FOR WHEEZING( RESCUE)    AMLODIPINE (NORVASC) 10 MG TABLET    Take 1 tablet (10 mg total) by mouth once daily.    ASPIRIN (ECOTRIN) 81 MG EC TABLET    Take 1 tablet (81 mg total) by mouth once daily.    ATORVASTATIN (LIPITOR) 40 MG TABLET    TAKE 1 TABLET BY MOUTH EVERY DAY    CILOSTAZOL (PLETAL) 50 MG TAB    Take 1 tablet (50 mg total) by mouth 2 (two) times daily.    CLOPIDOGREL (PLAVIX) 75 MG TABLET    TAKE 1 TABLET BY MOUTH EVERY DAY    DOXYCYCLINE (MONODOX) 100 MG CAPSULE    Take 1 capsule (100 mg total) by mouth 2 (two) times daily.    ERGOCALCIFEROL (ERGOCALCIFEROL) 50,000 UNIT CAP    TAKE 1 CAPSULE BY MOUTH EVERY 7 DAYS    ESCITALOPRAM OXALATE (LEXAPRO) 5 MG TAB    Take 1 tablet (5 mg total) by mouth once daily.    FERROUS SULFATE 325 MG (65 MG IRON) TAB TABLET    Take 1 tablet (325 mg total) by mouth 2 (two) times daily.    FLUTICASONE-SALMETEROL 250-50 MCG/DOSE (ADVAIR) 250-50 MCG/DOSE DISKUS INHALER    Inhale 1 puff into the lungs  2 (two) times daily.    GABAPENTIN (NEURONTIN) 100 MG CAPSULE    Take 1 tablet by mouth QHS x 1 wk, then take 1 tablet by mouth BID x1 wk, then take 1 tablet TID thereafter.    HYDROCODONE-ACETAMINOPHEN 5-325MG (NORCO) 5-325 MG PER TABLET    Take 1 tablet by mouth every 8 (eight) hours as needed for Pain.    LEVOTHYROXINE (SYNTHROID) 88 MCG TABLET    Take 1 tablet (88 mcg total) by mouth once daily.    MECLIZINE (ANTIVERT) 25 MG TABLET    TAKE 1 TABLET(25 MG) BY MOUTH EVERY 6 HOURS AS NEEDED FOR DIZZINESS    OLMESARTAN (BENICAR) 40 MG TABLET    TAKE 1 TABLET BY MOUTH DAILY    PANTOPRAZOLE (PROTONIX) 40 MG TABLET    Take 1 tablet (40 mg total) by mouth once daily.    TIOTROPIUM (SPIRIVA WITH HANDIHALER) 18 MCG INHALATION CAPSULE    Inhale 1 capsule (18 mcg total) into the lungs once daily.   Modified Medications    No medications on file   Discontinued Medications    No medications on file

## 2018-07-19 ENCOUNTER — TELEPHONE (OUTPATIENT)
Dept: PAIN MEDICINE | Facility: CLINIC | Age: 78
End: 2018-07-19

## 2018-07-19 NOTE — TELEPHONE ENCOUNTER
Spoke with patient's daughter ([place]) regarding scheduling procedure. Procedure instructions for a lumbar epidural steroid injection at L2-3 on 8/16/18 with f/u appt on 9/10/18 at 815 AM given. Patient's daughter verbalized understanding.

## 2018-07-19 NOTE — TELEPHONE ENCOUNTER
----- Message from Stiven Ibarra Jr., MD sent at 7/18/2018  6:51 PM CDT -----  Hi Dr. Brooks,    She is in need of physical therapy for rehabilitation of the muscles in her core, hips and buttocks, and lower extremities. I agree with her assessment that reproducible pain with palpation is unlikely to be related to peripheral arterial disease.  Unfortunately, I do not have an injection to alleviate those symptoms and anti-inflammatory medications such as ibuprofen or Aleve of a relative contraindication in patients with vasculopathy as it increases the risk of coronary and cerebrovascular occlusion.    She is limited in her ability to perform the level of physical therapy needed to rehabilitate the muscles in her back, buttocks, and legs due to her radiculopathy and peripheral vascular diseases.    I can attempt 1 additional epidural steroid injection at L2-3 to trying target the bilateral hip pain as this is the dermatomal distribution of those nerve roots.    I will CC my office staff on this message so they can reach out to her to schedule a lumbar epidural steroid injection at L2-3.    Stiven Ibarra Jr, MD  Interventional Pain Medicine / Anesthesiology    ----- Message -----  From: Anthony Brooks MD  Sent: 7/18/2018   9:36 AM  To: Stiven Ibarra Jr., MD        [facility]      I just her 2 days ago      Her presentation is complex. She has PAD but also has lumbar radiculopathy with bursitis. Her last ultrasound revealed bilateral SFA disease which can cause calf claudication which denies at this time. She is complaining of bilateral hip, back, and buttock pain which will be associated with aorto-iliac disease which was not seen in her vascular studies in the past ([place] + CTA). Her pain is reproducible with palpation making less likely PAD origin.       Any thoughts?        ZN

## 2018-07-25 ENCOUNTER — LAB VISIT (OUTPATIENT)
Dept: LAB | Facility: HOSPITAL | Age: 78
End: 2018-07-25
Attending: FAMILY MEDICINE
Payer: MEDICARE

## 2018-07-25 ENCOUNTER — OFFICE VISIT (OUTPATIENT)
Dept: FAMILY MEDICINE | Facility: CLINIC | Age: 78
End: 2018-07-25
Attending: FAMILY MEDICINE
Payer: MEDICARE

## 2018-07-25 VITALS
HEART RATE: 88 BPM | HEIGHT: 61 IN | BODY MASS INDEX: 27.22 KG/M2 | SYSTOLIC BLOOD PRESSURE: 133 MMHG | DIASTOLIC BLOOD PRESSURE: 60 MMHG | WEIGHT: 144.19 LBS | OXYGEN SATURATION: 95 %

## 2018-07-25 DIAGNOSIS — R09.02 HYPOXIA: ICD-10-CM

## 2018-07-25 DIAGNOSIS — R73.02 GLUCOSE INTOLERANCE (IMPAIRED GLUCOSE TOLERANCE): ICD-10-CM

## 2018-07-25 DIAGNOSIS — F33.1 MODERATE RECURRENT MAJOR DEPRESSION: ICD-10-CM

## 2018-07-25 DIAGNOSIS — M79.10 MYALGIA: ICD-10-CM

## 2018-07-25 DIAGNOSIS — I10 ESSENTIAL HYPERTENSION: Primary | ICD-10-CM

## 2018-07-25 DIAGNOSIS — I67.9 CVD (CEREBROVASCULAR DISEASE): ICD-10-CM

## 2018-07-25 DIAGNOSIS — I25.118 CORONARY ARTERY DISEASE OF NATIVE ARTERY OF NATIVE HEART WITH STABLE ANGINA PECTORIS: ICD-10-CM

## 2018-07-25 DIAGNOSIS — N18.30 CHRONIC KIDNEY DISEASE, STAGE III (MODERATE): ICD-10-CM

## 2018-07-25 DIAGNOSIS — I50.32 CHRONIC DIASTOLIC HEART FAILURE: ICD-10-CM

## 2018-07-25 DIAGNOSIS — E78.2 MIXED HYPERLIPIDEMIA: ICD-10-CM

## 2018-07-25 DIAGNOSIS — E03.9 HYPOTHYROIDISM, UNSPECIFIED TYPE: ICD-10-CM

## 2018-07-25 DIAGNOSIS — I70.0 AORTIC ATHEROSCLEROSIS: ICD-10-CM

## 2018-07-25 DIAGNOSIS — I73.9 PVD (PERIPHERAL VASCULAR DISEASE) WITH CLAUDICATION: ICD-10-CM

## 2018-07-25 DIAGNOSIS — I10 ESSENTIAL HYPERTENSION: ICD-10-CM

## 2018-07-25 DIAGNOSIS — F41.9 ANXIETY: ICD-10-CM

## 2018-07-25 DIAGNOSIS — J41.0 SIMPLE CHRONIC BRONCHITIS: ICD-10-CM

## 2018-07-25 LAB
ALBUMIN SERPL BCP-MCNC: 4 G/DL
ALP SERPL-CCNC: 114 U/L
ALT SERPL W/O P-5'-P-CCNC: 11 U/L
ANION GAP SERPL CALC-SCNC: 9 MMOL/L
AST SERPL-CCNC: 14 U/L
BASOPHILS # BLD AUTO: 0.01 K/UL
BASOPHILS NFR BLD: 0.2 %
BILIRUB SERPL-MCNC: 0.2 MG/DL
BNP SERPL-MCNC: 71 PG/ML
BUN SERPL-MCNC: 24 MG/DL
CALCIUM SERPL-MCNC: 9.7 MG/DL
CHLORIDE SERPL-SCNC: 103 MMOL/L
CK SERPL-CCNC: 101 U/L
CO2 SERPL-SCNC: 29 MMOL/L
CREAT SERPL-MCNC: 1.5 MG/DL
CRP SERPL-MCNC: 0.7 MG/L
DIFFERENTIAL METHOD: ABNORMAL
EOSINOPHIL # BLD AUTO: 0.2 K/UL
EOSINOPHIL NFR BLD: 4 %
ERYTHROCYTE [DISTWIDTH] IN BLOOD BY AUTOMATED COUNT: 14.6 %
EST. GFR  (AFRICAN AMERICAN): 38 ML/MIN/1.73 M^2
EST. GFR  (NON AFRICAN AMERICAN): 33 ML/MIN/1.73 M^2
ESTIMATED AVG GLUCOSE: 114 MG/DL
GLUCOSE SERPL-MCNC: 109 MG/DL
HBA1C MFR BLD HPLC: 5.6 %
HCT VFR BLD AUTO: 38.8 %
HGB BLD-MCNC: 12.2 G/DL
LYMPHOCYTES # BLD AUTO: 1.5 K/UL
LYMPHOCYTES NFR BLD: 27.8 %
MAGNESIUM SERPL-MCNC: 1.8 MG/DL
MCH RBC QN AUTO: 28.9 PG
MCHC RBC AUTO-ENTMCNC: 31.4 G/DL
MCV RBC AUTO: 92 FL
MONOCYTES # BLD AUTO: 0.5 K/UL
MONOCYTES NFR BLD: 8.4 %
NEUTROPHILS # BLD AUTO: 3.3 K/UL
NEUTROPHILS NFR BLD: 59.6 %
PHOSPHATE SERPL-MCNC: 3.7 MG/DL
PLATELET # BLD AUTO: 258 K/UL
PMV BLD AUTO: 9.3 FL
POTASSIUM SERPL-SCNC: 4.4 MMOL/L
PROT SERPL-MCNC: 6.9 G/DL
RBC # BLD AUTO: 4.22 M/UL
SODIUM SERPL-SCNC: 141 MMOL/L
TSH SERPL DL<=0.005 MIU/L-ACNC: 0.63 UIU/ML
WBC # BLD AUTO: 5.47 K/UL

## 2018-07-25 PROCEDURE — 86140 C-REACTIVE PROTEIN: CPT

## 2018-07-25 PROCEDURE — 83735 ASSAY OF MAGNESIUM: CPT

## 2018-07-25 PROCEDURE — 99999 PR PBB SHADOW E&M-EST. PATIENT-LVL III: CPT | Mod: PBBFAC,,, | Performed by: FAMILY MEDICINE

## 2018-07-25 PROCEDURE — 84100 ASSAY OF PHOSPHORUS: CPT

## 2018-07-25 PROCEDURE — 83036 HEMOGLOBIN GLYCOSYLATED A1C: CPT

## 2018-07-25 PROCEDURE — 84443 ASSAY THYROID STIM HORMONE: CPT

## 2018-07-25 PROCEDURE — 3075F SYST BP GE 130 - 139MM HG: CPT | Mod: CPTII,S$GLB,, | Performed by: FAMILY MEDICINE

## 2018-07-25 PROCEDURE — 80053 COMPREHEN METABOLIC PANEL: CPT

## 2018-07-25 PROCEDURE — 82550 ASSAY OF CK (CPK): CPT

## 2018-07-25 PROCEDURE — 99214 OFFICE O/P EST MOD 30 MIN: CPT | Mod: S$GLB,,, | Performed by: FAMILY MEDICINE

## 2018-07-25 PROCEDURE — 83880 ASSAY OF NATRIURETIC PEPTIDE: CPT

## 2018-07-25 PROCEDURE — 85025 COMPLETE CBC W/AUTO DIFF WBC: CPT

## 2018-07-25 PROCEDURE — 99499 UNLISTED E&M SERVICE: CPT | Mod: S$GLB,,, | Performed by: FAMILY MEDICINE

## 2018-07-25 PROCEDURE — 36415 COLL VENOUS BLD VENIPUNCTURE: CPT

## 2018-07-25 PROCEDURE — 3078F DIAST BP <80 MM HG: CPT | Mod: CPTII,S$GLB,, | Performed by: FAMILY MEDICINE

## 2018-07-25 RX ORDER — ESCITALOPRAM OXALATE 10 MG/1
10 TABLET ORAL NIGHTLY
Qty: 90 TABLET | Refills: 3 | Status: SHIPPED | OUTPATIENT
Start: 2018-07-25 | End: 2019-01-31 | Stop reason: SDUPTHER

## 2018-07-25 RX ORDER — BACLOFEN 10 MG/1
10 TABLET ORAL 3 TIMES DAILY PRN
Qty: 30 TABLET | Refills: 0 | Status: SHIPPED | OUTPATIENT
Start: 2018-07-25 | End: 2018-07-25 | Stop reason: SDUPTHER

## 2018-07-25 RX ORDER — BACLOFEN 10 MG/1
TABLET ORAL
Qty: 30 TABLET | Refills: 0 | Status: SHIPPED | OUTPATIENT
Start: 2018-07-25 | End: 2019-07-31

## 2018-07-25 NOTE — PROGRESS NOTES
Subjective:       Patient ID: Brittany Haile is a 78 y.o. female.    Chief Complaint: Follow-up (3 month) and Generalized Body Aches    77 yr old pleasant female with CVA, Hypothyroidism, HTN, HLD, COPD, GERD, carotid artery disease/stenosis, anxiety, presents today for her 3 month follow up and c/o leg pain bilateral and also body aches and neck pain. She has PAD and also had stents in her mesenteric blood vessels and on plavix. Her JUAN CARLOS test was abnormal and cardiology recommends more stents.    Lung nodules - several - found 6 months ago and treated with antibiotics and steroids - needs f/u CT for surveillance     CVA - had stroke many years ago and has no deficits - it was TIA and they could not find any problem - she is continuing to smoke and no desire to quit as of yet    CKD III - follows nephrology - labs UTD and reassuring    ANXIETY/DEPRESSION - SHE REPOTS FEELING MORE DEPRESSED COZ SHE IS UNABLE TO DO THINGS THAT SHE USED TO DO IN THE PAST - SHE IS UNABLE TO SLEEP AND TAKING TWO 5 MG LEXAPRO AT NIGHT AND IT HELPS - SHE NEEDS NEW SCRIPT         Hypothyroidism - controlled - TSH                      0.642               01/03/2018                          -      on synthroid 88 mcg daily - compliant - no side effects      HTN - controlled- on Benicar 40 and norvasc 10 - no side effects - labs reassuring      HLD - controlled - on statin - follows cardiology -  LDLCALC                  102.4               01/03/2018                   PAD/PVD - not controlled  - still smoking though    COPD - controlled - on advair daily and home oxygen - still smokes and no desire to quit - she drops her sats when she walks and she will benefit from ambulatory or portable oxygen      History as below - reviewed      Health maintenance  -labs UTD  -vaccines due                          Leg Pain    There was no injury mechanism. The pain is present in the left leg and right leg. The quality of the pain is described as aching. The  pain is at a severity of 8/10. The pain is severe. The pain has been intermittent since onset. Pertinent negatives include no inability to bear weight, loss of motion, muscle weakness or numbness. The symptoms are aggravated by movement. She has tried nothing for the symptoms. The treatment provided no relief.   Medication Refill   This is a chronic problem. The current episode started more than 1 year ago. The problem occurs constantly. The problem has been gradually improving. Associated symptoms include arthralgias, myalgias and neck pain. Pertinent negatives include no abdominal pain, chest pain, chills, congestion, diaphoresis, fatigue, headaches, nausea, numbness, rash, sore throat or weakness. Nothing aggravates the symptoms. Treatments tried: as below. The treatment provided significant relief.   Hypertension   This is a chronic problem. The current episode started more than 1 year ago. The problem has been gradually improving since onset. The problem is controlled. Associated symptoms include anxiety and neck pain. Pertinent negatives include no chest pain, headaches, malaise/fatigue, orthopnea, palpitations, peripheral edema or shortness of breath. There are no associated agents to hypertension. Past treatments include ACE inhibitors and calcium channel blockers. The current treatment provides significant improvement. There are no compliance problems.  There is no history of angina, CAD/MI, CVA, left ventricular hypertrophy, PVD or retinopathy. Identifiable causes of hypertension include a thyroid problem. There is no history of chronic renal disease, coarctation of the aorta, hypercortisolism, hyperparathyroidism, pheochromocytoma or renovascular disease.   Hyperlipidemia   This is a chronic problem. The current episode started more than 1 year ago. The problem is controlled. Recent lipid tests were reviewed and are normal. She has no history of chronic renal disease, diabetes, hypothyroidism or liver  disease. There are no known factors aggravating her hyperlipidemia. Associated symptoms include leg pain and myalgias. Pertinent negatives include no chest pain or shortness of breath. Current antihyperlipidemic treatment includes statins. The current treatment provides significant improvement of lipids. There are no compliance problems.  Risk factors for coronary artery disease include dyslipidemia, hypertension and post-menopausal.   Thyroid Problem   Presents for follow-up visit. Patient reports no anxiety, cold intolerance, constipation, depressed mood, diaphoresis, diarrhea, dry skin, fatigue, hair loss, hoarse voice, menstrual problem, nail problem, palpitations, tremors or weight loss. The symptoms have been stable. Past treatments include levothyroxine. The treatment provided significant relief. Prior procedures include thyroid ultrasound. The following procedures have not been performed: thyroid FNA. Her past medical history is significant for hyperlipidemia. There is no history of diabetes, Graves' ophthalmopathy or neuropathy. There are no known risk factors.   Gastroesophageal Reflux   She complains of heartburn. She reports no abdominal pain, no chest pain, no choking, no dysphagia, no early satiety, no hoarse voice, no nausea, no sore throat, no tooth decay or no wheezing. This is a chronic problem. The current episode started more than 1 year ago. The problem occurs constantly. The problem has been gradually improving. The heartburn is located in the abdomen. The heartburn is of moderate intensity. The heartburn does not wake her from sleep. The heartburn does not limit her activity. The heartburn doesn't change with position. Nothing aggravates the symptoms. Pertinent negatives include no fatigue, muscle weakness or weight loss. She has tried a PPI for the symptoms. The treatment provided significant relief. Past procedures do not include an abdominal ultrasound, esophageal manometry or H. pylori  antibody titer. Past invasive treatments do not include gastroplasty, gastroplication or reflux surgery.   Anxiety   Presents for follow-up visit. Patient reports no chest pain, confusion, decreased concentration, depressed mood, dizziness, excessive worry, feeling of choking, impotence, irritability, nausea, nervous/anxious behavior, palpitations, shortness of breath or suicidal ideas. Symptoms occur occasionally. The severity of symptoms is mild. Nothing aggravates the symptoms. The quality of sleep is fair. Nighttime awakenings: occasional.     There are no known risk factors. Her past medical history is significant for anxiety/panic attacks. There is no history of anemia, asthma, bipolar disorder, CAD, chronic lung disease, depression, hyperthyroidism or suicide attempts. Past treatments include SSRIs. The treatment provided significant relief. Compliance with prior treatments has been good. Compliance with medications is %.   Neck Pain    This is a chronic problem. The current episode started more than 1 year ago. The problem occurs constantly. The problem has been gradually worsening. The pain is associated with nothing. The pain is present in the occipital region. The quality of the pain is described as shooting and cramping. The pain is at a severity of 7/10. The pain is severe. The symptoms are aggravated by bending, twisting and stress. The pain is worse during the night. Associated symptoms include leg pain. Pertinent negatives include no chest pain, headaches, numbness, weakness or weight loss. She has tried heat, muscle relaxants and NSAIDs for the symptoms. The treatment provided mild relief.   Back Pain   This is a chronic problem. The current episode started more than 1 year ago. The problem occurs constantly. The problem has been gradually worsening since onset. The pain is present in the lumbar spine. The quality of the pain is described as aching. The pain radiates to the right foot and left  foot. The pain is at a severity of 8/10. The pain is severe. The pain is worse during the night. The symptoms are aggravated by bending, sitting and twisting. Associated symptoms include leg pain. Pertinent negatives include no abdominal pain, chest pain, dysuria, headaches, numbness, pelvic pain, weakness or weight loss. She has tried heat, ice, muscle relaxant and NSAIDs for the symptoms. The treatment provided moderate relief.     Review of Systems   Constitutional: Negative.  Negative for activity change, chills, diaphoresis, fatigue, irritability, malaise/fatigue, unexpected weight change and weight loss.   HENT: Negative.  Negative for congestion, ear discharge, hearing loss, hoarse voice, rhinorrhea, sore throat and voice change.    Eyes: Negative.  Negative for pain, discharge and visual disturbance.   Respiratory: Negative.  Negative for choking, chest tightness, shortness of breath and wheezing.    Cardiovascular: Negative.  Negative for chest pain, palpitations and orthopnea.   Gastrointestinal: Positive for heartburn. Negative for abdominal distention, abdominal pain, anal bleeding, constipation, diarrhea, dysphagia and nausea.   Endocrine: Negative.  Negative for cold intolerance, polydipsia and polyuria.   Genitourinary: Negative.  Negative for decreased urine volume, difficulty urinating, dysuria, frequency, impotence, menstrual problem, pelvic pain and vaginal pain.   Musculoskeletal: Positive for arthralgias, back pain, myalgias and neck pain. Negative for gait problem and muscle weakness.   Skin: Negative.  Negative for color change, pallor, rash and wound.   Allergic/Immunologic: Negative.  Negative for environmental allergies and immunocompromised state.   Neurological: Negative.  Negative for dizziness, tremors, seizures, speech difficulty, weakness, numbness and headaches.   Hematological: Negative.  Negative for adenopathy. Does not bruise/bleed easily.   Psychiatric/Behavioral: Negative.   Negative for agitation, confusion, decreased concentration, hallucinations, self-injury and suicidal ideas. The patient is not nervous/anxious.        PMH/PSH/FH/SH/MED/ALLERGY reviewed    Objective:       Vitals:    07/25/18 1311   BP: 133/60   Pulse: 88       Physical Exam   Constitutional: She is oriented to person, place, and time. She appears well-developed and well-nourished. No distress.   HENT:   Head: Normocephalic and atraumatic.   Right Ear: External ear normal.   Left Ear: External ear normal.   Nose: Nose normal.   Mouth/Throat: Oropharynx is clear and moist. No oropharyngeal exudate.   Eyes: Conjunctivae and EOM are normal. Pupils are equal, round, and reactive to light. Right eye exhibits no discharge. Left eye exhibits no discharge. No scleral icterus.   Neck: Normal range of motion. Neck supple. No JVD present. No tracheal deviation present. No thyromegaly present.   Cardiovascular: Normal rate, regular rhythm, normal heart sounds and intact distal pulses.  Exam reveals no gallop and no friction rub.    No murmur heard.  Pulmonary/Chest: Effort normal and breath sounds normal. No stridor. She has no wheezes. She has no rales. She exhibits no tenderness.   Abdominal: Soft. Bowel sounds are normal. She exhibits no distension and no mass. There is no tenderness. There is no rebound and no guarding. No hernia.   Musculoskeletal: Normal range of motion. She exhibits tenderness (TTP PARACERVICAL AREA, TRAPEZIUS AND C2-4 AND T2-T4 AND PARATHORACIC AREA). She exhibits no edema.   Lymphadenopathy:     She has no cervical adenopathy.   Neurological: She is alert and oriented to person, place, and time. She has normal reflexes. She displays normal reflexes. No cranial nerve deficit. She exhibits normal muscle tone. Coordination normal.   Skin: Skin is warm and dry. No rash noted. She is not diaphoretic. No erythema. No pallor.   Psychiatric: She has a normal mood and affect. Her behavior is normal. Judgment  and thought content normal.       Assessment:       1. Essential hypertension    2. Mixed hyperlipidemia    3. PVD (peripheral vascular disease) with claudication    4. CVD (cerebrovascular disease)    5. Coronary artery disease of native artery of native heart with stable angina pectoris    6. Chronic diastolic heart failure    7. Chronic kidney disease, stage III (moderate)    8. Aortic atherosclerosis    9. Anxiety    10. Moderate recurrent major depression    11. Simple chronic bronchitis    12. Hypoxia    13. Myalgia    14. Glucose intolerance (impaired glucose tolerance)    15. Hypothyroidism, unspecified type        Plan:       Brittany was seen today for follow-up and generalized body aches.    Diagnoses and all orders for this visit:    Essential hypertension  -     CBC auto differential; Future  -     Comprehensive metabolic panel; Future  -     Magnesium; Future  -     PHOSPHORUS; Future  -     Urinalysis; Future    Mixed hyperlipidemia  -     CBC auto differential; Future  -     Comprehensive metabolic panel; Future    PVD (peripheral vascular disease) with claudication    CVD (cerebrovascular disease)    Coronary artery disease of native artery of native heart with stable angina pectoris  -     CBC auto differential; Future  -     Comprehensive metabolic panel; Future    Chronic diastolic heart failure  -     Brain natriuretic peptide; Future    Chronic kidney disease, stage III (moderate)  -     CBC auto differential; Future  -     Comprehensive metabolic panel; Future    Aortic atherosclerosis    Anxiety  -     escitalopram oxalate (LEXAPRO) 10 MG tablet; Take 1 tablet (10 mg total) by mouth nightly.    Moderate recurrent major depression  -     escitalopram oxalate (LEXAPRO) 10 MG tablet; Take 1 tablet (10 mg total) by mouth nightly.    Simple chronic bronchitis    Hypoxia    Myalgia  -     CK; Future  -     C-reactive protein; Future  -     baclofen (LIORESAL) 10 MG tablet; Take 1 tablet (10 mg total)  by mouth 3 (three) times daily as needed (pain in neck/back).    Glucose intolerance (impaired glucose tolerance)  -     Hemoglobin A1c; Future    Hypothyroidism, unspecified type  -     TSH; Future    MYALGIA  -REST, HEAT PADS  -BACLOFEN PRN    ANXIETY/DEPRESSION  -UNCONTROLLED  -INCREASE LEXAPRO TO 10 MG NIGHTLY     HTN  -controlled  -continue Benicar and Norvasc    Pulmonary nodules  -CT for surveillance DONE    PVD/PAD  -follow cardiology for ongoing leg pain  -continue plavix    carotod artery disease/stenosis/bruit/PAD  -follows vascular medicine/cardiology  -statin for HLD    GERD  -controlled  -refilled PPI    Hypothyroidism  -controlled    CVA  -stable and baseline and no deficits    COPD  -stable and controlled  -on advair/spiriva  -recommend portable or ambulatory oxygen    Muscle spasm  -BACLOFEN prn nightly    CKD III  -baseline  -follows nephrology    Osteopenia  -vit D and dexa    Spent adequate time in obtaining history and explaining differentials    40 minutes spent during this visit of which greater than 50% devoted to face-face counseling and coordination of care regarding diagnosis and management plan    Follow-up in about 3 months (around 10/25/2018), or if symptoms worsen or fail to improve.

## 2018-08-13 NOTE — DISCHARGE INSTRUCTIONS
Home Care Instructions Pain Management:    1.  DIET:    You may resume your normal diet today.    2.  BATHING:    You may shower with luke warm water.    3.  DRESSING:    You may remove your bandage today.    4.  ACTIVITY LEVEL:      You may resume your normal activities 24 hours after your procedure.    5.  MEDICATIONS:    You may resume your normal medications today.    6.  SPECIAL INSTRUCTIONS:    No heat to the injection site for 24 hours including bath or shower, heating pad, moist heat or hot tubs.    Use an ice pack to the injection site for any pain or discomfort.  Apply ice packs for 20 minute intervals as needed.    If you have received any sedatives by mouth today, you can not drive for 12 hours.    If you have received sedation through an IV, you can not drive for 24 hours.    PLEASE CALL YOUR DOCTOR FOR THE FOLLOWIN.  Redness or swelling around the injection site.  2.  Fever of 101 degrees.  3.  Drainage (pus) from the injection site.  4.  For any continuous bleeding (some dried blood over the incision is normal.)    FOR EMERGENCIES:    If any unusual problems or difficulties occur during clinic hours, call [phone] or dial [phone].    Follow up with with your physician in 2-3 weeks.       Procedural Sedation  Procedural sedation is medicine to ease discomfort, pain, and anxiety during a procedure. The medicine is often given through an IV (intravenous) line in your arm or hand. In some cases, the medicine may be taken by mouth or inhaled. While you are under sedation, you will likely be awake. But you may not remember anything afterward.  Why procedural sedation is used  Sedation is used for many types of procedures. The goal is to reduce pain, anxiety, and stressful memories of a procedure. It can also help your healthcare provider treat you. For example, having a broken bone fixed may be easier if you feel relaxed.  Procedural sedation is used only for short, basic procedures. It is not used  for complex surgeries. Some procedures that use this type of sedation include:  · Dental surgery  · Breast biopsy, to take a sample of breast tissue  · Endoscopy, to look at gastrointestinal problems  · Bronchoscopy, to check for lung problems  · Bone or joint realignment, to fix a broken bone or dislocated joint  · Minor foot or skin surgery  · Electrical cardioversion, to restore a normal heart rhythm  · Lumbar puncture, to assess neurological disease  Risks of procedural sedation  Procedural sedation has some risks and possible side effects, such as:  · Headache  · Nausea and vomiting  · Unpleasant memory of the procedure  · Lowered rate of breathing  · Changes in heart rate and blood pressure (rare)  · Inhalation of stomach contents into your lungs (rare)  Side effects will likely go away shortly after the procedure. Your healthcare team will watch your heart rate and breathing during your sedation. This is to help prevent problems.  Your own risks may vary based on your age and your overall health. They also depend on the type of sedation you are given. Talk with your healthcare provider about the risks that apply most to you.  Getting ready for procedural sedation  Talk with your healthcare provider about how to get ready for your procedure. Tell him or her about all the medicines you take. This includes over-the-counter medicines such as ibuprofen. It also includes vitamins, herbs, and other supplements. You may need to stop taking some medicines before the procedure, such as blood thinners and aspirin. If you smoke, you should stop to lessen the chance of a lung issue. Talk with your healthcare provider if you need help to stop smoking.  Tell your healthcare provider if you:  · Have had any problems in the past with sedation or anesthesia  · Have had any recent changes in your health, such as an infection or fever  · Are pregnant or think you may be pregnant  Also be sure to:  · Ask a family member or friend  to take you home after the procedure. You cant drive on the day you receive sedation.  · Not eat or drink after midnight the night before your procedure, if advised.  · Not plan on making any important decisions, such as financial or legal, for the day after you receive the sedation.  · Follow all other instructions from your healthcare provider.  During your procedural sedation  You may have your procedure in a hospital or a medical clinic. Sedation is done by a trained healthcare provider. In general, you can expect the following:  · You will be given medicine through an IV line in your arm or hand. Or you may receive a shot. The medicine may also be given by mouth. Or you may inhale it through a mask.  · If you receive medicine through an IV, you may feel the effects very quickly. You will start to feel relaxed and drowsy.  · During the procedure, your heart rate, breathing, and blood pressure will be closely watched. Your breathing and blood pressure may decrease a little. But you will likely not need help with your breathing. You may receive a little extra oxygen through a mask or through some soft plastic prongs underneath your nose.  · You will probably be awake the entire time. If you do fall asleep, you should be easy to wake up, if needed. You should feel little or no pain.  · When your procedure is over, the sedative medicine will be stopped.  After your procedural sedation  You will begin to feel more awake and aware. But you will likely be drowsy for a while afterward. You will be closely watched as you become more alert. You may have a faint memory of the procedure. Or you may not remember it at all.  You should be able to return home within an hour or two after your procedure. Plan to have someone stay with you for a few hours. Side effects such as headache and nausea may go away quickly. Tell your healthcare provider if they continue.  Dont drive or make any important decisions for at least 24  hours. Be sure to follow all after-care instructions.     When to call your healthcare provider  Have someone call your healthcare provider right away if you have any of these:  · Drowsiness that gets worse  · Weakness or dizziness that gets worse  · Repeated vomiting  · You cant be awakened  · Severe or ongoing pain from the procedure, not relieved by the pain medicine   Date Last Reviewed: 2/1/2017  © 5472-4449 [employer]. [address] [place] [address], [place] [address]. All rights reserved. This information is not intended as a substitute for professional medical care. Always follow your healthcare professional's instructions.

## 2018-08-15 NOTE — OR NURSING
Instructed the patient to arrive for procedure at 1300, pt also instructed to fast after 0400 and transportation is required. Pt verb. Understanding.

## 2018-08-16 ENCOUNTER — HOSPITAL ENCOUNTER (OUTPATIENT)
Facility: HOSPITAL | Age: 78
Discharge: HOME | End: 2018-08-16
Attending: PAIN MEDICINE | Admitting: PAIN MEDICINE
Payer: MEDICARE

## 2018-08-16 VITALS
HEIGHT: 60 IN | WEIGHT: 140 LBS | TEMPERATURE: 98 F | RESPIRATION RATE: 16 BRPM | DIASTOLIC BLOOD PRESSURE: 72 MMHG | BODY MASS INDEX: 27.48 KG/M2 | SYSTOLIC BLOOD PRESSURE: 170 MMHG | OXYGEN SATURATION: 96 % | HEART RATE: 83 BPM

## 2018-08-16 DIAGNOSIS — M54.16 LUMBAR RADICULOPATHY: Primary | ICD-10-CM

## 2018-08-16 PROCEDURE — 25000003 PHARM REV CODE 250: Performed by: PAIN MEDICINE

## 2018-08-16 PROCEDURE — 62323 NJX INTERLAMINAR LMBR/SAC: CPT | Mod: ,,, | Performed by: PAIN MEDICINE

## 2018-08-16 PROCEDURE — 63600175 PHARM REV CODE 636 W HCPCS: Performed by: PAIN MEDICINE

## 2018-08-16 PROCEDURE — 62323 NJX INTERLAMINAR LMBR/SAC: CPT | Performed by: PAIN MEDICINE

## 2018-08-16 PROCEDURE — 25500020 PHARM REV CODE 255: Performed by: PAIN MEDICINE

## 2018-08-16 RX ORDER — LIDOCAINE HYDROCHLORIDE 10 MG/ML
INJECTION, SOLUTION EPIDURAL; INFILTRATION; INTRACAUDAL; PERINEURAL
Status: DISCONTINUED | OUTPATIENT
Start: 2018-08-16 | End: 2018-08-16 | Stop reason: HOSPADM

## 2018-08-16 RX ORDER — DEXAMETHASONE SODIUM PHOSPHATE 10 MG/ML
INJECTION INTRAMUSCULAR; INTRAVENOUS
Status: DISCONTINUED | OUTPATIENT
Start: 2018-08-16 | End: 2018-08-16 | Stop reason: HOSPADM

## 2018-08-16 RX ORDER — ALPRAZOLAM 0.5 MG/1
0.5 TABLET, ORALLY DISINTEGRATING ORAL ONCE AS NEEDED
Status: COMPLETED | OUTPATIENT
Start: 2018-08-16 | End: 2018-08-16

## 2018-08-16 RX ADMIN — ALPRAZOLAM 0.5 MG: 0.5 TABLET, ORALLY DISINTEGRATING ORAL at 02:08

## 2018-08-16 NOTE — H&P
[facility]  History & Physical - Short Stay  Pain Management           SUBJECTIVE:     Procedure: Procedure(s) (LRB):  INJECTION, STEROID, SPINE, LUMBAR, EPIDURAL- L2-3 ORAL SEDATION (N/A)    Chief Complaint/Reason for Admission:  Lumbar radiculopathy [M54.16]    PTA Medications   Medication Sig    amlodipine (NORVASC) 10 MG tablet Take 1 tablet (10 mg total) by mouth once daily.    atorvastatin (LIPITOR) 40 MG tablet TAKE 1 TABLET BY MOUTH EVERY DAY    levothyroxine (SYNTHROID) 88 MCG tablet Take 1 tablet (88 mcg total) by mouth once daily.    albuterol (PROVENTIL HFA) 90 mcg/actuation inhaler Inhale 2 puffs into the lungs every 4 to 6 hours as needed.    albuterol-ipratropium 2.5mg-0.5mg/3mL (DUO-NEB) 0.5 mg-3 mg(2.5 mg base)/3 mL nebulizer solution USE 1 VIAL VIA NEBULIZER EVERY 6 HOURS AS NEEDED FOR WHEEZING( RESCUE)    aspirin (ECOTRIN) 81 MG EC tablet Take 1 tablet (81 mg total) by mouth once daily.    baclofen (LIORESAL) 10 MG tablet TAKE 1 TABLET BY MOUTH THREE TIMES DAILY AS NEEDED FOR PAIN IN NECK/ BACK    cilostazol (PLETAL) 50 MG Tab Take 1 tablet (50 mg total) by mouth 2 (two) times daily.    clopidogrel (PLAVIX) 75 mg tablet TAKE 1 TABLET BY MOUTH EVERY DAY    doxycycline (MONODOX) 100 MG capsule Take 1 capsule (100 mg total) by mouth 2 (two) times daily.    ergocalciferol (ERGOCALCIFEROL) 50,000 unit Cap TAKE 1 CAPSULE BY MOUTH EVERY 7 DAYS    escitalopram oxalate (LEXAPRO) 10 MG tablet Take 1 tablet (10 mg total) by mouth nightly.    ferrous sulfate 325 mg (65 mg iron) Tab tablet Take 1 tablet (325 mg total) by mouth 2 (two) times daily.    fluticasone-salmeterol 250-50 mcg/dose (ADVAIR) 250-50 mcg/dose diskus inhaler Inhale 1 puff into the lungs 2 (two) times daily.    gabapentin (NEURONTIN) 100 MG capsule Take 1 tablet by mouth QHS x 1 wk, then take 1 tablet by mouth BID x1 wk, then take 1 tablet TID thereafter.    hydrocodone-acetaminophen 5-325mg (NORCO) 5-325  mg per tablet Take 1 tablet by mouth every 8 (eight) hours as needed for Pain.    meclizine (ANTIVERT) 25 mg tablet TAKE 1 TABLET(25 MG) BY MOUTH EVERY 6 HOURS AS NEEDED FOR DIZZINESS    olmesartan (BENICAR) 40 MG tablet TAKE 1 TABLET BY MOUTH DAILY    pantoprazole (PROTONIX) 40 MG tablet Take 1 tablet (40 mg total) by mouth once daily.    tiotropium (SPIRIVA WITH HANDIHALER) 18 mcg inhalation capsule Inhale 1 capsule (18 mcg total) into the lungs once daily.       Review of patient's allergies indicates:  No Known Allergies    Past Medical History:   Diagnosis Date    Abdominal pain     CHF (congestive heart failure)     Chronic kidney disease, stage III (moderate) 2015    COPD (chronic obstructive pulmonary disease)     COPD (chronic obstructive pulmonary disease)     CVA (cerebral infarction)     Esophagitis     Gastritis     GERD (gastroesophageal reflux disease)     GI bleed     Hyperlipidemia     Hypertension     Mesenteric angina     PVD (peripheral vascular disease) with claudication 10/13/2015    Stroke     Subclavian artery stenosis, left     Thyroid disease      Past Surgical History:   Procedure Laterality Date    CAROTID STENT       SECTION      CORONARY ANGIOPLASTY  2006    GALLBLADDER SURGERY      HYSTERECTOMY      OOPHORECTOMY      SUPERIOR MESTENTERIC ARTERY STENT       History reviewed. No pertinent family history.  Social History     Tobacco Use    Smoking status: Current Every Day Smoker     Packs/day: 0.50     Years: 60.00     Pack years: 30.00     Types: Cigarettes    Smokeless tobacco: Current User   Substance Use Topics    Alcohol use: No    Drug use: No        Review of Systems:  Review of Systems   Constitutional: Negative for chills and fever.   HENT: Negative for nosebleeds.    Eyes: Negative for pain.   Respiratory: Negative for hemoptysis.    Cardiovascular: Negative for chest pain.   Gastrointestinal: Negative for nausea and vomiting.    Genitourinary: Negative for dysuria.   Musculoskeletal: Positive for back pain. Negative for falls.   Skin: Negative for rash.   Neurological: Positive for tingling.   Endo/Heme/Allergies: Does not bruise/bleed easily.   Psychiatric/Behavioral: Negative for depression. The patient is not nervous/anxious and does not have insomnia.        OBJECTIVE:     Vital Signs (Most Recent):  Temp: 98.2 °F (36.8 °C) (08/16/18 1343)  Pulse: 88 (08/16/18 1416)  Resp: 18 (08/16/18 1416)  BP: (!) 192/84 (08/16/18 1416)  SpO2: 95 % (08/16/18 1416)    Physical Exam:  General appearance - alert, well appearing, and in no distress  Mental status - AOx3  Eyes - pupils equal and reactive, extraocular eye movements intact  Heart - normal rate, regular rhythm, normal S1, S2, no murmurs, rubs, clicks or gallops  Chest - clear to auscultation, no wheezes, rales or rhonchi, symmetric air entry  Abdomen - soft, nontender, nondistended, no masses or organomegaly  Neurological - alert, oriented, normal speech, no focal findings or movement disorder noted  Extremities - peripheral pulses normal, no pedal edema, no clubbing or cyanosis        ASSESSMENT/PLAN:     Active Hospital Problems    Diagnosis  POA    Lumbar radiculopathy [M54.16]  Yes      Resolved Hospital Problems   No resolved problems to display.        The risks and benefits of this intervention, and alternative therapies were discussed with the patient.  The discussion of risks included infection, bleeding, need for additional procedures or surgery, nerve damage, paralysis, adverse medication reaction(s), stroke, and/or death.  Questions regarding the procedure, risks, expected outcome, and possible side effects were solicited and answered to the patient's satisfaction.  Brittany wishes to proceed with the injection.  Verbal and written consent were verified.      Proceed with intervention as scheduled.      Stiven Ibarra Jr, MD  Interventional Pain Medicine /  Anesthesiology

## 2018-08-16 NOTE — INTERVAL H&P NOTE
The patient has been examined and the H&P has been reviewed:    I concur with the findings and no changes have occurred since H&P was written.    Anesthesia/Surgery risks, benefits and alternative options discussed and understood by patient/family.          Active Hospital Problems    Diagnosis  POA    Lumbar radiculopathy [M54.16]  Yes      Resolved Hospital Problems   No resolved problems to display.

## 2018-08-16 NOTE — DISCHARGE SUMMARY
[facility]  Discharge Note  Short Stay     Admit Date: 8/16/2018    Discharge Date: 8/16/2018     Attending Physician: Stiven Ibarra Jr, MD    Diagnoses:  Active Hospital Problems    Diagnosis  POA    *Lumbar radiculopathy [M54.16]  Yes      Resolved Hospital Problems   No resolved problems to display.     Discharged Condition: Good     Hospital Course: Patient was admitted for an outpatient interventional pain management procedure and tolerated the procedure well with no complications.     Final Diagnoses: Same as principal problem.     Disposition: Home or Self Care     Follow up/Patient Instructions:    Follow-up Information     Stiven Ibarra Jr, MD. Go in 1 month.    Specialty:  Pain Medicine  Why:  Post-procedural Follow Up As Scheduled, Call to make an appointment if you do not have one  Contact information:  [address] [facility]  [address]  [place] [id number] [phone]                   Reconciled Medications:     Medication List      CONTINUE taking these medications    albuterol 90 mcg/actuation inhaler  Commonly known as:  PROVENTIL HFA  Inhale 2 puffs into the lungs every 4 to 6 hours as needed.     albuterol-ipratropium 2.5 mg-0.5 mg/3 mL nebulizer solution  Commonly known as:  DUO-NEB  USE 1 VIAL VIA NEBULIZER EVERY 6 HOURS AS NEEDED FOR WHEEZING( RESCUE)     amLODIPine 10 MG tablet  Commonly known as:  NORVASC  Take 1 tablet (10 mg total) by mouth once daily.     aspirin 81 MG EC tablet  Commonly known as:  ECOTRIN  Take 1 tablet (81 mg total) by mouth once daily.     atorvastatin 40 MG tablet  Commonly known as:  LIPITOR  TAKE 1 TABLET BY MOUTH EVERY DAY     baclofen 10 MG tablet  Commonly known as:  LIORESAL  TAKE 1 TABLET BY MOUTH THREE TIMES DAILY AS NEEDED FOR PAIN IN NECK/ BACK     cilostazol 50 MG Tab  Commonly known as:  PLETAL  Take 1 tablet (50 mg total) by mouth 2 (two) times daily.     clopidogrel 75 mg tablet  Commonly known as:  PLAVIX  TAKE 1 TABLET BY MOUTH EVERY  DAY     doxycycline 100 MG capsule  Commonly known as:  MONODOX  Take 1 capsule (100 mg total) by mouth 2 (two) times daily.     ergocalciferol 50,000 unit Cap  Commonly known as:  ERGOCALCIFEROL  TAKE 1 CAPSULE BY MOUTH EVERY 7 DAYS     escitalopram oxalate 10 MG tablet  Commonly known as:  LEXAPRO  Take 1 tablet (10 mg total) by mouth nightly.     ferrous sulfate 325 mg (65 mg iron) Tab tablet  Take 1 tablet (325 mg total) by mouth 2 (two) times daily.     fluticasone-salmeterol 250-50 mcg/dose 250-50 mcg/dose diskus inhaler  Commonly known as:  ADVAIR  Inhale 1 puff into the lungs 2 (two) times daily.     gabapentin 100 MG capsule  Commonly known as:  NEURONTIN  Take 1 tablet by mouth QHS x 1 wk, then take 1 tablet by mouth BID x1 wk, then take 1 tablet TID thereafter.     HYDROcodone-acetaminophen 5-325 mg per tablet  Commonly known as:  NORCO  Take 1 tablet by mouth every 8 (eight) hours as needed for Pain.     levothyroxine 88 MCG tablet  Commonly known as:  SYNTHROID  Take 1 tablet (88 mcg total) by mouth once daily.     meclizine 25 mg tablet  Commonly known as:  ANTIVERT  TAKE 1 TABLET(25 MG) BY MOUTH EVERY 6 HOURS AS NEEDED FOR DIZZINESS     olmesartan 40 MG tablet  Commonly known as:  BENICAR  TAKE 1 TABLET BY MOUTH DAILY     pantoprazole 40 MG tablet  Commonly known as:  PROTONIX  Take 1 tablet (40 mg total) by mouth once daily.     tiotropium 18 mcg inhalation capsule  Commonly known as:  SPIRIVA WITH HANDIHALER  Inhale 1 capsule (18 mcg total) into the lungs once daily.           Discharge Procedure Orders (must include Diet, Follow-up, Activity)   Call MD for:  temperature >100.4     Call MD for:  severe uncontrolled pain     Call MD for:  redness, tenderness, or signs of infection (pain, swelling, redness, odor or green/yellow discharge around incision site)     Call MD for:  difficulty breathing or increased cough     Call MD for:  severe persistent headache     Call MD for:  worsening rash      Remove dressing in 24 hours       Stiven Ibarra Jr, MD  Interventional Pain Medicine / Anesthesiology

## 2018-08-16 NOTE — OR NURSING
D/c instructions given to the pt. Pt. Verb. Understanding. Pt transported to private vehicle by tech and daughter. Ptlars.

## 2018-08-16 NOTE — OP NOTE
"Procedure Note    Pre-operative Diagnosis: Lumbar radiculopathy  Post-operative Diagnosis: Lumbar radiculopathy  Procedure Date: 08/16/2018  Procedure: (1) Lumbar Epidural Steroid Injection at L2-3 and (2) Intraoperative fluoroscopy        Anesthesia: Local    Indications: To alleviate pain and suffering, and reduce functional impairment.    Procedure in Detail:   The patients history and physical exam were reviewed. The risks, benefits and alternatives to the procedure were discussed, and all questions were answered to the patients satisfaction. The patient agreed to proceed, and written informed consent was verified.    The patient was brought into the procedure room and placed in the prone position on the fluoroscopy table. The area of the lumbar spine was prepped with Chloraprep and draped in a sterile manner. The L2-3 interspace was identified and marked under AP fluoroscopy. The skin and subcutaneous tissues overlying the targeted interspace were anesthetized with 3-5 mL of 1% lidocaine using a 25G, 1.5" needle. A 17G, 3.5" Tuohy epidural needle was directed toward the interspace under fluoroscopic guidance until the ligamentum flavum was engaged. From this point, a loss of resistance technique with a glass syringe and saline was used to identify entrance of the needle into the epidural space. Once loss of resistance was observed 1 mL of contrast solution was injected live. An appropriate epidurogram was noted.    A 5 mL mixture consisting of saline, 2 mL 1% Lidocaine and 15 mg of Dexamethasone was injected slowly and without resistance.  The needle was removed and a bandage applied to puncture site.    Blood Loss: nil    Disposition: The patient tolerated the procedure well, and there were no apparent complications. Vital signs remained stable throughout the procedure. The patient was taken to the recovery area where written discharge instructions for the procedure were given.     Follow-up: RTC as " scheduled      Stiven Ibarra Jr, MD  Interventional Pain Medicine / Anesthesiology

## 2018-08-21 ENCOUNTER — TELEPHONE (OUTPATIENT)
Dept: NEUROLOGY | Facility: HOSPITAL | Age: 78
End: 2018-08-21

## 2018-08-21 NOTE — TELEPHONE ENCOUNTER
----- Message from Nurys Wynne sent at 8/20/2018  5:08 PM CDT -----  Contact: PT Portal Request  Appointment Request From: Brittany Haile    With Provider: Cecil Crooks MD [[facility]]    Preferred Date Range: 8/27/2018 - 9/28/2018    Preferred Times: Any time    Reason for visit: Existing Patient    Comments:  stomach pain and discomfort

## 2018-09-12 DIAGNOSIS — E03.4 HYPOTHYROIDISM DUE TO ACQUIRED ATROPHY OF THYROID: ICD-10-CM

## 2018-09-12 DIAGNOSIS — I10 ESSENTIAL HYPERTENSION: ICD-10-CM

## 2018-09-12 DIAGNOSIS — K21.9 GASTROESOPHAGEAL REFLUX DISEASE, ESOPHAGITIS PRESENCE NOT SPECIFIED: ICD-10-CM

## 2018-09-12 RX ORDER — PANTOPRAZOLE SODIUM 40 MG/1
TABLET, DELAYED RELEASE ORAL
Qty: 90 TABLET | Refills: 0 | Status: SHIPPED | OUTPATIENT
Start: 2018-09-12 | End: 2018-12-08 | Stop reason: SDUPTHER

## 2018-09-12 RX ORDER — AMLODIPINE BESYLATE 10 MG/1
TABLET ORAL
Qty: 90 TABLET | Refills: 0 | Status: SHIPPED | OUTPATIENT
Start: 2018-09-12 | End: 2018-12-08 | Stop reason: SDUPTHER

## 2018-09-12 RX ORDER — LEVOTHYROXINE SODIUM 88 UG/1
TABLET ORAL
Qty: 90 TABLET | Refills: 0 | Status: SHIPPED | OUTPATIENT
Start: 2018-09-12 | End: 2018-12-08 | Stop reason: SDUPTHER

## 2018-10-10 RX ORDER — CLOPIDOGREL BISULFATE 75 MG/1
TABLET ORAL
Qty: 90 TABLET | Refills: 3 | Status: ON HOLD | OUTPATIENT
Start: 2018-10-10 | End: 2019-09-11 | Stop reason: HOSPADM

## 2018-10-25 ENCOUNTER — OFFICE VISIT (OUTPATIENT)
Dept: FAMILY MEDICINE | Facility: CLINIC | Age: 78
End: 2018-10-25
Attending: FAMILY MEDICINE
Payer: MEDICARE

## 2018-10-25 VITALS
SYSTOLIC BLOOD PRESSURE: 124 MMHG | OXYGEN SATURATION: 90 % | DIASTOLIC BLOOD PRESSURE: 56 MMHG | HEART RATE: 97 BPM | HEIGHT: 60 IN | WEIGHT: 140 LBS | BODY MASS INDEX: 27.48 KG/M2

## 2018-10-25 DIAGNOSIS — B96.89 ACUTE BACTERIAL BRONCHITIS: Primary | ICD-10-CM

## 2018-10-25 DIAGNOSIS — Z72.0 TOBACCO ABUSE: ICD-10-CM

## 2018-10-25 DIAGNOSIS — J41.0 SIMPLE CHRONIC BRONCHITIS: ICD-10-CM

## 2018-10-25 DIAGNOSIS — J20.8 ACUTE BACTERIAL BRONCHITIS: Primary | ICD-10-CM

## 2018-10-25 PROCEDURE — 99999 PR PBB SHADOW E&M-EST. PATIENT-LVL III: CPT | Mod: PBBFAC,,, | Performed by: FAMILY MEDICINE

## 2018-10-25 PROCEDURE — 99214 OFFICE O/P EST MOD 30 MIN: CPT | Mod: 25,S$PBB,, | Performed by: FAMILY MEDICINE

## 2018-10-25 PROCEDURE — 94640 AIRWAY INHALATION TREATMENT: CPT | Mod: PBBFAC,PO

## 2018-10-25 PROCEDURE — 3074F SYST BP LT 130 MM HG: CPT | Mod: CPTII,,, | Performed by: FAMILY MEDICINE

## 2018-10-25 PROCEDURE — 99213 OFFICE O/P EST LOW 20 MIN: CPT | Mod: PBBFAC,PO | Performed by: FAMILY MEDICINE

## 2018-10-25 PROCEDURE — 1101F PT FALLS ASSESS-DOCD LE1/YR: CPT | Mod: CPTII,,, | Performed by: FAMILY MEDICINE

## 2018-10-25 PROCEDURE — 96372 THER/PROPH/DIAG INJ SC/IM: CPT | Mod: PBBFAC,59,PO

## 2018-10-25 PROCEDURE — 3078F DIAST BP <80 MM HG: CPT | Mod: CPTII,,, | Performed by: FAMILY MEDICINE

## 2018-10-25 RX ORDER — METHYLPREDNISOLONE SODIUM SUCCINATE 125 MG/2ML
125 INJECTION INTRAMUSCULAR; INTRAVENOUS
Status: COMPLETED | OUTPATIENT
Start: 2018-10-25 | End: 2018-10-25

## 2018-10-25 RX ORDER — LEVOFLOXACIN 500 MG/1
500 TABLET, FILM COATED ORAL DAILY
Qty: 5 TABLET | Refills: 0 | Status: SHIPPED | OUTPATIENT
Start: 2018-10-25 | End: 2019-01-31

## 2018-10-25 RX ORDER — IPRATROPIUM BROMIDE AND ALBUTEROL SULFATE 2.5; .5 MG/3ML; MG/3ML
3 SOLUTION RESPIRATORY (INHALATION)
Status: COMPLETED | OUTPATIENT
Start: 2018-10-25 | End: 2018-10-25

## 2018-10-25 RX ADMIN — IPRATROPIUM BROMIDE AND ALBUTEROL SULFATE 3 ML: .5; 2.5 SOLUTION RESPIRATORY (INHALATION) at 01:10

## 2018-10-25 RX ADMIN — METHYLPREDNISOLONE SODIUM SUCCINATE 125 MG: 125 INJECTION, POWDER, FOR SOLUTION INTRAMUSCULAR; INTRAVENOUS at 01:10

## 2018-10-25 NOTE — PROGRESS NOTES
Subjective:       Patient ID: Brittany Ha[place] is a 78 y.o. female.    Chief Complaint: Follow-up (3 month)    78 yr old pleasant female with CVA, Hypothyroidism, HTN, HLD, COPD, GERD, carotid artery disease/stenosis, anxiety, presents today for evaluation of cough, SOB, wheezing, and fever. Onset 4 weeks days ago and gradually worsening since last 2 weeks. She was febrile since last Saturday and taking tylenol. Mild SOB when she coughs. No sick contacts or recent travel. She is continuing to smoke but has decreased it. Details as follows -      History as below - reviewed       Cough   This is a recurrent problem. The current episode started more than 1 month ago. The problem has been gradually worsening. The problem occurs constantly. The cough is productive of sputum. Associated symptoms include nasal congestion and shortness of breath. Pertinent negatives include no chest pain, headaches, heartburn, hemoptysis, myalgias, rash, rhinorrhea, sore throat or wheezing. Nothing aggravates the symptoms. She has tried OTC cough suppressant for the symptoms. The treatment provided no relief. Her past medical history is significant for bronchitis, COPD and environmental allergies. There is no history of emphysema.     Review of Systems   Constitutional: Negative.  Negative for activity change, diaphoresis and unexpected weight change.   HENT: Negative.  Negative for congestion, ear discharge, hearing loss, rhinorrhea, sore throat and voice change.    Eyes: Negative.  Negative for pain, discharge and visual disturbance.   Respiratory: Positive for cough and shortness of breath. Negative for hemoptysis, chest tightness and wheezing.    Cardiovascular: Negative.  Negative for chest pain.   Gastrointestinal: Negative.  Negative for abdominal distention, anal bleeding, constipation, heartburn and nausea.   Endocrine: Negative.  Negative for cold intolerance, polydipsia and polyuria.   Genitourinary: Negative.  Negative for decreased  urine volume, difficulty urinating, dysuria, frequency, menstrual problem and vaginal pain.   Musculoskeletal: Negative.  Negative for arthralgias, gait problem and myalgias.   Skin: Negative.  Negative for color change, pallor, rash and wound.   Allergic/Immunologic: Positive for environmental allergies. Negative for immunocompromised state.   Neurological: Negative.  Negative for dizziness, tremors, seizures, speech difficulty and headaches.   Hematological: Negative.  Negative for adenopathy. Does not bruise/bleed easily.   Psychiatric/Behavioral: Negative.  Negative for agitation, confusion, decreased concentration, hallucinations, self-injury and suicidal ideas. The patient is not nervous/anxious.        PMH/PSH/FH/SH/MED/ALLERGY reviewed    Objective:       Vitals:    10/25/18 1314   BP: (!) 124/56   Pulse: 97   \    Physical Exam   Constitutional: She is oriented to person, place, and time. She appears well-developed and well-nourished. No distress.   HENT:   Head: Normocephalic and atraumatic.   Right Ear: External ear normal.   Left Ear: External ear normal.   Nose: Nose normal.   Mouth/Throat: Oropharynx is clear and moist. No oropharyngeal exudate.   Eyes: Conjunctivae and EOM are normal. Pupils are equal, round, and reactive to light. Right eye exhibits no discharge. Left eye exhibits no discharge. No scleral icterus.   Neck: Normal range of motion. Neck supple. No JVD present. No tracheal deviation present. No thyromegaly present.   Cardiovascular: Normal rate, regular rhythm, normal heart sounds and intact distal pulses. Exam reveals no gallop and no friction rub.   No murmur heard.  Pulmonary/Chest: Effort normal. No stridor. She has wheezes. She has rales. She exhibits no tenderness.   B/L rales and wheezing   Abdominal: Soft. Bowel sounds are normal. She exhibits no distension and no mass. There is no tenderness. There is no rebound and no guarding. No hernia.   Musculoskeletal: Normal range of  motion. She exhibits no edema or tenderness.   Lymphadenopathy:     She has no cervical adenopathy.   Neurological: She is alert and oriented to person, place, and time. She has normal reflexes. She displays normal reflexes. No cranial nerve deficit. She exhibits normal muscle tone. Coordination normal.   Skin: Skin is warm and dry. No rash noted. She is not diaphoretic. No erythema. No pallor.   Psychiatric: She has a normal mood and affect. Her behavior is normal. Judgment and thought content normal.       Assessment:       1. Acute bacterial bronchitis    2. Simple chronic bronchitis    3. Tobacco abuse        Plan:       Brittany was seen today for follow-up.    Diagnoses and all orders for this visit:    Acute bacterial bronchitis  -     albuterol-ipratropium 2.5 mg-0.5 mg/3 mL nebulizer solution 3 mL  -     levoFLOXacin (LEVAQUIN) 500 MG tablet; Take 1 tablet (500 mg total) by mouth once daily.  -     methylPREDNISolone sodium succinate injection 125 mg    Simple chronic bronchitis  -     albuterol-ipratropium 2.5 mg-0.5 mg/3 mL nebulizer solution 3 mL  -     levoFLOXacin (LEVAQUIN) 500 MG tablet; Take 1 tablet (500 mg total) by mouth once daily.  -     methylPREDNISolone sodium succinate injection 125 mg    Tobacco abuse  -     albuterol-ipratropium 2.5 mg-0.5 mg/3 mL nebulizer solution 3 mL  -     levoFLOXacin (LEVAQUIN) 500 MG tablet; Take 1 tablet (500 mg total) by mouth once daily.  -     methylPREDNISolone sodium succinate injection 125 mg      Acute bacterial bronchitis/cough  -Duoneb neb tx  -levaquin x 5 days  -solumedrol 125 mg IM once  -cough med when needed      Spent adequate time in obtaining history and explaining differentials    40 minutes spent during this visit of which greater than 50% devoted to face-face counseling and coordination of care regarding diagnosis and management plan    Follow-up in about 4 weeks (around 11/22/2018), or if symptoms worsen or fail to improve.

## 2018-10-26 ENCOUNTER — TELEPHONE (OUTPATIENT)
Dept: FAMILY MEDICINE | Facility: CLINIC | Age: 78
End: 2018-10-26

## 2018-10-26 ENCOUNTER — PATIENT MESSAGE (OUTPATIENT)
Dept: FAMILY MEDICINE | Facility: CLINIC | Age: 78
End: 2018-10-26

## 2018-10-26 DIAGNOSIS — J44.1 COPD WITH ACUTE EXACERBATION: ICD-10-CM

## 2018-10-26 RX ORDER — IPRATROPIUM BROMIDE AND ALBUTEROL SULFATE 2.5; .5 MG/3ML; MG/3ML
SOLUTION RESPIRATORY (INHALATION)
Qty: 1080 ML | Refills: 3 | Status: SHIPPED | OUTPATIENT
Start: 2018-10-26 | End: 2019-01-31

## 2018-10-26 RX ORDER — PROMETHAZINE HYDROCHLORIDE AND DEXTROMETHORPHAN HYDROBROMIDE 6.25; 15 MG/5ML; MG/5ML
5 SYRUP ORAL 2 TIMES DAILY PRN
Qty: 180 ML | Refills: 1 | Status: SHIPPED | OUTPATIENT
Start: 2018-10-26 | End: 2018-11-05

## 2018-10-26 NOTE — TELEPHONE ENCOUNTER
----- Message from Beverly Kathleen sent at 10/26/2018 12:39 PM CDT -----  Contact: Daughter [phone]  Patient's daughter  would like to speak with you about the patient still not feeling well. Please advise

## 2018-10-26 NOTE — TELEPHONE ENCOUNTER
Patient saw you yesterday, she is still having cough. She is requesting a liquid cough medication. She has a nebulizer at home so is there a medication you can prescribe for her to use with this nebulizer?

## 2018-11-01 ENCOUNTER — HOSPITAL ENCOUNTER (EMERGENCY)
Facility: HOSPITAL | Age: 78
Discharge: HOME | End: 2018-11-01
Attending: EMERGENCY MEDICINE
Payer: MEDICARE

## 2018-11-01 VITALS
SYSTOLIC BLOOD PRESSURE: 133 MMHG | TEMPERATURE: 98 F | BODY MASS INDEX: 28.66 KG/M2 | WEIGHT: 146 LBS | HEART RATE: 97 BPM | RESPIRATION RATE: 32 BRPM | OXYGEN SATURATION: 97 % | DIASTOLIC BLOOD PRESSURE: 64 MMHG | HEIGHT: 60 IN

## 2018-11-01 DIAGNOSIS — J20.9 COPD (CHRONIC OBSTRUCTIVE PULMONARY DISEASE) WITH ACUTE BRONCHITIS: ICD-10-CM

## 2018-11-01 DIAGNOSIS — J44.0 COPD (CHRONIC OBSTRUCTIVE PULMONARY DISEASE) WITH ACUTE BRONCHITIS: ICD-10-CM

## 2018-11-01 DIAGNOSIS — J44.1 COPD EXACERBATION: Primary | ICD-10-CM

## 2018-11-01 LAB
ALBUMIN SERPL BCP-MCNC: 3.5 G/DL
ALP SERPL-CCNC: 134 U/L
ALT SERPL W/O P-5'-P-CCNC: 12 U/L
ANION GAP SERPL CALC-SCNC: 13 MMOL/L
AST SERPL-CCNC: 17 U/L
BASOPHILS # BLD AUTO: 0.01 K/UL
BASOPHILS NFR BLD: 0.1 %
BILIRUB SERPL-MCNC: 0.2 MG/DL
BUN SERPL-MCNC: 23 MG/DL
CALCIUM SERPL-MCNC: 8.9 MG/DL
CHLORIDE SERPL-SCNC: 103 MMOL/L
CK SERPL-CCNC: 81 U/L
CO2 SERPL-SCNC: 22 MMOL/L
CREAT SERPL-MCNC: 1.2 MG/DL
DIFFERENTIAL METHOD: ABNORMAL
EOSINOPHIL # BLD AUTO: 0.3 K/UL
EOSINOPHIL NFR BLD: 4.1 %
ERYTHROCYTE [DISTWIDTH] IN BLOOD BY AUTOMATED COUNT: 14.4 %
EST. GFR  (AFRICAN AMERICAN): 49 ML/MIN/1.73 M^2
EST. GFR  (NON AFRICAN AMERICAN): 43 ML/MIN/1.73 M^2
FLUAV AG SPEC QL IA: NEGATIVE
FLUBV AG SPEC QL IA: NEGATIVE
GLUCOSE SERPL-MCNC: 142 MG/DL
HCT VFR BLD AUTO: 38 %
HGB BLD-MCNC: 12.2 G/DL
LYMPHOCYTES # BLD AUTO: 1.3 K/UL
LYMPHOCYTES NFR BLD: 17.6 %
MCH RBC QN AUTO: 28.8 PG
MCHC RBC AUTO-ENTMCNC: 32.1 G/DL
MCV RBC AUTO: 90 FL
MONOCYTES # BLD AUTO: 0.9 K/UL
MONOCYTES NFR BLD: 12.2 %
NEUTROPHILS # BLD AUTO: 4.7 K/UL
NEUTROPHILS NFR BLD: 65.2 %
PLATELET # BLD AUTO: 307 K/UL
PMV BLD AUTO: 9.1 FL
POTASSIUM SERPL-SCNC: 4.7 MMOL/L
PROT SERPL-MCNC: 6.4 G/DL
RBC # BLD AUTO: 4.23 M/UL
SODIUM SERPL-SCNC: 138 MMOL/L
SPECIMEN SOURCE: NORMAL
TROPONIN I SERPL DL<=0.01 NG/ML-MCNC: 0.01 NG/ML
WBC # BLD AUTO: 7.16 K/UL

## 2018-11-01 PROCEDURE — 80053 COMPREHEN METABOLIC PANEL: CPT

## 2018-11-01 PROCEDURE — 87400 INFLUENZA A/B EACH AG IA: CPT

## 2018-11-01 PROCEDURE — 84484 ASSAY OF TROPONIN QUANT: CPT

## 2018-11-01 PROCEDURE — 94640 AIRWAY INHALATION TREATMENT: CPT

## 2018-11-01 PROCEDURE — 63600175 PHARM REV CODE 636 W HCPCS: Performed by: EMERGENCY MEDICINE

## 2018-11-01 PROCEDURE — 82550 ASSAY OF CK (CPK): CPT

## 2018-11-01 PROCEDURE — 96361 HYDRATE IV INFUSION ADD-ON: CPT

## 2018-11-01 PROCEDURE — 25000242 PHARM REV CODE 250 ALT 637 W/ HCPCS: Performed by: EMERGENCY MEDICINE

## 2018-11-01 PROCEDURE — 25000003 PHARM REV CODE 250: Performed by: EMERGENCY MEDICINE

## 2018-11-01 PROCEDURE — 87040 BLOOD CULTURE FOR BACTERIA: CPT

## 2018-11-01 PROCEDURE — 99285 EMERGENCY DEPT VISIT HI MDM: CPT | Mod: 25

## 2018-11-01 PROCEDURE — 96374 THER/PROPH/DIAG INJ IV PUSH: CPT

## 2018-11-01 PROCEDURE — 85025 COMPLETE CBC W/AUTO DIFF WBC: CPT

## 2018-11-01 RX ORDER — PREDNISONE 20 MG/1
40 TABLET ORAL
Status: COMPLETED | OUTPATIENT
Start: 2018-11-01 | End: 2018-11-01

## 2018-11-01 RX ORDER — KETOROLAC TROMETHAMINE 30 MG/ML
15 INJECTION, SOLUTION INTRAMUSCULAR; INTRAVENOUS
Status: COMPLETED | OUTPATIENT
Start: 2018-11-01 | End: 2018-11-01

## 2018-11-01 RX ORDER — IPRATROPIUM BROMIDE AND ALBUTEROL SULFATE 2.5; .5 MG/3ML; MG/3ML
3 SOLUTION RESPIRATORY (INHALATION)
Status: COMPLETED | OUTPATIENT
Start: 2018-11-01 | End: 2018-11-01

## 2018-11-01 RX ORDER — PREDNISONE 50 MG/1
50 TABLET ORAL DAILY
Qty: 3 TABLET | Refills: 0 | Status: SHIPPED | OUTPATIENT
Start: 2018-11-02 | End: 2018-11-05

## 2018-11-01 RX ORDER — ALBUTEROL SULFATE 90 UG/1
1-2 AEROSOL, METERED RESPIRATORY (INHALATION) EVERY 6 HOURS PRN
Qty: 1 INHALER | Refills: 0 | Status: SHIPPED | OUTPATIENT
Start: 2018-11-01 | End: 2019-07-04 | Stop reason: SDUPTHER

## 2018-11-01 RX ADMIN — IPRATROPIUM BROMIDE AND ALBUTEROL SULFATE 3 ML: .5; 3 SOLUTION RESPIRATORY (INHALATION) at 08:11

## 2018-11-01 RX ADMIN — PREDNISONE 40 MG: 20 TABLET ORAL at 08:11

## 2018-11-01 RX ADMIN — KETOROLAC TROMETHAMINE 15 MG: 30 INJECTION, SOLUTION INTRAMUSCULAR at 09:11

## 2018-11-01 RX ADMIN — SODIUM CHLORIDE 1000 ML: 0.9 INJECTION, SOLUTION INTRAVENOUS at 08:11

## 2018-11-02 NOTE — ED PROVIDER NOTES
Encounter Date: 2018    Triage Sort Note: Brittany Ha[place], a nontoxic/well appearing, 78 y.o. female, presented to the ED with c/o shortness of breath and body aches for 2 days. Pt currently on oxygen at home.     Patient seen and medically screened by Nurse Practitioner in triage. Orders initiated at triage to expedite care. Care will be transferred to an alternate provider when patient was placed in an Exam Room from the [facility] for physical exam, additional orders, and disposition.  7:21 PM Delia Muniz DNP, FNP-BC         History   No chief complaint on file.    HPI  Review of patient's allergies indicates:  No Known Allergies  Past Medical History:   Diagnosis Date    Abdominal pain     CHF (congestive heart failure)     Chronic kidney disease, stage III (moderate) 2015    COPD (chronic obstructive pulmonary disease)     COPD (chronic obstructive pulmonary disease)     CVA (cerebral infarction)     Esophagitis     Gastritis     GERD (gastroesophageal reflux disease)     GI bleed     Hyperlipidemia     Hypertension     Mesenteric angina     PVD (peripheral vascular disease) with claudication 10/13/2015    Stroke     Subclavian artery stenosis, left     Thyroid disease      Past Surgical History:   Procedure Laterality Date    CAROTID STENT       SECTION      COLONOSCOPY N/A 3/2/2017    Procedure: COLONOSCOPY;  Surgeon: Ceicl Crooks MD;  Location: [facility] ENDO;  Service: Endoscopy;  Laterality: N/A;    COLONOSCOPY N/A 3/2/2017    Performed by Cecil Crooks MD at [facility] ENDO    COLONOSCOPY N/A 5/15/2014    Performed by Cecil [facility]oks MD at [facility] ENDO    CORONARY ANGIOPLASTY  2006    EPIDURAL STEROID INJECTION INTO LUMBAR SPINE N/A 2018    Procedure: INJECTION, STEROID, SPINE, LUMBAR, EPIDURAL- L2-3 ORAL SEDATION;  Surgeon: Stiven Ibarra Jr., MD;  Location: [facility] PAIN MGT;  Service: Pain Management;  Laterality: N/A;    ESOPHAGOGASTRODUODENOSCOPY (EGD) N/A  3/2/2017    Performed by [facility] MD at [facility] ENDO    GALLBLADDER SURGERY      HYSTERECTOMY      INJECTION, STEROID, SPINE, LUMBAR, EPIDURAL- L2-3 ORAL SEDATION N/A 8/16/2018    Performed by Stiven Ibarra Jr., MD at [facility] PAIN MGT    INJECTION-STEROID-EPIDURAL-LUMBAR- L5-S1 N/A 5/3/2018    Performed by Stiven Ibarra Jr., MD at [facility] PAIN MGT    OOPHORECTOMY      SUPERIOR MESTENTERIC ARTERY STENT       No family history on file.  Social History     Tobacco Use    Smoking status: Current Every Day Smoker     Packs/day: 0.50     Years: 60.00     Pack years: 30.00     Types: Cigarettes    Smokeless tobacco: Current User   Substance Use Topics    Alcohol use: No    Drug use: No     Review of Systems    Physical Exam     Initial Vitals   BP Pulse Resp Temp SpO2   -- -- -- -- --      MAP       --         Physical Exam    ED Course   Procedures  Labs Reviewed - No data to display       Imaging Results    None                               Clinical Impression:   {Add your Clinical Impression here. If you haven't documented one yet, please pend the note, finalize a Clinical Impression, and refresh your note before signing.:[id number]}

## 2018-11-02 NOTE — ED PROVIDER NOTES
Encounter Date: 2018    SCRIBE #1 NOTE: I, Mayelin Chavez, am scribing for, and in the presence of,  Dr. Bennett. I have scribed the entire note.       History     Chief Complaint   Patient presents with    Shortness of Breath     being treated by Dr Frazire for bronchitis; KEATING; unable to speak in complete sentences; denies fever; 7L on NC on arrival; usually on 2.5L at home     This is a 78 y.o. female who presents with complaint of  intermittent SOB with cough over  the past few weeks, worse over the past few days. Seen by Dr. Frazier on 10/25 for similar symptoms and diagnosed with bronchitis. Symptoms unrelieved by antibiotic medication and albuterol inhaler.  She reports cough is persistent and minimally productive of yellow/green sputum.  Reports the persistent coughing makes her feel very short of breath. No other eliciting/exacerbating/alleviating factors regarding this shortness of breath.  However she also reports that she feels generally fatigued with generalized myalgias and that she has some lower extremity swelling that began a few days ago.. Denies any fever or any other symptoms. Family assists with history.      The history is provided by the patient and a relative.     Review of patient's allergies indicates:  No Known Allergies  Past Medical History:   Diagnosis Date    Abdominal pain     CHF (congestive heart failure)     Chronic kidney disease, stage III (moderate) 2015    COPD (chronic obstructive pulmonary disease)     COPD (chronic obstructive pulmonary disease)     CVA (cerebral infarction)     Esophagitis     Gastritis     GERD (gastroesophageal reflux disease)     GI bleed     Hyperlipidemia     Hypertension     Mesenteric angina     PVD (peripheral vascular disease) with claudication 10/13/2015    Stroke     Subclavian artery stenosis, left     Thyroid disease      Past Surgical History:   Procedure Laterality Date    CAROTID STENT       SECTION       COLONOSCOPY N/A 3/2/2017    Procedure: COLONOSCOPY;  Surgeon: Cecil Crooks MD;  Location: [facility] ENDO;  Service: Endoscopy;  Laterality: N/A;    COLONOSCOPY N/A 3/2/2017    Performed by Cecil [facility]oks [facility] at [facility]    COLONOSCOPY N/A 5/15/2014    [facility]    CORONARY ANGIOPLASTY  2006    EPIDURAL STEROID INJECTION INTO LUMBAR SPINE N/A 8/16/2018    Procedure: INJECTION, STEROID, SPINE, LUMBAR, EPIDURAL- L2-3 ORAL SEDATION;  Surgeon: Stiven Ibarra Jr., MD;  Location: [facility];  Service: Pain Management;  Laterality: N/A;    ESOPHAGOGASTRODUODENOSCOPY (EGD) N/A 3/2/2017    [facility] by [facility]    GALLBLADDER SURGERY      HYSTERECTOMY      INJECTION, STEROID, SPINE, LUMBAR, EPIDURAL- L2-3 ORAL SEDATION N/A 8/16/2018    Performed by Stiven Ibarra Jr., MD at [facility]    INJECTION-STEROID-EPIDURAL-LUMBAR- L5-S1 N/A 5/3/2018    Performed by Stiven Ibarra Jr., MD at [facility]    OOPHORECTOMY      SUPERIOR MESTENTERIC ARTERY STENT       History reviewed. No pertinent family history.  Social History     Tobacco Use    Smoking status: Current Every Day Smoker     Packs/day: 0.50     Years: 60.00     Pack years: 30.00     Types: Cigarettes    Smokeless tobacco: Current User   Substance Use Topics    Alcohol use: No    Drug use: No     Review of Systems   Constitutional: Positive for fatigue. Negative for chills and fever.        Generalized body aches   HENT: Negative for congestion, facial swelling, trouble swallowing and voice change.    Eyes: Negative for photophobia, pain and redness.   Respiratory: Positive for cough and shortness of breath. Negative for choking.    Cardiovascular: Positive for leg swelling. Negative for chest pain and palpitations.   Gastrointestinal: Negative for abdominal pain, diarrhea, nausea and vomiting.   Genitourinary: Negative for dysuria, frequency and urgency.   Musculoskeletal:  Negative for back pain, neck pain and neck stiffness.   Neurological: Negative for seizures, speech difficulty, light-headedness, numbness and headaches.   All other systems reviewed and are negative.      Physical Exam     Initial Vitals [11/01/18 1921]   BP Pulse Resp Temp SpO2   (!) 152/66 96 (!) 24 98.4 °F (36.9 °C) 99 %      MAP       --         Physical Exam    Nursing note and vitals reviewed.  Constitutional: She appears well-developed and well-nourished. No distress.   HENT:   Head: Normocephalic and atraumatic.   Mouth/Throat: Mucous membranes are dry.   Oropharynx clear   Eyes: Conjunctivae and EOM are normal. Pupils are equal, round, and reactive to light.   Neck: Normal range of motion. Neck supple. No tracheal deviation present.   Cardiovascular: Regular rhythm, normal heart sounds and intact distal pulses. Tachycardia present.    Pulmonary/Chest: She is in respiratory distress (Mild). She has decreased breath sounds (bilaterally). She has wheezes (Expiratory, bilateral). She has no rhonchi. She has no rales.   Abdominal: Soft. Bowel sounds are normal. She exhibits no distension. There is no tenderness.   Musculoskeletal: Normal range of motion. She exhibits edema (Trace pitting edema bilaterally). She exhibits no tenderness.   Neurological: She is alert and oriented to person, place, and time. She has normal strength. No cranial nerve deficit or sensory deficit. GCS score is 15. GCS eye subscore is 4. GCS verbal subscore is 5. GCS motor subscore is 6.   Skin: Skin is warm and dry. Capillary refill takes less than 2 seconds.         ED Course   Procedures  Labs Reviewed   CBC W/ AUTO DIFFERENTIAL - Abnormal; Notable for the following components:       Result Value    MPV 9.1 (*)     Lymph% 17.6 (*)     All other components within normal limits   COMPREHENSIVE METABOLIC PANEL - Abnormal; Notable for the following components:    CO2 22 (*)     Glucose 142 (*)     eGFR if  49 (*)     eGFR  if non  43 (*)     All other components within normal limits   CULTURE, BLOOD   CULTURE, BLOOD   TROPONIN I   CK   INFLUENZA A AND B ANTIGEN   URINALYSIS          X-Rays:   Independently Interpreted Readings:   Other Readings:  I have visualized all imaging for this patient, radiology has done the interpretation.    Imaging Results          X-Ray Chest PA And Lateral (Final result)  Result time 11/01/18 21:24:01    Final result by Russ Nettles MD (11/01/18 21:24:01)                 Impression:      No acute process.      Electronically signed by: Russ Nettles MD  Date:    11/01/2018  Time:    21:24             Narrative:    EXAMINATION:  XR CHEST PA AND LATERAL    CLINICAL HISTORY:  Cough.    TECHNIQUE:  PA and lateral views of the chest were performed.    COMPARISON:  03/28/2018.    FINDINGS:  The trachea is unremarkable.  The cardiomediastinal silhouette is within normal limits.  The hemidiaphragms are unremarkable.  There is no evidence of free air beneath the hemidiaphragms.  There are no pleural effusions.  There is no evidence of a pneumothorax.  There is no evidence of pneumomediastinum.  No airspace opacities are present.  There are chronic pulmonary interstitial changes.  There are stable subcentimeter nodules in the right midlung zone.  There are degenerative changes in the osseous structures.  The subcutaneous tissues are unremarkable.                               Medical Decision Making:   Initial Assessment:   This is a 78 y.o. female who presents with complaint of  Intermittent SOB with cough over  the past few weeks, worse over the past few days.  Differential Diagnosis:   CHF, COPD, pneumonia  Independently Interpreted Test(s):   I have ordered and independently interpreted X-rays - see prior notes.  I have ordered and independently interpreted EKG Reading(s) - see prior notes  Clinical Tests:   Lab Tests: Reviewed       <> Summary of Lab: Benign  ED Management:  Patient given neb  treatments, IV fluid, Toradol, prednisone.  She reports feeling significantly better.  I discussed disposition with she and her family including discharge versus admission for observation.  Given her improvement, family is comfortable with discharge at this time.  We discussed home management, including neb treatments, increasing oral hydration, prescription for steroids, prompt follow-up with primary care physician, and strict return precautions.  Family reports that there is at least 1 person staying with her 24/7 and they will keep a close eye on her.  They are comfortable with plan at this time.                      Clinical Impression:     1. COPD exacerbation    2. COPD (chronic obstructive pulmonary disease) with acute bronchitis       Disposition:   Disposition: Discharged  Condition: Stable    Scribe Attestation I, Dr. Shaggy Bennett, personally performed the services described in this documentation. All medical record entries made by the scribe were at my direction and in my presence.  I have reviewed the chart and agree that the record reflects my personal performance and is accurate and complete. Shaggy Bennett MD.  7:39 PM 11/18/2018                      Shaggy Bennett MD  11/18/18 1939

## 2018-11-02 NOTE — ED NOTES
Pt to ED via w/c with c/o worsening SOB. Daughter states she is being treated for bronchitis by Dr. Frazier and symptoms have not improved. Pt presents with dyspnea on exertion. States she breathes best while sitting up. Wears 2.5 L NC at home, pt placed on 2.5 L NC on arrival to room. Denies pain at rest, states she has pain when walking. Mildly distressed, AAOx3.

## 2018-11-02 NOTE — ED NOTES
Encounter Date: 11/1/2018     Triage Sort Note: Brittany Haile, a nontoxic/well appearing, 78 y.o. female, presented to the ED with c/o shortness of breath and body aches for 2 days. Pt currently on oxygen at home.      Patient seen and medically screened by Nurse Practitioner in triage. Orders initiated at triage to expedite care. Care will be transferred to an alternate provider when patient was placed in an Exam Room from the [facility] for physical exam, additional orders, and disposition.  7:21 PM Delia Muniz DNP, FNP-BC         DOUGLAS Cochran  11/02/18 0808

## 2018-11-07 LAB
BACTERIA BLD CULT: NORMAL
BACTERIA BLD CULT: NORMAL

## 2018-11-14 NOTE — TELEPHONE ENCOUNTER
Called patients daughter to review results. Left a voicemail requesting a call back.    PI of results. Pt wanted Dr Malika Ballesteros to know that she is doing better. It has changed to loose to soft.

## 2018-12-04 DIAGNOSIS — F41.9 ANXIETY: ICD-10-CM

## 2018-12-04 RX ORDER — ESCITALOPRAM OXALATE 5 MG/1
TABLET ORAL
Qty: 90 TABLET | Refills: 0 | Status: SHIPPED | OUTPATIENT
Start: 2018-12-04 | End: 2019-01-31

## 2018-12-08 DIAGNOSIS — I10 ESSENTIAL HYPERTENSION: ICD-10-CM

## 2018-12-08 DIAGNOSIS — K21.9 GASTROESOPHAGEAL REFLUX DISEASE, ESOPHAGITIS PRESENCE NOT SPECIFIED: ICD-10-CM

## 2018-12-08 DIAGNOSIS — E03.4 HYPOTHYROIDISM DUE TO ACQUIRED ATROPHY OF THYROID: ICD-10-CM

## 2018-12-10 RX ORDER — AMLODIPINE BESYLATE 10 MG/1
TABLET ORAL
Qty: 90 TABLET | Refills: 0 | Status: SHIPPED | OUTPATIENT
Start: 2018-12-10 | End: 2019-01-31 | Stop reason: SDUPTHER

## 2018-12-10 RX ORDER — LEVOTHYROXINE SODIUM 88 UG/1
TABLET ORAL
Qty: 90 TABLET | Refills: 0 | Status: SHIPPED | OUTPATIENT
Start: 2018-12-10 | End: 2019-06-13 | Stop reason: SDUPTHER

## 2018-12-10 RX ORDER — PANTOPRAZOLE SODIUM 40 MG/1
TABLET, DELAYED RELEASE ORAL
Qty: 90 TABLET | Refills: 0 | Status: SHIPPED | OUTPATIENT
Start: 2018-12-10 | End: 2019-01-31 | Stop reason: SDUPTHER

## 2018-12-14 ENCOUNTER — PATIENT MESSAGE (OUTPATIENT)
Dept: FAMILY MEDICINE | Facility: CLINIC | Age: 78
End: 2018-12-14

## 2019-01-03 RX ORDER — CILOSTAZOL 50 MG/1
TABLET ORAL
Qty: 180 TABLET | Refills: 3 | Status: ON HOLD | OUTPATIENT
Start: 2019-01-03 | End: 2019-09-11 | Stop reason: HOSPADM

## 2019-01-31 ENCOUNTER — OFFICE VISIT (OUTPATIENT)
Dept: FAMILY MEDICINE | Facility: CLINIC | Age: 79
End: 2019-01-31
Attending: FAMILY MEDICINE
Payer: MEDICARE

## 2019-01-31 VITALS
HEART RATE: 81 BPM | SYSTOLIC BLOOD PRESSURE: 139 MMHG | BODY MASS INDEX: 26.65 KG/M2 | TEMPERATURE: 98 F | DIASTOLIC BLOOD PRESSURE: 67 MMHG | OXYGEN SATURATION: 95 % | HEIGHT: 61 IN | WEIGHT: 141.13 LBS

## 2019-01-31 DIAGNOSIS — R73.02 GLUCOSE INTOLERANCE (IMPAIRED GLUCOSE TOLERANCE): ICD-10-CM

## 2019-01-31 DIAGNOSIS — I67.9 CVD (CEREBROVASCULAR DISEASE): ICD-10-CM

## 2019-01-31 DIAGNOSIS — I70.0 AORTIC ATHEROSCLEROSIS: ICD-10-CM

## 2019-01-31 DIAGNOSIS — K21.9 GASTROESOPHAGEAL REFLUX DISEASE, ESOPHAGITIS PRESENCE NOT SPECIFIED: ICD-10-CM

## 2019-01-31 DIAGNOSIS — I73.9 PVD (PERIPHERAL VASCULAR DISEASE) WITH CLAUDICATION: ICD-10-CM

## 2019-01-31 DIAGNOSIS — F33.1 MODERATE RECURRENT MAJOR DEPRESSION: ICD-10-CM

## 2019-01-31 DIAGNOSIS — I50.32 CHRONIC DIASTOLIC HEART FAILURE: ICD-10-CM

## 2019-01-31 DIAGNOSIS — E03.9 HYPOTHYROIDISM, UNSPECIFIED TYPE: ICD-10-CM

## 2019-01-31 DIAGNOSIS — I10 ESSENTIAL HYPERTENSION: Primary | ICD-10-CM

## 2019-01-31 DIAGNOSIS — F41.9 ANXIETY: ICD-10-CM

## 2019-01-31 DIAGNOSIS — E78.2 MIXED HYPERLIPIDEMIA: ICD-10-CM

## 2019-01-31 DIAGNOSIS — I25.118 CORONARY ARTERY DISEASE OF NATIVE ARTERY OF NATIVE HEART WITH STABLE ANGINA PECTORIS: ICD-10-CM

## 2019-01-31 DIAGNOSIS — J44.9 CHRONIC OBSTRUCTIVE PULMONARY DISEASE, UNSPECIFIED COPD TYPE: ICD-10-CM

## 2019-01-31 DIAGNOSIS — N18.30 CHRONIC KIDNEY DISEASE, STAGE III (MODERATE): ICD-10-CM

## 2019-01-31 DIAGNOSIS — I63.9 CEREBROVASCULAR ACCIDENT (CVA), UNSPECIFIED MECHANISM: ICD-10-CM

## 2019-01-31 PROCEDURE — 1101F PR PT FALLS ASSESS DOC 0-1 FALLS W/OUT INJ PAST YR: ICD-10-PCS | Mod: CPTII,S$GLB,, | Performed by: FAMILY MEDICINE

## 2019-01-31 PROCEDURE — 3078F DIAST BP <80 MM HG: CPT | Mod: CPTII,S$GLB,, | Performed by: FAMILY MEDICINE

## 2019-01-31 PROCEDURE — 3075F SYST BP GE 130 - 139MM HG: CPT | Mod: CPTII,S$GLB,, | Performed by: FAMILY MEDICINE

## 2019-01-31 PROCEDURE — 3075F PR MOST RECENT SYSTOLIC BLOOD PRESS GE 130-139MM HG: ICD-10-PCS | Mod: CPTII,S$GLB,, | Performed by: FAMILY MEDICINE

## 2019-01-31 PROCEDURE — 1101F PT FALLS ASSESS-DOCD LE1/YR: CPT | Mod: CPTII,S$GLB,, | Performed by: FAMILY MEDICINE

## 2019-01-31 PROCEDURE — 99214 OFFICE O/P EST MOD 30 MIN: CPT | Mod: S$GLB,,, | Performed by: FAMILY MEDICINE

## 2019-01-31 PROCEDURE — 99499 RISK ADDL DX/OHS AUDIT: ICD-10-PCS | Mod: S$GLB,,, | Performed by: FAMILY MEDICINE

## 2019-01-31 PROCEDURE — 99999 PR PBB SHADOW E&M-EST. PATIENT-LVL III: ICD-10-PCS | Mod: PBBFAC,,, | Performed by: FAMILY MEDICINE

## 2019-01-31 PROCEDURE — 99214 PR OFFICE/OUTPT VISIT, EST, LEVL IV, 30-39 MIN: ICD-10-PCS | Mod: S$GLB,,, | Performed by: FAMILY MEDICINE

## 2019-01-31 PROCEDURE — 99999 PR PBB SHADOW E&M-EST. PATIENT-LVL III: CPT | Mod: PBBFAC,,, | Performed by: FAMILY MEDICINE

## 2019-01-31 PROCEDURE — 3078F PR MOST RECENT DIASTOLIC BLOOD PRESSURE < 80 MM HG: ICD-10-PCS | Mod: CPTII,S$GLB,, | Performed by: FAMILY MEDICINE

## 2019-01-31 PROCEDURE — 99499 UNLISTED E&M SERVICE: CPT | Mod: S$GLB,,, | Performed by: FAMILY MEDICINE

## 2019-01-31 RX ORDER — PNEUMOCOCCAL 13-VALENT CONJUGATE VACCINE 2.2; 2.2; 2.2; 2.2; 2.2; 4.4; 2.2; 2.2; 2.2; 2.2; 2.2; 2.2; 2.2 UG/.5ML; UG/.5ML; UG/.5ML; UG/.5ML; UG/.5ML; UG/.5ML; UG/.5ML; UG/.5ML; UG/.5ML; UG/.5ML; UG/.5ML; UG/.5ML; UG/.5ML
INJECTION, SUSPENSION INTRAMUSCULAR
Refills: 0 | COMMUNITY
Start: 2019-01-05 | End: 2019-01-31

## 2019-01-31 RX ORDER — PROMETHAZINE HYDROCHLORIDE AND DEXTROMETHORPHAN HYDROBROMIDE 6.25; 15 MG/5ML; MG/5ML
SYRUP ORAL
Refills: 1 | COMMUNITY
Start: 2018-11-20 | End: 2019-01-31

## 2019-01-31 RX ORDER — ATORVASTATIN CALCIUM 40 MG/1
40 TABLET, FILM COATED ORAL DAILY
Qty: 90 TABLET | Refills: 3 | Status: SHIPPED | OUTPATIENT
Start: 2019-01-31 | End: 2020-05-08

## 2019-01-31 RX ORDER — PREDNISONE 5 MG/1
TABLET ORAL
Refills: 0 | COMMUNITY
Start: 2018-11-01 | End: 2019-07-31

## 2019-01-31 RX ORDER — AMLODIPINE BESYLATE 10 MG/1
TABLET ORAL
Qty: 90 TABLET | Refills: 3 | Status: SHIPPED | OUTPATIENT
Start: 2019-01-31 | End: 2019-07-31

## 2019-01-31 RX ORDER — PANTOPRAZOLE SODIUM 40 MG/1
TABLET, DELAYED RELEASE ORAL
Qty: 90 TABLET | Refills: 3 | Status: SHIPPED | OUTPATIENT
Start: 2019-01-31 | End: 2020-04-01

## 2019-01-31 RX ORDER — TIOTROPIUM BROMIDE 18 UG/1
18 CAPSULE ORAL; RESPIRATORY (INHALATION) DAILY
Qty: 90 CAPSULE | Refills: 3 | Status: SHIPPED | OUTPATIENT
Start: 2019-01-31 | End: 2022-08-19

## 2019-01-31 RX ORDER — ESCITALOPRAM OXALATE 10 MG/1
10 TABLET ORAL NIGHTLY
Qty: 90 TABLET | Refills: 3 | Status: SHIPPED | OUTPATIENT
Start: 2019-01-31 | End: 2020-03-09

## 2019-01-31 NOTE — PROGRESS NOTES
Subjective:       Patient ID: Brittany Haile is a 78 y.o. female.    Chief Complaint: Follow-up (bone density due)    78 yr old pleasant female with CVA, Hypothyroidism, HTN, HLD, COPD, GERD, carotid artery disease/stenosis, anxiety, presents today for her 3-6 month follow up and c/o leg pain bilateral and also body aches and neck pain. She has PAD and also had stents in her mesenteric blood vessels and on plavix. Her JUAN CARLOS test was abnormal and cardiology recommends more stents.    Lung nodules - several - found 6 months ago and treated with antibiotics and steroids - needs f/u CT for surveillance     CVA - had stroke many years ago and has no deficits - it was TIA and they could not find any problem - she is continuing to smoke and no desire to quit as of yet    CKD III - follows nephrology - labs UTD and reassuring    ANXIETY/DEPRESSION -CONTROLLED - ON LEXAPRO 10 MG - COMPLIANT - NO SI/HI        Hypothyroidism - controlled - TSH                      0.642               01/03/2018                          -      on synthroid 88 mcg daily - compliant - no side effects      HTN - controlled- on Benicar 40 and norvasc 10 - no side effects - labs reassuring      HLD - controlled - on statin - follows cardiology -  LDLCALC                  102.4               01/03/2018                   PAD/PVD - CONTROLLED - FOLLOWS CARDIOLOGY    COPD - controlled - on advair daily and home oxygen - still smokes and no desire to quit - she drops her sats when she walks and she will benefit from ambulatory or portable oxygen      History as below - reviewed      Health maintenance  -labs UTD  -vaccines due                          Leg Pain    There was no injury mechanism. The pain is present in the left leg and right leg. The quality of the pain is described as aching. The pain is at a severity of 8/10. The pain is severe. The pain has been intermittent since onset. Pertinent negatives include no inability to bear weight, loss of motion,  muscle weakness or numbness. The symptoms are aggravated by movement. She has tried nothing for the symptoms. The treatment provided no relief.   Medication Refill   This is a chronic problem. The current episode started more than 1 year ago. The problem occurs constantly. The problem has been gradually improving. Associated symptoms include arthralgias, myalgias and neck pain. Pertinent negatives include no abdominal pain, chest pain, chills, congestion, diaphoresis, fatigue, headaches, nausea, numbness, rash, sore throat or weakness. Nothing aggravates the symptoms. Treatments tried: as below. The treatment provided significant relief.   Hypertension   This is a chronic problem. The current episode started more than 1 year ago. The problem has been gradually improving since onset. The problem is controlled. Associated symptoms include anxiety and neck pain. Pertinent negatives include no chest pain, headaches, malaise/fatigue, orthopnea, palpitations, peripheral edema or shortness of breath. There are no associated agents to hypertension. Past treatments include ACE inhibitors and calcium channel blockers. The current treatment provides significant improvement. There are no compliance problems.  There is no history of angina, CAD/MI, CVA, left ventricular hypertrophy, PVD or retinopathy. Identifiable causes of hypertension include a thyroid problem. There is no history of chronic renal disease, coarctation of the aorta, hypercortisolism, hyperparathyroidism, pheochromocytoma or renovascular disease.   Hyperlipidemia   This is a chronic problem. The current episode started more than 1 year ago. The problem is controlled. Recent lipid tests were reviewed and are normal. She has no history of chronic renal disease, diabetes, hypothyroidism or liver disease. There are no known factors aggravating her hyperlipidemia. Associated symptoms include leg pain and myalgias. Pertinent negatives include no chest pain or  shortness of breath. Current antihyperlipidemic treatment includes statins. The current treatment provides significant improvement of lipids. There are no compliance problems.  Risk factors for coronary artery disease include dyslipidemia, hypertension and post-menopausal.   Thyroid Problem   Presents for follow-up visit. Patient reports no anxiety, cold intolerance, constipation, depressed mood, diaphoresis, diarrhea, dry skin, fatigue, hair loss, hoarse voice, menstrual problem, nail problem, palpitations, tremors or weight loss. The symptoms have been stable. Past treatments include levothyroxine. The treatment provided significant relief. Prior procedures include thyroid ultrasound. The following procedures have not been performed: thyroid FNA. Her past medical history is significant for hyperlipidemia. There is no history of diabetes, Graves' ophthalmopathy or neuropathy. There are no known risk factors.   Gastroesophageal Reflux   She complains of heartburn. She reports no abdominal pain, no chest pain, no choking, no dysphagia, no early satiety, no hoarse voice, no nausea, no sore throat, no tooth decay or no wheezing. This is a chronic problem. The current episode started more than 1 year ago. The problem occurs constantly. The problem has been gradually improving. The heartburn is located in the abdomen. The heartburn is of moderate intensity. The heartburn does not wake her from sleep. The heartburn does not limit her activity. The heartburn doesn't change with position. Nothing aggravates the symptoms. Pertinent negatives include no fatigue, muscle weakness or weight loss. She has tried a PPI for the symptoms. The treatment provided significant relief. Past procedures do not include an abdominal ultrasound, esophageal manometry or H. pylori antibody titer. Past invasive treatments do not include gastroplasty, gastroplication or reflux surgery.   Anxiety   Presents for follow-up visit. Patient reports no  chest pain, confusion, decreased concentration, depressed mood, dizziness, excessive worry, feeling of choking, impotence, irritability, nausea, nervous/anxious behavior, palpitations, shortness of breath or suicidal ideas. Symptoms occur occasionally. The severity of symptoms is mild. Nothing aggravates the symptoms. The quality of sleep is fair. Nighttime awakenings: occasional.     There are no known risk factors. Her past medical history is significant for anxiety/panic attacks. There is no history of anemia, asthma, bipolar disorder, CAD, chronic lung disease, depression, hyperthyroidism or suicide attempts. Past treatments include SSRIs. The treatment provided significant relief. Compliance with prior treatments has been good. Compliance with medications is %.   Neck Pain    This is a chronic problem. The current episode started more than 1 year ago. The problem occurs constantly. The problem has been gradually worsening. The pain is associated with nothing. The pain is present in the occipital region. The quality of the pain is described as shooting and cramping. The pain is at a severity of 7/10. The pain is severe. The symptoms are aggravated by bending, twisting and stress. The pain is worse during the night. Associated symptoms include leg pain. Pertinent negatives include no chest pain, headaches, numbness, weakness or weight loss. She has tried heat, muscle relaxants and NSAIDs for the symptoms. The treatment provided mild relief.   Back Pain   This is a chronic problem. The current episode started more than 1 year ago. The problem occurs constantly. The problem has been gradually worsening since onset. The pain is present in the lumbar spine. The quality of the pain is described as aching. The pain radiates to the right foot and left foot. The pain is at a severity of 8/10. The pain is severe. The pain is worse during the night. The symptoms are aggravated by bending, sitting and twisting.  Associated symptoms include leg pain. Pertinent negatives include no abdominal pain, chest pain, dysuria, headaches, numbness, pelvic pain, weakness or weight loss. She has tried heat, ice, muscle relaxant and NSAIDs for the symptoms. The treatment provided moderate relief.     Review of Systems   Constitutional: Negative.  Negative for activity change, chills, diaphoresis, fatigue, irritability, malaise/fatigue, unexpected weight change and weight loss.   HENT: Negative.  Negative for congestion, ear discharge, hearing loss, hoarse voice, rhinorrhea, sore throat and voice change.    Eyes: Negative.  Negative for pain, discharge and visual disturbance.   Respiratory: Negative.  Negative for choking, chest tightness, shortness of breath and wheezing.    Cardiovascular: Negative.  Negative for chest pain, palpitations and orthopnea.   Gastrointestinal: Positive for heartburn. Negative for abdominal distention, abdominal pain, anal bleeding, constipation, diarrhea, dysphagia and nausea.   Endocrine: Negative.  Negative for cold intolerance, polydipsia and polyuria.   Genitourinary: Negative.  Negative for decreased urine volume, difficulty urinating, dysuria, frequency, impotence, menstrual problem, pelvic pain and vaginal pain.   Musculoskeletal: Positive for arthralgias, back pain, myalgias and neck pain. Negative for gait problem and muscle weakness.   Skin: Negative.  Negative for color change, pallor, rash and wound.   Allergic/Immunologic: Negative.  Negative for environmental allergies and immunocompromised state.   Neurological: Negative.  Negative for dizziness, tremors, seizures, speech difficulty, weakness, numbness and headaches.   Hematological: Negative.  Negative for adenopathy. Does not bruise/bleed easily.   Psychiatric/Behavioral: Negative.  Negative for agitation, confusion, decreased concentration, hallucinations, self-injury and suicidal ideas. The patient is not nervous/anxious.         PMH/PSH/FH/SH/MED/ALLERGY reviewed    Objective:       Vitals:    01/31/19 1310   BP: 139/67   Pulse: 81   Temp: 97.9 °F (36.6 °C)       Physical Exam   Constitutional: She is oriented to person, place, and time. She appears well-developed and well-nourished. No distress.   HENT:   Head: Normocephalic and atraumatic.   Right Ear: External ear normal.   Left Ear: External ear normal.   Nose: Nose normal.   Mouth/Throat: Oropharynx is clear and moist. No oropharyngeal exudate.   Eyes: Conjunctivae and EOM are normal. Pupils are equal, round, and reactive to light. Right eye exhibits no discharge. Left eye exhibits no discharge. No scleral icterus.   Neck: Normal range of motion. Neck supple. No JVD present. No tracheal deviation present. No thyromegaly present.   Cardiovascular: Normal rate, regular rhythm, normal heart sounds and intact distal pulses. Exam reveals no gallop and no friction rub.   No murmur heard.  Pulmonary/Chest: Effort normal and breath sounds normal. No stridor. She has no wheezes. She has no rales. She exhibits no tenderness.   Abdominal: Soft. Bowel sounds are normal. She exhibits no distension and no mass. There is no tenderness. There is no rebound and no guarding. No hernia.   Musculoskeletal: Normal range of motion. She exhibits tenderness (TTP PARACERVICAL AREA, TRAPEZIUS AND C2-4 AND T2-T4 AND PARATHORACIC AREA). She exhibits no edema.   Lymphadenopathy:     She has no cervical adenopathy.   Neurological: She is alert and oriented to person, place, and time. She has normal reflexes. She displays normal reflexes. No cranial nerve deficit. She exhibits normal muscle tone. Coordination normal.   Skin: Skin is warm and dry. No rash noted. She is not diaphoretic. No erythema. No pallor.   Psychiatric: She has a normal mood and affect. Her behavior is normal. Judgment and thought content normal.       Assessment:       1. Essential hypertension    2. Moderate recurrent major depression    3.  Chronic diastolic heart failure    4. Coronary artery disease of native artery of native heart with stable angina pectoris    5. Aortic atherosclerosis    6. Cerebrovascular accident (CVA), unspecified mechanism    7. Chronic kidney disease, stage III (moderate)    8. PVD (peripheral vascular disease) with claudication    9. Hypothyroidism, unspecified type    10. Mixed hyperlipidemia    11. Glucose intolerance (impaired glucose tolerance)    12. CVD (cerebrovascular disease)    13. Chronic obstructive pulmonary disease, unspecified COPD type    14. Gastroesophageal reflux disease, esophagitis presence not specified    15. Anxiety        Plan:       Brittany was seen today for follow-up.    Diagnoses and all orders for this visit:    Essential hypertension  -     CBC auto differential; Future  -     Comprehensive metabolic panel; Future  -     Lipid panel; Future  -     amLODIPine (NORVASC) 10 MG tablet; TAKE 1 TABLET(10 MG) BY MOUTH EVERY DAY    Moderate recurrent major depression  -     escitalopram oxalate (LEXAPRO) 10 MG tablet; Take 1 tablet (10 mg total) by mouth nightly.    Chronic diastolic heart failure    Coronary artery disease of native artery of native heart with stable angina pectoris  -     CBC auto differential; Future  -     Comprehensive metabolic panel; Future  -     Lipid panel; Future    Aortic atherosclerosis  -     CBC auto differential; Future  -     Comprehensive metabolic panel; Future  -     Lipid panel; Future    Cerebrovascular accident (CVA), unspecified mechanism  -     CBC auto differential; Future  -     Comprehensive metabolic panel; Future  -     Lipid panel; Future    Chronic kidney disease, stage III (moderate)  -     CBC auto differential; Future  -     Comprehensive metabolic panel; Future    PVD (peripheral vascular disease) with claudication    Hypothyroidism, unspecified type  -     TSH; Future    Mixed hyperlipidemia  -     CBC auto differential; Future  -     Comprehensive  metabolic panel; Future  -     Lipid panel; Future  -     atorvastatin (LIPITOR) 40 MG tablet; Take 1 tablet (40 mg total) by mouth once daily.    Glucose intolerance (impaired glucose tolerance)  -     Comprehensive metabolic panel; Future  -     Hemoglobin A1c; Future    CVD (cerebrovascular disease)    Chronic obstructive pulmonary disease, unspecified COPD type  -     tiotropium (SPIRIVA WITH HANDIHALER) 18 mcg inhalation capsule; Inhale 1 capsule (18 mcg total) into the lungs once daily.    Gastroesophageal reflux disease, esophagitis presence not specified  -     pantoprazole (PROTONIX) 40 MG tablet; TAKE 1 TABLET(40 MG) BY MOUTH EVERY DAY    Anxiety  -     escitalopram oxalate (LEXAPRO) 10 MG tablet; Take 1 tablet (10 mg total) by mouth nightly.          ANXIETY/DEPRESSION  -CONTROLLED  -CONTINUE LEXAPRO TO 10 MG NIGHTLY     HTN  -controlled  -continue Benicar and Norvasc    Pulmonary nodules  -CT for surveillance DONE    PVD/PAD  -follow cardiology for ongoing leg pain  -continue plavix    carotod artery disease/stenosis/bruit/PAD  -follows vascular medicine/cardiology  -statin for HLD    GERD  -controlled  -refilled PPI    Hypothyroidism  -controlled    CVA  -stable and baseline and no deficits    COPD  -stable and controlled  -on advair/spiriva  -recommend portable or ambulatory oxygen    Muscle spasm  -BACLOFEN prn nightly    CKD III  -baseline  -follows nephrology    Osteopenia  -vit D and dexa    Spent adequate time in obtaining history and explaining differentials    40 minutes spent during this visit of which greater than 50% devoted to face-face counseling and coordination of care regarding diagnosis and management plan    Follow-up in about 6 months (around 7/31/2019), or if symptoms worsen or fail to improve.

## 2019-02-08 ENCOUNTER — TELEPHONE (OUTPATIENT)
Dept: FAMILY MEDICINE | Facility: CLINIC | Age: 79
End: 2019-02-08

## 2019-02-08 NOTE — LETTER
February 8, 2019    Brittany Haile  [address] [place] [address]         [address]  [place] [address]  Phone: [phone]  Fax: [phone] February 8, 2019     Patient: Brittany Haile   YOB: 1940   Date of Visit: 2/8/2019       To Whom It May Concern:     Ms. Mic Soto, is my patient and has following diagnoses:    1. COPD  2. HTN  3. HLD  4. ON HOME OXYGEN THERAPY  5. STROKE    It is my medical opinion that Brittany Haile she should be on oxygen therapy when she is out of her home and during airlplane trips.    If you have any questions or concerns, please don't hesitate to call.    Sincerely,        Wayne Frazier MD

## 2019-02-08 NOTE — TELEPHONE ENCOUNTER
----- Message from Hillary Ortiz sent at 2/7/2019 12:59 PM CST -----  Contact: [phone]/ pts daughter Leila  Called in requesting to speak with you regarding a letter needed for pt to travel in April with oxygen. Please advise.

## 2019-02-08 NOTE — TELEPHONE ENCOUNTER
Called patient's daughter to inform that letter is ready for pickup and to ask if she needs a order for portable oxygen tank.  No answer, left voicemail.    Patient has very mild acne on her face. She has tried proactive in the past which did not help much. Currently, she does not have a healthy diet was advised on good hydration and diet changes and regular exercise. We'll prescribe Clindagel.

## 2019-02-08 NOTE — TELEPHONE ENCOUNTER
Spoke with patient's daughter. The pt uses oxygen and she would like for the pt to have orders for a portable tank to use on the flight and for the trip. She also needs a letter for the airport stating the patient needs the oxygen. Please advise.

## 2019-02-22 ENCOUNTER — TELEPHONE (OUTPATIENT)
Dept: OBSTETRICS AND GYNECOLOGY | Facility: CLINIC | Age: 79
End: 2019-02-22

## 2019-02-22 NOTE — TELEPHONE ENCOUNTER
----- Message from Siri Montgomery sent at 2/22/2019  2:23 PM CST -----  Contact: Self/ [phone]  [place] Only  New patient asked if she can be seen sooner. She has a bump on her vaginal area.    Please call.

## 2019-02-22 NOTE — TELEPHONE ENCOUNTER
I spoke with her and she has a bump on her private area that burns a bit.  She has not had anyone look at it.  She states it has been there for about a month.  I moved her appt up to Mar 6.  I offered to get her in with someone else sooner but she only wants Dr. Hahn.  She was happy with this. She had no further questions.

## 2019-04-25 ENCOUNTER — HOSPITAL ENCOUNTER (EMERGENCY)
Facility: HOSPITAL | Age: 79
Discharge: HOME | End: 2019-04-25
Attending: EMERGENCY MEDICINE
Payer: MEDICARE

## 2019-04-25 VITALS
HEIGHT: 62 IN | RESPIRATION RATE: 18 BRPM | DIASTOLIC BLOOD PRESSURE: 76 MMHG | SYSTOLIC BLOOD PRESSURE: 145 MMHG | TEMPERATURE: 98 F | WEIGHT: 149 LBS | OXYGEN SATURATION: 92 % | HEART RATE: 84 BPM | BODY MASS INDEX: 27.42 KG/M2

## 2019-04-25 DIAGNOSIS — D69.6 THROMBOCYTOPENIA: ICD-10-CM

## 2019-04-25 DIAGNOSIS — R51.9 ACUTE NONINTRACTABLE HEADACHE, UNSPECIFIED HEADACHE TYPE: ICD-10-CM

## 2019-04-25 DIAGNOSIS — R04.0 EPISTAXIS: Primary | ICD-10-CM

## 2019-04-25 LAB
ALBUMIN SERPL BCP-MCNC: 4.2 G/DL (ref 3.5–5.2)
ALP SERPL-CCNC: 119 U/L (ref 55–135)
ALT SERPL W/O P-5'-P-CCNC: 15 U/L (ref 10–44)
ANION GAP SERPL CALC-SCNC: 11 MMOL/L (ref 8–16)
AST SERPL-CCNC: 26 U/L (ref 10–40)
BASOPHILS # BLD AUTO: 0.01 K/UL (ref 0–0.2)
BASOPHILS NFR BLD: 0.2 % (ref 0–1.9)
BILIRUB SERPL-MCNC: 0.4 MG/DL (ref 0.1–1)
BUN SERPL-MCNC: 12 MG/DL (ref 8–23)
CALCIUM SERPL-MCNC: 10 MG/DL (ref 8.7–10.5)
CHLORIDE SERPL-SCNC: 100 MMOL/L (ref 95–110)
CO2 SERPL-SCNC: 27 MMOL/L (ref 23–29)
CREAT SERPL-MCNC: 1 MG/DL (ref 0.5–1.4)
DIFFERENTIAL METHOD: ABNORMAL
EOSINOPHIL # BLD AUTO: 0.1 K/UL (ref 0–0.5)
EOSINOPHIL NFR BLD: 2.5 % (ref 0–8)
ERYTHROCYTE [DISTWIDTH] IN BLOOD BY AUTOMATED COUNT: 14.1 % (ref 11.5–14.5)
EST. GFR  (AFRICAN AMERICAN): >60 ML/MIN/1.73 M^2
EST. GFR  (NON AFRICAN AMERICAN): 53 ML/MIN/1.73 M^2
GLUCOSE SERPL-MCNC: 96 MG/DL (ref 70–110)
HCT VFR BLD AUTO: 41.7 % (ref 37–48.5)
HGB BLD-MCNC: 13.2 G/DL (ref 12–16)
INR PPP: 1.2 (ref 0.8–1.2)
LYMPHOCYTES # BLD AUTO: 1 K/UL (ref 1–4.8)
LYMPHOCYTES NFR BLD: 22.9 % (ref 18–48)
MCH RBC QN AUTO: 28.6 PG (ref 27–31)
MCHC RBC AUTO-ENTMCNC: 31.7 G/DL (ref 32–36)
MCV RBC AUTO: 91 FL (ref 82–98)
MONOCYTES # BLD AUTO: 0.3 K/UL (ref 0.3–1)
MONOCYTES NFR BLD: 6.2 % (ref 4–15)
NEUTROPHILS # BLD AUTO: 3 K/UL (ref 1.8–7.7)
NEUTROPHILS NFR BLD: 68.2 % (ref 38–73)
PLATELET # BLD AUTO: 93 K/UL (ref 150–350)
PMV BLD AUTO: 9.6 FL (ref 9.2–12.9)
POTASSIUM SERPL-SCNC: 4.8 MMOL/L (ref 3.5–5.1)
PROT SERPL-MCNC: 7.4 G/DL (ref 6–8.4)
PROTHROMBIN TIME: 12 SEC (ref 9–12.5)
RBC # BLD AUTO: 4.61 M/UL (ref 4–5.4)
SODIUM SERPL-SCNC: 138 MMOL/L (ref 136–145)
WBC # BLD AUTO: 4.4 K/UL (ref 3.9–12.7)

## 2019-04-25 PROCEDURE — 85610 PROTHROMBIN TIME: CPT

## 2019-04-25 PROCEDURE — 99284 EMERGENCY DEPT VISIT MOD MDM: CPT | Mod: 25

## 2019-04-25 PROCEDURE — 85025 COMPLETE CBC W/AUTO DIFF WBC: CPT

## 2019-04-25 PROCEDURE — 25000003 PHARM REV CODE 250: Performed by: EMERGENCY MEDICINE

## 2019-04-25 PROCEDURE — 80053 COMPREHEN METABOLIC PANEL: CPT

## 2019-04-25 PROCEDURE — 30901 CONTROL OF NOSEBLEED: CPT

## 2019-04-25 RX ORDER — AMOXICILLIN AND CLAVULANATE POTASSIUM 875; 125 MG/1; MG/1
1 TABLET, FILM COATED ORAL 2 TIMES DAILY
Qty: 10 TABLET | Refills: 0 | Status: SHIPPED | OUTPATIENT
Start: 2019-04-25 | End: 2019-04-30

## 2019-04-25 RX ORDER — ACETAMINOPHEN 500 MG
1000 TABLET ORAL
Status: COMPLETED | OUTPATIENT
Start: 2019-04-25 | End: 2019-04-25

## 2019-04-25 RX ORDER — SILVER NITRATE 38.21; 12.74 MG/1; MG/1
1 STICK TOPICAL
Status: COMPLETED | OUTPATIENT
Start: 2019-04-25 | End: 2019-04-25

## 2019-04-25 RX ADMIN — SILVER NITRATE APPLICATORS 1 APPLICATOR: 25; 75 STICK TOPICAL at 10:04

## 2019-04-25 RX ADMIN — ACETAMINOPHEN 1000 MG: 500 TABLET ORAL at 10:04

## 2019-04-25 NOTE — ED PROVIDER NOTES
"Encounter Date: 4/25/2019    SCRIBE #1 NOTE: I, Raquel Ferreira, am scribing for, and in the presence of,  Dr. Lange. I have scribed the entire note.     I, Dr. Haylee Lange MD, personally performed the services described in this documentation. All medical record entries made by the scribe were at my direction and in my presence.  I have reviewed the chart and agree that the record reflects my personal performance and is accurate and complete. Haylee Lange MD.    History     Chief Complaint   Patient presents with    Epistaxis     pt has been having nose bleeds off/on x 1 mth, pt wears home o2 and " believes it is drying her out" no bleeding currently. pt states she is on blood thinners      HISTORY OF PRESENT ILLNESS: Brittany Haile who is a 78 y.o. presents to the emergency department today with complaint of intermittent nose bleeds x1 month. She believes its from her home O2 use. Usually it is a small amount and non concerning to her. Today the episode was worse with more bleeding. She states that bleeding stopped after putting tissue into nares and holding pressure. Patient wears home O2 and believes it may be "drying her out". She has also had intermittent right sided headache which is behind her eye. She has a h/o headaches, this is similar to her usual HAs. It usually responds to tylenol. She did not take tylenol today at home. No vision changes.  No pain at temple with biting down. Patient denies any fever or neck stiffness. No rash.    Headache history:    New-onset   no  Neurological findings   no  Sudden onset or worst and onset   no  Fever immunocompromise no  Chronic and progressive   no  Jaw claudication, muscle aches, temporal artery tenderness no  Symptoms present in others at home  no  Chronic headache medication withdrawal   no  Pregnancy or recent postpartum   no  Clotting disorder no  Trauma   no  Ocular pain   no  Recent cervical manipulation with facial pain   no  Dizziness   " no      ALLERGIES REVIEWED  MEDICATIONS REVIEWED  PMH/PSH/SOC/FH REVIEWED     The history is provided by the patient.    Nursing/Ancillary staff note reviewed.            The history is provided by the patient.     Review of patient's allergies indicates:  No Known Allergies  Past Medical History:   Diagnosis Date    Abdominal pain     CHF (congestive heart failure)     Chronic kidney disease, stage III (moderate) 2015    COPD (chronic obstructive pulmonary disease)     COPD (chronic obstructive pulmonary disease)     CVA (cerebral infarction)     Esophagitis     Gastritis     GERD (gastroesophageal reflux disease)     GI bleed     Hyperlipidemia     Hypertension     Mesenteric angina     PVD (peripheral vascular disease) with claudication 10/13/2015    Stroke     Subclavian artery stenosis, left     Thyroid disease      Past Surgical History:   Procedure Laterality Date    CAROTID STENT       SECTION      COLONOSCOPY N/A 3/2/2017    [facility]    COLONOSCOPY N/A 5/15/2014    [facility]    CORONARY ANGIOPLASTY  2006    ESOPHAGOGASTRODUODENOSCOPY (EGD) N/A 3/2/2017    [facility]    GALLBLADDER SURGERY      HYSTERECTOMY      INJECTION, STEROID, SPINE, LUMBAR, EPIDURAL- L2-3 ORAL SEDATION N/A 2018    Per[facility] St[facility] Jr[facility] PAIN MGT    INJECTION-STEROID-EPIDURAL-LUMBAR- L5-S1 N/A 5/3/2018    [facility] PAIN MGT    OOPHORECTOMY      SUPERIOR MESTENTERIC ARTERY STENT       No family history on file.  Social History     Tobacco Use    Smoking status: Current Every Day Smoker     Packs/day: 0.50     Years: 60.00     Pack years: 30.00     Types: Cigarettes    Smokeless tobacco: Current User   Substance Use Topics    Alcohol use: No    Drug use: No     Review of Systems   Constitutional: Negative for activity  change, appetite change, chills, diaphoresis and fever.   HENT: Positive for nosebleeds. Negative for congestion, drooling, facial swelling, mouth sores, rhinorrhea, sinus pressure, sinus pain, sore throat and trouble swallowing.    Eyes: Negative for photophobia, discharge, redness and visual disturbance.   Respiratory: Negative for cough, chest tightness, shortness of breath, wheezing and stridor.    Cardiovascular: Negative for chest pain, palpitations and leg swelling.   Gastrointestinal: Negative for abdominal distention, abdominal pain, blood in stool, constipation, diarrhea, nausea and vomiting.   Genitourinary: Negative for difficulty urinating, dysuria, flank pain, frequency, hematuria and urgency.   Musculoskeletal: Negative for arthralgias, back pain, myalgias and neck stiffness.   Skin: Negative for pallor, rash and wound.   Neurological: Positive for headaches. Negative for dizziness, syncope, weakness, light-headedness and numbness.   All other systems reviewed and are negative.      Physical Exam     Initial Vitals [04/25/19 0832]   BP Pulse Resp Temp SpO2   (!) 142/99 87 (!) 22 97.9 °F (36.6 °C) (!) 93 %      MAP       --         Physical Exam    Nursing note and vitals reviewed.  Constitutional: She appears well-developed and well-nourished.   HENT:   Head: Normocephalic and atraumatic.   Right Ear: External ear normal.   Left Ear: External ear normal.   Nose: Nose normal.   Mouth/Throat: Oropharynx is clear and moist.   No tenderness to palpation of the temple.   No pain at temple with biting down.   TMs normal.  On the right naris at the lateral aspect there is a section of avulsed skin which the bleeding is coming from.       Eyes: Conjunctivae and EOM are normal. Pupils are equal, round, and reactive to light. No scleral icterus.   Neck: Normal range of motion. Neck supple. No JVD present.   Cardiovascular: Normal rate, regular rhythm, normal heart sounds and intact distal pulses. Exam reveals  no gallop and no friction rub.    No murmur heard.  Pulmonary/Chest: Breath sounds normal. No stridor. No respiratory distress. She has no wheezes. She exhibits no tenderness.   Abdominal: Soft. Bowel sounds are normal. She exhibits no distension and no mass. There is no tenderness. There is no rebound and no guarding.   Musculoskeletal: Normal range of motion. She exhibits no edema or tenderness.   Back is nontender to palpation.    Neurological: She is alert and oriented to person, place, and time. She has normal strength. No cranial nerve deficit.   Skin: Skin is warm and dry. Capillary refill takes less than 2 seconds. No rash noted. No pallor.   Psychiatric: She has a normal mood and affect. Thought content normal.         ED Course   Epistaxis Mgmt  Date/Time: 4/25/2019 11:39 AM  Performed by: Haylee Lange MD  Authorized by: Haylee Lange MD   Consent Done: Yes  Consent: Verbal consent obtained.  Consent given by: patient  Patient understanding: patient states understanding of the procedure being performed  Patient consent: the patient's understanding of the procedure matches consent given  Procedure consent: procedure consent matches procedure scheduled  Relevant documents: relevant documents present and verified  Test results: test results available and properly labeled  Required items: required blood products, implants, devices, and special equipment available  Patient identity confirmed: name and verbally with patient  Treatment site: right anterior  Repair method: Rhino Rocket  Treatment complexity: simple  Patient tolerance: Patient tolerated the procedure well with no immediate complications        Labs Reviewed   CBC W/ AUTO DIFFERENTIAL - Abnormal; Notable for the following components:       Result Value    MCHC 31.7 (*)     Platelets 93 (*)     All other components within normal limits   COMPREHENSIVE METABOLIC PANEL - Abnormal; Notable for the following components:    eGFR if non   American 53 (*)     All other components within normal limits   PROTIME-INR   COMPREHENSIVE METABOLIC PANEL          Imaging Results          CT Head Without Contrast (Final result)  Result time 04/25/19 11:50:32    Final result by Xochitl Kapoor MD (04/25/19 11:50:32)                 Impression:      No acute process seen.      Electronically signed by: Xochitl Kapoor MD  Date:    04/25/2019  Time:    11:50             Narrative:    EXAMINATION:  CT HEAD WITHOUT CONTRAST    CLINICAL HISTORY:  Headache, acute, norm neuro exam;    TECHNIQUE:  Low dose axial images were obtained through the head.  Coronal and sagittal reformations were also performed. Contrast was not administered.    COMPARISON:  09/07/2017    FINDINGS:  The brain is normally formed.  The ventricular system is normal in size.  Small area of decreased attenuation noted in the periventricular white matter of the bilateral cerebral hemispheres consistent with small vessel chronic ischemic changes, unchanged from the prior study.  No new areas of hypoattenuation seen to strongly suggest an acute infarction.  No evidence of sizable remote infarction.  No intracranial mass or hemorrhage seen.  No subdural fluid collection.  The skull is intact.  The paranasal sinuses and mastoid air cells are well aerated.                                 Medical Decision Making:   History:   Old Medical Records: I decided to obtain old medical records.  Initial Assessment:   This is an 77 yo female who presents to the ED today with a nose bleed and headache. She has a h/o HA. Her pain is on the rigth side and behind the eye. Does not fit with Temporal arteritis as she has no pain to the temples, no pain with biting down. Will obtain CT head, treat her nose bleed and reassess.   Differential Diagnosis:   Migraine headache, cluster headache, tension headache eye strain, and infectious causes such as meningitis, pharyngitis and sinusitis, other dangerous causes  such as subarachnoid hemorrhage, tumor    Clinical Tests:   Lab Tests: Ordered and Reviewed  ED Management:  Brittany Haile presents to the ED today with c/o epistaxis and headache. The pt has no red flags on HPI or PE. The symptoms have improved here in the ED. There is no need for hospitalization at this time. The patients CT Head today is neg. Her headache is gone after tylenol.  They have no focal weakness, numbness, meningismus, other focal neurologic deficit, history of trauma, fevers, elevated blood pressure to suggest meningitis, subarachnoid hemorrhage, TIA, stroke, mass, or hypertensive urgency.  I will treat the patient supportively.  Her epistaxis seems to be coming from a small avulsion to the nares, a rhinorocket has been placed and the bleeding is controlled. I'll place her on ABX as ENT recommends and she will follow up with ENT.  She has low platelets which I discussed with her and she will follow up with her PCP for repeat labs and monitoring. Results, clinical impression and rx discussed with patient. Pt to call for follow up with PCP or referred physician in 2-3 days for her HA and thrombocytopenia and ENT for her epistaxis.  Pt is to return to the ED immediately for any new or worsening symptoms, including but not limited to: fever, chills, worsening pain, nausea/vomiting, weakness/fatigue, or for any other concerns.  Pt had time for ask questions to which they were addressed. Pt expressed understanding.  After taking into careful account the historical factors and physical exam findings of the patient's presentation today, in conjunction with the empirical and objective data obtained on ED workup, no acute emergent medical condition requiring admission has been identified. The patient appears to be low risk for an emergent medical condition and I feel it is safe and appropriate at this time for the patient to be discharged to follow-up as detailed in their discharge instructions for reevaluation  and possible continued outpatient workup and management. I have discussed the specifics of the workup with the patient and the patient has verbalized understanding of the details of the workup, the diagnosis, the treatment plan, and the need for outpatient follow-up.  Although the patient has no emergent etiology today this does not preclude the development of an emergent condition so in addition, I have advised the patient that they can return to the ED and/or activate EMS at any time with worsening of their symptoms, change of their symptoms, or with any other medical complaint.  The patient remained comfortable and stable during their visit in the ED.  Discharge and follow-up instructions discussed with the patient who expressed understanding and willingness to comply with my recommendations.     This medical record was prepared using voice dictation software. There may be phonetic errors.                             ED Course as of Apr 26 2051   Thu Apr 25, 2019   1030 Attempted to control the bleeding to the avulsed area with a silver nitrate application, unsuccessful. Placed an anterior rhino rocket without difficulty. Will continue to monitor.     [JA]   1138 The pts epistaxis is controlled. No further bleeding. Have discussed with her leaving the packing in for the next 2 days. Awaiting CT scan for her headache.     [JA]   1217 Headache has resolved with just tylenol. I have discussed with results of her workup with her. She understands the need to follow up with her PCP for her low platelets and headache. She'll follow up with ENT for her epistaxis or return to ED for removal if cannot get in with ENT. She is ready to go home. Her daughter was at bedside as well. All questions were answered and they are both comfortable going home.     [JA]      ED Course User Index  [JA] Haylee Lange MD     Clinical Impression:     1. Epistaxis    2. Acute nonintractable headache, unspecified headache type    3.  Thrombocytopenia, low platelets                                   Haylee Lange MD  04/26/19 2052

## 2019-04-25 NOTE — DISCHARGE INSTRUCTIONS
Additional instructions  Followup with your primary care physician in 2-3 days if you are not improving. Your nasal packing will need to be removed in 2 days. Follow up with ENT. Take all your medications as prescribed. Return to the emergency department if you have increasing pain, recurrent nasal bleeding, chest pain, difficulty breathing,  nonstop vomiting, or any other concerns. Be sure to drink plenty of fluids to stay hydrated. Get plenty of rest. Please refer to additional educational material for further instructions.

## 2019-04-25 NOTE — ED TRIAGE NOTES
Pt presents to ED today c/o right nare bleeding onset this am. Pt reports difficultly controlling bleeding. Pt wear O2 at home for history of COPD. Pt in no acute distress while not wearing o2.

## 2019-04-26 ENCOUNTER — TELEPHONE (OUTPATIENT)
Dept: OTOLARYNGOLOGY | Facility: CLINIC | Age: 79
End: 2019-04-26

## 2019-04-26 NOTE — TELEPHONE ENCOUNTER
----- Message from Anai Garcia sent at 4/26/2019  2:12 PM CDT -----  Contact: Pt daughter [place] can be reached at [phone]  [place] is calling to request an sooner appt for pt. Pt has an cut in nose consistant bleeding, referral in the system. Please be so kind to assist in this matter.      Thank you!

## 2019-05-06 RX ORDER — ATORVASTATIN CALCIUM 40 MG/1
TABLET, FILM COATED ORAL
Qty: 90 TABLET | Refills: 3 | Status: SHIPPED | OUTPATIENT
Start: 2019-05-06 | End: 2019-07-31 | Stop reason: SDUPTHER

## 2019-05-13 ENCOUNTER — OFFICE VISIT (OUTPATIENT)
Dept: OTOLARYNGOLOGY | Facility: CLINIC | Age: 79
End: 2019-05-13
Payer: MEDICARE

## 2019-05-13 VITALS
BODY MASS INDEX: 24.15 KG/M2 | DIASTOLIC BLOOD PRESSURE: 76 MMHG | SYSTOLIC BLOOD PRESSURE: 177 MMHG | WEIGHT: 132.06 LBS | HEART RATE: 100 BPM | TEMPERATURE: 97 F

## 2019-05-13 DIAGNOSIS — R04.0 EPISTAXIS: Primary | ICD-10-CM

## 2019-05-13 PROCEDURE — 99999 PR PBB SHADOW E&M-EST. PATIENT-LVL III: CPT | Mod: PBBFAC,,, | Performed by: OTOLARYNGOLOGY

## 2019-05-13 PROCEDURE — 3077F SYST BP >= 140 MM HG: CPT | Mod: CPTII,S$GLB,, | Performed by: OTOLARYNGOLOGY

## 2019-05-13 PROCEDURE — 1101F PR PT FALLS ASSESS DOC 0-1 FALLS W/OUT INJ PAST YR: ICD-10-PCS | Mod: CPTII,S$GLB,, | Performed by: OTOLARYNGOLOGY

## 2019-05-13 PROCEDURE — 3078F DIAST BP <80 MM HG: CPT | Mod: CPTII,S$GLB,, | Performed by: OTOLARYNGOLOGY

## 2019-05-13 PROCEDURE — 1101F PT FALLS ASSESS-DOCD LE1/YR: CPT | Mod: CPTII,S$GLB,, | Performed by: OTOLARYNGOLOGY

## 2019-05-13 PROCEDURE — 99202 PR OFFICE/OUTPT VISIT, NEW, LEVL II, 15-29 MIN: ICD-10-PCS | Mod: S$GLB,,, | Performed by: OTOLARYNGOLOGY

## 2019-05-13 PROCEDURE — 99999 PR PBB SHADOW E&M-EST. PATIENT-LVL III: ICD-10-PCS | Mod: PBBFAC,,, | Performed by: OTOLARYNGOLOGY

## 2019-05-13 PROCEDURE — 3078F PR MOST RECENT DIASTOLIC BLOOD PRESSURE < 80 MM HG: ICD-10-PCS | Mod: CPTII,S$GLB,, | Performed by: OTOLARYNGOLOGY

## 2019-05-13 PROCEDURE — 3077F PR MOST RECENT SYSTOLIC BLOOD PRESSURE >= 140 MM HG: ICD-10-PCS | Mod: CPTII,S$GLB,, | Performed by: OTOLARYNGOLOGY

## 2019-05-13 PROCEDURE — 99202 OFFICE O/P NEW SF 15 MIN: CPT | Mod: S$GLB,,, | Performed by: OTOLARYNGOLOGY

## 2019-05-13 NOTE — PROGRESS NOTES
Chief Complaint   Patient presents with    consult/ nose bleeds from right nostril       HPI   78 y.o. female presents for evaluation of right-sided epistaxis.  She reports the problem has been present intermittently for roughly 1 month.  She was seen in the emergency room and an attempt was made to cauterize her nose. She does use supplemental oxygen via nasal cannula and is concerned that the cannula is irritating her nose.  She is also on Plavix and Pletal.    Review of Systems   Constitutional: Negative for fatigue and unexpected weight change.   HENT: Per HPI.  Eyes: Negative for visual disturbance.   Respiratory: Negative for shortness of breath, hemoptysis   Cardiovascular: Negative for chest pain and palpitations.   Musculoskeletal: Negative for decreased ROM, back pain.   Skin: Negative for rash, sunburn, itching.   Neurological: Negative for dizziness and seizures.   Hematological: Negative for adenopathy. Does not bruise/bleed easily.   Endocrine: Negative for rapid weight loss/weight gain, heat/cold intolerance.     Past Medical History   Patient Active Problem List   Diagnosis    History of stroke without residual deficits    Carotid stenosis    Hypothyroid    Hypertension    Anxiety    Incidental pulmonary nodule, > 3mm and < 8mm    COPD (chronic obstructive pulmonary disease)    HLD (hyperlipidemia)    Chronic kidney disease, stage III (moderate)    Leg pain, bilateral    PVD (peripheral vascular disease) with claudication    Glucose intolerance (impaired glucose tolerance)    Subclavian artery stenosis, left    CVD (cerebrovascular disease)    Abdominal pain    Gastritis    Angina mesenteric    Tobacco abuse    Coronary artery disease of native artery of native heart with stable angina pectoris    Mesenteric angina    GIB (gastrointestinal bleeding)    Stroke    Aortic atherosclerosis    Chronic diastolic heart failure    Chronic bilateral low back pain with bilateral  sciatica    Lumbar spondylosis    Spinal stenosis of lumbar region with neurogenic claudication    DDD (degenerative disc disease), lumbar    Lumbar radiculopathy    Moderate recurrent major depression    Chronic obstructive pulmonary disease    Gastroesophageal reflux disease           Past Surgical History   Past Surgical History:   Procedure Laterality Date    CAROTID STENT       SECTION      COLONOSCOPY N/A 3/2/2017    [facility]    COLONOSCOPY N/A 5/15/2014    [facility]    CORONARY ANGIOPLASTY  2006    ESOPHAGOGASTRODUODENOSCOPY (EGD) N/A 3/2/2017    [facility]    GALLBLADDER SURGERY      HYSTERECTOMY      INJECTION, STEROID, SPINE, LUMBAR, EPIDURAL- L2-3 ORAL SEDATION N/A 2018    [facility] Jr[facility] PAIN MGT    INJECTION-STEROID-EPIDURAL-LUMBAR- L5-S1 N/A 5/3/2018    [facility] PAIN MGT    OOPHORECTOMY      SUPERIOR MESTENTERIC ARTERY STENT           Family History   History reviewed. No pertinent family history.        Social History   .  Social History     Socioeconomic History    Marital status:      Spouse name: Not on file    Number of children: 4    Years of education: Not on file    Highest education level: Not on file   Occupational History    Not on file   Social Needs    Financial resource strain: Not on file    Food insecurity:     Worry: Not on file     Inability: Not on file    Transportation needs:     Medical: Not on file     Non-medical: Not on file   Tobacco Use    Smoking status: Current Every Day Smoker     Packs/day: 0.50     Years: 60.00     Pack years: 30.00     Types: Cigarettes    Smokeless tobacco: Current User   Substance and Sexual Activity    Alcohol use: No    Drug use: No    Sexual activity: Not on file   Lifestyle    Physical activity:     Days per week: Not  on file     Minutes per session: Not on file    Stress: Not on file   Relationships    Social connections:     Talks on phone: Not on file     Gets together: Not on file     Attends [employer] service: Not on file     Active member of club or organization: Not on file     Attends meetings of clubs or organizations: Not on file     Relationship status: Not on file   Other Topics Concern    Not on file   Social History Narrative    Lives with son, 1 story house         Allergies   Review of patient's allergies indicates:  No Known Allergies        Physical Exam     Vitals:    05/13/19 0958   BP: (!) 177/76   Pulse: 100   Temp: 97.3 °F (36.3 °C)         Body mass index is 24.15 kg/m².      General: AOx3, NAD   Respiratory:  Symmetric chest rise, normal effort  Nose:  Dry, crusted skin along the right nasal vestibule.  No focal bleeding points.  No gross nasal septal deviation. Inferior Turbinates WNL bilaterally. No septal perforation. No masses/lesions.   Oral Cavity:  Oral Tongue mobile, no lesions noted. Hard Palate WNL. No buccal or FOM lesions.  Oropharynx:  No masses/lesions of the posterior pharyngeal wall. Tonsillar fossa without lesions. Soft palate without masses. Midline uvula.   Neck: No scars.  No cervical lymphadenopathy, thyromegaly or thyroid nodules.  Normal range of motion.    Face: House Brackmann I bilaterally.     Assessment/Plan  Problem List Items Addressed This Visit        ENT    Epistaxis - Primary     In the setting of nasal cannula oxygen and antiplatelet agents.  Her exam reveals irritation of the skin of the right nasal vestibule likely secondary to the nasal cannula and the dry oxygen emanating from it.  I urged her to keep her nose moist by applying Aquaphor 3 times a day and also to use nasal saline as she sees fit.  I asked her daughter to arrange for a humidifier for her oxygen.  They will return to see me on an as-needed basis.

## 2019-05-13 NOTE — ASSESSMENT & PLAN NOTE
In the setting of nasal cannula oxygen and antiplatelet agents.  Her exam reveals irritation of the skin of the right nasal vestibule likely secondary to the nasal cannula and the dry oxygen emanating from it.  I urged her to keep her nose moist by applying Aquaphor 3 times a day and also to use nasal saline as she sees fit.  I asked her daughter to arrange for a humidifier for her oxygen.  They will return to see me on an as-needed basis.

## 2019-05-13 NOTE — LETTER
May 13, 2019      Haylee Lange MD  [address] [place] [address]           [facility]  [address]  [facility] [address]  Phone: [phone]  Fax: [phone]          Patient: Brittany Haile   MR Number: [id number]   YOB: 1940   Date of Visit: 5/13/2019       Dear Dr. Haylee Lange:    Thank you for referring Brittany Haile to me for evaluation. Attached you will find relevant portions of my assessment and plan of care.    If you have questions, please do not hesitate to call me. I look forward to following Brittany Haile along with you.    Sincerely,    William Whatley MD    Enclosure  CC:  No Recipients    If you would like to receive this communication electronically, please contact [email or web address] or [phone] to request more information on [employer] access.    For providers and/or their staff who would like to refer a patient to [facility], please contact us through our one-stop-shop provider referral line, [facility], at [phone].    If you feel you have received this communication in error or would no longer like to receive these types of communications, please e-mail [email or web address]

## 2019-06-13 DIAGNOSIS — E03.4 HYPOTHYROIDISM DUE TO ACQUIRED ATROPHY OF THYROID: ICD-10-CM

## 2019-06-13 RX ORDER — LEVOTHYROXINE SODIUM 88 UG/1
TABLET ORAL
Qty: 90 TABLET | Refills: 0 | Status: ON HOLD | OUTPATIENT
Start: 2019-06-13 | End: 2019-09-10 | Stop reason: SDUPTHER

## 2019-07-04 ENCOUNTER — HOSPITAL ENCOUNTER (EMERGENCY)
Facility: HOSPITAL | Age: 79
Discharge: HOME | End: 2019-07-04
Attending: EMERGENCY MEDICINE
Payer: MEDICARE

## 2019-07-04 VITALS
RESPIRATION RATE: 18 BRPM | WEIGHT: 155 LBS | HEIGHT: 63 IN | OXYGEN SATURATION: 100 % | DIASTOLIC BLOOD PRESSURE: 79 MMHG | BODY MASS INDEX: 27.46 KG/M2 | TEMPERATURE: 98 F | SYSTOLIC BLOOD PRESSURE: 180 MMHG | HEART RATE: 94 BPM

## 2019-07-04 DIAGNOSIS — J43.9 PULMONARY EMPHYSEMA, UNSPECIFIED EMPHYSEMA TYPE: ICD-10-CM
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
## 2019-07-04 DIAGNOSIS — L02.91 ABSCESS: Primary | ICD-10-CM

## 2019-07-04 DIAGNOSIS — R06.02 SHORTNESS OF BREATH: ICD-10-CM

## 2019-07-04 DIAGNOSIS — I10 HYPERTENSION, UNSPECIFIED TYPE: ICD-10-CM

## 2019-07-04 DIAGNOSIS — M79.673 PAIN OF FOOT, UNSPECIFIED LATERALITY: ICD-10-CM

## 2019-07-04 DIAGNOSIS — R52 PAIN: ICD-10-CM

## 2019-07-04 PROCEDURE — 93005 ELECTROCARDIOGRAM TRACING: CPT | Mod: 59

## 2019-07-04 PROCEDURE — 93010 EKG 12-LEAD: ICD-10-PCS | Mod: ,,, | Performed by: INTERNAL MEDICINE

## 2019-07-04 PROCEDURE — 10160 PNXR ASPIR ABSC HMTMA BULLA: CPT

## 2019-07-04 PROCEDURE — 93010 ELECTROCARDIOGRAM REPORT: CPT | Mod: ,,, | Performed by: INTERNAL MEDICINE

## 2019-07-04 PROCEDURE — 99284 EMERGENCY DEPT VISIT MOD MDM: CPT | Mod: 25

## 2019-07-04 RX ORDER — ALBUTEROL SULFATE 90 UG/1
1-2 AEROSOL, METERED RESPIRATORY (INHALATION) EVERY 6 HOURS PRN
Qty: 1 INHALER | Refills: 0 | Status: SHIPPED | OUTPATIENT
Start: 2019-07-04 | End: 2019-09-10 | Stop reason: SDUPTHER

## 2019-07-04 RX ORDER — AMOXICILLIN AND CLAVULANATE POTASSIUM 875; 125 MG/1; MG/1
1 TABLET, FILM COATED ORAL 2 TIMES DAILY
Qty: 14 TABLET | Refills: 0 | Status: SHIPPED | OUTPATIENT
Start: 2019-07-04 | End: 2019-07-31

## 2019-07-05 NOTE — ED NOTES
APPEARANCE: Alert, oriented, no acute distress noted  CARDIAC: Normal sinus rhythm noted on CR monitor. Pt c/o midsternal CP x2 days, 9/10 pain. HTN noted.  PERIPHERAL VASCULAR: peripheral pulses +2 and present x4 extremities. Cap refill >3 seconds. +1 non-pitting edema noted to BLE  RESPIRATORY:Normal rate and effort, breath sounds clear diminished throughout, rhonchi noted to lower lobes. Respirations are equal and unlabored no obvious signs of distress.  GASTRO: soft, bowel sounds normal, no tenderness, no abdominal distention.  G/U: no problems urinating reported  MUSC: Full ROM x4 extremities. No obvious deformity.  SKIN: Skin is warm and dry, normal skin turgor, mucous membranes moist. Redness and swelling noted to left elbow. Scab x2 noted to left elbow in center of redness and swelling  NEURO:  GCS 15, motor strength WNL x4 extremities.   MENTAL STATUS: AAOx3, calm, cooperative, behavior appropriate to situation  EYE: PERRLA. Bilateral pupil equal, reaction brisk, normal size.   ENT: trachea midline, no problems identified

## 2019-07-05 NOTE — ED NOTES
Dr. Lefort aware of elevated BP. No new orders at this time. Pt reports no CP at this time. Pt did not take night time BP medication, but is unsure which BP med she takes at night.

## 2019-07-05 NOTE — ED NOTES
Pt instructed to take all home medications, including BP medications when she gets home. Pt and family verbalized understanding. Pt BP remains elevated, pt asymptomatic at this time. Dr. Lefort aware of BP, okay to d/c home at this time per MD.

## 2019-07-05 NOTE — ED TRIAGE NOTES
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

## 2019-07-05 NOTE — ED PROVIDER NOTES
Encounter Date: 2019    SCRIBE #1 NOTE: I, Mayelin Chavez, am scribing for, and in the presence of,  Dr. Lefort. I have scribed the entire note.       History     Chief Complaint   Patient presents with    Shortness of Breath     Pt. c/o shortness of breath with a cough for a week. Pt. wears 2 L NC at home. Pt. also c/o entire left side pain with swelling and redness that began to the left elbow today.      A 79 year-old female presents to the ED for abscess to right upper arm. Several weeks ago, patient was scratched by a cat. She sustained two superficial abrasions; no bleeding noted. Patient presents complaining of pain, and associated redness and swelling to the site. PMHx remarkable for COPD on home oxygen therapy.  SpO2 is 100% on 2L. Patient is also complaining of SOB and cough for one week. Recently exhausted albbuterol inhaler. She denies fever, chills, nausea, vomiting, chest pain, difficulty with ADL's, or any other concerns. Additionally, patient complaining of L foot pain. No recent injury/trauma or significant gait disturbance. Patient is a current everyday smoker; no plans for cessation.     The history is provided by the patient.     Review of patient's allergies indicates:  No Known Allergies  Past Medical History:   Diagnosis Date    Abdominal pain     CHF (congestive heart failure)     Chronic kidney disease, stage III (moderate) 2015    COPD (chronic obstructive pulmonary disease)     COPD (chronic obstructive pulmonary disease)     CVA (cerebral infarction)     Esophagitis     Gastritis     GERD (gastroesophageal reflux disease)     GI bleed     Hyperlipidemia     Hypertension     Mesenteric angina     PVD (peripheral vascular disease) with claudication 10/13/2015    Stroke     Subclavian artery stenosis, left     Thyroid disease      Past Surgical History:   Procedure Laterality Date    CAROTID STENT       SECTION      COLONOSCOPY N/A 3/2/2017    Performed  by [facility]    COLONOSCOPY N/A 5/15/2014    Performed by [facility]    CORONARY ANGIOPLASTY  2006    ESOPHAGOGASTRODUODENOSCOPY (EGD) N/A 3/2/2017    Performed by [facility]    GALLBLADDER SURGERY      HYSTERECTOMY      INJECTION, STEROID, SPINE, LUMBAR, EPIDURAL- L2-3 ORAL SEDATION N/A 8/16/2018    Performed by Stiven Ibarra Jr., MD at [facility]    INJECTION-STEROID-EPIDURAL-LUMBAR- L5-S1 N/A 5/3/2018    Performed by Stiven Ibarra Jr., [facility] at [facility]    OOPHORECTOMY      SUPERIOR MESTENTERIC ARTERY STENT       History reviewed. No pertinent family history.  Social History     Tobacco Use    Smoking status: Current Every Day Smoker     Packs/day: 0.50     Years: 60.00     Pack years: 30.00     Types: Cigarettes    Smokeless tobacco: Current User   Substance Use Topics    Alcohol use: No    Drug use: No     Review of Systems   Constitutional: Negative for chills and fever.   HENT: Negative for congestion, rhinorrhea and sore throat.    Eyes: Negative for redness and visual disturbance.   Respiratory: Positive for cough and shortness of breath. Negative for wheezing.    Cardiovascular: Negative for chest pain and palpitations.   Gastrointestinal: Negative for abdominal pain, diarrhea, nausea and vomiting.   Genitourinary: Negative for dysuria and hematuria.   Musculoskeletal: Positive for arthralgias. Negative for back pain, myalgias and neck pain.   Skin: Positive for wound. Negative for rash.   Neurological: Negative for dizziness, weakness and light-headedness.   Psychiatric/Behavioral: Negative for confusion.       Physical Exam     Initial Vitals [07/04/19 1926]   BP Pulse Resp Temp SpO2   (!) 183/77 79 20 98.2 °F (36.8 °C) 95 %      MAP       --         Physical Exam    Nursing note and vitals reviewed.  Constitutional: She appears well-developed and well-nourished. No distress.   HENT:   Head: Normocephalic and  atraumatic.   Mouth/Throat: Oropharynx is clear and moist.   Eyes: Conjunctivae and EOM are normal. Pupils are equal, round, and reactive to light.   Cardiovascular: Normal rate, regular rhythm and normal heart sounds.   Pulmonary/Chest: No respiratory distress. She has wheezes.   Faint expiratory wheezing   Abdominal: Soft. Bowel sounds are normal. There is no tenderness. There is no rebound and no guarding.   Musculoskeletal: Normal range of motion. She exhibits tenderness. She exhibits no edema.   Left elbow with full ROM   Tenderness along flexor tendon sheath of the left medial foot.   Neurological: She is alert and oriented to person, place, and time. She has normal strength.   Skin: Skin is warm and dry. Capillary refill takes less than 2 seconds. Abscess noted. No rash noted.   Small abscess on left proximal forearm with surrounding cellulitis              ED Course   Abscess Aspiration  Date/Time: 7/4/2019 10:06 PM  Performed by: Guy J. Lefort, MD  Authorized by: Guy J. Lefort, MD     A time out verifies correct patient, procedure, equipment, support staff and site/side marked as required:   Procedure Details:     Location of Abscess #1:  Left proximal forearm  Material send for:  Aerobic Culture  Post-procedure:     Patient tolerance:  Patient tolerated the procedure well with no immediate complications      Labs Reviewed - No data to display  EKG Readings: (Independently Interpreted)   Sinus rhythm with frequent PVC's. Rate 95. Nonspecific T wave abnormality.        X-Rays:   Independently Interpreted Readings:   Other Readings:  Reviewed by myself, read by radiology.      Imaging Results          X-Ray Chest AP Portable (Final result)  Result time 07/04/19 20:28:59    Final result by Surinder Herrera MD (07/04/19 20:28:59)                 Impression:      Findings of emphysema unchanged.  No failure or pneumonia or nodule.      Electronically signed by: Surinder Herrera  Date:    07/04/2019  Time:    20:28              Narrative:    EXAMINATION:  XR CHEST AP PORTABLE    CLINICAL HISTORY:  Shortness of breath    TECHNIQUE:  Single frontal view of the chest was performed.    COMPARISON:  11/01/2018 chest film.  CT scan 01/03/2018.    FINDINGS:  Single AP portable view at 20:23.    The heart is mildly enlarged unchanged.  The hilar shadows and trachea and mediastinal contours appear normal.  No pneumothorax or pleural effusion or interstitial edema consolidation.  No nodule or cavitary lesion.  There is scattered fine reticular opacities probably senescent change similar with prior.  Lungs are somewhat hyperinflated.  Findings suggest underlying emphysema better seen with CT 01/03/2018.  No abdominal free air or fracture.  There are overlying leads.                               Medical Decision Making:   ED Management:  Patient evaluated for multiple complaints. Was scratched by a cat and having area of pain, superficial abscess drained in department and started on empiric antibiotics.  Discussed potential for worsening necessitating ED return, expected course of illness related to this infection.  Complaint of foot pain evaluated with no trauma, suspect arthritic change versus tendinitis.  Patient with chronic shortness of breath which appears to be stable, workup unremarkable for acute process, states out of albuterol will refill this prescription.  Discussed indications for ED return and continued follow-up.                      Clinical Impression:       ICD-10-CM ICD-9-CM   1. Abscess L02.91 682.9   2. Shortness of breath R06.02 786.05   3. Pain R52 780.96   4. Scratched by cat W55.03XA 919.0     E906.8   5. Pulmonary emphysema, unspecified emphysema type J43.9 492.8   6. Pain of foot, unspecified laterality M79.673 729.5   7. Hypertension, unspecified type I10 401.9           Scribe Attestation I, Dr. Guy Lefort, personally performed the services described in this documentation. All medical record entries made by  the scribe were at my direction and in my presence. I have reviewed the chart and agree that the record reflects my personal performance and is accurate and complete. Guy Lefort, MD.  6:30 AM 07/05/2019                   Guy J. Lefort, MD  07/05/19 0631

## 2019-07-17 DIAGNOSIS — M94.9 DISORDER OF BONE AND CARTILAGE: Primary | ICD-10-CM

## 2019-07-17 DIAGNOSIS — M89.9 DISORDER OF BONE AND CARTILAGE: Primary | ICD-10-CM

## 2019-07-29 ENCOUNTER — LAB VISIT (OUTPATIENT)
Dept: LAB | Facility: HOSPITAL | Age: 79
End: 2019-07-29
Attending: FAMILY MEDICINE
Payer: MEDICARE

## 2019-07-29 DIAGNOSIS — N18.30 CHRONIC KIDNEY DISEASE, STAGE III (MODERATE): ICD-10-CM

## 2019-07-29 DIAGNOSIS — I63.9 CEREBROVASCULAR ACCIDENT (CVA), UNSPECIFIED MECHANISM: ICD-10-CM

## 2019-07-29 DIAGNOSIS — E78.2 MIXED HYPERLIPIDEMIA: ICD-10-CM

## 2019-07-29 DIAGNOSIS — E03.9 HYPOTHYROIDISM, UNSPECIFIED TYPE: ICD-10-CM

## 2019-07-29 DIAGNOSIS — I25.118 CORONARY ARTERY DISEASE OF NATIVE ARTERY OF NATIVE HEART WITH STABLE ANGINA PECTORIS: ICD-10-CM

## 2019-07-29 DIAGNOSIS — R73.02 GLUCOSE INTOLERANCE (IMPAIRED GLUCOSE TOLERANCE): ICD-10-CM

## 2019-07-29 DIAGNOSIS — I10 ESSENTIAL HYPERTENSION: ICD-10-CM

## 2019-07-29 DIAGNOSIS — I70.0 AORTIC ATHEROSCLEROSIS: ICD-10-CM

## 2019-07-29 LAB
ALBUMIN SERPL BCP-MCNC: 4 G/DL (ref 3.5–5.2)
ALP SERPL-CCNC: 114 U/L (ref 55–135)
ALT SERPL W/O P-5'-P-CCNC: 7 U/L (ref 10–44)
ANION GAP SERPL CALC-SCNC: 8 MMOL/L (ref 8–16)
AST SERPL-CCNC: 15 U/L (ref 10–40)
BASOPHILS # BLD AUTO: 0.02 K/UL (ref 0–0.2)
BASOPHILS NFR BLD: 0.5 % (ref 0–1.9)
BILIRUB SERPL-MCNC: 0.3 MG/DL (ref 0.1–1)
BUN SERPL-MCNC: 13 MG/DL (ref 8–23)
CALCIUM SERPL-MCNC: 9.6 MG/DL (ref 8.7–10.5)
CHLORIDE SERPL-SCNC: 98 MMOL/L (ref 95–110)
CHOLEST SERPL-MCNC: 154 MG/DL (ref 120–199)
CHOLEST/HDLC SERPL: 2.5 {RATIO} (ref 2–5)
CO2 SERPL-SCNC: 31 MMOL/L (ref 23–29)
CREAT SERPL-MCNC: 1 MG/DL (ref 0.5–1.4)
DIFFERENTIAL METHOD: ABNORMAL
EOSINOPHIL # BLD AUTO: 0.1 K/UL (ref 0–0.5)
EOSINOPHIL NFR BLD: 3.1 % (ref 0–8)
ERYTHROCYTE [DISTWIDTH] IN BLOOD BY AUTOMATED COUNT: 16.1 % (ref 11.5–14.5)
EST. GFR  (AFRICAN AMERICAN): >60 ML/MIN/1.73 M^2
EST. GFR  (NON AFRICAN AMERICAN): 53 ML/MIN/1.73 M^2
ESTIMATED AVG GLUCOSE: 114 MG/DL (ref 68–131)
GLUCOSE SERPL-MCNC: 106 MG/DL (ref 70–110)
HBA1C MFR BLD HPLC: 5.6 % (ref 4–5.6)
HCT VFR BLD AUTO: 28.7 % (ref 37–48.5)
HDLC SERPL-MCNC: 61 MG/DL (ref 40–75)
HDLC SERPL: 39.6 % (ref 20–50)
HGB BLD-MCNC: 8.2 G/DL (ref 12–16)
LDLC SERPL CALC-MCNC: 78.4 MG/DL (ref 63–159)
LYMPHOCYTES # BLD AUTO: 1 K/UL (ref 1–4.8)
LYMPHOCYTES NFR BLD: 24.5 % (ref 18–48)
MCH RBC QN AUTO: 21.5 PG (ref 27–31)
MCHC RBC AUTO-ENTMCNC: 28.6 G/DL (ref 32–36)
MCV RBC AUTO: 75 FL (ref 82–98)
MONOCYTES # BLD AUTO: 0.4 K/UL (ref 0.3–1)
MONOCYTES NFR BLD: 10.6 % (ref 4–15)
NEUTROPHILS # BLD AUTO: 2.6 K/UL (ref 1.8–7.7)
NEUTROPHILS NFR BLD: 61.3 % (ref 38–73)
NONHDLC SERPL-MCNC: 93 MG/DL
PLATELET # BLD AUTO: 393 K/UL (ref 150–350)
PMV BLD AUTO: 8.8 FL (ref 9.2–12.9)
POTASSIUM SERPL-SCNC: 3.8 MMOL/L (ref 3.5–5.1)
PROT SERPL-MCNC: 6.8 G/DL (ref 6–8.4)
RBC # BLD AUTO: 3.81 M/UL (ref 4–5.4)
SODIUM SERPL-SCNC: 137 MMOL/L (ref 136–145)
T4 FREE SERPL-MCNC: 1.17 NG/DL (ref 0.71–1.51)
TRIGL SERPL-MCNC: 73 MG/DL (ref 30–150)
TSH SERPL DL<=0.005 MIU/L-ACNC: 0.35 UIU/ML (ref 0.4–4)
WBC # BLD AUTO: 4.17 K/UL (ref 3.9–12.7)

## 2019-07-29 PROCEDURE — 85025 COMPLETE CBC W/AUTO DIFF WBC: CPT

## 2019-07-29 PROCEDURE — 80061 LIPID PANEL: CPT

## 2019-07-29 PROCEDURE — 36415 COLL VENOUS BLD VENIPUNCTURE: CPT

## 2019-07-29 PROCEDURE — 84443 ASSAY THYROID STIM HORMONE: CPT

## 2019-07-29 PROCEDURE — 80053 COMPREHEN METABOLIC PANEL: CPT

## 2019-07-29 PROCEDURE — 83036 HEMOGLOBIN GLYCOSYLATED A1C: CPT

## 2019-07-29 PROCEDURE — 84439 ASSAY OF FREE THYROXINE: CPT

## 2019-07-31 ENCOUNTER — OFFICE VISIT (OUTPATIENT)
Dept: FAMILY MEDICINE | Facility: CLINIC | Age: 79
End: 2019-07-31
Attending: FAMILY MEDICINE
Payer: MEDICARE

## 2019-07-31 ENCOUNTER — TELEPHONE (OUTPATIENT)
Dept: FAMILY MEDICINE | Facility: CLINIC | Age: 79
End: 2019-07-31

## 2019-07-31 ENCOUNTER — LAB VISIT (OUTPATIENT)
Dept: LAB | Facility: HOSPITAL | Age: 79
End: 2019-07-31
Attending: FAMILY MEDICINE
Payer: MEDICARE

## 2019-07-31 VITALS
BODY MASS INDEX: 23.79 KG/M2 | HEART RATE: 102 BPM | DIASTOLIC BLOOD PRESSURE: 60 MMHG | HEIGHT: 63 IN | OXYGEN SATURATION: 95 % | SYSTOLIC BLOOD PRESSURE: 139 MMHG | WEIGHT: 134.25 LBS

## 2019-07-31 DIAGNOSIS — D50.9 IRON DEFICIENCY ANEMIA, UNSPECIFIED IRON DEFICIENCY ANEMIA TYPE: ICD-10-CM

## 2019-07-31 DIAGNOSIS — Z00.00 ROUTINE GENERAL MEDICAL EXAMINATION AT A HEALTH CARE FACILITY: ICD-10-CM

## 2019-07-31 DIAGNOSIS — Z00.00 ROUTINE GENERAL MEDICAL EXAMINATION AT A HEALTH CARE FACILITY: Primary | ICD-10-CM

## 2019-07-31 DIAGNOSIS — E78.2 MIXED HYPERLIPIDEMIA: ICD-10-CM

## 2019-07-31 DIAGNOSIS — J44.9 CHRONIC OBSTRUCTIVE PULMONARY DISEASE, UNSPECIFIED COPD TYPE: ICD-10-CM

## 2019-07-31 DIAGNOSIS — I63.9 CEREBROVASCULAR ACCIDENT (CVA), UNSPECIFIED MECHANISM: ICD-10-CM

## 2019-07-31 DIAGNOSIS — R73.02 GLUCOSE INTOLERANCE (IMPAIRED GLUCOSE TOLERANCE): ICD-10-CM

## 2019-07-31 DIAGNOSIS — J41.0 SIMPLE CHRONIC BRONCHITIS: ICD-10-CM

## 2019-07-31 DIAGNOSIS — R10.84 GENERALIZED ABDOMINAL PAIN: ICD-10-CM

## 2019-07-31 DIAGNOSIS — I73.9 PVD (PERIPHERAL VASCULAR DISEASE) WITH CLAUDICATION: ICD-10-CM

## 2019-07-31 DIAGNOSIS — I10 ESSENTIAL HYPERTENSION: ICD-10-CM

## 2019-07-31 DIAGNOSIS — R63.4 UNINTENTIONAL WEIGHT LOSS: ICD-10-CM

## 2019-07-31 DIAGNOSIS — F41.9 ANXIETY: ICD-10-CM

## 2019-07-31 DIAGNOSIS — F33.1 MODERATE RECURRENT MAJOR DEPRESSION: ICD-10-CM

## 2019-07-31 DIAGNOSIS — I25.118 CORONARY ARTERY DISEASE OF NATIVE ARTERY OF NATIVE HEART WITH STABLE ANGINA PECTORIS: ICD-10-CM

## 2019-07-31 LAB
ALBUMIN SERPL BCP-MCNC: 3.9 G/DL (ref 3.5–5.2)
ALP SERPL-CCNC: 122 U/L (ref 55–135)
ALT SERPL W/O P-5'-P-CCNC: 8 U/L (ref 10–44)
ANION GAP SERPL CALC-SCNC: 8 MMOL/L (ref 8–16)
AST SERPL-CCNC: 15 U/L (ref 10–40)
BASOPHILS # BLD AUTO: 0.02 K/UL (ref 0–0.2)
BASOPHILS NFR BLD: 0.4 % (ref 0–1.9)
BILIRUB SERPL-MCNC: 0.2 MG/DL (ref 0.1–1)
BUN SERPL-MCNC: 15 MG/DL (ref 8–23)
CALCIUM SERPL-MCNC: 9.1 MG/DL (ref 8.7–10.5)
CHLORIDE SERPL-SCNC: 97 MMOL/L (ref 95–110)
CO2 SERPL-SCNC: 31 MMOL/L (ref 23–29)
CREAT SERPL-MCNC: 1 MG/DL (ref 0.5–1.4)
DIFFERENTIAL METHOD: ABNORMAL
EOSINOPHIL # BLD AUTO: 0.2 K/UL (ref 0–0.5)
EOSINOPHIL NFR BLD: 3 % (ref 0–8)
ERYTHROCYTE [DISTWIDTH] IN BLOOD BY AUTOMATED COUNT: 16.3 % (ref 11.5–14.5)
EST. GFR  (AFRICAN AMERICAN): >60 ML/MIN/1.73 M^2
EST. GFR  (NON AFRICAN AMERICAN): 53 ML/MIN/1.73 M^2
FERRITIN SERPL-MCNC: 5 NG/ML (ref 20–300)
GLUCOSE SERPL-MCNC: 109 MG/DL (ref 70–110)
HCT VFR BLD AUTO: 26.8 % (ref 37–48.5)
HGB BLD-MCNC: 7.8 G/DL (ref 12–16)
IRON SERPL-MCNC: <10 UG/DL (ref 30–160)
LYMPHOCYTES # BLD AUTO: 1.2 K/UL (ref 1–4.8)
LYMPHOCYTES NFR BLD: 24.4 % (ref 18–48)
MCH RBC QN AUTO: 22 PG (ref 27–31)
MCHC RBC AUTO-ENTMCNC: 29.1 G/DL (ref 32–36)
MCV RBC AUTO: 76 FL (ref 82–98)
MONOCYTES # BLD AUTO: 0.6 K/UL (ref 0.3–1)
MONOCYTES NFR BLD: 11.2 % (ref 4–15)
NEUTROPHILS # BLD AUTO: 3 K/UL (ref 1.8–7.7)
NEUTROPHILS NFR BLD: 61 % (ref 38–73)
PLATELET # BLD AUTO: 346 K/UL (ref 150–350)
PMV BLD AUTO: 8.5 FL (ref 9.2–12.9)
POTASSIUM SERPL-SCNC: 3.8 MMOL/L (ref 3.5–5.1)
PROT SERPL-MCNC: 6.6 G/DL (ref 6–8.4)
RBC # BLD AUTO: 3.55 M/UL (ref 4–5.4)
SATURATED IRON: ABNORMAL % (ref 20–50)
SODIUM SERPL-SCNC: 136 MMOL/L (ref 136–145)
TOTAL IRON BINDING CAPACITY: 546 UG/DL (ref 250–450)
TRANSFERRIN SERPL-MCNC: 369 MG/DL (ref 200–375)
WBC # BLD AUTO: 5 K/UL (ref 3.9–12.7)

## 2019-07-31 PROCEDURE — 99999 PR PBB SHADOW E&M-EST. PATIENT-LVL III: ICD-10-PCS | Mod: PBBFAC,,, | Performed by: FAMILY MEDICINE

## 2019-07-31 PROCEDURE — 82728 ASSAY OF FERRITIN: CPT

## 2019-07-31 PROCEDURE — 99999 PR PBB SHADOW E&M-EST. PATIENT-LVL III: CPT | Mod: PBBFAC,,, | Performed by: FAMILY MEDICINE

## 2019-07-31 PROCEDURE — 80053 COMPREHEN METABOLIC PANEL: CPT

## 2019-07-31 PROCEDURE — 3078F PR MOST RECENT DIASTOLIC BLOOD PRESSURE < 80 MM HG: ICD-10-PCS | Mod: CPTII,S$GLB,, | Performed by: FAMILY MEDICINE

## 2019-07-31 PROCEDURE — 83540 ASSAY OF IRON: CPT

## 2019-07-31 PROCEDURE — 99397 PER PM REEVAL EST PAT 65+ YR: CPT | Mod: S$GLB,,, | Performed by: FAMILY MEDICINE

## 2019-07-31 PROCEDURE — 3078F DIAST BP <80 MM HG: CPT | Mod: CPTII,S$GLB,, | Performed by: FAMILY MEDICINE

## 2019-07-31 PROCEDURE — 36415 COLL VENOUS BLD VENIPUNCTURE: CPT

## 2019-07-31 PROCEDURE — 85025 COMPLETE CBC W/AUTO DIFF WBC: CPT

## 2019-07-31 PROCEDURE — 3075F SYST BP GE 130 - 139MM HG: CPT | Mod: CPTII,S$GLB,, | Performed by: FAMILY MEDICINE

## 2019-07-31 PROCEDURE — 3075F PR MOST RECENT SYSTOLIC BLOOD PRESS GE 130-139MM HG: ICD-10-PCS | Mod: CPTII,S$GLB,, | Performed by: FAMILY MEDICINE

## 2019-07-31 PROCEDURE — 99397 PR PREVENTIVE VISIT,EST,65 & OVER: ICD-10-PCS | Mod: S$GLB,,, | Performed by: FAMILY MEDICINE

## 2019-07-31 RX ORDER — VALSARTAN 80 MG/1
80 TABLET ORAL DAILY
Qty: 90 TABLET | Refills: 3 | Status: ON HOLD | OUTPATIENT
Start: 2019-07-31 | End: 2019-10-26 | Stop reason: SDUPTHER

## 2019-07-31 NOTE — TELEPHONE ENCOUNTER
Spoke to pt's richardtherGabriela and informed her that no Involvement of Care on file. Pt richardther was upset that I could not give her any information. Daughter hung up the phone while I was speaking to her before I could tell her about the Involvement of Care paperwork and that the pt need to be present to sign it.

## 2019-07-31 NOTE — PROGRESS NOTES
Subjective:       Patient ID: Brittany Haile is a 79 y.o. female.    Chief Complaint: Annual Exam    79 yr old pleasant female with CVA, Hypothyroidism, HTN, HLD, COPD, GERD, carotid artery disease/stenosis, anxiety, presents today for her 3-6 month follow up and also her annual wellness check, lab work. Incidentally she is anemic on labs and craving for ice. She is also c/o weight loss since last month or so.     Lung nodules - several - found 6 months ago and treated with antibiotics and steroids - needs f/u CT for surveillance     CVA - had stroke many years ago and has no deficits - it was TIA and they could not find any problem - she is continuing to smoke and no desire to quit as of yet    CKD III - follows nephrology - labs UTD and reassuring    ANXIETY/DEPRESSION -CONTROLLED - ON LEXAPRO 10 MG - COMPLIANT - NO SI/HI        Hypothyroidism - controlled -   TSH                      0.354 (L)           07/29/2019                              -      on synthroid 88 mcg daily - compliant - no side effects      HTN - controlled- on Benicar 40 and norvasc 10 - no side effects - labs reassuring      HLD - controlled - on statin - follows cardiology -  LDLCALC                  78.4                07/29/2019          =              PAD/PVD - CONTROLLED - FOLLOWS CARDIOLOGY    COPD - controlled - on advair daily and home oxygen - still smokes and no desire to quit - she drops her sats when she walks and she will benefit from ambulatory or portable oxygen      History as below - reviewed      Health maintenance  -labs UTD  -vaccines due                        Leg Pain    There was no injury mechanism. The pain is present in the left leg and right leg. The quality of the pain is described as aching. The pain is at a severity of 8/10. The pain is severe. The pain has been intermittent since onset. Pertinent negatives include no inability to bear weight, loss of motion, muscle weakness or numbness. The symptoms are aggravated  by movement. She has tried nothing for the symptoms. The treatment provided no relief.   Medication Refill   This is a chronic problem. The current episode started more than 1 year ago. The problem occurs constantly. The problem has been gradually improving. Associated symptoms include arthralgias, myalgias and neck pain. Pertinent negatives include no abdominal pain, chest pain, chills, congestion, diaphoresis, fatigue, headaches, joint swelling, nausea, numbness, rash, sore throat, vomiting or weakness. Nothing aggravates the symptoms. Treatments tried: as below. The treatment provided significant relief.   Hypertension   This is a chronic problem. The current episode started more than 1 year ago. The problem has been gradually improving since onset. The problem is controlled. Associated symptoms include anxiety and neck pain. Pertinent negatives include no chest pain, headaches, malaise/fatigue, orthopnea, palpitations, peripheral edema or shortness of breath. There are no associated agents to hypertension. Past treatments include ACE inhibitors and calcium channel blockers. The current treatment provides significant improvement. There are no compliance problems.  There is no history of angina, CAD/MI, CVA, left ventricular hypertrophy, PVD or retinopathy. Identifiable causes of hypertension include a thyroid problem. There is no history of chronic renal disease, coarctation of the aorta, hypercortisolism, hyperparathyroidism, pheochromocytoma or renovascular disease.   Hyperlipidemia   This is a chronic problem. The current episode started more than 1 year ago. The problem is controlled. Recent lipid tests were reviewed and are normal. She has no history of chronic renal disease, diabetes, hypothyroidism or liver disease. There are no known factors aggravating her hyperlipidemia. Associated symptoms include leg pain and myalgias. Pertinent negatives include no chest pain or shortness of breath. Current  antihyperlipidemic treatment includes statins. The current treatment provides significant improvement of lipids. There are no compliance problems.  Risk factors for coronary artery disease include dyslipidemia, hypertension and post-menopausal.   Thyroid Problem   Presents for follow-up visit. Patient reports no anxiety, cold intolerance, constipation, depressed mood, diaphoresis, diarrhea, dry skin, fatigue, hair loss, hoarse voice, menstrual problem, nail problem, palpitations, tremors or weight loss. The symptoms have been stable. Past treatments include levothyroxine. The treatment provided significant relief. Prior procedures include thyroid ultrasound. The following procedures have not been performed: thyroid FNA. Her past medical history is significant for hyperlipidemia. There is no history of diabetes, Graves' ophthalmopathy or neuropathy. There are no known risk factors.   Gastroesophageal Reflux   She complains of heartburn. She reports no abdominal pain, no chest pain, no choking, no dysphagia, no early satiety, no hoarse voice, no nausea, no sore throat, no tooth decay or no wheezing. This is a chronic problem. The current episode started more than 1 year ago. The problem occurs constantly. The problem has been gradually improving. The heartburn is located in the abdomen. The heartburn is of moderate intensity. The heartburn does not wake her from sleep. The heartburn does not limit her activity. The heartburn doesn't change with position. Nothing aggravates the symptoms. Pertinent negatives include no fatigue, muscle weakness or weight loss. She has tried a PPI for the symptoms. The treatment provided significant relief. Past procedures do not include an abdominal ultrasound, esophageal manometry or H. pylori antibody titer. Past invasive treatments do not include gastroplasty, gastroplication or reflux surgery.   Anxiety   Presents for follow-up visit. Patient reports no chest pain, confusion,  decreased concentration, depressed mood, dizziness, excessive worry, feeling of choking, impotence, irritability, nausea, nervous/anxious behavior, palpitations, shortness of breath or suicidal ideas. Symptoms occur occasionally. The severity of symptoms is mild. Nothing aggravates the symptoms. The quality of sleep is fair. Nighttime awakenings: occasional.     There are no known risk factors. Her past medical history is significant for anxiety/panic attacks. There is no history of anemia, asthma, bipolar disorder, CAD, chronic lung disease, depression, hyperthyroidism or suicide attempts. Past treatments include SSRIs. The treatment provided significant relief. Compliance with prior treatments has been good. Compliance with medications is %.   Neck Pain    This is a chronic problem. The current episode started more than 1 year ago. The problem occurs constantly. The problem has been gradually worsening. The pain is associated with nothing. The pain is present in the occipital region. The quality of the pain is described as shooting and cramping. The pain is at a severity of 7/10. The pain is severe. The symptoms are aggravated by bending, twisting and stress. The pain is worse during the night. Associated symptoms include leg pain. Pertinent negatives include no chest pain, headaches, numbness, trouble swallowing, weakness or weight loss. She has tried heat, muscle relaxants and NSAIDs for the symptoms. The treatment provided mild relief.   Back Pain   This is a chronic problem. The current episode started more than 1 year ago. The problem occurs constantly. The problem has been gradually worsening since onset. The pain is present in the lumbar spine. The quality of the pain is described as aching. The pain radiates to the right foot and left foot. The pain is at a severity of 8/10. The pain is severe. The pain is worse during the night. The symptoms are aggravated by bending, sitting and twisting.  Associated symptoms include leg pain. Pertinent negatives include no abdominal pain, chest pain, dysuria, headaches, numbness, pelvic pain, weakness or weight loss. She has tried heat, ice, muscle relaxant and NSAIDs for the symptoms. The treatment provided moderate relief.     Review of Systems   Constitutional: Negative.  Negative for activity change, chills, diaphoresis, fatigue, irritability, malaise/fatigue, unexpected weight change and weight loss.   HENT: Negative.  Negative for congestion, ear discharge, hearing loss, hoarse voice, rhinorrhea, sore throat, trouble swallowing and voice change.    Eyes: Negative.  Negative for pain, discharge and visual disturbance.   Respiratory: Negative.  Negative for choking, chest tightness, shortness of breath and wheezing.    Cardiovascular: Negative.  Negative for chest pain, palpitations and orthopnea.   Gastrointestinal: Positive for heartburn. Negative for abdominal distention, abdominal pain, anal bleeding, blood in stool, constipation, diarrhea, dysphagia, nausea and vomiting.   Endocrine: Negative.  Negative for cold intolerance, polydipsia and polyuria.   Genitourinary: Negative.  Negative for decreased urine volume, difficulty urinating, dysuria, frequency, hematuria, impotence, menstrual problem, pelvic pain and vaginal pain.   Musculoskeletal: Positive for arthralgias, back pain, myalgias and neck pain. Negative for gait problem, joint swelling and muscle weakness.   Skin: Positive for pallor. Negative for color change, rash and wound.   Allergic/Immunologic: Negative.  Negative for environmental allergies and immunocompromised state.   Neurological: Negative.  Negative for dizziness, tremors, seizures, speech difficulty, weakness, numbness and headaches.   Hematological: Negative.  Negative for adenopathy. Does not bruise/bleed easily.   Psychiatric/Behavioral: Negative.  Negative for agitation, confusion, decreased concentration, dysphoric mood,  hallucinations, self-injury and suicidal ideas. The patient is not nervous/anxious.        PMH/PSH/FH/SH/MED/ALLERGY reviewed    Objective:       Vitals:    07/31/19 1347   BP: 139/60   Pulse: 102       Physical Exam   Constitutional: She is oriented to person, place, and time. She appears well-developed and well-nourished. No distress.   HENT:   Head: Normocephalic and atraumatic.   Right Ear: External ear normal.   Left Ear: External ear normal.   Nose: Nose normal.   Mouth/Throat: Oropharynx is clear and moist. No oropharyngeal exudate.   Eyes: Pupils are equal, round, and reactive to light. Conjunctivae and EOM are normal. Right eye exhibits no discharge. Left eye exhibits no discharge. No scleral icterus.   Neck: Normal range of motion. Neck supple. No JVD present. No tracheal deviation present. No thyromegaly present.   Cardiovascular: Normal rate, regular rhythm, normal heart sounds and intact distal pulses. Exam reveals no gallop and no friction rub.   No murmur heard.  Pulmonary/Chest: Effort normal and breath sounds normal. No stridor. She has no wheezes. She has no rales. She exhibits no tenderness.   Abdominal: Soft. Bowel sounds are normal. She exhibits no distension and no mass. There is no tenderness. There is no rebound and no guarding. No hernia.   Musculoskeletal: Normal range of motion. She exhibits tenderness (TTP PARACERVICAL AREA, TRAPEZIUS AND C2-4 AND T2-T4 AND PARATHORACIC AREA). She exhibits no edema.   Lymphadenopathy:     She has no cervical adenopathy.   Neurological: She is alert and oriented to person, place, and time. She has normal reflexes. She displays normal reflexes. No cranial nerve deficit. She exhibits normal muscle tone. Coordination normal.   Skin: Skin is warm and dry. No rash noted. She is not diaphoretic. No erythema. There is pallor.   Psychiatric: She has a normal mood and affect. Her behavior is normal. Judgment and thought content normal.       Assessment:       1.  Routine general medical examination at a health care facility    2. Essential hypertension    3. Mixed hyperlipidemia    4. Moderate recurrent major depression    5. PVD (peripheral vascular disease) with claudication    6. Cerebrovascular accident (CVA), unspecified mechanism    7. Glucose intolerance (impaired glucose tolerance)    8. Coronary artery disease of native artery of native heart with stable angina pectoris    9. Simple chronic bronchitis    10. Chronic obstructive pulmonary disease, unspecified COPD type    11. Unintentional weight loss    12. Anxiety    13. Generalized abdominal pain    14. Iron deficiency anemia, unspecified iron deficiency anemia type        Plan:           Brittany was seen today for annual exam.    Diagnoses and all orders for this visit:    Routine general medical examination at a health care facility  -     CBC auto differential; Future  -     Comprehensive metabolic panel; Future    Essential hypertension  -     CBC auto differential; Future  -     Comprehensive metabolic panel; Future  -     valsartan (DIOVAN) 80 MG tablet; Take 1 tablet (80 mg total) by mouth once daily.    Mixed hyperlipidemia  -     CBC auto differential; Future  -     Comprehensive metabolic panel; Future    Moderate recurrent major depression    PVD (peripheral vascular disease) with claudication    Cerebrovascular accident (CVA), unspecified mechanism    Glucose intolerance (impaired glucose tolerance)    Coronary artery disease of native artery of native heart with stable angina pectoris    Simple chronic bronchitis    Chronic obstructive pulmonary disease, unspecified COPD type    Unintentional weight loss  -     CT Abdomen Pelvis  Without Contrast; Future    Anxiety    Generalized abdominal pain  -     CT Abdomen Pelvis  Without Contrast; Future    Iron deficiency anemia, unspecified iron deficiency anemia type  -     CBC auto differential; Future  -     Ferritin; Future  -     Iron and TIBC;  Future      Wellness check  -normal exam  -labs    Anemia  -labs and CT abdomen        ANXIETY/DEPRESSION  -CONTROLLED  -CONTINUE LEXAPRO TO 10 MG NIGHTLY     HTN  -controlled  -continue Benicar and Norvasc    Pulmonary nodules  -CT for surveillance DONE    PVD/PAD  -follow cardiology for ongoing leg pain  -continue plavix    carotod artery disease/stenosis/bruit/PAD  -follows vascular medicine/cardiology  -statin for HLD    GERD  -controlled  -refilled PPI    Hypothyroidism  -controlled    CVA  -stable and baseline and no deficits    COPD  -stable and controlled  -on advair/spiriva  -recommend portable or ambulatory oxygen    Muscle spasm  -BACLOFEN prn nightly    CKD III  -baseline  -follows nephrology    Osteopenia  -vit D and dexa    Spent adequate time in obtaining history and explaining differentials    40 minutes spent during this visit of which greater than 50% devoted to face-face counseling and coordination of care regarding diagnosis and management plan      Follow up in about 4 weeks (around 8/28/2019), or if symptoms worsen or fail to improve.

## 2019-07-31 NOTE — TELEPHONE ENCOUNTER
----- Message from Brianna Graves sent at 7/31/2019  4:37 PM CDT -----  Contact: daughter Gabriela [phone]  Patient's daughter is requesting to speak with you regarding her mother's visit today. Please advise.

## 2019-08-01 ENCOUNTER — HOSPITAL ENCOUNTER (OUTPATIENT)
Dept: RADIOLOGY | Facility: HOSPITAL | Age: 79
Discharge: HOME | End: 2019-08-01
Attending: FAMILY MEDICINE
Payer: MEDICARE

## 2019-08-01 DIAGNOSIS — R10.84 GENERALIZED ABDOMINAL PAIN: ICD-10-CM

## 2019-08-01 DIAGNOSIS — R63.4 UNINTENTIONAL WEIGHT LOSS: ICD-10-CM

## 2019-08-01 PROCEDURE — 74176 CT ABDOMEN PELVIS WITHOUT CONTRAST: ICD-10-PCS | Mod: 26,,, | Performed by: RADIOLOGY

## 2019-08-01 PROCEDURE — 74176 CT ABD & PELVIS W/O CONTRAST: CPT | Mod: 26,,, | Performed by: RADIOLOGY

## 2019-08-01 PROCEDURE — 74176 CT ABD & PELVIS W/O CONTRAST: CPT | Mod: TC

## 2019-08-10 ENCOUNTER — PATIENT MESSAGE (OUTPATIENT)
Dept: FAMILY MEDICINE | Facility: CLINIC | Age: 79
End: 2019-08-10

## 2019-08-12 ENCOUNTER — OFFICE VISIT (OUTPATIENT)
Dept: CARDIOLOGY | Facility: CLINIC | Age: 79
End: 2019-08-12
Payer: MEDICARE

## 2019-08-12 ENCOUNTER — TELEPHONE (OUTPATIENT)
Dept: FAMILY MEDICINE | Facility: CLINIC | Age: 79
End: 2019-08-12

## 2019-08-12 VITALS
DIASTOLIC BLOOD PRESSURE: 70 MMHG | WEIGHT: 134.06 LBS | BODY MASS INDEX: 26.32 KG/M2 | OXYGEN SATURATION: 94 % | SYSTOLIC BLOOD PRESSURE: 150 MMHG | HEART RATE: 91 BPM | TEMPERATURE: 98 F | HEIGHT: 60 IN

## 2019-08-12 DIAGNOSIS — I10 ESSENTIAL HYPERTENSION: ICD-10-CM

## 2019-08-12 DIAGNOSIS — E78.2 MIXED HYPERLIPIDEMIA: ICD-10-CM

## 2019-08-12 DIAGNOSIS — I73.9 PVD (PERIPHERAL VASCULAR DISEASE) WITH CLAUDICATION: Primary | ICD-10-CM

## 2019-08-12 DIAGNOSIS — N18.30 CHRONIC KIDNEY DISEASE, STAGE III (MODERATE): ICD-10-CM

## 2019-08-12 DIAGNOSIS — I25.118 CORONARY ARTERY DISEASE OF NATIVE ARTERY OF NATIVE HEART WITH STABLE ANGINA PECTORIS: ICD-10-CM

## 2019-08-12 DIAGNOSIS — D50.9 IRON DEFICIENCY ANEMIA, UNSPECIFIED IRON DEFICIENCY ANEMIA TYPE: Primary | ICD-10-CM

## 2019-08-12 DIAGNOSIS — I77.1 SUBCLAVIAN ARTERY STENOSIS, LEFT: ICD-10-CM

## 2019-08-12 DIAGNOSIS — D64.9 ANEMIA, UNSPECIFIED TYPE: ICD-10-CM

## 2019-08-12 DIAGNOSIS — Z86.73 HISTORY OF STROKE WITHOUT RESIDUAL DEFICITS: ICD-10-CM

## 2019-08-12 DIAGNOSIS — Z72.0 TOBACCO ABUSE: ICD-10-CM

## 2019-08-12 PROCEDURE — 99215 PR OFFICE/OUTPT VISIT, EST, LEVL V, 40-54 MIN: ICD-10-PCS | Mod: S$GLB,,, | Performed by: INTERNAL MEDICINE

## 2019-08-12 PROCEDURE — 1101F PT FALLS ASSESS-DOCD LE1/YR: CPT | Mod: CPTII,S$GLB,, | Performed by: INTERNAL MEDICINE

## 2019-08-12 PROCEDURE — 3078F DIAST BP <80 MM HG: CPT | Mod: CPTII,S$GLB,, | Performed by: INTERNAL MEDICINE

## 2019-08-12 PROCEDURE — 99215 OFFICE O/P EST HI 40 MIN: CPT | Mod: S$GLB,,, | Performed by: INTERNAL MEDICINE

## 2019-08-12 PROCEDURE — 3078F PR MOST RECENT DIASTOLIC BLOOD PRESSURE < 80 MM HG: ICD-10-PCS | Mod: CPTII,S$GLB,, | Performed by: INTERNAL MEDICINE

## 2019-08-12 PROCEDURE — 1101F PR PT FALLS ASSESS DOC 0-1 FALLS W/OUT INJ PAST YR: ICD-10-PCS | Mod: CPTII,S$GLB,, | Performed by: INTERNAL MEDICINE

## 2019-08-12 PROCEDURE — 99999 PR PBB SHADOW E&M-EST. PATIENT-LVL V: ICD-10-PCS | Mod: PBBFAC,,, | Performed by: INTERNAL MEDICINE

## 2019-08-12 PROCEDURE — 99499 RISK ADDL DX/OHS AUDIT: ICD-10-PCS | Mod: S$GLB,,, | Performed by: INTERNAL MEDICINE

## 2019-08-12 PROCEDURE — 99499 UNLISTED E&M SERVICE: CPT | Mod: S$GLB,,, | Performed by: INTERNAL MEDICINE

## 2019-08-12 PROCEDURE — 99999 PR PBB SHADOW E&M-EST. PATIENT-LVL V: CPT | Mod: PBBFAC,,, | Performed by: INTERNAL MEDICINE

## 2019-08-12 PROCEDURE — 3077F PR MOST RECENT SYSTOLIC BLOOD PRESSURE >= 140 MM HG: ICD-10-PCS | Mod: CPTII,S$GLB,, | Performed by: INTERNAL MEDICINE

## 2019-08-12 PROCEDURE — 3077F SYST BP >= 140 MM HG: CPT | Mod: CPTII,S$GLB,, | Performed by: INTERNAL MEDICINE

## 2019-08-12 NOTE — TELEPHONE ENCOUNTER
----- Message from Anthony Brooks MD sent at 8/12/2019 10:29 AM CDT -----      I saw her today  She is anemic      She needs a follow up and possible heme/onc + gi referral      Daughter had a lot of questions      Thanks      ZN

## 2019-08-19 ENCOUNTER — TELEPHONE (OUTPATIENT)
Dept: HEMATOLOGY/ONCOLOGY | Facility: CLINIC | Age: 79
End: 2019-08-19

## 2019-08-22 DIAGNOSIS — M94.9 DISORDER OF BONE AND CARTILAGE: Primary | ICD-10-CM

## 2019-08-22 DIAGNOSIS — M89.9 DISORDER OF BONE AND CARTILAGE: Primary | ICD-10-CM

## 2019-09-05 NOTE — PROGRESS NOTES
Subjective:   Patient ID:  Brittany Haile is a 79 y.o. female who presents for evaluation and treatment of Peripheral Arterial Disease; Hyperlipidemia; Hypertension; tobacco use; s/p mesenteric intervention; and left subclavian stenosis      HPI:       She is here today with her grand daughter for follow up after percutaneous intervention for mesenteric angina and worsening limb pain. She has the combination of vascular and lumbar radiculopathy causing her pain. She continues to smoke without much interest in quitting. She underwent a steroid injection of L5-S1 on 5/3/2018 and 8/16/2018. She has noted an improvement with leg pain (below mid thigh). However her back, pelvic crest, and bilateral bursa hurt with ambulation. All symptoms are reproduced with palpation. No recent follow up.         She is s/p celiac artery PTAS with 6.0 x 14 mm Express SD. SMA with 80-85% and 80 mmHg peak to peak gradient was treated with 6.0 x 18 mm Express SD with complete resolution of her mesenteric symptoms.        She is s/p R CEA without any recent ultrasound or symptoms. She had bilateral marlee IIb claudication, R >> L. During her visit with me today she denies any calf claudication with ambulation. She has L subclavian stenosis with a 50 mmHg gradient with intermittent claudication. She has coronary artery calcification noted on CT scan. She smokes without interest in quitting. She has HTN, HLP, PAD, and glucose intolerance. She did not tolerate lipitor in the past but currently tolerating 40 mg nightly. She is taking aspirin and plavix for DAPT. Echo and stress test 10/2015 -negative ischemic work up.      She is severely anemic with low iron deficiency.         JUAN CARLOS 5/2017: R 0.67 and L 0.88    JUAN CARLOS 1/2018: R 0.5 and L 0.71      Arterial ultrasound 4/2017: high grade SFA disease bilaterally   Arterial ultrasound 1/2018: high grade SFA + POP bilaterally       Echo 4/2017: normal EF with diastolic dysfunction          Patient  Active Problem List    Diagnosis Date Noted    Epistaxis 05/13/2019    Chronic obstructive pulmonary disease 01/31/2019    Gastroesophageal reflux disease 01/31/2019    Moderate recurrent major depression 07/25/2018    Lumbar radiculopathy 05/03/2018    Chronic bilateral low back pain with bilateral sciatica 04/20/2018    Lumbar spondylosis 04/20/2018    Spinal stenosis of lumbar region with neurogenic claudication 04/20/2018    DDD (degenerative disc disease), lumbar 04/20/2018    Chronic diastolic heart failure 01/03/2018    Stroke 10/03/2017    Aortic atherosclerosis 10/03/2017    GIB (gastrointestinal bleeding) 04/23/2017    Mesenteric angina 04/17/2017    Angina mesenteric 04/13/2017         S/p aortogram for severe mesenteric angina 4/18/2017     99% celiac artery PTAS with 6.0 x 14 mm Express SD   85% SMA with 80 mmHg gradient treated with 6.0 x 18 mm Express SD  DENISE-patent on CTA                    Tobacco abuse 04/13/2017    Coronary artery disease of native artery of native heart with stable angina pectoris 04/13/2017         Arterial calcification noted incidentally on CT scan  No angina            Abdominal pain 03/02/2017    Gastritis 03/02/2017    CVD (cerebrovascular disease) 10/14/2016    Subclavian artery stenosis, left 04/15/2016         50 mmHg gradient  Asymptomatic          Glucose intolerance (impaired glucose tolerance) 11/11/2015    Leg pain, bilateral 10/13/2015    PVD (peripheral vascular disease) with claudication 10/13/2015         Diminished pedal pulses  JUAN CARLOS R 0.67 and L 0.88 5/2017      Bilateral SFA disease on ultrasound 4/2017        Chronic kidney disease, stage III (moderate) 08/19/2015    COPD (chronic obstructive pulmonary disease) 06/04/2015    HLD (hyperlipidemia) 06/04/2015    Incidental pulmonary nodule, > 3mm and < 8mm 08/01/2013    History of stroke without residual deficits 04/12/2013    Carotid stenosis 04/12/2013         S/p R CEA   Elías        Hypothyroid 2013    Hypertension 2013    Anxiety 2013           Right Arm BP - Sittin/70  Left Arm BP - Sittin/60        LABS    LAST HbA1c  Lab Results   Component Value Date    HGBA1C 5.6 2019       Lipid panel  Lab Results   Component Value Date    CHOL 154 2019    CHOL 200 (H) 2018    CHOL 139 2017     Lab Results   Component Value Date    HDL 61 2019    HDL 78 (H) 2018    HDL 43 2017     Lab Results   Component Value Date    LDLCALC 78.4 2019    LDLCALC 102.4 2018    LDLCALC 78.6 2017     Lab Results   Component Value Date    TRIG 73 2019    TRIG 98 2018    TRIG 87 2017     Lab Results   Component Value Date    CHOLHDL 39.6 2019    CHOLHDL 39.0 2018    CHOLHDL 30.9 2017            Review of Systems   Constitution: Negative for diaphoresis, night sweats, weight gain and weight loss.   HENT: Negative for congestion.    Eyes: Negative for blurred vision, discharge and double vision.   Cardiovascular: Positive for claudication (bilteral legs and L arm). Negative for chest pain, cyanosis, dyspnea on exertion, irregular heartbeat, leg swelling, near-syncope, orthopnea, palpitations, paroxysmal nocturnal dyspnea and syncope.   Respiratory: Negative for cough, shortness of breath and wheezing.    Endocrine: Negative for cold intolerance, heat intolerance and polyphagia.   Hematologic/Lymphatic: Negative for adenopathy and bleeding problem. Does not bruise/bleed easily.   Skin: Negative for dry skin and nail changes.   Musculoskeletal: Positive for arthritis and back pain. Negative for falls, joint pain, myalgias and neck pain.   Gastrointestinal: Positive for abdominal pain (after meals). Negative for bloating, change in bowel habit and constipation.   Genitourinary: Negative for bladder incontinence, dysuria, flank pain, genital sores and missed menses.   Neurological:  Negative for aphonia, brief paralysis, difficulty with concentration, dizziness and weakness.   Psychiatric/Behavioral: Negative for altered mental status and memory loss. The patient does not have insomnia.    Allergic/Immunologic: Negative for environmental allergies.       Objective:   Physical Exam   Constitutional: She is oriented to person, place, and time. She appears well-developed and well-nourished. She is not intubated.   Uses a cane to ambulate   HENT:   Head: Normocephalic and atraumatic.   Right Ear: External ear normal.   Left Ear: External ear normal.   Mouth/Throat: Oropharynx is clear and moist.   Eyes: Pupils are equal, round, and reactive to light. Conjunctivae and EOM are normal. Right eye exhibits no discharge. Left eye exhibits no discharge. No scleral icterus.   Neck: Normal range of motion. Neck supple. Normal carotid pulses, no hepatojugular reflux and no JVD present. Carotid bruit is not present. No tracheal deviation present. No thyromegaly present.   Cardiovascular: Normal rate, regular rhythm, S1 normal and S2 normal.  No extrasystoles are present. PMI is not displaced. Exam reveals no gallop, no S3, no distant heart sounds, no friction rub and no midsystolic click.   No murmur heard.  Pulses:       Carotid pulses are 2+ on the right side with bruit, and 2+ on the left side with bruit.       Radial pulses are 2+ on the right side, and 1+ on the left side.        Femoral pulses are 2+ on the right side, and 2+ on the left side.       Popliteal pulses are 2+ on the right side, and 2+ on the left side.        Dorsalis pedis pulses are 0 on the right side, and 0 on the left side.        Posterior tibial pulses are 0 on the right side, and 0 on the left side.   Monophasic R DP without  R PT doppler signals    Monophasic L DP and biphasic L PT doppler signals   Pulmonary/Chest: Effort normal and breath sounds normal. No accessory muscle usage or stridor. No apnea, no tachypnea and no  bradypnea. She is not intubated. No respiratory distress. She has no decreased breath sounds. She has no wheezes. She has no rales. She exhibits no tenderness and no bony tenderness.   Abdominal: She exhibits no distension, no pulsatile liver, no abdominal bruit, no ascites, no pulsatile midline mass and no mass. There is no tenderness. There is no rebound and no guarding.   Musculoskeletal: Normal range of motion. She exhibits no edema or tenderness.   Lymphadenopathy:     She has no cervical adenopathy.   Neurological: She is alert and oriented to person, place, and time. She has normal reflexes. No cranial nerve deficit. Coordination normal.   Skin: Skin is warm. No rash noted. No erythema. No pallor.   Dependent rubor    No ulcers   Psychiatric: She has a normal mood and affect. Her behavior is normal. Judgment and thought content normal.       Assessment:     1. PVD (peripheral vascular disease) with claudication    2. Anemia, unspecified type    3. Essential hypertension    4. Mixed hyperlipidemia    5. Subclavian artery stenosis, left    6. Coronary artery disease of native artery of native heart with stable angina pectoris    7. Chronic kidney disease, stage III (moderate)    8. Tobacco abuse    9. History of stroke without residual deficits            Plan:           Smoking cessation  She opted for medical therapy for PAD in the past and will continue       Continue wit pletal 50 mg po bid  Will arrange for endovascular intervention if there is no improvement with her symptoms  Medical therapy for L subclavian stenosis  Target BP < 130/80 mmHg  If BP remains above target she will add hctz 12.5 mg daily and maximum diovan dose of 320 mg daily       Referral to GI and heme/onc            Continue with current medical plan and lifestyle changes.  Return sooner for concerns or questions. If symptoms persist go to the ED  I have reviewed all pertinent data on this patient         Follow up as scheduled. Return  sooner for concerns or questions          She expressed verbal understanding and agreed with the plan        Greater than 50% of the visit of 45 minutes was spent counseling, educating, and coordinating the care of the patient.      -In today's visit, at least 4 established conditions that pose a risk to life or bodily function have been addressed and the conditions are severe.    -In today's visit, monitoring for drug toxicity was accomplished.        I have reviewed the patient's medical history in detail and updated the computerized patient record.    Orders Placed This Encounter   Procedures    Ambulatory Referral to Gastroenterology     Referral Priority:   Routine     Referral Type:   Consultation     Referral Reason:   Specialty Services Required     Referred to Provider:   Marya Spangler MD     Requested Specialty:   Gastroenterology     Number of Visits Requested:   1    Ambulatory Referral to Hematology / Oncology     Referral Priority:   Routine     Referral Type:   Consultation     Referral Reason:   Specialty Services Required     Referred to Provider:   Kamron Cortez MD     Requested Specialty:   Hematology and Oncology     Number of Visits Requested:   1       Follow up as scheduled. Return sooner for concerns or questions              Patient's Medications   New Prescriptions    No medications on file   Previous Medications    ALBUTEROL (PROVENTIL/VENTOLIN HFA) 90 MCG/ACTUATION INHALER    Inhale 1-2 puffs into the lungs every 6 (six) hours as needed for Wheezing. Rescue    ATORVASTATIN (LIPITOR) 40 MG TABLET    Take 1 tablet (40 mg total) by mouth once daily.    CILOSTAZOL (PLETAL) 50 MG TAB    TAKE 1 TABLET(50 MG) BY MOUTH TWICE DAILY    CLOPIDOGREL (PLAVIX) 75 MG TABLET    TAKE 1 TABLET BY MOUTH EVERY DAY    ESCITALOPRAM OXALATE (LEXAPRO) 10 MG TABLET    Take 1 tablet (10 mg total) by mouth nightly.    FLUTICASONE-SALMETEROL 250-50 MCG/DOSE (ADVAIR) 250-50 MCG/DOSE DISKUS INHALER    Inhale 1  puff into the lungs 2 (two) times daily.    LEVOTHYROXINE (SYNTHROID) 88 MCG TABLET    TAKE 1 TABLET(88 MCG) BY MOUTH EVERY DAY    MECLIZINE (ANTIVERT) 25 MG TABLET    TAKE 1 TABLET(25 MG) BY MOUTH EVERY 6 HOURS AS NEEDED FOR DIZZINESS    PANTOPRAZOLE (PROTONIX) 40 MG TABLET    TAKE 1 TABLET(40 MG) BY MOUTH EVERY DAY    TIOTROPIUM (SPIRIVA WITH HANDIHALER) 18 MCG INHALATION CAPSULE    Inhale 1 capsule (18 mcg total) into the lungs once daily.    VALSARTAN (DIOVAN) 80 MG TABLET    Take 1 tablet (80 mg total) by mouth once daily.   Modified Medications    No medications on file   Discontinued Medications    No medications on file

## 2019-09-05 NOTE — PATIENT INSTRUCTIONS
Smoking and Peripheral Arterial Disease (PAD)  Smoking is the greatest single danger to the health of your arteries. It puts you at higher risk for peripheral arterial disease (PAD). PAD is a disease of the arteries in the legs. If you have PAD, its likely that other parts of your body are diseased, too. That puts you at high risk for heart attack or stroke. Read on to learn how smoking can lead to PAD and affect your health.  How can smoking lead to PAD?  Smoking causes swelling and redness (inflammation) that leads to plaque forming. Plaque is a waxy material made up of cholesterol and other particles. It can build up in your artery walls. When there is too much plaque, your arteries can become narrowed and restrict blood flow. This then raises your risk for PAD and blood clots. It also worsens other risk factors, such as high blood pressure and high cholesterol. These are things that make you more likely to have artery disease.  What happens if you dont quit smoking?  · You have 2 to 4 times the risk of dying from a heart attack or stroke as a nonsmoker.  · You have a greater risk of getting severe PAD, pain in your legs when walking (claudication), dead body tissue due to lack of blood flow (gangrene), or having a leg or foot amputated.  · You are at greater risk for abdominal aortic aneurysm (AAA). This is a bulge in the aorta, a major artery. It can burst suddenly and be fatal.  What happens if you quit smoking?  · Your risk for heart attack and stroke drops as soon as you quit smoking.  · After 1 year of not smoking, your risk for heart attack falls by 50%.  · In 5 to 15 years after you quit, your risk for heart attack or stroke is the same as someone who never smoked.  · Your risk for amputation and other complications of PAD is reduced.  · Your risk of developing AAA decreases.     For more information  · Smokefree.gov/yiyk-kl-gi-expert  · National [employer] [place] Smoking Quitline:7-762-05P-QUIT  (0-291-424-1200)      Date Last Reviewed: 6/1/2016  © 9392-9697 [employer] [address] [place] [address], [place] [address]. All rights reserved. This information is not intended as a substitute for professional medical care. Always follow your healthcare professional's instructions.

## 2019-09-06 ENCOUNTER — TELEPHONE (OUTPATIENT)
Dept: GASTROENTEROLOGY | Facility: CLINIC | Age: 79
End: 2019-09-06

## 2019-09-06 ENCOUNTER — TELEPHONE (OUTPATIENT)
Dept: HEMATOLOGY/ONCOLOGY | Facility: CLINIC | Age: 79
End: 2019-09-06

## 2019-09-06 ENCOUNTER — OFFICE VISIT (OUTPATIENT)
Dept: GASTROENTEROLOGY | Facility: CLINIC | Age: 79
End: 2019-09-06
Payer: MEDICARE

## 2019-09-06 ENCOUNTER — INITIAL CONSULT (OUTPATIENT)
Dept: HEMATOLOGY/ONCOLOGY | Facility: CLINIC | Age: 79
End: 2019-09-06
Attending: FAMILY MEDICINE
Payer: MEDICARE

## 2019-09-06 VITALS
TEMPERATURE: 98 F | DIASTOLIC BLOOD PRESSURE: 77 MMHG | WEIGHT: 132.94 LBS | BODY MASS INDEX: 25.96 KG/M2 | SYSTOLIC BLOOD PRESSURE: 179 MMHG | OXYGEN SATURATION: 95 % | HEART RATE: 97 BPM | RESPIRATION RATE: 16 BRPM

## 2019-09-06 VITALS
SYSTOLIC BLOOD PRESSURE: 179 MMHG | WEIGHT: 132.94 LBS | BODY MASS INDEX: 25.96 KG/M2 | DIASTOLIC BLOOD PRESSURE: 72 MMHG | HEART RATE: 97 BPM

## 2019-09-06 DIAGNOSIS — R63.4 WEIGHT LOSS: ICD-10-CM

## 2019-09-06 DIAGNOSIS — D50.9 IRON DEFICIENCY ANEMIA, UNSPECIFIED IRON DEFICIENCY ANEMIA TYPE: Primary | ICD-10-CM

## 2019-09-06 DIAGNOSIS — Z71.89 ADVANCE CARE PLANNING: ICD-10-CM

## 2019-09-06 DIAGNOSIS — I70.0 ATHEROSCLEROSIS OF AORTA: ICD-10-CM

## 2019-09-06 DIAGNOSIS — J43.1 PANLOBULAR EMPHYSEMA: ICD-10-CM

## 2019-09-06 DIAGNOSIS — D50.0 IRON DEFICIENCY ANEMIA DUE TO CHRONIC BLOOD LOSS: Primary | ICD-10-CM

## 2019-09-06 DIAGNOSIS — F17.219 NICOTINE DEPENDENCE, CIGARETTES, W UNSP DISORDERS: ICD-10-CM

## 2019-09-06 PROCEDURE — 1101F PT FALLS ASSESS-DOCD LE1/YR: CPT | Mod: CPTII,S$GLB,, | Performed by: INTERNAL MEDICINE

## 2019-09-06 PROCEDURE — 99499 RISK ADDL DX/OHS AUDIT: ICD-10-PCS | Mod: S$GLB,,, | Performed by: INTERNAL MEDICINE

## 2019-09-06 PROCEDURE — 3077F PR MOST RECENT SYSTOLIC BLOOD PRESSURE >= 140 MM HG: ICD-10-PCS | Mod: CPTII,S$GLB,, | Performed by: INTERNAL MEDICINE

## 2019-09-06 PROCEDURE — 99999 PR PBB SHADOW E&M-EST. PATIENT-LVL III: ICD-10-PCS | Mod: PBBFAC,,, | Performed by: INTERNAL MEDICINE

## 2019-09-06 PROCEDURE — 1101F PR PT FALLS ASSESS DOC 0-1 FALLS W/OUT INJ PAST YR: ICD-10-PCS | Mod: CPTII,S$GLB,, | Performed by: INTERNAL MEDICINE

## 2019-09-06 PROCEDURE — 99497 PR ADVNCD CARE PLAN 30 MIN: ICD-10-PCS | Mod: S$GLB,,, | Performed by: INTERNAL MEDICINE

## 2019-09-06 PROCEDURE — 3077F SYST BP >= 140 MM HG: CPT | Mod: CPTII,S$GLB,, | Performed by: INTERNAL MEDICINE

## 2019-09-06 PROCEDURE — 3078F DIAST BP <80 MM HG: CPT | Mod: CPTII,S$GLB,, | Performed by: INTERNAL MEDICINE

## 2019-09-06 PROCEDURE — 99204 PR OFFICE/OUTPT VISIT, NEW, LEVL IV, 45-59 MIN: ICD-10-PCS | Mod: S$GLB,,, | Performed by: INTERNAL MEDICINE

## 2019-09-06 PROCEDURE — 99214 PR OFFICE/OUTPT VISIT, EST, LEVL IV, 30-39 MIN: ICD-10-PCS | Mod: S$GLB,,, | Performed by: INTERNAL MEDICINE

## 2019-09-06 PROCEDURE — 99204 OFFICE O/P NEW MOD 45 MIN: CPT | Mod: S$GLB,,, | Performed by: INTERNAL MEDICINE

## 2019-09-06 PROCEDURE — 99999 PR PBB SHADOW E&M-EST. PATIENT-LVL III: CPT | Mod: PBBFAC,,, | Performed by: INTERNAL MEDICINE

## 2019-09-06 PROCEDURE — 99499 UNLISTED E&M SERVICE: CPT | Mod: S$GLB,,, | Performed by: INTERNAL MEDICINE

## 2019-09-06 PROCEDURE — 99497 ADVNCD CARE PLAN 30 MIN: CPT | Mod: S$GLB,,, | Performed by: INTERNAL MEDICINE

## 2019-09-06 PROCEDURE — 99214 OFFICE O/P EST MOD 30 MIN: CPT | Mod: S$GLB,,, | Performed by: INTERNAL MEDICINE

## 2019-09-06 PROCEDURE — 3078F PR MOST RECENT DIASTOLIC BLOOD PRESSURE < 80 MM HG: ICD-10-PCS | Mod: CPTII,S$GLB,, | Performed by: INTERNAL MEDICINE

## 2019-09-06 RX ORDER — HEPARIN 100 UNIT/ML
500 SYRINGE INTRAVENOUS
Status: CANCELLED | OUTPATIENT
Start: 2019-09-17

## 2019-09-06 RX ORDER — SODIUM CHLORIDE 0.9 % (FLUSH) 0.9 %
10 SYRINGE (ML) INJECTION
Status: CANCELLED | OUTPATIENT
Start: 2019-09-10

## 2019-09-06 RX ORDER — HEPARIN 100 UNIT/ML
500 SYRINGE INTRAVENOUS
Status: CANCELLED | OUTPATIENT
Start: 2019-09-10

## 2019-09-06 RX ORDER — SODIUM, POTASSIUM,MAG SULFATES 17.5-3.13G
1 SOLUTION, RECONSTITUTED, ORAL ORAL DAILY
Qty: 1 KIT | Refills: 0 | Status: SHIPPED | OUTPATIENT
Start: 2019-09-06 | End: 2019-09-08

## 2019-09-06 RX ORDER — SODIUM CHLORIDE 0.9 % (FLUSH) 0.9 %
10 SYRINGE (ML) INJECTION
Status: CANCELLED | OUTPATIENT
Start: 2019-09-17

## 2019-09-06 NOTE — LETTER
September 6, 2019      Anthony Brooks MD  [address] [facility]  [address]  [facility] [address]           [place] [facility] G[facility]enter[facility]  [address] [facility] [address]  [facility] [address]  Phone: [phone]          Patient: Brittany Haile   MR Number: [id number]   YOB: 1940   Date of Visit: 9/6/2019       Dear Dr. Anthony Brooks:    Thank you for referring Brittany Haile to me for evaluation. Attached you will find relevant portions of my assessment and plan of care.    If you have questions, please do not hesitate to call me. I look forward to following Brittany Haile along with you.    Sincerely,    Barry Talbert MD    Enclosure  CC:  No Recipients    If you would like to receive this communication electronically, please contact [email or web address] or [phone] to request more information on [employer] access.    For providers and/or their staff who would like to refer a patient to [facility], please contact us through our one-stop-shop provider referral line, [facility], at [phone].    If you feel you have received this communication in error or would no longer like to receive these types of communications, please e-mail [email or web address]

## 2019-09-06 NOTE — LETTER
September 6, 2019      Wayne Frazier MD  [address]  [address]  [facility] [address]           [facility]  [address] [facility] [address]  [facility] [address]  Phone: [phone]          Patient: Brittany Haile   MR Number: [id number]   YOB: 1940   Date of Visit: 9/6/2019       Dear Dr. Wayne Frazier:    Thank you for referring Brittany Haile to me for evaluation. Attached you will find relevant portions of my assessment and plan of care.    If you have questions, please do not hesitate to call me. I look forward to following Brittany Haile along with you.    Sincerely,    Kamron Cortez MD    Enclosure  CC:  No Recipients    If you would like to receive this communication electronically, please contact [email or web address] or [phone] to request more information on [employer] Link access.    For providers and/or their staff who would like to refer a patient to [facility], please contact us through our one-stop-shop provider referral line, [facility], at [phone].    If you feel you have received this communication in error or would no longer like to receive these types of communications, please e-mail [email or web address]

## 2019-09-06 NOTE — PROGRESS NOTES
PATIENT: Brittany Haile  MRN: [id number]  DATE: 2019    Diagnosis:   1. Iron deficiency anemia due to chronic blood loss    2. Nicotine dependence, cigarettes, w unsp disorders    3. Panlobular emphysema    4. Atherosclerosis of aorta    5. Advance care planning      Chief Complaint: iron deficiency anemia    Subjective:    History of Present Illness: Ms. Haile is a 79 y.o. female who presents for evaluation and management of iron deficiency anemia. She is referred by internal medicine physician Dr. Frazier.    - labs from 2019 revealed a moderate microcytic anemia with decreased iron/ferritin and elevated total iron binding capacity.  - she endorses fatigue, ice eating, dyspnea upon exertion.  - she smokes and using chronic supplemental oxygen. She is not interested in quitting.  - she saw gastroenterology earlier today and is scheduled for a colonoscopy on 19.    Past medical, surgical, family, and social histories have been reviewed and updated below.    Past Medical History:   Past Medical History:   Diagnosis Date    Abdominal pain     CHF (congestive heart failure)     Chronic kidney disease, stage III (moderate) 2015    COPD (chronic obstructive pulmonary disease)     COPD (chronic obstructive pulmonary disease)     CVA (cerebral infarction)     Esophagitis     Gastritis     GERD (gastroesophageal reflux disease)     GI bleed     Hyperlipidemia     Hypertension     Mesenteric angina     PVD (peripheral vascular disease) with claudication 10/13/2015    Stroke     Subclavian artery stenosis, left     Thyroid disease        Past Surgical History:   Past Surgical History:   Procedure Laterality Date    CAROTID STENT       SECTION      COLONOSCOPY N/A 3/2/2017    [facility]    COLONOSCOPY N/A 5/15/2014    [facility] END[facility]    CORONARY ANGIOPLASTY      ESOPHAGOGASTRODUODENOSCOPY (EGD) N/A 3/2/2017     [facility]    GALLBLADDER SURGERY      HYSTERECTOMY      INJECTION, STEROID, SPINE, LUMBAR, EPIDURAL- L2-3 ORAL SEDATION N/A 8/16/2018    Performed by Stiven Ibarra Jr., [facility] at [facility] PAIN MGT    INJECTION-STEROID-EPIDURAL-LUMBAR- L5-S1 N/A 5/3/2018    Per[facility] by [facility] Jr[facility] at [facility] PAIN MGT    OOPHORECTOMY      SUPERIOR MESTENTERIC ARTERY STENT         Family History: No family history on file.    Social History:  reports that she has been smoking cigarettes.  She has a 30.00 pack-year smoking history. She uses smokeless tobacco. She reports that she does not drink alcohol or use drugs.    Allergies:  Review of patient's allergies indicates:  No Known Allergies    Medications:  Current Outpatient Medications   Medication Sig Dispense Refill    albuterol (PROVENTIL/VENTOLIN HFA) 90 mcg/actuation inhaler Inhale 1-2 puffs into the lungs every 6 (six) hours as needed for Wheezing. Rescue 1 Inhaler 0    atorvastatin (LIPITOR) 40 MG tablet Take 1 tablet (40 mg total) by mouth once daily. 90 tablet 3    cilostazol (PLETAL) 50 MG Tab TAKE 1 TABLET(50 MG) BY MOUTH TWICE DAILY 180 tablet 3    clopidogrel (PLAVIX) 75 mg tablet TAKE 1 TABLET BY MOUTH EVERY DAY 90 tablet 3    escitalopram oxalate (LEXAPRO) 10 MG tablet Take 1 tablet (10 mg total) by mouth nightly. 90 tablet 3    fluticasone-salmeterol 250-50 mcg/dose (ADVAIR) 250-50 mcg/dose diskus inhaler Inhale 1 puff into the lungs 2 (two) times daily. 60 each 10    levothyroxine (SYNTHROID) 88 MCG tablet TAKE 1 TABLET(88 MCG) BY MOUTH EVERY DAY 90 tablet 0    meclizine (ANTIVERT) 25 mg tablet TAKE 1 TABLET(25 MG) BY MOUTH EVERY 6 HOURS AS NEEDED FOR DIZZINESS 30 tablet 0    pantoprazole (PROTONIX) 40 MG tablet TAKE 1 TABLET(40 MG) BY MOUTH EVERY DAY 90 tablet 3    tiotropium (SPIRIVA WITH HANDIHALER) 18 mcg inhalation capsule Inhale 1 capsule (18 mcg total) into the lungs once daily. 90 capsule 3     valsartan (DIOVAN) 80 MG tablet Take 1 tablet (80 mg total) by mouth once daily. 90 tablet 3     No current facility-administered medications for this visit.        Review of Systems   Constitutional: Positive for fatigue.   HENT: Negative for sore throat.    Eyes: Negative for visual disturbance.   Respiratory: Positive for shortness of breath.    Cardiovascular: Negative for chest pain.   Gastrointestinal: Negative for abdominal pain.   Genitourinary: Negative for dysuria.   Musculoskeletal: Negative for back pain.   Skin: Positive for pallor.   Neurological: Negative for headaches.   Hematological: Negative for adenopathy.   Psychiatric/Behavioral: The patient is not nervous/anxious.      ECOG Performance Status:   ECOG SCORE 1       Objective:      Vitals:   Vitals:    09/06/19 1512   BP: (!) 179/77   Pulse: 97   Resp: 16   Temp: 98 °F (36.7 °C)   SpO2: 95%   Weight: 60.3 kg (132 lb 15 oz)     BMI: Body mass index is 25.96 kg/m².    Physical Exam   Constitutional: She is oriented to person, place, and time. She appears well-developed and well-nourished.   HENT:   Head: Normocephalic and atraumatic.   Eyes: Pupils are equal, round, and reactive to light. EOM are normal.   Neck: Normal range of motion. Neck supple.   Cardiovascular: Normal rate and regular rhythm.   Pulmonary/Chest: Effort normal and breath sounds normal.   Abdominal: Soft. Bowel sounds are normal.   Musculoskeletal: Normal range of motion.   Neurological: She is alert and oriented to person, place, and time.   Skin: Skin is warm and dry.   Psychiatric: She has a normal mood and affect. Her behavior is normal. Judgment and thought content normal.   Nursing note and vitals reviewed.      Laboratory Data:  Labs have been reviewed.    Lab Results   Component Value Date    WBC 5.00 07/31/2019    HGB 7.8 (L) 07/31/2019    HCT 26.8 (L) 07/31/2019    MCV 76 (L) 07/31/2019     07/31/2019         Imaging:     CT abdomen/pelvis (8/1/19): I have  personally reviewed the images  - No acute abnormality within the abdomen or pelvis.  - Colonic diverticulosis without diverticulitis.  - Hepatomegaly, unchanged.  - Severe atherosclerotic calcification of the abdominal aorta and its branches.    Assessment:       1. Iron deficiency anemia due to chronic blood loss    2. Nicotine dependence, cigarettes, w unsp disorders    3. Panlobular emphysema    4. Atherosclerosis of aorta    5. Advance care planning         Plan:     1. Iron deficiency anemia due to chronic blood loss  - labs from July 2019 revealed a moderate microcytic anemia with decreased iron/ferritin and elevated total iron binding capacity. These findings are consistent with severe iron deficiency anemia.  - given the severity of her anemia and iron deficiency, I recommend ferric carboxymaltose x 2 doses.  - she saw gastroenterology earlier today and is scheduled for a colonoscopy on 9/11/19.  - return to clinic in 7-8 weeks with repeat iron studies.    2. Nicotine dependence, cigarettes  - she is not interested in quitting.  - continue to monitor.    3. Atherosclerosis of aorta  - seen on CT imaging from 8/1/19  - continue valsartan, atorvastatin, clopidogrel  - continue to monitor.    4. Advance Care Planning     Power of   I initiated the process of advance care planning today and explained the importance of this process to the patient.  I introduced the concept of advance directives to the patient, as well. Then the patient received detailed information about the importance of designating a Health Care Power of  (HCPOA). She was also instructed to communicate with this person about their wishes for future healthcare, should she become sick and lose decision-making capacity. The patient has not previously appointed a HCPOA. After our discussion, the patient has decided to complete a HCPOA and has appointed her Leila Castillo ([phone]). I spent a total time of 16 minutes discussing  this issue with the patient.    Living Will  During this visit, I engaged the patient  in the advance care planning process.  The patient and I reviewed the role for advance directives and their purpose in directing future healthcare if the patient's unable to speak for him/herself.  At this point in time, the patient does have full decision-making capacity.  We discussed different extreme health states that she could experience, and reviewed what kind of medical care she would want in those situations.  The patient communicated that if she were comatose and had little chance of a meaningful recovery, she would not want machines/life-sustaining treatments used. She had previously completed an Advance Directives form, which has been uploaded into her records.       - return to clinic in 7-8 weeks with repeat iron studies.    Kamron Cortez M.D.  Hematology/Oncology  [facility] - [address], [address]  [place] [address]  Phone: [phone]  Fax: [phone]

## 2019-09-06 NOTE — PATIENT INSTRUCTIONS
SUPREP Instructions    You are scheduled for a colonoscopy with Dr. Talbert on 9/11/2019 at [facility] To ensure that your test is accurate and complete, you MUST follow these instructions listed below.  If you have any questions, please call our office at [phone].  Plan on being at the hospital for your procedure for 3-4 hours.    1.  Follow a CLEAR LIQUID DIET for the entire day before your scheduled colonoscopy.  This means no solid food the entire day starting when you wake.  You may have as much of the clear liquids as you want throughout the day.   CLEAR LIQUID DIET:   - Avoid Red, Orange, Purple, and/or Blue food coloring   - NO DAIRY   - You can have:  Coffee with sugar (no creamer), tea, water, soda, apple or white grape juice, chicken or beef broth/bouillon (no meat, noodles, or veggies), green/yellow popsicles, green/yellow Jell-O, lemonade.    2.  AT 5 pm the evening before your colonoscopy, POUR ONE (1) BOTTLE OF SUPREP INTO THE MIXING CONTAINER, PROVIDED INSIDE THE BOX.  ADD WATER TO THE LINE ON THE CONTAINER AND MIX IT WELL.  DRINK THE ENTIRE CONTAINER AND THEN DRINK TWO (2) MORE CONTAINERS OF WATER OVER THE NEXT 1 HOUR.  This is sometimes easier to drink if this solution is cold, so you can mix the solution a few hours ahead of time and place in the refrigerator prior to drinking.  You have to drink the solution within 24 hours of mixing it.  Do NOT put this solution over ice.  It IS ok to drink with a straw.    3.  The endoscopy department will call you 2 days before your colonoscopy to tell you the exact time to arrive, AND to tell you the exact time to drink the 2nd portion of your prep (which will be FIVE HOURS BEFORE YOUR ARRIVAL TIME).  At this time given to you, POUR ONE (1) BOTTLE OF SUPREP INTO THE MIXING CONTAINER, PROVIDED INSIDE THE BOX.  ADD WATER TO THE LINE ON THE CONTAINER AND MIX IT WELL.  DRINK THE ENTIRE CONTAINER AND THEN DRINK TWO (2) MORE CONTAINERS OF WATER OVER THE  NEXT 1 HOUR.  This is sometimes easier to drink if this solution is cold, so you can mix the solution a few hours ahead of time and place in the refrigerator prior to drinking.  You have to drink the solution within 24 hours of mixing it.  Do NOT put this solution over ice.  It IS ok to drink with a straw. Once this is complete, you may not have ANYTHING else by mouth!    4.  You must have someone with you to DRIVE YOU HOME since you will be receiving IV sedation for the colonoscopy.    5.  It is ok to take your heart, blood pressure, and seizure medications in the morning of your test with a SIP of water.  Hold other medications until after your procedure.  Do NOT have anything else to eat or drink the morning of your colonoscopy.  It is ok to brush your teeth.    6.  If you are on blood thinners THAT YOU HAVE BEEN INSTRUCTED TO HOLD BY YOUR DOCTOR FOR THIS PROCEDURE, then do NOT take this the morning of your colonoscopy.  Do NOT stop these medications on your own, they must be approved to be held by your doctor.  Your colonoscopy can NOT be done if you are on these medications.  Examples of blood thinners include: Coumadin, Aggrenox, Plavix, Pradaxa, Reapro, Pletal, Xarelto, Ticagrelor, Brilinta, Eliquis, and high dose aspirin (325 mg).  You do not have to stop baby aspirin 81 mg.    7.  IF YOU ARE DIABETIC:  NO INSULIN OR ORAL MEDICATIONS THE MORNING OF THE COLONOSCOPY.  TAKE ONLY HALF THE DOSE OF YOUR INSULIN THE DAY BEFORE THE COLONOSCOPY.  DO NOT TAKE ANY ORAL DIABETIC MEDICATIONS THE DAY BEFORE THE COLONOSCOPY.  IF YOU ARE AN INSULIN DEPENDENT DIABETIC WITH UNSTABLE BLOOD SUGARS, NOTIFY YOUR PRIMARY CARE PHYSICIAN FOR INSTRUCTIONS.

## 2019-09-06 NOTE — TELEPHONE ENCOUNTER
Patient is scheduled for a Colonoscopy with Dr. Talbert on 9/11/2019. Can patient hold her Plavix 5 days before her procedure?

## 2019-09-06 NOTE — PROGRESS NOTES
Subjective:       Patient ID: Brittany Haile is a 79 y.o. female.    Chief Complaint: Anemia    79-year-old female referred today for evaluation of anemia.  Patient has significant history of coronary artery and peripheral vascular disease. She is on both patella and Plavix for these issues for years.  However, only recently patient has developed significant anemia with a drop in hemoglobin from 13-7 in the past few months.  She does report some dark/black stool that began before her primary care doctor started her on supplemental iron therapy.  In addition she reports new chest pain and pica.    Patient has previously been evaluated by EGD and colonoscopy, as recently as 2017.  EGD showed some patchy erosive gastritis, H pylori negative.  Colonoscopy did not show any pathology but was incomplete due to significant looping.  Subsequent CT colonography ordered did not show any lesions.       Imaging reviewed:  CT abdomen pelvis with contrast 2019  Impression    No acute abnormality within the abdomen or pelvis.  Colonic diverticulosis without diverticulitis.  Hepatomegaly, unchanged.  Severe atherosclerotic calcification of the abdominal aorta and its branches.    CT chest without contrast 2018  Impression     There has been improvement in the pulmonary nodule seen within the right lower lobe of lung with many having resolved since prior exam. This suggests an infectious etiology for these. There are 2 residual pulmonary nodules for which followup CT in 3-6 months time to ensure resolution should be obtained.            Past Medical History:   Diagnosis Date    Abdominal pain     CHF (congestive heart failure)     Chronic kidney disease, stage III (moderate) 8/19/2015    COPD (chronic obstructive pulmonary disease)     COPD (chronic obstructive pulmonary disease)     CVA (cerebral infarction)     Esophagitis     Gastritis     GERD (gastroesophageal reflux disease)     GI bleed     Hyperlipidemia      Hypertension     Mesenteric angina     PVD (peripheral vascular disease) with claudication 10/13/2015    Stroke     Subclavian artery stenosis, left     Thyroid disease        Past Surgical History:   Procedure Laterality Date    CAROTID STENT       SECTION      COLONOSCOPY N/A 3/2/2017    [facility] ENDO    COLONOSCOPY N/A 5/15/2014    [facility] ENDO    CORONARY ANGIOPLASTY  2006    ESOPHAGOGASTRODUODENOSCOPY (EGD) N/A 3/2/2017    [facility] ENDO    GALLBLADDER SURGERY      HYSTERECTOMY      INJECTION, STEROID, SPINE, LUMBAR, EPIDURAL- L2-3 ORAL SEDATION N/A 2018    [facility] Jr[facility] PAIN MGT    INJECTION-STEROID-EPIDURAL-LUMBAR- L5-S1 N/A 5/3/2018    [facility] PAIN MGT    OOPHORECTOMY      SUPERIOR MESTENTERIC ARTERY STENT         Social History  Social History     Tobacco Use    Smoking status: Current Every Day Smoker     Packs/day: 0.50     Years: 60.00     Pack years: 30.00     Types: Cigarettes    Smokeless tobacco: Current User   Substance Use Topics    Alcohol use: No    Drug use: No       No family history on file.    Review of Systems   Constitutional: Negative for chills, fever and unexpected weight change.   HENT: Negative for congestion and trouble swallowing.    Eyes: Negative for photophobia and visual disturbance.   Respiratory: Negative for cough and shortness of breath.    Cardiovascular: Positive for chest pain. Negative for leg swelling.   Gastrointestinal: Positive for blood in stool. Negative for abdominal distention, abdominal pain, constipation, diarrhea, nausea and vomiting.   Genitourinary: Negative for dysuria and hematuria.   Musculoskeletal: Negative for arthralgias and myalgias.   Skin: Negative for color change and rash.   Neurological: Positive for dizziness. Negative for light-headedness and  numbness.   Psychiatric/Behavioral: Negative for agitation and confusion.           Objective:      Physical Exam   Constitutional: She is oriented to person, place, and time. She appears well-developed and well-nourished.   HENT:   Head: Normocephalic and atraumatic.   Eyes: Pupils are equal, round, and reactive to light. EOM are normal. No scleral icterus.   Neck: Normal range of motion. Neck supple.   Cardiovascular: Normal rate, regular rhythm, normal heart sounds and intact distal pulses.   Pulmonary/Chest: Effort normal and breath sounds normal. No respiratory distress.   Abdominal: Soft. Bowel sounds are normal. She exhibits no distension. There is no tenderness.   Musculoskeletal: Normal range of motion. She exhibits no edema.   Neurological: She is alert and oriented to person, place, and time.   No asterixis   Skin: Skin is warm and dry. No rash noted. No erythema.   Psychiatric: She has a normal mood and affect. Her behavior is normal.   Nursing note and vitals reviewed.        Pertinent labs and imaging studies reviewed    Assessment:       1. Iron deficiency anemia, unspecified iron deficiency anemia type    2. Weight loss        Plan:       Patient with new onset of significant iron deficiency anemia.  Will plan for EGD colonoscopy  If negative patient will need video capsule to exclude small bowel blood loss  Continue iron supplementation.  Patient on may need infusion however will defer to Hematology, appointment later today    (Portions of this note were dictated using voice recognition software and may contain dictation related errors in spelling/grammar/syntax not found on text review)

## 2019-09-09 ENCOUNTER — PATIENT MESSAGE (OUTPATIENT)
Dept: FAMILY MEDICINE | Facility: CLINIC | Age: 79
End: 2019-09-09

## 2019-09-09 ENCOUNTER — TELEPHONE (OUTPATIENT)
Dept: ENDOSCOPY | Facility: HOSPITAL | Age: 79
End: 2019-09-09

## 2019-09-09 ENCOUNTER — TELEPHONE (OUTPATIENT)
Dept: HEMATOLOGY/ONCOLOGY | Facility: CLINIC | Age: 79
End: 2019-09-09

## 2019-09-09 ENCOUNTER — TELEPHONE (OUTPATIENT)
Dept: INFUSION THERAPY | Facility: HOSPITAL | Age: 79
End: 2019-09-09

## 2019-09-09 NOTE — TELEPHONE ENCOUNTER
Spoke with pt. about scheduling Injectafer appointments.  Pt request to call back tomorrow when her daughters are home due to needing transportation.     ----- Message from Tang Mayo RN sent at 9/6/2019  4:19 PM CDT -----  Good afternoon,    Dr. Cortez would like pt. Scheduled to receive injectafer x2 doses. She is approved.  Please let me know if you need any additional information.    Thanks,    Tang

## 2019-09-09 NOTE — TELEPHONE ENCOUNTER
Pt.'s daughter, Ms. Green, confirmed infusion center's phone number for appt. Scheduling.    ----- Message from Tatiana Olivas sent at 9/9/2019  9:45 AM CDT -----  Contact: [phone]/[email or web address]  Type:  Patient Returning Call    Who Called: daughter Norma  Who Left Message for Patient:   Does the patient know what this is regarding?: Infusion   Would the patient rather a call back or a response via [facility]?  call  Best Call Back Number:   Additional Information:  [phone]/[email or web address]

## 2019-09-09 NOTE — TELEPHONE ENCOUNTER
Left message instructing patient to call dept @ [phone] between 8am-4pm.    Arrival time to be given @ 1100  (Message sent via [facility] portal)

## 2019-09-09 NOTE — TELEPHONE ENCOUNTER
Spoke with patient about arrival time @1100.     Prep instructions reviewed: the day before the procedure, follow a clear liquid diet all day, then start the first 1/2 of prep at 5pm and take 2nd 1/2 of prep @0530.  Pt must be completely NPO when prep completed @0730.              Medications: Do not take Insulin or oral diabetic medications the day of the procedure.  Take as prescribed: heart, seizure and blood pressure medication in the morning with a sip of water (less than an ounce).  Take any breathing medications and bring inhalers to hospital with you Leave all valuables and jewelry at home.     Wear comfortable clothes to procedure to change into hospital gown You cannot drive for 24 hours after your procedure because you will receive sedation for your procedure to make you comfortable.  A ride must be provided at discharge.

## 2019-09-10 DIAGNOSIS — E03.4 HYPOTHYROIDISM DUE TO ACQUIRED ATROPHY OF THYROID: ICD-10-CM

## 2019-09-10 RX ORDER — ALBUTEROL SULFATE 90 UG/1
1-2 AEROSOL, METERED RESPIRATORY (INHALATION) EVERY 6 HOURS PRN
Qty: 1 INHALER | Refills: 1 | Status: SHIPPED | OUTPATIENT
Start: 2019-09-10 | End: 2019-11-04 | Stop reason: SDUPTHER

## 2019-09-11 ENCOUNTER — ANESTHESIA (OUTPATIENT)
Dept: ENDOSCOPY | Facility: HOSPITAL | Age: 79
DRG: 313 | End: 2019-09-11
Payer: MEDICARE

## 2019-09-11 ENCOUNTER — ANESTHESIA EVENT (OUTPATIENT)
Dept: ENDOSCOPY | Facility: HOSPITAL | Age: 79
DRG: 313 | End: 2019-09-11
Payer: MEDICARE

## 2019-09-11 PROBLEM — D64.9 ANEMIA: Status: ACTIVE | Noted: 2019-09-11

## 2019-09-11 PROCEDURE — 63600175 PHARM REV CODE 636 W HCPCS: Performed by: NURSE ANESTHETIST, CERTIFIED REGISTERED

## 2019-09-11 RX ORDER — PROPOFOL 10 MG/ML
VIAL (ML) INTRAVENOUS CONTINUOUS PRN
Status: DISCONTINUED | OUTPATIENT
Start: 2019-09-11 | End: 2019-09-11

## 2019-09-11 RX ORDER — LIDOCAINE HCL/PF 100 MG/5ML
SYRINGE (ML) INTRAVENOUS
Status: DISCONTINUED | OUTPATIENT
Start: 2019-09-11 | End: 2019-09-11

## 2019-09-11 RX ORDER — LEVOTHYROXINE SODIUM 88 UG/1
TABLET ORAL
Qty: 90 TABLET | Refills: 3 | Status: SHIPPED | OUTPATIENT
Start: 2019-09-11

## 2019-09-11 RX ORDER — PROPOFOL 10 MG/ML
VIAL (ML) INTRAVENOUS
Status: DISCONTINUED | OUTPATIENT
Start: 2019-09-11 | End: 2019-09-11

## 2019-09-11 RX ADMIN — LIDOCAINE HYDROCHLORIDE 100 MG: 20 INJECTION, SOLUTION INTRAVENOUS at 12:09

## 2019-09-11 RX ADMIN — PROPOFOL 100 MCG/KG/MIN: 10 INJECTION, EMULSION INTRAVENOUS at 12:09

## 2019-09-11 RX ADMIN — PROPOFOL 50 MG: 10 INJECTION, EMULSION INTRAVENOUS at 12:09

## 2019-09-11 NOTE — ANESTHESIA POSTPROCEDURE EVALUATION
Anesthesia Post Evaluation    Patient: Brittany Haile    Procedure(s) Performed: Procedure(s) (LRB):  EGD (ESOPHAGOGASTRODUODENOSCOPY) (N/A)  COLONOSCOPY/Suprep (N/A)    Final Anesthesia Type: MAC  Patient location during evaluation: GI PACU  Patient participation: Yes- Able to Participate  Level of consciousness: awake and alert  Post-procedure vital signs: reviewed and stable  Pain management: adequate  Airway patency: patent  PONV status at discharge: No PONV  Anesthetic complications: no      Cardiovascular status: hemodynamically stable and blood pressure returned to baseline  Respiratory status: unassisted, spontaneous ventilation and room air  Hydration status: euvolemic  Follow-up not needed.          Vitals Value Taken Time   /83 9/11/2019  2:29 PM   Temp 36.8 °C (98.2 °F) 9/11/2019  2:03 PM   Pulse 73 9/11/2019  2:29 PM   Resp 22 9/11/2019  2:29 PM   SpO2 93 % 9/11/2019  2:29 PM         No case tracking events are documented in the log.      Pain/Cruz Score: Cruz Score: 10 (9/11/2019  2:21 PM)

## 2019-09-11 NOTE — TRANSFER OF CARE
"Anesthesia Transfer of Care Note    Patient: Brittany Haile    Procedure(s) Performed: Procedure(s) (LRB):  EGD (ESOPHAGOGASTRODUODENOSCOPY) (N/A)  COLONOSCOPY/Suprep (N/A)    Patient location: GI    Anesthesia Type: MAC    Transport from OR: Transported from OR on room air with adequate spontaneous ventilation    Post pain: adequate analgesia    Post assessment: no apparent anesthetic complications    Post vital signs: stable    Level of consciousness: awake    Nausea/Vomiting: no nausea/vomiting    Complications: none    Transfer of care protocol was followed      Last vitals:   Visit Vitals  BP (!) 198/87 (BP Location: Left arm, Patient Position: Lying)   Pulse 91   Temp 37.5 °C (99.5 °F) (Tympanic)   Resp 18   Ht 5' 1" (1.549 m)   Wt 59.9 kg (132 lb)   SpO2 95%   Breastfeeding? No   BMI 24.94 kg/m²     "

## 2019-09-11 NOTE — ANESTHESIA PREPROCEDURE EVALUATION
09/11/2019  Brittany Haile is a 79 y.o., female for EGD and colonoscopy under MAC    Past Medical History:   Diagnosis Date    Abdominal pain     CHF (congestive heart failure)     Chronic kidney disease, stage III (moderate) 8/19/2015    COPD (chronic obstructive pulmonary disease)     COPD (chronic obstructive pulmonary disease)     CVA (cerebral infarction)     Esophagitis     Gastritis     GERD (gastroesophageal reflux disease)     GI bleed     Hyperlipidemia     Hypertension     Mesenteric angina     PVD (peripheral vascular disease) with claudication 10/13/2015    Stroke     Subclavian artery stenosis, left     Thyroid disease    smoker      Anesthesia Evaluation    I have reviewed the Patient Summary Reports.    I have reviewed the Nursing Notes.   I have reviewed the Medications.     Review of Systems  Social:  Smoker        Physical Exam  General:  Well nourished    Airway/Jaw/Neck:  Airway Findings: Mallampati: II      Chest/Lungs:  Chest/Lungs Findings: Decreased Breathe Sounds Left, Decreased Breathe Sounds Right, Normal Respiratory Rate     Heart/Vascular:  Heart Findings: Normal        CONCLUSIONS     1 - Normal left ventricular systolic function (EF 60-65%).     2 - Impaired LV relaxation, normal LAP (grade 1 diastolic dysfunction).     3 - Normal right ventricular systolic function .     4 - No wall motion abnormalities.     5 - The estimated PA systolic pressure is 7 mmHg.     6 - Concentric hypertrophy.             This document has been electronically    SIGNED BY: Jayy Holland MD On: 04/17/2017 12:42    Anesthesia Plan  Type of Anesthesia, risks & benefits discussed:  Anesthesia Type:  MAC  Patient's Preference:   Intra-op Monitoring Plan:   Intra-op Monitoring Plan Comments:   Post Op Pain Control Plan:   Post Op Pain Control Plan Comments:   Induction:    Beta Blocker:   Patient is not currently on a Beta-Blocker (No further documentation required).       Informed Consent: Patient understands risks and agrees with Anesthesia plan.  Questions answered. Anesthesia consent signed with patient.  ASA Score: 3     Day of Surgery Review of History & Physical:        Anesthesia Plan Notes: NPO water 1030am        Ready For Surgery From Anesthesia Perspective.

## 2019-09-13 ENCOUNTER — HOSPITAL ENCOUNTER (INPATIENT)
Facility: HOSPITAL | Age: 79
LOS: 1 days | Discharge: HOME | DRG: 313 | End: 2019-09-14
Attending: EMERGENCY MEDICINE | Admitting: INTERNAL MEDICINE
Payer: MEDICARE

## 2019-09-13 ENCOUNTER — TELEPHONE (OUTPATIENT)
Dept: HEMATOLOGY/ONCOLOGY | Facility: CLINIC | Age: 79
End: 2019-09-13

## 2019-09-13 ENCOUNTER — NURSE TRIAGE (OUTPATIENT)
Dept: ADMINISTRATIVE | Facility: CLINIC | Age: 79
End: 2019-09-13

## 2019-09-13 DIAGNOSIS — R53.1 WEAKNESS: ICD-10-CM

## 2019-09-13 DIAGNOSIS — R10.9 ABDOMINAL PAIN: ICD-10-CM

## 2019-09-13 DIAGNOSIS — D64.9 ANEMIA, UNSPECIFIED TYPE: ICD-10-CM

## 2019-09-13 DIAGNOSIS — R07.9 CHEST PAIN: Primary | ICD-10-CM

## 2019-09-13 DIAGNOSIS — R53.83 FATIGUE: ICD-10-CM

## 2019-09-13 LAB
ABO + RH BLD: NORMAL
ALBUMIN SERPL BCP-MCNC: 3.5 G/DL (ref 3.5–5.2)
ALP SERPL-CCNC: 100 U/L (ref 55–135)
ALT SERPL W/O P-5'-P-CCNC: 10 U/L (ref 10–44)
ANION GAP SERPL CALC-SCNC: 7 MMOL/L (ref 8–16)
ANISOCYTOSIS BLD QL SMEAR: SLIGHT
APTT BLDCRRT: 28.5 SEC (ref 21–32)
AST SERPL-CCNC: 16 U/L (ref 10–40)
BASOPHILS # BLD AUTO: 0.02 K/UL (ref 0–0.2)
BASOPHILS NFR BLD: 0.4 % (ref 0–1.9)
BILIRUB SERPL-MCNC: 0.2 MG/DL (ref 0.1–1)
BILIRUB UR QL STRIP: NEGATIVE
BLD GP AB SCN CELLS X3 SERPL QL: NORMAL
BNP SERPL-MCNC: 70 PG/ML (ref 0–99)
BUN SERPL-MCNC: 25 MG/DL (ref 8–23)
CALCIUM SERPL-MCNC: 8.9 MG/DL (ref 8.7–10.5)
CHLORIDE SERPL-SCNC: 100 MMOL/L (ref 95–110)
CLARITY UR: CLEAR
CO2 SERPL-SCNC: 30 MMOL/L (ref 23–29)
COLOR UR: YELLOW
CREAT SERPL-MCNC: 1.2 MG/DL (ref 0.5–1.4)
DIFFERENTIAL METHOD: ABNORMAL
EOSINOPHIL # BLD AUTO: 0.1 K/UL (ref 0–0.5)
EOSINOPHIL NFR BLD: 2.9 % (ref 0–8)
ERYTHROCYTE [DISTWIDTH] IN BLOOD BY AUTOMATED COUNT: 19.5 % (ref 11.5–14.5)
EST. GFR  (AFRICAN AMERICAN): 49 ML/MIN/1.73 M^2
EST. GFR  (NON AFRICAN AMERICAN): 42 ML/MIN/1.73 M^2
GLUCOSE SERPL-MCNC: 133 MG/DL (ref 70–110)
GLUCOSE UR QL STRIP: NEGATIVE
HCT VFR BLD AUTO: 28.3 % (ref 37–48.5)
HGB BLD-MCNC: 8 G/DL (ref 12–16)
HGB UR QL STRIP: ABNORMAL
HYPOCHROMIA BLD QL SMEAR: ABNORMAL
INR PPP: 1 (ref 0.8–1.2)
KETONES UR QL STRIP: NEGATIVE
LEUKOCYTE ESTERASE UR QL STRIP: ABNORMAL
LYMPHOCYTES # BLD AUTO: 1.3 K/UL (ref 1–4.8)
LYMPHOCYTES NFR BLD: 27.3 % (ref 18–48)
MCH RBC QN AUTO: 20.4 PG (ref 27–31)
MCHC RBC AUTO-ENTMCNC: 28.3 G/DL (ref 32–36)
MCV RBC AUTO: 72 FL (ref 82–98)
MICROSCOPIC COMMENT: NORMAL
MONOCYTES # BLD AUTO: 0.4 K/UL (ref 0.3–1)
MONOCYTES NFR BLD: 9 % (ref 4–15)
NEUTROPHILS # BLD AUTO: 2.9 K/UL (ref 1.8–7.7)
NEUTROPHILS NFR BLD: 60.4 % (ref 38–73)
NITRITE UR QL STRIP: NEGATIVE
OB PNL STL: POSITIVE
OVALOCYTES BLD QL SMEAR: ABNORMAL
PH UR STRIP: 6 [PH] (ref 5–8)
PLATELET # BLD AUTO: 340 K/UL (ref 150–350)
PLATELET BLD QL SMEAR: ABNORMAL
PMV BLD AUTO: 8.5 FL (ref 9.2–12.9)
POIKILOCYTOSIS BLD QL SMEAR: SLIGHT
POTASSIUM SERPL-SCNC: 4 MMOL/L (ref 3.5–5.1)
PROT SERPL-MCNC: 6.1 G/DL (ref 6–8.4)
PROT UR QL STRIP: NEGATIVE
PROTHROMBIN TIME: 10.6 SEC (ref 9–12.5)
RBC # BLD AUTO: 3.92 M/UL (ref 4–5.4)
RBC #/AREA URNS HPF: 1 /HPF (ref 0–4)
SODIUM SERPL-SCNC: 137 MMOL/L (ref 136–145)
SP GR UR STRIP: 1.01 (ref 1–1.03)
TROPONIN I SERPL DL<=0.01 NG/ML-MCNC: 0.02 NG/ML (ref 0–0.03)
TROPONIN I SERPL DL<=0.01 NG/ML-MCNC: 0.02 NG/ML (ref 0–0.03)
URN SPEC COLLECT METH UR: ABNORMAL
UROBILINOGEN UR STRIP-ACNC: NEGATIVE EU/DL
WBC # BLD AUTO: 4.76 K/UL (ref 3.9–12.7)
WBC #/AREA URNS HPF: 2 /HPF (ref 0–5)

## 2019-09-13 PROCEDURE — 36415 COLL VENOUS BLD VENIPUNCTURE: CPT

## 2019-09-13 PROCEDURE — 63600175 PHARM REV CODE 636 W HCPCS: Performed by: NURSE PRACTITIONER

## 2019-09-13 PROCEDURE — 99285 EMERGENCY DEPT VISIT HI MDM: CPT | Mod: 25

## 2019-09-13 PROCEDURE — 93005 ELECTROCARDIOGRAM TRACING: CPT

## 2019-09-13 PROCEDURE — 81000 URINALYSIS NONAUTO W/SCOPE: CPT

## 2019-09-13 PROCEDURE — 82272 OCCULT BLD FECES 1-3 TESTS: CPT

## 2019-09-13 PROCEDURE — 25000003 PHARM REV CODE 250: Performed by: NURSE PRACTITIONER

## 2019-09-13 PROCEDURE — 86850 RBC ANTIBODY SCREEN: CPT

## 2019-09-13 PROCEDURE — 84484 ASSAY OF TROPONIN QUANT: CPT

## 2019-09-13 PROCEDURE — 85730 THROMBOPLASTIN TIME PARTIAL: CPT

## 2019-09-13 PROCEDURE — 93010 ELECTROCARDIOGRAM REPORT: CPT | Mod: 76,,, | Performed by: INTERNAL MEDICINE

## 2019-09-13 PROCEDURE — 84484 ASSAY OF TROPONIN QUANT: CPT | Mod: 91

## 2019-09-13 PROCEDURE — 25000003 PHARM REV CODE 250: Performed by: STUDENT IN AN ORGANIZED HEALTH CARE EDUCATION/TRAINING PROGRAM

## 2019-09-13 PROCEDURE — 85610 PROTHROMBIN TIME: CPT

## 2019-09-13 PROCEDURE — 93010 EKG 12-LEAD: ICD-10-PCS | Mod: ,,, | Performed by: INTERNAL MEDICINE

## 2019-09-13 PROCEDURE — 93010 ELECTROCARDIOGRAM REPORT: CPT | Mod: ,,, | Performed by: INTERNAL MEDICINE

## 2019-09-13 PROCEDURE — 11000001 HC ACUTE MED/SURG PRIVATE ROOM

## 2019-09-13 PROCEDURE — 80053 COMPREHEN METABOLIC PANEL: CPT

## 2019-09-13 PROCEDURE — 83880 ASSAY OF NATRIURETIC PEPTIDE: CPT

## 2019-09-13 PROCEDURE — 85025 COMPLETE CBC W/AUTO DIFF WBC: CPT

## 2019-09-13 RX ORDER — SODIUM CHLORIDE 0.9 % (FLUSH) 0.9 %
10 SYRINGE (ML) INJECTION
Status: DISCONTINUED | OUTPATIENT
Start: 2019-09-13 | End: 2019-09-14 | Stop reason: HOSPADM

## 2019-09-13 RX ORDER — ATORVASTATIN CALCIUM 40 MG/1
40 TABLET, FILM COATED ORAL DAILY
Status: DISCONTINUED | OUTPATIENT
Start: 2019-09-14 | End: 2019-09-14 | Stop reason: HOSPADM

## 2019-09-13 RX ORDER — HEPARIN SODIUM 5000 [USP'U]/ML
5000 INJECTION, SOLUTION INTRAVENOUS; SUBCUTANEOUS EVERY 8 HOURS
Status: DISCONTINUED | OUTPATIENT
Start: 2019-09-13 | End: 2019-09-14 | Stop reason: HOSPADM

## 2019-09-13 RX ORDER — LOSARTAN POTASSIUM 25 MG/1
25 TABLET ORAL DAILY
Status: DISCONTINUED | OUTPATIENT
Start: 2019-09-13 | End: 2019-09-14 | Stop reason: HOSPADM

## 2019-09-13 RX ORDER — ESCITALOPRAM OXALATE 10 MG/1
10 TABLET ORAL NIGHTLY
Status: DISCONTINUED | OUTPATIENT
Start: 2019-09-13 | End: 2019-09-14 | Stop reason: HOSPADM

## 2019-09-13 RX ORDER — NITROGLYCERIN 0.4 MG/1
0.4 TABLET SUBLINGUAL
Status: COMPLETED | OUTPATIENT
Start: 2019-09-13 | End: 2019-09-13

## 2019-09-13 RX ORDER — LOSARTAN POTASSIUM 25 MG/1
25 TABLET ORAL DAILY
Status: DISCONTINUED | OUTPATIENT
Start: 2019-09-14 | End: 2019-09-13

## 2019-09-13 RX ORDER — LEVOTHYROXINE SODIUM 88 UG/1
88 TABLET ORAL
Status: DISCONTINUED | OUTPATIENT
Start: 2019-09-14 | End: 2019-09-14 | Stop reason: HOSPADM

## 2019-09-13 RX ORDER — PANTOPRAZOLE SODIUM 40 MG/1
40 TABLET, DELAYED RELEASE ORAL DAILY
Status: DISCONTINUED | OUTPATIENT
Start: 2019-09-14 | End: 2019-09-14 | Stop reason: HOSPADM

## 2019-09-13 RX ORDER — ASPIRIN 325 MG
325 TABLET ORAL
Status: COMPLETED | OUTPATIENT
Start: 2019-09-13 | End: 2019-09-13

## 2019-09-13 RX ORDER — CLOPIDOGREL BISULFATE 75 MG/1
75 TABLET ORAL DAILY
Status: DISCONTINUED | OUTPATIENT
Start: 2019-09-14 | End: 2019-09-14 | Stop reason: HOSPADM

## 2019-09-13 RX ADMIN — ESCITALOPRAM OXALATE 10 MG: 10 TABLET ORAL at 10:09

## 2019-09-13 RX ADMIN — LOSARTAN POTASSIUM 25 MG: 25 TABLET ORAL at 06:09

## 2019-09-13 RX ADMIN — NITROGLYCERIN 0.4 MG: 0.4 TABLET, ORALLY DISINTEGRATING SUBLINGUAL at 03:09

## 2019-09-13 RX ADMIN — ASPIRIN 325 MG ORAL TABLET 325 MG: 325 PILL ORAL at 03:09

## 2019-09-13 NOTE — ED TRIAGE NOTES
Patient states she has been feeling weak and she has been having chest pain and left arm numbness off and on.

## 2019-09-13 NOTE — TELEPHONE ENCOUNTER
Pt.'s daughter confirmed infusion center's number to reschedule injectafer appt.'s.  She also reports pt. C/o left arm numbness yesterday and weakness today.  Instructed her to take pt. To emergency dept. Now.  Pt.'s daughter verbalized understanding and confirmed she would take her to ED now.

## 2019-09-13 NOTE — H&P
"[facility] Medicine H&P Note     Admitting Team: [facility] Hospitalist Team A  Attending Physician: Daniel Huerta MD  Resident: John Rodriguez  Intern: Wilfredo Chauhan    Date of Admit: 9/13/2019    Chief Complaint     SOB + Chest Pressure radiating to Left Arm for 3 days    Subjective:      History of Present Illness:    Brittany Haile is an 80 yo F w/ PMHx of COPD and PVD w/ stents in Celiac and SMA. She is primarily Irish speaking and most of this hx is obtained through her daughters. She presents with episodes of chest "pressure" accompanied by SOB and L Arm radiation since last Wednesday after she had a colonoscopy and upper endoscopy performed. On Wednesday she had one of these episodes, on Thursday she had two, and today (Friday) she had about two at home and three since she's been in the ED.     She says these episodes last about 5-10 minutes. They occur at rest and seemingly at random with no associated events. She denies feeling dizzy or having a headache during these episodes. She has no hx of MI, Afib, or any other arrhythmias. She's been told she has a hx of CHF and CKD on her chart, but she doesn't take any medications for these conditions and doesn't know how she came to be diagnosed.       She also explains a recent history of anemia. She had never had anemia before, and last April she had routine blood tests that were totally normal. Over the past few months she noticed increased fatigue and eating ice. On July 31 she had repeat blood tests that showed a Hb of 7, and she underwent further testing that demonstrated iron deficiency anemia. She began seeing a Gastroenterologist and Hematologist for this, and they said they suspected GI bleed because of her fatigue and also she's had dark stools for the past 3-4 months. A colonoscopy and upper GI endoscopy were performed last Wednesday (9/11) with a few polyps removed but otherwise benign findings. Biopsies were taken and pathology results are still pending. " Since Wednesday the pt has not had any BM's but does have flatus. She had two episodes yesterday of expelling a small volume of blood per rectum. She says there was no stool in these bowel movements and she experienced no abdominal pain. She and her daughters believe this is likely 2/2 to colonoscopy biopsies.     Pt was admitted with a BP of 147/65. During my interview her BP was in the 170's systolic, and we were called down about an hour later due to BP of 238/104. Pt was also tachycardic of close to 110's during both my interview and when we were called.       Past Medical History:  Past Medical History:   Diagnosis Date    Abdominal pain     CHF (congestive heart failure)     Chronic kidney disease, stage III (moderate) 2015    COPD (chronic obstructive pulmonary disease)     COPD (chronic obstructive pulmonary disease)     CVA (cerebral infarction)     Esophagitis     Gastritis     GERD (gastroesophageal reflux disease)     GI bleed     Hyperlipidemia     Hypertension     Mesenteric angina     PVD (peripheral vascular disease) with claudication 10/13/2015    Stroke     Subclavian artery stenosis, left     Thyroid disease      (Pt reports PMHx of COPD and peripheral vascular disease. Unsure how she came to be diagnosed with CHF and CKD)    Past Surgical History:  Past Surgical History:   Procedure Laterality Date    CAROTID STENT       SECTION      COLONOSCOPY N/A 3/2/2017    [facility]    COLONOSCOPY N/A 5/15/2014    [facility]    COLONOSCOPY/Suprep N/A 2019    [facility] at [facility]    CORONARY ANGIOPLASTY  2006    EGD (ESOPHAGOGASTRODUODENOSCOPY) N/A 2019    [facility]    ESOPHAGOGASTRODUODENOSCOPY (EGD) N/A 3/2/2017    [facility]    GALLBLADDER SURGERY      HYSTERECTOMY       INJECTION, STEROID, SPINE, LUMBAR, EPIDURAL- L2-3 ORAL SEDATION N/A 8/16/2018    Per[facility] Stiven Ibarra Jr., [facility] at [facility] PAIN MGT    INJECTION-STEROID-EPIDURAL-LUMBAR- L5-S1 N/A 5/3/2018    Performed by Stiven [facility]ra Jr[facility] at [facility] PAIN MGT    OOPHORECTOMY      SUPERIOR MESTENTERIC ARTERY STENT       (Pt reports PSH of C-sections and hysterectomy 20+ yrs ago, carotid endarterectomy around 2005, SMA and Celiac stents in 2017 and Cholecystectomy around 5 yrs ago)    Allergies:  Review of patient's allergies indicates:  No Known Allergies    Home Medications:  Prior to Admission medications    Medication Sig Start Date End Date Taking? Authorizing Provider   albuterol (PROVENTIL/VENTOLIN HFA) 90 mcg/actuation inhaler Inhale 1-2 puffs into the lungs every 6 (six) hours as needed for Wheezing. Rescue 9/10/19   Wayne Frazier MD   atorvastatin (LIPITOR) 40 MG tablet Take 1 tablet (40 mg total) by mouth once daily. 1/31/19   Wayne Frazier MD   escitalopram oxalate (LEXAPRO) 10 MG tablet Take 1 tablet (10 mg total) by mouth nightly. 1/31/19   Wayne Frazier MD   fluticasone-salmeterol 250-50 mcg/dose (ADVAIR) 250-50 mcg/dose diskus inhaler Inhale 1 puff into the lungs 2 (two) times daily. 1/3/18   Wayne Frazier MD   levothyroxine (SYNTHROID) 88 MCG tablet TAKE 1 TABLET(88 MCG) BY MOUTH EVERY DAY 9/11/19   Wayne Frazier MD   meclizine (ANTIVERT) 25 mg tablet TAKE 1 TABLET(25 MG) BY MOUTH EVERY 6 HOURS AS NEEDED FOR DIZZINESS 6/14/17   Wayne Frazier MD   pantoprazole (PROTONIX) 40 MG tablet TAKE 1 TABLET(40 MG) BY MOUTH EVERY DAY 1/31/19   Wayne Frazier MD   tiotropium (SPIRIVA WITH HANDIHALER) 18 mcg inhalation capsule Inhale 1 capsule (18 mcg total) into the lungs once daily. 1/31/19   Wayne Frazier MD   valsartan (DIOVAN) 80 MG tablet Take 1 tablet (80 mg total) by mouth once daily. 7/31/19 7/30/20  Wayne Frazier MD     (Pt furnished medication list which consisted of:   - Synthroid 88, Albuterol  inhaler 90, Valsartan 80, Pantoprazole 40, Atorvastatin 40, Lexapro 10, Meclizine 25 and Plavix 75     Family History:  Family History   Problem Relation Age of Onset    Cancer Sister         breast    Cancer Brother         throat cancer     (Pt reports Breast Ca in sister and Lung Ca in brother)     Social History:  Social History     Tobacco Use    Smoking status: Current Every Day Smoker     Packs/day: 0.50     Years: 60.00     Pack years: 30.00     Types: Cigarettes    Smokeless tobacco: Current User   Substance Use Topics    Alcohol use: No    Drug use: No     (Pt retired, used to work in childcare. Smoked for 60 yrs, currently at about 1/2 ppd but was 2 ppd for many years. Never drank alcohol or illicit drugs)    Review of Systems:  Review of Systems   Constitutional: Positive for malaise/fatigue. Negative for chills, fever and weight loss.   Eyes: Negative for blurred vision.   Respiratory: Positive for shortness of breath. Negative for cough, hemoptysis, sputum production and wheezing.    Cardiovascular: Positive for chest pain. Negative for palpitations, orthopnea and leg swelling.   Gastrointestinal: Positive for blood in stool. Negative for abdominal pain, constipation, diarrhea, melena, nausea and vomiting.   Genitourinary: Negative for dysuria, frequency and urgency.   Musculoskeletal: Negative for back pain, falls and myalgias.   Skin: Negative for rash.   Neurological: Negative for dizziness, focal weakness, weakness and headaches.   Psychiatric/Behavioral: Negative for substance abuse.         Health Maintaince :   Primary Care Physician: Wayne Frazier    Immunizations:   TDap UTD    Flu UTD  Pna UTD    Cancer Screening:  PAP: no longer gets them, no hx of abnormalities  MMG: no longer gets them, no hx of abnormalities  Colonoscopy: UTD     Objective:   Last 24 Hour Vital Signs:  BP  Min: 147/65  Max: 147/65  Temp  Av.3 °F (36.8 °C)  Min: 98.3 °F (36.8 °C)  Max: 98.3 °F (36.8 °C)  Pulse   "Av  Min: 84  Max: 84  Resp  Av  Min: 20  Max: 20  SpO2  Av %  Min: 88 %  Max: 98 %  Height  Av' 1" (154.9 cm)  Min: 5' 1" (154.9 cm)  Max: 5' 1" (154.9 cm)  Weight  Av.9 kg (132 lb)  Min: 59.9 kg (132 lb)  Max: 59.9 kg (132 lb)  Body mass index is 24.94 kg/m².  No intake/output data recorded.    Physical Examination:  General: AAOx3, resting comfortably  Head: Atraumatic, normocephalic  Eyes: PERRL, EOMI   Neck: No cervical lymphadenopathy or thyromegaly  CV: Tachycardic, normal rhythm, no murmurs, gallops or rubs. No carotid bruits  Pulm: Clear lung sounds bilaterally, no wheezes, rhonchi or rales  Abdomen: Soft, non-distended, non-tender. BS+ x4. No hepatosplenomegaly. No guarding or rebound tenderness  Extremities: Good muscle tone and ROM. No edema or cyanosis. 2+ peripheral pulses full and symmetric bilaterally  Skin: No lesions, ulcerations or ecchymoses. Mild pallor noted     During my physical exam, patient said she started having the chest pain episode w/ SOB + L Arm radiation. During this event, pt's vital signs remained stable and didn't change at all. Pt was conversing calmly with me and answering questions while she said she was having this episode. I observed no tachypnea or signs of distress       Laboratory:  Most Recent Data:  CBC:   Lab Results   Component Value Date    WBC 4.76 2019    HGB 8.0 (L) 2019    HCT 28.3 (L) 2019     2019    MCV 72 (L) 2019    RDW 19.5 (H) 2019       BMP:   Lab Results   Component Value Date     2019    K 4.0 2019     2019    CO2 30 (H) 2019    BUN 25 (H) 2019    CREATININE 1.2 2019     (H) 2019    CALCIUM 8.9 2019    MG 1.8 2018    PHOS 3.7 2018     LFTs:   Lab Results   Component Value Date    PROT 6.1 2019    ALBUMIN 3.5 2019    BILITOT 0.2 2019    AST 16 2019    ALKPHOS 100 2019    ALT 10 " 09/13/2019     Coags:   Lab Results   Component Value Date    INR 1.0 09/13/2019     FLP:   Lab Results   Component Value Date    CHOL 154 07/29/2019    HDL 61 07/29/2019    LDLCALC 78.4 07/29/2019    TRIG 73 07/29/2019    CHOLHDL 39.6 07/29/2019     DM:   Lab Results   Component Value Date    HGBA1C 5.6 07/29/2019    HGBA1C 5.6 07/25/2018    HGBA1C 5.7 (H) 01/03/2018    LDLCALC 78.4 07/29/2019    CREATININE 1.2 09/13/2019     Thyroid:   Lab Results   Component Value Date    TSH 0.354 (L) 07/29/2019    FREET4 1.17 07/29/2019     Anemia:   Lab Results   Component Value Date    IRON <10 (L) 07/31/2019    TIBC 546 (H) 07/31/2019    FERRITIN 5 (L) 07/31/2019    UIWECCJV85 272 01/18/2017    FOLATE 9.2 01/20/2016     Cardiac:   Lab Results   Component Value Date    TROPONINI 0.018 09/13/2019    BNP 70 09/13/2019     Urinalysis:   Lab Results   Component Value Date    LABURIN No growth 04/20/2017    COLORU Yellow 09/13/2019    SPECGRAV 1.010 09/13/2019    NITRITE Negative 09/13/2019    KETONESU Negative 09/13/2019    UROBILINOGEN Negative 09/13/2019    WBCUA 2 09/13/2019       Trended Lab Data:  Recent Labs   Lab 09/13/19  1342   WBC 4.76   HGB 8.0*   HCT 28.3*      MCV 72*   RDW 19.5*      K 4.0      CO2 30*   BUN 25*   CREATININE 1.2   *   PROT 6.1   ALBUMIN 3.5   BILITOT 0.2   AST 16   ALKPHOS 100   ALT 10       Trended Cardiac Data:  Recent Labs   Lab 09/13/19  1342   TROPONINI 0.018   BNP 70         Other Results:  EKG (my interpretation): Unremarkable EKG. Normal sinus rhythm and rate. PVC's present in 7/4/19 EKG have resolved. Nonspecific T wave abnormalities. No signs of ischemia or infarction.    Radiology:  Imaging Results          CT Head Without Contrast (Final result)  Result time 09/13/19 15:07:52    Final result by Milton Barron MD (09/13/19 15:07:52)                 Impression:      No acute large vascular territory infarct or intracranial hemorrhage identified.  Further  evaluation/follow-up as warranted.      Electronically signed by: Milton Barron MD  Date:    09/13/2019  Time:    15:07             Narrative:    EXAMINATION:  CT HEAD WITHOUT CONTRAST    CLINICAL HISTORY:  Confusion/delirium, altered LOC, unexplained;    TECHNIQUE:  Low dose axial CT images obtained throughout the head without intravenous contrast. Sagittal and coronal reconstructions were performed.    COMPARISON:  Head CT 04/25/2019 and MRI brain 06/08/2011    FINDINGS:  Intracranial compartment:    Age-appropriate mild generalized cerebral volume loss.  The ventricles are midline and stable in size configuration without distortion by mass effect or acute hydrocephalus.  No extra-axial blood or fluid collections.    Grossly stable distribution of supratentorial white matter chronic small vessel ischemic change.  No definite new focal areas of abnormal parenchymal attenuation.  No parenchymal mass, hemorrhage, edema or major vascular distribution infarct.  Skull base atherosclerotic vascular calcifications noted.    Skull/extracranial contents (limited evaluation): No fracture. Mastoid air cells and paranasal sinuses are essentially clear.                               X-Ray Abdomen AP 1 View (KUB) (Final result)  Result time 09/13/19 14:16:30    Final result by Michele Valente MD (09/13/19 14:16:30)                 Impression:      No acute process identified.    Further workup as warranted clinically.      Electronically signed by: Michele Valente MD  Date:    09/13/2019  Time:    14:16             Narrative:    EXAMINATION:  XR ABDOMEN AP 1 VIEW    CLINICAL HISTORY:  Unspecified abdominal pain    TECHNIQUE:  Single AP View of the abdomen was performed.    COMPARISON:  CT 08/01/2019    FINDINGS:  Bowel gas pattern appears nonobstructive.  No dilated loops of bowel identified.  No obvious free air.    Cholecystectomy clips.    Osseous degenerative change    Scattered arterial calcification.                                X-Ray Chest 1 View (Final result)  Result time 09/13/19 13:47:18    Final result by Gwen Velásquez MD (09/13/19 13:47:18)                 Impression:      The source of the patient's chest pain is not established on this study.      Electronically signed by: Gwen Velásquez MD  Date:    09/13/2019  Time:    13:47             Narrative:    EXAMINATION:  XR CHEST 1 VIEW    CLINICAL HISTORY:  Chest pain, unspecified    TECHNIQUE:  Single frontal view of the chest was performed.    COMPARISON:  07/04/2019.    FINDINGS:  Cardiac silhouette is mildly enlarged but stable.  Mediastinal structures are midline.  Thoracic aorta is atherosclerotic.  Abundant calcific atherosclerosis is also evident in the axillary and subclavian arteries as well as the right innominate artery.    The lungs are deeply and symmetrically inflated similar to the earlier study.  Obstructive airways disease is not excluded.    I detect no focal pulmonary disease, pleural fluid, lymph node enlargement, cardiac decompensation, pneumothorax, pneumomediastinum, pneumoperitoneum or significant osseous abnormality.                                   Assessment:     Brittany Haile is a 79 y.o. female with:  Patient Active Problem List    Diagnosis Date Noted    Anemia 09/11/2019    Epistaxis 05/13/2019    Chronic obstructive pulmonary disease 01/31/2019    Gastroesophageal reflux disease 01/31/2019    Moderate recurrent major depression 07/25/2018    Lumbar radiculopathy 05/03/2018    Chronic bilateral low back pain with bilateral sciatica 04/20/2018    Lumbar spondylosis 04/20/2018    Spinal stenosis of lumbar region with neurogenic claudication 04/20/2018    DDD (degenerative disc disease), lumbar 04/20/2018    Chronic diastolic heart failure 01/03/2018    Stroke 10/03/2017    Aortic atherosclerosis 10/03/2017    GIB (gastrointestinal bleeding) 04/23/2017    Mesenteric angina 04/17/2017    Angina mesenteric 04/13/2017    Nicotine  "dependence, cigarettes, w unsp disorders 04/13/2017    Coronary artery disease of native artery of native heart with stable angina pectoris 04/13/2017    Abdominal pain 03/02/2017    Gastritis 03/02/2017    CVD (cerebrovascular disease) 10/14/2016    Chest pain 10/14/2016    Subclavian artery stenosis, left 04/15/2016    Glucose intolerance (impaired glucose tolerance) 11/11/2015    Iron deficiency anemia due to chronic blood loss 10/22/2015    Leg pain, bilateral 10/13/2015    PVD (peripheral vascular disease) with claudication 10/13/2015    Chronic kidney disease, stage III (moderate) 08/19/2015    COPD (chronic obstructive pulmonary disease) 06/04/2015    HLD (hyperlipidemia) 06/04/2015    Incidental pulmonary nodule, > 3mm and < 8mm 08/01/2013    History of stroke without residual deficits 04/12/2013    Carotid stenosis 04/12/2013    Hypothyroid 04/12/2013    Hypertension 04/12/2013    Anxiety 04/12/2013        Plan:     Atypical Chest pain  - Chest "pressure" sensation w/ SOB and radiation to L arm that occurs at rest  - In the ED, troponin 0.022, BNP 70 (both unremarkable, EKG significant for ST depressions in the lateral leads,  received AGE655 and Nitroglycerin 0.4    - Episode was observed in ED, pt appeared to be in no distress, vital signs were stable  Plan:   - Continue Plavix 75, Atorvastatin 40  - Continue to trend troponins and perform serial EKG's    Iron Deficiency Anemia  - 9/13/19 H/H 8.0/28.3 MCV 72  - 7/31/19 Iron 10, TIBC 546, Ferritin 5  ---- 10/21/15 Iron 12, TIBC 448, Ferritin 7  - Pt complains of fatigue, eating ice and dark stools for the past 3-4 months  Plan:   - Replete with Ferrous Sulfate 325mg tablet daily    Hypertension  - BP on admission 147/65  - Elevated as high as 238/104; pt does not remember if she took her BP meds today  Plan:   - Continue home Valsartan 80    COPD  - Pt with SpO2 around 88% on room air, joshua to 98% on 3L NC  Plan:   - Continue home " Albuterol 90  - Encouraged smoking cessation     Hypothyroidism  - TSH 0.354  Plan:   - Continue home Synthroid 88    Code Status:     Code: Full  Diet: NPO  DVT ppx: Heparin  Dispo: pending chest pain and anemia workup    Diego Aleman MS4  and  Johnny Chauhan, DO  [facility] HO-1    [facility] Medicine Hospitalist Pager numbers:   [facility] Hospitalist Medicine Team A ([place]/Kristin): [phone]  [facility] Hospitalist Medicine Team B ([place]/James):  [phone]

## 2019-09-13 NOTE — ED PROVIDER NOTES
Sort note: Brittany Haile nontoxic/afebrile 79 y.o.  presented to the ED with c/o fatigue and bloody stool. Recent colonoscopy.      Patient seen and medically screened by Nurse practitioner in Sort process due to ED crowding.  Appropriate tests and/or medications ordered.  Care transferred to an alternate provider when patient was placed in an Exam Room from the lob for physical exam, additional orders, and disposition. 12:23 PM. MARIO Munroe, DOUGLAS  09/13/19 1224

## 2019-09-13 NOTE — TELEPHONE ENCOUNTER
"    Reason for Disposition   New neurologic deficit that is present NOW, sudden onset of ANY of the following: * Weakness of the face, arm, or leg on one side of the body * Numbness of the face, arm, or leg on one side of the body * Loss of speech or garbled speech    Additional Information   Negative: Difficult to awaken or acting confused (e.g., disoriented, slurred speech)    Protocols used: NEUROLOGIC DEFICIT-A-OH    Cg called stated pt had a scope on Wednesday and has been c/o feeling extremely weak and exhausted. Pt is also c/o "mild chest pain and left arm numbness" per cg. Per care advice cg instructed to call [phone] cg refused stated she will bring pt to er herself. Cg would like some from Dr Boroks office to call her.   "

## 2019-09-13 NOTE — ED PROVIDER NOTES
Encounter Date: 9/13/2019       History     Chief Complaint   Patient presents with    Fatigue     Pt presents to ED today with daughter who reports pt had a GI scope and a colonoscopy done on Wednesday. pt reported chest pain yesterday which resolved. pt c/o lower abdominal and left thigh pain now.      79-year-old female presents ED for evaluation of generalized weakness and fatigue for the past few days. Patient received GI scope and colonoscopy on Wednesday and has been having generalized weakness, feeling fatigue, and blood in stool since.  Patient also complain of left arm numbness and chest pain yesterday that has resolved at this time.  Daughter reports that patient has low iron and was supposed to have an iron transfusion today. Patient sources shortness of breath but states that she does have history of COPD.  Denies left thigh pain as previously reported triage.  Reports a little lower abdominal pain.  Patient's cardiologist is Dr. Brooks.  Denies any alleviating or exacerbating factors.  Denies fever, chills, neck pain/stiffness, dizziness, lightheadedness, chest pain, nausea, vomiting, diarrhea, melena, back pain, or any other concerns at this time.    The history is provided by the patient and a relative.     Review of patient's allergies indicates:  No Known Allergies  Past Medical History:   Diagnosis Date    Abdominal pain     CHF (congestive heart failure)     Chronic kidney disease, stage III (moderate) 8/19/2015    COPD (chronic obstructive pulmonary disease)     COPD (chronic obstructive pulmonary disease)     CVA (cerebral infarction)     Esophagitis     Gastritis     GERD (gastroesophageal reflux disease)     GI bleed     Hyperlipidemia     Hypertension     Mesenteric angina     PVD (peripheral vascular disease) with claudication 10/13/2015    Stroke     Subclavian artery stenosis, left     Thyroid disease      Past Surgical History:   Procedure Laterality Date    CAROTID  STENT       SECTION      COLONOSCOPY N/A 3/2/2017    Performed by [facility]    COLONOSCOPY N/A 5/15/2014    [facility]    COLONOSCOPY/Suprep N/A 2019    [facility] by Barry Talbert [facility] at [facility]    CORONARY ANGIOPLASTY  2006    EGD (ESOPHAGOGASTRODUODENOSCOPY) N/A 2019    [facility]    ESOPHAGOGASTRODUODENOSCOPY (EGD) N/A 3/2/2017    [facility]    GALLBLADDER SURGERY      HYSTERECTOMY      INJECTION, STEROID, SPINE, LUMBAR, EPIDURAL- L2-3 ORAL SEDATION N/A 2018    Performed by Stiven Ibarra Jr., MD at [facility] PAIN MGT    INJECTION-STEROID-EPIDURAL-LUMBAR- L5-S1 N/A 5/3/2018    Performed by Stiven [facility]ra Jr[facility] at [facility] PAIN MGT    OOPHORECTOMY      SUPERIOR MESTENTERIC ARTERY STENT       Family History   Problem Relation Age of Onset    Cancer Sister         breast    Cancer Brother         throat cancer     Social History     Tobacco Use    Smoking status: Current Every Day Smoker     Packs/day: 0.50     Years: 60.00     Pack years: 30.00     Types: Cigarettes    Smokeless tobacco: Current User   Substance Use Topics    Alcohol use: No    Drug use: No     Review of Systems   Constitutional: Negative for chills and fever.   Respiratory: Negative for shortness of breath.    Cardiovascular: Negative for chest pain.   Gastrointestinal: Positive for abdominal pain and blood in stool. Negative for diarrhea, nausea and vomiting.   Genitourinary: Negative for dysuria and hematuria.   Musculoskeletal: Negative for back pain, neck pain and neck stiffness.   Neurological: Negative for dizziness and light-headedness.   Hematological: Does not bruise/bleed easily.   All other systems reviewed and are negative.      Physical Exam     Initial Vitals [19 1129]   BP Pulse Resp Temp SpO2   (!) 147/65 84 20 98.3 °F (36.8 °C) (!) 88 %       MAP       --         Physical Exam    Vitals reviewed.  Constitutional: She appears well-developed and well-nourished.  Non-toxic appearance. She does not have a sickly appearance.   Elderly.    HENT:   Head: Atraumatic.   Eyes: EOM are normal.   Neck: Normal range of motion, full passive range of motion without pain and phonation normal. Neck supple.   Cardiovascular: Regular rhythm.   Pulmonary/Chest: Effort normal and breath sounds normal. No respiratory distress.   Abdominal: Soft. Normal appearance and bowel sounds are normal. She exhibits no distension. There is no tenderness. There is no rigidity, no rebound, no guarding and no CVA tenderness.   Genitourinary: Pelvic exam was performed with patient supine.   Genitourinary Comments: Rectal exam chaperoned by WHITNEY Perdomo.     Neurological: She is alert and oriented to person, place, and time. She has normal strength. No sensory deficit.   No focal neurological deficits.   Skin: There is pallor.   Psychiatric: She has a normal mood and affect.         ED Course   Procedures  Labs Reviewed   CBC W/ AUTO DIFFERENTIAL - Abnormal; Notable for the following components:       Result Value    RBC 3.92 (*)     Hemoglobin 8.0 (*)     Hematocrit 28.3 (*)     Mean Corpuscular Volume 72 (*)     Mean Corpuscular Hemoglobin 20.4 (*)     Mean Corpuscular Hemoglobin Conc 28.3 (*)     RDW 19.5 (*)     MPV 8.5 (*)     All other components within normal limits   COMPREHENSIVE METABOLIC PANEL - Abnormal; Notable for the following components:    CO2 30 (*)     Glucose 133 (*)     BUN, Bld 25 (*)     Anion Gap 7 (*)     eGFR if  49 (*)     eGFR if non  42 (*)     All other components within normal limits   OCCULT BLOOD X 1, STOOL - Abnormal; Notable for the following components:    Occult Blood Positive (*)     All other components within normal limits   TROPONIN I   B-TYPE NATRIURETIC PEPTIDE   APTT   PROTIME-INR   URINALYSIS, REFLEX TO URINE  CULTURE   URINALYSIS MICROSCOPIC   TYPE & SCREEN        ECG Results          EKG 12-lead (In process)  Result time 09/13/19 12:02:58    In process by Interface, Lab In [facility] (09/13/19 12:02:58)                 Narrative:    Test Reason : R07.9,    Vent. Rate : 085 BPM     Atrial Rate : 085 BPM     P-R Int : 164 ms          QRS Dur : 076 ms      QT Int : 348 ms       P-R-T Axes : 060 057 062 degrees     QTc Int : 414 ms    Normal sinus rhythm  Nonspecific T wave abnormality  Abnormal ECG  When compared with ECG of 04-JUL-2019 19:25,  Premature ventricular complexes are no longer Present  Nonspecific T wave abnormality now evident in Inferior leads    Referred By: [email or web address]   SELF           Confirmed By:                             Imaging Results          CT Head Without Contrast (Final result)  Result time 09/13/19 15:07:52    Final result by Milton Barron MD (09/13/19 15:07:52)                 Impression:      No acute large vascular territory infarct or intracranial hemorrhage identified.  Further evaluation/follow-up as warranted.      Electronically signed by: Milton Barron MD  Date:    09/13/2019  Time:    15:07             Narrative:    EXAMINATION:  CT HEAD WITHOUT CONTRAST    CLINICAL HISTORY:  Confusion/delirium, altered LOC, unexplained;    TECHNIQUE:  Low dose axial CT images obtained throughout the head without intravenous contrast. Sagittal and coronal reconstructions were performed.    COMPARISON:  Head CT 04/25/2019 and MRI brain 06/08/2011    FINDINGS:  Intracranial compartment:    Age-appropriate mild generalized cerebral volume loss.  The ventricles are midline and stable in size configuration without distortion by mass effect or acute hydrocephalus.  No extra-axial blood or fluid collections.    Grossly stable distribution of supratentorial white matter chronic small vessel ischemic change.  No definite new focal areas of abnormal parenchymal attenuation.  No parenchymal mass, hemorrhage, edema  or major vascular distribution infarct.  Skull base atherosclerotic vascular calcifications noted.    Skull/extracranial contents (limited evaluation): No fracture. Mastoid air cells and paranasal sinuses are essentially clear.                               X-Ray Abdomen AP 1 View (KUB) (Final result)  Result time 09/13/19 14:16:30    Final result by Michele Valente MD (09/13/19 14:16:30)                 Impression:      No acute process identified.    Further workup as warranted clinically.      Electronically signed by: Michele Valente MD  Date:    09/13/2019  Time:    14:16             Narrative:    EXAMINATION:  XR ABDOMEN AP 1 VIEW    CLINICAL HISTORY:  Unspecified abdominal pain    TECHNIQUE:  Single AP View of the abdomen was performed.    COMPARISON:  CT 08/01/2019    FINDINGS:  Bowel gas pattern appears nonobstructive.  No dilated loops of bowel identified.  No obvious free air.    Cholecystectomy clips.    Osseous degenerative change    Scattered arterial calcification.                               X-Ray Chest 1 View (Final result)  Result time 09/13/19 13:47:18    Final result by Gwen Velásquez MD (09/13/19 13:47:18)                 Impression:      The source of the patient's chest pain is not established on this study.      Electronically signed by: Gwen Velásquez MD  Date:    09/13/2019  Time:    13:47             Narrative:    EXAMINATION:  XR CHEST 1 VIEW    CLINICAL HISTORY:  Chest pain, unspecified    TECHNIQUE:  Single frontal view of the chest was performed.    COMPARISON:  07/04/2019.    FINDINGS:  Cardiac silhouette is mildly enlarged but stable.  Mediastinal structures are midline.  Thoracic aorta is atherosclerotic.  Abundant calcific atherosclerosis is also evident in the axillary and subclavian arteries as well as the right innominate artery.    The lungs are deeply and symmetrically inflated similar to the earlier study.  Obstructive airways disease is not excluded.    I detect no  focal pulmonary disease, pleural fluid, lymph node enlargement, cardiac decompensation, pneumothorax, pneumomediastinum, pneumoperitoneum or significant osseous abnormality.                                 Medical Decision Making:   History:   I obtained history from: someone other than patient.       <> Summary of History: Daughter   Old Medical Records: I decided to obtain old medical records.  Initial Assessment:   79-year-old female presents ED for evaluation generalized weakness and fatigue for the past few days.   Clinical Tests:   Lab Tests: Ordered and Reviewed  Radiological Study: Reviewed and Ordered  ED Management:  Labs, UA, hemoccult, CT head, CXR, Xray abdomen, EKG, sublingual nitro, PO ASA  H&H (8.0, 28.3). CT head within normal limits. Xray abdomen shows no acute abnormalities.  Chest x-ray shows no acute abnormalities.  Troponin and BNP within normal limits.  Patient with chest pain while in ED, repeat EKG shows changes from previous EKG upon arrival to ED (ST depression).  Discussed patient's HPI and ED course with Dr. Brooks who recommends admitting patient to [facility] for anemia and chest pain and to add him as a consult.  Discussed patient's HPI and ED course with [facility] who accepts patient for admission. Upon re-evaluation, the patient's status has remained stable.  At this time, I believe the patient should be admitted to [facility]  for further evaluation and management of chest pain and anemia. [facility]  contacted and the case was discussed. Additional recommendations at this time: none. [facility] agrees with plan and will admit under her service.  The patient and daughter were updated with test results, overall impression, and further plan of care.  All questions were answered.  The patient and daughter express understanding and agrees with current plan.  Other:   I discussed test(s) with the performing physician.       <> Summary of the Findings: Care of this patient discussed with Dr. Dumont who  agrees with ED course and disposition.   I have discussed this case with another health care provider.    Additional MDM:   Heart Score:    History:          Slightly suspicious.  ECG:             Significant ST depression  Age:               >65 years  Risk factors: >= 3 risk factors or history of atherosclerotic disease  Troponin:       Less than or equal to normal limit  Final Score: 6                    ED Course as of Sep 13 1526   Fri Sep 13, 2019   1516 3:16 PM- Cardiology paged for consult.       [CH]   1521 3:21 PM- [facility] paged for consult.       [CH]      ED Course User Index  [CH] William Shelton NP     Clinical Impression:       ICD-10-CM ICD-9-CM   1. Chest pain R07.9 786.50   2. Fatigue R53.83 780.79   3. Abdominal pain R10.9 789.00   4. Weakness R53.1 780.79   5. Anemia, unspecified type D64.9 285.9                                William Shelton NP  09/13/19 [id number]

## 2019-09-14 ENCOUNTER — PATIENT MESSAGE (OUTPATIENT)
Dept: HEPATOLOGY | Facility: HOSPITAL | Age: 79
End: 2019-09-14

## 2019-09-14 VITALS
TEMPERATURE: 99 F | BODY MASS INDEX: 24.6 KG/M2 | RESPIRATION RATE: 19 BRPM | WEIGHT: 130.31 LBS | HEIGHT: 61 IN | SYSTOLIC BLOOD PRESSURE: 153 MMHG | HEART RATE: 89 BPM | DIASTOLIC BLOOD PRESSURE: 65 MMHG | OXYGEN SATURATION: 97 %

## 2019-09-14 DIAGNOSIS — D50.0 IRON DEFICIENCY ANEMIA DUE TO CHRONIC BLOOD LOSS: Primary | ICD-10-CM

## 2019-09-14 LAB
ALBUMIN SERPL BCP-MCNC: 3.4 G/DL (ref 3.5–5.2)
ALP SERPL-CCNC: 94 U/L (ref 55–135)
ALT SERPL W/O P-5'-P-CCNC: 10 U/L (ref 10–44)
ANION GAP SERPL CALC-SCNC: 7 MMOL/L (ref 8–16)
ANISOCYTOSIS BLD QL SMEAR: SLIGHT
AST SERPL-CCNC: 16 U/L (ref 10–40)
BASOPHILS # BLD AUTO: 0.02 K/UL (ref 0–0.2)
BASOPHILS NFR BLD: 0.5 % (ref 0–1.9)
BILIRUB SERPL-MCNC: 0.2 MG/DL (ref 0.1–1)
BUN SERPL-MCNC: 19 MG/DL (ref 8–23)
CALCIUM SERPL-MCNC: 8.9 MG/DL (ref 8.7–10.5)
CHLORIDE SERPL-SCNC: 101 MMOL/L (ref 95–110)
CO2 SERPL-SCNC: 31 MMOL/L (ref 23–29)
CREAT SERPL-MCNC: 0.9 MG/DL (ref 0.5–1.4)
DIFFERENTIAL METHOD: ABNORMAL
EOSINOPHIL # BLD AUTO: 0.2 K/UL (ref 0–0.5)
EOSINOPHIL NFR BLD: 4.4 % (ref 0–8)
ERYTHROCYTE [DISTWIDTH] IN BLOOD BY AUTOMATED COUNT: 19.4 % (ref 11.5–14.5)
EST. GFR  (AFRICAN AMERICAN): >60 ML/MIN/1.73 M^2
EST. GFR  (NON AFRICAN AMERICAN): >60 ML/MIN/1.73 M^2
GLUCOSE SERPL-MCNC: 97 MG/DL (ref 70–110)
HCT VFR BLD AUTO: 28.6 % (ref 37–48.5)
HGB BLD-MCNC: 7.9 G/DL (ref 12–16)
HYPOCHROMIA BLD QL SMEAR: ABNORMAL
LYMPHOCYTES # BLD AUTO: 1.1 K/UL (ref 1–4.8)
LYMPHOCYTES NFR BLD: 27.8 % (ref 18–48)
MAGNESIUM SERPL-MCNC: 1.9 MG/DL (ref 1.6–2.6)
MCH RBC QN AUTO: 20.1 PG (ref 27–31)
MCHC RBC AUTO-ENTMCNC: 27.6 G/DL (ref 32–36)
MCV RBC AUTO: 73 FL (ref 82–98)
MONOCYTES # BLD AUTO: 0.4 K/UL (ref 0.3–1)
MONOCYTES NFR BLD: 8.8 % (ref 4–15)
NEUTROPHILS # BLD AUTO: 2.4 K/UL (ref 1.8–7.7)
NEUTROPHILS NFR BLD: 58.5 % (ref 38–73)
OVALOCYTES BLD QL SMEAR: ABNORMAL
PHOSPHATE SERPL-MCNC: 4.2 MG/DL (ref 2.7–4.5)
PLATELET # BLD AUTO: 337 K/UL (ref 150–350)
PMV BLD AUTO: 8.7 FL (ref 9.2–12.9)
POIKILOCYTOSIS BLD QL SMEAR: SLIGHT
POTASSIUM SERPL-SCNC: 4.5 MMOL/L (ref 3.5–5.1)
PROT SERPL-MCNC: 5.9 G/DL (ref 6–8.4)
RBC # BLD AUTO: 3.93 M/UL (ref 4–5.4)
SODIUM SERPL-SCNC: 139 MMOL/L (ref 136–145)
TROPONIN I SERPL DL<=0.01 NG/ML-MCNC: 0.02 NG/ML (ref 0–0.03)
TROPONIN I SERPL DL<=0.01 NG/ML-MCNC: 0.03 NG/ML (ref 0–0.03)
WBC # BLD AUTO: 4.1 K/UL (ref 3.9–12.7)

## 2019-09-14 PROCEDURE — 25000003 PHARM REV CODE 250: Performed by: STUDENT IN AN ORGANIZED HEALTH CARE EDUCATION/TRAINING PROGRAM

## 2019-09-14 PROCEDURE — 80053 COMPREHEN METABOLIC PANEL: CPT

## 2019-09-14 PROCEDURE — 63600175 PHARM REV CODE 636 W HCPCS: Performed by: INTERNAL MEDICINE

## 2019-09-14 PROCEDURE — 85025 COMPLETE CBC W/AUTO DIFF WBC: CPT

## 2019-09-14 PROCEDURE — 63600175 PHARM REV CODE 636 W HCPCS: Performed by: STUDENT IN AN ORGANIZED HEALTH CARE EDUCATION/TRAINING PROGRAM

## 2019-09-14 PROCEDURE — 90662 IIV NO PRSV INCREASED AG IM: CPT | Performed by: INTERNAL MEDICINE

## 2019-09-14 PROCEDURE — 90471 IMMUNIZATION ADMIN: CPT | Performed by: INTERNAL MEDICINE

## 2019-09-14 PROCEDURE — G0008 ADMIN INFLUENZA VIRUS VAC: HCPCS | Performed by: INTERNAL MEDICINE

## 2019-09-14 PROCEDURE — 84484 ASSAY OF TROPONIN QUANT: CPT

## 2019-09-14 PROCEDURE — 83735 ASSAY OF MAGNESIUM: CPT

## 2019-09-14 PROCEDURE — 84100 ASSAY OF PHOSPHORUS: CPT

## 2019-09-14 RX ORDER — CLOPIDOGREL BISULFATE 75 MG/1
75 TABLET ORAL DAILY
Qty: 30 TABLET | Refills: 11 | Status: SHIPPED | OUTPATIENT
Start: 2019-09-14 | End: 2019-12-19

## 2019-09-14 RX ORDER — POLYETHYLENE GLYCOL 3350, SODIUM SULFATE ANHYDROUS, SODIUM BICARBONATE, SODIUM CHLORIDE, POTASSIUM CHLORIDE 236; 22.74; 6.74; 5.86; 2.97 G/4L; G/4L; G/4L; G/4L; G/4L
2 POWDER, FOR SOLUTION ORAL ONCE
Qty: 2000 ML | Refills: 0 | Status: SHIPPED | OUTPATIENT
Start: 2019-09-14 | End: 2019-09-14

## 2019-09-14 RX ADMIN — LEVOTHYROXINE SODIUM 88 MCG: 88 TABLET ORAL at 05:09

## 2019-09-14 RX ADMIN — HEPARIN SODIUM 5000 UNITS: 5000 INJECTION, SOLUTION INTRAVENOUS; SUBCUTANEOUS at 05:09

## 2019-09-14 RX ADMIN — LOSARTAN POTASSIUM 25 MG: 25 TABLET ORAL at 09:09

## 2019-09-14 RX ADMIN — CLOPIDOGREL BISULFATE 75 MG: 75 TABLET ORAL at 09:09

## 2019-09-14 RX ADMIN — PANTOPRAZOLE SODIUM 40 MG: 40 TABLET, DELAYED RELEASE ORAL at 09:09

## 2019-09-14 RX ADMIN — INFLUENZA A VIRUS A/MICHIGAN/45/2015 X-275 (H1N1) ANTIGEN (FORMALDEHYDE INACTIVATED), INFLUENZA A VIRUS A/SINGAPORE/INFIMH-16-0019/2016 IVR-186 (H3N2) ANTIGEN (FORMALDEHYDE INACTIVATED), AND INFLUENZA B VIRUS B/MARYLAND/15/2016 BX-69A (A B/COLORADO/6/2017-LIKE VIRUS) ANTIGEN (FORMALDEHYDE INACTIVATED) 0.5 ML: 60; 60; 60 INJECTION, SUSPENSION INTRAMUSCULAR at 01:09

## 2019-09-14 RX ADMIN — ATORVASTATIN CALCIUM 40 MG: 40 TABLET, FILM COATED ORAL at 09:09

## 2019-09-14 NOTE — ED NOTES
Patient Patient complaints of chest pain  And numbness in her arm. EKG taken, Patient given aspirin and nitro.

## 2019-09-14 NOTE — DISCHARGE INSTRUCTIONS
Anemia (Indian) View Edit Remove  Chest Pain, Noncardiac (Indian) View Edit Remove  Chest Pain, Uncertain Cause (Indian) View Edit Remove  Clopidogrel Bisulfate Oral tablet (English) View Edit Remove

## 2019-09-14 NOTE — PLAN OF CARE
Problem: Adult Inpatient Plan of Care  Goal: Plan of Care Review  Outcome: Ongoing (interventions implemented as appropriate)  Admission completed.  Initiated care plan and education.  Chart review complete.

## 2019-09-14 NOTE — PROGRESS NOTES
"[facility] Medicine Progress Note    Primary Team: [facility] Hospitalist Team A  Attending Physician: Daniel Huerta MD  Resident: Jennifer  Intern: Tien    Subjective:      Cardiology consult will see patient this morning. Patient denies any adverse events overnight or any new symptoms. Patient reports feeling improved since her admission. Patient denies any chest pain, palpitations, leg swelling, shortness of breath, cough, or sputum production. Patient denies and further concerns or questions when asked.       Objective:     Last 24 Hour Vital Signs:  BP  Min: 146/65  Max: 238/104  Temp  Av.9 °F (36.6 °C)  Min: 96.9 °F (36.1 °C)  Max: 98.7 °F (37.1 °C)  Pulse  Av.4  Min: 69  Max: 114  Resp  Av.4  Min: 14  Max: 35  SpO2  Av.3 %  Min: 88 %  Max: 100 %  Height  Av' 1" (154.9 cm)  Min: 5' 1" (154.9 cm)  Max: 5' 1" (154.9 cm)  Weight  Av.5 kg (131 lb 2.3 oz)  Min: 59.1 kg (130 lb 4.7 oz)  Max: 59.9 kg (132 lb)  No intake/output data recorded.    Physical Examination:    Physical Exam   Constitutional: She is oriented to person, place, and time and well-developed, well-nourished, and in no distress. No distress.   HENT:   Head: Normocephalic and atraumatic.   Eyes: EOM are normal. Right eye exhibits no discharge. Left eye exhibits no discharge. No scleral icterus.   Neck: No JVD present.   Cardiovascular: Normal rate, regular rhythm, normal heart sounds and intact distal pulses. Exam reveals no gallop and no friction rub.   No murmur heard.  Decreased pulse in right radial artery as compared to the left radial artery. Patient states this is chronic   Pulmonary/Chest: Effort normal and breath sounds normal. No stridor. No respiratory distress. She has no wheezes. She has no rales. She exhibits no tenderness.   Abdominal: Soft. Bowel sounds are normal. She exhibits no distension and no mass. There is no tenderness. There is no rebound and no guarding.   Musculoskeletal: She exhibits no edema or " deformity.   Neurological: She is alert and oriented to person, place, and time.   Skin: Skin is warm and dry. No rash noted. She is not diaphoretic. No erythema. No pallor.   Psychiatric: Mood, memory, affect and judgment normal.   Vitals reviewed.                Current Medications:     Infusions:       Scheduled:   atorvastatin  40 mg Oral Daily    clopidogrel  75 mg Oral Daily    escitalopram oxalate  10 mg Oral Nightly    heparin (porcine)  5,000 Units Subcutaneous Q8H    levothyroxine  88 mcg Oral Before breakfast    losartan  25 mg Oral Daily    pantoprazole  40 mg Oral Daily        PRN:  influenza, sodium chloride 0.9%        Assessment:     Brittany Haile is a 79 y.o.female with  Patient Active Problem List    Diagnosis Date Noted    Anemia 09/11/2019    Epistaxis 05/13/2019    Chronic obstructive pulmonary disease 01/31/2019    Gastroesophageal reflux disease 01/31/2019    Moderate recurrent major depression 07/25/2018    Lumbar radiculopathy 05/03/2018    Chronic bilateral low back pain with bilateral sciatica 04/20/2018    Lumbar spondylosis 04/20/2018    Spinal stenosis of lumbar region with neurogenic claudication 04/20/2018    DDD (degenerative disc disease), lumbar 04/20/2018    Chronic diastolic heart failure 01/03/2018    Stroke 10/03/2017    Aortic atherosclerosis 10/03/2017    GIB (gastrointestinal bleeding) 04/23/2017    Mesenteric angina 04/17/2017    Angina mesenteric 04/13/2017    Nicotine dependence, cigarettes, w unsp disorders 04/13/2017    Coronary artery disease of native artery of native heart with stable angina pectoris 04/13/2017    Abdominal pain 03/02/2017    Gastritis 03/02/2017    CVD (cerebrovascular disease) 10/14/2016    Chest pain 10/14/2016    Subclavian artery stenosis, left 04/15/2016    Glucose intolerance (impaired glucose tolerance) 11/11/2015    Iron deficiency anemia due to chronic blood loss 10/22/2015    Leg pain, bilateral 10/13/2015  "   PVD (peripheral vascular disease) with claudication 10/13/2015    Chronic kidney disease, stage III (moderate) 08/19/2015    COPD (chronic obstructive pulmonary disease) 06/04/2015    HLD (hyperlipidemia) 06/04/2015    Incidental pulmonary nodule, > 3mm and < 8mm 08/01/2013    History of stroke without residual deficits 04/12/2013    Carotid stenosis 04/12/2013    Hypothyroid 04/12/2013    Hypertension 04/12/2013    Anxiety 04/12/2013        Hospital Plan Includes:     Atypical chest pain  - Chest "pressure" sensation w/ SOB and radiation to L arm that occurs at rest  - In the ED, troponin 0.022, BNP 70 (both unremarkable, EKG significant for ST depressions in the lateral leads,  received [id number] and Nitroglycerin 0.4    - Episode was observed in ED, pt appeared to be in no distress, vital signs were stable  - Repeat troponins 0.022 (9/13/19) -> 0.033 (9/13/19) -> 0.020 (9/14/19)  Plan:   - Continue Plavix 75, Atorvastatin 40  - Continue to trend troponins and perform serial EKG's       Iron Deficiency Anemia  - 9/13/19 H/H 8.0/28.3 MCV 72  - 7/31/19 Iron 10, TIBC 546, Ferritin 5  -10/21/15 Iron 12, TIBC 448, Ferritin 7  - Patient complains of fatigue, eating ice and dark stools for the past 3-4 months  Plan:   - Ferrous Sulfate 325mg tablet daily       Hypertension  - BP on admission 147/65  - Elevated as high as 238/104; patient does not remember if she took her BP meds today  Plan:   - Continue home Valsartan 80       COPD  - Pateint with SpO2 around 88% on room air, joshua to 98% on 3L NC  Plan:   - Continue home Albuterol 90  - Encouraged smoking cessation        Hypothyroidism  - TSH 0.354  Plan:   - Continue home Synthroid 88      Code: Full  Diet: NPO  DVT ppx: Heparin  Dispo: Likely discharge today pending chest pain and anemia workup        Johnny Chauhan, DO  [facility]    [facility] Hospitalist Pager numbers:   [facility] Hospitalist Medicine Team A (Bradley/Kristin): [phone]  [facility] " Hospitalist Medicine Team B (Homer/James):  [phone]

## 2019-09-14 NOTE — PLAN OF CARE
Problem: Adult Inpatient Plan of Care  Goal: Plan of Care Review  POC reviewed. Telemetry monitor displaying NSR. No c/o pain or discomfort. Turned Q 2hrs to prevent further skin break down. Remains NPO except for meds. Daughter at bedside. Bed alarm on and call bell in reach.

## 2019-09-14 NOTE — MEDICAL/APP STUDENT
"[facility] Medicine Progress Note    Primary Team: \[facility]\"" Hospitalist Team A  Attending Physician: Daniel Huerta MD  Resident: John Rodriguez  Intern: Wilfredo Chauhan    Subjective:      Pt is lying comfortably in bed, daughter is at bedside. Pt reports feeling alright but her whole body is sore; her daughter attributes this to laying in ED for a long time last night. Pt denies chest pain/pressure, SOB or radiations down L Arm. She said the last time she had one of those episodes was when I examined her last night, and since then she's had no symptoms.     HPI included for reference:   Brittany Haile is an 80 yo F w/ PMHx of COPD and PVD w/ stents in Celiac and SMA. She is primarily Citizen of the [place] speaking and most of this hx is obtained through her daughters. She presents with episodes of chest "pressure" accompanied by SOB and L Arm radiation since last Wednesday after she had a colonoscopy and upper endoscopy performed. On Wednesday she had one of these episodes, on Thursday she had two, and today (Friday) she had about two at home and three since she's been in the ED.      She says these episodes last about 5-10 minutes. They occur at rest and seemingly at random with no associated events. She denies feeling dizzy or having a headache during these episodes. She has no hx of MI, Afib, or any other arrhythmias. She's been told she has a hx of CHF and CKD on her chart, but she doesn't take any medications for these conditions and doesn't know how she came to be diagnosed.        She also explains a recent history of anemia. She had never had anemia before, and last April she had routine blood tests that were totally normal. Over the past few months she noticed increased fatigue and eating ice. On July 31 she had repeat blood tests that showed a Hb of 7, and she underwent further testing that demonstrated iron deficiency anemia. She began seeing a Gastroenterologist and Hematologist for this, and they said they suspected GI bleed " "because of her fatigue and also she's had dark stools for the past 3-4 months. A colonoscopy and upper GI endoscopy were performed last Wednesday () with a few polyps removed but otherwise benign findings. Biopsies were taken and pathology results are still pending. Since Wednesday the pt has not had any BM's but does have flatus. She had two episodes yesterday of expelling a small volume of blood per rectum. She says there was no stool in these bowel movements and she experienced no abdominal pain. She and her daughters believe this is likely 2/2 to colonoscopy biopsies.      Pt was admitted with a BP of 147/65. During my interview her BP was in the 170's systolic, and we were called down about an hour later due to BP of 238/104. Pt was also tachycardic of close to 110's during both my interview and when we were called.      Objective:     Last 24 Hour Vital Signs:  BP  Min: 146/65  Max: 238/104  Temp  Av.9 °F (36.6 °C)  Min: 96.9 °F (36.1 °C)  Max: 98.7 °F (37.1 °C)  Pulse  Av.4  Min: 69  Max: 114  Resp  Av.4  Min: 14  Max: 35  SpO2  Av.3 %  Min: 88 %  Max: 100 %  Height  Av' 1" (154.9 cm)  Min: 5' 1" (154.9 cm)  Max: 5' 1" (154.9 cm)  Weight  Av.5 kg (131 lb 2.3 oz)  Min: 59.1 kg (130 lb 4.7 oz)  Max: 59.9 kg (132 lb)  No intake/output data recorded.    Physical Examination:  General: AAOx3  HEENT: Normocephalic, atraumatic. No cervical lymphadenopathy or thyromegaly  CV: RRR, no murmurs, gallops or rubs  Pulm: Clear breath sounds bilaterally, no wheezes, rhonchi or rales  Abdomen: Soft, non-distended, non-tender. BS+ x4. No hepatosplenomegaly. No guarding or rebound tenderness   Extremities: Peripheral pulses 2+ symmetric bilaterally. No clubbing or swelling   Skin: No bruises, lesions or pallor   Neuro: AAOx3. CN2-12 grossly intact. Motor function intact in bilateral UE+LE     Laboratory:  Laboratory Data Reviewed: yes  Pertinent Findings:  Hematology:  Recent Labs   Lab " 09/13/19  1342 09/14/19  0633   WBC 4.76 4.10   HGB 8.0* 7.9*    337   MCV 72* 73*   RDW 19.5* 19.4*       Chemistry:  Recent Labs   Lab 09/13/19  1342 09/14/19  0633    139   K 4.0 4.5    101   CO2 30* 31*   BUN 25* 19   CREATININE 1.2 0.9   * 97   CALCIUM 8.9 8.9   PROT 6.1 5.9*   ALBUMIN 3.5 3.4*   ALT 10 10   AST 16 16   ALKPHOS 100 94   MG  --  1.9   PHOS  --  4.2       Cardiac:  Recent Labs   Lab 09/13/19  1342 09/13/19  1648 09/13/19  2321 09/14/19  0633   TROPONINI 0.018 0.022 0.033* 0.020   BNP 70  --   --   --        Other Results:  EKG (my interpretation): Normal sinus rhythm, nonspecific T wave abnormality, compared to 7/4/19 EKG PVC's are no longer present but a nonspecific T wave abnormality is now seen in Inferior leads      Current Medications:     Infusions:       Scheduled:   atorvastatin  40 mg Oral Daily    clopidogrel  75 mg Oral Daily    escitalopram oxalate  10 mg Oral Nightly    heparin (porcine)  5,000 Units Subcutaneous Q8H    levothyroxine  88 mcg Oral Before breakfast    losartan  25 mg Oral Daily    pantoprazole  40 mg Oral Daily        PRN:  influenza, sodium chloride 0.9%      Assessment:     Brittany Haile is a 79 y.o.female with  Patient Active Problem List    Diagnosis Date Noted    Anemia 09/11/2019    Epistaxis 05/13/2019    Chronic obstructive pulmonary disease 01/31/2019    Gastroesophageal reflux disease 01/31/2019    Moderate recurrent major depression 07/25/2018    Lumbar radiculopathy 05/03/2018    Chronic bilateral low back pain with bilateral sciatica 04/20/2018    Lumbar spondylosis 04/20/2018    Spinal stenosis of lumbar region with neurogenic claudication 04/20/2018    DDD (degenerative disc disease), lumbar 04/20/2018    Chronic diastolic heart failure 01/03/2018    Stroke 10/03/2017    Aortic atherosclerosis 10/03/2017    GIB (gastrointestinal bleeding) 04/23/2017    Mesenteric angina 04/17/2017    Angina mesenteric  "04/13/2017    Nicotine dependence, cigarettes, w unsp disorders 04/13/2017    Coronary artery disease of native artery of native heart with stable angina pectoris 04/13/2017    Abdominal pain 03/02/2017    Gastritis 03/02/2017    CVD (cerebrovascular disease) 10/14/2016    Chest pain 10/14/2016    Subclavian artery stenosis, left 04/15/2016    Glucose intolerance (impaired glucose tolerance) 11/11/2015    Iron deficiency anemia due to chronic blood loss 10/22/2015    Leg pain, bilateral 10/13/2015    PVD (peripheral vascular disease) with claudication 10/13/2015    Chronic kidney disease, stage III (moderate) 08/19/2015    COPD (chronic obstructive pulmonary disease) 06/04/2015    HLD (hyperlipidemia) 06/04/2015    Incidental pulmonary nodule, > 3mm and < 8mm 08/01/2013    History of stroke without residual deficits 04/12/2013    Carotid stenosis 04/12/2013    Hypothyroid 04/12/2013    Hypertension 04/12/2013    Anxiety 04/12/2013        Plan:     Unstable Angina  - Chest "pressure" sensation w/ SOB and radiation to L arm that occurs at rest  - In the ED, troponin 0.018, BNP 70 (both unremarkable, EKG significant for ST depressions in the lateral leads, received [id number] and Nitroglycerin 0.4    ---- Troponin remains stable; values in chronological order - 0.018 --> 0.022 --> 0.033 --> 0.020  - Episode was observed in ED, pt appeared to be in no distress, vital signs were stable   - Serial EKG's have remained unremarkable  Plan:   - Continue Plavix 75, Atorvastatin 40  - In setting of normal EKG's and Troponins, plan for d/c today     Iron Deficiency Anemia  - 9/13/19 H/H 8.0/28.3 MCV 72  - 7/31/19 Iron 10, TIBC 546, Ferritin 5  ---- 10/21/15 Iron 12, TIBC 448, Ferritin 7  - Pt complains of fatigue, eating ice and dark stools for the past 3-4 months  Plan:   - Pt previously failed PO iron therapy, scheduled for IV iron as outpt on Monday  - f/u with outpt Gastroenterologist and " Hematologist    Rectal Bleeding   - Pt reports dark stools for past 3-4 months  - Colonoscopy on 9/11, pt had 2 episodes of small volume bright red blood per rectum   - FOBT+ for occult blood, Urinalysis showed Trace occult blood and 1+ Leukocytes  Plan:   - Monitor, no bleeding since she's been in hospital  - w/u iron deficiency anemia     Hypertension  - BP on admission 147/65  - Elevated as high as 238/104; pt does not remember if she took her BP meds today  - Given dose of Losartan 25, BP's stabilized to 140's-150's which daughters say is her baseline   Plan:   - Continue Losartan 25 QD     COPD  - Pt with SpO2 around 88% on room air, joshua to 98% on 3L NC  - Currently at % on 2L NC  - Reports using 2.5L NC at home  Plan:   - Continue home Albuterol 90  - Encouraged smoking cessation      Hypothyroidism  - TSH 0.354  Plan:   - Continue home Synthroid 88    Diego Aleman  [facility] Internal Medicine MS4    [facility] Medicine Hospitalist Pager numbers:   [facility] Hospitalist Medicine Team A ([facility]ristin): [phone]  [facility] Hospitalist Medicine Team B ([facility]/James):  [phone]

## 2019-09-14 NOTE — NURSING
Pt is for discharge. PIV and Telem box removed. V/S stable. Discharge instructions done. Pt and family verbalize understanding. Awaiting transport.

## 2019-09-14 NOTE — PLAN OF CARE
Received from ED accompanied by ED Tech and daughters. AAOx3. resp easy and non labored. Denies pain at present time.small reddened area noted to coccyx with pea size skin break to center. Area dry without drainage. mepilex applied to area.# 22 saline lock noted to RAC site without redness or edema. Oriented to room and equipment. Bed in lowest position and call bell in reach. Bed alarm activated. Will continue to monitor.Telemetry monitor applied.

## 2019-09-14 NOTE — PLAN OF CARE
Discharge orders noted, no HH or HME ordered.    Future Appointments   Date Time Provider Department Center   9/16/2019  3:00 PM CHAIR 06 [facility] CHEMO [facility]   9/23/2019  3:00 PM CHAIR 03 [facility] CHEMO [facility]   10/25/2019  2:00 PM SAME DAY [facility] LAB [facility]   10/25/2019  3:00 PM Kamron Cortez MD [facility] HEM ONC [facility]       Pt's nurse will go over medications/signs and symptoms prior to discharge       09/14/19 1241   Final Note   Assessment Type Final Discharge Note   Anticipated Discharge Disposition Home   What phone number can be called within the next 1-3 days to see how you are doing after discharge? [phone]   Hospital Follow Up  Appt(s) scheduled? No  (Offices closed for Weekend. Patient to schedule own follow up appointment. )     Lelo King RN Transitional Navigator  [phone]

## 2019-09-15 NOTE — NURSING
Pt is for discharge. PIV and telem removed. Discharge instructions done. Pt and family verbalize understanding. Awaiting transport.

## 2019-09-16 ENCOUNTER — INFUSION (OUTPATIENT)
Dept: INFUSION THERAPY | Facility: HOSPITAL | Age: 79
End: 2019-09-16
Attending: INTERNAL MEDICINE
Payer: MEDICARE

## 2019-09-16 ENCOUNTER — PATIENT MESSAGE (OUTPATIENT)
Dept: GASTROENTEROLOGY | Facility: CLINIC | Age: 79
End: 2019-09-16

## 2019-09-16 ENCOUNTER — TELEPHONE (OUTPATIENT)
Dept: GASTROENTEROLOGY | Facility: CLINIC | Age: 79
End: 2019-09-16

## 2019-09-16 VITALS
HEIGHT: 61 IN | OXYGEN SATURATION: 98 % | SYSTOLIC BLOOD PRESSURE: 147 MMHG | BODY MASS INDEX: 24.6 KG/M2 | WEIGHT: 130.31 LBS | TEMPERATURE: 99 F | DIASTOLIC BLOOD PRESSURE: 64 MMHG | RESPIRATION RATE: 18 BRPM | HEART RATE: 87 BPM

## 2019-09-16 DIAGNOSIS — D50.0 IRON DEFICIENCY ANEMIA DUE TO CHRONIC BLOOD LOSS: Primary | ICD-10-CM

## 2019-09-16 PROCEDURE — 25000003 PHARM REV CODE 250: Performed by: INTERNAL MEDICINE

## 2019-09-16 PROCEDURE — A4216 STERILE WATER/SALINE, 10 ML: HCPCS | Performed by: INTERNAL MEDICINE

## 2019-09-16 PROCEDURE — 63600175 PHARM REV CODE 636 W HCPCS: Performed by: INTERNAL MEDICINE

## 2019-09-16 PROCEDURE — 96365 THER/PROPH/DIAG IV INF INIT: CPT

## 2019-09-16 RX ORDER — HEPARIN 100 UNIT/ML
500 SYRINGE INTRAVENOUS
Status: DISCONTINUED | OUTPATIENT
Start: 2019-09-16 | End: 2019-09-16 | Stop reason: HOSPADM

## 2019-09-16 RX ORDER — SODIUM CHLORIDE 0.9 % (FLUSH) 0.9 %
10 SYRINGE (ML) INJECTION
Status: DISCONTINUED | OUTPATIENT
Start: 2019-09-16 | End: 2019-09-16 | Stop reason: HOSPADM

## 2019-09-16 RX ADMIN — Medication 10 ML: at 04:09

## 2019-09-16 RX ADMIN — SODIUM CHLORIDE: 0.9 INJECTION, SOLUTION INTRAVENOUS at 03:09

## 2019-09-16 RX ADMIN — FERRIC CARBOXYMALTOSE INJECTION 750 MG: 50 INJECTION, SOLUTION INTRAVENOUS at 03:09

## 2019-09-17 ENCOUNTER — TELEPHONE (OUTPATIENT)
Dept: ENDOSCOPY | Facility: HOSPITAL | Age: 79
End: 2019-09-17

## 2019-09-17 NOTE — TELEPHONE ENCOUNTER
Spoke with patient about arrival time @ 0800.     NPO status reviewed: Patient must have nothing to eat after midnight. Medications: Do not take Insulin or oral diabetic medications the day of the procedure.  Take as prescribed: heart, seizure and blood pressure medication in the morning with a sip of water (less than an ounce).  Take any breathing medications and bring inhalers to hospital with you Leave all valuables and jewelry at home.     Wear comfortable clothes to procedure to change into hospital gown You cannot drive for 24 hours after your procedure because you will receive sedation for your procedure to make you comfortable.  A ride must be provided at discharge.

## 2019-09-18 ENCOUNTER — HOSPITAL ENCOUNTER (OUTPATIENT)
Facility: HOSPITAL | Age: 79
Discharge: HOME | End: 2019-09-18
Attending: INTERNAL MEDICINE | Admitting: INTERNAL MEDICINE
Payer: MEDICARE

## 2019-09-18 PROCEDURE — 91110 GI TRC IMG INTRAL ESOPH-ILE: CPT | Performed by: INTERNAL MEDICINE

## 2019-09-18 PROCEDURE — 91110 GI TRC IMG INTRAL ESOPH-ILE: CPT | Mod: 26,,, | Performed by: INTERNAL MEDICINE

## 2019-09-18 PROCEDURE — 91110 PR GI TRACT CAPSULE ENDOSCOPY: ICD-10-PCS | Mod: 26,,, | Performed by: INTERNAL MEDICINE

## 2019-09-18 NOTE — PLAN OF CARE
Pill cam administered post consents signed. Teaching done on  Pill cam at length with patient and daughter. Patient noted to be pressing buttons, advised not to press any buttons as this may interfere with test.

## 2019-09-23 ENCOUNTER — PATIENT MESSAGE (OUTPATIENT)
Dept: GASTROENTEROLOGY | Facility: CLINIC | Age: 79
End: 2019-09-23

## 2019-09-23 ENCOUNTER — INFUSION (OUTPATIENT)
Dept: INFUSION THERAPY | Facility: HOSPITAL | Age: 79
End: 2019-09-23
Attending: INTERNAL MEDICINE
Payer: MEDICARE

## 2019-09-23 VITALS
WEIGHT: 130.06 LBS | DIASTOLIC BLOOD PRESSURE: 77 MMHG | SYSTOLIC BLOOD PRESSURE: 153 MMHG | OXYGEN SATURATION: 91 % | HEART RATE: 91 BPM | HEIGHT: 61 IN | RESPIRATION RATE: 18 BRPM | BODY MASS INDEX: 24.55 KG/M2 | TEMPERATURE: 99 F

## 2019-09-23 DIAGNOSIS — D50.0 IRON DEFICIENCY ANEMIA DUE TO CHRONIC BLOOD LOSS: Primary | ICD-10-CM

## 2019-09-23 PROCEDURE — 25000003 PHARM REV CODE 250: Performed by: INTERNAL MEDICINE

## 2019-09-23 PROCEDURE — 63600175 PHARM REV CODE 636 W HCPCS: Mod: JG | Performed by: INTERNAL MEDICINE

## 2019-09-23 PROCEDURE — 96365 THER/PROPH/DIAG IV INF INIT: CPT

## 2019-09-23 PROCEDURE — A4216 STERILE WATER/SALINE, 10 ML: HCPCS | Performed by: INTERNAL MEDICINE

## 2019-09-23 RX ORDER — SODIUM CHLORIDE 0.9 % (FLUSH) 0.9 %
10 SYRINGE (ML) INJECTION
Status: DISCONTINUED | OUTPATIENT
Start: 2019-09-23 | End: 2019-09-23 | Stop reason: HOSPADM

## 2019-09-23 RX ORDER — HEPARIN 100 UNIT/ML
500 SYRINGE INTRAVENOUS
Status: DISCONTINUED | OUTPATIENT
Start: 2019-09-23 | End: 2019-09-23 | Stop reason: HOSPADM

## 2019-09-23 RX ADMIN — FERRIC CARBOXYMALTOSE INJECTION 750 MG: 50 INJECTION, SOLUTION INTRAVENOUS at 03:09

## 2019-09-23 RX ADMIN — Medication 10 ML: at 04:09

## 2019-09-23 RX ADMIN — SODIUM CHLORIDE: 0.9 INJECTION, SOLUTION INTRAVENOUS at 03:09

## 2019-09-23 NOTE — NURSING
Pt tolerated Injectafer infusion well.  No adverse reaction noted.  Pt education reinforced on potential side effects, what to expect, and when to call physician.  Pt verbalized understanding.  I reviewed pt's schedule with patient and understanding verbalized. IV flushed with NS and D/C per protocol.

## 2019-09-25 ENCOUNTER — TELEPHONE (OUTPATIENT)
Dept: GASTROENTEROLOGY | Facility: CLINIC | Age: 79
End: 2019-09-25

## 2019-09-25 NOTE — TELEPHONE ENCOUNTER
Called today to discuss findings of recent VCE with daughter today. Pt did have 1 small AVM noted in her mid/distal small bowel. Discussed with Dr. Crooks whom recommended treating with IV iron and potentially discontinuing plavix as apposed to submitting for DBE. She reportedly is subjectively doing much better on IV iron. Will f/u with repeat iron studies in 1 month and consider continuing v discontinuing antiplatelet agents if risk of bleeding higher than vascular insult.

## 2019-09-25 NOTE — [employer] PATIENT INSTRUCTIONS
Discharge Summary/Instructions after an Endoscopic Procedure  Patient Name: Brittany Hiale  Patient MRN: [id number]  Patient YOB: 1940  Wednesday, September 25, 2019  Barry Talbert MD  RESTRICTIONS:  During your procedure today, you received medications for sedation.  These   medications may affect your judgment, balance and coordination.  Therefore,   for 24 hours, you have the following restrictions:   - DO NOT drive a car, operate machinery, make legal/financial decisions,   sign important papers or drink alcohol.    ACTIVITY:  Today: no heavy lifting, straining or running due to procedural   sedation/anesthesia.  The following day: return to full activity including work.  DIET:  Eat and drink normally unless instructed otherwise.     TREATMENT FOR COMMON SIDE EFFECTS:  - Mild abdominal pain, nausea, belching, bloating or excessive gas:  rest,   eat lightly and use a heating pad.  - Sore Throat: treat with throat lozenges and/or gargle with warm salt   water.  - Because air was used during the procedure, expelling large amounts of air   from your rectum or belching is normal.  - If a bowel prep was taken, you may not have a bowel movement for 1-3 days.    This is normal.  SYMPTOMS TO WATCH FOR AND REPORT TO YOUR PHYSICIAN:  1. Abdominal pain or bloating, other than gas cramps.  2. Chest pain.  3. Back pain.  4. Signs of infection such as: chills or fever occurring within 24 hours   after the procedure.  5. Rectal bleeding, which would show as bright red, maroon, or black stools.   (A tablespoon of blood from the rectum is not serious, especially if   hemorrhoids are present.)  6. Vomiting.  7. Weakness or dizziness.  GO DIRECTLY TO THE NEAREST EMERGENCY ROOM IF YOU HAVE ANY OF THE FOLLOWING:      Difficulty breathing              Chills and/or fever over 101 F   Persistent vomiting and/or vomiting blood   Severe abdominal pain   Severe chest pain   Black, tarry stools   Bleeding- more than one  tablespoon   Any other symptom or condition that you feel may need urgent attention  Your doctor recommends these additional instructions:  If any biopsies were taken, your doctors clinic will contact you in 1 to 2   weeks with any results.  - Discharge patient to home.   - Resume previous diet.   - Administer iron dextran injection.   - Return to my office as previously scheduled.   - Perform an upper device-assisted enteroscopy if hemoglobin does not   improve.  For questions, problems or results please call your physician - Barry Talbert MD at Work:  [phone].  EMERGENCY PHONE NUMBER: [phone],  LAB RESULTS: [phone]  IF A COMPLICATION OR EMERGENCY SITUATION ARISES AND YOU ARE UNABLE TO REACH   YOUR PHYSICIAN - GO DIRECTLY TO THE EMERGENCY ROOM.  Barry Talbert MD  9/25/2019 11:02:35 AM  This report has been verified and signed electronically.  [employer]

## 2019-10-23 ENCOUNTER — HOSPITAL ENCOUNTER (INPATIENT)
Facility: HOSPITAL | Age: 79
LOS: 3 days | Discharge: HOME | DRG: 305 | End: 2019-10-26
Attending: EMERGENCY MEDICINE | Admitting: INTERNAL MEDICINE
Payer: MEDICARE

## 2019-10-23 DIAGNOSIS — R79.89 ELEVATED TROPONIN: ICD-10-CM

## 2019-10-23 DIAGNOSIS — R51.9 ACUTE NONINTRACTABLE HEADACHE, UNSPECIFIED HEADACHE TYPE: ICD-10-CM

## 2019-10-23 DIAGNOSIS — I16.1 HYPERTENSIVE EMERGENCY: Primary | ICD-10-CM

## 2019-10-23 DIAGNOSIS — F41.9 ANXIETY: ICD-10-CM

## 2019-10-23 DIAGNOSIS — E03.9 HYPOTHYROIDISM, UNSPECIFIED TYPE: ICD-10-CM

## 2019-10-23 DIAGNOSIS — R07.9 CHEST PAIN: ICD-10-CM

## 2019-10-23 DIAGNOSIS — I10 ESSENTIAL HYPERTENSION: ICD-10-CM

## 2019-10-23 DIAGNOSIS — J42 CHRONIC BRONCHITIS, UNSPECIFIED CHRONIC BRONCHITIS TYPE: ICD-10-CM

## 2019-10-23 DIAGNOSIS — E78.2 MIXED HYPERLIPIDEMIA: ICD-10-CM

## 2019-10-23 DIAGNOSIS — R11.2 NON-INTRACTABLE VOMITING WITH NAUSEA, UNSPECIFIED VOMITING TYPE: ICD-10-CM

## 2019-10-23 DIAGNOSIS — D50.0 IRON DEFICIENCY ANEMIA DUE TO CHRONIC BLOOD LOSS: ICD-10-CM

## 2019-10-23 DIAGNOSIS — N18.30 CHRONIC KIDNEY DISEASE, STAGE III (MODERATE): ICD-10-CM

## 2019-10-23 DIAGNOSIS — J41.0 SIMPLE CHRONIC BRONCHITIS: ICD-10-CM

## 2019-10-23 DIAGNOSIS — R51.9 HEADACHE: ICD-10-CM

## 2019-10-23 LAB
ALBUMIN SERPL BCP-MCNC: 4.2 G/DL (ref 3.5–5.2)
ALP SERPL-CCNC: 141 U/L (ref 55–135)
ALT SERPL W/O P-5'-P-CCNC: 20 U/L (ref 10–44)
ANION GAP SERPL CALC-SCNC: 11 MMOL/L (ref 8–16)
ANISOCYTOSIS BLD QL SMEAR: SLIGHT
AST SERPL-CCNC: 34 U/L (ref 10–40)
BACTERIA #/AREA URNS HPF: ABNORMAL /HPF
BASOPHILS # BLD AUTO: 0.02 K/UL (ref 0–0.2)
BASOPHILS NFR BLD: 0.3 % (ref 0–1.9)
BILIRUB SERPL-MCNC: 0.3 MG/DL (ref 0.1–1)
BILIRUB UR QL STRIP: NEGATIVE
BUN SERPL-MCNC: 15 MG/DL (ref 8–23)
CALCIUM SERPL-MCNC: 9.6 MG/DL (ref 8.7–10.5)
CHLORIDE SERPL-SCNC: 96 MMOL/L (ref 95–110)
CLARITY UR: CLEAR
CO2 SERPL-SCNC: 29 MMOL/L (ref 23–29)
COLOR UR: YELLOW
CREAT SERPL-MCNC: 1.1 MG/DL (ref 0.5–1.4)
DIFFERENTIAL METHOD: ABNORMAL
EOSINOPHIL # BLD AUTO: 0.3 K/UL (ref 0–0.5)
EOSINOPHIL NFR BLD: 4 % (ref 0–8)
ERYTHROCYTE [DISTWIDTH] IN BLOOD BY AUTOMATED COUNT: ABNORMAL % (ref 11.5–14.5)
EST. GFR  (AFRICAN AMERICAN): 54 ML/MIN/1.73 M^2
EST. GFR  (NON AFRICAN AMERICAN): 47 ML/MIN/1.73 M^2
GLUCOSE SERPL-MCNC: 163 MG/DL (ref 70–110)
GLUCOSE UR QL STRIP: NEGATIVE
HCT VFR BLD AUTO: 45.4 % (ref 37–48.5)
HGB BLD-MCNC: 14.1 G/DL (ref 12–16)
HGB UR QL STRIP: ABNORMAL
HYALINE CASTS #/AREA URNS LPF: 0 /LPF
HYPOCHROMIA BLD QL SMEAR: ABNORMAL
INFLUENZA A, MOLECULAR: NEGATIVE
INFLUENZA B, MOLECULAR: NEGATIVE
KETONES UR QL STRIP: NEGATIVE
LEUKOCYTE ESTERASE UR QL STRIP: ABNORMAL
LYMPHOCYTES # BLD AUTO: 0.9 K/UL (ref 1–4.8)
LYMPHOCYTES NFR BLD: 13.9 % (ref 18–48)
MCH RBC QN AUTO: 26.4 PG (ref 27–31)
MCHC RBC AUTO-ENTMCNC: 31.1 G/DL (ref 32–36)
MCV RBC AUTO: 85 FL (ref 82–98)
MICROSCOPIC COMMENT: ABNORMAL
MONOCYTES # BLD AUTO: 0.4 K/UL (ref 0.3–1)
MONOCYTES NFR BLD: 5.8 % (ref 4–15)
NEUTROPHILS # BLD AUTO: 5.1 K/UL (ref 1.8–7.7)
NEUTROPHILS NFR BLD: 76 % (ref 38–73)
NITRITE UR QL STRIP: NEGATIVE
OVALOCYTES BLD QL SMEAR: ABNORMAL
PH UR STRIP: 8 [PH] (ref 5–8)
PLATELET # BLD AUTO: 225 K/UL (ref 150–350)
PLATELET BLD QL SMEAR: ABNORMAL
PMV BLD AUTO: 9.1 FL (ref 9.2–12.9)
POIKILOCYTOSIS BLD QL SMEAR: SLIGHT
POLYCHROMASIA BLD QL SMEAR: ABNORMAL
POTASSIUM SERPL-SCNC: 4.9 MMOL/L (ref 3.5–5.1)
PROT SERPL-MCNC: 7.6 G/DL (ref 6–8.4)
PROT UR QL STRIP: ABNORMAL
RBC # BLD AUTO: 5.34 M/UL (ref 4–5.4)
RBC #/AREA URNS HPF: 3 /HPF (ref 0–4)
SODIUM SERPL-SCNC: 136 MMOL/L (ref 136–145)
SP GR UR STRIP: 1.02 (ref 1–1.03)
SPECIMEN SOURCE: NORMAL
TROPONIN I SERPL DL<=0.01 NG/ML-MCNC: 0.03 NG/ML (ref 0–0.03)
TROPONIN I SERPL DL<=0.01 NG/ML-MCNC: 0.03 NG/ML (ref 0–0.03)
URN SPEC COLLECT METH UR: ABNORMAL
UROBILINOGEN UR STRIP-ACNC: NEGATIVE EU/DL
WBC # BLD AUTO: 6.76 K/UL (ref 3.9–12.7)
WBC #/AREA URNS HPF: 6 /HPF (ref 0–5)

## 2019-10-23 PROCEDURE — 84484 ASSAY OF TROPONIN QUANT: CPT

## 2019-10-23 PROCEDURE — 80053 COMPREHEN METABOLIC PANEL: CPT

## 2019-10-23 PROCEDURE — 25500020 PHARM REV CODE 255: Performed by: EMERGENCY MEDICINE

## 2019-10-23 PROCEDURE — 63600175 PHARM REV CODE 636 W HCPCS: Performed by: EMERGENCY MEDICINE

## 2019-10-23 PROCEDURE — 85025 COMPLETE CBC W/AUTO DIFF WBC: CPT

## 2019-10-23 PROCEDURE — 96375 TX/PRO/DX INJ NEW DRUG ADDON: CPT

## 2019-10-23 PROCEDURE — 87502 INFLUENZA DNA AMP PROBE: CPT

## 2019-10-23 PROCEDURE — 25000003 PHARM REV CODE 250: Performed by: STUDENT IN AN ORGANIZED HEALTH CARE EDUCATION/TRAINING PROGRAM

## 2019-10-23 PROCEDURE — 99285 EMERGENCY DEPT VISIT HI MDM: CPT | Mod: 25

## 2019-10-23 PROCEDURE — 96374 THER/PROPH/DIAG INJ IV PUSH: CPT | Mod: 59

## 2019-10-23 PROCEDURE — 96366 THER/PROPH/DIAG IV INF ADDON: CPT

## 2019-10-23 PROCEDURE — 93010 EKG 12-LEAD: ICD-10-PCS | Mod: 76,,, | Performed by: INTERNAL MEDICINE

## 2019-10-23 PROCEDURE — 81000 URINALYSIS NONAUTO W/SCOPE: CPT

## 2019-10-23 PROCEDURE — G0378 HOSPITAL OBSERVATION PER HR: HCPCS

## 2019-10-23 PROCEDURE — 25000003 PHARM REV CODE 250: Performed by: EMERGENCY MEDICINE

## 2019-10-23 PROCEDURE — 12000002 HC ACUTE/MED SURGE SEMI-PRIVATE ROOM

## 2019-10-23 PROCEDURE — 96365 THER/PROPH/DIAG IV INF INIT: CPT

## 2019-10-23 PROCEDURE — 96361 HYDRATE IV INFUSION ADD-ON: CPT

## 2019-10-23 PROCEDURE — 93010 ELECTROCARDIOGRAM REPORT: CPT | Mod: 76,,, | Performed by: INTERNAL MEDICINE

## 2019-10-23 PROCEDURE — 93005 ELECTROCARDIOGRAM TRACING: CPT

## 2019-10-23 RX ORDER — IBUPROFEN 200 MG
24 TABLET ORAL
Status: DISCONTINUED | OUTPATIENT
Start: 2019-10-24 | End: 2019-10-26 | Stop reason: HOSPADM

## 2019-10-23 RX ORDER — ESCITALOPRAM OXALATE 10 MG/1
10 TABLET ORAL NIGHTLY
Status: DISCONTINUED | OUTPATIENT
Start: 2019-10-23 | End: 2019-10-26 | Stop reason: HOSPADM

## 2019-10-23 RX ORDER — DEXAMETHASONE SODIUM PHOSPHATE 4 MG/ML
8 INJECTION, SOLUTION INTRA-ARTICULAR; INTRALESIONAL; INTRAMUSCULAR; INTRAVENOUS; SOFT TISSUE
Status: COMPLETED | OUTPATIENT
Start: 2019-10-23 | End: 2019-10-23

## 2019-10-23 RX ORDER — ASPIRIN 325 MG
325 TABLET ORAL ONCE
Status: COMPLETED | OUTPATIENT
Start: 2019-10-23 | End: 2019-10-23

## 2019-10-23 RX ORDER — GLUCAGON 1 MG
1 KIT INJECTION
Status: DISCONTINUED | OUTPATIENT
Start: 2019-10-24 | End: 2019-10-26 | Stop reason: HOSPADM

## 2019-10-23 RX ORDER — ALBUTEROL SULFATE 90 UG/1
1 AEROSOL, METERED RESPIRATORY (INHALATION) EVERY 6 HOURS PRN
Status: DISCONTINUED | OUTPATIENT
Start: 2019-10-24 | End: 2019-10-24

## 2019-10-23 RX ORDER — TIOTROPIUM BROMIDE 18 UG/1
18 CAPSULE ORAL; RESPIRATORY (INHALATION) DAILY
Status: DISCONTINUED | OUTPATIENT
Start: 2019-10-24 | End: 2019-10-26 | Stop reason: HOSPADM

## 2019-10-23 RX ORDER — LEVOTHYROXINE SODIUM 88 UG/1
88 TABLET ORAL
Status: DISCONTINUED | OUTPATIENT
Start: 2019-10-24 | End: 2019-10-26 | Stop reason: HOSPADM

## 2019-10-23 RX ORDER — SODIUM CHLORIDE 0.9 % (FLUSH) 0.9 %
10 SYRINGE (ML) INJECTION
Status: DISCONTINUED | OUTPATIENT
Start: 2019-10-24 | End: 2019-10-26 | Stop reason: HOSPADM

## 2019-10-23 RX ORDER — MAGNESIUM SULFATE 1 G/100ML
1 INJECTION INTRAVENOUS ONCE
Status: COMPLETED | OUTPATIENT
Start: 2019-10-24 | End: 2019-10-24

## 2019-10-23 RX ORDER — KETOROLAC TROMETHAMINE 30 MG/ML
15 INJECTION, SOLUTION INTRAMUSCULAR; INTRAVENOUS
Status: COMPLETED | OUTPATIENT
Start: 2019-10-23 | End: 2019-10-23

## 2019-10-23 RX ORDER — PANTOPRAZOLE SODIUM 40 MG/1
40 TABLET, DELAYED RELEASE ORAL DAILY
Status: DISCONTINUED | OUTPATIENT
Start: 2019-10-24 | End: 2019-10-26 | Stop reason: HOSPADM

## 2019-10-23 RX ORDER — HEPARIN SODIUM 5000 [USP'U]/ML
5000 INJECTION, SOLUTION INTRAVENOUS; SUBCUTANEOUS EVERY 8 HOURS
Status: DISCONTINUED | OUTPATIENT
Start: 2019-10-24 | End: 2019-10-26 | Stop reason: HOSPADM

## 2019-10-23 RX ORDER — ATORVASTATIN CALCIUM 40 MG/1
40 TABLET, FILM COATED ORAL DAILY
Status: DISCONTINUED | OUTPATIENT
Start: 2019-10-24 | End: 2019-10-26 | Stop reason: HOSPADM

## 2019-10-23 RX ORDER — NICARDIPINE HYDROCHLORIDE 0.2 MG/ML
5 INJECTION INTRAVENOUS CONTINUOUS
Status: DISCONTINUED | OUTPATIENT
Start: 2019-10-23 | End: 2019-10-25 | Stop reason: HOSPADM

## 2019-10-23 RX ORDER — VALSARTAN 80 MG/1
80 TABLET ORAL
Status: DISCONTINUED | OUTPATIENT
Start: 2019-10-23 | End: 2019-10-23

## 2019-10-23 RX ORDER — MORPHINE SULFATE 4 MG/ML
4 INJECTION, SOLUTION INTRAMUSCULAR; INTRAVENOUS
Status: COMPLETED | OUTPATIENT
Start: 2019-10-23 | End: 2019-10-23

## 2019-10-23 RX ORDER — IBUPROFEN 200 MG
16 TABLET ORAL
Status: DISCONTINUED | OUTPATIENT
Start: 2019-10-24 | End: 2019-10-26 | Stop reason: HOSPADM

## 2019-10-23 RX ORDER — LOSARTAN POTASSIUM 50 MG/1
50 TABLET ORAL
Status: COMPLETED | OUTPATIENT
Start: 2019-10-23 | End: 2019-10-23

## 2019-10-23 RX ORDER — CLOPIDOGREL BISULFATE 75 MG/1
75 TABLET ORAL DAILY
Status: DISCONTINUED | OUTPATIENT
Start: 2019-10-24 | End: 2019-10-26 | Stop reason: HOSPADM

## 2019-10-23 RX ORDER — PROCHLORPERAZINE EDISYLATE 5 MG/ML
10 INJECTION INTRAMUSCULAR; INTRAVENOUS
Status: COMPLETED | OUTPATIENT
Start: 2019-10-23 | End: 2019-10-23

## 2019-10-23 RX ORDER — HYDRALAZINE HYDROCHLORIDE 20 MG/ML
10 INJECTION INTRAMUSCULAR; INTRAVENOUS
Status: COMPLETED | OUTPATIENT
Start: 2019-10-23 | End: 2019-10-23

## 2019-10-23 RX ORDER — PROCHLORPERAZINE MALEATE 5 MG
10 TABLET ORAL EVERY 6 HOURS PRN
Status: DISCONTINUED | OUTPATIENT
Start: 2019-10-24 | End: 2019-10-26 | Stop reason: HOSPADM

## 2019-10-23 RX ORDER — HALOPERIDOL 5 MG/ML
2 INJECTION INTRAMUSCULAR
Status: COMPLETED | OUTPATIENT
Start: 2019-10-23 | End: 2019-10-23

## 2019-10-23 RX ORDER — LOSARTAN POTASSIUM 50 MG/1
50 TABLET ORAL DAILY
Status: DISCONTINUED | OUTPATIENT
Start: 2019-10-24 | End: 2019-10-25

## 2019-10-23 RX ADMIN — HALOPERIDOL LACTATE 2 MG: 5 INJECTION, SOLUTION INTRAMUSCULAR at 08:10

## 2019-10-23 RX ADMIN — IOHEXOL 100 ML: 350 INJECTION, SOLUTION INTRAVENOUS at 09:10

## 2019-10-23 RX ADMIN — LOSARTAN POTASSIUM 50 MG: 50 TABLET, FILM COATED ORAL at 06:10

## 2019-10-23 RX ADMIN — NICARDIPINE HYDROCHLORIDE 2.5 MG/HR: 0.2 INJECTION, SOLUTION INTRAVENOUS at 09:10

## 2019-10-23 RX ADMIN — NICARDIPINE HYDROCHLORIDE 5 MG/HR: 0.2 INJECTION, SOLUTION INTRAVENOUS at 08:10

## 2019-10-23 RX ADMIN — PROCHLORPERAZINE EDISYLATE 10 MG: 5 INJECTION INTRAMUSCULAR; INTRAVENOUS at 04:10

## 2019-10-23 RX ADMIN — ASPIRIN 325 MG ORAL TABLET 325 MG: 325 PILL ORAL at 11:10

## 2019-10-23 RX ADMIN — HYDRALAZINE HYDROCHLORIDE 10 MG: 20 INJECTION INTRAMUSCULAR; INTRAVENOUS at 07:10

## 2019-10-23 RX ADMIN — MORPHINE SULFATE 4 MG: 4 INJECTION INTRAVENOUS at 09:10

## 2019-10-23 RX ADMIN — DEXAMETHASONE SODIUM PHOSPHATE 8 MG: 4 INJECTION, SOLUTION INTRA-ARTICULAR; INTRALESIONAL; INTRAMUSCULAR; INTRAVENOUS; SOFT TISSUE at 07:10

## 2019-10-23 RX ADMIN — KETOROLAC TROMETHAMINE 15 MG: 30 INJECTION, SOLUTION INTRAMUSCULAR at 05:10

## 2019-10-23 RX ADMIN — SODIUM CHLORIDE 500 ML: 0.9 INJECTION, SOLUTION INTRAVENOUS at 04:10

## 2019-10-23 RX ADMIN — ESCITALOPRAM OXALATE 10 MG: 10 TABLET ORAL at 11:10

## 2019-10-23 NOTE — ED NOTES
Pt placed on 2LPM per NC and given ice pack to place on head for comfort. Pt's daughter at bedside and lights are off to decrease stimulation in room.

## 2019-10-23 NOTE — ED NOTES
Pt states that she is having minimal relief from medications but daughter states that she is significantly less restless and has been able to calm down.

## 2019-10-23 NOTE — ED PROVIDER NOTES
Encounter Date: 10/23/2019       History     Chief Complaint   Patient presents with    Headache     Reports woke up with headache this AM and dizziness with 1 episode of vomiting. Reports has been taking Tylenol without relief. VAN-. Reports worst headache of her life.      79-year-old female presents emergency department complaining of headache, lightheadedness, nausea with 1 episode of emesis.  Reports onset this morning.  Reports she had a mild to moderate frontal headache described as throbbing and aching.  States headache has been constant albeit worsening since this morning.  States she gets some mild, temporary relief with Tylenol, otherwise no exacerbating or other up alleviating factors reported.  Not worse with bright lights or with head movements.  Patient reports she is dizzy, but on further investigation she reports weakness and feeling lightheaded, as if she will pass out.  Reports associated nausea with 1 episode of nonbloody, nonbilious emesis prior to arrival.  Denies any vision changes, focal numbness or weakness, chest pain, shortness of breath, abdominal pain. No other symptoms reported at this time.  States she has taken all her medications today.    Patient speaks some English. Translation by daughter. Offered formal , patient declined.         Review of patient's allergies indicates:  No Known Allergies  Past Medical History:   Diagnosis Date    Abdominal pain     CHF (congestive heart failure)     Chronic kidney disease, stage III (moderate) 8/19/2015    COPD (chronic obstructive pulmonary disease)     COPD (chronic obstructive pulmonary disease)     CVA (cerebral infarction)     Esophagitis     Gastritis     GERD (gastroesophageal reflux disease)     GI bleed     Hyperlipidemia     Hypertension     Mesenteric angina     PVD (peripheral vascular disease) with claudication 10/13/2015    Stroke     Subclavian artery stenosis, left     Thyroid disease      Past  Surgical History:   Procedure Laterality Date    CAROTID STENT       SECTION      COLONOSCOPY N/A 3/2/2017    Procedure: COLONOSCOPY;  Surgeon: Cecil Crooks MD;  Location: [facility] ENDO;  Service: Endoscopy;  Laterality: N/A;    COLONOSCOPY N/A 2019    Procedure: COLONOSCOPY/Suprep;  Surgeon: Barry Talbert MD;  Location: [facility] ENDO;  Service: Endoscopy;  Laterality: N/A;    CORONARY ANGIOPLASTY  2006    EPIDURAL STEROID INJECTION INTO LUMBAR SPINE N/A 2018    Procedure: INJECTION, STEROID, SPINE, LUMBAR, EPIDURAL- L2-3 ORAL SEDATION;  Surgeon: Stiven Ibarra Jr., MD;  Location: [facility] PAIN MGT;  Service: Pain Management;  Laterality: N/A;    ESOPHAGOGASTRODUODENOSCOPY N/A 2019    Procedure: EGD (ESOPHAGOGASTRODUODENOSCOPY);  Surgeon: Barry Talbert MD;  Location: [facility] ENDO;  Service: Endoscopy;  Laterality: N/A;    GALLBLADDER SURGERY      HYSTERECTOMY      INTRALUMINAL GASTROINTESTINAL TRACT IMAGING VIA CAPSULE N/A 2019    Procedure: IMAGING PROCEDURE, GI TRACT, INTRALUMINAL, VIA CAPSULE;  Surgeon: Barry Talbert MD;  Location: [facility] ENDO;  Service: Endoscopy;  Laterality: N/A;    OOPHORECTOMY      SUPERIOR MESTENTERIC ARTERY STENT       Family History   Problem Relation Age of Onset    Cancer Sister         breast    Cancer Brother         throat cancer     Social History     Tobacco Use    Smoking status: Current Every Day Smoker     Packs/day: 0.50     Years: 60.00     Pack years: 30.00     Types: Cigarettes    Smokeless tobacco: Never Used   Substance Use Topics    Alcohol use: No    Drug use: No     Review of Systems   Constitutional: Negative for chills, fatigue and fever.   HENT: Negative for congestion, sore throat and voice change.    Eyes: Negative for photophobia, pain and redness.   Respiratory: Negative for cough, choking and shortness of breath.    Cardiovascular: Negative for chest pain, palpitations and leg swelling.    Gastrointestinal: Positive for nausea and vomiting. Negative for abdominal pain and diarrhea.   Genitourinary: Negative for dysuria, frequency and urgency.   Musculoskeletal: Negative for back pain, neck pain and neck stiffness.   Neurological: Positive for light-headedness and headaches. Negative for numbness.   All other systems reviewed and are negative.      Physical Exam     Initial Vitals [10/23/19 1554]   BP Pulse Resp Temp SpO2   (!) 201/95 94 18 98.1 °F (36.7 °C) (!) 94 %      MAP       --         Physical Exam    Nursing note and vitals reviewed.  Constitutional: She appears well-developed and well-nourished. She appears distressed (Mild discomfort secondary to headache).   HENT:   Head: Normocephalic and atraumatic.   Oropharynx clear; Dry MM   Eyes: Conjunctivae and EOM are normal. Pupils are equal, round, and reactive to light.   Neck: Normal range of motion. Neck supple. No tracheal deviation present.   Cardiovascular: Normal rate, regular rhythm, normal heart sounds and intact distal pulses.   Pulmonary/Chest: Breath sounds normal. No respiratory distress. She has no wheezes. She has no rhonchi. She has no rales.   Abdominal: Soft. Bowel sounds are normal. She exhibits no distension. There is no tenderness.   Musculoskeletal: Normal range of motion. She exhibits no edema or tenderness.   Neurological: She is alert and oriented to person, place, and time. She has normal strength. No cranial nerve deficit. GCS score is 15. GCS eye subscore is 4. GCS verbal subscore is 5. GCS motor subscore is 6.   Skin: Skin is warm and dry.         ED Course   Procedures  Labs Reviewed   CBC W/ AUTO DIFFERENTIAL - Abnormal; Notable for the following components:       Result Value    Mean Corpuscular Hemoglobin 26.4 (*)     Mean Corpuscular Hemoglobin Conc 31.1 (*)     MPV 9.1 (*)     Lymph # 0.9 (*)     Gran% 76.0 (*)     Lymph% 13.9 (*)     All other components within normal limits   COMPREHENSIVE METABOLIC PANEL -  Abnormal; Notable for the following components:    Glucose 163 (*)     Alkaline Phosphatase 141 (*)     eGFR if  54 (*)     eGFR if non  47 (*)     All other components within normal limits   URINALYSIS - Abnormal; Notable for the following components:    Protein, UA 1+ (*)     Occult Blood UA Trace (*)     Leukocytes, UA Trace (*)     All other components within normal limits   URINALYSIS MICROSCOPIC - Abnormal; Notable for the following components:    WBC, UA 6 (*)     All other components within normal limits   INFLUENZA A & B BY MOLECULAR     EKG Readings: (Independently Interpreted)   Initial Reading: No STEMI. Previous EKG: Compared with most recent EKG Previous EKG Date: 9/13/19 (Nonspecific change). Rhythm: Normal Sinus Rhythm. Heart Rate: 80. Ectopy: No Ectopy. Conduction: Normal. ST Segments: Normal ST Segments. Axis: Normal.   Repeat EKG at 2032 essentially unchanged with heart rate of 91         X-Rays:   Independently Interpreted Readings:   Other Readings:  Imaging interpreted by radiologist and visualized by me    Imaging Results          CTA Chest Abdomen Non Coronary (Final result)  Result time 10/23/19 21:47:36   Procedure changed from CT Chest Abdomen With Contrast (XPD)     Final result by Ada Rowland MD (10/23/19 21:47:36)                 Impression:      1. No evidence of aortic aneurysm or dissection.  Moderate to severe atherosclerosis is seen of the abdominal aorta with significant plaquing of its branch vessels.  2. No acute intrathoracic abnormalities identified.  3. No acute intra-abdominal abnormalities identified.  4. Postsurgical changes and additional findings as detailed above.      Electronically signed by: Ada Rowland MD  Date:    10/23/2019  Time:    21:47             Narrative:    EXAMINATION:  CTA CHEST ABDOMEN NON CORONARY (XPD)    CLINICAL HISTORY:  Aortic dz, non-traumatic, known or suspect;    TECHNIQUE:  CTA chest and abdomen were  performed following administration of 100 cc Omnipaque 350 IV contrast.    COMPARISON:  CT abdomen and pelvis from 08/01/2019. CT chest from January 2018.    FINDINGS:  No evidence of aortic aneurysm or dissection.  Branch vessels of the arch are patent with atherosclerosis seen.  Moderate to severe atherosclerosis is seen involving the abdominal aorta.  Severe plaquing is seen involving the celiac artery and SMA origins.  Prominent plaquing is also seen involving the bilateral renal artery origins as well as the right common iliac artery origin.    No evidence of proximal pulmonary embolus through the proximal segmental branches.  Heart is normal in size without pericardial effusion.  Coronary artery calcification is seen.  Esophagus is unremarkable along its course.    Airways are patent.  Lungs are symmetrically expanded.  Mild emphysematous changes are seen.  No evidence of mass, focal consolidation, pneumothorax, or pleural effusion.  Unchanged focal area of ground-glass attenuation is seen within the lateral aspect of the right lower lobe.    No significant hepatic abnormalities are identified.  There is no intra-or extrahepatic biliary ductal dilatation.  Gallbladder has been removed.  The stomach, pancreas, spleen, and adrenal glands show no gross abnormalities.    Kidneys are functioning.  No evidence of hydronephrosis.  Stable presumed left renal cyst is seen.    The visualized loops of small and large bowel show no evidence of obstruction or inflammation.  No free air or free fluid.    No acute osseous abnormality identified. Subcutaneous soft tissue structures are unremarkable.                               X-Ray Chest AP Portable (Final result)  Result time 10/23/19 20:56:33    Final result by Milton Barron MD (10/23/19 20:56:33)                 Impression:      No detrimental change or radiographic acute intrathoracic process seen on this single view.    Radiographic findings suggesting sequela of  COPD/emphysema, without focal consolidation.      Electronically signed by: Milton Barron MD  Date:    10/23/2019  Time:    20:56             Narrative:    EXAMINATION:  XR CHEST AP PORTABLE    CLINICAL HISTORY:  Chest pain, unspecified    TECHNIQUE:  Single frontal view of the chest was performed.    COMPARISON:  Chest radiograph 09/13/2019    FINDINGS:  Monitoring leads overlie the chest.  Cardiomediastinal silhouette is midline and within normal limits noting calcific atherosclerosis at the arch.  Bibasilar scattered linear opacities consistent with subsegmental scarring versus atelectasis.  Left basilar platelike scarring versus atelectasis, unchanged.  The lungs are symmetrically hyperexpanded with bilateral diffuse nonspecific interstitial coarsening and increased lucency of the upper zones suggesting sequela of underlying COPD/emphysema similar to previous studies.  No convincing evidence for acute pulmonary edema.  No focal consolidation, pleural effusion or pneumothorax.  Scattered atherosclerotic vascular calcifications noted elsewhere.  No acute osseous process seen.  PA and lateral views can be obtained.                               CT Head Without Contrast (Final result)  Result time 10/23/19 16:53:40    Final result by Surinder Herrera MD (10/23/19 16:53:40)                 Impression:      No acute infarct or hemorrhage or mass or fracture.      Electronically signed by: Surinder Herrera  Date:    10/23/2019  Time:    16:53             Narrative:    EXAMINATION:  CT HEAD WITHOUT CONTRAST    CLINICAL HISTORY:  Dizziness;Headache, acute, norm neuro exam;    TECHNIQUE:  Low dose axial images were obtained through the head.  Coronal and sagittal reformations were also performed. Contrast was not administered.    COMPARISON:  09/13/2019 head CT    FINDINGS:  The overall gray-white interface and hemispherical morphology appears normal.  The ventricles and basal cisterns and sulci are proportionally sized and  probably normal for age similar with 09/13/2019.  The corpus callosum shows normal morphology.  The pituitary and sella turcica appear normal for age.  There is ill-defined hypodensity of the left occipital lobe axial image 18, possibly also coronal image 123 similar with 09/13/2019.    No definite acute infarct or hemorrhage or mass.  No vasogenic edema pattern.  No subdural collection.    No skull fracture or overlying soft tissue swelling.  The visualized paranasal sinuses and mastoid sinuses and middle ears appear normal.  The visualized orbits and globes appear normal.  There are bilateral cataract resections. 8                                  Medical Decision Making:   Initial Assessment:   79-year-old female presents emergency department complaining of headache  Differential Diagnosis:   ICH, SAH, CVA, migraine versus tension versus cluster headache, hypertensive urgency versus emergency  Independently Interpreted Test(s):   I have ordered and independently interpreted X-rays - see prior notes.  I have ordered and independently interpreted EKG Reading(s) - see prior notes  Clinical Tests:   Lab Tests: Reviewed       <> Summary of Lab: Benign  ED Management:  Patient initially treated for headache as I was unsure if her blood pressure was causing her headache or headache was causing her blood pressure.  Given some Compazine and Toradol with mild improvement in headache and minimal if any improvement in her blood pressure.  I gave her an extra dose of her home blood pressure medication with minimal change in her headache or blood pressure.  As noted, her headache then worsened and she was also complaining of chest pain and back pain.  At that time I started on Cardene and got a CT of her chest abdomen and pelvis with contrast to ensure she was not having a dissection.  Fortunately the CT was negative. Her blood pressure has improved with the Cardene and she is feeling somewhat better.  I discussed the case [facility]  internal medicine who will see and admit the patient.  Patient and family are comfortable with plan at this time.                   ED Course as of Oct 23 2153   Wed Oct 23, 2019   2037 Patient presented with hypertension complaining of headache that gradually worsened throughout the day.  Attempt to control her headache initially to see if that would help with her blood pressure.  Her headache had improved some and then worsened again, per family.  I had also given her some of her oral blood pressure medication with some mild improvement.  Also given some hydralazine IV with some more improvement in her blood pressure.  She was still a 190 systolic, so is about to call Internal Medicine for admission for hypertensive urgency, however now she is complaining of chest pain and back pain.  I have ordered a CTA as well as a troponin and a chestx-ray and a repeat EKG.    [BB]      ED Course User Index  [BB] Shaggy Bennett MD     Clinical Impression:       ICD-10-CM ICD-9-CM   1. Hypertensive emergency I16.1 401.9   2. Headache R51 784.0   3. Chest pain R07.9 786.50   4. Acute nonintractable headache, unspecified headache type R51 784.0         Disposition:   Disposition: Admitted  Condition: Fair                        Shaggy Bennett MD  10/23/19 2155

## 2019-10-24 ENCOUNTER — CLINICAL SUPPORT (OUTPATIENT)
Dept: SMOKING CESSATION | Facility: CLINIC | Age: 79
End: 2019-10-24

## 2019-10-24 DIAGNOSIS — F17.210 CIGARETTE SMOKER: Primary | ICD-10-CM

## 2019-10-24 LAB
ALBUMIN SERPL BCP-MCNC: 4.1 G/DL (ref 3.5–5.2)
ALP SERPL-CCNC: 143 U/L (ref 55–135)
ALT SERPL W/O P-5'-P-CCNC: 20 U/L (ref 10–44)
ANION GAP SERPL CALC-SCNC: 11 MMOL/L (ref 8–16)
AORTIC ROOT ANNULUS: 2.69 CM
AORTIC VALVE CUSP SEPERATION: 1.51 CM
AST SERPL-CCNC: 21 U/L (ref 10–40)
AV INDEX (PROSTH): 0.66
AV MEAN GRADIENT: 5 MMHG
AV PEAK GRADIENT: 8 MMHG
AV VALVE AREA: 2.17 CM2
AV VELOCITY RATIO: 0.66
BASOPHILS # BLD AUTO: 0.01 K/UL (ref 0–0.2)
BASOPHILS NFR BLD: 0.2 % (ref 0–1.9)
BILIRUB SERPL-MCNC: 0.3 MG/DL (ref 0.1–1)
BSA FOR ECHO PROCEDURE: 1.61 M2
BUN SERPL-MCNC: 16 MG/DL (ref 8–23)
CALCIUM SERPL-MCNC: 9.7 MG/DL (ref 8.7–10.5)
CHLORIDE SERPL-SCNC: 96 MMOL/L (ref 95–110)
CO2 SERPL-SCNC: 28 MMOL/L (ref 23–29)
CREAT SERPL-MCNC: 1 MG/DL (ref 0.5–1.4)
CV ECHO LV RWT: 0.47 CM
DIFFERENTIAL METHOD: ABNORMAL
DOP CALC AO PEAK VEL: 1.43 M/S
DOP CALC AO VTI: 28.17 CM
DOP CALC LVOT AREA: 3.3 CM2
DOP CALC LVOT DIAMETER: 2.05 CM
DOP CALC LVOT PEAK VEL: 0.95 M/S
DOP CALC LVOT STROKE VOLUME: 61.2 CM3
DOP CALCLVOT PEAK VEL VTI: 18.55 CM
E WAVE DECELERATION TIME: 165.05 MSEC
E/A RATIO: 0.69
ECHO LV POSTERIOR WALL: 1.11 CM (ref 0.6–1.1)
EOSINOPHIL # BLD AUTO: 0 K/UL (ref 0–0.5)
EOSINOPHIL NFR BLD: 0.2 % (ref 0–8)
ERYTHROCYTE [DISTWIDTH] IN BLOOD BY AUTOMATED COUNT: 27.3 % (ref 11.5–14.5)
EST. GFR  (AFRICAN AMERICAN): >60 ML/MIN/1.73 M^2
EST. GFR  (NON AFRICAN AMERICAN): 53 ML/MIN/1.73 M^2
FRACTIONAL SHORTENING: 29 % (ref 28–44)
GLUCOSE SERPL-MCNC: 171 MG/DL (ref 70–110)
HCT VFR BLD AUTO: 48.7 % (ref 37–48.5)
HGB BLD-MCNC: 15.1 G/DL (ref 12–16)
HYPOCHROMIA BLD QL SMEAR: ABNORMAL
INTERVENTRICULAR SEPTUM: 0.97 CM (ref 0.6–1.1)
IVRT: 0.08 MSEC
LA MAJOR: 4.33 CM
LA MINOR: 3.98 CM
LA WIDTH: 3.2 CM
LEFT ATRIUM SIZE: 2.62 CM
LEFT ATRIUM VOLUME INDEX: 18.8 ML/M2
LEFT ATRIUM VOLUME: 29.56 CM3
LEFT INTERNAL DIMENSION IN SYSTOLE: 3.33 CM (ref 2.1–4)
LEFT VENTRICLE DIASTOLIC VOLUME INDEX: 64.63 ML/M2
LEFT VENTRICLE DIASTOLIC VOLUME: 101.4 ML
LEFT VENTRICLE MASS INDEX: 110 G/M2
LEFT VENTRICLE SYSTOLIC VOLUME INDEX: 28.8 ML/M2
LEFT VENTRICLE SYSTOLIC VOLUME: 45.22 ML
LEFT VENTRICULAR INTERNAL DIMENSION IN DIASTOLE: 4.68 CM (ref 3.5–6)
LEFT VENTRICULAR MASS: 172.34 G
LYMPHOCYTES # BLD AUTO: 0.6 K/UL (ref 1–4.8)
LYMPHOCYTES NFR BLD: 10.4 % (ref 18–48)
MAGNESIUM SERPL-MCNC: 2.7 MG/DL (ref 1.6–2.6)
MCH RBC QN AUTO: 26.2 PG (ref 27–31)
MCHC RBC AUTO-ENTMCNC: 31 G/DL (ref 32–36)
MCV RBC AUTO: 84 FL (ref 82–98)
MONOCYTES # BLD AUTO: 0.1 K/UL (ref 0.3–1)
MONOCYTES NFR BLD: 0.9 % (ref 4–15)
MV PEAK A VEL: 1.01 M/S
MV PEAK E VEL: 0.7 M/S
NEUTROPHILS # BLD AUTO: 5 K/UL (ref 1.8–7.7)
NEUTROPHILS NFR BLD: 88.3 % (ref 38–73)
PHOSPHATE SERPL-MCNC: 2.3 MG/DL (ref 2.7–4.5)
PISA TR MAX VEL: 1.01 M/S
PLATELET # BLD AUTO: 235 K/UL (ref 150–350)
PLATELET BLD QL SMEAR: ABNORMAL
PMV BLD AUTO: 9 FL (ref 9.2–12.9)
POTASSIUM SERPL-SCNC: 4.2 MMOL/L (ref 3.5–5.1)
PROT SERPL-MCNC: 7.1 G/DL (ref 6–8.4)
PULM VEIN S/D RATIO: 0.67
PV PEAK D VEL: 0.43 M/S
PV PEAK S VEL: 0.29 M/S
RA MAJOR: 4.56 CM
RA PRESSURE: 3 MMHG
RA WIDTH: 2.64 CM
RBC # BLD AUTO: 5.77 M/UL (ref 4–5.4)
RIGHT VENTRICULAR END-DIASTOLIC DIMENSION: 2.71 CM
SODIUM SERPL-SCNC: 135 MMOL/L (ref 136–145)
TR MAX PG: 4 MMHG
TROPONIN I SERPL DL<=0.01 NG/ML-MCNC: 0.1 NG/ML (ref 0–0.03)
TROPONIN I SERPL DL<=0.01 NG/ML-MCNC: 0.15 NG/ML (ref 0–0.03)
TSH SERPL DL<=0.005 MIU/L-ACNC: 0.51 UIU/ML (ref 0.4–4)
TV REST PULMONARY ARTERY PRESSURE: 7 MMHG
WBC # BLD AUTO: 5.66 K/UL (ref 3.9–12.7)

## 2019-10-24 PROCEDURE — 63600175 PHARM REV CODE 636 W HCPCS: Performed by: STUDENT IN AN ORGANIZED HEALTH CARE EDUCATION/TRAINING PROGRAM

## 2019-10-24 PROCEDURE — 27000221 HC OXYGEN, UP TO 24 HOURS

## 2019-10-24 PROCEDURE — 85025 COMPLETE CBC W/AUTO DIFF WBC: CPT

## 2019-10-24 PROCEDURE — 99406 BEHAV CHNG SMOKING 3-10 MIN: CPT | Mod: S$GLB,,,

## 2019-10-24 PROCEDURE — 93005 ELECTROCARDIOGRAM TRACING: CPT

## 2019-10-24 PROCEDURE — 84443 ASSAY THYROID STIM HORMONE: CPT

## 2019-10-24 PROCEDURE — 99406 PT REFUSED TOBACCO CESSATION: ICD-10-PCS | Mod: S$GLB,,,

## 2019-10-24 PROCEDURE — 93010 EKG 12-LEAD: ICD-10-PCS | Mod: 76,,, | Performed by: INTERNAL MEDICINE

## 2019-10-24 PROCEDURE — 96366 THER/PROPH/DIAG IV INF ADDON: CPT

## 2019-10-24 PROCEDURE — 83735 ASSAY OF MAGNESIUM: CPT

## 2019-10-24 PROCEDURE — 93010 ELECTROCARDIOGRAM REPORT: CPT | Mod: 76,,, | Performed by: INTERNAL MEDICINE

## 2019-10-24 PROCEDURE — 84100 ASSAY OF PHOSPHORUS: CPT

## 2019-10-24 PROCEDURE — 25000003 PHARM REV CODE 250: Performed by: STUDENT IN AN ORGANIZED HEALTH CARE EDUCATION/TRAINING PROGRAM

## 2019-10-24 PROCEDURE — 96375 TX/PRO/DX INJ NEW DRUG ADDON: CPT

## 2019-10-24 PROCEDURE — 96372 THER/PROPH/DIAG INJ SC/IM: CPT

## 2019-10-24 PROCEDURE — 94640 AIRWAY INHALATION TREATMENT: CPT

## 2019-10-24 PROCEDURE — 20000000 HC ICU ROOM

## 2019-10-24 PROCEDURE — 94761 N-INVAS EAR/PLS OXIMETRY MLT: CPT

## 2019-10-24 PROCEDURE — 25000242 PHARM REV CODE 250 ALT 637 W/ HCPCS: Performed by: STUDENT IN AN ORGANIZED HEALTH CARE EDUCATION/TRAINING PROGRAM

## 2019-10-24 PROCEDURE — 84484 ASSAY OF TROPONIN QUANT: CPT | Mod: 91

## 2019-10-24 PROCEDURE — 80053 COMPREHEN METABOLIC PANEL: CPT

## 2019-10-24 PROCEDURE — 36415 COLL VENOUS BLD VENIPUNCTURE: CPT

## 2019-10-24 RX ORDER — CILOSTAZOL 50 MG/1
50 TABLET ORAL 2 TIMES DAILY
Status: DISCONTINUED | OUTPATIENT
Start: 2019-10-24 | End: 2019-10-26 | Stop reason: HOSPADM

## 2019-10-24 RX ORDER — DIVALPROEX SODIUM 250 MG/1
250 TABLET, DELAYED RELEASE ORAL EVERY 8 HOURS
Status: DISCONTINUED | OUTPATIENT
Start: 2019-10-24 | End: 2019-10-26 | Stop reason: HOSPADM

## 2019-10-24 RX ORDER — IPRATROPIUM BROMIDE AND ALBUTEROL SULFATE 2.5; .5 MG/3ML; MG/3ML
3 SOLUTION RESPIRATORY (INHALATION) EVERY 6 HOURS PRN
Status: DISCONTINUED | OUTPATIENT
Start: 2019-10-24 | End: 2019-10-26 | Stop reason: HOSPADM

## 2019-10-24 RX ADMIN — ESCITALOPRAM OXALATE 10 MG: 10 TABLET ORAL at 08:10

## 2019-10-24 RX ADMIN — HEPARIN SODIUM 5000 UNITS: 5000 INJECTION, SOLUTION INTRAVENOUS; SUBCUTANEOUS at 02:10

## 2019-10-24 RX ADMIN — DIVALPROEX SODIUM 250 MG: 250 TABLET, DELAYED RELEASE ORAL at 02:10

## 2019-10-24 RX ADMIN — LOSARTAN POTASSIUM 50 MG: 50 TABLET ORAL at 09:10

## 2019-10-24 RX ADMIN — CILOSTAZOL 50 MG: 50 TABLET ORAL at 08:10

## 2019-10-24 RX ADMIN — HEPARIN SODIUM 5000 UNITS: 5000 INJECTION, SOLUTION INTRAVENOUS; SUBCUTANEOUS at 06:10

## 2019-10-24 RX ADMIN — LEVOTHYROXINE SODIUM 88 MCG: 88 TABLET ORAL at 06:10

## 2019-10-24 RX ADMIN — DIVALPROEX SODIUM 250 MG: 250 TABLET, DELAYED RELEASE ORAL at 09:10

## 2019-10-24 RX ADMIN — HEPARIN SODIUM 5000 UNITS: 5000 INJECTION, SOLUTION INTRAVENOUS; SUBCUTANEOUS at 09:10

## 2019-10-24 RX ADMIN — MAGNESIUM SULFATE 1 G: 1 INJECTION INTRAVENOUS at 01:10

## 2019-10-24 RX ADMIN — ATORVASTATIN CALCIUM 40 MG: 40 TABLET, FILM COATED ORAL at 09:10

## 2019-10-24 RX ADMIN — NICARDIPINE HYDROCHLORIDE 5 MG/HR: 0.2 INJECTION, SOLUTION INTRAVENOUS at 08:10

## 2019-10-24 RX ADMIN — PANTOPRAZOLE SODIUM 40 MG: 40 TABLET, DELAYED RELEASE ORAL at 09:10

## 2019-10-24 RX ADMIN — CILOSTAZOL 50 MG: 50 TABLET ORAL at 09:10

## 2019-10-24 RX ADMIN — CLOPIDOGREL BISULFATE 75 MG: 75 TABLET ORAL at 09:10

## 2019-10-24 RX ADMIN — TIOTROPIUM BROMIDE 18 MCG: 18 CAPSULE ORAL; RESPIRATORY (INHALATION) at 11:10

## 2019-10-24 NOTE — PLAN OF CARE
Pt reports her adult son lives with her and she is independent with adls; uses a cane or RW at times and also has home oxygen. Pt's daughter Tiki currently at bedside and informed Tn she or any of her siblings can provide help for pt as needed as well as transportation upon d/c.   Tn gave pt d/c brochure and card and encouraged to call for any needs/concerns.     10/24/19 4454   Discharge Assessment   Assessment Type Discharge Planning Assessment   Confirmed/corrected address and phone number on facesheet? Yes   Assessment information obtained from? Patient   Expected Length of Stay (days) 2   Communicated expected length of stay with patient/caregiver yes   Prior to hospitilization cognitive status: Alert/Oriented   Prior to hospitalization functional status: Independent;Assistive Equipment   Current cognitive status: Alert/Oriented   Current Functional Status: Needs Assistance   Lives With child(martha), adult  (son)   Able to Return to Prior Arrangements yes   Is patient able to care for self after discharge? Yes   Who are your caregiver(s) and their phone number(s)? [place] (daughter) [phone]   Patient's perception of discharge disposition home or selfcare   Readmission Within the Last 30 Days no previous admission in last 30 days   Patient currently being followed by outpatient case management? No   Patient currently receives any other outside agency services? No   Equipment Currently Used at Home oxygen;cane, straight   Do you have any problems affording any of your prescribed medications? No   Is the patient taking medications as prescribed? yes   Does the patient have transportation home? Yes   Transportation Anticipated family or friend will provide   Does the patient receive services at the [facility]? No   Discharge Plan A Home with family   Discharge Plan B Home Health   DME Needed Upon Discharge  none   Patient/Family in Agreement with Plan yes

## 2019-10-24 NOTE — ED NOTES
Pt reports that she has had the worst headache of her life that started this morning and reports that she had one episode of vomiting. Pt states that she's taken tylenol for the pain and has had no relief. Pts BP are also elevated but are usually 150-160s. Pt arrived mildly distressed and c/o major headache. Pt's family is with her at the bedside.

## 2019-10-24 NOTE — ED NOTES
Repositioned in bed for comfort. Pt. Is hungry and cardiac diet served. cardene drip restarted sec. To sbp >180. Discussed avaiolibility of icu bed with family and pt. And room has been assigned.

## 2019-10-24 NOTE — NURSING TRANSFER
Nursing Transfer Note      10/24/2019     Transfer To:     Transfer via stretcher    Transfer with cardiac monitoring    Transported by 2 ER RNs    Medicines sent: Magnesium gtt, Valproate gtt handed from ER RN to ICU RN    Chart send with patient: Yes    Notified: daughter    Patient reassessed at: 10/24/19 at 0055     Upon arrival to floor: cardiac monitor applied, patient oriented to room, call bell in reach and bed in lowest position

## 2019-10-24 NOTE — ED NOTES
Pt reports that she is feeling some relief from morphine and has calmed down. Pt's daughters at bedside.

## 2019-10-24 NOTE — PROGRESS NOTES
Smoking cessation education provided. Pt denies nicotine withdrawal symptoms.  She states that she is not ready to quit smoking and she declines enrollment in the [employer]. Handout provided for Ambulatory Smoking Cessation program

## 2019-10-24 NOTE — ED NOTES
Pt reports that she's still not having any relief from medications and appears very anxious and uncomfortable. Pt is groaning in pain. MD notified.

## 2019-10-24 NOTE — ED NOTES
Admit team at bedside. Discussed plan of care with pt. And family-pt. Is boarder in ED secondary to no bed availability but there are inpatient beds are available at [facility]. After discussion, pt. And family request transfer to [place].

## 2019-10-24 NOTE — PLAN OF CARE
Safety: call light in reach, patient oriented to room & instructed how to notify nurse if assistance is needed, current questions/concerns addressed, bed in lowest position with wheels locked & side rails up X 3. Pt educated regarding fall prevention and taking appropriate action to prevent falls   Activity: bedrest maintained  Neurological: Oriented x4   Respiratory: O2 sats >92% on 2L NC  Cardiac: On Cardine gtt; Maintained MAP around 92 per orders; HR stable. Afebrile this shift.   Intake/Output: pt has not urinated or had bm tonight  Diet: Cardiac diet; pt ate while in ED but no intake in ICU  Pain: upon arriving to ICU pt drowsy and drifting off to sleep;  Skin: WDI  Devices: IV pump

## 2019-10-24 NOTE — ED NOTES
Pt. Reports minimal pain relief from headache. Pt. Increasingly restless and moaning. She is c/o lt. Sided chest pain that radiates through to her back at this time. She c/o sob and appears mildly distressed. Dr. Bennett at bedside for reassessment. Orders received.

## 2019-10-24 NOTE — PROGRESS NOTES
[facility]       Briefly, 79 y.o.  female who  has a past medical history of Abdominal pain, CHF (congestive heart failure), Chronic kidney disease, stage III (moderate) (8/19/2015), COPD (chronic obstructive pulmonary disease), COPD (chronic obstructive pulmonary disease), CVA (cerebral infarction), Esophagitis, Gastritis, GERD (gastroesophageal reflux disease), GI bleed, Hyperlipidemia, Hypertension, Mesenteric angina, PVD (peripheral vascular disease) with claudication (10/13/2015), Stroke, Subclavian artery stenosis, left, and Thyroid disease.. The patient presented to [facility] on 10/23/2019 with a primary complaint of Headache (Reports woke up with headache this AM and dizziness with 1 episode of vomiting. Reports has been taking Tylenol without relief. VAN-. Reports worst headache of her life. )   The patient was in their usual state of health until 7:30am this morning when she woke up and began experiencing moderate frontal headaches that did not resolve with Tylenol.  She reports having one episode of vomiting shortly after. Over the next few hours she noticed progression of the headache and took additional Tylenol with no relief. She states that she has occasional headaches, which normally resolve with Tylenol, and felt concerned when she noticed that the headache seemed to worsen and not improve. She denies worsening of pain with bright lights or movements. She had a similar presentation in September 2017 during which she complained of headache and neck pain in the setting of hypertension. At that time she was treated with IV labetolol and PO norvasc with clinical improvement.    She called her daughters who brought her to the ED, where she was found to be hypertensive to the 240s/100 with no clear evidence of end organ damage . Her blood pressure at home is normally in the 160s systolic and her daughters report strict compliance with medication regimen (however they  also note that when the patient gets acutely ill, she forgets to takes her meds sometimes). She takes Valsartan 80mg at home and was started on 50mg of Losartan, a cardene drip and 2L oxygen per NC with improvement in BP. Additionally, she was given morphine, compazine, toradol and prednisone to management of headache.    While in the ED, she also begin complaining of chest, neck, jaw and back pain. Initial investigations were negative. She had a CTA of the chest and abdomen (to rule out possible dissection), which showed no evidence of aortic aneurysm or dissection, no acute intrathoracic abnormalities and no acute intra-abdominal abnormalities. Her troponin is downtrending (0.027 to 0.025). Upon visitation in the ED, she denies agitation, delirium, seizures, visual disturbances, fever, chills, current chest pain, shortness of breath, abdominal pain, numbness, tingling. She admits to being an active smoker, averaging 1 PPD for over 60 years.        Video assessment :  Sleeping comfortably     Vitals reviewed   Afebrile, 240/100     LABs reviewed       Radiology reviewed   CTA C/A  1. No evidence of aortic aneurysm or dissection.  Moderate to severe atherosclerosis is seen of the abdominal aorta with significant plaquing of its branch vessels.  2. No acute intrathoracic abnormalities identified.  3. No acute intra-abdominal abnormalities identified.  4. Postsurgical changes and additional findings as detailed above.    CT head  Neg     CXR   Neg           Assessment / Plan :  # accelerated HTN -- HTN urgency   # CJ/CKD   # multiple co-morbidities :  Abdominal pain, CHF (congestive heart failure), Chronic kidney disease, stage III (moderate) (8/19/2015), COPD (chronic obstructive pulmonary disease), COPD (chronic obstructive pulmonary disease), CVA (cerebral infarction), Esophagitis, Gastritis, GERD (gastroesophageal reflux disease), GI bleed, Hyperlipidemia, Hypertension, Mesenteric angina, PVD (peripheral vascular  disease) with claudication (10/13/2015), Stroke, Subclavian artery stenosis, left, and Thyroid disease.  - Nicardipine drip ; drop max 20 % in 1st hour, then additional max 15 % in next 23 hours. Came with MAP of 140, so will tray to maintain MAP around 93 mmHg for upto total 23 hours  -  Resume home meds as tolerated        DVT Px : Heparin SQ  GI Px : PPI       Patient seen over video : Yes  Chart reviewed : Yes  Spoke with RN : No

## 2019-10-24 NOTE — PROGRESS NOTES
"[facility] Hospital Medicine Progress Note    Primary Team: [facility] Hospitalist Team A  Attending Physician: Cady Foster MD  Resident: Talita/Zaynab  Intern: Tien/Flora    Subjective:      Patient reports her headache is still present but greatly improved following mag/valproate, now 4/10 and still bilaterally over eyes. Nausea well controlled, no further episodes of vomiting. Remains on cardene drip. Denies SOB, CP, palpitations, jaw pain, abd pain, N/V/D.      Objective:     Last 24 Hour Vital Signs:  BP  Min: 131/59  Max: 240/100  Temp  Av.1 °F (36.7 °C)  Min: 97.6 °F (36.4 °C)  Max: 98.6 °F (37 °C)  Pulse  Av.8  Min: 75  Max: 104  Resp  Av.7  Min: 18  Max: 36  SpO2  Av.3 %  Min: 89 %  Max: 99 %  Height  Av' 1" (154.9 cm)  Min: 5' 1" (154.9 cm)  Max: 5' 1" (154.9 cm)  Weight  Av.2 kg (130 lb 9.5 oz)  Min: 58.6 kg (129 lb 3 oz)  Max: 59.9 kg (132 lb)  I/O last 3 completed shifts:  In: 500 [IV Piggyback:500]  Out: 600 [Urine:600]    Physical Examination:  General: Alert, cooperative, no distress, appears stated age  HEENT: NCAT, PERRL, MMM  Neck: supple, normal ROM, no thyromegaly, no LAD  Cardio: RRR, HS 1 & 2 normal, no murmurs or added sounds  Lungs: CTABL, no wheezes or crackles, good inspiratory effort  Abd: soft, non-tender to palpation, no palpable masses or organomegaly, bowel sounds normoactive  Extrem: atraumatic, no edema, distal pulses 2+ in all four extremities  Skin: no rashes, bruises, or lesions noted  Neuro: A&Ox3, follows commands, moves all extremities spontaneously    Laboratory:  Laboratory Data Reviewed: yes  Pertinent Findings:  Recent Labs   Lab 10/23/19  1629 10/24/19  0410   WBC 6.76 5.66   HGB 14.1 15.1   HCT 45.4 48.7*    235    135*   K 4.9 4.2   CL 96 96   CREATININE 1.1 1.0   BUN 15 16   CO2 29 28   ALT 20 20   AST 34 21     Microbiology Data Reviewed: yes  Pertinent Findings:  None    Other Results:  EKG (my interpretation): NSR rate 80, no " signs of ischemia    Radiology Data Reviewed: yes  Pertinent Findings:  10/23 CTA CAP:  1. No evidence of aortic aneurysm or dissection.  Moderate to severe atherosclerosis is seen of the abdominal aorta with significant plaquing of its branch vessels.  2. No acute intrathoracic abnormalities identified.  3. No acute intra-abdominal abnormalities identified.  4. Postsurgical changes and additional findings as detailed above.    Current Medications:     Infusions:   niCARdipine 2.5 mg/hr (10/23/19 5245)        Scheduled:   atorvastatin  40 mg Oral Daily    clopidogrel  75 mg Oral Daily    escitalopram oxalate  10 mg Oral Nightly    heparin (porcine)  5,000 Units Subcutaneous Q8H    levothyroxine  88 mcg Oral Before breakfast    losartan  50 mg Oral Daily    pantoprazole  40 mg Oral Daily    tiotropium  18 mcg Inhalation Daily    valproate sodium (DEPACON) IVPB  500 mg Intravenous Once        PRN:  albuterol, Dextrose 10% Bolus, Dextrose 10% Bolus, glucagon (human recombinant), glucose, glucose, prochlorperazine, sodium chloride 0.9%    Antibiotics and Day Number of Therapy:  None    Lines and Day Number of Therapy:  PIV    Assessment/Plan:     Hypertensive Emergency  Patient with a hx of HTN, takes valsartan 80 mg at home. Per daughters patient will miss some doses of medications when not feeling well. /100 in ED, started on cardene drip and transferred to ICU. While in ER developed chest, back, and neck pain. CTA CAP without signs of AAA or dissection. Troponins mildly elevated to 0.027, EKG without signs of ischemia.   - continue losartan 50 mg daily, wean cardene as tolerated.   - troponin slightly increased to 0.1 this am, continue EKG and trop trend q6h.     Tropinemia  Trop elevated to 0.027 in ED, has consistently elevated trops per chart review with baselines around 0.01-0.02. EKGs without signs of ischemia. Suspect this is 2/2 HTN emergency. No active CP at present.   - most recent trop 0.1,  continue to trend trop/ekg.  - treatment of HTN emergency as above.   - continue to monitor for CP.      Headache with Nausea/Vomiting  Reports gradual onset headache since waking with vomiting x1, 10/10 severity in ED. CT head without signs of infarct or hemorrhage. S/p compazine, haldol, steroids, morphine, toradol in ER without relief.   - ordered 1mg mag sulfate, valproate, compazine with good relief in pain/nasuea.   - continue valproate 250 mg po taper pending resolution of pain (250 mg TID x 3 days, 250 mg BID x 3 days, 250 mg daily x 3 days).   - compazine q6h prn nausea.      Iron Deficiency Anemia  Has history of DELIA requiring IV iron infusions. EGD and colonoscopy 9/2019 without evidence of bleeding. H/H: 14.1/45.1 on current admission, no signs of active bleed.  - will continue to monitor with daily CBC.     Peripheral Vascular Disease with Claudication  Follows with Dr. Brooks as an outpatient. S/p SMA and celiac stenting as well as R CEA. Takes pletal 50 mg BID, plavix and ASA at home.   - continue DAPT and pletal.   - counseled on smoking cessation.     COPD  On 2L NC at home. Takes albuterol rescue inhaler at home. No active issues.    - duonebs prn wheeze.   - continue 2L NC.     Hypothyroidism  Takes synthroid 88 mcg daily at home. Most recent TSH 7/2019 0.35.  - TSH ordered.   - continue home synthroid.                 GERD  - continue home Pantoprazole 40mg.     Depression      Takes lexapro 10 mg nightly at home. No SI/HI at present.  - continue home lexapro.      Tobacco Abuse  Daily smoker, 1PPD since age 14. No interest in quitting.  - counseled on cessation.  - offered nicotine patches.                             HLD  Lipid panel 7/2019 at goal. Takes lipitor 40 mg at home.   - continue home statin.                Code: Full  PPX: Heparin  Diet: Cardiac  Dispo: pending wean from cardene drip and improvement of LONG/nausea    Iram Anders MD  [facility] HO-II  [facility] Internal  Medicine Team A    [facility] Medicine Hospitalist Pager numbers:   [facility] Hospitalist Medicine Team A (Bradley/Kristin): [phone]  [facility] Hospitalist Medicine Team B (Homer/James):  [phone]

## 2019-10-24 NOTE — ED NOTES
Physician at bedside. Pt having increased back pain in mid upper back. Pt is restless and anxious.

## 2019-10-24 NOTE — H&P
[facility] Medicine H&P Note     Admitting Team: [facility] Hospitalist Team A  Attending Physician: Kristin  Resident: Talita  Intern: Yanet    Date of Admit: 10/23/2019    Chief Complaint     Headache (Reports woke up with headache this AM and dizziness with 1 episode of vomiting. Reports has been taking Tylenol without relief. VAN-. Reports worst headache of her life. )   for 1 day.     Subjective:      History of Present Illness:  Brittany Haile is a 79 y.o.  female who  has a past medical history of Abdominal pain, CHF (congestive heart failure), Chronic kidney disease, stage III (moderate) (8/19/2015), COPD (chronic obstructive pulmonary disease), COPD (chronic obstructive pulmonary disease), CVA (cerebral infarction), Esophagitis, Gastritis, GERD (gastroesophageal reflux disease), GI bleed, Hyperlipidemia, Hypertension, Mesenteric angina, PVD (peripheral vascular disease) with claudication (10/13/2015), Stroke, Subclavian artery stenosis, left, and Thyroid disease.. The patient presented to [facility] on 10/23/2019 with a primary complaint of Headache (Reports woke up with headache this AM and dizziness with 1 episode of vomiting. Reports has been taking Tylenol without relief. VAN-. Reports worst headache of her life. )    The patient was in their usual state of health until 7:30am this morning when she woke up and began experiencing moderate frontal headaches that did not resolve with Tylenol.  She reports having one episode of vomiting shortly after. Over the next few hours she noticed progression of the headache and took additional Tylenol with no relief. She states that she has occasional headaches, which normally resolve with Tylenol, and felt concerned when she noticed that the headache seemed to worsen and not improve. She denies worsening of pain with bright lights or movements. She had a similar presentation in September 2017 during which she complained of headache and neck pain in  the setting of hypertension. At that time she was treated with IV labetolol and PO norvasc with clinical improvement.     She called her daughters who brought her to the ED, where she was found to be hypertensive to the 240s/100 with no clear evidence of end organ damage . Her blood pressure at home is normally in the 160s systolic and her daughters report strict compliance with medication regimen (however they also note that when the patient gets acutely ill, she forgets to takes her meds sometimes). She takes Valsartan 80mg at home and was started on 50mg of Losartan, a cardene drip and 2L oxygen per NC with improvement in BP. Additionally, she was given morphine, compazine, toradol and prednisone to management of headache.     While in the ED, she also begin complaining of chest, neck, jaw and back pain. Initial investigations were negative. She had a CTA of the chest and abdomen (to rule out possible dissection), which showed no evidence of aortic aneurysm or dissection, no acute intrathoracic abnormalities and no acute intra-abdominal abnormalities. Her troponin is downtrending (0.027 to 0.025). Upon visitation in the ED, she denies agitation, delirium, seizures, visual disturbances, fever, chills, current chest pain, shortness of breath, abdominal pain, numbness, tingling. She admits to being an active smoker, averaging 1 PPD for over 60 years.    Past Medical History:  Past Medical History:   Diagnosis Date    Abdominal pain     CHF (congestive heart failure)     Chronic kidney disease, stage III (moderate) 8/19/2015    COPD (chronic obstructive pulmonary disease)     COPD (chronic obstructive pulmonary disease)     CVA (cerebral infarction)     Esophagitis     Gastritis     GERD (gastroesophageal reflux disease)     GI bleed     Hyperlipidemia     Hypertension     Mesenteric angina     PVD (peripheral vascular disease) with claudication 10/13/2015    Stroke     Subclavian artery stenosis,  left     Thyroid disease      Past Surgical History:  Past Surgical History:   Procedure Laterality Date    CAROTID STENT       SECTION      COLONOSCOPY N/A 3/2/2017    Procedure: COLONOSCOPY;  Surgeon: Cecil Crooks MD;  Location: [facility] ENDO;  Service: Endoscopy;  Laterality: N/A;    COLONOSCOPY N/A 2019    Procedure: COLONOSCOPY/Suprep;  Surgeon: Barry Talbert MD;  Location: [facility] ENDO;  Service: Endoscopy;  Laterality: N/A;    CORONARY ANGIOPLASTY  2006    EPIDURAL STEROID INJECTION INTO LUMBAR SPINE N/A 2018    Procedure: INJECTION, STEROID, SPINE, LUMBAR, EPIDURAL- L2-3 ORAL SEDATION;  Surgeon: Stiven Ibarra Jr., MD;  Location: [facility] PAIN MGT;  Service: Pain Management;  Laterality: N/A;    ESOPHAGOGASTRODUODENOSCOPY N/A 2019    Procedure: EGD (ESOPHAGOGASTRODUODENOSCOPY);  Surgeon: Barry Talbert MD;  Location: [facility] ENDO;  Service: Endoscopy;  Laterality: N/A;    GALLBLADDER SURGERY      HYSTERECTOMY      INTRALUMINAL GASTROINTESTINAL TRACT IMAGING VIA CAPSULE N/A 2019    Procedure: IMAGING PROCEDURE, GI TRACT, INTRALUMINAL, VIA CAPSULE;  Surgeon: Barry Talbert MD;  Location: [facility] ENDO;  Service: Endoscopy;  Laterality: N/A;    OOPHORECTOMY      SUPERIOR MESTENTERIC ARTERY STENT       Allergies:  Review of patient's allergies indicates:  No Known Allergies    Home Medications:  Prior to Admission medications    Medication Sig Start Date End Date Taking? Authorizing Provider   albuterol (PROVENTIL/VENTOLIN HFA) 90 mcg/actuation inhaler Inhale 1-2 puffs into the lungs every 6 (six) hours as needed for Wheezing. Rescue 9/10/19   Wayne Frazier MD   atorvastatin (LIPITOR) 40 MG tablet Take 1 tablet (40 mg total) by mouth once daily. 19   Wayne Frazier MD   clopidogrel (PLAVIX) 75 mg tablet Take 1 tablet (75 mg total) by mouth once daily. 19  John Rodriguez MD   escitalopram oxalate (LEXAPRO) 10 MG tablet Take 1  tablet (10 mg total) by mouth nightly. 1/31/19   Wayne Frazier MD   fluticasone-salmeterol 250-50 mcg/dose (ADVAIR) 250-50 mcg/dose diskus inhaler Inhale 1 puff into the lungs 2 (two) times daily. 1/3/18   Wayne Frazier MD   levothyroxine (SYNTHROID) 88 MCG tablet TAKE 1 TABLET(88 MCG) BY MOUTH EVERY DAY 9/11/19   Wayne Frazier MD   meclizine (ANTIVERT) 25 mg tablet TAKE 1 TABLET(25 MG) BY MOUTH EVERY 6 HOURS AS NEEDED FOR DIZZINESS 6/14/17   Wayne Frazier MD   pantoprazole (PROTONIX) 40 MG tablet TAKE 1 TABLET(40 MG) BY MOUTH EVERY DAY 1/31/19   Wayne Frazier MD   tiotropium (SPIRIVA WITH HANDIHALER) 18 mcg inhalation capsule Inhale 1 capsule (18 mcg total) into the lungs once daily. 1/31/19   Wayne Frazier MD   valsartan (DIOVAN) 80 MG tablet Take 1 tablet (80 mg total) by mouth once daily. 7/31/19 7/30/20  Wayne Frazier MD       Family History:  Family History   Problem Relation Age of Onset    Cancer Sister         breast    Cancer Brother         throat cancer       Social History:  Social History     Tobacco Use    Smoking status: Current Every Day Smoker     Packs/day: 0.50     Years: 60.00     Pack years: 30.00     Types: Cigarettes    Smokeless tobacco: Never Used   Substance Use Topics    Alcohol use: No    Drug use: No       Review of Systems:  Review of Systems   Constitutional: Negative for chills, diaphoresis, fever, malaise/fatigue and weight loss.   HENT: Negative for ear discharge, ear pain, hearing loss and tinnitus.    Eyes: Negative for blurred vision, double vision, photophobia, pain and discharge.   Respiratory: Negative for cough, hemoptysis, sputum production, shortness of breath and wheezing.    Cardiovascular: Positive for chest pain. Negative for palpitations, orthopnea, claudication and leg swelling.   Gastrointestinal: Positive for nausea and vomiting. Negative for abdominal pain, blood in stool, constipation, diarrhea and heartburn.   Genitourinary: Negative for dysuria,  "frequency and urgency.   Musculoskeletal: Positive for back pain and neck pain. Negative for falls, joint pain and myalgias.   Skin: Negative for itching and rash.   Neurological: Positive for headaches. Negative for dizziness, tingling, tremors and sensory change.   Endo/Heme/Allergies: Negative for environmental allergies and polydipsia. Does not bruise/bleed easily.   Psychiatric/Behavioral: Negative for depression and suicidal ideas.   All other systems are reviewed and are negative.    Health Maintaince :   Primary Care Physician: Dr. Frazier    Immunizations:   TDap UTD  Flu UTD  Pna UTD    Cancer Screening:  Colonoscopy: UTD     Objective:   Last 24 Hour Vital Signs:  BP  Min: 140/66  Max: 240/100  Temp  Av.1 °F (36.7 °C)  Min: 98.1 °F (36.7 °C)  Max: 98.1 °F (36.7 °C)  Pulse  Av.3  Min: 90  Max: 101  Resp  Av.5  Min: 18  Max: 36  SpO2  Av.5 %  Min: 93 %  Max: 99 %  Height  Av' 1" (154.9 cm)  Min: 5' 1" (154.9 cm)  Max: 5' 1" (154.9 cm)  Weight  Av.9 kg (132 lb)  Min: 59.9 kg (132 lb)  Max: 59.9 kg (132 lb)  Body mass index is 24.94 kg/m².  I/O last 3 completed shifts:  In: 500 [IV Piggyback:500]  Out: 600 [Urine:600]    Physical Examination:  Physical Exam   Constitutional: She is oriented to person, place, and time. She appears well-developed and well-nourished. She appears distressed.   HENT:   Head: Normocephalic and atraumatic.   Eyes: Pupils are equal, round, and reactive to light. EOM are normal. Right eye exhibits no discharge. Left eye exhibits no discharge. No scleral icterus.   Neck: Normal range of motion. Neck supple. No JVD present. No tracheal deviation present. No thyromegaly present.   Cardiovascular: Normal rate, regular rhythm, normal heart sounds and intact distal pulses. Exam reveals no gallop and no friction rub.   No murmur heard.  Pulmonary/Chest: Effort normal and breath sounds normal. No stridor. No respiratory distress. She has no wheezes. She has no rales. " She exhibits no tenderness.   Abdominal: Soft. Bowel sounds are normal. She exhibits no distension and no mass. There is no tenderness. There is no guarding.   Musculoskeletal: Normal range of motion.   Lymphadenopathy:     She has no cervical adenopathy.   Neurological: She is alert and oriented to person, place, and time. She displays normal reflexes. No cranial nerve deficit. Coordination normal.   Skin: Skin is warm and dry. She is not diaphoretic.   Psychiatric: She has a normal mood and affect.     Laboratory:  CBC:   Lab Results   Component Value Date    WBC 6.76 10/23/2019    HGB 14.1 10/23/2019    HCT 45.4 10/23/2019     10/23/2019    MCV 85 10/23/2019    RDW SEE COMMENT 10/23/2019     WBC Differential:   BMP:   Lab Results   Component Value Date     10/23/2019    K 4.9 10/23/2019    CL 96 10/23/2019    CO2 29 10/23/2019    BUN 15 10/23/2019    CREATININE 1.1 10/23/2019     (H) 10/23/2019    CALCIUM 9.6 10/23/2019    MG 1.9 09/14/2019    PHOS 4.2 09/14/2019     LFTs:   Lab Results   Component Value Date    PROT 7.6 10/23/2019    ALBUMIN 4.2 10/23/2019    BILITOT 0.3 10/23/2019    AST 34 10/23/2019    ALKPHOS 141 (H) 10/23/2019    ALT 20 10/23/2019     Coags:   Lab Results   Component Value Date    INR 1.0 09/13/2019     FLP:   Lab Results   Component Value Date    CHOL 154 07/29/2019    HDL 61 07/29/2019    LDLCALC 78.4 07/29/2019    TRIG 73 07/29/2019    CHOLHDL 39.6 07/29/2019     DM:   Lab Results   Component Value Date    HGBA1C 5.6 07/29/2019    HGBA1C 5.6 07/25/2018    HGBA1C 5.7 (H) 01/03/2018    LDLCALC 78.4 07/29/2019    CREATININE 1.1 10/23/2019     Thyroid:   Lab Results   Component Value Date    TSH 0.354 (L) 07/29/2019    FREET4 1.17 07/29/2019     Anemia:   Lab Results   Component Value Date    IRON <10 (L) 07/31/2019    TIBC 546 (H) 07/31/2019    FERRITIN 5 (L) 07/31/2019    AKBYVREF31 272 01/18/2017    FOLATE 9.2 01/20/2016     Cardiac:   Lab Results   Component Value  Date    TROPONINI 0.025 10/23/2019    BNP 70 09/13/2019     Urinalysis:   Lab Results   Component Value Date    LABURIN No growth 04/20/2017    COLORU Yellow 10/23/2019    SPECGRAV 1.020 10/23/2019    NITRITE Negative 10/23/2019    KETONESU Negative 10/23/2019    UROBILINOGEN Negative 10/23/2019    WBCUA 6 (H) 10/23/2019       Trended Lab Data:  Recent Labs   Lab 10/23/19  1629   WBC 6.76   HGB 14.1   HCT 45.4      MCV 85   RDW SEE COMMENT      K 4.9   CL 96   CO2 29   BUN 15   CREATININE 1.1   *   PROT 7.6   ALBUMIN 4.2   BILITOT 0.3   AST 34   ALKPHOS 141*   ALT 20       Trended Cardiac Data:  Recent Labs   Lab 10/23/19  2055 10/23/19  2101   TROPONINI 0.027* 0.025       Microbiology Data:      Other Results:  EKG (my interpretation):   Sinus rhythm with Premature supraventricular complexes  Nonspecific T wave abnormality    Radiology:  Imaging Results          CTA Chest Abdomen Non Coronary (In process)  Result time 10/23/19 21:47:40   Procedure changed from CT Chest Abdomen With Contrast (XPD)                X-Ray Chest AP Portable (Final result)  Result time 10/23/19 20:56:33    Final result by Milton Barron MD (10/23/19 20:56:33)                 Impression:      No detrimental change or radiographic acute intrathoracic process seen on this single view.    Radiographic findings suggesting sequela of COPD/emphysema, without focal consolidation.      Electronically signed by: Milton Barron MD  Date:    10/23/2019  Time:    20:56             Narrative:    EXAMINATION:  XR CHEST AP PORTABLE    CLINICAL HISTORY:  Chest pain, unspecified    TECHNIQUE:  Single frontal view of the chest was performed.    COMPARISON:  Chest radiograph 09/13/2019    FINDINGS:  Monitoring leads overlie the chest.  Cardiomediastinal silhouette is midline and within normal limits noting calcific atherosclerosis at the arch.  Bibasilar scattered linear opacities consistent with subsegmental scarring versus atelectasis.   Left basilar platelike scarring versus atelectasis, unchanged.  The lungs are symmetrically hyperexpanded with bilateral diffuse nonspecific interstitial coarsening and increased lucency of the upper zones suggesting sequela of underlying COPD/emphysema similar to previous studies.  No convincing evidence for acute pulmonary edema.  No focal consolidation, pleural effusion or pneumothorax.  Scattered atherosclerotic vascular calcifications noted elsewhere.  No acute osseous process seen.  PA and lateral views can be obtained.                               CT Head Without Contrast (Final result)  Result time 10/23/19 16:53:40    Final result by Surinder Herrera MD (10/23/19 16:53:40)                 Impression:      No acute infarct or hemorrhage or mass or fracture.      Electronically signed by: Surinder Herrera  Date:    10/23/2019  Time:    16:53             Narrative:    EXAMINATION:  CT HEAD WITHOUT CONTRAST    CLINICAL HISTORY:  Dizziness;Headache, acute, norm neuro exam;    TECHNIQUE:  Low dose axial images were obtained through the head.  Coronal and sagittal reformations were also performed. Contrast was not administered.    COMPARISON:  09/13/2019 head CT    FINDINGS:  The overall gray-white interface and hemispherical morphology appears normal.  The ventricles and basal cisterns and sulci are proportionally sized and probably normal for age similar with 09/13/2019.  The corpus callosum shows normal morphology.  The pituitary and sella turcica appear normal for age.  There is ill-defined hypodensity of the left occipital lobe axial image 18, possibly also coronal image 123 similar with 09/13/2019.    No definite acute infarct or hemorrhage or mass.  No vasogenic edema pattern.  No subdural collection.    No skull fracture or overlying soft tissue swelling.  The visualized paranasal sinuses and mastoid sinuses and middle ears appear normal.  The visualized orbits and globes appear normal.  There are bilateral  cataract resections. 8                                 Assessment:     Brittany Haile is a 79 y.o. female with:  Patient Active Problem List    Diagnosis Date Noted    Anemia 09/11/2019    Epistaxis 05/13/2019    Chronic obstructive pulmonary disease 01/31/2019    Gastroesophageal reflux disease 01/31/2019    Moderate recurrent major depression 07/25/2018    Lumbar radiculopathy 05/03/2018    Chronic bilateral low back pain with bilateral sciatica 04/20/2018    Lumbar spondylosis 04/20/2018    Spinal stenosis of lumbar region with neurogenic claudication 04/20/2018    DDD (degenerative disc disease), lumbar 04/20/2018    Chronic diastolic heart failure 01/03/2018    Stroke 10/03/2017    Aortic atherosclerosis 10/03/2017    GIB (gastrointestinal bleeding) 04/23/2017    Mesenteric angina 04/17/2017    Angina mesenteric 04/13/2017    Nicotine dependence, cigarettes, w unsp disorders 04/13/2017    Coronary artery disease of native artery of native heart with stable angina pectoris 04/13/2017    Abdominal pain 03/02/2017    Gastritis 03/02/2017    CVD (cerebrovascular disease) 10/14/2016    Chest pain 10/14/2016    Subclavian artery stenosis, left 04/15/2016    Glucose intolerance (impaired glucose tolerance) 11/11/2015    Iron deficiency anemia due to chronic blood loss 10/22/2015    Leg pain, bilateral 10/13/2015    PVD (peripheral vascular disease) with claudication 10/13/2015    Chronic kidney disease, stage III (moderate) 08/19/2015    COPD (chronic obstructive pulmonary disease) 06/04/2015    HLD (hyperlipidemia) 06/04/2015    Incidental pulmonary nodule, > 3mm and < 8mm 08/01/2013    History of stroke without residual deficits 04/12/2013    Carotid stenosis 04/12/2013    Hypothyroid 04/12/2013    Hypertension 04/12/2013    Anxiety 04/12/2013        Plan:   Hypertensive Emergency  -Patient with a hx of HTN with elevated BP (240/100) with reports of severe headache  -No clear  evidence of end organ damage (CTA negative, UA shows stable proteinuria, trops downtrending (0.027 to 0.025), no significant abnormal chemistries.  -She takes Valsartan 80mg at home and was started on 50mg of Losartan, a cardene drip and 2L oxygen per NC with improvement in BP.  -Complained of chest, neck, jaw and back pain in the ED  -CTA chest/abd negative, troponin within normal limits at 0.025.   -Trend Trops q6, Cardene drip,   -Pain management with Morphine  -Headache treatment with Toradol unsuccessful, ordered Depakote for sx relief     Nausea/Vomiting  -PRN prochlorperazine    Iron Deficiency Anemia  -H/H: 14.1/45.1 on current admission   - 9/13/19 H/H 8.0/28.3 MCV 72  - 7/31/19 Iron 10, TIBC 546, Ferritin 5  -10/21/15 Iron 12, TIBC 448, Ferritin 7  - No acute issues     COPD  - Pt with satting well on 2L NC  - Continue home Albuterol 90  - Encouraged smoking cessation      Hypothyroidism  - TSH 0.354 on last admit  - Continue home Synthroid      Gastroesophageal reflux disease  -continue home Pantoprazole 40mg    Moderate recurrent major depression   -Continue home Lexapro 10mg nightly     Nicotine dependence, cigarettes, w unsp disorders  -Smoke 1PPD since age 14 (currently 79), actively smoking 1PPD  -Counseled on smoking cessation      Glucose intolerance (impaired glucose tolerance)   -Glucose 163 on admission  -Continue to monitor     HLD (hyperlipidemia)  -Continue Lipitor 40mg     History of stroke without residual deficits  -Negative imaging  -No acute issues     DVT Prophylaxis: Subq heparin  Diet: Cardiac   Dispo: ICU (ED is full, diversion, ICU had available bed)    Code Status:   full    Kevin Lemons MD  [facility] Internal Medicine HO-1    [facility] Medicine Hospitalist Pager numbers:   [facility] Hospitalist Medicine Team A (Bradley/Kristin): [phone]  [facility] Hospitalist Medicine Team B (Homer/James):  [phone]

## 2019-10-25 LAB
ALBUMIN SERPL BCP-MCNC: 3.8 G/DL (ref 3.5–5.2)
ALP SERPL-CCNC: 129 U/L (ref 55–135)
ALT SERPL W/O P-5'-P-CCNC: 13 U/L (ref 10–44)
ANION GAP SERPL CALC-SCNC: 11 MMOL/L (ref 8–16)
ANISOCYTOSIS BLD QL SMEAR: SLIGHT
AST SERPL-CCNC: 15 U/L (ref 10–40)
BASOPHILS # BLD AUTO: 0.01 K/UL (ref 0–0.2)
BASOPHILS NFR BLD: 0.1 % (ref 0–1.9)
BILIRUB SERPL-MCNC: 0.3 MG/DL (ref 0.1–1)
BUN SERPL-MCNC: 25 MG/DL (ref 8–23)
CALCIUM SERPL-MCNC: 9.3 MG/DL (ref 8.7–10.5)
CHLORIDE SERPL-SCNC: 96 MMOL/L (ref 95–110)
CO2 SERPL-SCNC: 27 MMOL/L (ref 23–29)
CREAT SERPL-MCNC: 1.2 MG/DL (ref 0.5–1.4)
DIFFERENTIAL METHOD: ABNORMAL
EOSINOPHIL # BLD AUTO: 0.2 K/UL (ref 0–0.5)
EOSINOPHIL NFR BLD: 2.2 % (ref 0–8)
ERYTHROCYTE [DISTWIDTH] IN BLOOD BY AUTOMATED COUNT: ABNORMAL % (ref 11.5–14.5)
EST. GFR  (AFRICAN AMERICAN): 49 ML/MIN/1.73 M^2
EST. GFR  (NON AFRICAN AMERICAN): 42 ML/MIN/1.73 M^2
GLUCOSE SERPL-MCNC: 123 MG/DL (ref 70–110)
HCT VFR BLD AUTO: 41.7 % (ref 37–48.5)
HGB BLD-MCNC: 13.4 G/DL (ref 12–16)
HYPOCHROMIA BLD QL SMEAR: ABNORMAL
LYMPHOCYTES # BLD AUTO: 1.5 K/UL (ref 1–4.8)
LYMPHOCYTES NFR BLD: 17.1 % (ref 18–48)
MAGNESIUM SERPL-MCNC: 2 MG/DL (ref 1.6–2.6)
MCH RBC QN AUTO: 26.2 PG (ref 27–31)
MCHC RBC AUTO-ENTMCNC: 32.1 G/DL (ref 32–36)
MCV RBC AUTO: 82 FL (ref 82–98)
MONOCYTES # BLD AUTO: 0.7 K/UL (ref 0.3–1)
MONOCYTES NFR BLD: 8.1 % (ref 4–15)
NEUTROPHILS # BLD AUTO: 6.3 K/UL (ref 1.8–7.7)
NEUTROPHILS NFR BLD: 72.5 % (ref 38–73)
PHOSPHATE SERPL-MCNC: 2.3 MG/DL (ref 2.7–4.5)
PLATELET # BLD AUTO: 244 K/UL (ref 150–350)
PMV BLD AUTO: 9.1 FL (ref 9.2–12.9)
POIKILOCYTOSIS BLD QL SMEAR: SLIGHT
POLYCHROMASIA BLD QL SMEAR: ABNORMAL
POTASSIUM SERPL-SCNC: 3.7 MMOL/L (ref 3.5–5.1)
PROT SERPL-MCNC: 6.3 G/DL (ref 6–8.4)
RBC # BLD AUTO: 5.11 M/UL (ref 4–5.4)
SODIUM SERPL-SCNC: 134 MMOL/L (ref 136–145)
WBC # BLD AUTO: 8.63 K/UL (ref 3.9–12.7)

## 2019-10-25 PROCEDURE — 25000003 PHARM REV CODE 250: Performed by: STUDENT IN AN ORGANIZED HEALTH CARE EDUCATION/TRAINING PROGRAM

## 2019-10-25 PROCEDURE — 83735 ASSAY OF MAGNESIUM: CPT

## 2019-10-25 PROCEDURE — 80053 COMPREHEN METABOLIC PANEL: CPT

## 2019-10-25 PROCEDURE — 25000242 PHARM REV CODE 250 ALT 637 W/ HCPCS: Performed by: STUDENT IN AN ORGANIZED HEALTH CARE EDUCATION/TRAINING PROGRAM

## 2019-10-25 PROCEDURE — 99900035 HC TECH TIME PER 15 MIN (STAT)

## 2019-10-25 PROCEDURE — 36415 COLL VENOUS BLD VENIPUNCTURE: CPT

## 2019-10-25 PROCEDURE — 63600175 PHARM REV CODE 636 W HCPCS: Performed by: STUDENT IN AN ORGANIZED HEALTH CARE EDUCATION/TRAINING PROGRAM

## 2019-10-25 PROCEDURE — 85025 COMPLETE CBC W/AUTO DIFF WBC: CPT

## 2019-10-25 PROCEDURE — 94761 N-INVAS EAR/PLS OXIMETRY MLT: CPT

## 2019-10-25 PROCEDURE — 27000221 HC OXYGEN, UP TO 24 HOURS

## 2019-10-25 PROCEDURE — 84100 ASSAY OF PHOSPHORUS: CPT

## 2019-10-25 PROCEDURE — 11000001 HC ACUTE MED/SURG PRIVATE ROOM

## 2019-10-25 PROCEDURE — 97165 OT EVAL LOW COMPLEX 30 MIN: CPT

## 2019-10-25 PROCEDURE — 97161 PT EVAL LOW COMPLEX 20 MIN: CPT

## 2019-10-25 RX ORDER — LOSARTAN POTASSIUM 50 MG/1
100 TABLET ORAL DAILY
Status: DISCONTINUED | OUTPATIENT
Start: 2019-10-26 | End: 2019-10-26 | Stop reason: HOSPADM

## 2019-10-25 RX ORDER — ONDANSETRON 2 MG/ML
4 INJECTION INTRAMUSCULAR; INTRAVENOUS EVERY 6 HOURS
Status: DISCONTINUED | OUTPATIENT
Start: 2019-10-25 | End: 2019-10-26 | Stop reason: HOSPADM

## 2019-10-25 RX ORDER — ACETAMINOPHEN 325 MG/1
650 TABLET ORAL EVERY 6 HOURS PRN
Status: DISCONTINUED | OUTPATIENT
Start: 2019-10-25 | End: 2019-10-26 | Stop reason: HOSPADM

## 2019-10-25 RX ORDER — MAGNESIUM SULFATE 1 G/100ML
1 INJECTION INTRAVENOUS ONCE
Status: COMPLETED | OUTPATIENT
Start: 2019-10-25 | End: 2019-10-25

## 2019-10-25 RX ORDER — HALOPERIDOL 5 MG/ML
0.5 INJECTION INTRAMUSCULAR ONCE
Status: COMPLETED | OUTPATIENT
Start: 2019-10-25 | End: 2019-10-25

## 2019-10-25 RX ORDER — LOSARTAN POTASSIUM 50 MG/1
50 TABLET ORAL DAILY
Status: DISCONTINUED | OUTPATIENT
Start: 2019-10-25 | End: 2019-10-26

## 2019-10-25 RX ORDER — HALOPERIDOL 5 MG/ML
1 INJECTION INTRAMUSCULAR EVERY 6 HOURS PRN
Status: DISCONTINUED | OUTPATIENT
Start: 2019-10-25 | End: 2019-10-26 | Stop reason: HOSPADM

## 2019-10-25 RX ADMIN — ATORVASTATIN CALCIUM 40 MG: 40 TABLET, FILM COATED ORAL at 08:10

## 2019-10-25 RX ADMIN — DIVALPROEX SODIUM 250 MG: 250 TABLET, DELAYED RELEASE ORAL at 02:10

## 2019-10-25 RX ADMIN — PANTOPRAZOLE SODIUM 40 MG: 40 TABLET, DELAYED RELEASE ORAL at 08:10

## 2019-10-25 RX ADMIN — TIOTROPIUM BROMIDE 18 MCG: 18 CAPSULE ORAL; RESPIRATORY (INHALATION) at 11:10

## 2019-10-25 RX ADMIN — HEPARIN SODIUM 5000 UNITS: 5000 INJECTION, SOLUTION INTRAVENOUS; SUBCUTANEOUS at 09:10

## 2019-10-25 RX ADMIN — HEPARIN SODIUM 5000 UNITS: 5000 INJECTION, SOLUTION INTRAVENOUS; SUBCUTANEOUS at 02:10

## 2019-10-25 RX ADMIN — HALOPERIDOL LACTATE 1 MG: 5 INJECTION, SOLUTION INTRAMUSCULAR at 04:10

## 2019-10-25 RX ADMIN — CILOSTAZOL 50 MG: 50 TABLET ORAL at 09:10

## 2019-10-25 RX ADMIN — DIVALPROEX SODIUM 250 MG: 250 TABLET, DELAYED RELEASE ORAL at 09:10

## 2019-10-25 RX ADMIN — ONDANSETRON 4 MG: 2 INJECTION INTRAMUSCULAR; INTRAVENOUS at 08:10

## 2019-10-25 RX ADMIN — LEVOTHYROXINE SODIUM 88 MCG: 88 TABLET ORAL at 06:10

## 2019-10-25 RX ADMIN — HALOPERIDOL LACTATE 0.5 MG: 5 INJECTION, SOLUTION INTRAMUSCULAR at 02:10

## 2019-10-25 RX ADMIN — PROCHLORPERAZINE MALEATE 10 MG: 5 TABLET, FILM COATED ORAL at 02:10

## 2019-10-25 RX ADMIN — MAGNESIUM SULFATE 1 G: 1 INJECTION INTRAVENOUS at 09:10

## 2019-10-25 RX ADMIN — DIVALPROEX SODIUM 250 MG: 250 TABLET, DELAYED RELEASE ORAL at 06:10

## 2019-10-25 RX ADMIN — ONDANSETRON 4 MG: 2 INJECTION INTRAMUSCULAR; INTRAVENOUS at 11:10

## 2019-10-25 RX ADMIN — NICARDIPINE HYDROCHLORIDE 5 MG/HR: 0.2 INJECTION, SOLUTION INTRAVENOUS at 04:10

## 2019-10-25 RX ADMIN — ONDANSETRON 4 MG: 2 INJECTION INTRAMUSCULAR; INTRAVENOUS at 06:10

## 2019-10-25 RX ADMIN — CLOPIDOGREL BISULFATE 75 MG: 75 TABLET ORAL at 08:10

## 2019-10-25 RX ADMIN — LOSARTAN POTASSIUM 50 MG: 50 TABLET ORAL at 12:10

## 2019-10-25 RX ADMIN — ESCITALOPRAM OXALATE 10 MG: 10 TABLET ORAL at 09:10

## 2019-10-25 RX ADMIN — HEPARIN SODIUM 5000 UNITS: 5000 INJECTION, SOLUTION INTRAVENOUS; SUBCUTANEOUS at 06:10

## 2019-10-25 RX ADMIN — LOSARTAN POTASSIUM 50 MG: 50 TABLET ORAL at 08:10

## 2019-10-25 NOTE — PLAN OF CARE
Problem: Occupational Therapy Goal  Goal: Occupational Therapy Goal  Description  Goals to be met by: 11/25/19     Patient will increase functional independence with ADLs by performing:    LE Dressing with Supervision.  Grooming while standing with Supervision.  Toileting from toilet with Supervision for hygiene and clothing management.   Supine to sit with Supervision.  Step transfer with Supervision  Toilet transfer to toilet with Supervision.  Increased functional strength to WFL for self care skills and functional mobility .  Upper extremity exercise program x10 reps per handout, with independence.     Outcome: Ongoing, Progressing    Pt would benefit from cont OT services in order to maximize functional independence. Recommending HHOT/PT at d/c

## 2019-10-25 NOTE — PROGRESS NOTES
[facility] Medicine Progress Note    Primary Team: [facility] Hospitalist Team A  Attending Physician: Cady Foster MD  Resident: Talita/Zaynab  Intern: Tien/Flora    Subjective:      Cardene drip off yesterday, however, blood pressure being taken on arm with extensive thrombosis and pressure readings not accurate. When switched back to other arm, pressures again in 190s/90s and cardene resumed. Patient reports her LONG returned last night and still feels nauseous this am. No vomiting. Denies vision changes, chest pain, SOB, palpitations.      Objective:     Last 24 Hour Vital Signs:  BP  Min: 92/51  Max: 196/79  Temp  Av.6 °F (37 °C)  Min: 98.4 °F (36.9 °C)  Max: 98.9 °F (37.2 °C)  Pulse  Av.7  Min: 72  Max: 95  Resp  Av.9  Min: 16  Max: 48  SpO2  Av.2 %  Min: 93 %  Max: 99 %  I/O last 3 completed shifts:  In: 394.6 [I.V.:394.6]  Out: 650 [Urine:650]    Physical Examination:  General: alert, cooperative, mild distress 2/2 pain, appears stated age  HEENT: NCAT, PERRL, MMM  Neck: supple, normal ROM, no thyromegaly, no LAD  Cardio: RRR, HS 1 & 2 normal, no murmurs or added sounds  Lungs: CTABL, no wheezes or crackles, good inspiratory effort  Abd: soft, non-tender to palpation, no palpable masses or organomegaly, bowel sounds normoactive  Extrem: atraumatic, no edema, distal pulses 2+ in all four extremities  Skin: no rashes, bruises, or lesions noted  Neuro: A&Ox3, follows commands, moves all extremities spontaneously    Laboratory:  Laboratory Data Reviewed: yes  Pertinent Findings:  Recent Labs   Lab 10/23/19  1629 10/24/19  0410 10/25/19  0508   WBC 6.76 5.66 8.63   HGB 14.1 15.1 13.4   HCT 45.4 48.7* 41.7    235 244    135* 134*   K 4.9 4.2 3.7   CL 96 96 96   CREATININE 1.1 1.0 1.2   BUN 15 16 25*   CO2 29 28 27   ALT 20 20 13   AST 34 21 15     Microbiology Data Reviewed: yes  Pertinent Findings:  None    Radiology Data Reviewed: yes  Pertinent Findings:  None  new    Current Medications:     Infusions:   niCARdipine 5 mg/hr (10/25/19 0400)        Scheduled:   atorvastatin  40 mg Oral Daily    cilostazol  50 mg Oral BID    clopidogrel  75 mg Oral Daily    divalproex  250 mg Oral Q8H    escitalopram oxalate  10 mg Oral Nightly    heparin (porcine)  5,000 Units Subcutaneous Q8H    levothyroxine  88 mcg Oral Before breakfast    losartan  50 mg Oral Daily    ondansetron  4 mg Intravenous Q6H    pantoprazole  40 mg Oral Daily    tiotropium  18 mcg Inhalation Daily    valproate sodium (DEPACON) IVPB  500 mg Intravenous Once        PRN:  albuterol-ipratropium, Dextrose 10% Bolus, Dextrose 10% Bolus, glucagon (human recombinant), glucose, glucose, haloperidol lactate, prochlorperazine, sodium chloride 0.9%    Antibiotics and Day Number of Therapy:  None    Lines and Day Number of Therapy:  PIV    Assessment/Plan:     Hypertensive Emergency  Patient with a hx of HTN, takes valsartan 80 mg at home. Per daughters patient will miss some doses of medications when not feeling well. /100 in ED, started on cardene drip and transferred to ICU. While in ER developed chest, back, and neck pain. CTA CAP without signs of AAA or dissection. Troponins peaked at 0.1 and have leveled off, EKGs without signs of ischemia.   - continue losartan 50 mg daily.  - remains on 5 mg/h cardene, wean drip as tolerated.      Headache with Nausea/Vomiting  Reports gradual onset headache since waking with vomiting x1, 10/10 severity in ED. CT head without signs of infarct or hemorrhage. Combo of mag sulfate, valproate, compazine with more relief in pain/nasuea on admission.    - continue valproate 250 mg po taper pending resolution of pain (250 mg TID x 3 days, 250 mg BID x 3 days, 250 mg daily x 3 days).   - scheduled zofran q6h with compazine q6h prn nausea.     Tropinemia  Trop elevated to 0.027 in ED, has consistently elevated trops per chart review with baselines around 0.01-0.02. EKGs  without signs of ischemia. Suspect this is 2/2 HTN emergency. No active CP at present. Trop peak at 0.14 and have since leveled off.  - treatment of HTN emergency as above.   - continue to monitor for CP.      Iron Deficiency Anemia  Has history of DELIA requiring IV iron infusions. EGD and colonoscopy 9/2019 without evidence of bleeding. H/H: 14.1/45.1 on current admission, no signs of active bleed.  - will continue to monitor with daily CBC.     Peripheral Vascular Disease with Claudication  Follows with Dr. Brooks as an outpatient. S/p SMA and celiac stenting as well as R CEA. Takes pletal 50 mg BID, plavix and ASA at home.   - continue DAPT and pletal.   - counseled on smoking cessation.     COPD  On 2L NC at home. Takes albuterol rescue inhaler at home. No active issues.    - duonebs prn wheeze.   - continue 2L NC.     Hypothyroidism  Takes synthroid 88 mcg daily at home. TSH this admission WNL at 0.5.  - continue home synthroid.                 GERD  No acute issues.  - continue home Pantoprazole 40mg.     Depression      Takes lexapro 10 mg nightly at home. No SI/HI at present.  - continue home lexapro.      Tobacco Abuse  Daily smoker, 1PPD since age 14. No interest in quitting.  - counseled on cessation.  - offered nicotine patches.                             HLD  Lipid panel 7/2019 at goal. Takes lipitor 40 mg at home.   - continue home statin.                Code: Full  PPX: Heparin  Diet: Cardiac  Dispo: pending wean from cardene drip and improvement of LONG/nausea    Iram Anders MD  [facility] HO-II  [facility] Team A    [facility] Hospitalist Pager numbers:   [facility] Hospitalist Medicine Team A (Bradley/Kristin): [phone]  [facility] Hospitalist Medicine Team B (Homer/James):  [phone]

## 2019-10-25 NOTE — PLAN OF CARE
Virtural RN cued into room, pt asleep in bed in low position with call bell near, bed alarm in place.  No family present, pt did not awaken to voice, pt is without signs of pain, anxiety or dyspnea.  Virtual Rn will be available to intervene if needed.

## 2019-10-25 NOTE — PLAN OF CARE
Report called to Ximena; plan to transfer to room 424 via bed. Spoke to daughter China about new room number.

## 2019-10-25 NOTE — PLAN OF CARE
Transferred to room 424; given call light, bed alarm on, instructed to call for assistance when wanting to get out of bed.

## 2019-10-25 NOTE — PT/OT/SLP EVAL
"Physical Therapy Evaluation    Patient Name:  Brittany Haile   MRN:  [id number]    Recommendations:     Discharge Recommendations:  home with home health   Discharge Equipment Recommendations: 3-in-1 commode   Barriers to discharge: None    Assessment:     Brittany Haile is a 79 y.o. female admitted with a medical diagnosis of Hypertensive emergency.  She presents with the following impairments/functional limitations:  weakness, impaired functional mobilty, gait instability, impaired endurance, decreased safety awareness .  Pt will switch from english to [place] when talking with therapist.  Mild confusion but oriented x 3.  Drop in BP when getting OOB but not symptomatic    Rehab Prognosis: Good; patient would benefit from acute skilled PT services to address these deficits and reach maximum level of function.    Recent Surgery: * No surgery found *      Plan:     During this hospitalization, patient to be seen 5 x/week to address the identified rehab impairments via gait training, therapeutic activities, therapeutic exercises and progress toward the following goals:    · Plan of Care Expires:  11/24/19    Subjective     Chief Complaint: tired ( per nurse didn't sleep well last night-"fidgeting"  Patient/Family Comments/goals: goal:PLOF   Pain/Comfort:  · Pain Rating 1: 0/10  · Pain Rating Post-Intervention 1: 0/10    Patients cultural, spiritual, Mandaeism conflicts given the current situation: other (see comments)(is able to speak english but will switch to speaking [place] tayla when confused)    Living Environment:  Pt lives with son who is disabled (per pt's daughter mentally disabled but able to physically assist pt).  1 story house with 1 ALBER, shower stall with small lip to step in and with built in seat and grab bar.  Standard toilet.   Prior to admission, patients level of function was mod I for ADL and amb with SPC-daughter states family has been trying to get pt to use RW.  Daughter does driving and grocery " shopping.  Pt and son share cooking.  Pt never leaves home alone and uses SPC.  Equipment used at home: cane, straight.  DME owned (not currently used): wheelchair.  Upon discharge, patient will have assistance from family.    Objective:     Communicated with nurse prior to session.  Patient found supine with eyes closed with peripheral IV, blood pressure cuff, telemetry, oxygen, pulse ox (continuous)  upon PT entry to room.    General Precautions: Standard, fall   Orthopedic Precautions:N/A   Braces: N/A     Exams:  · Cognitive Exam:  Patient is oriented to Person, Place and Time  · Gross Motor Coordination:  WFL  · Postural Exam:  Patient presented with the following abnormalities:    · -       Rounded shoulders  · Sensation:    · -       Intact  light/touch BLE  · Skin Integrity/Edema:      · -       Skin integrity: Visible skin intact  · -       Edema: None noted   · RLE ROM: WFL  · RLE Strength: WFL  · LLE ROM: WFL  · LLE Strength: WFL    Functional Mobility:  · Bed Mobility:     · Supine to Sit: contact guard assistance  · Sit to Supine: left up in chair  · Transfers:     · Sit to Stand:  contact guard assistance with rolling walker and cues for hand placement  · Gait: only able to amb < 10' in room 2* multiple lines-using RW and CGA, min for walker management.  decreased gerald, small steps   · Balance: fair standing      Therapeutic Activities and Exercises:   PT eval.  Assist to EOB and OOB to chair as above    AM-PAC 6 CLICK MOBILITY  Total Score:18     Patient left up in chair with all lines intact, call button in reach and nurse notified.    GOALS:   Multidisciplinary Problems     Physical Therapy Goals        Problem: Physical Therapy Goal    Goal Priority Disciplines Outcome Goal Variances Interventions   Physical Therapy Goal     PT, PT/OT Ongoing, Progressing     Description:  Goals to be met by: 19     Patient will increase functional independence with mobility by performin. Supine to  sit with supervision.   2. Sit to supine with supervision.   3. Sit<>stand transfer with supervision using LRAD  4. Gait > 150 feet with SBA using LRAD  5. Ascend/descend 2 stairs with no  handrails with CGA                     History:     Past Medical History:   Diagnosis Date    Abdominal pain     CHF (congestive heart failure)     Chronic kidney disease, stage III (moderate) 2015    COPD (chronic obstructive pulmonary disease)     COPD (chronic obstructive pulmonary disease)     CVA (cerebral infarction)     Esophagitis     Gastritis     GERD (gastroesophageal reflux disease)     GI bleed     Hyperlipidemia     Hypertension     Mesenteric angina     PVD (peripheral vascular disease) with claudication 10/13/2015    Stroke     Subclavian artery stenosis, left     Thyroid disease        Past Surgical History:   Procedure Laterality Date    CAROTID STENT       SECTION      COLONOSCOPY N/A 3/2/2017    Procedure: COLONOSCOPY;  Surgeon: Cecil Crooks MD;  Location: [facility] ENDO;  Service: Endoscopy;  Laterality: N/A;    COLONOSCOPY N/A 2019    Procedure: COLONOSCOPY/Suprep;  Surgeon: Barry Talbert MD;  Location: [facility] ENDO;  Service: Endoscopy;  Laterality: N/A;    CORONARY ANGIOPLASTY  2006    EPIDURAL STEROID INJECTION INTO LUMBAR SPINE N/A 2018    Procedure: INJECTION, STEROID, SPINE, LUMBAR, EPIDURAL- L2-3 ORAL SEDATION;  Surgeon: Stiven Ibarra Jr., MD;  Location: [facility] PAIN MGT;  Service: Pain Management;  Laterality: N/A;    ESOPHAGOGASTRODUODENOSCOPY N/A 2019    Procedure: EGD (ESOPHAGOGASTRODUODENOSCOPY);  Surgeon: Barry Talbert MD;  Location: [facility] ENDO;  Service: Endoscopy;  Laterality: N/A;    GALLBLADDER SURGERY      HYSTERECTOMY      INTRALUMINAL GASTROINTESTINAL TRACT IMAGING VIA CAPSULE N/A 2019    Procedure: IMAGING PROCEDURE, GI TRACT, INTRALUMINAL, VIA CAPSULE;  Surgeon: Barry Talbert MD;  Location: [facility]  ENDO;  Service: Endoscopy;  Laterality: N/A;    OOPHORECTOMY      SUPERIOR MESTENTERIC ARTERY STENT         Time Tracking:     PT Received On: 10/25/19  PT Start Time: 1255     PT Stop Time: 1314  PT Total Time (min): 19 min     Billable Minutes: Evaluation 19      Marcie Owens, PT  10/25/2019

## 2019-10-25 NOTE — PLAN OF CARE
PT eval.  Assist to EOB and OOB to chair.  Able to amb only few feet in room with RW and A  2* multiple lines.    REC: home-with /without HH pending progress  DME;  3 in 1

## 2019-10-25 NOTE — PT/OT/SLP EVAL
Occupational Therapy   Evaluation    Name: Brittany Haile  MRN: [id number]  Admitting Diagnosis:  Hypertensive emergency      Recommendations:     Discharge Recommendations: home health PT, home health OT  Discharge Equipment Recommendations:  none  Barriers to discharge:  None    Assessment:     Brittany Haile is a 79 y.o. female with a medical diagnosis of Hypertensive emergency.. Performance deficits affecting function: weakness, impaired self care skills, impaired balance, impaired functional mobilty, impaired endurance, gait instability, decreased safety awareness.      Pt would benefit from cont OT services in order to maximize functional independence. Recommending HHOT/PT at d/c     Rehab Prognosis: Good; patient would benefit from acute skilled OT services to address these deficits and reach maximum level of function.       Plan:     Patient to be seen 5 x/week to address the above listed problems via self-care/home management, therapeutic activities, therapeutic exercises  · Plan of Care Expires: 11/25/19  · Plan of Care Reviewed with: patient    Subjective     Chief Complaint: no complaints at this time   Patient/Family Comments/goals: return home     Occupational Profile:  Living Environment: Pt lives with son (reports he has a mental disability), in [facility], 2 steps to enter, WIS with built in seat   Previous level of function: independent/mod I   Equipment Used at Home:  cane, straight  Assistance upon Discharge: from dghtr and son     Pain/Comfort:  · Pain Rating 1: 0/10    Patients cultural, spiritual, Temple conflicts given the current situation:      Objective:     Communicated with: tarsha prior to session. .    General Precautions: Standard, fall   Orthopedic Precautions:N/A   Braces: N/A     Occupational Performance:    Bed Mobility:    · Patient completed Scooting/Bridging with contact guard assistance  · Patient completed Supine to Sit with contact guard assistance    Functional  Mobility/Transfers:  · Patient completed Sit <> Stand Transfer with contact guard assistance  with  rolling walker   · Patient completed Bed <> Chair Transfer using Step Transfer technique with contact guard assistance and minimum assistance with rolling walker  · Functional Mobility: CGA- Min A for assist with RW     Activities of Daily Living:  · N/A     Cognitive/Visual Perceptual:  Cognitive/Psychosocial Skills:     -       Oriented to: Person, Place, Time and Situation   -       Follows Commands/attention:Follows multistep  commands  -       Communication: clear/fluent  -       Memory: No Deficits noted  -       Safety awareness/insight to disability: intact   -       Mood/Affect/Coping skills/emotional control: Appropriate to situation    Physical Exam:  BUE ROM and strength WFL for pt's needs   No Edema  No Pain    AMPAC 6 Click ADL:  AMPAC Total Score: 19    Treatment & Education:  Pt performing skills as above  Mobility trial in room- limited by lines   Sat up in b/s chair following mobility trial   Education:    Patient left up in chair with all lines intact, call button in reach and nsg notified    GOALS:   Multidisciplinary Problems     Occupational Therapy Goals        Problem: Occupational Therapy Goal    Goal Priority Disciplines Outcome Interventions   Occupational Therapy Goal     OT, PT/OT Ongoing, Progressing    Description:  Goals to be met by: 11/25/19     Patient will increase functional independence with ADLs by performing:    LE Dressing with Supervision.  Grooming while standing with Supervision.  Toileting from toilet with Supervision for hygiene and clothing management.   Supine to sit with Supervision.  Step transfer with Supervision  Toilet transfer to toilet with Supervision.  Increased functional strength to WFL for self care skills and functional mobility .  Upper extremity exercise program x10 reps per handout, with independence.                      History:     Past Medical  History:   Diagnosis Date    Abdominal pain     CHF (congestive heart failure)     Chronic kidney disease, stage III (moderate) 2015    COPD (chronic obstructive pulmonary disease)     COPD (chronic obstructive pulmonary disease)     CVA (cerebral infarction)     Esophagitis     Gastritis     GERD (gastroesophageal reflux disease)     GI bleed     Hyperlipidemia     Hypertension     Mesenteric angina     PVD (peripheral vascular disease) with claudication 10/13/2015    Stroke     Subclavian artery stenosis, left     Thyroid disease        Past Surgical History:   Procedure Laterality Date    CAROTID STENT       SECTION      COLONOSCOPY N/A 3/2/2017    Procedure: COLONOSCOPY;  Surgeon: Cecil Crooks MD;  Location: [facility] ENDO;  Service: Endoscopy;  Laterality: N/A;    COLONOSCOPY N/A 2019    Procedure: COLONOSCOPY/Suprep;  Surgeon: Barry Talbert MD;  Location: [facility] ENDO;  Service: Endoscopy;  Laterality: N/A;    CORONARY ANGIOPLASTY      EPIDURAL STEROID INJECTION INTO LUMBAR SPINE N/A 2018    Procedure: INJECTION, STEROID, SPINE, LUMBAR, EPIDURAL- L2-3 ORAL SEDATION;  Surgeon: Stiven Ibarra Jr., MD;  Location: [facility] PAIN MGT;  Service: Pain Management;  Laterality: N/A;    ESOPHAGOGASTRODUODENOSCOPY N/A 2019    Procedure: EGD (ESOPHAGOGASTRODUODENOSCOPY);  Surgeon: Barry Talbert MD;  Location: [facility] ENDO;  Service: Endoscopy;  Laterality: N/A;    GALLBLADDER SURGERY      HYSTERECTOMY      INTRALUMINAL GASTROINTESTINAL TRACT IMAGING VIA CAPSULE N/A 2019    Procedure: IMAGING PROCEDURE, GI TRACT, INTRALUMINAL, VIA CAPSULE;  Surgeon: Barry Talbert MD;  Location: [facility] ENDO;  Service: Endoscopy;  Laterality: N/A;    OOPHORECTOMY      SUPERIOR MESTENTERIC ARTERY STENT         Time Tracking:     OT Date of Treatment: 10/25/19  OT Start Time: 1306  OT Stop Time: 1321  OT Total Time (min): 15 min    Billable  Minutes:Evaluation 15 co treatment with PT     Deb Hardy, OT  10/25/2019

## 2019-10-25 NOTE — NURSING
Patient arrived to telemetry unit from ICU without incident via hospital bed. No S/S of pain or discomfort noted at time of arrival. NADN. Verbal, able to make needs known. Answers questions appropriately. Patient oriented to room and placed on cardiac monitoring. Patient updated on POC- verbalizes understanding. Bed in lowest position, side rails up x 2, call light within reach. Will continue to monitor patient.

## 2019-10-26 VITALS
BODY MASS INDEX: 24.39 KG/M2 | RESPIRATION RATE: 20 BRPM | HEIGHT: 61 IN | SYSTOLIC BLOOD PRESSURE: 141 MMHG | OXYGEN SATURATION: 95 % | HEART RATE: 75 BPM | WEIGHT: 129.19 LBS | DIASTOLIC BLOOD PRESSURE: 63 MMHG | TEMPERATURE: 98 F

## 2019-10-26 LAB
ALBUMIN SERPL BCP-MCNC: 3.4 G/DL (ref 3.5–5.2)
ALP SERPL-CCNC: 116 U/L (ref 55–135)
ALT SERPL W/O P-5'-P-CCNC: 12 U/L (ref 10–44)
ANION GAP SERPL CALC-SCNC: 10 MMOL/L (ref 8–16)
AST SERPL-CCNC: 13 U/L (ref 10–40)
BASOPHILS # BLD AUTO: 0.02 K/UL (ref 0–0.2)
BASOPHILS NFR BLD: 0.4 % (ref 0–1.9)
BILIRUB SERPL-MCNC: 0.2 MG/DL (ref 0.1–1)
BUN SERPL-MCNC: 28 MG/DL (ref 8–23)
CALCIUM SERPL-MCNC: 8.5 MG/DL (ref 8.7–10.5)
CHLORIDE SERPL-SCNC: 97 MMOL/L (ref 95–110)
CO2 SERPL-SCNC: 25 MMOL/L (ref 23–29)
CREAT SERPL-MCNC: 1.3 MG/DL (ref 0.5–1.4)
DIFFERENTIAL METHOD: ABNORMAL
EOSINOPHIL # BLD AUTO: 0.2 K/UL (ref 0–0.5)
EOSINOPHIL NFR BLD: 3.3 % (ref 0–8)
ERYTHROCYTE [DISTWIDTH] IN BLOOD BY AUTOMATED COUNT: 27.4 % (ref 11.5–14.5)
EST. GFR  (AFRICAN AMERICAN): 44 ML/MIN/1.73 M^2
EST. GFR  (NON AFRICAN AMERICAN): 39 ML/MIN/1.73 M^2
GLUCOSE SERPL-MCNC: 86 MG/DL (ref 70–110)
HCT VFR BLD AUTO: 40 % (ref 37–48.5)
HGB BLD-MCNC: 12.3 G/DL (ref 12–16)
LYMPHOCYTES # BLD AUTO: 1.5 K/UL (ref 1–4.8)
LYMPHOCYTES NFR BLD: 27.2 % (ref 18–48)
MAGNESIUM SERPL-MCNC: 2 MG/DL (ref 1.6–2.6)
MCH RBC QN AUTO: 26.3 PG (ref 27–31)
MCHC RBC AUTO-ENTMCNC: 30.8 G/DL (ref 32–36)
MCV RBC AUTO: 86 FL (ref 82–98)
MONOCYTES # BLD AUTO: 0.6 K/UL (ref 0.3–1)
MONOCYTES NFR BLD: 10.2 % (ref 4–15)
NEUTROPHILS # BLD AUTO: 3.2 K/UL (ref 1.8–7.7)
NEUTROPHILS NFR BLD: 58.9 % (ref 38–73)
PHOSPHATE SERPL-MCNC: 3.3 MG/DL (ref 2.7–4.5)
PLATELET # BLD AUTO: 224 K/UL (ref 150–350)
PMV BLD AUTO: 9.3 FL (ref 9.2–12.9)
POTASSIUM SERPL-SCNC: 5 MMOL/L (ref 3.5–5.1)
PROT SERPL-MCNC: 5.7 G/DL (ref 6–8.4)
RBC # BLD AUTO: 4.68 M/UL (ref 4–5.4)
SODIUM SERPL-SCNC: 132 MMOL/L (ref 136–145)
WBC # BLD AUTO: 5.4 K/UL (ref 3.9–12.7)

## 2019-10-26 PROCEDURE — 63600175 PHARM REV CODE 636 W HCPCS: Performed by: STUDENT IN AN ORGANIZED HEALTH CARE EDUCATION/TRAINING PROGRAM

## 2019-10-26 PROCEDURE — 97110 THERAPEUTIC EXERCISES: CPT

## 2019-10-26 PROCEDURE — 83735 ASSAY OF MAGNESIUM: CPT

## 2019-10-26 PROCEDURE — 94640 AIRWAY INHALATION TREATMENT: CPT

## 2019-10-26 PROCEDURE — 27000221 HC OXYGEN, UP TO 24 HOURS

## 2019-10-26 PROCEDURE — 85025 COMPLETE CBC W/AUTO DIFF WBC: CPT

## 2019-10-26 PROCEDURE — 84100 ASSAY OF PHOSPHORUS: CPT

## 2019-10-26 PROCEDURE — 94761 N-INVAS EAR/PLS OXIMETRY MLT: CPT

## 2019-10-26 PROCEDURE — 36415 COLL VENOUS BLD VENIPUNCTURE: CPT

## 2019-10-26 PROCEDURE — 80053 COMPREHEN METABOLIC PANEL: CPT

## 2019-10-26 PROCEDURE — 25000003 PHARM REV CODE 250: Performed by: STUDENT IN AN ORGANIZED HEALTH CARE EDUCATION/TRAINING PROGRAM

## 2019-10-26 RX ORDER — DIVALPROEX SODIUM 250 MG/1
250 TABLET, DELAYED RELEASE ORAL DAILY
Qty: 8 TABLET | Refills: 0 | Status: SHIPPED | OUTPATIENT
Start: 2019-10-26 | End: 2019-10-29 | Stop reason: SDUPTHER

## 2019-10-26 RX ORDER — CILOSTAZOL 50 MG/1
50 TABLET ORAL 2 TIMES DAILY
Qty: 60 TABLET | Refills: 11 | Status: SHIPPED | OUTPATIENT
Start: 2019-10-26 | End: 2020-02-10

## 2019-10-26 RX ORDER — VALSARTAN 320 MG/1
320 TABLET ORAL DAILY
Qty: 90 TABLET | Refills: 3 | Status: SHIPPED | OUTPATIENT
Start: 2019-10-26 | End: 2022-08-19

## 2019-10-26 RX ADMIN — CLOPIDOGREL BISULFATE 75 MG: 75 TABLET ORAL at 09:10

## 2019-10-26 RX ADMIN — DIVALPROEX SODIUM 250 MG: 250 TABLET, DELAYED RELEASE ORAL at 05:10

## 2019-10-26 RX ADMIN — TIOTROPIUM BROMIDE 18 MCG: 18 CAPSULE ORAL; RESPIRATORY (INHALATION) at 08:10

## 2019-10-26 RX ADMIN — ONDANSETRON 4 MG: 2 INJECTION INTRAMUSCULAR; INTRAVENOUS at 05:10

## 2019-10-26 RX ADMIN — PANTOPRAZOLE SODIUM 40 MG: 40 TABLET, DELAYED RELEASE ORAL at 09:10

## 2019-10-26 RX ADMIN — CILOSTAZOL 50 MG: 50 TABLET ORAL at 10:10

## 2019-10-26 RX ADMIN — LEVOTHYROXINE SODIUM 88 MCG: 88 TABLET ORAL at 05:10

## 2019-10-26 RX ADMIN — ATORVASTATIN CALCIUM 40 MG: 40 TABLET, FILM COATED ORAL at 09:10

## 2019-10-26 RX ADMIN — LOSARTAN POTASSIUM 100 MG: 50 TABLET ORAL at 09:10

## 2019-10-26 RX ADMIN — HEPARIN SODIUM 5000 UNITS: 5000 INJECTION, SOLUTION INTRAVENOUS; SUBCUTANEOUS at 05:10

## 2019-10-26 RX ADMIN — ONDANSETRON 4 MG: 2 INJECTION INTRAMUSCULAR; INTRAVENOUS at 11:10

## 2019-10-26 NOTE — NURSING
Patient safety maintained. Medications administered per order. Assistance provided as needed. Printed documentation provided for discharge. IV and telemetry monitoring removed, patient tolerated well.

## 2019-10-26 NOTE — PT/OT/SLP PROGRESS
Physical Therapy Treatment    Patient Name:  Brittany Haile   MRN:  [id number]    Recommendations:     Discharge Recommendations:  home health OT, home health PT   Discharge Equipment Recommendations: 3-in-1 commode   Barriers to discharge: None    Assessment:     Brittany Haile is a 79 y.o. female admitted with a medical diagnosis of Hypertensive emergency.  She presents with the following impairments/functional limitations:  weakness, impaired endurance, impaired sensation, decreased ROM, impaired balance, impaired cognition, impaired functional mobilty, decreased safety awareness, impaired cardiopulmonary response to activity. Pt performed standing activities with no adverse reactions and no complaints of pain. Performed standing ambulation simulation with CGA for balance, daughter present for session.    Rehab Prognosis: Good; patient would benefit from acute skilled PT services to address these deficits and reach maximum level of function.    Recent Surgery: * No surgery found *      Plan:     During this hospitalization, patient to be seen 5 x/week to address the identified rehab impairments via therapeutic activities, therapeutic exercises, gait training and progress toward the following goals:    · Plan of Care Expires:  11/24/19    Subjective     Chief Complaint: none stated   Patient/Family Comments/goals: to go home  Pain/Comfort:  · Pain Rating 1: 0/10      Objective:     Communicated with NSG prior to session.  Patient found supine with telemetry, peripheral IV, oxygen upon PT entry to room.     General Precautions: Standard, fall   Orthopedic Precautions:N/A   Braces:       Functional Mobility:  · Bed Mobility:     · Rolling Left:  contact guard assistance  · Transfers:     · Sit to Stand:  contact guard assistance with rolling walker      AM-PAC 6 CLICK MOBILITY  Turning over in bed (including adjusting bedclothes, sheets and blankets)?: 3  Sitting down on and standing up from a chair with arms (e.g.,  wheelchair, bedside commode, etc.): 3  Moving from lying on back to sitting on the side of the bed?: 3  Moving to and from a bed to a chair (including a wheelchair)?: 3  Need to walk in hospital room?: 3  Climbing 3-5 steps with a railing?: 3  Basic Mobility Total Score: 18       Therapeutic Activities and Exercises:   performed 3 trials of marching simulation in RW and SBA. No LOB noted, pt required 3 seated rest breaks to recover between trials.     Patient left supine with all lines intact, call button in reach and bed alarm on..    GOALS:   Multidisciplinary Problems     Physical Therapy Goals        Problem: Physical Therapy Goal    Goal Priority Disciplines Outcome Goal Variances Interventions   Physical Therapy Goal     PT, PT/OT Ongoing, Progressing     Description:  Goals to be met by: 19     Patient will increase functional independence with mobility by performin. Supine to sit with supervision.   2. Sit to supine with supervision.   3. Sit<>stand transfer with supervision using LRAD  4. Gait > 150 feet with SBA using LRAD  5. Ascend/descend 2 stairs with no  handrails with CGA      Cont to advance PT towards established PT goals.                    Time Tracking:     PT Received On: 10/26/19  PT Start Time: 1200     PT Stop Time: 1215  PT Total Time (min): 15 min     Billable Minutes: Therapeutic Exercise 15       PT/PTA: PTA     PTA Visit Number: 1     Jeffrey Ayala PTA  10/26/2019

## 2019-10-26 NOTE — PLAN OF CARE
Plan of care reviewed with patient.  SR on tele.  No complaints overnight.  SHAWNA monitoring in place. Bed alarm on, call light in reach, fall precautions in place.

## 2019-10-26 NOTE — PROGRESS NOTES
[facility] Medicine Progress Note    Primary Team: [facility] Hospitalist Team A  Attending Physician: Cady Foster MD  Resident: Talita/Zaynab  Intern: Tien/Flora    Subjective:      Blood pressure remain stable on PO medications. Denies any further headache. NO chest pain or shortness of breath.      Objective:     Last 24 Hour Vital Signs:  BP  Min: 116/75  Max: 155/63  Temp  Av °F (36.7 °C)  Min: 97.3 °F (36.3 °C)  Max: 98.8 °F (37.1 °C)  Pulse  Av.5  Min: 68  Max: 93  Resp  Av.3  Min: 14  Max: 49  SpO2  Av.8 %  Min: 87 %  Max: 100 %  I/O last 3 completed shifts:  In: 1132.5 [P.O.:700; I.V.:432.5]  Out: 1250 [Urine:1250]    Physical Examination:  General: alert, cooperative, mild distress 2/2 pain, appears stated age  HEENT: NCAT, PERRL, MMM  Neck: supple, normal ROM, no thyromegaly, no LAD  Cardio: RRR, HS 1 & 2 normal, no murmurs or added sounds  Lungs: CTABL, no wheezes or crackles, good inspiratory effort  Abd: soft, non-tender to palpation, no palpable masses or organomegaly, bowel sounds normoactive  Extrem: atraumatic, no edema, distal pulses 2+ in all four extremities  Skin: no rashes, bruises, or lesions noted  Neuro: A&Ox3, follows commands, moves all extremities spontaneously    Laboratory:  Laboratory Data Reviewed: yes  Pertinent Findings:  Recent Labs   Lab 10/23/19  1629 10/24/19  0410 10/25/19  0508   WBC 6.76 5.66 8.63   HGB 14.1 15.1 13.4   HCT 45.4 48.7* 41.7    235 244    135* 134*   K 4.9 4.2 3.7   CL 96 96 96   CREATININE 1.1 1.0 1.2   BUN 15 16 25*   CO2 29 28 27   ALT 20 20 13   AST 34 21 15     Microbiology Data Reviewed: yes  Pertinent Findings:  None    Radiology Data Reviewed: yes  Pertinent Findings:  None new    Current Medications:     Infusions:       Scheduled:   atorvastatin  40 mg Oral Daily    cilostazol  50 mg Oral BID    clopidogrel  75 mg Oral Daily    divalproex  250 mg Oral Q8H    escitalopram oxalate  10 mg Oral Nightly     heparin (porcine)  5,000 Units Subcutaneous Q8H    levothyroxine  88 mcg Oral Before breakfast    losartan  100 mg Oral Daily    losartan  50 mg Oral Daily    ondansetron  4 mg Intravenous Q6H    pantoprazole  40 mg Oral Daily    tiotropium  18 mcg Inhalation Daily    valproate sodium (DEPACON) IVPB  500 mg Intravenous Once        PRN:  acetaminophen, albuterol-ipratropium, Dextrose 10% Bolus, Dextrose 10% Bolus, glucagon (human recombinant), glucose, glucose, haloperidol lactate, prochlorperazine, sodium chloride 0.9%    Antibiotics and Day Number of Therapy:  None    Lines and Day Number of Therapy:  PIV    Assessment/Plan:     Hypertensive Emergency  Patient with a hx of HTN, takes valsartan 80 mg at home. Per daughters patient will miss some doses of medications when not feeling well. /100 in ED, started on cardene drip and transferred to ICU. While in ER developed chest, back, and neck pain. CTA CAP without signs of AAA or dissection. Troponins peaked at 0.1 and have leveled off, EKGs without signs of ischemia. Weaned off cardene gtt.   - continue losartan 100mg    Headache with Nausea/Vomiting  Reports gradual onset headache since waking with vomiting x1, 10/10 severity in ED. CT head without signs of infarct or hemorrhage. Combo of mag sulfate, valproate, compazine with more relief in pain/nasuea on admission.    - continue valproate 250 mg po taper pending resolution of pain (250 mg TID x 3 days, 250 mg BID x 3 days, 250 mg daily x 3 days).   - scheduled zofran q6h with compazine q6h prn nausea.     Tropinemia  Trop elevated to 0.027 in ED, has consistently elevated trops per chart review with baselines around 0.01-0.02. EKGs without signs of ischemia. Suspect this is 2/2 HTN emergency. No active CP at present. Trop peak at 0.14 and have since leveled off.  - treatment of HTN emergency as above.   - continue to monitor for CP.      Iron Deficiency Anemia  Has history of DELIA requiring IV iron  infusions. EGD and colonoscopy 9/2019 without evidence of bleeding. H/H: 14.1/45.1 on current admission, no signs of active bleed.  - will continue to monitor with daily CBC.     Peripheral Vascular Disease with Claudication  Follows with Dr. Brooks as an outpatient. S/p SMA and celiac stenting as well as R CEA. Takes pletal 50 mg BID, plavix and ASA at home.   - continue DAPT and pletal.   - counseled on smoking cessation.     COPD  On 2L NC at home. Takes albuterol rescue inhaler at home. No active issues.    - duonebs prn wheeze.   - continue 2L NC.     Hypothyroidism  Takes synthroid 88 mcg daily at home. TSH this admission WNL at 0.5.  - continue home synthroid.                 GERD  No acute issues.  - continue home Pantoprazole 40mg.     Depression      Takes lexapro 10 mg nightly at home. No SI/HI at present.  - continue home lexapro.      Tobacco Abuse  Daily smoker, 1PPD since age 14. No interest in quitting.  - counseled on cessation.  - offered nicotine patches.                             HLD  Lipid panel 7/2019 at goal. Takes lipitor 40 mg at home.   - continue home statin.                Code: Full  PPX: Heparin  Diet: Cardiac  Dispo: discharge today,  on medication adherence     Jacquelyn Worthy MD  \[facility]\"" Internal Medicine HO-II  \[facility]\"" Internal Medicine Team A    \[facility]\"" Medicine Hospitalist Pager numbers:   \[facility]\"" Hospitalist Medicine Team A (Bradley/Kristin): [phone]  \[facility]\"" Hospitalist Medicine Team B (Homer/James):  [phone]

## 2019-10-26 NOTE — DISCHARGE SUMMARY
[facility] Hospital Medicine Discharge Summary    Primary Team: [facility] Hospitalist Team A  Attending Physician: Cady Foster MD  Resident: Zaynab   Intern: Flora    Date of Admit: 10/23/2019  Date of Discharge: 10/26/2019    Discharge to: Home   Condition: Good     Discharge Diagnoses     Patient Active Problem List   Diagnosis    History of stroke without residual deficits    Carotid stenosis    Hypothyroid    Hypertension    Anxiety    Incidental pulmonary nodule, > 3mm and < 8mm    COPD (chronic obstructive pulmonary disease)    HLD (hyperlipidemia)    Chronic kidney disease, stage III (moderate)    Leg pain, bilateral    PVD (peripheral vascular disease) with claudication    Iron deficiency anemia due to chronic blood loss    Glucose intolerance (impaired glucose tolerance)    Subclavian artery stenosis, left    CVD (cerebrovascular disease)    Chest pain    Abdominal pain    Gastritis    Angina mesenteric    Nicotine dependence, cigarettes, w unsp disorders    Coronary artery disease of native artery of native heart with stable angina pectoris    Mesenteric angina    GIB (gastrointestinal bleeding)    Stroke    Aortic atherosclerosis    Chronic diastolic heart failure    Chronic bilateral low back pain with bilateral sciatica    Lumbar spondylosis    Spinal stenosis of lumbar region with neurogenic claudication    DDD (degenerative disc disease), lumbar    Lumbar radiculopathy    Moderate recurrent major depression    Chronic obstructive pulmonary disease    Gastroesophageal reflux disease    Epistaxis    Anemia    Hypertensive emergency    Elevated troponin    Acute nonintractable headache    Non-intractable vomiting       Consultants and Procedures     Consultants:  None    Procedures:   None    Imaging:  Imaging Results          CTA Chest Abdomen Non Coronary (Final result)  Result time 10/23/19 21:47:36   Procedure changed from CT Chest Abdomen With Contrast (XPD)      Final result by Ada Rowland MD (10/23/19 21:47:36)                 Impression:      1. No evidence of aortic aneurysm or dissection.  Moderate to severe atherosclerosis is seen of the abdominal aorta with significant plaquing of its branch vessels.  2. No acute intrathoracic abnormalities identified.  3. No acute intra-abdominal abnormalities identified.  4. Postsurgical changes and additional findings as detailed above.      Electronically signed by: Ada Rowland MD  Date:    10/23/2019  Time:    21:47             Narrative:    EXAMINATION:  CTA CHEST ABDOMEN NON CORONARY (XPD)    CLINICAL HISTORY:  Aortic dz, non-traumatic, known or suspect;    TECHNIQUE:  CTA chest and abdomen were performed following administration of 100 cc Omnipaque 350 IV contrast.    COMPARISON:  CT abdomen and pelvis from 08/01/2019. CT chest from January 2018.    FINDINGS:  No evidence of aortic aneurysm or dissection.  Branch vessels of the arch are patent with atherosclerosis seen.  Moderate to severe atherosclerosis is seen involving the abdominal aorta.  Severe plaquing is seen involving the celiac artery and SMA origins.  Prominent plaquing is also seen involving the bilateral renal artery origins as well as the right common iliac artery origin.    No evidence of proximal pulmonary embolus through the proximal segmental branches.  Heart is normal in size without pericardial effusion.  Coronary artery calcification is seen.  Esophagus is unremarkable along its course.    Airways are patent.  Lungs are symmetrically expanded.  Mild emphysematous changes are seen.  No evidence of mass, focal consolidation, pneumothorax, or pleural effusion.  Unchanged focal area of ground-glass attenuation is seen within the lateral aspect of the right lower lobe.    No significant hepatic abnormalities are identified.  There is no intra-or extrahepatic biliary ductal dilatation.  Gallbladder has been removed.  The stomach, pancreas, spleen, and  adrenal glands show no gross abnormalities.    Kidneys are functioning.  No evidence of hydronephrosis.  Stable presumed left renal cyst is seen.    The visualized loops of small and large bowel show no evidence of obstruction or inflammation.  No free air or free fluid.    No acute osseous abnormality identified. Subcutaneous soft tissue structures are unremarkable.                               X-Ray Chest AP Portable (Final result)  Result time 10/23/19 20:56:33    Final result by Milton Barron MD (10/23/19 20:56:33)                 Impression:      No detrimental change or radiographic acute intrathoracic process seen on this single view.    Radiographic findings suggesting sequela of COPD/emphysema, without focal consolidation.      Electronically signed by: Milton Barron MD  Date:    10/23/2019  Time:    20:56             Narrative:    EXAMINATION:  XR CHEST AP PORTABLE    CLINICAL HISTORY:  Chest pain, unspecified    TECHNIQUE:  Single frontal view of the chest was performed.    COMPARISON:  Chest radiograph 09/13/2019    FINDINGS:  Monitoring leads overlie the chest.  Cardiomediastinal silhouette is midline and within normal limits noting calcific atherosclerosis at the arch.  Bibasilar scattered linear opacities consistent with subsegmental scarring versus atelectasis.  Left basilar platelike scarring versus atelectasis, unchanged.  The lungs are symmetrically hyperexpanded with bilateral diffuse nonspecific interstitial coarsening and increased lucency of the upper zones suggesting sequela of underlying COPD/emphysema similar to previous studies.  No convincing evidence for acute pulmonary edema.  No focal consolidation, pleural effusion or pneumothorax.  Scattered atherosclerotic vascular calcifications noted elsewhere.  No acute osseous process seen.  PA and lateral views can be obtained.                               CT Head Without Contrast (Final result)  Result time 10/23/19 16:53:40    Final result  by Surinder Herrera MD (10/23/19 16:53:40)                 Impression:      No acute infarct or hemorrhage or mass or fracture.      Electronically signed by: Surinder Herrera  Date:    10/23/2019  Time:    16:53             Narrative:    EXAMINATION:  CT HEAD WITHOUT CONTRAST    CLINICAL HISTORY:  Dizziness;Headache, acute, norm neuro exam;    TECHNIQUE:  Low dose axial images were obtained through the head.  Coronal and sagittal reformations were also performed. Contrast was not administered.    COMPARISON:  09/13/2019 head CT    FINDINGS:  The overall gray-white interface and hemispherical morphology appears normal.  The ventricles and basal cisterns and sulci are proportionally sized and probably normal for age similar with 09/13/2019.  The corpus callosum shows normal morphology.  The pituitary and sella turcica appear normal for age.  There is ill-defined hypodensity of the left occipital lobe axial image 18, possibly also coronal image 123 similar with 09/13/2019.    No definite acute infarct or hemorrhage or mass.  No vasogenic edema pattern.  No subdural collection.    No skull fracture or overlying soft tissue swelling.  The visualized paranasal sinuses and mastoid sinuses and middle ears appear normal.  The visualized orbits and globes appear normal.  There are bilateral cataract resections. 8                                  Brief History of Present Illness      The patient was in their usual state of health until 7:30am this morning when she woke up and began experiencing moderate frontal headaches that did not resolve with Tylenol.  She reports having one episode of vomiting shortly after. Over the next few hours she noticed progression of the headache and took additional Tylenol with no relief. She states that she has occasional headaches, which normally resolve with Tylenol, and felt concerned when she noticed that the headache seemed to worsen and not improve. She denies worsening of pain with bright  lights or movements. She had a similar presentation in September 2017 during which she complained of headache and neck pain in the setting of hypertension. At that time she was treated with IV labetolol and PO norvasc with clinical improvement.      She called her daughters who brought her to the ED, where she was found to be hypertensive to the 240s/100 with no clear evidence of end organ damage . Her blood pressure at home is normally in the 160s systolic and her daughters report strict compliance with medication regimen (however they also note that when the patient gets acutely ill, she forgets to takes her meds sometimes). She takes Valsartan 80mg at home and was started on 50mg of Losartan, a cardene drip and 2L oxygen per NC with improvement in BP. Additionally, she was given morphine, compazine, toradol and prednisone to management of headache.      While in the ED, she also begin complaining of chest, neck, jaw and back pain. Initial investigations were negative. She had a CTA of the chest and abdomen (to rule out possible dissection), which showed no evidence of aortic aneurysm or dissection, no acute intrathoracic abnormalities and no acute intra-abdominal abnormalities. Her troponin is downtrending (0.027 to 0.025). Upon visitation in the ED, she denies agitation, delirium, seizures, visual disturbances, fever, chills, current chest pain, shortness of breath, abdominal pain, numbness, tingling. She admits to being an active smoker, averaging 1 PPD for over 60 years.    For the full HPI please refer to the History & Physical from this admission.    Hospital Course By Problem with Pertinent Findings     Hypertensive Emergency  Patient with a hx of HTN, takes valsartan 80 mg at home. Per daughters patient will miss some doses of medications when not feeling well. /100 in ED, started on cardene drip and transferred to ICU. While in ER developed chest, back, and neck pain. CTA CAP without signs of AAA or  dissection. Troponins peaked at 0.1 and have leveled off, EKGs without signs of ischemia. Weaned off cardene gtt onto losartan 100mg. Discharged home on equivalent valsartan 320mg      Headache with Nausea/Vomiting  Reports gradual onset headache since waking with vomiting x1, 10/10 severity in ED. CT head without signs of infarct or hemorrhage. Combo of mag sulfate, valproate, compazine with more relief in pain/nausea on admission.  Received valproate and discharged home on taper.     Tropinemia  Trop elevated to 0.027 in ED, has consistently elevated trops per chart review with baselines around 0.01-0.02. EKGs without signs of ischemia. Suspected this is 2/2 HTN emergency. No active CP. Trop peak at 0.14 and leveled off.     Iron Deficiency Anemia  Has history of DELIA requiring IV iron infusions. EGD and colonoscopy 9/2019 without evidence of bleeding. H/H: 14.1/45.1 on current admission, no signs of active bleed.    Peripheral Vascular Disease with Claudication  Follows with Dr. Brooks as an outpatient. S/p SMA and celiac stenting as well as R CEA. Takes pletal 50 mg BID, plavix and ASA at home.     COPD  On 2L NC at home. Takes albuterol rescue inhaler at home. No active issues.      Hypothyroidism  Takes synthroid 88 mcg daily at home. TSH this admission WNL at 0.5.    GERD  No acute issues.Continued home Pantoprazole 40mg.     Depression      Takes lexapro 10 mg nightly at home. No SI/HI at present.     Tobacco Abuse  Daily smoker, 1PPD since age 14. No interest in quitting.Counseled on cessation.    HLD  Lipid panel 7/2019 at goal. Takes lipitor 40 mg at home.       Discharge Medications      Brittany Haile   Home Medication Instructions RACH:[id number]    Printed on:10/26/19 1316   Medication Information                      albuterol (PROVENTIL/VENTOLIN HFA) 90 mcg/actuation inhaler  Inhale 1-2 puffs into the lungs every 6 (six) hours as needed for Wheezing. Rescue             atorvastatin (LIPITOR) 40 MG  tablet  Take 1 tablet (40 mg total) by mouth once daily.             cilostazol (PLETAL) 50 MG Tab  Take 1 tablet (50 mg total) by mouth 2 (two) times daily.             clopidogrel (PLAVIX) 75 mg tablet  Take 1 tablet (75 mg total) by mouth once daily.             divalproex (DEPAKOTE) 250 MG EC tablet  Take 1 tablet (250 mg total) by mouth once daily. Take 1 tablet (250mg) twice a day on 10/26, 10/27, 10/28, 10/29  Take 1 tablet (250mg) once a day on 10/30, 10/31, 11/1 for 8 doses             escitalopram oxalate (LEXAPRO) 10 MG tablet  Take 1 tablet (10 mg total) by mouth nightly.             fluticasone-salmeterol 250-50 mcg/dose (ADVAIR) 250-50 mcg/dose diskus inhaler  Inhale 1 puff into the lungs 2 (two) times daily.             levothyroxine (SYNTHROID) 88 MCG tablet  TAKE 1 TABLET(88 MCG) BY MOUTH EVERY DAY             pantoprazole (PROTONIX) 40 MG tablet  TAKE 1 TABLET(40 MG) BY MOUTH EVERY DAY             tiotropium (SPIRIVA WITH HANDIHALER) 18 mcg inhalation capsule  Inhale 1 capsule (18 mcg total) into the lungs once daily.             valsartan (DIOVAN) 320 MG tablet  Take 1 tablet (320 mg total) by mouth once daily.                 Discharge Information:   Diet:  Cardiac     Physical Activity:  As tolerated              Instructions:  1. Take all medications as prescribed  2. Keep all follow-up appointments  3. Return to the hospital or call your primary care physicians if any worsening symptoms such as fever, chest pain, shortness of breath, return of symptoms, or any other concerns.    Follow-Up Appointments:  Future Appointments   Date Time Provider Department Center   10/29/2019  4:00 PM Kamron Cortez MD [facility] HEM ONC Jean Worthy MD  [facility] Internal Medicine, -II

## 2019-10-26 NOTE — PLAN OF CARE
The proper method of use, as well as anticipated side effects, of this metered-dose inhaler are discussed and demonstrated to the patient.

## 2019-10-28 ENCOUNTER — PATIENT MESSAGE (OUTPATIENT)
Dept: HEMATOLOGY/ONCOLOGY | Facility: CLINIC | Age: 79
End: 2019-10-28

## 2019-10-28 ENCOUNTER — TELEPHONE (OUTPATIENT)
Dept: HEMATOLOGY/ONCOLOGY | Facility: CLINIC | Age: 79
End: 2019-10-28

## 2019-10-28 NOTE — PROGRESS NOTES
PATIENT: Brittany Haile  MRN: [id number]  DATE: 10/28/2019    Diagnosis:   1. Iron deficiency anemia due to chronic blood loss    2. Nicotine dependence, cigarettes, w unsp disorders    3. Panlobular emphysema    4. Aortic atherosclerosis      Chief Complaint: iron deficiency anemia    Subjective:    History of Present Illness: Ms. Haiel is a 79 y.o. female who presented in September 2019 for evaluation and management of iron deficiency anemia. She is referred by internal medicine physician Dr. Frazier.    - labs from July 2019 revealed a moderate microcytic anemia with decreased iron/ferritin and elevated total iron binding capacity.  - she endorses fatigue, ice eating, dyspnea upon exertion.  - she smokes and using chronic supplemental oxygen. She is not interested in quitting.  - she saw gastroenterology earlier today and is scheduled for a colonoscopy on 9/11/19.    Interval History:  - he presents for a follow-up appointment for her iron deficiency anemia.  - she received ferric carboxymaltose on 9/16/19 and 9/23/19.  - she underwent upper GI endoscopy, colonoscopy, and video capsule study in September 2019. Non-bleeding angioectasias were discovered. No malignancy was seen.  - she was hospitalized from 10/23/19 - 10/26/19 for hypertensive urgency/emergency.  - today, she is doing well. Her fatigue had improved, but she is tired from her recent hospitalization. She denies chest pain, nausea, vomiting, diarrhea, constipation.    Past medical, surgical, family, and social histories have been reviewed and updated below.    Past Medical History:   Past Medical History:   Diagnosis Date    Abdominal pain     CHF (congestive heart failure)     Chronic kidney disease, stage III (moderate) 8/19/2015    COPD (chronic obstructive pulmonary disease)     COPD (chronic obstructive pulmonary disease)     CVA (cerebral infarction)     Esophagitis     Gastritis     GERD (gastroesophageal reflux disease)     GI bleed      Hyperlipidemia     Hypertension     Mesenteric angina     PVD (peripheral vascular disease) with claudication 10/13/2015    Stroke     Subclavian artery stenosis, left     Thyroid disease        Past Surgical History:   Past Surgical History:   Procedure Laterality Date    CAROTID STENT       SECTION      COLONOSCOPY N/A 3/2/2017    Procedure: COLONOSCOPY;  Surgeon: Cecil Crooks MD;  Location: [facility] ENDO;  Service: Endoscopy;  Laterality: N/A;    COLONOSCOPY N/A 2019    Procedure: COLONOSCOPY/Suprep;  Surgeon: Barry Talbert MD;  Location: [facility] ENDO;  Service: Endoscopy;  Laterality: N/A;    CORONARY ANGIOPLASTY      EPIDURAL STEROID INJECTION INTO LUMBAR SPINE N/A 2018    Procedure: INJECTION, STEROID, SPINE, LUMBAR, EPIDURAL- L2-3 ORAL SEDATION;  Surgeon: Stiven Ibarra Jr., MD;  Location: [facility] PAIN MGT;  Service: Pain Management;  Laterality: N/A;    ESOPHAGOGASTRODUODENOSCOPY N/A 2019    Procedure: EGD (ESOPHAGOGASTRODUODENOSCOPY);  Surgeon: Barry Talbert MD;  Location: [facility] ENDO;  Service: Endoscopy;  Laterality: N/A;    GALLBLADDER SURGERY      HYSTERECTOMY      INTRALUMINAL GASTROINTESTINAL TRACT IMAGING VIA CAPSULE N/A 2019    Procedure: IMAGING PROCEDURE, GI TRACT, INTRALUMINAL, VIA CAPSULE;  Surgeon: Barry Talbert MD;  Location: [facility];  Service: Endoscopy;  Laterality: N/A;    OOPHORECTOMY      SUPERIOR MESTENTERIC ARTERY STENT         Family History:   Family History   Problem Relation Age of Onset    Cancer Sister         breast    Cancer Brother         throat cancer       Social History:  reports that she has been smoking cigarettes. She has a 30.00 pack-year smoking history. She has never used smokeless tobacco. She reports that she does not drink alcohol or use drugs.    Allergies:  Review of patient's allergies indicates:  No Known Allergies    Medications:  Current Outpatient Medications   Medication Sig  Dispense Refill    albuterol (PROVENTIL/VENTOLIN HFA) 90 mcg/actuation inhaler Inhale 1-2 puffs into the lungs every 6 (six) hours as needed for Wheezing. Rescue 1 Inhaler 1    atorvastatin (LIPITOR) 40 MG tablet Take 1 tablet (40 mg total) by mouth once daily. 90 tablet 3    cilostazol (PLETAL) 50 MG Tab Take 1 tablet (50 mg total) by mouth 2 (two) times daily. 60 tablet 11    clopidogrel (PLAVIX) 75 mg tablet Take 1 tablet (75 mg total) by mouth once daily. 30 tablet 11    divalproex (DEPAKOTE) 250 MG EC tablet Take 1 tablet (250 mg total) by mouth once daily. Take 1 tablet (250mg) twice a day on 10/26, 10/27, 10/28, 10/29  Take 1 tablet (250mg) once a day on 10/30, 10/31, 11/1 for 8 doses 8 tablet 0    escitalopram oxalate (LEXAPRO) 10 MG tablet Take 1 tablet (10 mg total) by mouth nightly. 90 tablet 3    fluticasone-salmeterol 250-50 mcg/dose (ADVAIR) 250-50 mcg/dose diskus inhaler Inhale 1 puff into the lungs 2 (two) times daily. 60 each 10    levothyroxine (SYNTHROID) 88 MCG tablet TAKE 1 TABLET(88 MCG) BY MOUTH EVERY DAY 90 tablet 3    pantoprazole (PROTONIX) 40 MG tablet TAKE 1 TABLET(40 MG) BY MOUTH EVERY DAY 90 tablet 3    tiotropium (SPIRIVA WITH HANDIHALER) 18 mcg inhalation capsule Inhale 1 capsule (18 mcg total) into the lungs once daily. 90 capsule 3    valsartan (DIOVAN) 320 MG tablet Take 1 tablet (320 mg total) by mouth once daily. 90 tablet 3     No current facility-administered medications for this visit.        Review of Systems   Constitutional: Positive for fatigue (improved).   HENT: Negative for sore throat.    Eyes: Negative for visual disturbance.   Respiratory: Positive for shortness of breath.    Cardiovascular: Negative for chest pain.   Gastrointestinal: Negative for abdominal pain.   Genitourinary: Negative for dysuria.   Musculoskeletal: Negative for back pain.   Skin: Negative for pallor.   Neurological: Negative for headaches.   Hematological: Negative for adenopathy.    Psychiatric/Behavioral: The patient is not nervous/anxious.      ECOG Performance Status:   ECOG SCORE 1       Objective:      Vitals:   Vitals:    10/29/19 1609   Pulse: 96   Resp: 18   Temp: (!) 93 °F (33.9 °C)   TempSrc: Oral   SpO2: (!) 93%   Weight: 60 kg (132 lb 4.4 oz)     BMI: Body mass index is 24.99 kg/m².    Physical Exam   Constitutional: She is oriented to person, place, and time. She appears well-developed and well-nourished.   HENT:   Head: Normocephalic and atraumatic.   Eyes: Pupils are equal, round, and reactive to light. EOM are normal.   Neck: Normal range of motion. Neck supple.   Cardiovascular: Normal rate and regular rhythm.   Pulmonary/Chest: Effort normal and breath sounds normal.   Abdominal: Soft. Bowel sounds are normal.   Musculoskeletal: Normal range of motion.   Neurological: She is alert and oriented to person, place, and time.   Skin: Skin is warm and dry.   Psychiatric: She has a normal mood and affect. Her behavior is normal. Judgment and thought content normal.   Nursing note and vitals reviewed.    Laboratory Data:  Labs have been reviewed.    Lab Results   Component Value Date    WBC 5.40 10/26/2019    HGB 12.3 10/26/2019    HCT 40.0 10/26/2019    MCV 86 10/26/2019     10/26/2019         Imaging:     Video capsule endoscopy (9/25/19):  - Small bowel lymphangiectasias.  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    Upper GI endoscopy (9/11/19):  - Normal esophagus.  - Normal stomach. Biopsied.  - A few duodenal polyps. Biopsied.  - Biopsies were taken with a cold forceps for evaluation of celiac disease.    Colonoscopy (9/11/19):  - Lipomatous ileocecal valve. Biopsied.  - A single non-bleeding colonic angioectasia. Treated with argon beam coagulation.  - One 8 mm polyp in the ascending colon, removed with a hot snare. Complete resection. Polyp tissue not retrieved. No specimens collected. Clip was  placed.  - Two 3 to 5 mm polyps in the descending colon, removed with a cold snare. Resected and retrieved.  - One 3 mm polyp in the transverse colon, removed with a cold snare. Resected and retrieved.  - Small lipoma in the descending colon and in the transverse colon.    CT abdomen/pelvis (8/1/19): I have personally reviewed the images  - No acute abnormality within the abdomen or pelvis.  - Colonic diverticulosis without diverticulitis.  - Hepatomegaly, unchanged.  - Severe atherosclerotic calcification of the abdominal aorta and its branches.    Assessment:       1. Iron deficiency anemia due to chronic blood loss    2. Nicotine dependence, cigarettes, w unsp disorders    3. Panlobular emphysema    4. Aortic atherosclerosis         Plan:     1. Iron deficiency anemia due to chronic blood loss  - labs from July 2019 revealed a moderate microcytic anemia with decreased iron/ferritin and elevated total iron binding capacity. These findings were consistent with severe iron deficiency anemia.  - given the severity of her anemia and iron deficiency, I recommended ferric carboxymaltose x 2 doses.  - she received ferric carboxymaltose on 9/16/19 and 9/23/19.  - she underwent upper GI endoscopy, colonoscopy, and video capsule study in September 2019. Non-bleeding angioectasias were discovered.  - labs from 10/26/19 have been reviewed. Although she did not get iron studies drawn, her anemia has resolved. Clinically she is doing better as well.  - return to clinic in 3 months with repeat iron studies.    2. Nicotine dependence, cigarettes  - she is not interested in quitting.  - continue to monitor.    3. Atherosclerosis of aorta  - seen on CT imaging from 8/1/19  - continue valsartan, atorvastatin, clopidogrel  - continue to monitor.    4. Advance Care Planning     Power of   I initiated the process of advance care planning today and explained the importance of this process to the patient.  I introduced the  concept of advance directives to the patient, as well. Then the patient received detailed information about the importance of designating a Health Care Power of  (HCPOA). She was also instructed to communicate with this person about their wishes for future healthcare, should she become sick and lose decision-making capacity. The patient has not previously appointed a HCPOA. After our discussion, the patient has decided to complete a HCPOA and has appointed her Leila Castillo ([phone]). I spent a total time of 16 minutes discussing this issue with the patient.    Living Will  During this visit, I engaged the patient  in the advance care planning process.  The patient and I reviewed the role for advance directives and their purpose in directing future healthcare if the patient's unable to speak for him/herself.  At this point in time, the patient does have full decision-making capacity.  We discussed different extreme health states that she could experience, and reviewed what kind of medical care she would want in those situations.  The patient communicated that if she were comatose and had little chance of a meaningful recovery, she would not want machines/life-sustaining treatments used. She had previously completed an Advance Directives form, which has been uploaded into her records.       - return to clinic in 3 months with repeat iron studies.    Kamron Cortez M.D.  Hematology/Oncology  [facility] - [address], [address]  [place] [address]  Phone: [phone]  Fax: [phone]

## 2019-10-28 NOTE — TELEPHONE ENCOUNTER
Pt.'s daughter confirmed labs are not needed before office visit with Dr. Cortez tomorrow, 10/29/19.

## 2019-10-29 ENCOUNTER — OFFICE VISIT (OUTPATIENT)
Dept: HEMATOLOGY/ONCOLOGY | Facility: CLINIC | Age: 79
End: 2019-10-29
Payer: MEDICARE

## 2019-10-29 ENCOUNTER — PATIENT MESSAGE (OUTPATIENT)
Dept: FAMILY MEDICINE | Facility: CLINIC | Age: 79
End: 2019-10-29

## 2019-10-29 VITALS
OXYGEN SATURATION: 93 % | RESPIRATION RATE: 18 BRPM | HEART RATE: 96 BPM | BODY MASS INDEX: 24.99 KG/M2 | TEMPERATURE: 93 F | WEIGHT: 132.25 LBS

## 2019-10-29 DIAGNOSIS — I70.0 AORTIC ATHEROSCLEROSIS: ICD-10-CM

## 2019-10-29 DIAGNOSIS — F17.219 NICOTINE DEPENDENCE, CIGARETTES, W UNSP DISORDERS: ICD-10-CM

## 2019-10-29 DIAGNOSIS — D50.0 IRON DEFICIENCY ANEMIA DUE TO CHRONIC BLOOD LOSS: Primary | ICD-10-CM

## 2019-10-29 DIAGNOSIS — J43.1 PANLOBULAR EMPHYSEMA: ICD-10-CM

## 2019-10-29 PROCEDURE — 99499 UNLISTED E&M SERVICE: CPT | Mod: S$GLB,,, | Performed by: INTERNAL MEDICINE

## 2019-10-29 PROCEDURE — 99214 PR OFFICE/OUTPT VISIT, EST, LEVL IV, 30-39 MIN: ICD-10-PCS | Mod: S$GLB,,, | Performed by: INTERNAL MEDICINE

## 2019-10-29 PROCEDURE — 1101F PR PT FALLS ASSESS DOC 0-1 FALLS W/OUT INJ PAST YR: ICD-10-PCS | Mod: CPTII,S$GLB,, | Performed by: INTERNAL MEDICINE

## 2019-10-29 PROCEDURE — 99999 PR PBB SHADOW E&M-EST. PATIENT-LVL III: ICD-10-PCS | Mod: PBBFAC,,, | Performed by: INTERNAL MEDICINE

## 2019-10-29 PROCEDURE — 99999 PR PBB SHADOW E&M-EST. PATIENT-LVL III: CPT | Mod: PBBFAC,,, | Performed by: INTERNAL MEDICINE

## 2019-10-29 PROCEDURE — 99214 OFFICE O/P EST MOD 30 MIN: CPT | Mod: S$GLB,,, | Performed by: INTERNAL MEDICINE

## 2019-10-29 PROCEDURE — 1101F PT FALLS ASSESS-DOCD LE1/YR: CPT | Mod: CPTII,S$GLB,, | Performed by: INTERNAL MEDICINE

## 2019-10-29 PROCEDURE — 99499 RISK ADDL DX/OHS AUDIT: ICD-10-PCS | Mod: S$GLB,,, | Performed by: INTERNAL MEDICINE

## 2019-10-29 RX ORDER — DIVALPROEX SODIUM 250 MG/1
250 TABLET, DELAYED RELEASE ORAL DAILY
Qty: 8 TABLET | Refills: 0 | Status: SHIPPED | OUTPATIENT
Start: 2019-10-29 | End: 2019-10-29 | Stop reason: SDUPTHER

## 2019-10-29 NOTE — TELEPHONE ENCOUNTER
Was communicating via pt's portal with her daughter, [place]. Informed her that Dr. Worthy discharged the pt with the Depakote. [place] stated that her is suppose to be weaned off the Depakote. [place] will try calling Dr. Foster to inform her that her mother did not get the Depakote prescription and find out if she really needs to be on the medication since pt has been out of medication for the last 3 days.

## 2019-10-30 ENCOUNTER — PATIENT MESSAGE (OUTPATIENT)
Dept: FAMILY MEDICINE | Facility: CLINIC | Age: 79
End: 2019-10-30

## 2019-10-30 RX ORDER — DIVALPROEX SODIUM 250 MG/1
TABLET, DELAYED RELEASE ORAL
Qty: 90 TABLET | Refills: 0 | Status: SHIPPED | OUTPATIENT
Start: 2019-10-30 | End: 2020-01-21

## 2019-11-04 ENCOUNTER — TELEPHONE (OUTPATIENT)
Dept: FAMILY MEDICINE | Facility: CLINIC | Age: 79
End: 2019-11-04

## 2019-11-04 ENCOUNTER — OFFICE VISIT (OUTPATIENT)
Dept: FAMILY MEDICINE | Facility: CLINIC | Age: 79
End: 2019-11-04
Attending: FAMILY MEDICINE
Payer: MEDICARE

## 2019-11-04 VITALS
DIASTOLIC BLOOD PRESSURE: 60 MMHG | HEIGHT: 61 IN | HEART RATE: 77 BPM | OXYGEN SATURATION: 88 % | TEMPERATURE: 98 F | BODY MASS INDEX: 24.44 KG/M2 | SYSTOLIC BLOOD PRESSURE: 138 MMHG | WEIGHT: 129.44 LBS

## 2019-11-04 DIAGNOSIS — I63.9 CEREBROVASCULAR ACCIDENT (CVA), UNSPECIFIED MECHANISM: ICD-10-CM

## 2019-11-04 DIAGNOSIS — I73.9 PVD (PERIPHERAL VASCULAR DISEASE) WITH CLAUDICATION: ICD-10-CM

## 2019-11-04 DIAGNOSIS — J44.9 CHRONIC OBSTRUCTIVE PULMONARY DISEASE, UNSPECIFIED COPD TYPE: ICD-10-CM

## 2019-11-04 DIAGNOSIS — I10 ESSENTIAL HYPERTENSION: Primary | ICD-10-CM

## 2019-11-04 DIAGNOSIS — E03.9 HYPOTHYROIDISM, UNSPECIFIED TYPE: ICD-10-CM

## 2019-11-04 DIAGNOSIS — E78.2 MIXED HYPERLIPIDEMIA: ICD-10-CM

## 2019-11-04 DIAGNOSIS — J42 CHRONIC BRONCHITIS, UNSPECIFIED CHRONIC BRONCHITIS TYPE: ICD-10-CM

## 2019-11-04 DIAGNOSIS — R09.02 HYPOXIA: ICD-10-CM

## 2019-11-04 DIAGNOSIS — N18.30 CHRONIC KIDNEY DISEASE, STAGE III (MODERATE): ICD-10-CM

## 2019-11-04 DIAGNOSIS — F33.1 MODERATE RECURRENT MAJOR DEPRESSION: ICD-10-CM

## 2019-11-04 DIAGNOSIS — I25.118 CORONARY ARTERY DISEASE OF NATIVE ARTERY OF NATIVE HEART WITH STABLE ANGINA PECTORIS: ICD-10-CM

## 2019-11-04 PROCEDURE — 99999 PR PBB SHADOW E&M-EST. PATIENT-LVL III: ICD-10-PCS | Mod: PBBFAC,,, | Performed by: FAMILY MEDICINE

## 2019-11-04 PROCEDURE — 3075F SYST BP GE 130 - 139MM HG: CPT | Mod: CPTII,S$GLB,, | Performed by: FAMILY MEDICINE

## 2019-11-04 PROCEDURE — 99499 RISK ADDL DX/OHS AUDIT: ICD-10-PCS | Mod: S$GLB,,, | Performed by: FAMILY MEDICINE

## 2019-11-04 PROCEDURE — 3078F DIAST BP <80 MM HG: CPT | Mod: CPTII,S$GLB,, | Performed by: FAMILY MEDICINE

## 2019-11-04 PROCEDURE — 3078F PR MOST RECENT DIASTOLIC BLOOD PRESSURE < 80 MM HG: ICD-10-PCS | Mod: CPTII,S$GLB,, | Performed by: FAMILY MEDICINE

## 2019-11-04 PROCEDURE — 1101F PR PT FALLS ASSESS DOC 0-1 FALLS W/OUT INJ PAST YR: ICD-10-PCS | Mod: CPTII,S$GLB,, | Performed by: FAMILY MEDICINE

## 2019-11-04 PROCEDURE — 1101F PT FALLS ASSESS-DOCD LE1/YR: CPT | Mod: CPTII,S$GLB,, | Performed by: FAMILY MEDICINE

## 2019-11-04 PROCEDURE — 99214 OFFICE O/P EST MOD 30 MIN: CPT | Mod: S$GLB,,, | Performed by: FAMILY MEDICINE

## 2019-11-04 PROCEDURE — 99214 PR OFFICE/OUTPT VISIT, EST, LEVL IV, 30-39 MIN: ICD-10-PCS | Mod: S$GLB,,, | Performed by: FAMILY MEDICINE

## 2019-11-04 PROCEDURE — 99499 UNLISTED E&M SERVICE: CPT | Mod: S$GLB,,, | Performed by: FAMILY MEDICINE

## 2019-11-04 PROCEDURE — 3075F PR MOST RECENT SYSTOLIC BLOOD PRESS GE 130-139MM HG: ICD-10-PCS | Mod: CPTII,S$GLB,, | Performed by: FAMILY MEDICINE

## 2019-11-04 PROCEDURE — 99999 PR PBB SHADOW E&M-EST. PATIENT-LVL III: CPT | Mod: PBBFAC,,, | Performed by: FAMILY MEDICINE

## 2019-11-04 RX ORDER — FLUTICASONE PROPIONATE AND SALMETEROL 250; 50 UG/1; UG/1
1 POWDER RESPIRATORY (INHALATION) 2 TIMES DAILY
Qty: 60 EACH | Refills: 10 | Status: SHIPPED | OUTPATIENT
Start: 2019-11-04 | End: 2022-08-19

## 2019-11-04 RX ORDER — CALCIUM CARBONATE 750 MG/1
TABLET, CHEWABLE ORAL
Qty: 1 EACH | Refills: 2 | Status: SHIPPED | OUTPATIENT
Start: 2019-11-04 | End: 2020-01-21

## 2019-11-04 RX ORDER — ALBUTEROL SULFATE 90 UG/1
1-2 AEROSOL, METERED RESPIRATORY (INHALATION) EVERY 6 HOURS PRN
Qty: 1 INHALER | Refills: 1 | Status: SHIPPED | OUTPATIENT
Start: 2019-11-04 | End: 2019-11-04 | Stop reason: SDUPTHER

## 2019-11-04 RX ORDER — ALBUTEROL SULFATE 90 UG/1
AEROSOL, METERED RESPIRATORY (INHALATION)
Qty: 25.5 G | Refills: 1 | Status: SHIPPED | OUTPATIENT
Start: 2019-11-04 | End: 2020-02-16 | Stop reason: SDUPTHER

## 2019-11-04 NOTE — TELEPHONE ENCOUNTER
----- Message from Kimberlyn Campos sent at 11/4/2019 10:53 AM CST -----  Contact: Daughter Betina Dee [phone]  The form you gave her is not correct needs a new one. Please advise

## 2019-11-04 NOTE — PROGRESS NOTES
Transitional Care Note  Subjective:       Patient ID: Brittany Haile is a 79 y.o. female.  Chief Complaint: Follow-up (Blood pressure management)    Family and/or Caretaker present at visit?  Yes.  Diagnostic tests reviewed/disposition: I have reviewed all completed as well as pending diagnostic tests at the time of discharge.  Disease/illness education: yes  Home health/community services discussion/referrals: Patient does not have home health established from hospital visit.  They do not need home health.  If needed, we will set up home health for the patient.   Establishment or re-establishment of referral orders for community resources: No other necessary community resources.   Discussion with other health care providers: No discussion with other health care providers necessary.   79 yr old pleasant female with CVA, Hypothyroidism, HTN, HLD, COPD, GERD, carotid artery disease/stenosis, anxiety, presents today for her post hospital DC follow up and also her MEDICAL MANAGEMENT.    ANEMIA - MUCH BETTER - HAD UPPER GI BLEED - ADMITTED IN HOSPITAL - EGD DONE - NO EVIDENCE OF BLEED - TRANSFUSIONS DONE - SHE IS BETTER NOW - SHE ALSO WAS ADMITTED RECENTLY FOR HTN URGENCY - SHE IS DOING WELL NOW - ON DIOVAN 320        Lung nodules - several - found 6 months ago and treated with antibiotics and steroids - needs f/u CT for surveillance     CVA - had stroke many years ago and has no deficits - it was TIA and they could not find any problem - she is continuing to smoke and no desire to quit as of yet    CKD III - follows nephrology - labs UTD and reassuring    ANXIETY/DEPRESSION -CONTROLLED - ON LEXAPRO 10 MG - COMPLIANT - NO SI/HI        Hypothyroidism - controlled -   TSH                      0.354 (L)           07/29/2019                              -      on synthroid 88 mcg daily - compliant - no side effects      HTN - controlled- on Benicar 40 and norvasc 10 - no side effects - labs reassuring      HLD - controlled - on  statin - follows cardiology -  LDLCALC                  78.4                07/29/2019                  PAD/PVD - CONTROLLED - FOLLOWS CARDIOLOGY    COPD - controlled - on advair daily and home oxygen - still smokes and no desire to quit - she drops her sats when she walks and she will benefit from ambulatory or portable oxygen      History as below - reviewed      Health maintenance  -labs UTD  -vaccines due                        Hypertension   This is a chronic problem. The current episode started more than 1 year ago. The problem has been gradually worsening since onset. The problem is resistant. Associated symptoms include headaches, malaise/fatigue and neck pain. Pertinent negatives include no anxiety, blurred vision, chest pain, orthopnea, palpitations, peripheral edema, PND or shortness of breath. Agents associated with hypertension include thyroid hormones. Risk factors for coronary artery disease include dyslipidemia and smoking/tobacco exposure. Past treatments include beta blockers. The current treatment provides moderate improvement. Compliance problems include exercise.  There is no history of angina, CAD/MI, CVA, left ventricular hypertrophy, PVD or retinopathy. Identifiable causes of hypertension include a thyroid problem. There is no history of chronic renal disease, coarctation of the aorta, hypercortisolism, hyperparathyroidism, pheochromocytoma or renovascular disease.   Leg Pain    There was no injury mechanism. The pain is present in the left leg and right leg. The quality of the pain is described as aching. The pain is at a severity of 8/10. The pain is severe. The pain has been intermittent since onset. Pertinent negatives include no inability to bear weight, loss of motion, muscle weakness or numbness. The symptoms are aggravated by movement. She has tried nothing for the symptoms. The treatment provided no relief.   Medication Refill   This is a chronic problem. The current episode started  more than 1 year ago. The problem occurs constantly. The problem has been gradually improving. Associated symptoms include arthralgias, headaches, myalgias and neck pain. Pertinent negatives include no abdominal pain, chest pain, chills, congestion, diaphoresis, fatigue, joint swelling, nausea, numbness, rash, sore throat, vomiting or weakness. Nothing aggravates the symptoms. Treatments tried: as below. The treatment provided significant relief.   Hyperlipidemia   This is a chronic problem. The current episode started more than 1 year ago. The problem is controlled. Recent lipid tests were reviewed and are normal. She has no history of chronic renal disease, diabetes, hypothyroidism or liver disease. There are no known factors aggravating her hyperlipidemia. Associated symptoms include leg pain and myalgias. Pertinent negatives include no chest pain or shortness of breath. Current antihyperlipidemic treatment includes statins. The current treatment provides significant improvement of lipids. There are no compliance problems.  Risk factors for coronary artery disease include dyslipidemia, hypertension and post-menopausal.   Thyroid Problem   Presents for follow-up visit. Patient reports no anxiety, cold intolerance, constipation, depressed mood, diaphoresis, diarrhea, dry skin, fatigue, hair loss, hoarse voice, menstrual problem, nail problem, palpitations, tremors or weight loss. The symptoms have been stable. Past treatments include levothyroxine. The treatment provided significant relief. Prior procedures include thyroid ultrasound. The following procedures have not been performed: thyroid FNA. Her past medical history is significant for hyperlipidemia. There is no history of diabetes, Graves' ophthalmopathy or neuropathy. There are no known risk factors.   Gastroesophageal Reflux   She complains of heartburn. She reports no abdominal pain, no chest pain, no choking, no dysphagia, no early satiety, no hoarse  voice, no nausea, no sore throat, no tooth decay or no wheezing. This is a chronic problem. The current episode started more than 1 year ago. The problem occurs constantly. The problem has been gradually improving. The heartburn is located in the abdomen. The heartburn is of moderate intensity. The heartburn does not wake her from sleep. The heartburn does not limit her activity. The heartburn doesn't change with position. Nothing aggravates the symptoms. Pertinent negatives include no fatigue, muscle weakness or weight loss. She has tried a PPI for the symptoms. The treatment provided significant relief. Past procedures do not include an abdominal ultrasound, esophageal manometry or H. pylori antibody titer. Past invasive treatments do not include gastroplasty, gastroplication or reflux surgery.   Anxiety   Presents for follow-up visit. Patient reports no chest pain, confusion, decreased concentration, depressed mood, dizziness, excessive worry, feeling of choking, impotence, irritability, nausea, nervous/anxious behavior, palpitations, shortness of breath or suicidal ideas. Symptoms occur occasionally. The severity of symptoms is mild. Nothing aggravates the symptoms. The quality of sleep is fair. Nighttime awakenings: occasional.     There are no known risk factors. Her past medical history is significant for anxiety/panic attacks. There is no history of anemia, asthma, bipolar disorder, CAD, chronic lung disease, depression, hyperthyroidism or suicide attempts. Past treatments include SSRIs. The treatment provided significant relief. Compliance with prior treatments has been good. Compliance with medications is %.   Neck Pain    This is a chronic problem. The current episode started more than 1 year ago. The problem occurs constantly. The problem has been gradually worsening. The pain is associated with nothing. The pain is present in the occipital region. The quality of the pain is described as shooting  and cramping. The pain is at a severity of 7/10. The pain is severe. The symptoms are aggravated by bending, twisting and stress. The pain is worse during the night. Associated symptoms include headaches and leg pain. Pertinent negatives include no chest pain, numbness, trouble swallowing, weakness or weight loss. She has tried heat, muscle relaxants and NSAIDs for the symptoms. The treatment provided mild relief.   Back Pain   This is a chronic problem. The current episode started more than 1 year ago. The problem occurs constantly. The problem has been gradually worsening since onset. The pain is present in the lumbar spine. The quality of the pain is described as aching. The pain radiates to the right foot and left foot. The pain is at a severity of 8/10. The pain is severe. The pain is worse during the night. The symptoms are aggravated by bending, sitting and twisting. Associated symptoms include headaches and leg pain. Pertinent negatives include no abdominal pain, chest pain, dysuria, numbness, pelvic pain, weakness or weight loss. She has tried heat, ice, muscle relaxant and NSAIDs for the symptoms. The treatment provided moderate relief.     Review of Systems   Constitutional: Positive for malaise/fatigue. Negative for activity change, chills, diaphoresis, fatigue, irritability, unexpected weight change and weight loss.   HENT: Negative.  Negative for congestion, ear discharge, hearing loss, hoarse voice, rhinorrhea, sore throat, trouble swallowing and voice change.    Eyes: Negative.  Negative for blurred vision, pain, discharge and visual disturbance.   Respiratory: Negative.  Negative for choking, chest tightness, shortness of breath and wheezing.    Cardiovascular: Negative.  Negative for chest pain, palpitations, orthopnea and PND.   Gastrointestinal: Positive for heartburn. Negative for abdominal distention, abdominal pain, anal bleeding, constipation, diarrhea, dysphagia, nausea and vomiting.    Endocrine: Negative.  Negative for cold intolerance, polydipsia and polyuria.   Genitourinary: Negative.  Negative for decreased urine volume, difficulty urinating, dysuria, frequency, impotence, menstrual problem, pelvic pain and vaginal pain.   Musculoskeletal: Positive for arthralgias, back pain, myalgias and neck pain. Negative for gait problem, joint swelling and muscle weakness.   Skin: Negative.  Negative for color change, pallor, rash and wound.   Allergic/Immunologic: Negative.  Negative for environmental allergies and immunocompromised state.   Neurological: Positive for headaches. Negative for dizziness, tremors, seizures, speech difficulty, weakness and numbness.   Hematological: Negative.  Negative for adenopathy. Does not bruise/bleed easily.   Psychiatric/Behavioral: Negative.  Negative for agitation, confusion, decreased concentration, hallucinations, self-injury and suicidal ideas. The patient is not nervous/anxious.        PMH/PSH/FH/SH/MED/ALLERGY reviewed    Objective:       Vitals:    11/04/19 0834   BP: 138/60   Pulse: 77   Temp: 97.5 °F (36.4 °C)     Sat 88 on room air at rest without oxygen, 86 on room air with exertion without oxygen  Sat 92 with room air at rest with oxygen, 91 with exertion with oxygen    Physical Exam   Constitutional: She is oriented to person, place, and time. She appears well-developed and well-nourished. No distress.   HENT:   Head: Normocephalic and atraumatic.   Right Ear: External ear normal.   Left Ear: External ear normal.   Nose: Nose normal.   Mouth/Throat: Oropharynx is clear and moist. No oropharyngeal exudate.   Eyes: Pupils are equal, round, and reactive to light. Conjunctivae and EOM are normal. Right eye exhibits no discharge. Left eye exhibits no discharge. No scleral icterus.   Neck: Normal range of motion. Neck supple. No JVD present. No tracheal deviation present. No thyromegaly present.   Cardiovascular: Normal rate, regular rhythm, normal heart  sounds and intact distal pulses. Exam reveals no gallop and no friction rub.   No murmur heard.  Pulmonary/Chest: Effort normal and breath sounds normal. No stridor. She has no wheezes. She has no rales. She exhibits no tenderness.   Abdominal: Soft. Bowel sounds are normal. She exhibits no distension and no mass. There is no tenderness. There is no rebound and no guarding. No hernia.   Musculoskeletal: Normal range of motion. She exhibits tenderness (TTP PARACERVICAL AREA, TRAPEZIUS AND C2-4 AND T2-T4 AND PARATHORACIC AREA). She exhibits no edema.   Lymphadenopathy:     She has no cervical adenopathy.   Neurological: She is alert and oriented to person, place, and time. She has normal reflexes. She displays normal reflexes. No cranial nerve deficit. She exhibits normal muscle tone. Coordination normal.   Skin: Skin is warm and dry. No rash noted. She is not diaphoretic. No erythema. There is pallor.   Psychiatric: She has a normal mood and affect. Her behavior is normal. Judgment and thought content normal.       Assessment:       1. Essential hypertension    2. Hypothyroidism, unspecified type    3. Mixed hyperlipidemia    4. Moderate recurrent major depression    5. PVD (peripheral vascular disease) with claudication    6. Cerebrovascular accident (CVA), unspecified mechanism    7. Coronary artery disease of native artery of native heart with stable angina pectoris    8. Chronic bronchitis, unspecified chronic bronchitis type    9. Chronic kidney disease, stage III (moderate)    10. Chronic obstructive pulmonary disease, unspecified COPD type    11. Hypoxia        Plan:           Brittany was seen today for follow-up.    Diagnoses and all orders for this visit:    Essential hypertension  -     blood pressure test kit-medium Kit; To use to check BP daily    Hypothyroidism, unspecified type    Mixed hyperlipidemia    Moderate recurrent major depression    PVD (peripheral vascular disease) with  claudication    Cerebrovascular accident (CVA), unspecified mechanism    Coronary artery disease of native artery of native heart with stable angina pectoris    Chronic bronchitis, unspecified chronic bronchitis type  -     OXYGEN FOR HOME USE    Chronic kidney disease, stage III (moderate)    Chronic obstructive pulmonary disease, unspecified COPD type  -     albuterol (PROVENTIL/VENTOLIN HFA) 90 mcg/actuation inhaler; Inhale 1-2 puffs into the lungs every 6 (six) hours as needed for Wheezing or Shortness of Breath. Rescue  -     fluticasone-salmeterol diskus inhaler 250-50 mcg; Inhale 1 puff into the lungs 2 (two) times daily.  -     OXYGEN FOR HOME USE    Hypoxia  -     OXYGEN FOR HOME USE      Anemia  -improved  -EGD and colonoscopy with no lesions    ANXIETY/DEPRESSION  -CONTROLLED  -CONTINUE LEXAPRO TO 10 MG NIGHTLY     HTN  -controlled  -continue Benicar and Norvasc    Pulmonary nodules  -CT for surveillance DONE    PVD/PAD  -follow cardiology for ongoing leg pain  -continue plavix    carotod artery disease/stenosis/bruit/PAD  -follows vascular medicine/cardiology  -statin for HLD    GERD  -controlled  -refilled PPI    Hypothyroidism  -controlled    CVA  -stable and baseline and no deficits    COPD  -stable and controlled  -on advair/spiriva  -recommend portable or ambulatory oxygen    Muscle spasm  -BACLOFEN prn nightly    CKD III  -baseline  -follows nephrology    Osteopenia  -vit D and dexa    Spent adequate time in obtaining history and explaining differentials    25 minutes spent during this visit of which greater than 50% devoted to face-face counseling and coordination of care regarding diagnosis and management plan      Follow up in about 3 months (around 2/4/2020), or if symptoms worsen or fail to improve.

## 2019-12-19 RX ORDER — CLOPIDOGREL BISULFATE 75 MG/1
TABLET ORAL
Qty: 90 TABLET | Refills: 3 | Status: SHIPPED | OUTPATIENT
Start: 2019-12-19

## 2020-01-02 ENCOUNTER — OFFICE VISIT (OUTPATIENT)
Dept: URGENT CARE | Facility: CLINIC | Age: 80
End: 2020-01-02
Payer: MEDICARE

## 2020-01-02 VITALS
RESPIRATION RATE: 18 BRPM | WEIGHT: 129 LBS | BODY MASS INDEX: 24.35 KG/M2 | HEIGHT: 61 IN | SYSTOLIC BLOOD PRESSURE: 170 MMHG | OXYGEN SATURATION: 96 % | DIASTOLIC BLOOD PRESSURE: 106 MMHG | TEMPERATURE: 99 F | HEART RATE: 97 BPM

## 2020-01-02 DIAGNOSIS — J11.1 FLU SYNDROME: ICD-10-CM

## 2020-01-02 DIAGNOSIS — R05.9 COUGH: Primary | ICD-10-CM

## 2020-01-02 DIAGNOSIS — J02.9 SORE THROAT: ICD-10-CM

## 2020-01-02 LAB
CTP QC/QA: YES
CTP QC/QA: YES
FLUAV AG NPH QL: NEGATIVE
FLUBV AG NPH QL: NEGATIVE
S PYO RRNA THROAT QL PROBE: NEGATIVE

## 2020-01-02 PROCEDURE — 99214 PR OFFICE/OUTPT VISIT, EST, LEVL IV, 30-39 MIN: ICD-10-PCS | Mod: S$GLB,,, | Performed by: FAMILY MEDICINE

## 2020-01-02 PROCEDURE — 87804 INFLUENZA ASSAY W/OPTIC: CPT | Mod: QW,S$GLB,, | Performed by: FAMILY MEDICINE

## 2020-01-02 PROCEDURE — S0119 PR ONDANSETRON, ORAL, 4MG: ICD-10-PCS | Mod: S$GLB,,, | Performed by: FAMILY MEDICINE

## 2020-01-02 PROCEDURE — 87880 STREP A ASSAY W/OPTIC: CPT | Mod: QW,S$GLB,, | Performed by: FAMILY MEDICINE

## 2020-01-02 PROCEDURE — S0119 ONDANSETRON 4 MG: HCPCS | Mod: S$GLB,,, | Performed by: FAMILY MEDICINE

## 2020-01-02 PROCEDURE — 87880 POCT RAPID STREP A: ICD-10-PCS | Mod: QW,S$GLB,, | Performed by: FAMILY MEDICINE

## 2020-01-02 PROCEDURE — 87804 POCT INFLUENZA A/B: ICD-10-PCS | Mod: QW,S$GLB,, | Performed by: FAMILY MEDICINE

## 2020-01-02 PROCEDURE — 99214 OFFICE O/P EST MOD 30 MIN: CPT | Mod: S$GLB,,, | Performed by: FAMILY MEDICINE

## 2020-01-02 RX ORDER — ONDANSETRON 4 MG/1
4 TABLET, ORALLY DISINTEGRATING ORAL EVERY 6 HOURS PRN
Qty: 15 TABLET | Refills: 0 | Status: SHIPPED | OUTPATIENT
Start: 2020-01-02 | End: 2020-01-21

## 2020-01-02 RX ORDER — ONDANSETRON 4 MG/1
4 TABLET, ORALLY DISINTEGRATING ORAL
Status: COMPLETED | OUTPATIENT
Start: 2020-01-02 | End: 2020-01-02

## 2020-01-02 RX ORDER — OSELTAMIVIR PHOSPHATE 75 MG/1
75 CAPSULE ORAL 2 TIMES DAILY
Qty: 10 CAPSULE | Refills: 0 | Status: SHIPPED | OUTPATIENT
Start: 2020-01-02 | End: 2020-01-07

## 2020-01-02 RX ORDER — CODEINE PHOSPHATE AND GUAIFENESIN 10; 100 MG/5ML; MG/5ML
5 SOLUTION ORAL 3 TIMES DAILY PRN
Qty: 120 ML | Refills: 0 | Status: SHIPPED | OUTPATIENT
Start: 2020-01-02 | End: 2020-01-12

## 2020-01-02 RX ORDER — LORATADINE 10 MG/1
10 TABLET ORAL DAILY
Qty: 30 TABLET | Refills: 2 | Status: SHIPPED | OUTPATIENT
Start: 2020-01-02 | End: 2020-01-21

## 2020-01-02 RX ADMIN — ONDANSETRON 4 MG: 4 TABLET, ORALLY DISINTEGRATING ORAL at 07:01

## 2020-01-03 NOTE — PROGRESS NOTES
"Subjective:       Patient ID: Brittany Haile is a 79 y.o. female.    Vitals:  height is 5' 1" (1.549 m) and weight is 58.5 kg (129 lb). Her oral temperature is 99.4 °F (37.4 °C). Her blood pressure is 170/106 (abnormal) and her pulse is 97. Her respiration is 18 and oxygen saturation is 96%.     Chief Complaint: Cough    79-year-old female with daughter with complaint of cough and chest congestion times 3-4 days now feels like vomiting while she gets on her phlegm subjective fever    Cough   This is a new problem. Episode onset: 3 days. The problem has been gradually worsening. The problem occurs every few minutes. The cough is productive of sputum. Associated symptoms include chills, ear congestion, ear pain, myalgias, nasal congestion, postnasal drip, a sore throat and shortness of breath. Pertinent negatives include no eye redness, fever, hemoptysis, rash or wheezing. She has tried prescription cough suppressant for the symptoms. The treatment provided no relief. Her past medical history is significant for bronchitis, COPD and pneumonia.       Constitution: Positive for chills and fatigue. Negative for sweating and fever.   HENT: Positive for ear pain, congestion, postnasal drip, sinus pain, sinus pressure and sore throat. Negative for voice change.    Neck: Negative for painful lymph nodes.   Eyes: Negative for eye redness.   Respiratory: Positive for cough, sputum production and shortness of breath. Negative for chest tightness, bloody sputum, COPD, stridor, wheezing and asthma.    Gastrointestinal: Negative for nausea and vomiting.   Musculoskeletal: Positive for muscle ache.   Skin: Negative for rash.   Allergic/Immunologic: Negative for seasonal allergies and asthma.   Hematologic/Lymphatic: Negative for swollen lymph nodes.       Objective:      Physical Exam   Constitutional: She is oriented to person, place, and time. She appears well-developed and well-nourished. She is cooperative.  Non-toxic appearance. " She does not appear ill.   HENT:   Head: Normocephalic and atraumatic.   Right Ear: Hearing, tympanic membrane, external ear and ear canal normal.   Left Ear: Hearing, tympanic membrane, external ear and ear canal normal.   Nose: Nose normal. No mucosal edema, rhinorrhea or nasal deformity. No epistaxis. Right sinus exhibits no maxillary sinus tenderness and no frontal sinus tenderness. Left sinus exhibits no maxillary sinus tenderness and no frontal sinus tenderness.   Mouth/Throat: Uvula is midline, oropharynx is clear and moist and mucous membranes are normal. No trismus in the jaw. Normal dentition. No uvula swelling. No posterior oropharyngeal erythema.   Eyes: Conjunctivae and lids are normal. Right eye exhibits no discharge. Left eye exhibits no discharge. No scleral icterus.   Neck: Trachea normal, normal range of motion, full passive range of motion without pain and phonation normal. Neck supple. No JVD present. No tracheal deviation present.   Cardiovascular: Normal rate, regular rhythm, normal heart sounds, intact distal pulses and normal pulses. Exam reveals no gallop and no friction rub.   No murmur heard.  Pulmonary/Chest: Effort normal and breath sounds normal. No stridor. No respiratory distress. She has no wheezes. She has no rales.   Scattered rhonchi no wheezes no crackles   Abdominal: Soft. Normal appearance and bowel sounds are normal. She exhibits no distension, no pulsatile midline mass and no mass. There is no tenderness.   Musculoskeletal: Normal range of motion. She exhibits no edema or deformity.   Lymphadenopathy:     She has no cervical adenopathy.   Neurological: She is alert and oriented to person, place, and time. She exhibits normal muscle tone. Coordination normal.   Skin: Skin is warm, dry, intact, not diaphoretic and not pale.   Psychiatric: She has a normal mood and affect. Her speech is normal and behavior is normal. Judgment and thought content normal. Cognition and memory are  normal.   Nursing note and vitals reviewed.        Assessment:       1. Cough    2. Sore throat    3. Flu syndrome        Plan:         Cough  -     POCT Influenza A/B    Sore throat  -     POCT rapid strep A    Flu syndrome    Other orders  -     ondansetron disintegrating tablet 4 mg  -     ondansetron (ZOFRAN-ODT) 4 MG TbDL; Take 1 tablet (4 mg total) by mouth every 6 (six) hours as needed.  Dispense: 15 tablet; Refill: 0  -     oseltamivir (TAMIFLU) 75 MG capsule; Take 1 capsule (75 mg total) by mouth 2 (two) times daily. for 5 days  Dispense: 10 capsule; Refill: 0  -     loratadine (CLARITIN) 10 mg tablet; Take 1 tablet (10 mg total) by mouth once daily.  Dispense: 30 tablet; Refill: 2          Nor advised to give her throat Tylenol to 1 or 2 p.o. Q 6 hr as needed for fever if patient persists to vomit to take her to the ER

## 2020-01-16 ENCOUNTER — TELEPHONE (OUTPATIENT)
Dept: FAMILY MEDICINE | Facility: CLINIC | Age: 80
End: 2020-01-16

## 2020-01-16 NOTE — TELEPHONE ENCOUNTER
----- Message from Joshua Aleman sent at 1/16/2020 10:48 AM CST -----  Contact: daughter/[place]  [phone]  Patient daughter requesting for the patient to be seen next week for coughing and hearing issues.   Please call back to assist at [phone]

## 2020-01-16 NOTE — TELEPHONE ENCOUNTER
----- Message from Catalina Henson sent at 1/16/2020 11:55 AM CST -----  Contact: [place], Daughter  Ms. Atkinson is returning a call to Staff regarding the pt being seen next week.    She can be reached at [phone].    Thank you.

## 2020-01-21 ENCOUNTER — OFFICE VISIT (OUTPATIENT)
Dept: FAMILY MEDICINE | Facility: CLINIC | Age: 80
End: 2020-01-21
Attending: FAMILY MEDICINE
Payer: MEDICARE

## 2020-01-21 ENCOUNTER — HOSPITAL ENCOUNTER (OUTPATIENT)
Dept: RADIOLOGY | Facility: HOSPITAL | Age: 80
Discharge: HOME | End: 2020-01-21
Attending: FAMILY MEDICINE
Payer: MEDICARE

## 2020-01-21 ENCOUNTER — TELEPHONE (OUTPATIENT)
Dept: FAMILY MEDICINE | Facility: CLINIC | Age: 80
End: 2020-01-21

## 2020-01-21 VITALS
HEART RATE: 78 BPM | HEIGHT: 61 IN | WEIGHT: 129 LBS | TEMPERATURE: 98 F | SYSTOLIC BLOOD PRESSURE: 139 MMHG | BODY MASS INDEX: 24.35 KG/M2 | DIASTOLIC BLOOD PRESSURE: 78 MMHG | OXYGEN SATURATION: 97 %

## 2020-01-21 DIAGNOSIS — F33.1 MODERATE RECURRENT MAJOR DEPRESSION: ICD-10-CM

## 2020-01-21 DIAGNOSIS — R73.02 GLUCOSE INTOLERANCE (IMPAIRED GLUCOSE TOLERANCE): ICD-10-CM

## 2020-01-21 DIAGNOSIS — R05.9 COUGH: ICD-10-CM

## 2020-01-21 DIAGNOSIS — I67.9 CVD (CEREBROVASCULAR DISEASE): ICD-10-CM

## 2020-01-21 DIAGNOSIS — E78.2 MIXED HYPERLIPIDEMIA: ICD-10-CM

## 2020-01-21 DIAGNOSIS — I10 ESSENTIAL HYPERTENSION: ICD-10-CM

## 2020-01-21 DIAGNOSIS — I70.0 AORTIC ATHEROSCLEROSIS: ICD-10-CM

## 2020-01-21 DIAGNOSIS — J20.8 ACUTE BACTERIAL BRONCHITIS: Primary | ICD-10-CM

## 2020-01-21 DIAGNOSIS — B96.89 ACUTE BACTERIAL BRONCHITIS: Primary | ICD-10-CM

## 2020-01-21 DIAGNOSIS — J42 CHRONIC BRONCHITIS, UNSPECIFIED CHRONIC BRONCHITIS TYPE: ICD-10-CM

## 2020-01-21 DIAGNOSIS — F41.9 ANXIETY: ICD-10-CM

## 2020-01-21 DIAGNOSIS — D69.6 THROMBOCYTOPENIA: ICD-10-CM

## 2020-01-21 DIAGNOSIS — I73.9 PVD (PERIPHERAL VASCULAR DISEASE) WITH CLAUDICATION: ICD-10-CM

## 2020-01-21 DIAGNOSIS — I25.118 CORONARY ARTERY DISEASE OF NATIVE ARTERY OF NATIVE HEART WITH STABLE ANGINA PECTORIS: ICD-10-CM

## 2020-01-21 DIAGNOSIS — I63.9 CEREBROVASCULAR ACCIDENT (CVA), UNSPECIFIED MECHANISM: ICD-10-CM

## 2020-01-21 DIAGNOSIS — N18.30 CHRONIC KIDNEY DISEASE, STAGE III (MODERATE): ICD-10-CM

## 2020-01-21 DIAGNOSIS — J43.1 PANLOBULAR EMPHYSEMA: ICD-10-CM

## 2020-01-21 DIAGNOSIS — J30.89 SEASONAL ALLERGIC RHINITIS DUE TO OTHER ALLERGIC TRIGGER: ICD-10-CM

## 2020-01-21 DIAGNOSIS — I50.32 CHRONIC DIASTOLIC HEART FAILURE: ICD-10-CM

## 2020-01-21 DIAGNOSIS — K21.9 GASTROESOPHAGEAL REFLUX DISEASE, ESOPHAGITIS PRESENCE NOT SPECIFIED: ICD-10-CM

## 2020-01-21 PROBLEM — I16.1 HYPERTENSIVE EMERGENCY: Status: RESOLVED | Noted: 2019-10-23 | Resolved: 2020-01-21

## 2020-01-21 PROBLEM — K92.2 GIB (GASTROINTESTINAL BLEEDING): Status: RESOLVED | Noted: 2017-04-23 | Resolved: 2020-01-21

## 2020-01-21 PROCEDURE — 71046 XR CHEST PA AND LATERAL: ICD-10-PCS | Mod: 26,,, | Performed by: RADIOLOGY

## 2020-01-21 PROCEDURE — 1126F AMNT PAIN NOTED NONE PRSNT: CPT | Mod: S$GLB,,, | Performed by: FAMILY MEDICINE

## 2020-01-21 PROCEDURE — 1101F PT FALLS ASSESS-DOCD LE1/YR: CPT | Mod: CPTII,S$GLB,, | Performed by: FAMILY MEDICINE

## 2020-01-21 PROCEDURE — 1126F PR PAIN SEVERITY QUANTIFIED, NO PAIN PRESENT: ICD-10-PCS | Mod: S$GLB,,, | Performed by: FAMILY MEDICINE

## 2020-01-21 PROCEDURE — 3078F DIAST BP <80 MM HG: CPT | Mod: CPTII,S$GLB,, | Performed by: FAMILY MEDICINE

## 2020-01-21 PROCEDURE — 3078F PR MOST RECENT DIASTOLIC BLOOD PRESSURE < 80 MM HG: ICD-10-PCS | Mod: CPTII,S$GLB,, | Performed by: FAMILY MEDICINE

## 2020-01-21 PROCEDURE — 3075F SYST BP GE 130 - 139MM HG: CPT | Mod: CPTII,S$GLB,, | Performed by: FAMILY MEDICINE

## 2020-01-21 PROCEDURE — 99999 PR PBB SHADOW E&M-EST. PATIENT-LVL IV: CPT | Mod: PBBFAC,,, | Performed by: FAMILY MEDICINE

## 2020-01-21 PROCEDURE — 99214 OFFICE O/P EST MOD 30 MIN: CPT | Mod: S$GLB,,, | Performed by: FAMILY MEDICINE

## 2020-01-21 PROCEDURE — 71046 X-RAY EXAM CHEST 2 VIEWS: CPT | Mod: TC,FY

## 2020-01-21 PROCEDURE — 1159F MED LIST DOCD IN RCRD: CPT | Mod: S$GLB,,, | Performed by: FAMILY MEDICINE

## 2020-01-21 PROCEDURE — 99499 RISK ADDL DX/OHS AUDIT: ICD-10-PCS | Mod: S$GLB,,, | Performed by: FAMILY MEDICINE

## 2020-01-21 PROCEDURE — 3075F PR MOST RECENT SYSTOLIC BLOOD PRESS GE 130-139MM HG: ICD-10-PCS | Mod: CPTII,S$GLB,, | Performed by: FAMILY MEDICINE

## 2020-01-21 PROCEDURE — 99499 UNLISTED E&M SERVICE: CPT | Mod: S$GLB,,, | Performed by: FAMILY MEDICINE

## 2020-01-21 PROCEDURE — 99999 PR PBB SHADOW E&M-EST. PATIENT-LVL IV: ICD-10-PCS | Mod: PBBFAC,,, | Performed by: FAMILY MEDICINE

## 2020-01-21 PROCEDURE — 99214 PR OFFICE/OUTPT VISIT, EST, LEVL IV, 30-39 MIN: ICD-10-PCS | Mod: S$GLB,,, | Performed by: FAMILY MEDICINE

## 2020-01-21 PROCEDURE — 1101F PR PT FALLS ASSESS DOC 0-1 FALLS W/OUT INJ PAST YR: ICD-10-PCS | Mod: CPTII,S$GLB,, | Performed by: FAMILY MEDICINE

## 2020-01-21 PROCEDURE — 1159F PR MEDICATION LIST DOCUMENTED IN MEDICAL RECORD: ICD-10-PCS | Mod: S$GLB,,, | Performed by: FAMILY MEDICINE

## 2020-01-21 PROCEDURE — 71046 X-RAY EXAM CHEST 2 VIEWS: CPT | Mod: 26,,, | Performed by: RADIOLOGY

## 2020-01-21 RX ORDER — FLUTICASONE PROPIONATE 50 MCG
1 SPRAY, SUSPENSION (ML) NASAL DAILY
Qty: 16 G | Refills: 2 | Status: SHIPPED | OUTPATIENT
Start: 2020-01-21

## 2020-01-21 RX ORDER — DOXYCYCLINE 100 MG/1
100 CAPSULE ORAL 2 TIMES DAILY
Qty: 20 CAPSULE | Refills: 0 | Status: SHIPPED | OUTPATIENT
Start: 2020-01-21 | End: 2020-03-02

## 2020-01-21 RX ORDER — PROMETHAZINE HYDROCHLORIDE AND DEXTROMETHORPHAN HYDROBROMIDE 6.25; 15 MG/5ML; MG/5ML
5 SYRUP ORAL 2 TIMES DAILY PRN
Qty: 180 ML | Refills: 1 | Status: SHIPPED | OUTPATIENT
Start: 2020-01-21 | End: 2020-01-31

## 2020-01-21 RX ORDER — PREDNISONE 50 MG/1
50 TABLET ORAL DAILY
Qty: 5 TABLET | Refills: 0 | Status: SHIPPED | OUTPATIENT
Start: 2020-01-21 | End: 2020-01-26

## 2020-01-21 NOTE — TELEPHONE ENCOUNTER
Spoke to pt's daughter and stated to disregard the message. Pt had her labs this morning after her appt with Dr. Frazier.

## 2020-01-21 NOTE — TELEPHONE ENCOUNTER
----- Message from Adali Gauthier sent at 1/21/2020  9:08 AM CST -----  Contact: Pt   Pt is requesting orders for blood work     Pt can be reached at [phone]..

## 2020-01-21 NOTE — PROGRESS NOTES
Subjective:       Patient ID: Brittany Haile is a 79 y.o. female.    Chief Complaint: Cough and Ear Fullness    79 yr old pleasant female with CVA, Hypothyroidism, HTN, HLD, COPD, GERD, carotid artery disease/stenosis, anxiety, presents today for her  follow up and also c/o cough n nasal congestion. Onset 3-4 weeks ago n gradually worsening.    ANEMIA - MUCH BETTER - HAD UPPER GI BLEED - ADMITTED IN HOSPITAL - EGD DONE - NO EVIDENCE OF BLEED - TRANSFUSIONS DONE - SHE IS BETTER NOW - SHE ALSO WAS ADMITTED RECENTLY FOR HTN URGENCY - SHE IS DOING WELL NOW - ON DIOVAN 320        Lung nodules - several - found 6 months ago and treated with antibiotics and steroids - needs f/u CT for surveillance     CVA - had stroke many years ago and has no deficits - it was TIA and they could not find any problem - she is continuing to smoke and no desire to quit as of yet    CKD III - follows nephrology - labs UTD and reassuring    ANXIETY/DEPRESSION -CONTROLLED - ON LEXAPRO 10 MG - COMPLIANT - NO SI/HI        Hypothyroidism - controlled -   TSH                      0.354 (L)           07/29/2019                              -      on synthroid 88 mcg daily - compliant - no side effects      HTN - controlled- on Benicar 40 and norvasc 10 - no side effects - labs reassuring      HLD - controlled - on statin - follows cardiology -  LDLCALC                  78.4                07/29/2019                  PAD/PVD - CONTROLLED - FOLLOWS CARDIOLOGY    COPD - controlled - on advair daily and home oxygen - still smokes and no desire to quit - she drops her sats when she walks and she will benefit from ambulatory or portable oxygen      History as below - reviewed      Health maintenance  -labs UTD  -vaccines due                        Hypertension   This is a chronic problem. The current episode started more than 1 year ago. The problem has been gradually worsening since onset. The problem is resistant. Associated symptoms include headaches,  malaise/fatigue and neck pain. Pertinent negatives include no anxiety, blurred vision, chest pain, orthopnea, palpitations, peripheral edema, PND or shortness of breath. Agents associated with hypertension include thyroid hormones. Risk factors for coronary artery disease include dyslipidemia and smoking/tobacco exposure. Past treatments include beta blockers. The current treatment provides moderate improvement. Compliance problems include exercise.  There is no history of angina, CAD/MI, CVA, left ventricular hypertrophy, PVD or retinopathy. Identifiable causes of hypertension include a thyroid problem. There is no history of chronic renal disease, coarctation of the aorta, hypercortisolism, hyperparathyroidism, pheochromocytoma or renovascular disease.   Leg Pain    There was no injury mechanism. The pain is present in the left leg and right leg. The quality of the pain is described as aching. The pain is at a severity of 8/10. The pain is severe. The pain has been intermittent since onset. Pertinent negatives include no inability to bear weight, loss of motion, muscle weakness or numbness. The symptoms are aggravated by movement. She has tried nothing for the symptoms. The treatment provided no relief.   Medication Refill   This is a chronic problem. The current episode started more than 1 year ago. The problem occurs constantly. The problem has been gradually improving. Associated symptoms include arthralgias, coughing, headaches, myalgias and neck pain. Pertinent negatives include no abdominal pain, chest pain, chills, congestion, diaphoresis, fatigue, joint swelling, nausea, numbness, rash, sore throat, vomiting or weakness. Nothing aggravates the symptoms. Treatments tried: as below. The treatment provided significant relief.   Hyperlipidemia   This is a chronic problem. The current episode started more than 1 year ago. The problem is controlled. Recent lipid tests were reviewed and are normal. She has no  history of chronic renal disease, diabetes, hypothyroidism or liver disease. There are no known factors aggravating her hyperlipidemia. Associated symptoms include leg pain and myalgias. Pertinent negatives include no chest pain or shortness of breath. Current antihyperlipidemic treatment includes statins. The current treatment provides significant improvement of lipids. There are no compliance problems.  Risk factors for coronary artery disease include dyslipidemia, hypertension and post-menopausal.   Thyroid Problem   Presents for follow-up visit. Patient reports no anxiety, cold intolerance, constipation, depressed mood, diaphoresis, diarrhea, dry skin, fatigue, hair loss, hoarse voice, menstrual problem, nail problem, palpitations, tremors or weight loss. The symptoms have been stable. Past treatments include levothyroxine. The treatment provided significant relief. Prior procedures include thyroid ultrasound. The following procedures have not been performed: thyroid FNA. Her past medical history is significant for hyperlipidemia. There is no history of diabetes, Graves' ophthalmopathy or neuropathy. There are no known risk factors.   Gastroesophageal Reflux   She complains of coughing and heartburn. She reports no abdominal pain, no chest pain, no choking, no dysphagia, no early satiety, no hoarse voice, no nausea, no sore throat, no tooth decay or no wheezing. This is a chronic problem. The current episode started more than 1 year ago. The problem occurs constantly. The problem has been gradually improving. The heartburn is located in the abdomen. The heartburn is of moderate intensity. The heartburn does not wake her from sleep. The heartburn does not limit her activity. The heartburn doesn't change with position. Nothing aggravates the symptoms. Pertinent negatives include no fatigue, muscle weakness or weight loss. She has tried a PPI for the symptoms. The treatment provided significant relief. Past  procedures do not include an abdominal ultrasound, esophageal manometry or H. pylori antibody titer. Past invasive treatments do not include gastroplasty, gastroplication or reflux surgery.   Anxiety   Presents for follow-up visit. Patient reports no chest pain, confusion, decreased concentration, depressed mood, dizziness, excessive worry, feeling of choking, impotence, irritability, nausea, nervous/anxious behavior, palpitations, shortness of breath or suicidal ideas. Symptoms occur occasionally. The severity of symptoms is mild. Nothing aggravates the symptoms. The quality of sleep is fair. Nighttime awakenings: occasional.     There are no known risk factors. Her past medical history is significant for anxiety/panic attacks. There is no history of anemia, asthma, bipolar disorder, CAD, chronic lung disease, depression, hyperthyroidism or suicide attempts. Past treatments include SSRIs. The treatment provided significant relief. Compliance with prior treatments has been good. Compliance with medications is %.   Neck Pain    This is a chronic problem. The current episode started more than 1 year ago. The problem occurs constantly. The problem has been gradually worsening. The pain is associated with nothing. The pain is present in the occipital region. The quality of the pain is described as shooting and cramping. The pain is at a severity of 7/10. The pain is severe. The symptoms are aggravated by bending, twisting and stress. The pain is worse during the night. Associated symptoms include headaches and leg pain. Pertinent negatives include no chest pain, numbness, trouble swallowing, weakness or weight loss. She has tried heat, muscle relaxants and NSAIDs for the symptoms. The treatment provided mild relief.   Back Pain   This is a chronic problem. The current episode started more than 1 year ago. The problem occurs constantly. The problem has been gradually worsening since onset. The pain is present in  the lumbar spine. The quality of the pain is described as aching. The pain radiates to the right foot and left foot. The pain is at a severity of 8/10. The pain is severe. The pain is worse during the night. The symptoms are aggravated by bending, sitting and twisting. Associated symptoms include headaches and leg pain. Pertinent negatives include no abdominal pain, chest pain, dysuria, numbness, pelvic pain, weakness or weight loss. She has tried heat, ice, muscle relaxant and NSAIDs for the symptoms. The treatment provided moderate relief.   Cough   This is a recurrent problem. The current episode started more than 1 month ago. The problem has been gradually worsening. The problem occurs constantly. The cough is non-productive. Associated symptoms include headaches, heartburn and myalgias. Pertinent negatives include no chest pain, chills, rash, rhinorrhea, sore throat, shortness of breath, weight loss or wheezing. Nothing aggravates the symptoms. She has tried nothing for the symptoms. The treatment provided no relief. Her past medical history is significant for environmental allergies. There is no history of asthma.     Review of Systems   Constitutional: Positive for malaise/fatigue. Negative for activity change, chills, diaphoresis, fatigue, irritability, unexpected weight change and weight loss.   HENT: Positive for hearing loss. Negative for congestion, ear discharge, hoarse voice, rhinorrhea, sore throat, trouble swallowing and voice change.    Eyes: Negative.  Negative for blurred vision, pain, discharge and visual disturbance.   Respiratory: Positive for cough. Negative for choking, chest tightness, shortness of breath and wheezing.    Cardiovascular: Negative.  Negative for chest pain, palpitations, orthopnea and PND.   Gastrointestinal: Positive for heartburn. Negative for abdominal distention, abdominal pain, anal bleeding, blood in stool, constipation, diarrhea, dysphagia, nausea and vomiting.    Endocrine: Negative.  Negative for cold intolerance, polydipsia and polyuria.   Genitourinary: Negative.  Negative for decreased urine volume, difficulty urinating, dysuria, frequency, hematuria, impotence, menstrual problem, pelvic pain and vaginal pain.   Musculoskeletal: Positive for arthralgias, back pain, myalgias and neck pain. Negative for gait problem, joint swelling and muscle weakness.   Skin: Negative.  Negative for color change, pallor, rash and wound.   Allergic/Immunologic: Positive for environmental allergies. Negative for immunocompromised state.   Neurological: Positive for headaches. Negative for dizziness, tremors, seizures, speech difficulty, weakness and numbness.   Hematological: Negative.  Negative for adenopathy. Does not bruise/bleed easily.   Psychiatric/Behavioral: Negative.  Negative for agitation, confusion, decreased concentration, dysphoric mood, hallucinations, self-injury and suicidal ideas. The patient is not nervous/anxious.        PMH/PSH/FH/SH/MED/ALLERGY reviewed    Objective:       Vitals:    01/21/20 0839   BP: 139/78   Pulse: 78   Temp: 97.9 °F (36.6 °C)       Physical Exam   Constitutional: She is oriented to person, place, and time. She appears well-developed and well-nourished. No distress.   HENT:   Head: Normocephalic and atraumatic.   Right Ear: External ear normal.   Left Ear: External ear normal.   Nose: Nose normal.   Mouth/Throat: Oropharynx is clear and moist. No oropharyngeal exudate.   Eyes: Pupils are equal, round, and reactive to light. Conjunctivae and EOM are normal. Right eye exhibits no discharge. Left eye exhibits no discharge. No scleral icterus.   Neck: Normal range of motion. Neck supple. No JVD present. No tracheal deviation present. No thyromegaly present.   Cardiovascular: Normal rate, regular rhythm, normal heart sounds and intact distal pulses. Exam reveals no gallop and no friction rub.   No murmur heard.  Pulmonary/Chest: Effort normal. No  stridor. She has decreased breath sounds in the right upper field, the right lower field, the left middle field and the left lower field. She has no wheezes. She has no rales. She exhibits no tenderness.   Abdominal: Soft. Bowel sounds are normal. She exhibits no distension and no mass. There is no tenderness. There is no rebound and no guarding. No hernia.   Musculoskeletal: Normal range of motion. She exhibits no edema or tenderness.   Lymphadenopathy:     She has no cervical adenopathy.   Neurological: She is alert and oriented to person, place, and time. She has normal reflexes. She displays normal reflexes. No cranial nerve deficit. She exhibits normal muscle tone. Coordination normal.   Skin: Skin is warm and dry. No rash noted. She is not diaphoretic. No erythema. No pallor.   Psychiatric: She has a normal mood and affect. Her behavior is normal. Judgment and thought content normal.       Assessment:       1. Acute bacterial bronchitis    2. Thrombocytopenia    3. Moderate recurrent major depression    4. Chronic diastolic heart failure    5. Coronary artery disease of native artery of native heart with stable angina pectoris    6. PVD (peripheral vascular disease) with claudication    7. Chronic bronchitis, unspecified chronic bronchitis type    8. Essential hypertension    9. Mixed hyperlipidemia    10. Glucose intolerance (impaired glucose tolerance)    11. Gastroesophageal reflux disease, esophagitis presence not specified    12. CVD (cerebrovascular disease)    13. Panlobular emphysema    14. Chronic kidney disease, stage III (moderate)    15. Aortic atherosclerosis    16. Anxiety    17. Cerebrovascular accident (CVA), unspecified mechanism    18. Cough    19. Seasonal allergic rhinitis due to other allergic trigger        Plan:           Brittany was seen today for cough and ear fullness.    Diagnoses and all orders for this visit:    Acute bacterial bronchitis  -     predniSONE (DELTASONE) 50 MG Tab;  Take 1 tablet (50 mg total) by mouth once daily. for 5 days  -     promethazine-dextromethorphan (PROMETHAZINE-DM) 6.25-15 mg/5 mL Syrp; Take 5 mLs by mouth 2 (two) times daily as needed (cough).  -     doxycycline (MONODOX) 100 MG capsule; Take 1 capsule (100 mg total) by mouth 2 (two) times daily.    Thrombocytopenia    Moderate recurrent major depression    Chronic diastolic heart failure    Coronary artery disease of native artery of native heart with stable angina pectoris    PVD (peripheral vascular disease) with claudication    Chronic bronchitis, unspecified chronic bronchitis type    Essential hypertension  -     CBC auto differential; Future  -     Comprehensive metabolic panel; Future    Mixed hyperlipidemia  -     CBC auto differential; Future  -     Comprehensive metabolic panel; Future    Glucose intolerance (impaired glucose tolerance)    Gastroesophageal reflux disease, esophagitis presence not specified    CVD (cerebrovascular disease)    Panlobular emphysema    Chronic kidney disease, stage III (moderate)    Aortic atherosclerosis    Anxiety    Cerebrovascular accident (CVA), unspecified mechanism    Cough  -     predniSONE (DELTASONE) 50 MG Tab; Take 1 tablet (50 mg total) by mouth once daily. for 5 days  -     promethazine-dextromethorphan (PROMETHAZINE-DM) 6.25-15 mg/5 mL Syrp; Take 5 mLs by mouth 2 (two) times daily as needed (cough).  -     X-Ray Chest PA And Lateral; Future  -     doxycycline (MONODOX) 100 MG capsule; Take 1 capsule (100 mg total) by mouth 2 (two) times daily.    Seasonal allergic rhinitis due to other allergic trigger  -     fluticasone propionate (FLONASE) 50 mcg/actuation nasal spray; 1 spray (50 mcg total) by Each Nostril route once daily.      Ac bac bronchitis  -cxr to r/o pna  -cough med prn  -doxy x 10 days      Anemia  -improved  -EGD and colonoscopy with no lesions    ANXIETY/DEPRESSION  -CONTROLLED  -CONTINUE LEXAPRO TO 10 MG NIGHTLY     HTN  -controlled  -continue  Benicar and Norvasc    Pulmonary nodules  -CT for surveillance DONE    PVD/PAD  -follow cardiology for ongoing leg pain  -continue plavix    carotod artery disease/stenosis/bruit/PAD  -follows vascular medicine/cardiology  -statin for HLD    GERD  -controlled  -refilled PPI    Hypothyroidism  -controlled    CVA  -stable and baseline and no deficits    COPD  -stable and controlled  -on advair/spiriva  -recommend portable or ambulatory oxygen    Muscle spasm  -BACLOFEN prn nightly    CKD III  -baseline  -follows nephrology    Osteopenia  -vit D and dexa    Spent adequate time in obtaining history and explaining differentials    25 minutes spent during this visit of which greater than 50% devoted to face-face counseling and coordination of care regarding diagnosis and management plan    Follow up in about 4 weeks (around 2/18/2020), or if symptoms worsen or fail to improve.

## 2020-01-22 ENCOUNTER — TELEPHONE (OUTPATIENT)
Dept: HEMATOLOGY/ONCOLOGY | Facility: CLINIC | Age: 80
End: 2020-01-22

## 2020-01-22 DIAGNOSIS — D50.0 IRON DEFICIENCY ANEMIA DUE TO CHRONIC BLOOD LOSS: Primary | ICD-10-CM

## 2020-01-22 NOTE — TELEPHONE ENCOUNTER
I called and reviewed her labs with her daughter. Her anemia had resolved, and her iron studies have improved significantly.    Repeat labs in 3 months.    Patient's daughter is in agreement with the proposed treatment plan. All questions were answered to hersatisfaction.    Kamron Cortez M.D.  Hematology/Oncology  [facility] - [address], [address]  [place] [address]  Phone: [phone]  Fax: [phone]

## 2020-02-10 RX ORDER — CILOSTAZOL 50 MG/1
TABLET ORAL
Qty: 180 TABLET | Refills: 3 | Status: SHIPPED | OUTPATIENT
Start: 2020-02-10

## 2020-02-15 DIAGNOSIS — J44.9 CHRONIC OBSTRUCTIVE PULMONARY DISEASE, UNSPECIFIED COPD TYPE: ICD-10-CM

## 2020-02-16 RX ORDER — ALBUTEROL SULFATE 90 UG/1
AEROSOL, METERED RESPIRATORY (INHALATION)
Qty: 8.5 G | Refills: 2 | Status: SHIPPED | OUTPATIENT
Start: 2020-02-16 | End: 2020-06-01 | Stop reason: SDUPTHER

## 2020-03-02 ENCOUNTER — HOSPITAL ENCOUNTER (OUTPATIENT)
Facility: HOSPITAL | Age: 80
Discharge: ANOTHER HOSPITAL | End: 2020-03-03
Attending: EMERGENCY MEDICINE | Admitting: INTERNAL MEDICINE
Payer: MEDICARE

## 2020-03-02 ENCOUNTER — OFFICE VISIT (OUTPATIENT)
Dept: FAMILY MEDICINE | Facility: CLINIC | Age: 80
End: 2020-03-02
Attending: FAMILY MEDICINE
Payer: MEDICARE

## 2020-03-02 VITALS
HEIGHT: 61 IN | WEIGHT: 128 LBS | HEART RATE: 110 BPM | OXYGEN SATURATION: 84 % | BODY MASS INDEX: 24.17 KG/M2 | SYSTOLIC BLOOD PRESSURE: 138 MMHG | DIASTOLIC BLOOD PRESSURE: 58 MMHG

## 2020-03-02 DIAGNOSIS — R15.9 INCONTINENCE OF FECES, UNSPECIFIED FECAL INCONTINENCE TYPE: ICD-10-CM

## 2020-03-02 DIAGNOSIS — R11.0 NAUSEA: ICD-10-CM

## 2020-03-02 DIAGNOSIS — R53.83 FATIGUE: ICD-10-CM

## 2020-03-02 DIAGNOSIS — E86.0 DEHYDRATION: Primary | ICD-10-CM

## 2020-03-02 DIAGNOSIS — J18.9 PNEUMONIA OF RIGHT LOWER LOBE DUE TO INFECTIOUS ORGANISM: Primary | ICD-10-CM

## 2020-03-02 DIAGNOSIS — R09.02 HYPOXIA: ICD-10-CM
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
## 2020-03-02 LAB
ALBUMIN SERPL BCP-MCNC: 3.6 G/DL (ref 3.5–5.2)
ALP SERPL-CCNC: 115 U/L (ref 55–135)
ALT SERPL W/O P-5'-P-CCNC: 17 U/L (ref 10–44)
ANION GAP SERPL CALC-SCNC: 11 MMOL/L (ref 8–16)
APTT BLDCRRT: 38.7 SEC (ref 21–32)
AST SERPL-CCNC: 27 U/L (ref 10–40)
BACTERIA #/AREA URNS HPF: ABNORMAL /HPF
BASOPHILS # BLD AUTO: 0.04 K/UL (ref 0–0.2)
BASOPHILS NFR BLD: 0.3 % (ref 0–1.9)
BILIRUB SERPL-MCNC: 0.9 MG/DL (ref 0.1–1)
BILIRUB UR QL STRIP: ABNORMAL
BNP SERPL-MCNC: 211 PG/ML (ref 0–99)
BUN SERPL-MCNC: 13 MG/DL (ref 8–23)
CALCIUM SERPL-MCNC: 9.4 MG/DL (ref 8.7–10.5)
CHLORIDE SERPL-SCNC: 94 MMOL/L (ref 95–110)
CLARITY UR: CLEAR
CO2 SERPL-SCNC: 28 MMOL/L (ref 23–29)
COLOR UR: ABNORMAL
CREAT SERPL-MCNC: 1 MG/DL (ref 0.5–1.4)
DIFFERENTIAL METHOD: ABNORMAL
EOSINOPHIL # BLD AUTO: 0 K/UL (ref 0–0.5)
EOSINOPHIL NFR BLD: 0.1 % (ref 0–8)
ERYTHROCYTE [DISTWIDTH] IN BLOOD BY AUTOMATED COUNT: 14.1 % (ref 11.5–14.5)
EST. GFR  (AFRICAN AMERICAN): >60 ML/MIN/1.73 M^2
EST. GFR  (NON AFRICAN AMERICAN): 53 ML/MIN/1.73 M^2
GLUCOSE SERPL-MCNC: 121 MG/DL (ref 70–110)
GLUCOSE UR QL STRIP: NEGATIVE
HCT VFR BLD AUTO: 39.8 % (ref 37–48.5)
HGB BLD-MCNC: 13 G/DL (ref 12–16)
HGB UR QL STRIP: ABNORMAL
HYALINE CASTS #/AREA URNS LPF: 0 /LPF
IMM GRANULOCYTES # BLD AUTO: 0.08 K/UL (ref 0–0.04)
IMM GRANULOCYTES NFR BLD AUTO: 0.6 % (ref 0–0.5)
INFLUENZA A, MOLECULAR: NEGATIVE
INFLUENZA B, MOLECULAR: NEGATIVE
INR PPP: 1 (ref 0.8–1.2)
KETONES UR QL STRIP: NEGATIVE
LACTATE SERPL-SCNC: 1.1 MMOL/L (ref 0.5–2.2)
LACTATE SERPL-SCNC: 1.1 MMOL/L (ref 0.5–2.2)
LEUKOCYTE ESTERASE UR QL STRIP: NEGATIVE
LYMPHOCYTES # BLD AUTO: 1.1 K/UL (ref 1–4.8)
LYMPHOCYTES NFR BLD: 7.9 % (ref 18–48)
MCH RBC QN AUTO: 31.2 PG (ref 27–31)
MCHC RBC AUTO-ENTMCNC: 32.7 G/DL (ref 32–36)
MCV RBC AUTO: 95 FL (ref 82–98)
MICROSCOPIC COMMENT: ABNORMAL
MONOCYTES # BLD AUTO: 1.3 K/UL (ref 0.3–1)
MONOCYTES NFR BLD: 9.4 % (ref 4–15)
NEUTROPHILS # BLD AUTO: 10.9 K/UL (ref 1.8–7.7)
NEUTROPHILS NFR BLD: 81.7 % (ref 38–73)
NITRITE UR QL STRIP: NEGATIVE
NRBC BLD-RTO: 0 /100 WBC
PH UR STRIP: 6 [PH] (ref 5–8)
PLATELET # BLD AUTO: 247 K/UL (ref 150–350)
PMV BLD AUTO: 9.9 FL (ref 9.2–12.9)
POCT GLUCOSE: 129 MG/DL (ref 70–110)
POTASSIUM SERPL-SCNC: 4.1 MMOL/L (ref 3.5–5.1)
PROT SERPL-MCNC: 7.9 G/DL (ref 6–8.4)
PROT UR QL STRIP: ABNORMAL
PROTHROMBIN TIME: 11 SEC (ref 9–12.5)
RBC # BLD AUTO: 4.17 M/UL (ref 4–5.4)
RBC #/AREA URNS HPF: 3 /HPF (ref 0–4)
SODIUM SERPL-SCNC: 133 MMOL/L (ref 136–145)
SP GR UR STRIP: >=1.03 (ref 1–1.03)
SPECIMEN SOURCE: NORMAL
TROPONIN I SERPL DL<=0.01 NG/ML-MCNC: 0.04 NG/ML (ref 0–0.03)
TSH SERPL DL<=0.005 MIU/L-ACNC: 1.18 UIU/ML (ref 0.4–4)
URN SPEC COLLECT METH UR: ABNORMAL
UROBILINOGEN UR STRIP-ACNC: 1 EU/DL
WBC # BLD AUTO: 13.35 K/UL (ref 3.9–12.7)
WBC #/AREA URNS HPF: 1 /HPF (ref 0–5)

## 2020-03-02 PROCEDURE — 94640 AIRWAY INHALATION TREATMENT: CPT

## 2020-03-02 PROCEDURE — 99214 PR OFFICE/OUTPT VISIT, EST, LEVL IV, 30-39 MIN: ICD-10-PCS | Mod: S$GLB,,, | Performed by: FAMILY MEDICINE

## 2020-03-02 PROCEDURE — 82962 GLUCOSE BLOOD TEST: CPT

## 2020-03-02 PROCEDURE — 85730 THROMBOPLASTIN TIME PARTIAL: CPT

## 2020-03-02 PROCEDURE — 1101F PT FALLS ASSESS-DOCD LE1/YR: CPT | Mod: CPTII,S$GLB,, | Performed by: FAMILY MEDICINE

## 2020-03-02 PROCEDURE — 85610 PROTHROMBIN TIME: CPT

## 2020-03-02 PROCEDURE — 3078F DIAST BP <80 MM HG: CPT | Mod: CPTII,S$GLB,, | Performed by: FAMILY MEDICINE

## 2020-03-02 PROCEDURE — G0378 HOSPITAL OBSERVATION PER HR: HCPCS

## 2020-03-02 PROCEDURE — 96365 THER/PROPH/DIAG IV INF INIT: CPT

## 2020-03-02 PROCEDURE — 83880 ASSAY OF NATRIURETIC PEPTIDE: CPT

## 2020-03-02 PROCEDURE — 87502 INFLUENZA DNA AMP PROBE: CPT

## 2020-03-02 PROCEDURE — 99285 EMERGENCY DEPT VISIT HI MDM: CPT | Mod: 25

## 2020-03-02 PROCEDURE — 25000003 PHARM REV CODE 250: Performed by: EMERGENCY MEDICINE

## 2020-03-02 PROCEDURE — 1159F PR MEDICATION LIST DOCUMENTED IN MEDICAL RECORD: ICD-10-PCS | Mod: S$GLB,,, | Performed by: FAMILY MEDICINE

## 2020-03-02 PROCEDURE — 99214 OFFICE O/P EST MOD 30 MIN: CPT | Mod: S$GLB,,, | Performed by: FAMILY MEDICINE

## 2020-03-02 PROCEDURE — 85025 COMPLETE CBC W/AUTO DIFF WBC: CPT

## 2020-03-02 PROCEDURE — 81000 URINALYSIS NONAUTO W/SCOPE: CPT

## 2020-03-02 PROCEDURE — 99999 PR PBB SHADOW E&M-EST. PATIENT-LVL III: ICD-10-PCS | Mod: PBBFAC,,, | Performed by: FAMILY MEDICINE

## 2020-03-02 PROCEDURE — 80053 COMPREHEN METABOLIC PANEL: CPT

## 2020-03-02 PROCEDURE — 25000242 PHARM REV CODE 250 ALT 637 W/ HCPCS: Performed by: EMERGENCY MEDICINE

## 2020-03-02 PROCEDURE — 84443 ASSAY THYROID STIM HORMONE: CPT

## 2020-03-02 PROCEDURE — 27000221 HC OXYGEN, UP TO 24 HOURS

## 2020-03-02 PROCEDURE — 3078F PR MOST RECENT DIASTOLIC BLOOD PRESSURE < 80 MM HG: ICD-10-PCS | Mod: CPTII,S$GLB,, | Performed by: FAMILY MEDICINE

## 2020-03-02 PROCEDURE — 1125F PR PAIN SEVERITY QUANTIFIED, PAIN PRESENT: ICD-10-PCS | Mod: S$GLB,,, | Performed by: FAMILY MEDICINE

## 2020-03-02 PROCEDURE — 3075F PR MOST RECENT SYSTOLIC BLOOD PRESS GE 130-139MM HG: ICD-10-PCS | Mod: CPTII,S$GLB,, | Performed by: FAMILY MEDICINE

## 2020-03-02 PROCEDURE — 96361 HYDRATE IV INFUSION ADD-ON: CPT

## 2020-03-02 PROCEDURE — 96376 TX/PRO/DX INJ SAME DRUG ADON: CPT

## 2020-03-02 PROCEDURE — 99999 PR PBB SHADOW E&M-EST. PATIENT-LVL III: CPT | Mod: PBBFAC,,, | Performed by: FAMILY MEDICINE

## 2020-03-02 PROCEDURE — 96366 THER/PROPH/DIAG IV INF ADDON: CPT

## 2020-03-02 PROCEDURE — 87040 BLOOD CULTURE FOR BACTERIA: CPT

## 2020-03-02 PROCEDURE — 1125F AMNT PAIN NOTED PAIN PRSNT: CPT | Mod: S$GLB,,, | Performed by: FAMILY MEDICINE

## 2020-03-02 PROCEDURE — 1159F MED LIST DOCD IN RCRD: CPT | Mod: S$GLB,,, | Performed by: FAMILY MEDICINE

## 2020-03-02 PROCEDURE — 83605 ASSAY OF LACTIC ACID: CPT

## 2020-03-02 PROCEDURE — 63600175 PHARM REV CODE 636 W HCPCS: Performed by: NURSE PRACTITIONER

## 2020-03-02 PROCEDURE — 3075F SYST BP GE 130 - 139MM HG: CPT | Mod: CPTII,S$GLB,, | Performed by: FAMILY MEDICINE

## 2020-03-02 PROCEDURE — 93005 ELECTROCARDIOGRAM TRACING: CPT

## 2020-03-02 PROCEDURE — 1101F PR PT FALLS ASSESS DOC 0-1 FALLS W/OUT INJ PAST YR: ICD-10-PCS | Mod: CPTII,S$GLB,, | Performed by: FAMILY MEDICINE

## 2020-03-02 PROCEDURE — 84484 ASSAY OF TROPONIN QUANT: CPT

## 2020-03-02 RX ORDER — IPRATROPIUM BROMIDE AND ALBUTEROL SULFATE 2.5; .5 MG/3ML; MG/3ML
3 SOLUTION RESPIRATORY (INHALATION)
Status: COMPLETED | OUTPATIENT
Start: 2020-03-02 | End: 2020-03-02

## 2020-03-02 RX ORDER — SODIUM CHLORIDE 0.9 % (FLUSH) 0.9 %
10 SYRINGE (ML) INJECTION
Status: DISCONTINUED | OUTPATIENT
Start: 2020-03-02 | End: 2020-03-02

## 2020-03-02 RX ORDER — VALSARTAN 320 MG/1
320 TABLET ORAL DAILY
Status: DISCONTINUED | OUTPATIENT
Start: 2020-03-03 | End: 2020-03-02

## 2020-03-02 RX ORDER — OSELTAMIVIR PHOSPHATE 30 MG/1
30 CAPSULE ORAL 2 TIMES DAILY
Status: DISCONTINUED | OUTPATIENT
Start: 2020-03-02 | End: 2020-03-02

## 2020-03-02 RX ORDER — FLUTICASONE FUROATE AND VILANTEROL 100; 25 UG/1; UG/1
1 POWDER RESPIRATORY (INHALATION) DAILY
Status: DISCONTINUED | OUTPATIENT
Start: 2020-03-03 | End: 2020-03-03 | Stop reason: HOSPADM

## 2020-03-02 RX ORDER — TIOTROPIUM BROMIDE 18 UG/1
18 CAPSULE ORAL; RESPIRATORY (INHALATION) DAILY
Status: DISCONTINUED | OUTPATIENT
Start: 2020-03-03 | End: 2020-03-02

## 2020-03-02 RX ORDER — CLOPIDOGREL BISULFATE 75 MG/1
75 TABLET ORAL DAILY
Status: DISCONTINUED | OUTPATIENT
Start: 2020-03-03 | End: 2020-03-02

## 2020-03-02 RX ORDER — ACETAMINOPHEN 500 MG
1000 TABLET ORAL
Status: COMPLETED | OUTPATIENT
Start: 2020-03-02 | End: 2020-03-02

## 2020-03-02 RX ORDER — ATORVASTATIN CALCIUM 40 MG/1
40 TABLET, FILM COATED ORAL NIGHTLY
Status: DISCONTINUED | OUTPATIENT
Start: 2020-03-02 | End: 2020-03-02

## 2020-03-02 RX ORDER — SODIUM CHLORIDE 9 MG/ML
1000 INJECTION, SOLUTION INTRAVENOUS
Status: COMPLETED | OUTPATIENT
Start: 2020-03-02 | End: 2020-03-02

## 2020-03-02 RX ORDER — OSELTAMIVIR PHOSPHATE 75 MG/1
75 CAPSULE ORAL 2 TIMES DAILY
Status: DISCONTINUED | OUTPATIENT
Start: 2020-03-02 | End: 2020-03-02

## 2020-03-02 RX ORDER — CANDESARTAN 4 MG/1
16 TABLET ORAL DAILY
Status: DISCONTINUED | OUTPATIENT
Start: 2020-03-03 | End: 2020-03-03 | Stop reason: HOSPADM

## 2020-03-02 RX ORDER — LEVOTHYROXINE SODIUM 88 UG/1
88 TABLET ORAL
Status: DISCONTINUED | OUTPATIENT
Start: 2020-03-03 | End: 2020-03-02

## 2020-03-02 RX ORDER — PANTOPRAZOLE SODIUM 40 MG/1
40 TABLET, DELAYED RELEASE ORAL DAILY
Status: DISCONTINUED | OUTPATIENT
Start: 2020-03-03 | End: 2020-03-02

## 2020-03-02 RX ORDER — LEVOFLOXACIN 5 MG/ML
750 INJECTION, SOLUTION INTRAVENOUS
Status: DISCONTINUED | OUTPATIENT
Start: 2020-03-02 | End: 2020-03-03 | Stop reason: HOSPADM

## 2020-03-02 RX ORDER — FLUTICASONE PROPIONATE 50 MCG
1 SPRAY, SUSPENSION (ML) NASAL DAILY
Status: DISCONTINUED | OUTPATIENT
Start: 2020-03-03 | End: 2020-03-02

## 2020-03-02 RX ORDER — FLUTICASONE PROPIONATE AND SALMETEROL 250; 50 UG/1; UG/1
1 POWDER RESPIRATORY (INHALATION) 2 TIMES DAILY
Status: DISCONTINUED | OUTPATIENT
Start: 2020-03-02 | End: 2020-03-02

## 2020-03-02 RX ORDER — LEVOFLOXACIN 5 MG/ML
750 INJECTION, SOLUTION INTRAVENOUS
Status: DISCONTINUED | OUTPATIENT
Start: 2020-03-03 | End: 2020-03-02

## 2020-03-02 RX ORDER — ENOXAPARIN SODIUM 100 MG/ML
40 INJECTION SUBCUTANEOUS EVERY 24 HOURS
Status: DISCONTINUED | OUTPATIENT
Start: 2020-03-02 | End: 2020-03-02

## 2020-03-02 RX ORDER — LEVOFLOXACIN 5 MG/ML
750 INJECTION, SOLUTION INTRAVENOUS
Status: DISCONTINUED | OUTPATIENT
Start: 2020-03-04 | End: 2020-03-03 | Stop reason: HOSPADM

## 2020-03-02 RX ORDER — ESCITALOPRAM OXALATE 10 MG/1
10 TABLET ORAL NIGHTLY
Status: DISCONTINUED | OUTPATIENT
Start: 2020-03-02 | End: 2020-03-02

## 2020-03-02 RX ORDER — CILOSTAZOL 50 MG/1
50 TABLET ORAL 2 TIMES DAILY
Status: DISCONTINUED | OUTPATIENT
Start: 2020-03-02 | End: 2020-03-02

## 2020-03-02 RX ADMIN — SODIUM CHLORIDE 1000 ML: 0.9 INJECTION, SOLUTION INTRAVENOUS at 04:03

## 2020-03-02 RX ADMIN — ACETAMINOPHEN 1000 MG: 500 TABLET ORAL at 04:03

## 2020-03-02 RX ADMIN — IPRATROPIUM BROMIDE AND ALBUTEROL SULFATE 3 ML: .5; 3 SOLUTION RESPIRATORY (INHALATION) at 04:03

## 2020-03-02 NOTE — ED PROVIDER NOTES
Encounter Date: 3/2/2020    SCRIBE #1 NOTE: I, Ruby Maza, am scribing for, and in the presence of, Dr. Stern .       History     Chief Complaint   Patient presents with    Fatigue     weakness, fatigue, decreased appetite and intake.  Was seen by Dr Frazier and sent to ED for further evaluation.  Pt supposed to be on home O2 but none today     Brittany Haile is a 79 y.o. female who  has a past medical history of Abdominal pain, CHF (congestive heart failure), Chronic kidney disease, stage III (moderate) (8/19/2015), COPD (chronic obstructive pulmonary disease), COPD (chronic obstructive pulmonary disease), CVA (cerebral infarction), Esophagitis, Gastritis, GERD (gastroesophageal reflux disease), GI bleed, Hyperlipidemia, Hypertension, Mesenteric angina, PVD (peripheral vascular disease) with claudication (10/13/2015), Stroke, Subclavian artery stenosis, left, and Thyroid disease.    The patient presents to the ED due to a lack of appetite since over 1 week ago. She reports N/V with eating, body aches, diaphoresis, swelling in both legs and pain on her inner left thigh. In addition she suffes with chronic CP due to her COPD. The patient did vomit this morning and experienced generalized weakness to the point that she was unable to get in the bed. She has also been having some confusion for the past couple of days and her daughter states that this is not baseline for her. Her daughter did become concerned when she urinated on herself today and decided to bring her here.  Her PCP is concerned for severe dehydration and is worried about her kidneys. She takes 2.5 liters of oxygen at home. The patient reports abdominal pain as well.         The history is provided by the patient.     Review of patient's allergies indicates:  No Known Allergies  Past Medical History:   Diagnosis Date    Abdominal pain     CHF (congestive heart failure)     Chronic kidney disease, stage III (moderate) 8/19/2015    COPD (chronic obstructive  pulmonary disease)     COPD (chronic obstructive pulmonary disease)     CVA (cerebral infarction)     Esophagitis     Gastritis     GERD (gastroesophageal reflux disease)     GI bleed     Hyperlipidemia     Hypertension     Mesenteric angina     PVD (peripheral vascular disease) with claudication 10/13/2015    Stroke     Subclavian artery stenosis, left     Thyroid disease      Past Surgical History:   Procedure Laterality Date    CAROTID STENT       SECTION      COLONOSCOPY N/A 3/2/2017    Procedure: COLONOSCOPY;  Surgeon: Cecil Crooks MD;  Location: [facility] ENDO;  Service: Endoscopy;  Laterality: N/A;    COLONOSCOPY N/A 2019    Procedure: COLONOSCOPY/Suprep;  Surgeon: Barry Talbert MD;  Location: [facility] ENDO;  Service: Endoscopy;  Laterality: N/A;    CORONARY ANGIOPLASTY      EPIDURAL STEROID INJECTION INTO LUMBAR SPINE N/A 2018    Procedure: INJECTION, STEROID, SPINE, LUMBAR, EPIDURAL- L2-3 ORAL SEDATION;  Surgeon: Stiven Ibarra Jr., MD;  Location: [facility] PAIN MGT;  Service: Pain Management;  Laterality: N/A;    ESOPHAGOGASTRODUODENOSCOPY N/A 2019    Procedure: EGD (ESOPHAGOGASTRODUODENOSCOPY);  Surgeon: Barry Talbert MD;  Location: [facility] ENDO;  Service: Endoscopy;  Laterality: N/A;    GALLBLADDER SURGERY      HYSTERECTOMY      INTRALUMINAL GASTROINTESTINAL TRACT IMAGING VIA CAPSULE N/A 2019    Procedure: IMAGING PROCEDURE, GI TRACT, INTRALUMINAL, VIA CAPSULE;  Surgeon: Barry Talbert MD;  Location: [facility] ENDO;  Service: Endoscopy;  Laterality: N/A;    OOPHORECTOMY      SUPERIOR MESTENTERIC ARTERY STENT       Family History   Problem Relation Age of Onset    No Known Problems Mother     Cancer Sister         breast    Cancer Brother         throat cancer     Social History     Tobacco Use    Smoking status: Current Every Day Smoker     Packs/day: 0.50     Years: 60.00     Pack years: 30.00     Types: Cigarettes     Smokeless tobacco: Never Used    Tobacco comment: Pt declines enrollment in [employer] / Ambulatory Smoking Cessation program.    Substance Use Topics    Alcohol use: No     Frequency: Never     Drinks per session: Patient refused     Binge frequency: Never    Drug use: No     Review of Systems   Constitutional: Positive for appetite change and diaphoresis. Negative for chills.   HENT: Negative for congestion.    Eyes: Negative for discharge.   Respiratory: Negative for shortness of breath.    Gastrointestinal: Positive for abdominal pain, nausea and vomiting.   Genitourinary: Negative for dysuria.   Musculoskeletal: Positive for myalgias. Negative for back pain.        Positive for swelling in legs and thigh pain.    Neurological: Positive for weakness (generalized). Negative for dizziness.   Hematological: Does not bruise/bleed easily.   Psychiatric/Behavioral: Positive for confusion.   All other systems reviewed and are negative.      Physical Exam     Initial Vitals [03/02/20 1533]   BP Pulse Resp Temp SpO2   (!) 189/79 108 20 99.8 °F (37.7 °C) (!) 82 %      MAP       --         Physical Exam    Nursing note and vitals reviewed.  Constitutional: She appears well-developed and well-nourished. No distress.   HENT:   Head: Normocephalic and atraumatic.   Right Ear: External ear normal.   Left Ear: External ear normal.   Eyes: Conjunctivae and EOM are normal. Pupils are equal, round, and reactive to light. Right eye exhibits no discharge. Left eye exhibits no discharge. No scleral icterus.   Neck: Normal range of motion. Neck supple.   Cardiovascular: Normal rate, regular rhythm and normal heart sounds. Exam reveals no gallop and no friction rub.    No murmur heard.  Pulmonary/Chest: No stridor. Tachypnea noted. No respiratory distress. She has decreased breath sounds (bilaterally ). She has no wheezes. She has no rhonchi. She has no rales.   Abdominal: Soft. Bowel sounds are normal. She exhibits no distension  and no mass. There is no tenderness. There is no rebound and no guarding.   Musculoskeletal: She exhibits no edema.   No lower extremity edema.    Neurological: She is alert and oriented to person, place, and time. She has normal strength. No cranial nerve deficit or sensory deficit. GCS score is 15. GCS eye subscore is 4. GCS verbal subscore is 5. GCS motor subscore is 6.   Skin: Skin is warm and dry. Capillary refill takes less than 2 seconds.   Psychiatric: She has a normal mood and affect. Her behavior is normal. Judgment and thought content normal.         ED Course   Procedures  Labs Reviewed   CBC W/ AUTO DIFFERENTIAL - Abnormal; Notable for the following components:       Result Value    WBC 13.35 (*)     Mean Corpuscular Hemoglobin 31.2 (*)     Immature Granulocytes 0.6 (*)     Gran # (ANC) 10.9 (*)     Immature Grans (Abs) 0.08 (*)     Mono # 1.3 (*)     Gran% 81.7 (*)     Lymph% 7.9 (*)     All other components within normal limits   COMPREHENSIVE METABOLIC PANEL - Abnormal; Notable for the following components:    Sodium 133 (*)     Chloride 94 (*)     Glucose 121 (*)     eGFR if non  53 (*)     All other components within normal limits   TROPONIN I - Abnormal; Notable for the following components:    Troponin I 0.043 (*)     All other components within normal limits   URINALYSIS, REFLEX TO URINE CULTURE - Abnormal; Notable for the following components:    Color, UA Brown (*)     Specific Gravity, UA >=1.030 (*)     Protein, UA 2+ (*)     Bilirubin (UA) 1+ (*)     Occult Blood UA 1+ (*)     All other components within normal limits    Narrative:     Preferred Collection Type->Urine, Clean Catch   B-TYPE NATRIURETIC PEPTIDE - Abnormal; Notable for the following components:     (*)     All other components within normal limits   APTT - Abnormal; Notable for the following components:    aPTT 38.7 (*)     All other components within normal limits   URINALYSIS MICROSCOPIC - Abnormal;  Notable for the following components:    Bacteria Few (*)     All other components within normal limits    Narrative:     Preferred Collection Type->Urine, Clean Catch   POCT GLUCOSE - Abnormal; Notable for the following components:    POCT Glucose 129 (*)     All other components within normal limits   INFLUENZA A & B BY MOLECULAR   CULTURE, BLOOD   CULTURE, BLOOD   TSH   PROTIME-INR   LACTIC ACID, PLASMA   LACTIC ACID, PLASMA     EKG Readings: (Independently Interpreted)   Rhythm: Sinus Tachycardia. Heart Rate: 105.     ECG Results          EKG 12-lead (In process)  Result time 03/03/20 07:56:58    In process by Interface, Lab In [facility] (03/03/20 07:56:58)                 Narrative:    Test Reason : R53.83,    Vent. Rate : 105 BPM     Atrial Rate : 105 BPM     P-R Int : 184 ms          QRS Dur : 072 ms      QT Int : 284 ms       P-R-T Axes : 091 053 069 degrees     QTc Int : 375 ms    Sinus tachycardia with Premature supraventricular complexes  Nonspecific T wave abnormality  Abnormal ECG  When compared with ECG of 24-OCT-2019 23:53,  Nonspecific T wave abnormality has replaced inverted T waves in Lateral  leads    Referred By: [email or web address]   SELF           Confirmed By:                             Imaging Results          CT Head Without Contrast (Final result)  Result time 03/02/20 18:36:26    Final result by Ada Rowland MD (03/02/20 18:36:26)                 Impression:      No acute intracranial abnormalities identified.      Electronically signed by: Ada Rowalnd MD  Date:    03/02/2020  Time:    18:36             Narrative:    EXAMINATION:  CT HEAD WITHOUT CONTRAST    CLINICAL HISTORY:  Confusion/delirium, altered LOC, unexplained;    TECHNIQUE:  Low dose axial images were obtained through the head.  Coronal and sagittal reformations were also performed. Contrast was not administered.    COMPARISON:  CT head from October 2019.    FINDINGS:  There is chronic microvascular ischemic disease.  No evidence  of acute/recent major vascular distribution cerebral infarction, intraparenchymal hemorrhage, or intra-axial space occupying lesion. The ventricular system is normal in size and configuration with no evidence of hydrocephalus. No effacement of the skull-base cisterns. No abnormal extra-axial fluid collections or blood products. Visualized paranasal sinuses and mastoid air cells are essentially clear.  The calvarium shows no significant abnormality.                                X-Ray Chest 1 View (Final result)  Result time 03/02/20 16:32:27    Final result by Jesús Nelson MD (03/02/20 16:32:27)                 Impression:      1. Right basilar infiltrate and pleural effusion could represent pneumonia.  Mild interstitial infiltrates in the right upper lobe also.  Follow-up is recommended.  2.  This report was flagged in Epic as abnormal.      Electronically signed by: Jesús Nelson  Date:    03/02/2020  Time:    16:32             Narrative:    EXAMINATION:  XR CHEST 1 VIEW    CLINICAL HISTORY:  Other fatigue    TECHNIQUE:  Single frontal view of the chest was performed.    COMPARISON:  01/21/2020    FINDINGS:  A suboptimal inspiration.    Right basilar opacity could represent pleural effusion and mild infiltrate.  This is new from the prior study.  Follow-up recommended.    Mild interstitial infiltrates in the right upper lobe also.    Heart is normal size.  No edema is detected.    No mass is identified.  No acute osseous abnormality.  No evidence of pneumothorax.                              X-Rays:   Independently Interpreted Readings:   Chest X-Ray: Noted effusion on right side.      Medical Decision Making:   History:   Old Medical Records: I decided to obtain old medical records.  Initial Assessment:   The patient presents to the ED due to a lack of appetite since over 1 week ago. She reports N/V with eating, body aches, diaphoresis, swelling in both legs and pain on her inner left thigh. Vitals are  stable. Physical exam shows   Independently Interpreted Test(s):   I have ordered and independently interpreted EKG Reading(s) - see prior notes  Clinical Tests:   Lab Tests: Ordered and Reviewed  Medical Tests: Ordered and Reviewed  ED Management:  Plan: Ct head without contrast, x-ray chest, lactic acid, poct glucose, lactic acid plasma, blood culture, influenza a&b, tsh, aptt, protime-inr, urinalysis microscopic, cbc auto differential, cmp, troponin I, urinalysis reflex urine to urine,  poct glucose, bnp, ekg             Scribe Attestation:   Scribe #1: I performed the above scribed service and the documentation accurately describes the services I performed. I attest to the accuracy of the note.    Attending Attestation:           Physician Attestation for Scribe:  Physician Attestation Statement for Scribe #1: I, Dr. Stern, reviewed documentation, as scribed by Ruby Maza  in my presence, and it is both accurate and complete.                 ED Course as of Mar 04 0140   Mon Mar 02, 2020   0836 Paged [facility] internal medicine      [DC]   2009 Pt signed out to [facility] medicine     [DC]   2228 Pt and family would like pt transferred as there are no more available beds at [place]. Spoke with transfer center ,Dr. Jorge from transfer center will accept patient at [facility].    Temp: 99.8 °F (37.7 °C) [DC]      ED Course User Index  [DC] Antonieta Stern Jr., MD                Clinical Impression:     1. Pneumonia of right lower lobe due to infectious organism    2. Fatigue              ED Disposition Condition    Transfer to Another Facility Stable                          Antonieta Stern Jr., MD  03/04/20 0141

## 2020-03-02 NOTE — PROGRESS NOTES
Subjective:       Patient ID: Brittany Haile is a 79 y.o. female.    Chief Complaint: Anorexia; Nausea; and Fall (Fall from bed this AM)    79 yr old pleasant female with CVA, Hypothyroidism, HTN, HLD, COPD, GERD, carotid artery disease/stenosis, anxiety, BROUGHT BY HER DAUGHTER presents today for EVALUATION OF FALL, FECAL INCONTINENCE SINCE THIS AM, ANOREXIA, NAUSEA AND FEELS BAD X 1 WEEK. SHE HAS CHRONIC COUGH WHICH IS WORSE X 1 WEEK. SHE IS DROWSY AND ALTERED MENTAL STATUS AS WELL. SHE IS HAVING MEMORY ISSUES. NO URINE ISSUES OR FEVER. NO SICK CONTACTS/RECENYT TRAVEL. DETAILS AS FOLLOWS -    Nausea   This is a new problem. The current episode started in the past 7 days. The problem occurs constantly. The problem has been unchanged. Associated symptoms include anorexia, arthralgias, fatigue, myalgias and nausea. Pertinent negatives include no chest pain, congestion, diaphoresis, headaches, joint swelling, neck pain, numbness, sore throat, vomiting or weakness. Nothing aggravates the symptoms. She has tried nothing for the symptoms. The treatment provided no relief.   Fall   She fell from a height of 1 to 2 ft. There was no blood loss. The point of impact was the head. The pain is present in the back. The pain is at a severity of 3/10. The pain is mild. Associated symptoms include nausea. Pertinent negatives include no headaches, hematuria, numbness or vomiting. She has tried nothing for the symptoms. The treatment provided no relief.     Review of Systems   Constitutional: Positive for activity change, appetite change and fatigue. Negative for diaphoresis and unexpected weight change.   HENT: Positive for hearing loss. Negative for congestion, ear discharge, rhinorrhea, sore throat, trouble swallowing and voice change.    Eyes: Negative.  Negative for pain, discharge and visual disturbance.   Respiratory: Negative.  Negative for chest tightness, shortness of breath and wheezing.    Cardiovascular: Negative.  Negative  for chest pain and palpitations.   Gastrointestinal: Positive for anorexia and nausea. Negative for abdominal distention, anal bleeding, blood in stool, constipation, diarrhea and vomiting.        Fecal incontinence   Endocrine: Negative.  Negative for cold intolerance, polydipsia and polyuria.   Genitourinary: Negative.  Negative for decreased urine volume, difficulty urinating, dysuria, frequency, hematuria, menstrual problem and vaginal pain.   Musculoskeletal: Positive for arthralgias and myalgias. Negative for gait problem, joint swelling and neck pain.   Skin: Negative.  Negative for color change, pallor and wound.   Allergic/Immunologic: Negative.  Negative for environmental allergies and immunocompromised state.   Neurological: Negative.  Negative for dizziness, tremors, seizures, speech difficulty, weakness, numbness and headaches.   Hematological: Negative.  Negative for adenopathy. Does not bruise/bleed easily.   Psychiatric/Behavioral: Negative.  Negative for agitation, confusion, decreased concentration, dysphoric mood, hallucinations, self-injury and suicidal ideas. The patient is not nervous/anxious.        PMH/PSH/FH/SH/MED/ALLERGY reviewed    Objective:       Vitals:    03/02/20 1438   BP: (!) 138/58   Pulse: 110       Physical Exam   Constitutional: She appears well-developed and well-nourished. No distress.   drowsy   HENT:   Head: Normocephalic and atraumatic.   Hard of hearing   Eyes: Pupils are equal, round, and reactive to light. EOM are normal.   Neck: Normal range of motion. Neck supple.   Cardiovascular: Normal rate, regular rhythm, normal heart sounds and intact distal pulses. Exam reveals no gallop and no friction rub.   No murmur heard.  Pulmonary/Chest: Effort normal. She has no wheezes. She has no rales. She exhibits no tenderness.   Abdominal: Soft. Bowel sounds are normal. She exhibits no distension and no mass. There is no tenderness. There is no rebound and no guarding. No hernia.    Musculoskeletal: She exhibits no edema or deformity.   Neurological: She is alert. She displays normal reflexes. No cranial nerve deficit or sensory deficit. Coordination normal.   Mild oriented but drowsy   Skin: Skin is warm and dry. Capillary refill takes less than 2 seconds. She is not diaphoretic.   Psychiatric: Her behavior is normal. Judgment and thought content normal.       Assessment:       1. Dehydration    2. Hypoxia    3. Nausea    4. Fall, initial encounter    5. Incontinence of feces, unspecified fecal incontinence type        Plan:           Brittany was seen today for anorexia, nausea and fall.    Diagnoses and all orders for this visit:    Dehydration    Hypoxia    Nausea    Fall, initial encounter    Incontinence of feces, unspecified fecal incontinence type      Dehydration/nausea  -need labs and IV fluids  -zofran prn - pt will go ER for management    Fall and AMS with fecal incontinence  -likely dehydration  -cannot r/o seizure vs head injury  -refer ER for management    Spent adequate time in obtaining history and explaining differentials    25 minutes spent during this visit of which greater than 50% devoted to face-face counseling and coordination of care regarding diagnosis and management plan    Follow up if symptoms worsen or fail to improve.

## 2020-03-03 ENCOUNTER — HOSPITAL ENCOUNTER (INPATIENT)
Facility: OTHER | Age: 80
LOS: 3 days | Discharge: HOME HEALTH | DRG: 194 | End: 2020-03-06
Attending: HOSPITALIST | Admitting: HOSPITALIST
Payer: MEDICARE

## 2020-03-03 VITALS
DIASTOLIC BLOOD PRESSURE: 67 MMHG | RESPIRATION RATE: 20 BRPM | OXYGEN SATURATION: 92 % | SYSTOLIC BLOOD PRESSURE: 153 MMHG | HEART RATE: 83 BPM | WEIGHT: 128 LBS | TEMPERATURE: 99 F | HEIGHT: 61 IN | BODY MASS INDEX: 24.17 KG/M2

## 2020-03-03 DIAGNOSIS — J18.9 PNA (PNEUMONIA): ICD-10-CM

## 2020-03-03 DIAGNOSIS — J18.9 COMMUNITY ACQUIRED PNEUMONIA OF RIGHT LOWER LOBE OF LUNG: ICD-10-CM

## 2020-03-03 PROBLEM — I10 ESSENTIAL HYPERTENSION: Status: ACTIVE | Noted: 2020-03-03

## 2020-03-03 LAB
ALBUMIN SERPL BCP-MCNC: 2.7 G/DL (ref 3.5–5.2)
ALP SERPL-CCNC: 85 U/L (ref 55–135)
ALT SERPL W/O P-5'-P-CCNC: 13 U/L (ref 10–44)
ANION GAP SERPL CALC-SCNC: 8 MMOL/L (ref 8–16)
AST SERPL-CCNC: 14 U/L (ref 10–40)
BASOPHILS # BLD AUTO: 0.03 K/UL (ref 0–0.2)
BASOPHILS NFR BLD: 0.3 % (ref 0–1.9)
BILIRUB SERPL-MCNC: 0.6 MG/DL (ref 0.1–1)
BUN SERPL-MCNC: 14 MG/DL (ref 8–23)
CALCIUM SERPL-MCNC: 8.5 MG/DL (ref 8.7–10.5)
CHLORIDE SERPL-SCNC: 100 MMOL/L (ref 95–110)
CHOLEST SERPL-MCNC: 107 MG/DL (ref 120–199)
CHOLEST SERPL-MCNC: 107 MG/DL (ref 120–199)
CHOLEST/HDLC SERPL: 2.3 {RATIO} (ref 2–5)
CHOLEST/HDLC SERPL: 2.3 {RATIO} (ref 2–5)
CO2 SERPL-SCNC: 25 MMOL/L (ref 23–29)
CREAT SERPL-MCNC: 0.8 MG/DL (ref 0.5–1.4)
DIFFERENTIAL METHOD: ABNORMAL
EOSINOPHIL # BLD AUTO: 0 K/UL (ref 0–0.5)
EOSINOPHIL NFR BLD: 0.4 % (ref 0–8)
ERYTHROCYTE [DISTWIDTH] IN BLOOD BY AUTOMATED COUNT: 14.2 % (ref 11.5–14.5)
EST. GFR  (AFRICAN AMERICAN): >60 ML/MIN/1.73 M^2
EST. GFR  (NON AFRICAN AMERICAN): >60 ML/MIN/1.73 M^2
GLUCOSE SERPL-MCNC: 102 MG/DL (ref 70–110)
HCT VFR BLD AUTO: 34.2 % (ref 37–48.5)
HDLC SERPL-MCNC: 46 MG/DL (ref 40–75)
HDLC SERPL-MCNC: 46 MG/DL (ref 40–75)
HDLC SERPL: 43 % (ref 20–50)
HDLC SERPL: 43 % (ref 20–50)
HGB BLD-MCNC: 11.3 G/DL (ref 12–16)
IMM GRANULOCYTES # BLD AUTO: 0.05 K/UL (ref 0–0.04)
IMM GRANULOCYTES NFR BLD AUTO: 0.5 % (ref 0–0.5)
LACTATE SERPL-SCNC: 0.7 MMOL/L (ref 0.5–2.2)
LDLC SERPL CALC-MCNC: 49.2 MG/DL (ref 63–159)
LDLC SERPL CALC-MCNC: 49.2 MG/DL (ref 63–159)
LYMPHOCYTES # BLD AUTO: 1.3 K/UL (ref 1–4.8)
LYMPHOCYTES NFR BLD: 12.3 % (ref 18–48)
MAGNESIUM SERPL-MCNC: 1.4 MG/DL (ref 1.6–2.6)
MCH RBC QN AUTO: 31.3 PG (ref 27–31)
MCHC RBC AUTO-ENTMCNC: 33 G/DL (ref 32–36)
MCV RBC AUTO: 95 FL (ref 82–98)
MONOCYTES # BLD AUTO: 1 K/UL (ref 0.3–1)
MONOCYTES NFR BLD: 9.6 % (ref 4–15)
NEUTROPHILS # BLD AUTO: 8.1 K/UL (ref 1.8–7.7)
NEUTROPHILS NFR BLD: 76.9 % (ref 38–73)
NONHDLC SERPL-MCNC: 61 MG/DL
NONHDLC SERPL-MCNC: 61 MG/DL
NRBC BLD-RTO: 0 /100 WBC
PHOSPHATE SERPL-MCNC: 1.8 MG/DL (ref 2.7–4.5)
PLATELET # BLD AUTO: 197 K/UL (ref 150–350)
PMV BLD AUTO: 9.3 FL (ref 9.2–12.9)
POTASSIUM SERPL-SCNC: 3.4 MMOL/L (ref 3.5–5.1)
PROCALCITONIN SERPL IA-MCNC: 0.16 NG/ML
PROT SERPL-MCNC: 5.7 G/DL (ref 6–8.4)
RBC # BLD AUTO: 3.61 M/UL (ref 4–5.4)
SODIUM SERPL-SCNC: 133 MMOL/L (ref 136–145)
TRIGL SERPL-MCNC: 59 MG/DL (ref 30–150)
TRIGL SERPL-MCNC: 59 MG/DL (ref 30–150)
TROPONIN I SERPL DL<=0.01 NG/ML-MCNC: 0.03 NG/ML (ref 0–0.03)
WBC # BLD AUTO: 10.57 K/UL (ref 3.9–12.7)

## 2020-03-03 PROCEDURE — 27000221 HC OXYGEN, UP TO 24 HOURS

## 2020-03-03 PROCEDURE — 25000003 PHARM REV CODE 250: Performed by: NURSE PRACTITIONER

## 2020-03-03 PROCEDURE — 25000242 PHARM REV CODE 250 ALT 637 W/ HCPCS: Performed by: NURSE PRACTITIONER

## 2020-03-03 PROCEDURE — 63600175 PHARM REV CODE 636 W HCPCS: Performed by: NURSE PRACTITIONER

## 2020-03-03 PROCEDURE — 83605 ASSAY OF LACTIC ACID: CPT

## 2020-03-03 PROCEDURE — 94761 N-INVAS EAR/PLS OXIMETRY MLT: CPT

## 2020-03-03 PROCEDURE — 83735 ASSAY OF MAGNESIUM: CPT

## 2020-03-03 PROCEDURE — 97162 PT EVAL MOD COMPLEX 30 MIN: CPT

## 2020-03-03 PROCEDURE — 80053 COMPREHEN METABOLIC PANEL: CPT

## 2020-03-03 PROCEDURE — 84145 PROCALCITONIN (PCT): CPT

## 2020-03-03 PROCEDURE — 84484 ASSAY OF TROPONIN QUANT: CPT

## 2020-03-03 PROCEDURE — 11000001 HC ACUTE MED/SURG PRIVATE ROOM

## 2020-03-03 PROCEDURE — 85025 COMPLETE CBC W/AUTO DIFF WBC: CPT

## 2020-03-03 PROCEDURE — 84100 ASSAY OF PHOSPHORUS: CPT

## 2020-03-03 PROCEDURE — 80061 LIPID PANEL: CPT

## 2020-03-03 PROCEDURE — 99223 PR INITIAL HOSPITAL CARE,LEVL III: ICD-10-PCS | Mod: ,,, | Performed by: NURSE PRACTITIONER

## 2020-03-03 PROCEDURE — 94640 AIRWAY INHALATION TREATMENT: CPT

## 2020-03-03 PROCEDURE — G0378 HOSPITAL OBSERVATION PER HR: HCPCS

## 2020-03-03 PROCEDURE — 99223 1ST HOSP IP/OBS HIGH 75: CPT | Mod: ,,, | Performed by: NURSE PRACTITIONER

## 2020-03-03 PROCEDURE — 36415 COLL VENOUS BLD VENIPUNCTURE: CPT

## 2020-03-03 RX ORDER — ONDANSETRON 8 MG/1
8 TABLET, ORALLY DISINTEGRATING ORAL EVERY 8 HOURS PRN
Status: DISCONTINUED | OUTPATIENT
Start: 2020-03-03 | End: 2020-03-06 | Stop reason: HOSPADM

## 2020-03-03 RX ORDER — TIOTROPIUM BROMIDE 18 UG/1
18 CAPSULE ORAL; RESPIRATORY (INHALATION) DAILY
Status: DISCONTINUED | OUTPATIENT
Start: 2020-03-03 | End: 2020-03-06 | Stop reason: HOSPADM

## 2020-03-03 RX ORDER — ATORVASTATIN CALCIUM 20 MG/1
40 TABLET, FILM COATED ORAL DAILY
Status: DISCONTINUED | OUTPATIENT
Start: 2020-03-03 | End: 2020-03-06 | Stop reason: HOSPADM

## 2020-03-03 RX ORDER — SODIUM CHLORIDE 9 MG/ML
INJECTION, SOLUTION INTRAVENOUS CONTINUOUS
Status: DISCONTINUED | OUTPATIENT
Start: 2020-03-03 | End: 2020-03-06 | Stop reason: HOSPADM

## 2020-03-03 RX ORDER — VALSARTAN 80 MG/1
320 TABLET ORAL DAILY
Status: DISCONTINUED | OUTPATIENT
Start: 2020-03-03 | End: 2020-03-06 | Stop reason: HOSPADM

## 2020-03-03 RX ORDER — CLOPIDOGREL BISULFATE 75 MG/1
75 TABLET ORAL DAILY
Status: DISCONTINUED | OUTPATIENT
Start: 2020-03-03 | End: 2020-03-06 | Stop reason: HOSPADM

## 2020-03-03 RX ORDER — SODIUM CHLORIDE 0.9 % (FLUSH) 0.9 %
10 SYRINGE (ML) INJECTION
Status: DISCONTINUED | OUTPATIENT
Start: 2020-03-03 | End: 2020-03-06 | Stop reason: HOSPADM

## 2020-03-03 RX ORDER — IPRATROPIUM BROMIDE AND ALBUTEROL SULFATE 2.5; .5 MG/3ML; MG/3ML
3 SOLUTION RESPIRATORY (INHALATION) EVERY 4 HOURS
Status: DISCONTINUED | OUTPATIENT
Start: 2020-03-03 | End: 2020-03-06 | Stop reason: HOSPADM

## 2020-03-03 RX ORDER — CILOSTAZOL 50 MG/1
50 TABLET ORAL 2 TIMES DAILY
Status: DISCONTINUED | OUTPATIENT
Start: 2020-03-03 | End: 2020-03-06 | Stop reason: HOSPADM

## 2020-03-03 RX ORDER — HEPARIN SODIUM 5000 [USP'U]/ML
5000 INJECTION, SOLUTION INTRAVENOUS; SUBCUTANEOUS EVERY 8 HOURS
Status: DISCONTINUED | OUTPATIENT
Start: 2020-03-03 | End: 2020-03-06 | Stop reason: HOSPADM

## 2020-03-03 RX ORDER — FLUTICASONE FUROATE AND VILANTEROL 100; 25 UG/1; UG/1
1 POWDER RESPIRATORY (INHALATION) DAILY
Status: DISCONTINUED | OUTPATIENT
Start: 2020-03-03 | End: 2020-03-06 | Stop reason: HOSPADM

## 2020-03-03 RX ORDER — ACETAMINOPHEN 325 MG/1
650 TABLET ORAL EVERY 4 HOURS PRN
Status: DISCONTINUED | OUTPATIENT
Start: 2020-03-03 | End: 2020-03-06 | Stop reason: HOSPADM

## 2020-03-03 RX ORDER — LEVOTHYROXINE SODIUM 88 UG/1
88 TABLET ORAL
Status: DISCONTINUED | OUTPATIENT
Start: 2020-03-03 | End: 2020-03-06 | Stop reason: HOSPADM

## 2020-03-03 RX ORDER — PANTOPRAZOLE SODIUM 40 MG/1
40 TABLET, DELAYED RELEASE ORAL DAILY
Status: DISCONTINUED | OUTPATIENT
Start: 2020-03-03 | End: 2020-03-06 | Stop reason: HOSPADM

## 2020-03-03 RX ORDER — ESCITALOPRAM OXALATE 10 MG/1
10 TABLET ORAL NIGHTLY
Status: DISCONTINUED | OUTPATIENT
Start: 2020-03-03 | End: 2020-03-06 | Stop reason: HOSPADM

## 2020-03-03 RX ADMIN — ESCITALOPRAM OXALATE 10 MG: 10 TABLET ORAL at 08:03

## 2020-03-03 RX ADMIN — AZITHROMYCIN MONOHYDRATE 500 MG: 500 INJECTION, POWDER, LYOPHILIZED, FOR SOLUTION INTRAVENOUS at 04:03

## 2020-03-03 RX ADMIN — ATORVASTATIN CALCIUM 40 MG: 20 TABLET, FILM COATED ORAL at 09:03

## 2020-03-03 RX ADMIN — IPRATROPIUM BROMIDE AND ALBUTEROL SULFATE 3 ML: .5; 3 SOLUTION RESPIRATORY (INHALATION) at 08:03

## 2020-03-03 RX ADMIN — CEFTRIAXONE 1 G: 1 INJECTION, SOLUTION INTRAVENOUS at 04:03

## 2020-03-03 RX ADMIN — ACETAMINOPHEN 650 MG: 325 TABLET, FILM COATED ORAL at 01:03

## 2020-03-03 RX ADMIN — CILOSTAZOL 50 MG: 50 TABLET ORAL at 08:03

## 2020-03-03 RX ADMIN — PANTOPRAZOLE SODIUM 40 MG: 40 TABLET, DELAYED RELEASE ORAL at 09:03

## 2020-03-03 RX ADMIN — IPRATROPIUM BROMIDE AND ALBUTEROL SULFATE 3 ML: .5; 3 SOLUTION RESPIRATORY (INHALATION) at 07:03

## 2020-03-03 RX ADMIN — HEPARIN SODIUM 5000 UNITS: 5000 INJECTION, SOLUTION INTRAVENOUS; SUBCUTANEOUS at 08:03

## 2020-03-03 RX ADMIN — ACETAMINOPHEN 650 MG: 325 TABLET, FILM COATED ORAL at 08:03

## 2020-03-03 RX ADMIN — LEVOTHYROXINE SODIUM 88 MCG: 88 TABLET ORAL at 06:03

## 2020-03-03 RX ADMIN — IPRATROPIUM BROMIDE AND ALBUTEROL SULFATE 3 ML: .5; 3 SOLUTION RESPIRATORY (INHALATION) at 02:03

## 2020-03-03 RX ADMIN — CILOSTAZOL 50 MG: 50 TABLET ORAL at 09:03

## 2020-03-03 RX ADMIN — VALSARTAN 320 MG: 80 TABLET, FILM COATED ORAL at 09:03

## 2020-03-03 RX ADMIN — HEPARIN SODIUM 5000 UNITS: 5000 INJECTION, SOLUTION INTRAVENOUS; SUBCUTANEOUS at 01:03

## 2020-03-03 RX ADMIN — TIOTROPIUM BROMIDE 18 MCG: 18 CAPSULE ORAL; RESPIRATORY (INHALATION) at 08:03

## 2020-03-03 RX ADMIN — ONDANSETRON 8 MG: 8 TABLET, ORALLY DISINTEGRATING ORAL at 07:03

## 2020-03-03 RX ADMIN — ESCITALOPRAM OXALATE 10 MG: 10 TABLET ORAL at 04:03

## 2020-03-03 RX ADMIN — FLUTICASONE FUROATE AND VILANTEROL TRIFENATATE 1 PUFF: 100; 25 POWDER RESPIRATORY (INHALATION) at 08:03

## 2020-03-03 RX ADMIN — IPRATROPIUM BROMIDE AND ALBUTEROL SULFATE 3 ML: .5; 3 SOLUTION RESPIRATORY (INHALATION) at 11:03

## 2020-03-03 RX ADMIN — ACETAMINOPHEN 650 MG: 325 TABLET, FILM COATED ORAL at 04:03

## 2020-03-03 RX ADMIN — SODIUM CHLORIDE: 0.9 INJECTION, SOLUTION INTRAVENOUS at 04:03

## 2020-03-03 RX ADMIN — IPRATROPIUM BROMIDE AND ALBUTEROL SULFATE 3 ML: .5; 3 SOLUTION RESPIRATORY (INHALATION) at 03:03

## 2020-03-03 RX ADMIN — CLOPIDOGREL BISULFATE 75 MG: 75 TABLET ORAL at 09:03

## 2020-03-03 NOTE — PT/OT/SLP EVAL
Physical Therapy Evaluation    Patient Name:  Brittany Haile   MRN:  [id number]    Recommendations:     Discharge Recommendations:  home, home health PT   Discharge Equipment Recommendations: none   Barriers to discharge: Current functional status    Assessment:     Brittany Haile is a 79 y.o. female admitted with a medical diagnosis of Community acquired pneumonia of right lower lobe of lung.  She presents with the following impairments/functional limitations:  weakness, impaired endurance, impaired functional mobilty, impaired cardiopulmonary response to activity, decreased safety awareness, decreased coordination, gait instability, impaired balance.    PT orders received. PT evaluation completed and goals/POC established. Pt tolerated evaluation well with no adverse reactions. Pt performed bed mobility with SBA, transfers with CGA, and gait training in room with min A and RW. Pt will benefit from skilled PT services to address impairments and functional limitations. Recommend discharge to home with HHPT.      Rehab Prognosis: Good; patient would benefit from acute skilled PT services to address these deficits and reach maximum level of function.    Recent Surgery: * No surgery found *      Plan:     During this hospitalization, patient to be seen 5 x/week to address the identified rehab impairments via gait training, therapeutic activities, therapeutic exercises and progress toward the following goals:    · Plan of Care Expires:  04/03/20    Subjective     Chief Complaint: None stated  Patient/Family Comments/goals: None stated  Pain/Comfort:  · Pain Rating 1: other (see comments)(Not rated.)  · Pain Rating Post-Intervention 1: other (see comments)(Not rated.)    Patients cultural, spiritual, [employer]ology conflicts given the current situation: other (see comments)(None stated.)    Living Environment:  · Pt lives with son in a single story house with no steps to enter.  · Son is on disability and can only provide some  assistance.  · Pt has a walk-in shower with a shower seat on one side.  · DME owned: rollator and straight cane  · Pt uses rollator outside and a straight cane indoors.  · Upon discharge, patient will have assistance from family.  Prior level of function:  · Ambulation: Independent  · ADL's: Independent  · IADLs: Daughters help with groceries and son sometimes helps with cooking    Objective:     Communicated with RN (Zaire) prior to session.  Patient found supine with telemetry, peripheral IV, oxygen(3L O2 via nasal cannula; telesitter)  upon PT entry to room.    General Precautions: Standard, fall, anti-coagulation medicine, hearing impaired  Orthopedic Precautions:N/A   Braces: N/A     Exams:  · Cognition:   · Patient is oriented to person, place, situation, time.  · Pt follows approximately 100% of multi-step commands.    · Mood: Pleasant and cooperative.  · Safety Awareness: Fair  · Musculoskeletal:  · Posture:  Flexed trunk  · LE ROM/Strength:   · R ROM: WFL  · L ROM: WFL  · R Strength:   · Hip flexion: 4+/5  · Knee extension: 5/5  · Dorsiflexion: 5/5   · L Strength:   · Hip flexion: 4/5  · Knee extension: 4/5  · Dorsiflexion: 4/5   · Neuromuscular:  · Tone/Reflexes: No impairments identified with functional mobility. No formal testing performed.  · Coordination:  · Heel to shin: WFL  · Toe tapping: WFL  · Balance:   · Static sitting: SBA  · Dynamic sitting: SBA  · Static standing: CGA  · Dynamic standing: CGA  · Visual-vestibular: No impairments identified with functional mobility. No formal testing performed.  · Integument: Visible skin intact    Functional Mobility: Gait belt and non-slip socks donned prior to OOB mobility.  · Bed Mobility:     · Supine to Sit: stand by assistance  · Sit to Supine: stand by assistance  · Transfers:     · Sit to Stand: 3 trials with stand by assistance and rolling walker  · Gait: 10' with min A and RW  · Required assistance with RW management  · Demonstrated decreased  gerald and flexed trunk    Therapeutic Activities and Exercises:  Bed mobility, transfers, and gait training as documented above.    PT educated patient re:   · PT plan of care/role of PT  · Use of RW  · Fall and safety precautions  Pt verbalized understanding.    The patient is safe to ambulate in room with RN assist, whiteboard updated.     AM-PAC 6 CLICK MOBILITY  Total Score:20     Patient left supine with all lines intact, call button in reach and bed alarm on.    GOALS:   Multidisciplinary Problems     Physical Therapy Goals        Problem: Physical Therapy Goal    Goal Priority Disciplines Outcome Goal Variances Interventions   Physical Therapy Goal     PT, PT/OT Ongoing, Progressing     Description:  Goals to be met by: 4/3/2020    Patient will perform the following to increase strength, improve mobility, and return to prior level of function:    1. Supine <> sit with independence.  2. Sit<>stand with independence with least restrictive assistive device.  3. Gait x 100 feet with SBA with least restrictive assistive device.                    History:     Past Medical History:   Diagnosis Date    Abdominal pain     CHF (congestive heart failure)     Chronic kidney disease, stage III (moderate) 2015    COPD (chronic obstructive pulmonary disease)     COPD (chronic obstructive pulmonary disease)     CVA (cerebral infarction)     Esophagitis     Gastritis     GERD (gastroesophageal reflux disease)     GI bleed     Hyperlipidemia     Hypertension     Mesenteric angina     PVD (peripheral vascular disease) with claudication 10/13/2015    Stroke     Subclavian artery stenosis, left     Thyroid disease        Past Surgical History:   Procedure Laterality Date    CAROTID STENT       SECTION      COLONOSCOPY N/A 3/2/2017    Procedure: COLONOSCOPY;  Surgeon: Cecil Crooks MD;  [facility];  Service: Endoscopy;  Laterality: N/A;    COLONOSCOPY N/A 2019    Procedure:  COLONOSCOPY/Suprep;  Surgeon: Barry Talbert MD;  Location: [facility];  Service: Endoscopy;  Laterality: N/A;    CORONARY ANGIOPLASTY  2006    EPIDURAL STEROID INJECTION INTO LUMBAR SPINE N/A 8/16/2018    Procedure: INJECTION, STEROID, SPINE, LUMBAR, EPIDURAL- L2-3 ORAL SEDATION;  Surgeon: Stiven Ibarra Jr., MD;  Location: [facility];  Service: Pain Management;  Laterality: N/A;    ESOPHAGOGASTRODUODENOSCOPY N/A 9/11/2019    Procedure: EGD (ESOPHAGOGASTRODUODENOSCOPY);  Surgeon: Barry Talbert MD;  Location: [facility];  Service: Endoscopy;  Laterality: N/A;    GALLBLADDER SURGERY      HYSTERECTOMY      INTRALUMINAL GASTROINTESTINAL TRACT IMAGING VIA CAPSULE N/A 9/18/2019    Procedure: IMAGING PROCEDURE, GI TRACT, INTRALUMINAL, VIA CAPSULE;  Surgeon: Barry Talbert MD;  Location: [facility];  Service: Endoscopy;  Laterality: N/A;    OOPHORECTOMY      SUPERIOR MESTENTERIC ARTERY STENT         Time Tracking:     PT Received On: 03/03/20  PT Start Time: 1342     PT Stop Time: 1402  PT Total Time (min): 20 min     Billable Minutes: Evaluation 20      Ximena Taylor, FELECIA  03/03/2020

## 2020-03-03 NOTE — ED NOTES
Recd report, assumed care at this time. Pt awaiting transfer to [facility]. Lying on strecher NAD,no complaints voiced, daughter at bedside. VS stable. SR up Bed locked and low CB in reach. Will continue to monitor.O23L NC in use.

## 2020-03-03 NOTE — NURSING
Patient rounding completed. Plan of care reviewed with pt, medications reviewed with pt and family. Vital signs stable. Pt c/o of SOB. Placed on 3L NC. Pt ambulated to bathroom with assistance, reports generalized weakness and SOB with ambulation. Purposeful rounding completed. Bed wheels locked and in lowest position. Side rails up x 2. Call bell within reach. Pt AAO x 4 and appropriate at this time. No acute distress noted. Will continue to monitor.

## 2020-03-03 NOTE — PLAN OF CARE
Problem: Physical Therapy Goal  Goal: Physical Therapy Goal  Description  Goals to be met by: 4/3/2020    Patient will perform the following to increase strength, improve mobility, and return to prior level of function:    1. Supine <> sit with independence.  2. Sit<>stand with independence with least restrictive assistive device.  3. Gait x 100 feet with SBA with least restrictive assistive device.   Outcome: Ongoing, Progressing    PT orders received. PT evaluation completed and goals/POC established. Pt tolerated evaluation well with no adverse reactions. Pt performed bed mobility with SBA, transfers with CGA, and gait training in room with min A and RW. Pt will benefit from skilled PT services to address impairments and functional limitations. Recommend discharge to home with HHPT.

## 2020-03-03 NOTE — SUBJECTIVE & OBJECTIVE
Past Medical History:   Diagnosis Date    Abdominal pain     CHF (congestive heart failure)     Chronic kidney disease, stage III (moderate) 2015    COPD (chronic obstructive pulmonary disease)     COPD (chronic obstructive pulmonary disease)     CVA (cerebral infarction)     Esophagitis     Gastritis     GERD (gastroesophageal reflux disease)     GI bleed     Hyperlipidemia     Hypertension     Mesenteric angina     PVD (peripheral vascular disease) with claudication 10/13/2015    Stroke     Subclavian artery stenosis, left     Thyroid disease        Past Surgical History:   Procedure Laterality Date    CAROTID STENT       SECTION      COLONOSCOPY N/A 3/2/2017    Procedure: COLONOSCOPY;  Surgeon: Cecil Crooks MD;  Location: [facility] END[facility];  Service: Endoscopy;  Laterality: N/A;    COLONOSCOPY N/A 2019    Procedure: COLONOSCOPY/Suprep;  Surgeon: Barry Talbert MD;  Location: [facility] END[facility];  Service: Endoscopy;  Laterality: N/A;    CORONARY ANGIOPLASTY      EPIDURAL STEROID INJECTION INTO LUMBAR SPINE N/A 2018    Procedure: INJECTION, STEROID, SPINE, LUMBAR, EPIDURAL- L2-3 ORAL SEDATION;  Surgeon: Stiven Ibarra Jr., MD;  Location: [facility] PAIN [facility]GT;  Service: Pain Management;  Laterality: N/A;    ESOPHAGOGASTRODUODENOSCOPY N/A 2019    Procedure: EGD (ESOPHAGOGASTRODUODENOSCOPY);  Surgeon: Barry Talbert MD;  Location: [facility];  Service: Endoscopy;  Laterality: N/A;    GALLBLADDER SURGERY      HYSTERECTOMY      INTRALUMINAL GASTROINTESTINAL TRACT IMAGING VIA CAPSULE N/A 2019    Procedure: IMAGING PROCEDURE, GI TRACT, INTRALUMINAL, VIA CAPSULE;  Surgeon: Barry Talbert MD;  Location: [facility] END[facility];  Service: Endoscopy;  Laterality: N/A;    OOPHORECTOMY      SUPERIOR MESTENTERIC ARTERY STENT         Review of patient's allergies indicates:  No Known Allergies    Current Facility-Administered Medications on File Prior to Encounter    Medication    [COMPLETED] 0.9%  NaCl infusion    [COMPLETED] acetaminophen tablet 1,000 mg    [COMPLETED] albuterol-ipratropium 2.5 mg-0.5 mg/3 mL nebulizer solution 3 mL    [DISCONTINUED] atorvastatin tablet 40 mg    [DISCONTINUED] candesartan tablet 16 mg    [DISCONTINUED] cilostazoL tablet 50 mg    [DISCONTINUED] clopidogreL tablet 75 mg    [DISCONTINUED] enoxaparin injection 40 mg    [DISCONTINUED] escitalopram oxalate tablet 10 mg    [DISCONTINUED] fluticasone furoate-vilanterol 100-25 mcg/dose diskus inhaler 1 puff    [DISCONTINUED] fluticasone propionate 50 mcg/actuation nasal spray 50 mcg    [DISCONTINUED] fluticasone-salmeterol 250-50 mcg/dose diskus inhaler 1 puff    [DISCONTINUED] levoFLOXacin 750 mg/150 mL IVPB 750 mg    [DISCONTINUED] levoFLOXacin 750 mg/150 mL IVPB 750 mg    [DISCONTINUED] levoFLOXacin 750 mg/150 mL IVPB 750 mg    [DISCONTINUED] levothyroxine tablet 88 mcg    [DISCONTINUED] oseltamivir capsule 30 mg    [DISCONTINUED] oseltamivir capsule 75 mg    [DISCONTINUED] pantoprazole EC tablet 40 mg    [DISCONTINUED] sodium chloride 0.9% flush 10 mL    [DISCONTINUED] tiotropium inhalation capsule 18 mcg    [DISCONTINUED] valsartan tablet 320 mg     Current Outpatient Medications on File Prior to Encounter   Medication Sig    albuterol (PROVENTIL/VENTOLIN HFA) 90 mcg/actuation inhaler INHALE 1 TO 2 PUFFS BY MOUTH INTO THE LUNGS EVERY 6 HOURS AS NEEDED FOR WHEEZING OR SHORTNESS OF BREATH( RESCUE)    atorvastatin (LIPITOR) 40 MG tablet Take 1 tablet (40 mg total) by mouth once daily.    cilostazoL (PLETAL) 50 MG Tab TAKE 1 TABLET(50 MG) BY MOUTH TWICE DAILY    clopidogrel (PLAVIX) 75 mg tablet TAKE 1 TABLET BY MOUTH EVERY DAY    escitalopram oxalate (LEXAPRO) 10 MG tablet Take 1 tablet (10 mg total) by mouth nightly.    fluticasone propionate (FLONASE) 50 mcg/actuation nasal spray 1 spray (50 mcg total) by Each Nostril route once daily.    fluticasone-salmeterol diskus  inhaler 250-50 mcg Inhale 1 puff into the lungs 2 (two) times daily.    levothyroxine (SYNTHROID) 88 MCG tablet TAKE 1 TABLET(88 MCG) BY MOUTH EVERY DAY    pantoprazole (PROTONIX) 40 MG tablet TAKE 1 TABLET(40 MG) BY MOUTH EVERY DAY    tiotropium (SPIRIVA WITH HANDIHALER) 18 mcg inhalation capsule Inhale 1 capsule (18 mcg total) into the lungs once daily.    valsartan (DIOVAN) 320 MG tablet Take 1 tablet (320 mg total) by mouth once daily.     Family History     Problem Relation (Age of Onset)    Cancer Sister, Brother    No Known Problems Mother        Tobacco Use    Smoking status: Current Every Day Smoker     Packs/day: 0.50     Years: 60.00     Pack years: 30.00     Types: Cigarettes    Smokeless tobacco: Never Used    Tobacco comment: Pt declines enrollment in [employer] / Ambulatory Smoking Cessation program.    Substance and Sexual Activity    Alcohol use: No     Frequency: Never     Drinks per session: Patient refused     Binge frequency: Never    Drug use: No    Sexual activity: Not on file     Review of Systems   Constitutional: Positive for activity change, appetite change, chills, fatigue and fever.   HENT: Negative for congestion, ear pain, rhinorrhea and sinus pressure.    Eyes: Negative for pain and discharge.   Respiratory: Positive for shortness of breath. Negative for cough, chest tightness and wheezing.    Cardiovascular: Negative for chest pain and leg swelling.   Gastrointestinal: Positive for abdominal pain and vomiting. Negative for abdominal distention, diarrhea and nausea.   Endocrine: Negative for cold intolerance and heat intolerance.   Genitourinary: Negative for difficulty urinating, flank pain, frequency, hematuria and urgency.   Musculoskeletal: Positive for myalgias. Negative for arthralgias and joint swelling.   Allergic/Immunologic: Negative for environmental allergies and food allergies.   Neurological: Positive for weakness. Negative for dizziness, light-headedness  and headaches.   Hematological: Does not bruise/bleed easily.   Psychiatric/Behavioral: Negative for agitation, behavioral problems and decreased concentration.     Objective:     Vital Signs (Most Recent):  Temp: (!) 100.8 °F (38.2 °C) (03/03/20 0140)  Pulse: 91 (03/03/20 0140)  Resp: 18 (03/03/20 0140)  BP: (!) 175/73 (03/03/20 0140)  SpO2: (!) 92 % (03/03/20 0140) Vital Signs (24h Range):  Temp:  [98.7 °F (37.1 °C)-100.8 °F (38.2 °C)] 100.8 °F (38.2 °C)  Pulse:  [] 91  Resp:  [18-20] 18  SpO2:  [82 %-95 %] 92 %  BP: (117-189)/(56-79) 175/73        There is no height or weight on file to calculate BMI.    Physical Exam   Constitutional: She appears well-developed and well-nourished.   HENT:   Head: Normocephalic.   Eyes: Conjunctivae are normal. Right eye exhibits no discharge. Left eye exhibits no discharge.   Neck: Normal range of motion. Neck supple.   Cardiovascular: Regular rhythm, normal heart sounds and intact distal pulses. Tachycardia present.   Pulses:       Radial pulses are 1+ on the right side, and 1+ on the left side.   Pulmonary/Chest: Effort normal. Tachypnea noted. No respiratory distress. She has decreased breath sounds in the left lower field. She has rhonchi in the right lower field.   Abdominal: Soft. She exhibits no distension. Bowel sounds are decreased. There is generalized tenderness.   Musculoskeletal: Normal range of motion.   Neurological: She is alert. GCS eye subscore is 3. GCS verbal subscore is 4. GCS motor subscore is 6.   Skin: Skin is warm and dry.   Psychiatric: She has a normal mood and affect. Her speech is normal and behavior is normal.           Significant Labs:   CBC:   Recent Labs   Lab 03/02/20  1606   WBC 13.35*   HGB 13.0   HCT 39.8        CMP:   Recent Labs   Lab 03/02/20  1606   *   K 4.1   CL 94*   CO2 28   *   BUN 13   CREATININE 1.0   CALCIUM 9.4   PROT 7.9   ALBUMIN 3.6   BILITOT 0.9   ALKPHOS 115   AST 27   ALT 17   ANIONGAP 11    EGFRNONAA 53*       Significant Imaging: I have reviewed all pertinent imaging results/findings within the past 24 hours.

## 2020-03-03 NOTE — HPI
The patient is a 80 yo with history of CVA, COPD on O2 PRN, HTN, PVD presents complaining of weakness and confusion for 3 days.  Five days ago, the patient began having malaise, fatigue, and anorexia. Symptoms continued to progress to chills, night sweats and nausea/vomiting.  She also became more short of breath and had increased O2 requirement to 3L.  This morning she sustained a mechanical fall out of her bed where she did not lose consciousness and was brought to her PCP this afternoon to work up her fall. PCP noted her to appear clinically dehydrated and was concerned she may have a head injury and instructed her to come to the ED.  On ER workup, patient was found to have a lower lobe pneumonia and was admitted to the hospital, but was unable to placed in a bed.  The patient was transferred to [facility] for a room assignment.

## 2020-03-03 NOTE — H&P
[facility] Medicine  History & Physical    Patient Name: Brittany Haile  MRN: [id number]  Admission Date: 3/3/2020  Attending Physician: Marcie Min, *   Primary Care Provider: Wayne Frazier MD         Patient information was obtained from patient, past medical records and ER records.     Subjective:     Principal Problem:Community acquired pneumonia of right lower lobe of lung    Chief Complaint: No chief complaint on file.       HPI: The patient is a 80 yo with history of CVA, COPD on O2 PRN, HTN, PVD presents complaining of  weakness and confusion for 3 days.  Five days ago, the patient began having malaise, fatigue, and anorexia. Symptoms continued to progress to chills, night sweats and nausea/vomiting.  She also became more short of breath and had increased O2 requirement to 3L.  This morning she sustained a mechanical fall out of her bed where she did not lose consciousness and was brought to her PCP this afternoon to work up her fall. PCP noted her to appear clinically dehydrated and was concerned she may have a head injury and instructed her to come to the ED.  On ER workup, patient was found to have a lower lobe pneumonia and was admitted to the hospital, but was unable to placed in a bed.  The patient was transferred to [facility] for a room assignment.          Past Medical History:   Diagnosis Date    Abdominal pain     CHF (congestive heart failure)     Chronic kidney disease, stage III (moderate) 8/19/2015    COPD (chronic obstructive pulmonary disease)     COPD (chronic obstructive pulmonary disease)     CVA (cerebral infarction)     Esophagitis     Gastritis     GERD (gastroesophageal reflux disease)     GI bleed     Hyperlipidemia     Hypertension     Mesenteric angina     PVD (peripheral vascular disease) with claudication 10/13/2015    Stroke     Subclavian artery stenosis, left     Thyroid disease        Past Surgical History:   Procedure  Laterality Date    CAROTID STENT       SECTION      COLONOSCOPY N/A 3/2/2017    Procedure: COLONOSCOPY;  Surgeon: Cecil Crooks MD;  Location: [facility] ENDO;  Service: Endoscopy;  Laterality: N/A;    COLONOSCOPY N/A 2019    Procedure: COLONOSCOPY/Suprep;  Surgeon: Barry Talbert MD;  Location: [facility] ENDO;  Service: Endoscopy;  Laterality: N/A;    CORONARY ANGIOPLASTY  2006    EPIDURAL STEROID INJECTION INTO LUMBAR SPINE N/A 2018    Procedure: INJECTION, STEROID, SPINE, LUMBAR, EPIDURAL- L2-3 ORAL SEDATION;  Surgeon: Stiven Ibarra Jr., MD;  Location: [facility] PAIN MGT;  Service: Pain Management;  Laterality: N/A;    ESOPHAGOGASTRODUODENOSCOPY N/A 2019    Procedure: EGD (ESOPHAGOGASTRODUODENOSCOPY);  Surgeon: Barry Talbert MD;  Location: [facility] ENDO;  Service: Endoscopy;  Laterality: N/A;    GALLBLADDER SURGERY      HYSTERECTOMY      INTRALUMINAL GASTROINTESTINAL TRACT IMAGING VIA CAPSULE N/A 2019    Procedure: IMAGING PROCEDURE, GI TRACT, INTRALUMINAL, VIA CAPSULE;  Surgeon: Barry Talbert MD;  Location: [facility] ENDO;  Service: Endoscopy;  Laterality: N/A;    OOPHORECTOMY      SUPERIOR MESTENTERIC ARTERY STENT         Review of patient's allergies indicates:  No Known Allergies    Current Facility-Administered Medications on File Prior to Encounter   Medication    [COMPLETED] 0.9%  NaCl infusion    [COMPLETED] acetaminophen tablet 1,000 mg    [COMPLETED] albuterol-ipratropium 2.5 mg-0.5 mg/3 mL nebulizer solution 3 mL    [DISCONTINUED] atorvastatin tablet 40 mg    [DISCONTINUED] candesartan tablet 16 mg    [DISCONTINUED] cilostazoL tablet 50 mg    [DISCONTINUED] clopidogreL tablet 75 mg    [DISCONTINUED] enoxaparin injection 40 mg    [DISCONTINUED] escitalopram oxalate tablet 10 mg    [DISCONTINUED] fluticasone furoate-vilanterol 100-25 mcg/dose diskus inhaler 1 puff    [DISCONTINUED] fluticasone propionate 50 mcg/actuation nasal spray 50 mcg     [DISCONTINUED] fluticasone-salmeterol 250-50 mcg/dose diskus inhaler 1 puff    [DISCONTINUED] levoFLOXacin 750 mg/150 mL IVPB 750 mg    [DISCONTINUED] levoFLOXacin 750 mg/150 mL IVPB 750 mg    [DISCONTINUED] levoFLOXacin 750 mg/150 mL IVPB 750 mg    [DISCONTINUED] levothyroxine tablet 88 mcg    [DISCONTINUED] oseltamivir capsule 30 mg    [DISCONTINUED] oseltamivir capsule 75 mg    [DISCONTINUED] pantoprazole EC tablet 40 mg    [DISCONTINUED] sodium chloride 0.9% flush 10 mL    [DISCONTINUED] tiotropium inhalation capsule 18 mcg    [DISCONTINUED] valsartan tablet 320 mg     Current Outpatient Medications on File Prior to Encounter   Medication Sig    albuterol (PROVENTIL/VENTOLIN HFA) 90 mcg/actuation inhaler INHALE 1 TO 2 PUFFS BY MOUTH INTO THE LUNGS EVERY 6 HOURS AS NEEDED FOR WHEEZING OR SHORTNESS OF BREATH( RESCUE)    atorvastatin (LIPITOR) 40 MG tablet Take 1 tablet (40 mg total) by mouth once daily.    cilostazoL (PLETAL) 50 MG Tab TAKE 1 TABLET(50 MG) BY MOUTH TWICE DAILY    clopidogrel (PLAVIX) 75 mg tablet TAKE 1 TABLET BY MOUTH EVERY DAY    escitalopram oxalate (LEXAPRO) 10 MG tablet Take 1 tablet (10 mg total) by mouth nightly.    fluticasone propionate (FLONASE) 50 mcg/actuation nasal spray 1 spray (50 mcg total) by Each Nostril route once daily.    fluticasone-salmeterol diskus inhaler 250-50 mcg Inhale 1 puff into the lungs 2 (two) times daily.    levothyroxine (SYNTHROID) 88 MCG tablet TAKE 1 TABLET(88 MCG) BY MOUTH EVERY DAY    pantoprazole (PROTONIX) 40 MG tablet TAKE 1 TABLET(40 MG) BY MOUTH EVERY DAY    tiotropium (SPIRIVA WITH HANDIHALER) 18 mcg inhalation capsule Inhale 1 capsule (18 mcg total) into the lungs once daily.    valsartan (DIOVAN) 320 MG tablet Take 1 tablet (320 mg total) by mouth once daily.     Family History     Problem Relation (Age of Onset)    Cancer Sister, Brother    No Known Problems Mother        Tobacco Use    Smoking status: Current Every Day  Smoker     Packs/day: 0.50     Years: 60.00     Pack years: 30.00     Types: Cigarettes    Smokeless tobacco: Never Used    Tobacco comment: Pt declines enrollment in [employer] / Ambulatory Smoking Cessation program.    Substance and Sexual Activity    Alcohol use: No     Frequency: Never     Drinks per session: Patient refused     Binge frequency: Never    Drug use: No    Sexual activity: Not on file     Review of Systems   Constitutional: Positive for activity change, appetite change, chills, fatigue and fever.   HENT: Negative for congestion, ear pain, rhinorrhea and sinus pressure.    Eyes: Negative for pain and discharge.   Respiratory: Positive for shortness of breath. Negative for cough, chest tightness and wheezing.    Cardiovascular: Negative for chest pain and leg swelling.   Gastrointestinal: Positive for abdominal pain and vomiting. Negative for abdominal distention, diarrhea and nausea.   Endocrine: Negative for cold intolerance and heat intolerance.   Genitourinary: Negative for difficulty urinating, flank pain, frequency, hematuria and urgency.   Musculoskeletal: Positive for myalgias. Negative for arthralgias and joint swelling.   Allergic/Immunologic: Negative for environmental allergies and food allergies.   Neurological: Positive for weakness. Negative for dizziness, light-headedness and headaches.   Hematological: Does not bruise/bleed easily.   Psychiatric/Behavioral: Negative for agitation, behavioral problems and decreased concentration.     Objective:     Vital Signs (Most Recent):  Temp: (!) 100.8 °F (38.2 °C) (03/03/20 0140)  Pulse: 91 (03/03/20 0140)  Resp: 18 (03/03/20 0140)  BP: (!) 175/73 (03/03/20 0140)  SpO2: (!) 92 % (03/03/20 0140) Vital Signs (24h Range):  Temp:  [98.7 °F (37.1 °C)-100.8 °F (38.2 °C)] 100.8 °F (38.2 °C)  Pulse:  [] 91  Resp:  [18-20] 18  SpO2:  [82 %-95 %] 92 %  BP: (117-189)/(56-79) 175/73        There is no height or weight on file to calculate  BMI.    Physical Exam   Constitutional: She appears well-developed and well-nourished.   HENT:   Head: Normocephalic.   Eyes: Conjunctivae are normal. Right eye exhibits no discharge. Left eye exhibits no discharge.   Neck: Normal range of motion. Neck supple.   Cardiovascular: Regular rhythm, normal heart sounds and intact distal pulses. Tachycardia present.   Pulses:       Radial pulses are 1+ on the right side, and 1+ on the left side.   Pulmonary/Chest: Effort normal. Tachypnea noted. No respiratory distress. She has decreased breath sounds in the left lower field. She has rhonchi in the right lower field.   Abdominal: Soft. She exhibits no distension. Bowel sounds are decreased. There is generalized tenderness.   Musculoskeletal: Normal range of motion.   Neurological: She is alert. GCS eye subscore is 3. GCS verbal subscore is 4. GCS motor subscore is 6.   Skin: Skin is warm and dry.   Psychiatric: She has a normal mood and affect. Her speech is normal and behavior is normal.           Significant Labs:   CBC:   Recent Labs   Lab 03/02/20  1606   WBC 13.35*   HGB 13.0   HCT 39.8        CMP:   Recent Labs   Lab 03/02/20  1606   *   K 4.1   CL 94*   CO2 28   *   BUN 13   CREATININE 1.0   CALCIUM 9.4   PROT 7.9   ALBUMIN 3.6   BILITOT 0.9   ALKPHOS 115   AST 27   ALT 17   ANIONGAP 11   EGFRNONAA 53*       Significant Imaging: I have reviewed all pertinent imaging results/findings within the past 24 hours.    Assessment/Plan:     * Community acquired pneumonia of right lower lobe of lung  CXR- Right basilar infiltrate and pleural effusion could represent pneumonia.  Mild interstitial infiltrates in the right upper lobe also.  Follow-up is recommended.    Rocephin/Azithromycin  Duonebs        Essential hypertension  Hypertensive currently    Continue valsartan      Chronic diastolic heart failure  Patient appears well compensated.    Monitor for acute decompensation with IVF admin      Chronic  kidney disease, stage III (moderate)  Creatinine 1, at baseline    Monitor for acute decompensation      HLD (hyperlipidemia)  Lipid Panel pending    Continue Lipitor      COPD (chronic obstructive pulmonary disease)  Will continue Breo, Spiriva  Duonebs      Hypothyroid  Continue levothyroxine        VTE Risk Mitigation (From admission, onward)         Ordered     heparin (porcine) injection 5,000 Units  Every 8 hours      03/03/20 0253     IP VTE HIGH RISK PATIENT  Once      03/03/20 0253                   Moe Vaughan NP  Department of Hospital Medicine   [facility]

## 2020-03-03 NOTE — ED NOTES
Pt wheeled to bathroom with O2 tank and NC @ 3L. Pt had to have bowel movement- both daughters are in bathroom with pt.

## 2020-03-03 NOTE — PLAN OF CARE
Initial Discharge Planning Assessment:  Patient admitted on 3/3/2020    Chart reviewed, Care plan discussed with treatment team,  attending Dr. Min    PCP updated in [facility]: Dr. Frazier  Pharmacy, updated in [facility]: [facility]    DME at home: oxygen (pt does not know name of supplier), rollator, cane and shower chair    Current dispo: Pt states she wants to return home.  Awaiting PT/OT and MD recs.    Anticipated dispo: HH vs SNF    Transportation: family can drive home    Power of  or Living Will: on file with epic    Case management  to follow.       03/03/20 1258   Discharge Assessment   Assessment Type Discharge Planning Assessment   Confirmed/corrected address and phone number on facesheet? Yes   Assessment information obtained from? Patient   Communicated expected length of stay with patient/caregiver yes   Prior to hospitilization cognitive status: Alert/Oriented   Prior to hospitalization functional status: Assistive Equipment   Current cognitive status: Alert/Oriented   Current Functional Status: Assistive Equipment   Lives With child(martha), adult   Who are your caregiver(s) and their phone number(s)? Daughter: China Alexandre [phone]   Patient's perception of discharge disposition home health   Readmission Within the Last 30 Days no previous admission in last 30 days   Patient currently being followed by outpatient case management? No   Patient currently receives any other outside agency services? No   Equipment Currently Used at Home rollator;oxygen;shower chair;cane, straight   Do you have any problems affording any of your prescribed medications? No   Is the patient taking medications as prescribed? yes   Does the patient have transportation home? Yes   Transportation Anticipated family or friend will provide   Does the patient receive services at the [facility]? No   Discharge Plan A Home Health   Discharge Plan B Skilled Nursing Facility   DME Needed Upon Discharge   none   Patient/Family in Agreement with Plan yes

## 2020-03-03 NOTE — H&P
[facility] Internal Medicine History and Physical - Resident Note    Admitting Team: Medicine Team B  Attending Physician: Homer  Resident: Angelia  Intern: Roscoe    Date of Admit: 3/2/2020    Chief Complaint     Abdominal pain, confusion    Subjective:      History of Present Illness:  80 yo with history of CVA, COPD on O2 PRN, HTN, PVD presents complaining of confusion for 3 days. History provided by family at bedside and patient. 5 days ago patient began having malaise, fatigue, and anorexia. 3 days ago developed chills, night sweats and became nauseous. Family noted that her voice was difficult to understand, and she had an episode of fecal incontinence yesterday which is not patients' baseline. She also became more short of breath and had increased O2 requirement to 3L.     This morning she sustained a mechanical fall out of her bed (height of 1-2 feet) where she did not lose consciousness and was brought to her PCP this afternoon to work up her fall. PCP noted her to appear clinically dehydrated and was concerned she may have a head injury and instructed her to come to the ED.     Patient has had 3-5 admissions for COPD exacerbations in the past, but has never been admitted to the ICU or intubated. Denies increased frequency of cough or sputum production. Denies chest pain, hemoptysis, recent travel, leg swelling, history of blood clots. No chest pain, diaphoresis. No orthopnea, decreased urine output. No sick contacts.     In the ED she was noted to be tachypneic, required 3L of O2 to keep O2 sats >92%, was febrile to 100.8 and tachycardic to 108. Basic labs, a CXR, a head CT was done and patient was given levofloxacin, 1L NS, and a breathing treatment.     Past Medical History:  Past Medical History:   Diagnosis Date    Abdominal pain     CHF (congestive heart failure)     Chronic kidney disease, stage III (moderate) 8/19/2015    COPD (chronic obstructive pulmonary disease)     COPD (chronic obstructive  pulmonary disease)     CVA (cerebral infarction)     Esophagitis     Gastritis     GERD (gastroesophageal reflux disease)     GI bleed     Hyperlipidemia     Hypertension     Mesenteric angina     PVD (peripheral vascular disease) with claudication 10/13/2015    Stroke     Subclavian artery stenosis, left     Thyroid disease        Past Surgical History:  Past Surgical History:   Procedure Laterality Date    CAROTID STENT       SECTION      COLONOSCOPY N/A 3/2/2017    Procedure: COLONOSCOPY;  Surgeon: Cecil Crooks MD;  Location: [facility] ENDO;  Service: Endoscopy;  Laterality: N/A;    COLONOSCOPY N/A 2019    Procedure: COLONOSCOPY/Suprep;  Surgeon: Barry Talbert MD;  Location: [facility] ENDO;  Service: Endoscopy;  Laterality: N/A;    CORONARY ANGIOPLASTY      EPIDURAL STEROID INJECTION INTO LUMBAR SPINE N/A 2018    Procedure: INJECTION, STEROID, SPINE, LUMBAR, EPIDURAL- L2-3 ORAL SEDATION;  Surgeon: Stiven Ibarra Jr., MD;  Location: [facility] PAIN MGT;  Service: Pain Management;  Laterality: N/A;    ESOPHAGOGASTRODUODENOSCOPY N/A 2019    Procedure: EGD (ESOPHAGOGASTRODUODENOSCOPY);  Surgeon: Barry Talbert MD;  Location: [facility] ENDO;  Service: Endoscopy;  Laterality: N/A;    GALLBLADDER SURGERY      HYSTERECTOMY      INTRALUMINAL GASTROINTESTINAL TRACT IMAGING VIA CAPSULE N/A 2019    Procedure: IMAGING PROCEDURE, GI TRACT, INTRALUMINAL, VIA CAPSULE;  Surgeon: Barry Talbert MD;  Location: [facility] ENDO;  Service: Endoscopy;  Laterality: N/A;    OOPHORECTOMY      SUPERIOR MESTENTERIC ARTERY STENT         Allergies:  Review of patient's allergies indicates:  No Known Allergies    Home Medications:  Prior to Admission medications    Medication Sig Start Date End Date Taking? Authorizing Provider   albuterol (PROVENTIL/VENTOLIN HFA) 90 mcg/actuation inhaler INHALE 1 TO 2 PUFFS BY MOUTH INTO THE LUNGS EVERY 6 HOURS AS NEEDED FOR WHEEZING OR  SHORTNESS OF BREATH( RESCUE) 2/16/20   Wayne Frazier MD   atorvastatin (LIPITOR) 40 MG tablet Take 1 tablet (40 mg total) by mouth once daily. 1/31/19   Wayne Frazier MD   cilostazoL (PLETAL) 50 MG Tab TAKE 1 TABLET(50 MG) BY MOUTH TWICE DAILY 2/10/20   Anthony Brooks MD   clopidogrel (PLAVIX) 75 mg tablet TAKE 1 TABLET BY MOUTH EVERY DAY 12/19/19   Anthony Brooks MD   escitalopram oxalate (LEXAPRO) 10 MG tablet Take 1 tablet (10 mg total) by mouth nightly. 1/31/19   Wayne Frazier MD   fluticasone propionate (FLONASE) 50 mcg/actuation nasal spray 1 spray (50 mcg total) by Each Nostril route once daily. 1/21/20   Wayne Frazier MD   fluticasone-salmeterol diskus inhaler 250-50 mcg Inhale 1 puff into the lungs 2 (two) times daily. 11/4/19   Wayne Frazier MD   levothyroxine (SYNTHROID) 88 MCG tablet TAKE 1 TABLET(88 MCG) BY MOUTH EVERY DAY 9/11/19   Wayne Frazier MD   pantoprazole (PROTONIX) 40 MG tablet TAKE 1 TABLET(40 MG) BY MOUTH EVERY DAY 1/31/19   Wayne Frazier MD   tiotropium (SPIRIVA WITH HANDIHALER) 18 mcg inhalation capsule Inhale 1 capsule (18 mcg total) into the lungs once daily. 1/31/19   Wayne Frazier MD   valsartan (DIOVAN) 320 MG tablet Take 1 tablet (320 mg total) by mouth once daily. 10/26/19 10/25/20  Jacquelyn Worthy MD   doxycycline (MONODOX) 100 MG capsule Take 1 capsule (100 mg total) by mouth 2 (two) times daily. 1/21/20 3/2/20  Wayne Frazier MD       Family History:  Family History   Problem Relation Age of Onset    No Known Problems Mother     Cancer Sister         breast    Cancer Brother         throat cancer       Social History:  Social History     Tobacco Use    Smoking status: Current Every Day Smoker     Packs/day: 0.50     Years: 60.00     Pack years: 30.00     Types: Cigarettes    Smokeless tobacco: Never Used    Tobacco comment: Pt declines enrollment in [employer] / Ambulatory Smoking Cessation program.    Substance Use Topics    Alcohol use: No      "Frequency: Never     Drinks per session: Patient refused     Binge frequency: Never    Drug use: No       Review of Systems:  Pertinent positives and negatives are listed in HPI.  All other systems are reviewed and are negative.    Health Maintaince :   Primary Care Physician: Freddie  Immunizations:   TDap is not up to date.  Influenza is up to date.  Pneumovax is up to date.  Cancer Screening:  PAP: is not up to date.   Mammogram: is not up to date.    Colonoscopy: is up to date.      Objective:   Last 24 Hour Vital Signs:  BP  Min: 138/58  Max: 189/79  Temp  Av.5 °F (38.1 °C)  Min: 99.8 °F (37.7 °C)  Max: 100.8 °F (38.2 °C)  Pulse  Av.3  Min: 101  Max: 110  Resp  Av  Min: 20  Max: 20  SpO2  Av %  Min: 82 %  Max: 95 %  Height  Av' 1" (154.9 cm)  Min: 5' 1" (154.9 cm)  Max: 5' 1" (154.9 cm)  Weight  Av.1 kg (128 lb)  Min: 58.1 kg (128 lb)  Max: 58.1 kg (128 lb)  Body mass index is 24.19 kg/m².  No intake/output data recorded.    Physical Examination:  General: sitting up in bed eating, mumbles words but interactive, speaking in 4 word sentences on 3L NC  HEENT: Dry mucous membranes, no tender anterior cervical LA. No raccoon sign, gonzales sign  Eyes: no subconjunctival pallor  Heart: RRR no MRG, pulses symmetic and 2+, warm extremities  Pulm: Rhonchorous breath sounds on right, no wheezes  MSK: no pretibial edema  Skin: warm and dry  Neuro: alert and interactive, moving all extremities spontaneously  Psych: pleasant affect       Laboratory:  Most Recent Data:  CBC:   Lab Results   Component Value Date    WBC 13.35 (H) 2020    HGB 13.0 2020    HCT 39.8 2020     2020    MCV 95 2020    RDW 14.1 2020     BMP:   Lab Results   Component Value Date     (L) 2020    K 4.1 2020    CL 94 (L) 2020    CO2 28 2020    BUN 13 2020    CREATININE 1.0 2020     (H) 2020    CALCIUM 9.4 2020    MG 2.0 " 10/26/2019    PHOS 3.3 10/26/2019     LFTs:   Lab Results   Component Value Date    PROT 7.9 03/02/2020    ALBUMIN 3.6 03/02/2020    BILITOT 0.9 03/02/2020    AST 27 03/02/2020    ALKPHOS 115 03/02/2020    ALT 17 03/02/2020     Coags:   Lab Results   Component Value Date    INR 1.0 03/02/2020     Urinalysis:   Lab Results   Component Value Date    LABURIN No growth 04/20/2017    COLORU Brown (A) 03/02/2020    SPECGRAV >=1.030 (A) 03/02/2020    NITRITE Negative 03/02/2020    KETONESU Negative 03/02/2020    UROBILINOGEN 1.0 03/02/2020    WBCUA 1 03/02/2020       Trended Lab Data:  Recent Labs   Lab 03/02/20  1606   WBC 13.35*   HGB 13.0   HCT 39.8      MCV 95   RDW 14.1   *   K 4.1   CL 94*   CO2 28   BUN 13   CREATININE 1.0   *   PROT 7.9   ALBUMIN 3.6   BILITOT 0.9   AST 27   ALKPHOS 115   ALT 17       Trended Cardiac Data:  Recent Labs   Lab 03/02/20  1606   TROPONINI 0.043*   *       Microbiology Data:  Influenza negative  Blood cultures x2 3/2: p/d    Other Laboratory Data:      Other Results:  EKG (my interpretation):   Sinus tachycardia at a rate of around 100. Background precludes full evaluation of P waves and T waves but axis normal, QRS narrow. Adequate R wave progression. No obvious TWI (prior from 10/2019 had diffuse TWI)    Radiology:  Imaging Results          CT Head Without Contrast (Final result)  Result time 03/02/20 18:36:26    Final result by Ada Rowland MD (03/02/20 18:36:26)                 Impression:      No acute intracranial abnormalities identified.      Electronically signed by: Ada Rowland MD  Date:    03/02/2020  Time:    18:36             Narrative:    EXAMINATION:  CT HEAD WITHOUT CONTRAST    CLINICAL HISTORY:  Confusion/delirium, altered LOC, unexplained;    TECHNIQUE:  Low dose axial images were obtained through the head.  Coronal and sagittal reformations were also performed. Contrast was not administered.    COMPARISON:  CT head from October  2019.    FINDINGS:  There is chronic microvascular ischemic disease.  No evidence of acute/recent major vascular distribution cerebral infarction, intraparenchymal hemorrhage, or intra-axial space occupying lesion. The ventricular system is normal in size and configuration with no evidence of hydrocephalus. No effacement of the skull-base cisterns. No abnormal extra-axial fluid collections or blood products. Visualized paranasal sinuses and mastoid air cells are essentially clear.  The calvarium shows no significant abnormality.                                X-Ray Chest 1 View (Final result)  Result time 03/02/20 16:32:27    Final result by Jesús Nelson MD (03/02/20 16:32:27)                 Impression:      1. Right basilar infiltrate and pleural effusion could represent pneumonia.  Mild interstitial infiltrates in the right upper lobe also.  Follow-up is recommended.  2.  This report was flagged in Epic as abnormal.      Electronically signed by: Jesús Nelson  Date:    03/02/2020  Time:    16:32             Narrative:    EXAMINATION:  XR CHEST 1 VIEW    CLINICAL HISTORY:  Other fatigue    TECHNIQUE:  Single frontal view of the chest was performed.    COMPARISON:  01/21/2020    FINDINGS:  A suboptimal inspiration.    Right basilar opacity could represent pleural effusion and mild infiltrate.  This is new from the prior study.  Follow-up recommended.    Mild interstitial infiltrates in the right upper lobe also.    Heart is normal size.  No edema is detected.    No mass is identified.  No acute osseous abnormality.  No evidence of pneumothorax.                                   Assessment:     78 YO  female with history of COPD on PRN 2L, HFpEF, CVA, HTN presents with shortness of breath, nausea, diarrhea, and CXR showing RLL infiltrate.      Plan:     Community Acquired Pneumonia of Right Lower Lobe   Pt has had increased O2 requirement, fever to 100.8, CXR with RLL infiltrate, neutorphilic predom  leukocytosis concerning for lobar pneumonia.   -Pt is high risk based on CURB-65: confusion, uremia, respiratory rate >30, age >65  -Continue Levofloxacin 750 QD IV for expected 5 day course for uncomplicated  -Add Oseltamavir 75 BID PO for empiric flu treatment  -3L O2 by NC to keep sats 92-99%  -f/u legionella and strep pneumo urine antigen  -f/u blood cultures    Chronic Obstructive Pulmonary Disease, not in Exacerbation  -Continue home fluticasone-salmeterol BID  -Continue home PRN albuterol for wheezing  -Continue home fluticasone propionate 50 mcg QD  -Continue home tiotropium 18mcg QD  -NC O2 as needed to keep sats >92%    Peripheral Vascular Disease with Claudication  Patient follows with Dr. Brooks, had SMA and celiac stenting and R CEA  -Continue home plavix 75 QD, Cilostazol 50 BID, Lipitor 40 QD     Heart Failure with Preserved Ejection Fraction, Grade I Diastolic Dysfunction  -TTE 10/2019 showed LVEF >60%, concentric LVH  -BNP mildly elevated to 211 above patient's baseline of 70   -No signs of volume overload at the moment, will hold off on diuresis    Hypertension  -Continue home valsartan 320 QD    Hypothyroidism  TSH 1.183 on admission, 0.509 4 months prior  -Continue home synthroid 88 QAC PO     Tobacco Abuse  Daily smoker, 68 PYH.   -Nicotine patches QD while inpatient    Depression  Continue home lexapro 10 QHS  -No active SI/HI    Hyperlipidemia  Lipid panel 7/2019 at goal  -Continue home lipitor 40 QD    Code Status:     Full    Jovani Malhotra  [facility] Internal Medicine HO-I  U Medicine Service    [facility] Medicine Hospitalist Pager numbers:   U Hospitalist Medicine Team A (Bradley/Kristin): [phone]  [facility] Hospitalist Medicine Team B (Homer/James):  [phone]

## 2020-03-03 NOTE — ASSESSMENT & PLAN NOTE
CXR- Right basilar infiltrate and pleural effusion could represent pneumonia.  Mild interstitial infiltrates in the right upper lobe also.  Follow-up is recommended.    Rocephin/Azithromycin  Duonebs

## 2020-03-03 NOTE — PLAN OF CARE
(Physician in Lead of Transfers)   Outside Transfer Acceptance Note / Regional Referral Center      Transferring Physician: Antonieta Stern Jr., MD    Accepting Physician: Cait Jorge MD    Date of Acceptance: 03/02/2020    Transferring Facility: hospitals    Reason for Transfer: capacity    Report from Transferring Physician/Hospital course:  Patient is a 79 y.o. female who has a past medical history of CHF (congestive heart failure), Chronic kidney disease, stage III (moderate), COPD (chronic obstructive pulmonary disease), CVA (cerebral infarction), Esophagitis, Gastritis, GERD (gastroesophageal reflux disease), Hyperlipidemia, Hypertension, PVD (peripheral vascular disease) with claudication, Subclavian artery stenosis, left, and Thyroid disease presented with SOB and consfusion. In the ED she was noted to be tachypneic, required 3L of O2 to keep O2 sats >92%, was febrile to 100.8 and tachycardic to 108. CXR with RLL infiltrate concerning for lobar pneumonia. Basic labs, a head CT was done and patient was given levofloxacin, 1L NS, and a breathing treatment. Facility seeking transfer because facility is at capacity.       Labs & Radiographs: see EPIC    Significant Labs:   ABG: No results for input(s): PH, PCO2, HCO3, POCSATURATED, BE in the last 48 hours., Blood Culture: No results for input(s): LABBLOO in the last 48 hours., CMP:   Recent Labs   Lab 03/02/20  1606   *   K 4.1   CL 94*   CO2 28   *   BUN 13   CREATININE 1.0   CALCIUM 9.4   PROT 7.9   ALBUMIN 3.6   BILITOT 0.9   ALKPHOS 115   AST 27   ALT 17   ANIONGAP 11   ESTGFRAFRICA >60   EGFRNONAA 53*   , CBC:   Recent Labs   Lab 03/02/20  1606   WBC 13.35*   HGB 13.0   HCT 39.8      , INR:   Recent Labs   Lab 03/02/20  1606   INR 1.0   , Lipid Panel No results for input(s): CHOL, HDL, LDLCALC, TRIG, CHOLHDL in the last 48 hours. and Troponin   Recent Labs   Lab 03/02/20  1606   TROPONINI 0.043*         To Do List:    1. Admit to HM  2. Cont treatment for CAP    Cait Jorge MD  Hospital Medicine Staff

## 2020-03-03 NOTE — PLAN OF CARE
[facility] Internal Medicine Plan of Care    Patient was admitted to the [facility] Internal Medicine service w/ community acquired PNA. Patient's family was notified while still in the ED that there were no beds available in the hospital at the time of admission, so family requested a transfer to another facility where beds were currently available. Patient was accepted at [facility] for admission. Admit orders To [facility] were cancelled and patient was later transferred to [facility].     Ryley Adam McPeters, MD  U Med-Peds resident, PGY-IV

## 2020-03-03 NOTE — ED NOTES
Notified family that hospital is out of beds, family requesting to go to another facility with an open bed. [facility] notified.

## 2020-03-03 NOTE — ED TRIAGE NOTES
Pt presents to ED with daughter due to advice from pts PCP. Per daughter, pt has had decreased appetite over the past week, body aches, and bilateral leg swelling. Per daughter pt is a current smoker and has COPD and CHF. Per pt, she was feeling so weak today that she could not get into her and slid down the side of the bed onto the floor. Per daughter, PCP is concerned for dehydration and kidney issues as pt urinated on herself today. Pt denies CP, SOB, diarrhea; but does report some abdominal pain.

## 2020-03-04 LAB
ALBUMIN SERPL BCP-MCNC: 2.3 G/DL (ref 3.5–5.2)
ALP SERPL-CCNC: 87 U/L (ref 55–135)
ALT SERPL W/O P-5'-P-CCNC: 13 U/L (ref 10–44)
ANION GAP SERPL CALC-SCNC: 8 MMOL/L (ref 8–16)
AST SERPL-CCNC: 15 U/L (ref 10–40)
BASOPHILS # BLD AUTO: 0.02 K/UL (ref 0–0.2)
BASOPHILS NFR BLD: 0.2 % (ref 0–1.9)
BILIRUB SERPL-MCNC: 0.2 MG/DL (ref 0.1–1)
BUN SERPL-MCNC: 23 MG/DL (ref 8–23)
CALCIUM SERPL-MCNC: 8.1 MG/DL (ref 8.7–10.5)
CHLORIDE SERPL-SCNC: 97 MMOL/L (ref 95–110)
CO2 SERPL-SCNC: 25 MMOL/L (ref 23–29)
CREAT SERPL-MCNC: 1.6 MG/DL (ref 0.5–1.4)
DIFFERENTIAL METHOD: ABNORMAL
EOSINOPHIL # BLD AUTO: 0 K/UL (ref 0–0.5)
EOSINOPHIL NFR BLD: 0.3 % (ref 0–8)
ERYTHROCYTE [DISTWIDTH] IN BLOOD BY AUTOMATED COUNT: 14.6 % (ref 11.5–14.5)
EST. GFR  (AFRICAN AMERICAN): 35 ML/MIN/1.73 M^2
EST. GFR  (NON AFRICAN AMERICAN): 30 ML/MIN/1.73 M^2
GLUCOSE SERPL-MCNC: 169 MG/DL (ref 70–110)
HCT VFR BLD AUTO: 29.5 % (ref 37–48.5)
HGB BLD-MCNC: 9.5 G/DL (ref 12–16)
IMM GRANULOCYTES # BLD AUTO: 0.06 K/UL (ref 0–0.04)
IMM GRANULOCYTES NFR BLD AUTO: 0.7 % (ref 0–0.5)
LYMPHOCYTES # BLD AUTO: 0.6 K/UL (ref 1–4.8)
LYMPHOCYTES NFR BLD: 6.9 % (ref 18–48)
MAGNESIUM SERPL-MCNC: 1.4 MG/DL (ref 1.6–2.6)
MCH RBC QN AUTO: 30.9 PG (ref 27–31)
MCHC RBC AUTO-ENTMCNC: 32.2 G/DL (ref 32–36)
MCV RBC AUTO: 96 FL (ref 82–98)
MONOCYTES # BLD AUTO: 0.7 K/UL (ref 0.3–1)
MONOCYTES NFR BLD: 8.2 % (ref 4–15)
NEUTROPHILS # BLD AUTO: 7.2 K/UL (ref 1.8–7.7)
NEUTROPHILS NFR BLD: 83.7 % (ref 38–73)
NRBC BLD-RTO: 0 /100 WBC
PHOSPHATE SERPL-MCNC: 2.5 MG/DL (ref 2.7–4.5)
PLATELET # BLD AUTO: 186 K/UL (ref 150–350)
PMV BLD AUTO: 9.4 FL (ref 9.2–12.9)
POTASSIUM SERPL-SCNC: 3.2 MMOL/L (ref 3.5–5.1)
PROT SERPL-MCNC: 5.3 G/DL (ref 6–8.4)
RBC # BLD AUTO: 3.07 M/UL (ref 4–5.4)
SODIUM SERPL-SCNC: 130 MMOL/L (ref 136–145)
WBC # BLD AUTO: 8.66 K/UL (ref 3.9–12.7)

## 2020-03-04 PROCEDURE — 25000242 PHARM REV CODE 250 ALT 637 W/ HCPCS: Performed by: NURSE PRACTITIONER

## 2020-03-04 PROCEDURE — 99233 PR SUBSEQUENT HOSPITAL CARE,LEVL III: ICD-10-PCS | Mod: ,,, | Performed by: INTERNAL MEDICINE

## 2020-03-04 PROCEDURE — 25000003 PHARM REV CODE 250: Performed by: NURSE PRACTITIONER

## 2020-03-04 PROCEDURE — 94640 AIRWAY INHALATION TREATMENT: CPT

## 2020-03-04 PROCEDURE — 97110 THERAPEUTIC EXERCISES: CPT | Mod: CQ

## 2020-03-04 PROCEDURE — 97165 OT EVAL LOW COMPLEX 30 MIN: CPT

## 2020-03-04 PROCEDURE — 99900035 HC TECH TIME PER 15 MIN (STAT)

## 2020-03-04 PROCEDURE — 94761 N-INVAS EAR/PLS OXIMETRY MLT: CPT

## 2020-03-04 PROCEDURE — 84100 ASSAY OF PHOSPHORUS: CPT

## 2020-03-04 PROCEDURE — 83735 ASSAY OF MAGNESIUM: CPT

## 2020-03-04 PROCEDURE — 63600175 PHARM REV CODE 636 W HCPCS: Performed by: NURSE PRACTITIONER

## 2020-03-04 PROCEDURE — 97116 GAIT TRAINING THERAPY: CPT | Mod: CQ

## 2020-03-04 PROCEDURE — 36415 COLL VENOUS BLD VENIPUNCTURE: CPT

## 2020-03-04 PROCEDURE — 27000221 HC OXYGEN, UP TO 24 HOURS

## 2020-03-04 PROCEDURE — 99233 SBSQ HOSP IP/OBS HIGH 50: CPT | Mod: ,,, | Performed by: INTERNAL MEDICINE

## 2020-03-04 PROCEDURE — 11000001 HC ACUTE MED/SURG PRIVATE ROOM

## 2020-03-04 PROCEDURE — 63600175 PHARM REV CODE 636 W HCPCS: Performed by: INTERNAL MEDICINE

## 2020-03-04 PROCEDURE — 85025 COMPLETE CBC W/AUTO DIFF WBC: CPT

## 2020-03-04 PROCEDURE — 80053 COMPREHEN METABOLIC PANEL: CPT

## 2020-03-04 RX ADMIN — ACETAMINOPHEN 650 MG: 325 TABLET, FILM COATED ORAL at 05:03

## 2020-03-04 RX ADMIN — HEPARIN SODIUM 5000 UNITS: 5000 INJECTION, SOLUTION INTRAVENOUS; SUBCUTANEOUS at 05:03

## 2020-03-04 RX ADMIN — LEVOTHYROXINE SODIUM 88 MCG: 88 TABLET ORAL at 05:03

## 2020-03-04 RX ADMIN — IPRATROPIUM BROMIDE AND ALBUTEROL SULFATE 3 ML: .5; 3 SOLUTION RESPIRATORY (INHALATION) at 04:03

## 2020-03-04 RX ADMIN — TIOTROPIUM BROMIDE 18 MCG: 18 CAPSULE ORAL; RESPIRATORY (INHALATION) at 08:03

## 2020-03-04 RX ADMIN — AZITHROMYCIN MONOHYDRATE 500 MG: 500 INJECTION, POWDER, LYOPHILIZED, FOR SOLUTION INTRAVENOUS at 03:03

## 2020-03-04 RX ADMIN — ESCITALOPRAM OXALATE 10 MG: 10 TABLET ORAL at 10:03

## 2020-03-04 RX ADMIN — HEPARIN SODIUM 5000 UNITS: 5000 INJECTION, SOLUTION INTRAVENOUS; SUBCUTANEOUS at 10:03

## 2020-03-04 RX ADMIN — CILOSTAZOL 50 MG: 50 TABLET ORAL at 10:03

## 2020-03-04 RX ADMIN — SODIUM CHLORIDE: 0.9 INJECTION, SOLUTION INTRAVENOUS at 11:03

## 2020-03-04 RX ADMIN — CEFTRIAXONE 1 G: 1 INJECTION, SOLUTION INTRAVENOUS at 03:03

## 2020-03-04 RX ADMIN — IPRATROPIUM BROMIDE AND ALBUTEROL SULFATE 3 ML: .5; 3 SOLUTION RESPIRATORY (INHALATION) at 12:03

## 2020-03-04 RX ADMIN — HEPARIN SODIUM 5000 UNITS: 5000 INJECTION, SOLUTION INTRAVENOUS; SUBCUTANEOUS at 03:03

## 2020-03-04 RX ADMIN — FLUTICASONE FUROATE AND VILANTEROL TRIFENATATE 1 PUFF: 100; 25 POWDER RESPIRATORY (INHALATION) at 08:03

## 2020-03-04 RX ADMIN — ATORVASTATIN CALCIUM 40 MG: 20 TABLET, FILM COATED ORAL at 10:03

## 2020-03-04 RX ADMIN — IPRATROPIUM BROMIDE AND ALBUTEROL SULFATE 3 ML: .5; 3 SOLUTION RESPIRATORY (INHALATION) at 07:03

## 2020-03-04 RX ADMIN — PANTOPRAZOLE SODIUM 40 MG: 40 TABLET, DELAYED RELEASE ORAL at 10:03

## 2020-03-04 RX ADMIN — IPRATROPIUM BROMIDE AND ALBUTEROL SULFATE 3 ML: .5; 3 SOLUTION RESPIRATORY (INHALATION) at 08:03

## 2020-03-04 RX ADMIN — CLOPIDOGREL BISULFATE 75 MG: 75 TABLET ORAL at 10:03

## 2020-03-04 RX ADMIN — VALSARTAN 320 MG: 80 TABLET, FILM COATED ORAL at 10:03

## 2020-03-04 RX ADMIN — IPRATROPIUM BROMIDE AND ALBUTEROL SULFATE 3 ML: .5; 3 SOLUTION RESPIRATORY (INHALATION) at 03:03

## 2020-03-04 NOTE — PLAN OF CARE
Problem: Physical Therapy Goal  Goal: Physical Therapy Goal  Description  Goals to be met by: 4/3/2020    Patient will perform the following to increase strength, improve mobility, and return to prior level of function:    1. Supine <> sit with independence.  2. Sit<>stand with independence with least restrictive assistive device.  3. Gait x 100 feet with SBA with least restrictive assistive device.   Outcome: Ongoing, Progressing   Pt sup to sit SBA with HOB elevated and pt using bedrail, sit to stand CGA with RW, amb'd 40' with RW and Robert, O2@3L:88% following. HHPT and family assistance as needed.

## 2020-03-04 NOTE — PLAN OF CARE
Problem: Occupational Therapy Goal  Goal: Occupational Therapy Goal  Description  Goals to be met by: 03/11/2020     Patient will increase functional independence with ADLs by performing:    UE Dressing with Set-up Assistance.  LE Dressing with Stand-by Assistance.  Grooming while standing at sink with Stand-by Assistance with no more than one seated rest break.  Toileting from toilet with Stand-by Assistance for hygiene and clothing management.   Stand pivot transfers with Stand-by Assistance.  Step transfer with Stand-by Assistance  Toilet transfer to toilet with Stand-by Assistance.     Outcome: Ongoing, Progressing  Initial OT evaluation completed, with treatment to follow.  Very SOB during ADL tasks, low endurance.  Will benefit from acute OT services to restore patient functioning.

## 2020-03-04 NOTE — NURSING
Patient rounding completed. Plan of care reviewed with pt, medications reviewed with pt and family. Vital signs stable. Pt c/o of SOB. Breathing treatments given q4h. Placed on 3L NC. Comfort measures promoted. Purposeful rounding completed. Bed wheels locked and in lowest position. Side rails up x 2. Call bell within reach. Pt AAO x 4 and appropriate at this time. No acute distress noted. Will continue to monitor

## 2020-03-04 NOTE — PLAN OF CARE
"PT/OT recommending Home health and a BSC.    Discussed options with pt at bedside, informed this is a covered benefit trough [employer], however pt denies. Pt states she has help at home and does not need "a ."      [facility] states pt home oxygen is supplied by [facility] [employer].  Information added to AVS.  Pt states her daughter has a portable tank and is able to drive her home.      CM to continue to follow.  "

## 2020-03-04 NOTE — PLAN OF CARE
[facility]    HOME HEALTH ORDERS  FACE TO FACE ENCOUNTER    Patient Name: Brittany Haile  YOB: 1940    PCP: Wayne Frazier MD   PCP Address: [address] / [place] [address]  PCP Phone Number: [phone]  PCP Fax: [phone]    Encounter Date: 03/06/2020    Admit to Home Health    Diagnoses:  Active Hospital Problems    Diagnosis  POA    *Community acquired pneumonia of right lower lobe of lung [J18.1]  Yes    Essential hypertension [I10]  Yes    Chronic diastolic heart failure [I50.32]  Yes    Chronic kidney disease, stage III (moderate) [N18.3]  Yes    HLD (hyperlipidemia) [E78.5]  Yes    COPD (chronic obstructive pulmonary disease) [J44.9]  Yes    Hypothyroid [E03.9]  Yes      Resolved Hospital Problems   No resolved problems to display.       Future Appointments   Date Time Provider Department Center   4/22/2020 10:00 AM SAME DAY [place] [facility] [place]           I have seen and examined this patient face to face today. My clinical findings that support the need for the home health skilled services and home bound status are the following:  Weakness/numbness causing balance and gait disturbance due to COPD Exacerbation, Infection and Weakness/Debility making it taxing to leave home.  Requiring assistive device to leave home due to unsteady gait caused by  COPD Exacerbation, Infection and Weakness/Debility.  Medical restrictions requiring assistance of another human to leave home due to  Dyspnea on exertion (SOB), Fluid/volume overload and Frequent Falls.    Allergies:Review of patient's allergies indicates:  No Known Allergies    Diet: cardiac diet    Activities: activity as tolerated    Nursing:   SN to complete comprehensive assessment including routine vital signs. Instruct on disease process and s/s of complications to report to MD. Review/verify medication list sent home with the patient at time of discharge  and instruct  patient/caregiver as needed. Frequency may be adjusted depending on start of care date.    Notify MD if SBP > 160 or < 90; DBP > 90 or < 50; HR > 120 or < 50; Temp > 101;       CONSULTS:    Physical Therapy to evaluate and treat. Evaluate for home safety and equipment needs; Establish/upgrade home exercise program. Perform / instruct on therapeutic exercises, gait training, transfer training, and Range of Motion.  Occupational Therapy to evaluate and treat. Evaluate home environment for safety and equipment needs. Perform/Instruct on transfers, ADL training, ROM, and therapeutic exercises.  Aide to provide assistance with personal care, ADLs, and vital signs.    MISCELLANEOUS CARE:  Home Oxygen:  Assess oxygen saturation via pulse oximeter as needed for increase in SOB.      Medications: Review discharge medications with patient and family and provide education.      Current Discharge Medication List      START taking these medications    Details   azithromycin (ZITHROMAX) 500 MG tablet Take 1 tablet (500 mg total) by mouth once daily. for 3 days  Qty: 3 tablet, Refills: 0         CONTINUE these medications which have NOT CHANGED    Details   albuterol (PROVENTIL/VENTOLIN HFA) 90 mcg/actuation inhaler INHALE 1 TO 2 PUFFS BY MOUTH INTO THE LUNGS EVERY 6 HOURS AS NEEDED FOR WHEEZING OR SHORTNESS OF BREATH( RESCUE)  Qty: 8.5 g, Refills: 2    Associated Diagnoses: Chronic obstructive pulmonary disease, unspecified COPD type      atorvastatin (LIPITOR) 40 MG tablet Take 1 tablet (40 mg total) by mouth once daily.  Qty: 90 tablet, Refills: 3    Associated Diagnoses: Mixed hyperlipidemia      cilostazoL (PLETAL) 50 MG Tab TAKE 1 TABLET(50 MG) BY MOUTH TWICE DAILY  Qty: 180 tablet, Refills: 3      clopidogrel (PLAVIX) 75 mg tablet TAKE 1 TABLET BY MOUTH EVERY DAY  Qty: 90 tablet, Refills: 3      escitalopram oxalate (LEXAPRO) 10 MG tablet Take 1 tablet (10 mg total) by mouth nightly.  Qty: 90 tablet, Refills: 3     Associated Diagnoses: Anxiety; Moderate recurrent major depression      fluticasone propionate (FLONASE) 50 mcg/actuation nasal spray 1 spray (50 mcg total) by Each Nostril route once daily.  Qty: 16 g, Refills: 2    Associated Diagnoses: Seasonal allergic rhinitis due to other allergic trigger      fluticasone-salmeterol diskus inhaler 250-50 mcg Inhale 1 puff into the lungs 2 (two) times daily.  Qty: 60 each, Refills: 10    Associated Diagnoses: Chronic obstructive pulmonary disease, unspecified COPD type      levothyroxine (SYNTHROID) 88 MCG tablet TAKE 1 TABLET(88 MCG) BY MOUTH EVERY DAY  Qty: 90 tablet, Refills: 3    Associated Diagnoses: Hypothyroidism due to acquired atrophy of thyroid      pantoprazole (PROTONIX) 40 MG tablet TAKE 1 TABLET(40 MG) BY MOUTH EVERY DAY  Qty: 90 tablet, Refills: 3    Associated Diagnoses: Gastroesophageal reflux disease, esophagitis presence not specified      tiotropium (SPIRIVA WITH HANDIHALER) 18 mcg inhalation capsule Inhale 1 capsule (18 mcg total) into the lungs once daily.  Qty: 90 capsule, Refills: 3    Associated Diagnoses: Chronic obstructive pulmonary disease, unspecified COPD type      valsartan (DIOVAN) 320 MG tablet Take 1 tablet (320 mg total) by mouth once daily.  Qty: 90 tablet, Refills: 3    Associated Diagnoses: Essential hypertension             I certify that this patient is confined to her home and needs intermittent skilled nursing care, physical therapy and occupational therapy.

## 2020-03-04 NOTE — PLAN OF CARE
Patient on oxygen with documented flow. Will attempt to wean per O2 order protocol.  Patient wears oxygen at home @ 2 lpm.  Patient given aerosol treatment with no adverse reactions noted. Patient instructed on proper use.  \The proper method of use, as well as anticipated side effects of this metered-dose inhaler are discussed and demonstrated to the patient.  Will continue to monitor.

## 2020-03-04 NOTE — PT/OT/SLP PROGRESS
Physical Therapy Treatment    Patient Name:  Brittany Haile   MRN:  [id number]    Recommendations:     Discharge Recommendations:  home with home health, home health PT, home health OT   Discharge Equipment Recommendations: (possible 3-in-1)   Barriers to discharge: None (pt may need some assistance from family initially)    Assessment:     Brittany Haile is a 79 y.o. female admitted with a medical diagnosis of Community acquired pneumonia of right lower lobe of lung.  She presents with the following impairments/functional limitations:  weakness, impaired endurance, impaired self care skills, impaired functional mobilty, gait instability, impaired balance, decreased lower extremity function, decreased safety awareness, impaired cardiopulmonary response to activity ;pt with improved mobility, inc. amb distance, though req'd min.A for balance and RW mgmt, O2@3L:91-88%.    Rehab Prognosis: Good; patient would benefit from acute skilled PT services to address these deficits and reach maximum level of function.    Recent Surgery: * No surgery found *      Plan:     During this hospitalization, patient to be seen 5 x/week to address the identified rehab impairments via gait training, therapeutic activities, therapeutic exercises and progress toward the following goals:    · Plan of Care Expires:  04/03/20    Subjective     Chief Complaint: none  Patient/Family Comments/goals: pt agreeable to session, daughter present.   Pain/Comfort:  · Pain Rating 1: 0/10  · Pain Rating Post-Intervention 1: 0/10      Objective:     Communicated with nurse prior to session.  Patient found HOB elevated with peripheral IV, telemetry, bed alarm, oxygen(O2@3L, Avasys monitoring) upon PT entry to room.     General Precautions: Standard, anti-coagulation medicine, fall, aspiration, hearing impaired(L limb alert, cardiac diet)   Orthopedic Precautions:N/A   Braces: N/A     Functional Mobility:  · Bed Mobility:     · Supine to Sit: stand by assistance and  iwth HOB elevated partially and pt using bedrails  · Transfers:     · Sit to Stand:  contact guard assistance with rolling walker  · Gait: amb'd 40' with RW and min.A, O2@3L:88%, HR:106      AM-PAC 6 CLICK MOBILITY  Turning over in bed (including adjusting bedclothes, sheets and blankets)?: 4  Sitting down on and standing up from a chair with arms (e.g., wheelchair, bedside commode, etc.): 3  Moving from lying on back to sitting on the side of the bed?: 3(HOB partially elevated and pt using bedrails)  Moving to and from a bed to a chair (including a wheelchair)?: 3  Need to walk in hospital room?: 3  Climbing 3-5 steps with a railing?: 2  Basic Mobility Total Score: 18       Therapeutic Activities and Exercises:   pt perf'd seated LE ex's of AP's, hip flex, LAQ's x 10 ea.     Patient left up in chair with all lines intact, call button in reach, ns notified and Avasys monitoring present..    GOALS:   Multidisciplinary Problems     Physical Therapy Goals        Problem: Physical Therapy Goal    Goal Priority Disciplines Outcome Goal Variances Interventions   Physical Therapy Goal     PT, PT/OT Ongoing, Progressing     Description:  Goals to be met by: 4/3/2020    Patient will perform the following to increase strength, improve mobility, and return to prior level of function:    1. Supine <> sit with independence.  2. Sit<>stand with independence with least restrictive assistive device.  3. Gait x 100 feet with SBA with least restrictive assistive device.                    Time Tracking:     PT Received On: 03/04/20  PT Start Time: 1140     PT Stop Time: 1205  PT Total Time (min): 25 min     Billable Minutes: Gait Training 15 and Therapeutic Exercise 10    Treatment Type: Treatment  PT/PTA: PTA     PTA Visit Number: 1     Blessing Bear PTA  03/04/2020

## 2020-03-04 NOTE — PLAN OF CARE
Patient seen and examined at bedside this morning with daughter China present.  Patient is unchanged from admit at this time.  No new complaints.  Will repeat chest x-ray this afternoon to assess improvement if any.  Did discuss labs and plan of care with patient and daughter in both are in agreement.

## 2020-03-04 NOTE — PT/OT/SLP EVAL
"Occupational Therapy   Evaluation    Name: Brittany Haile  MRN: [id number]  Admitting Diagnosis:  Community acquired pneumonia of right lower lobe of lung      Recommendations:     Discharge Recommendations: home with home health, home health PT, home health OT  Discharge Equipment Recommendations:  other (see comments)(possible 3-in-1)  Barriers to discharge:  Decreased caregiver support    Assessment:     Brittany Haile is a 79 y.o. female with a medical diagnosis of Community acquired pneumonia of right lower lobe of lung.  She presents with the following performance deficits affecting function: weakness, impaired endurance, impaired self care skills, impaired functional mobilty, gait instability, impaired balance, decreased safety awareness, pain, impaired cardiopulmonary response to activity.      Initial OT evaluation completed, with treatment to follow.  Very SOB during ADL tasks, low endurance.  Will benefit from acute OT services to restore patient functioning.     Rehab Prognosis: Good; patient would benefit from acute skilled OT services to address these deficits and reach maximum level of function.       Plan:     Patient to be seen 5 x/week to address the above listed problems via self-care/home management, therapeutic activities, therapeutic exercises  · Plan of Care Expires: 04/01/20  · Plan of Care Reviewed with: patient    Subjective     Chief Complaint: "I feel like I ran a marathon" at end of session  Patient/Family Comments/goals: Home    Occupational Profile:  Living Environment: Patient resides with son (on disability) in one story home with no steps to enter.  There is a walk in shower with seat.   Previous level of function: Modified independent, assist for IADL tasks  Roles and Routines: Mother  Equipment Used at Home:  oxygen, rollator, cane, straight, shower chair  Assistance upon Discharge: Limited son, amado    Pain/Comfort:  · Pain Rating 1: other (see comments)(body aches "all over")  · Pain " Addressed 1: Reposition, Distraction  · Pain Rating Post-Intervention 1: (no change)    Patients cultural, spiritual, Latter day conflicts given the current situation: no    Objective:     Communicated with: WHITNEY Elizabeth prior to session.  Patient found HOB elevated with oxygen, peripheral IV, telemetry(3L via NC, AVAsys) upon OT entry to room.    General Precautions: Standard, anti-coagulation medicine, fall, aspiration, hearing impaired, other (see comments)((L) limb alert, cardiac diet)   Orthopedic Precautions:N/A   Braces: N/A     Occupational Performance:    Bed Mobility:    · Patient completed Scooting/Bridging with supervision  · Patient completed Supine to Sit with supervision    Functional Mobility/Transfers:  · Patient completed Sit <> Stand Transfer with contact guard assistance  with  hand-held assist   · Patient completed Bed <> Chair Transfer using Stand Pivot technique with contact guard assistance with hand-held assist  · Functional Mobility: NT    Activities of Daily Living:  · Feeding:  set up assistance opening containers  · Bathing: contact guard assistance [place] care in standing during sponge bath  · Upper Body Dressing: minimum assistance secondary to IV  · Lower Body Dressing: contact guard assistance donning disposable undergarment and vc to sit when doffing/donning over feet    Cognitive/Visual Perceptual:  Cognitive/Psychosocial Skills:     -       Oriented to: Person and Place   -       Follows Commands/attention:Follows two-step commands  -       Communication: clear/fluent  -       Memory: No overt deficts  -       Safety awareness/insight to disability: impaired, vc to sit when doffing/donning over feet  -       Mood/Affect/Coping skills/emotional control: Appropriate to situation, Cooperative and Pleasant  Visual/Perceptual:      -wears glasses, Craig     Physical Exam:  Postural examination/scapula alignment:    -       Rounded shoulders  Skin integrity: Visible skin intact  Edema:  None  noted  Sensation:    -       Intact  light/touch (B) UEs  Upper Extremity Range of Motion:     -       Right Upper Extremity: WFL  -       Left Upper Extremity: WFL  Upper Extremity Strength:    -       Right Upper Extremity: WFL  -       Left Upper Extremity: WFL   Strength:    -       Right Upper Extremity: WFL  -       Left Upper Extremity: WFL  Gross motor coordination:   WFL    AMPAC 6 Click ADL:  AMPA Total Score: 18    Treatment & Education:  Educated on role of OT, POC, safety during LBD, use of nurses' button for staff assist with transfers.  Verbalized understanding.  Education:    Patient left up in chair with all lines intact, call button in reach, RN notified and AVAsys present    GOALS:   Multidisciplinary Problems     Occupational Therapy Goals        Problem: Occupational Therapy Goal    Goal Priority Disciplines Outcome Interventions   Occupational Therapy Goal     OT, PT/OT Ongoing, Progressing    Description:  Goals to be met by: 03/11/2020     Patient will increase functional independence with ADLs by performing:    UE Dressing with Set-up Assistance.  LE Dressing with Stand-by Assistance.  Grooming while standing at sink with Stand-by Assistance with no more than one seated rest break.  Toileting from toilet with Stand-by Assistance for hygiene and clothing management.   Stand pivot transfers with Stand-by Assistance.  Step transfer with Stand-by Assistance  Toilet transfer to toilet with Stand-by Assistance.                      History:     Past Medical History:   Diagnosis Date    Abdominal pain     CHF (congestive heart failure)     Chronic kidney disease, stage III (moderate) 8/19/2015    COPD (chronic obstructive pulmonary disease)     COPD (chronic obstructive pulmonary disease)     CVA (cerebral infarction)     Esophagitis     Gastritis     GERD (gastroesophageal reflux disease)     GI bleed     Hyperlipidemia     Hypertension     Mesenteric angina     PVD (peripheral  vascular disease) with claudication 10/13/2015    Stroke     Subclavian artery stenosis, left     Thyroid disease        Past Surgical History:   Procedure Laterality Date    CAROTID STENT       SECTION      COLONOSCOPY N/A 3/2/2017    Procedure: COLONOSCOPY;  Surgeon: Cecil Crooks MD;  Location: [facility] ENDO;  Service: Endoscopy;  Laterality: N/A;    COLONOSCOPY N/A 2019    Procedure: COLONOSCOPY/Suprep;  Surgeon: Barry Talbert MD;  Location: [facility] ENDO;  Service: Endoscopy;  Laterality: N/A;    CORONARY ANGIOPLASTY  2006    EPIDURAL STEROID INJECTION INTO LUMBAR SPINE N/A 2018    Procedure: INJECTION, STEROID, SPINE, LUMBAR, EPIDURAL- L2-3 ORAL SEDATION;  Surgeon: Stiven Ibarra Jr., MD;  Location: [facility] PAIN MGT;  Service: Pain Management;  Laterality: N/A;    ESOPHAGOGASTRODUODENOSCOPY N/A 2019    Procedure: EGD (ESOPHAGOGASTRODUODENOSCOPY);  Surgeon: Barry Talbert MD;  Location: [facility] ENDO;  Service: Endoscopy;  Laterality: N/A;    GALLBLADDER SURGERY      HYSTERECTOMY      INTRALUMINAL GASTROINTESTINAL TRACT IMAGING VIA CAPSULE N/A 2019    Procedure: IMAGING PROCEDURE, GI TRACT, INTRALUMINAL, VIA CAPSULE;  Surgeon: Barry Talbert MD;  Location: [facility] ENDO;  Service: Endoscopy;  Laterality: N/A;    OOPHORECTOMY      SUPERIOR MESTENTERIC ARTERY STENT         Time Tracking:     OT Date of Treatment: 20  OT Start Time: 822  OT Stop Time: 844  OT Total Time (min): 22 min    Billable Minutes:Evaluation 22    JARED Tang  3/4/2020

## 2020-03-05 LAB
ALBUMIN SERPL BCP-MCNC: 2.2 G/DL (ref 3.5–5.2)
ALP SERPL-CCNC: 94 U/L (ref 55–135)
ALT SERPL W/O P-5'-P-CCNC: 17 U/L (ref 10–44)
ANION GAP SERPL CALC-SCNC: 8 MMOL/L (ref 8–16)
AST SERPL-CCNC: 22 U/L (ref 10–40)
BASOPHILS # BLD AUTO: 0.02 K/UL (ref 0–0.2)
BASOPHILS NFR BLD: 0.3 % (ref 0–1.9)
BILIRUB SERPL-MCNC: 0.2 MG/DL (ref 0.1–1)
BUN SERPL-MCNC: 25 MG/DL (ref 8–23)
CALCIUM SERPL-MCNC: 8.2 MG/DL (ref 8.7–10.5)
CHLORIDE SERPL-SCNC: 99 MMOL/L (ref 95–110)
CO2 SERPL-SCNC: 24 MMOL/L (ref 23–29)
CREAT SERPL-MCNC: 1.3 MG/DL (ref 0.5–1.4)
DIFFERENTIAL METHOD: ABNORMAL
EOSINOPHIL # BLD AUTO: 0.1 K/UL (ref 0–0.5)
EOSINOPHIL NFR BLD: 1 % (ref 0–8)
ERYTHROCYTE [DISTWIDTH] IN BLOOD BY AUTOMATED COUNT: 14.5 % (ref 11.5–14.5)
EST. GFR  (AFRICAN AMERICAN): 44 ML/MIN/1.73 M^2
EST. GFR  (NON AFRICAN AMERICAN): 39 ML/MIN/1.73 M^2
GLUCOSE SERPL-MCNC: 128 MG/DL (ref 70–110)
HCT VFR BLD AUTO: 28.8 % (ref 37–48.5)
HGB BLD-MCNC: 9.2 G/DL (ref 12–16)
IMM GRANULOCYTES # BLD AUTO: 0.04 K/UL (ref 0–0.04)
IMM GRANULOCYTES NFR BLD AUTO: 0.6 % (ref 0–0.5)
LYMPHOCYTES # BLD AUTO: 0.7 K/UL (ref 1–4.8)
LYMPHOCYTES NFR BLD: 10.1 % (ref 18–48)
MAGNESIUM SERPL-MCNC: 1.4 MG/DL (ref 1.6–2.6)
MCH RBC QN AUTO: 30.5 PG (ref 27–31)
MCHC RBC AUTO-ENTMCNC: 31.9 G/DL (ref 32–36)
MCV RBC AUTO: 95 FL (ref 82–98)
MONOCYTES # BLD AUTO: 0.7 K/UL (ref 0.3–1)
MONOCYTES NFR BLD: 9.1 % (ref 4–15)
NEUTROPHILS # BLD AUTO: 5.7 K/UL (ref 1.8–7.7)
NEUTROPHILS NFR BLD: 78.9 % (ref 38–73)
NRBC BLD-RTO: 0 /100 WBC
PHOSPHATE SERPL-MCNC: 2.2 MG/DL (ref 2.7–4.5)
PLATELET # BLD AUTO: 225 K/UL (ref 150–350)
PMV BLD AUTO: 9.4 FL (ref 9.2–12.9)
POCT GLUCOSE: 133 MG/DL (ref 70–110)
POTASSIUM SERPL-SCNC: 3.2 MMOL/L (ref 3.5–5.1)
PROT SERPL-MCNC: 5.2 G/DL (ref 6–8.4)
RBC # BLD AUTO: 3.02 M/UL (ref 4–5.4)
SODIUM SERPL-SCNC: 131 MMOL/L (ref 136–145)
WBC # BLD AUTO: 7.23 K/UL (ref 3.9–12.7)

## 2020-03-05 PROCEDURE — 27000221 HC OXYGEN, UP TO 24 HOURS

## 2020-03-05 PROCEDURE — 94640 AIRWAY INHALATION TREATMENT: CPT

## 2020-03-05 PROCEDURE — 94761 N-INVAS EAR/PLS OXIMETRY MLT: CPT

## 2020-03-05 PROCEDURE — 85025 COMPLETE CBC W/AUTO DIFF WBC: CPT

## 2020-03-05 PROCEDURE — 83735 ASSAY OF MAGNESIUM: CPT

## 2020-03-05 PROCEDURE — 25000003 PHARM REV CODE 250: Performed by: INTERNAL MEDICINE

## 2020-03-05 PROCEDURE — 63600175 PHARM REV CODE 636 W HCPCS: Performed by: NURSE PRACTITIONER

## 2020-03-05 PROCEDURE — 63600175 PHARM REV CODE 636 W HCPCS: Performed by: INTERNAL MEDICINE

## 2020-03-05 PROCEDURE — 99233 SBSQ HOSP IP/OBS HIGH 50: CPT | Mod: ,,, | Performed by: INTERNAL MEDICINE

## 2020-03-05 PROCEDURE — 97530 THERAPEUTIC ACTIVITIES: CPT | Mod: CQ

## 2020-03-05 PROCEDURE — 36415 COLL VENOUS BLD VENIPUNCTURE: CPT

## 2020-03-05 PROCEDURE — 99233 PR SUBSEQUENT HOSPITAL CARE,LEVL III: ICD-10-PCS | Mod: ,,, | Performed by: INTERNAL MEDICINE

## 2020-03-05 PROCEDURE — 25000242 PHARM REV CODE 250 ALT 637 W/ HCPCS: Performed by: NURSE PRACTITIONER

## 2020-03-05 PROCEDURE — 84100 ASSAY OF PHOSPHORUS: CPT

## 2020-03-05 PROCEDURE — 25000003 PHARM REV CODE 250: Performed by: NURSE PRACTITIONER

## 2020-03-05 PROCEDURE — 80053 COMPREHEN METABOLIC PANEL: CPT

## 2020-03-05 PROCEDURE — 97110 THERAPEUTIC EXERCISES: CPT | Mod: CQ

## 2020-03-05 PROCEDURE — 97116 GAIT TRAINING THERAPY: CPT | Mod: CQ

## 2020-03-05 PROCEDURE — 11000001 HC ACUTE MED/SURG PRIVATE ROOM

## 2020-03-05 RX ORDER — SODIUM,POTASSIUM PHOSPHATES 280-250MG
2 POWDER IN PACKET (EA) ORAL
Status: DISCONTINUED | OUTPATIENT
Start: 2020-03-05 | End: 2020-03-06 | Stop reason: HOSPADM

## 2020-03-05 RX ORDER — LANOLIN ALCOHOL/MO/W.PET/CERES
800 CREAM (GRAM) TOPICAL
Status: DISCONTINUED | OUTPATIENT
Start: 2020-03-05 | End: 2020-03-06 | Stop reason: HOSPADM

## 2020-03-05 RX ORDER — POTASSIUM CHLORIDE 20 MEQ/15ML
60 SOLUTION ORAL
Status: DISCONTINUED | OUTPATIENT
Start: 2020-03-05 | End: 2020-03-06 | Stop reason: HOSPADM

## 2020-03-05 RX ORDER — POTASSIUM CHLORIDE 20 MEQ/15ML
40 SOLUTION ORAL
Status: DISCONTINUED | OUTPATIENT
Start: 2020-03-05 | End: 2020-03-06 | Stop reason: HOSPADM

## 2020-03-05 RX ADMIN — CLOPIDOGREL BISULFATE 75 MG: 75 TABLET ORAL at 09:03

## 2020-03-05 RX ADMIN — IPRATROPIUM BROMIDE AND ALBUTEROL SULFATE 3 ML: .5; 3 SOLUTION RESPIRATORY (INHALATION) at 07:03

## 2020-03-05 RX ADMIN — CEFTRIAXONE 1 G: 1 INJECTION, SOLUTION INTRAVENOUS at 04:03

## 2020-03-05 RX ADMIN — IPRATROPIUM BROMIDE AND ALBUTEROL SULFATE 3 ML: .5; 3 SOLUTION RESPIRATORY (INHALATION) at 12:03

## 2020-03-05 RX ADMIN — POTASSIUM CHLORIDE 40 MEQ: 40 SOLUTION ORAL at 09:03

## 2020-03-05 RX ADMIN — IPRATROPIUM BROMIDE AND ALBUTEROL SULFATE 3 ML: .5; 3 SOLUTION RESPIRATORY (INHALATION) at 03:03

## 2020-03-05 RX ADMIN — TIOTROPIUM BROMIDE 18 MCG: 18 CAPSULE ORAL; RESPIRATORY (INHALATION) at 08:03

## 2020-03-05 RX ADMIN — PANTOPRAZOLE SODIUM 40 MG: 40 TABLET, DELAYED RELEASE ORAL at 09:03

## 2020-03-05 RX ADMIN — ATORVASTATIN CALCIUM 40 MG: 20 TABLET, FILM COATED ORAL at 09:03

## 2020-03-05 RX ADMIN — SODIUM CHLORIDE: 0.9 INJECTION, SOLUTION INTRAVENOUS at 02:03

## 2020-03-05 RX ADMIN — HEPARIN SODIUM 5000 UNITS: 5000 INJECTION, SOLUTION INTRAVENOUS; SUBCUTANEOUS at 02:03

## 2020-03-05 RX ADMIN — CILOSTAZOL 50 MG: 50 TABLET ORAL at 09:03

## 2020-03-05 RX ADMIN — IPRATROPIUM BROMIDE AND ALBUTEROL SULFATE 3 ML: .5; 3 SOLUTION RESPIRATORY (INHALATION) at 11:03

## 2020-03-05 RX ADMIN — LEVOTHYROXINE SODIUM 88 MCG: 88 TABLET ORAL at 06:03

## 2020-03-05 RX ADMIN — IPRATROPIUM BROMIDE AND ALBUTEROL SULFATE 3 ML: .5; 3 SOLUTION RESPIRATORY (INHALATION) at 08:03

## 2020-03-05 RX ADMIN — Medication 800 MG: at 09:03

## 2020-03-05 RX ADMIN — ESCITALOPRAM OXALATE 10 MG: 10 TABLET ORAL at 09:03

## 2020-03-05 RX ADMIN — HEPARIN SODIUM 5000 UNITS: 5000 INJECTION, SOLUTION INTRAVENOUS; SUBCUTANEOUS at 09:03

## 2020-03-05 RX ADMIN — FLUTICASONE FUROATE AND VILANTEROL TRIFENATATE 1 PUFF: 100; 25 POWDER RESPIRATORY (INHALATION) at 08:03

## 2020-03-05 RX ADMIN — AZITHROMYCIN MONOHYDRATE 500 MG: 500 INJECTION, POWDER, LYOPHILIZED, FOR SOLUTION INTRAVENOUS at 06:03

## 2020-03-05 RX ADMIN — VALSARTAN 320 MG: 80 TABLET, FILM COATED ORAL at 09:03

## 2020-03-05 RX ADMIN — POTASSIUM & SODIUM PHOSPHATES POWDER PACK 280-160-250 MG 2 PACKET: 280-160-250 PACK at 09:03

## 2020-03-05 RX ADMIN — HEPARIN SODIUM 5000 UNITS: 5000 INJECTION, SOLUTION INTRAVENOUS; SUBCUTANEOUS at 06:03

## 2020-03-05 NOTE — PLAN OF CARE
Patient stable. VSS. IV fluids infusing. Rounding completed. Denied needs. Call bell in reach. Side rails up X3. Bed locked, lowered. Bed alarm set. Avasys in place. Safety maintained.

## 2020-03-05 NOTE — PROGRESS NOTES
Pt received on 3LNC;[id number]%. Treatments were given as scheduled;pt tolerated it well. No changes were made. Will continue to monitor.

## 2020-03-05 NOTE — ASSESSMENT & PLAN NOTE
CXR- Right basilar infiltrate and pleural effusion could represent pneumonia.  Mild interstitial infiltrates in the right upper lobe also.  Repeat CXR on 3/3/20 - Mild progression of right LLL infiltrate  Rocephin/Azithromycin  Duonebs

## 2020-03-05 NOTE — PLAN OF CARE
Patient and daughter, China Alexandre [phone], now agreeable to offer for HH  as recommended by PT/OT.  They state they have all the DME needed, refusing [facility].       PHN will assign HH company,  awaiting MD signed HH orders.  Template shared in Epic.      CM to continue to follow.

## 2020-03-05 NOTE — PLAN OF CARE
Problem: Adult Inpatient Plan of Care  Goal: Plan of Care Review  Outcome: Ongoing, Progressing  Flowsheets (Taken 3/5/2020 0556)  Plan of Care Reviewed With: patient; daughter     Problem: Fall Injury Risk  Goal: Absence of Fall and Fall-Related Injury  Intervention: Identify and Manage Contributors to Fall Injury Risk  Flowsheets (Taken 3/5/2020 0556)  Self-Care Promotion: independence encouraged; BADL personal objects within reach; BADL personal routines maintained  Medication Review/Management: medications reviewed     Problem: Skin Injury Risk Increased  Goal: Skin Health and Integrity  Intervention: Optimize Skin Protection  Flowsheets (Taken 3/5/2020 0556)  Pressure Reduction Techniques: frequent weight shift encouraged  Skin Protection: tubing/devices free from skin contact  Head of Bed (HOB): HOB elevated

## 2020-03-05 NOTE — SUBJECTIVE & OBJECTIVE
Interval History: Appears to be unchanged from yesterday from a respiratory status, but more alert today  Discussed plan of care with patient and daughters China and Sarah today  Both are in agreement with plan of care    Review of Systems   Constitutional: Positive for activity change, appetite change and fatigue. Negative for chills and fever.   HENT: Negative for congestion, ear pain, rhinorrhea and sinus pressure.    Eyes: Negative for pain and discharge.   Respiratory: Positive for shortness of breath. Negative for cough, chest tightness and wheezing.    Cardiovascular: Negative for chest pain and leg swelling.   Gastrointestinal: Negative for abdominal distention, abdominal pain, diarrhea, nausea and vomiting.   Endocrine: Negative for cold intolerance and heat intolerance.   Genitourinary: Negative for difficulty urinating, flank pain, frequency, hematuria and urgency.   Musculoskeletal: Positive for myalgias. Negative for arthralgias and joint swelling.   Allergic/Immunologic: Negative for environmental allergies and food allergies.   Neurological: Positive for weakness. Negative for dizziness, light-headedness and headaches.   Hematological: Does not bruise/bleed easily.   Psychiatric/Behavioral: Negative for agitation, behavioral problems and decreased concentration.     Objective:     Vital Signs (Most Recent):  Temp: 99.2 °F (37.3 °C) (03/04/20 1631)  Pulse: 110 (03/04/20 1906)  Resp: 20 (03/04/20 1906)  BP: 139/60 (03/04/20 1631)  SpO2: (!) 94 % (03/04/20 1906) Vital Signs (24h Range):  Temp:  [98.4 °F (36.9 °C)-101 °F (38.3 °C)] 99.2 °F (37.3 °C)  Pulse:  [] 110  Resp:  [14-24] 20  SpO2:  [90 %-97 %] 94 %  BP: (129-155)/(56-66) 139/60     Weight: 59 kg (130 lb)  Body mass index is 24.56 kg/m².    Intake/Output Summary (Last 24 hours) at 3/4/2020 2023  Last data filed at 3/4/2020 1936  Gross per 24 hour   Intake 2367.32 ml   Output --   Net 2367.32 ml      Physical Exam   Constitutional: She  appears well-developed and well-nourished.   HENT:   Head: Normocephalic.   Eyes: Conjunctivae are normal. Right eye exhibits no discharge. Left eye exhibits no discharge.   Neck: Normal range of motion. Neck supple.   Cardiovascular: Regular rhythm, normal heart sounds and intact distal pulses. Tachycardia present.   Pulses:       Radial pulses are 1+ on the right side, and 1+ on the left side.   Pulmonary/Chest: Effort normal. No respiratory distress. She has decreased breath sounds in the left lower field. She has rhonchi in the right lower field.   Abdominal: Soft. She exhibits no distension. Bowel sounds are decreased. There is generalized tenderness.   Musculoskeletal: Normal range of motion.   Neurological: She is alert.   Skin: Skin is warm and dry.   Psychiatric: She has a normal mood and affect. Her speech is normal and behavior is normal.       Significant Labs:   CBC:   Recent Labs   Lab 03/03/20  0403 03/04/20  0539   WBC 10.57 8.66   HGB 11.3* 9.5*   HCT 34.2* 29.5*    186     CMP:   Recent Labs   Lab 03/03/20  0403 03/04/20  0539   * 130*   K 3.4* 3.2*    97   CO2 25 25    169*   BUN 14 23   CREATININE 0.8 1.6*   CALCIUM 8.5* 8.1*   PROT 5.7* 5.3*   ALBUMIN 2.7* 2.3*   BILITOT 0.6 0.2   ALKPHOS 85 87   AST 14 15   ALT 13 13   ANIONGAP 8 8   EGFRNONAA >60 30*     All pertinent labs within the past 24 hours have been reviewed.    Significant Imaging: I have reviewed all pertinent imaging results/findings within the past 24 hours.

## 2020-03-05 NOTE — ASSESSMENT & PLAN NOTE
Creatinine 1 at baseline, increased to 1.6  Will continue to monitor for acute decompensation  Will increase IVF to 100ml/hr

## 2020-03-05 NOTE — PROGRESS NOTES
[facility] Hospital Medicine  Progress Note    Patient Name: Brittany Haile  MRN: [id number]  Patient Class: IP- Inpatient   Admission Date: 3/3/2020  Length of Stay: 1 days  Attending Physician: Marcie Min, *  Primary Care Provider: Wayne Frazier MD        Subjective:     Principal Problem:Community acquired pneumonia of right lower lobe of lung        HPI:  The patient is a 80 yo with history of CVA, COPD on O2 PRN, HTN, PVD presents complaining of  weakness and confusion for 3 days.  Five days ago, the patient began having malaise, fatigue, and anorexia. Symptoms continued to progress to chills, night sweats and nausea/vomiting.  She also became more short of breath and had increased O2 requirement to 3L.  This morning she sustained a mechanical fall out of her bed where she did not lose consciousness and was brought to her PCP this afternoon to work up her fall. PCP noted her to appear clinically dehydrated and was concerned she may have a head injury and instructed her to come to the ED.  On ER workup, patient was found to have a lower lobe pneumonia and was admitted to the hospital, but was unable to placed in a bed.  The patient was transferred to [facility] for a room assignment.          Overview/Hospital Course:  No notes on file    Interval History: Appears to be unchanged from yesterday from a respiratory status, but more alert today  Discussed plan of care with patient and daughters China and Sarah today  Both are in agreement with plan of care    Review of Systems   Constitutional: Positive for activity change, appetite change and fatigue. Negative for chills and fever.   HENT: Negative for congestion, ear pain, rhinorrhea and sinus pressure.    Eyes: Negative for pain and discharge.   Respiratory: Positive for shortness of breath. Negative for cough, chest tightness and wheezing.    Cardiovascular: Negative for chest pain and leg swelling.   Gastrointestinal: Negative for  abdominal distention, abdominal pain, diarrhea, nausea and vomiting.   Endocrine: Negative for cold intolerance and heat intolerance.   Genitourinary: Negative for difficulty urinating, flank pain, frequency, hematuria and urgency.   Musculoskeletal: Positive for myalgias. Negative for arthralgias and joint swelling.   Allergic/Immunologic: Negative for environmental allergies and food allergies.   Neurological: Positive for weakness. Negative for dizziness, light-headedness and headaches.   Hematological: Does not bruise/bleed easily.   Psychiatric/Behavioral: Negative for agitation, behavioral problems and decreased concentration.     Objective:     Vital Signs (Most Recent):  Temp: 99.2 °F (37.3 °C) (03/04/20 1631)  Pulse: 110 (03/04/20 1906)  Resp: 20 (03/04/20 1906)  BP: 139/60 (03/04/20 1631)  SpO2: (!) 94 % (03/04/20 1906) Vital Signs (24h Range):  Temp:  [98.4 °F (36.9 °C)-101 °F (38.3 °C)] 99.2 °F (37.3 °C)  Pulse:  [] 110  Resp:  [14-24] 20  SpO2:  [90 %-97 %] 94 %  BP: (129-155)/(56-66) 139/60     Weight: 59 kg (130 lb)  Body mass index is 24.56 kg/m².    Intake/Output Summary (Last 24 hours) at 3/4/2020 2023  Last data filed at 3/4/2020 1936  Gross per 24 hour   Intake 2367.32 ml   Output --   Net 2367.32 ml      Physical Exam   Constitutional: She appears well-developed and well-nourished.   HENT:   Head: Normocephalic.   Eyes: Conjunctivae are normal. Right eye exhibits no discharge. Left eye exhibits no discharge.   Neck: Normal range of motion. Neck supple.   Cardiovascular: Regular rhythm, normal heart sounds and intact distal pulses. Tachycardia present.   Pulses:       Radial pulses are 1+ on the right side, and 1+ on the left side.   Pulmonary/Chest: Effort normal. No respiratory distress. She has decreased breath sounds in the left lower field. She has rhonchi in the right lower field.   Abdominal: Soft. She exhibits no distension. Bowel sounds are decreased. There is generalized  tenderness.   Musculoskeletal: Normal range of motion.   Neurological: She is alert.   Skin: Skin is warm and dry.   Psychiatric: She has a normal mood and affect. Her speech is normal and behavior is normal.       Significant Labs:   CBC:   Recent Labs   Lab 03/03/20 0403 03/04/20  0539   WBC 10.57 8.66   HGB 11.3* 9.5*   HCT 34.2* 29.5*    186     CMP:   Recent Labs   Lab 03/03/20  0403 03/04/20  0539   * 130*   K 3.4* 3.2*    97   CO2 25 25    169*   BUN 14 23   CREATININE 0.8 1.6*   CALCIUM 8.5* 8.1*   PROT 5.7* 5.3*   ALBUMIN 2.7* 2.3*   BILITOT 0.6 0.2   ALKPHOS 85 87   AST 14 15   ALT 13 13   ANIONGAP 8 8   EGFRNONAA >60 30*     All pertinent labs within the past 24 hours have been reviewed.    Significant Imaging: I have reviewed all pertinent imaging results/findings within the past 24 hours.      Assessment/Plan:      * Community acquired pneumonia of right lower lobe of lung  CXR- Right basilar infiltrate and pleural effusion could represent pneumonia.  Mild interstitial infiltrates in the right upper lobe also.  Repeat CXR on 3/3/20 - Mild progression of right LLL infiltrate  Rocephin/Azithromycin  Duonebs        Essential hypertension  Moderately controlled  Continue valsartan      Chronic diastolic heart failure  Patient appears well compensated.  Monitor for acute decompensation with IVF admin      Chronic kidney disease, stage III (moderate)  Creatinine 1 at baseline, increased to 1.6  Will continue to monitor for acute decompensation  Will increase IVF to 100ml/hr      HLD (hyperlipidemia)  Well controlled  Continue Lipitor      COPD (chronic obstructive pulmonary disease)  Will continue Breo, Spiriva  Duonebs      Hypothyroid  Continue levothyroxine        VTE Risk Mitigation (From admission, onward)         Ordered     heparin (porcine) injection 5,000 Units  Every 8 hours      03/03/20 0253     IP VTE HIGH RISK PATIENT  Once      03/03/20 0253                      Marcie  ADRYAN Min MD  Department of Hospital Medicine   [facility]

## 2020-03-05 NOTE — PLAN OF CARE
Problem: Physical Therapy Goal  Goal: Physical Therapy Goal  Description  Goals to be met by: 4/3/2020    Patient will perform the following to increase strength, improve mobility, and return to prior level of function:    1. Supine <> sit with independence.  2. Sit<>stand with independence with least restrictive assistive device.  3. Gait x 100 feet with SBA with least restrictive assistive device.   Outcome: Ongoing, Progressing   Pt sup to sit CGA, sit to stand CGA/min.A with RW, amb'd 60' with RW and CGA/min.A, O2@3L:95-85%. (pt found sleeping, had apparently taken O2 off, O2:76% on RA, nsg. Notified).  Recommend HHPT and inc. Family assistance initially.

## 2020-03-05 NOTE — PT/OT/SLP PROGRESS
Physical Therapy Treatment    Patient Name:  Brittany Haile   MRN:  [id number]    Recommendations:     Discharge Recommendations:  home with home health, home health PT, home health OT(and 24/7 assistance initially)   Discharge Equipment Recommendations: (possible 3-in-1 commode)   Barriers to discharge: Decreased caregiver support    Assessment:     Brittany Haile is a 79 y.o. female admitted with a medical diagnosis of Community acquired pneumonia of right lower lobe of lung.  She presents with the following impairments/functional limitations:  weakness, impaired endurance, impaired self care skills, impaired functional mobilty, gait instability, impaired balance, decreased lower extremity function, decreased safety awareness, impaired cardiopulmonary response to activity ; pt with improved mobility today, inc. amb distance. Pt found sleeping in bed without NC O2 in her nose, O2:76% on RA at rest, donned O2@3L:inc to 94%.    Rehab Prognosis: Good; patient would benefit from acute skilled PT services to address these deficits and reach maximum level of function.    Recent Surgery: * No surgery found *      Plan:     During this hospitalization, patient to be seen 5 x/week to address the identified rehab impairments via gait training, therapeutic activities, therapeutic exercises and progress toward the following goals:    · Plan of Care Expires:  04/03/20    Subjective     Chief Complaint: fatigue  Patient/Family Comments/goals: pt agreeable to session, requesting to use bathroom.  Pain/Comfort:  · Pain Rating 1: 0/10  · Pain Rating Post-Intervention 1: 0/10      Objective:     Communicated with nurse prior to session.  Patient found HOB elevated with peripheral IV, telemetry, oxygen, bed alarm(O2 @3L, though pt not wearing upon arrival; [employer] monitoring ) upon PT entry to room.     General Precautions: Standard, anti-coagulation medicine, fall, aspiration, hearing impaired(L limb alert, cardiac diet)   Orthopedic  Precautions:N/A   Braces: N/A     Functional Mobility:  · Bed Mobility:     · Supine to Sit: stand by assistance, contact guard assistance and and use of bedrails, HOB partially up  · Transfers:     · Sit to Stand:  contact guard assistance and minimum assistance with rolling walker  · Gait: amb'd 60' with RW and CGA/min.A, O2@3L:85% following amb., within 1 min of resting inc to 95%.      AM-PAC 6 CLICK MOBILITY  Turning over in bed (including adjusting bedclothes, sheets and blankets)?: 4  Sitting down on and standing up from a chair with arms (e.g., wheelchair, bedside commode, etc.): 3  Moving from lying on back to sitting on the side of the bed?: 3  Moving to and from a bed to a chair (including a wheelchair)?: 3  Need to walk in hospital room?: 3  Climbing 3-5 steps with a railing?: 2  Basic Mobility Total Score: 18       Therapeutic Activities and Exercises:   perf'd commode t/f's in bathroom with CGA/min.A, pt was found in bed with pull-up style briefs on (her own) that were soiled with urine, assisted pt with doffing/donning briefs with min.A.   perf'd seated LE ex's of AP's, LAQ's x 10 ea.     Patient left up in chair with all lines intact, call button in reach, nurse notified and [employer] monitoring present..    GOALS:   Multidisciplinary Problems     Physical Therapy Goals        Problem: Physical Therapy Goal    Goal Priority Disciplines Outcome Goal Variances Interventions   Physical Therapy Goal     PT, PT/OT Ongoing, Progressing     Description:  Goals to be met by: 4/3/2020    Patient will perform the following to increase strength, improve mobility, and return to prior level of function:    1. Supine <> sit with independence.  2. Sit<>stand with independence with least restrictive assistive device.  3. Gait x 100 feet with SBA with least restrictive assistive device.                    Time Tracking:     PT Received On: 03/05/20  PT Start Time: 1114     PT Stop Time: 1209  PT Total Time (min): 55 min      Billable Minutes: Gait Training 15, Therapeutic Activity 25 and Therapeutic Exercise 15    Treatment Type: Treatment  PT/PTA: PTA     PTA Visit Number: 2     Blessing Bear PTA  03/05/2020

## 2020-03-06 VITALS
WEIGHT: 130 LBS | BODY MASS INDEX: 24.55 KG/M2 | TEMPERATURE: 99 F | HEART RATE: 104 BPM | OXYGEN SATURATION: 93 % | DIASTOLIC BLOOD PRESSURE: 62 MMHG | HEIGHT: 61 IN | RESPIRATION RATE: 18 BRPM | SYSTOLIC BLOOD PRESSURE: 132 MMHG

## 2020-03-06 LAB
ALBUMIN SERPL BCP-MCNC: 2.3 G/DL (ref 3.5–5.2)
ALP SERPL-CCNC: 122 U/L (ref 55–135)
ALT SERPL W/O P-5'-P-CCNC: 27 U/L (ref 10–44)
ANION GAP SERPL CALC-SCNC: 8 MMOL/L (ref 8–16)
AST SERPL-CCNC: 36 U/L (ref 10–40)
BASOPHILS # BLD AUTO: 0.03 K/UL (ref 0–0.2)
BASOPHILS NFR BLD: 0.5 % (ref 0–1.9)
BILIRUB SERPL-MCNC: 0.2 MG/DL (ref 0.1–1)
BUN SERPL-MCNC: 15 MG/DL (ref 8–23)
CALCIUM SERPL-MCNC: 9 MG/DL (ref 8.7–10.5)
CHLORIDE SERPL-SCNC: 108 MMOL/L (ref 95–110)
CO2 SERPL-SCNC: 21 MMOL/L (ref 23–29)
CREAT SERPL-MCNC: 1 MG/DL (ref 0.5–1.4)
DIFFERENTIAL METHOD: ABNORMAL
EOSINOPHIL # BLD AUTO: 0.1 K/UL (ref 0–0.5)
EOSINOPHIL NFR BLD: 1.9 % (ref 0–8)
ERYTHROCYTE [DISTWIDTH] IN BLOOD BY AUTOMATED COUNT: 14.8 % (ref 11.5–14.5)
EST. GFR  (AFRICAN AMERICAN): >60 ML/MIN/1.73 M^2
EST. GFR  (NON AFRICAN AMERICAN): 53 ML/MIN/1.73 M^2
GLUCOSE SERPL-MCNC: 116 MG/DL (ref 70–110)
HCT VFR BLD AUTO: 31.6 % (ref 37–48.5)
HGB BLD-MCNC: 10 G/DL (ref 12–16)
IMM GRANULOCYTES # BLD AUTO: 0.05 K/UL (ref 0–0.04)
IMM GRANULOCYTES NFR BLD AUTO: 0.8 % (ref 0–0.5)
LYMPHOCYTES # BLD AUTO: 0.8 K/UL (ref 1–4.8)
LYMPHOCYTES NFR BLD: 12.1 % (ref 18–48)
MAGNESIUM SERPL-MCNC: 1.6 MG/DL (ref 1.6–2.6)
MCH RBC QN AUTO: 30.4 PG (ref 27–31)
MCHC RBC AUTO-ENTMCNC: 31.6 G/DL (ref 32–36)
MCV RBC AUTO: 96 FL (ref 82–98)
MONOCYTES # BLD AUTO: 0.7 K/UL (ref 0.3–1)
MONOCYTES NFR BLD: 10.6 % (ref 4–15)
NEUTROPHILS # BLD AUTO: 4.7 K/UL (ref 1.8–7.7)
NEUTROPHILS NFR BLD: 74.1 % (ref 38–73)
NRBC BLD-RTO: 0 /100 WBC
PHOSPHATE SERPL-MCNC: 2.2 MG/DL (ref 2.7–4.5)
PLATELET # BLD AUTO: 279 K/UL (ref 150–350)
PMV BLD AUTO: 9 FL (ref 9.2–12.9)
POTASSIUM SERPL-SCNC: 5.5 MMOL/L (ref 3.5–5.1)
PROT SERPL-MCNC: 5.8 G/DL (ref 6–8.4)
RBC # BLD AUTO: 3.29 M/UL (ref 4–5.4)
SODIUM SERPL-SCNC: 137 MMOL/L (ref 136–145)
WBC # BLD AUTO: 6.34 K/UL (ref 3.9–12.7)

## 2020-03-06 PROCEDURE — 83735 ASSAY OF MAGNESIUM: CPT

## 2020-03-06 PROCEDURE — 97116 GAIT TRAINING THERAPY: CPT | Mod: CQ

## 2020-03-06 PROCEDURE — 63600175 PHARM REV CODE 636 W HCPCS: Performed by: INTERNAL MEDICINE

## 2020-03-06 PROCEDURE — 27000221 HC OXYGEN, UP TO 24 HOURS

## 2020-03-06 PROCEDURE — 97535 SELF CARE MNGMENT TRAINING: CPT

## 2020-03-06 PROCEDURE — 99239 PR HOSPITAL DISCHARGE DAY,>30 MIN: ICD-10-PCS | Mod: ,,, | Performed by: INTERNAL MEDICINE

## 2020-03-06 PROCEDURE — 25000242 PHARM REV CODE 250 ALT 637 W/ HCPCS: Performed by: NURSE PRACTITIONER

## 2020-03-06 PROCEDURE — 36415 COLL VENOUS BLD VENIPUNCTURE: CPT

## 2020-03-06 PROCEDURE — 85025 COMPLETE CBC W/AUTO DIFF WBC: CPT

## 2020-03-06 PROCEDURE — 63600175 PHARM REV CODE 636 W HCPCS: Performed by: NURSE PRACTITIONER

## 2020-03-06 PROCEDURE — 94761 N-INVAS EAR/PLS OXIMETRY MLT: CPT

## 2020-03-06 PROCEDURE — 99239 HOSP IP/OBS DSCHRG MGMT >30: CPT | Mod: ,,, | Performed by: INTERNAL MEDICINE

## 2020-03-06 PROCEDURE — 84100 ASSAY OF PHOSPHORUS: CPT

## 2020-03-06 PROCEDURE — 25000003 PHARM REV CODE 250: Performed by: NURSE PRACTITIONER

## 2020-03-06 PROCEDURE — 97110 THERAPEUTIC EXERCISES: CPT | Mod: CQ

## 2020-03-06 PROCEDURE — 94640 AIRWAY INHALATION TREATMENT: CPT

## 2020-03-06 PROCEDURE — 80053 COMPREHEN METABOLIC PANEL: CPT

## 2020-03-06 PROCEDURE — 25000003 PHARM REV CODE 250: Performed by: INTERNAL MEDICINE

## 2020-03-06 RX ORDER — AZITHROMYCIN 500 MG/1
500 TABLET, FILM COATED ORAL DAILY
Qty: 3 TABLET | Refills: 0 | Status: SHIPPED | OUTPATIENT
Start: 2020-03-06 | End: 2020-03-09

## 2020-03-06 RX ADMIN — VALSARTAN 320 MG: 80 TABLET, FILM COATED ORAL at 08:03

## 2020-03-06 RX ADMIN — LEVOTHYROXINE SODIUM 88 MCG: 88 TABLET ORAL at 07:03

## 2020-03-06 RX ADMIN — SODIUM CHLORIDE: 0.9 INJECTION, SOLUTION INTRAVENOUS at 12:03

## 2020-03-06 RX ADMIN — IPRATROPIUM BROMIDE AND ALBUTEROL SULFATE 3 ML: .5; 3 SOLUTION RESPIRATORY (INHALATION) at 12:03

## 2020-03-06 RX ADMIN — HEPARIN SODIUM 5000 UNITS: 5000 INJECTION, SOLUTION INTRAVENOUS; SUBCUTANEOUS at 07:03

## 2020-03-06 RX ADMIN — CEFTRIAXONE 1 G: 1 INJECTION, SOLUTION INTRAVENOUS at 03:03

## 2020-03-06 RX ADMIN — AZITHROMYCIN MONOHYDRATE 500 MG: 500 INJECTION, POWDER, LYOPHILIZED, FOR SOLUTION INTRAVENOUS at 07:03

## 2020-03-06 RX ADMIN — FLUTICASONE FUROATE AND VILANTEROL TRIFENATATE 1 PUFF: 100; 25 POWDER RESPIRATORY (INHALATION) at 07:03

## 2020-03-06 RX ADMIN — TIOTROPIUM BROMIDE 18 MCG: 18 CAPSULE ORAL; RESPIRATORY (INHALATION) at 07:03

## 2020-03-06 RX ADMIN — POTASSIUM CHLORIDE 40 MEQ: 40 SOLUTION ORAL at 12:03

## 2020-03-06 RX ADMIN — CILOSTAZOL 50 MG: 50 TABLET ORAL at 08:03

## 2020-03-06 RX ADMIN — CLOPIDOGREL BISULFATE 75 MG: 75 TABLET ORAL at 08:03

## 2020-03-06 RX ADMIN — Medication 800 MG: at 07:03

## 2020-03-06 RX ADMIN — Medication 800 MG: at 03:03

## 2020-03-06 RX ADMIN — IPRATROPIUM BROMIDE AND ALBUTEROL SULFATE 3 ML: .5; 3 SOLUTION RESPIRATORY (INHALATION) at 07:03

## 2020-03-06 RX ADMIN — IPRATROPIUM BROMIDE AND ALBUTEROL SULFATE 3 ML: .5; 3 SOLUTION RESPIRATORY (INHALATION) at 03:03

## 2020-03-06 RX ADMIN — POTASSIUM & SODIUM PHOSPHATES POWDER PACK 280-160-250 MG 2 PACKET: 280-160-250 PACK at 07:03

## 2020-03-06 RX ADMIN — PANTOPRAZOLE SODIUM 40 MG: 40 TABLET, DELAYED RELEASE ORAL at 08:03

## 2020-03-06 RX ADMIN — POTASSIUM & SODIUM PHOSPHATES POWDER PACK 280-160-250 MG 2 PACKET: 280-160-250 PACK at 03:03

## 2020-03-06 RX ADMIN — ATORVASTATIN CALCIUM 40 MG: 20 TABLET, FILM COATED ORAL at 08:03

## 2020-03-06 NOTE — PT/OT/SLP PROGRESS
Physical Therapy Treatment    Patient Name:  Brittany Haile   MRN:  [id number]    Recommendations:     Discharge Recommendations:  home health OT, home with home health, home health PT(with family assistance 24/7)   Discharge Equipment Recommendations: bedside commode   Barriers to discharge: None    Assessment:     Brittany Haile is a 79 y.o. female admitted with a medical diagnosis of Community acquired pneumonia of right lower lobe of lung.  She presents with the following impairments/functional limitations:  weakness, impaired endurance, impaired self care skills, impaired functional mobilty, gait instability, impaired balance, decreased lower extremity function, decreased safety awareness, impaired cardiopulmonary response to activity ;pt with improved mobility today, inc. amb distance, though still req's CGA/min.A for safety. O2@3L:91% with amb..    Rehab Prognosis: Good; patient would benefit from acute skilled PT services to address these deficits and reach maximum level of function.    Recent Surgery: * No surgery found *      Plan:     During this hospitalization, patient to be seen 5 x/week to address the identified rehab impairments via gait training, therapeutic activities, therapeutic exercises and progress toward the following goals:    · Plan of Care Expires:  04/03/20    Subjective     Chief Complaint: fatigue  Patient/Family Comments/goals: pt agreeable to session, daughter present as well.  Pain/Comfort:  · Pain Rating 1: 0/10  · Pain Rating Post-Intervention 1: 0/10      Objective:     Communicated with nurse prior to session.  Patient found up in chair with oxygen(O2@3L), periph.IV, upon PT entry to room.     General Precautions: Standard, anti-coagulation medicine, fall, aspiration, hearing impaired   Orthopedic Precautions:N/A   Braces: N/A     Functional Mobility:  · Transfers:     · Sit to Stand:  contact guard assistance and minimum assistance with rolling walker  · Gait: amb'd 70' with RW and  CGA/Robert, O2@3L:91%, HR:111.      AM-PAC 6 CLICK MOBILITY  Turning over in bed (including adjusting bedclothes, sheets and blankets)?: 4  Sitting down on and standing up from a chair with arms (e.g., wheelchair, bedside commode, etc.): 3  Moving from lying on back to sitting on the side of the bed?: 3  Moving to and from a bed to a chair (including a wheelchair)?: 3  Need to walk in hospital room?: 3  Climbing 3-5 steps with a railing?: 2  Basic Mobility Total Score: 18       Therapeutic Activities and Exercises:   pt perf'd seated LE ex's of heel/toe raises, hip flex, LAQ's x 10 ea.     Patient left up in chair with all lines intact, call button in reach, nurse notified and daughter present..    GOALS:   Multidisciplinary Problems     Physical Therapy Goals        Problem: Physical Therapy Goal    Goal Priority Disciplines Outcome Goal Variances Interventions   Physical Therapy Goal     PT, PT/OT Ongoing, Progressing     Description:  Goals to be met by: 4/3/2020    Patient will perform the following to increase strength, improve mobility, and return to prior level of function:    1. Supine <> sit with independence.  2. Sit<>stand with independence with least restrictive assistive device.  3. Gait x 100 feet with SBA with least restrictive assistive device.                    Time Tracking:     PT Received On: 03/06/20  PT Start Time: 1029     PT Stop Time: 1053  PT Total Time (min): 24 min     Billable Minutes: Gait Training 14 and Therapeutic Exercise 10    Treatment Type: Treatment  PT/PTA: PTA     PTA Visit Number: 3     Blessing Bear PTA  03/06/2020

## 2020-03-06 NOTE — SUBJECTIVE & OBJECTIVE
Interval History: Much improved today.  Sitting up in bed resting comfortably.  Alert and talkative.  Did discuss discharge plan for tomorrow and patient in agreement.  PT has recommended home health.     Review of Systems   Constitutional: Positive for activity change, appetite change and fatigue. Negative for chills and fever.   HENT: Negative for congestion, ear pain, rhinorrhea and sinus pressure.    Eyes: Negative for pain and discharge.   Respiratory: Positive for shortness of breath. Negative for cough, chest tightness and wheezing.    Cardiovascular: Negative for chest pain and leg swelling.   Gastrointestinal: Negative for abdominal distention, abdominal pain, diarrhea, nausea and vomiting.   Endocrine: Negative for cold intolerance and heat intolerance.   Genitourinary: Negative for difficulty urinating, flank pain, frequency, hematuria and urgency.   Musculoskeletal: Positive for myalgias. Negative for arthralgias and joint swelling.   Allergic/Immunologic: Negative for environmental allergies and food allergies.   Neurological: Positive for weakness. Negative for dizziness, light-headedness and headaches.   Hematological: Does not bruise/bleed easily.   Psychiatric/Behavioral: Negative for agitation, behavioral problems and decreased concentration.     Objective:     Vital Signs (Most Recent):  Temp: 99.6 °F (37.6 °C) (03/05/20 1638)  Pulse: 109 (03/05/20 1638)  Resp: 20 (03/05/20 1638)  BP: (!) 146/55 (03/05/20 1638)  SpO2: (!) 92 % (03/05/20 1638) Vital Signs (24h Range):  Temp:  [97.6 °F (36.4 °C)-100.9 °F (38.3 °C)] 99.6 °F (37.6 °C)  Pulse:  [] 109  Resp:  [14-20] 20  SpO2:  [88 %-94 %] 92 %  BP: (120-158)/(54-73) 146/55     Weight: 59 kg (130 lb)  Body mass index is 24.56 kg/m².    Intake/Output Summary (Last 24 hours) at 3/5/2020 1850  Last data filed at 3/5/2020 0626  Gross per 24 hour   Intake 2012.15 ml   Output --   Net 2012.15 ml      Physical Exam   Constitutional: She appears  well-developed and well-nourished.   HENT:   Head: Normocephalic.   Eyes: Conjunctivae are normal. Right eye exhibits no discharge. Left eye exhibits no discharge.   Neck: Normal range of motion. Neck supple.   Cardiovascular: Normal rate, regular rhythm, normal heart sounds and intact distal pulses.   Pulses:       Radial pulses are 1+ on the right side, and 1+ on the left side.   Pulmonary/Chest: Effort normal. No respiratory distress. She has decreased breath sounds in the left lower field.   Abdominal: Soft. She exhibits no distension. Bowel sounds are decreased.   Musculoskeletal: Normal range of motion.   Neurological: She is alert.   Skin: Skin is warm and dry.   Psychiatric: She has a normal mood and affect. Her speech is normal and behavior is normal.       Significant Labs:   CBC:   Recent Labs   Lab 03/04/20  0539 03/05/20  0534   WBC 8.66 7.23   HGB 9.5* 9.2*   HCT 29.5* 28.8*    225     CMP:   Recent Labs   Lab 03/04/20 0539 03/05/20  0534   * 131*   K 3.2* 3.2*   CL 97 99   CO2 25 24   * 128*   BUN 23 25*   CREATININE 1.6* 1.3   CALCIUM 8.1* 8.2*   PROT 5.3* 5.2*   ALBUMIN 2.3* 2.2*   BILITOT 0.2 0.2   ALKPHOS 87 94   AST 15 22   ALT 13 17   ANIONGAP 8 8   EGFRNONAA 30* 39*     All pertinent labs within the past 24 hours have been reviewed.    Significant Imaging: I have reviewed all pertinent imaging results/findings within the past 24 hours.

## 2020-03-06 NOTE — PLAN OF CARE
Problem: Physical Therapy Goal  Goal: Physical Therapy Goal  Description  Goals to be met by: 4/3/2020    Patient will perform the following to increase strength, improve mobility, and return to prior level of function:    1. Supine <> sit with independence.  2. Sit<>stand with independence with least restrictive assistive device.  3. Gait x 100 feet with SBA with least restrictive assistive device.   Outcome: Ongoing, Progressing   Pt sit to stand CGA/min.A, amb'd 70' with RW and CGA/min.A, O2@3L:91% following, HR:111. Recommend HHPT and 24/7 family assistance.

## 2020-03-06 NOTE — PROGRESS NOTES
[facility] Hospital Medicine  Progress Note    Patient Name: Brittany Haile  MRN: [id number]  Patient Class: IP- Inpatient   Admission Date: 3/3/2020  Length of Stay: 2 days  Attending Physician: Marcie Min, *  Primary Care Provider: Wayne Frazier MD        Subjective:     Principal Problem:Community acquired pneumonia of right lower lobe of lung        HPI:  The patient is a 80 yo with history of CVA, COPD on O2 PRN, HTN, PVD presents complaining of  weakness and confusion for 3 days.  Five days ago, the patient began having malaise, fatigue, and anorexia. Symptoms continued to progress to chills, night sweats and nausea/vomiting.  She also became more short of breath and had increased O2 requirement to 3L.  This morning she sustained a mechanical fall out of her bed where she did not lose consciousness and was brought to her PCP this afternoon to work up her fall. PCP noted her to appear clinically dehydrated and was concerned she may have a head injury and instructed her to come to the ED.  On ER workup, patient was found to have a lower lobe pneumonia and was admitted to the hospital, but was unable to placed in a bed.  The patient was transferred to [facility] for a room assignment.          Overview/Hospital Course:  No notes on file    Interval History: Much improved today.  Sitting up in bed resting comfortably.  Alert and talkative.  Did discuss discharge plan for tomorrow and patient in agreement.  PT has recommended home health.     Review of Systems   Constitutional: Positive for activity change, appetite change and fatigue. Negative for chills and fever.   HENT: Negative for congestion, ear pain, rhinorrhea and sinus pressure.    Eyes: Negative for pain and discharge.   Respiratory: Positive for shortness of breath. Negative for cough, chest tightness and wheezing.    Cardiovascular: Negative for chest pain and leg swelling.   Gastrointestinal: Negative for abdominal distention,  abdominal pain, diarrhea, nausea and vomiting.   Endocrine: Negative for cold intolerance and heat intolerance.   Genitourinary: Negative for difficulty urinating, flank pain, frequency, hematuria and urgency.   Musculoskeletal: Positive for myalgias. Negative for arthralgias and joint swelling.   Allergic/Immunologic: Negative for environmental allergies and food allergies.   Neurological: Positive for weakness. Negative for dizziness, light-headedness and headaches.   Hematological: Does not bruise/bleed easily.   Psychiatric/Behavioral: Negative for agitation, behavioral problems and decreased concentration.     Objective:     Vital Signs (Most Recent):  Temp: 99.6 °F (37.6 °C) (03/05/20 1638)  Pulse: 109 (03/05/20 1638)  Resp: 20 (03/05/20 1638)  BP: (!) 146/55 (03/05/20 1638)  SpO2: (!) 92 % (03/05/20 1638) Vital Signs (24h Range):  Temp:  [97.6 °F (36.4 °C)-100.9 °F (38.3 °C)] 99.6 °F (37.6 °C)  Pulse:  [] 109  Resp:  [14-20] 20  SpO2:  [88 %-94 %] 92 %  BP: (120-158)/(54-73) 146/55     Weight: 59 kg (130 lb)  Body mass index is 24.56 kg/m².    Intake/Output Summary (Last 24 hours) at 3/5/2020 1850  Last data filed at 3/5/2020 0626  Gross per 24 hour   Intake 2012.15 ml   Output --   Net 2012.15 ml      Physical Exam   Constitutional: She appears well-developed and well-nourished.   HENT:   Head: Normocephalic.   Eyes: Conjunctivae are normal. Right eye exhibits no discharge. Left eye exhibits no discharge.   Neck: Normal range of motion. Neck supple.   Cardiovascular: Normal rate, regular rhythm, normal heart sounds and intact distal pulses.   Pulses:       Radial pulses are 1+ on the right side, and 1+ on the left side.   Pulmonary/Chest: Effort normal. No respiratory distress. She has decreased breath sounds in the left lower field.   Abdominal: Soft. She exhibits no distension. Bowel sounds are decreased.   Musculoskeletal: Normal range of motion.   Neurological: She is alert.   Skin: Skin is warm  and dry.   Psychiatric: She has a normal mood and affect. Her speech is normal and behavior is normal.       Significant Labs:   CBC:   Recent Labs   Lab 03/04/20  0539 03/05/20  0534   WBC 8.66 7.23   HGB 9.5* 9.2*   HCT 29.5* 28.8*    225     CMP:   Recent Labs   Lab 03/04/20  0539 03/05/20  0534   * 131*   K 3.2* 3.2*   CL 97 99   CO2 25 24   * 128*   BUN 23 25*   CREATININE 1.6* 1.3   CALCIUM 8.1* 8.2*   PROT 5.3* 5.2*   ALBUMIN 2.3* 2.2*   BILITOT 0.2 0.2   ALKPHOS 87 94   AST 15 22   ALT 13 17   ANIONGAP 8 8   EGFRNONAA 30* 39*     All pertinent labs within the past 24 hours have been reviewed.    Significant Imaging: I have reviewed all pertinent imaging results/findings within the past 24 hours.      Assessment/Plan:      * Community acquired pneumonia of right lower lobe of lung  CXR- Right basilar infiltrate and pleural effusion could represent pneumonia.  Mild interstitial infiltrates in the right upper lobe also.  Repeat CXR on 3/3/20 - Mild progression of right LLL infiltrate  Rocephin/Azithromycin  Duonebs  Plan for discharge on tomorrow      Essential hypertension  Moderately controlled  Continue valsartan      Chronic diastolic heart failure  Patient appears well compensated.  Monitor for acute decompensation with IVF admin      Chronic kidney disease, stage III (moderate)  Creatinine 1 at baseline, increased to 1.6  Will continue to monitor for acute decompensation  Increased IVF to 100ml/hr and creatinine improved to 1.3      HLD (hyperlipidemia)  Well controlled  Continue Lipitor      COPD (chronic obstructive pulmonary disease)  Will continue Breo, Spiriva  Duonebs      Hypothyroid  Continue levothyroxine        VTE Risk Mitigation (From admission, onward)         Ordered     heparin (porcine) injection 5,000 Units  Every 8 hours      03/03/20 0253     IP VTE HIGH RISK PATIENT  Once      03/03/20 0253                      Marcie Min MD  Department of Hospital  Medicine   [facility]

## 2020-03-06 NOTE — PT/OT/SLP PROGRESS
Occupational Therapy   Treatment    Name: Brittany Haile  MRN: [id number]  Admitting Diagnosis:  Community acquired pneumonia of right lower lobe of lung       Recommendations:     Discharge Recommendations: home health OT, home with home health(family can provide supervision and assist as needed )  Discharge Equipment Recommendations:  bedside commode  Barriers to discharge:       Assessment:     Brittany Haile is a 79 y.o. female with a medical diagnosis of Community acquired pneumonia of right lower lobe of lung.  She presents with the following performance deficits affecting function are weakness, gait instability, impaired balance. Pt with improving endurance and family support at home (dtr present for session) Pt met GH goal standing and progressing on rest of established goals, limited by dynamic balance deficits and language barrier     Rehab Prognosis:  Good; patient would benefit from acute skilled OT services to address these deficits and reach maximum level of function.       Plan:     Patient to be seen 5 x/week to address the above listed problems via self-care/home management, therapeutic activities, therapeutic exercises  · Plan of Care Expires: 04/01/20  · Plan of Care Reviewed with: patient, daughter    Subjective     Pain/Comfort:  · Pain Rating 1: 0/10    Objective:     Communicated with: Zaire OLSON  prior to session.  Patient found up in chair with oxygen(3L NC) upon OT entry to room. Dtr present for session     General Precautions: Standard, anti-coagulation medicine, fall, aspiration, hearing impaired, other (see comments)((L) limb alert, cardiac diet)   Orthopedic Precautions:N/A   Braces:       Occupational Performance:       Functional Mobility/Transfers:  · Patient completed Sit <> Stand Transfer with stand by assistance  with  rolling walker   · Functional Mobility: amb within room using RW with SBA, 1 LOB with Min A to correct while turning to toilet, requiring frequent cues for safe use of RW  and demonstration to maintain RW during all mobility.      Activities of Daily Living:  · Grooming: stand by assistance standing at sink with RW   · Lower Body Dressing: supervision don/doff socks seated in chair   · Toileting: contact guard assistance for [place] care & clothing mgmt in standing       [facility] 6 Click ADL: 18    Treatment & Education:  Educated pt on safe use of RW during all mobility as well as safety during dynamic ADLs. Dtr present for session and states understanding of safe use of RW.     Patient left up in chair with all lines intact, call button in reach and dtr presentEducation:      GOALS:   Multidisciplinary Problems     Occupational Therapy Goals        Problem: Occupational Therapy Goal    Goal Priority Disciplines Outcome Interventions   Occupational Therapy Goal     OT, PT/OT Ongoing, Progressing    Description:  Goals to be met by: 03/11/2020     Patient will increase functional independence with ADLs by performing:    UE Dressing with Set-up Assistance.  LE Dressing with Stand-by Assistance.  Grooming while standing at sink with Stand-by Assistance with no more than one seated rest break.- MET 3/6  Toileting from toilet with Stand-by Assistance for hygiene and clothing management.   Stand pivot transfers with Stand-by Assistance.  Step transfer with Stand-by Assistance  Toilet transfer to toilet with Stand-by Assistance.                       Time Tracking:     OT Date of Treatment: 03/06/20  OT Start Time: 1120  OT Stop Time: 1146  OT Total Time (min): 26 min    Billable Minutes:Self Care/Home Management 26    Tiki Smith OT  3/6/2020

## 2020-03-06 NOTE — PLAN OF CARE
Patient in no apparent distress. Sat's 92-93  % on 3 lpm. Aerosol treatments given Q 4 . Will continue to monitor.

## 2020-03-06 NOTE — ASSESSMENT & PLAN NOTE
CXR- Right basilar infiltrate and pleural effusion could represent pneumonia.  Mild interstitial infiltrates in the right upper lobe also.  Repeat CXR on 3/3/20 - Mild progression of right LLL infiltrate  Rocephin/Azithromycin  Duonebs  Plan for discharge on tomorrow

## 2020-03-06 NOTE — PLAN OF CARE
PHN to assign pt home health company, awaiting call from [employer] with assigned HH company.     Pt daughter to bring portable unit and provide pt transportation home.     Pt refuses offer for new DME.      No other DC needs from CM perspective.       03/06/20 1155   Final Note   Assessment Type Final Discharge Note   Anticipated Discharge Disposition Home-Health   What phone number can be called within the next 1-3 days to see how you are doing after discharge? [phone]   Hospital Follow Up  Appt(s) scheduled? Yes   Discharge plans and expectations educations in teach back method with documentation complete? Yes   Right Care Referral Info   Post Acute Recommendation Home-care

## 2020-03-06 NOTE — PLAN OF CARE
Problem: Adult Inpatient Plan of Care  Goal: Plan of Care Review  Description   Outcome: Ongoing, Progressing  Flowsheets (Taken 3/6/2020 0610)  Plan of Care Reviewed With: patient     Problem: Fall Injury Risk  Goal: Absence of Fall and Fall-Related Injury  Intervention: Promote Injury-Free Environment  Flowsheets (Taken 3/6/2020 0610)  Safety Promotion/Fall Prevention: assistive device/personal item within reach; bed alarm set; Fall Risk signage in place; room near unit station; /camera at bedside; side rails raised x 3; instructed to call staff for mobility     Problem: Skin Injury Risk Increased  Goal: Skin Health and Integrity  Intervention: Optimize Skin Protection  Flowsheets (Taken 3/6/2020 0610)  Pressure Reduction Techniques: frequent weight shift encouraged; weight shift assistance provided  Head of Bed (HOB): HOB elevated

## 2020-03-06 NOTE — ASSESSMENT & PLAN NOTE
Creatinine 1 at baseline, increased to 1.6  Will continue to monitor for acute decompensation  Increased IVF to 100ml/hr and creatinine improved to 1.3

## 2020-03-06 NOTE — PLAN OF CARE
Problem: Occupational Therapy Goal  Goal: Occupational Therapy Goal  Description  Goals to be met by: 03/11/2020     Patient will increase functional independence with ADLs by performing:    UE Dressing with Set-up Assistance.  LE Dressing with Stand-by Assistance.  Grooming while standing at sink with Stand-by Assistance with no more than one seated rest break.- MET 3/6  Toileting from toilet with Stand-by Assistance for hygiene and clothing management.   Stand pivot transfers with Stand-by Assistance.  Step transfer with Stand-by Assistance  Toilet transfer to toilet with Stand-by Assistance.      Outcome: Ongoing, Progressing   Pt met GH goal standing and progressing on rest of established goals, limited by dynamic balance deficits and language barrier

## 2020-03-07 LAB
BACTERIA BLD CULT: NORMAL
BACTERIA BLD CULT: NORMAL

## 2020-03-08 PROCEDURE — G0180 PR HOME HEALTH MD CERTIFICATION: ICD-10-PCS | Mod: ,,, | Performed by: FAMILY MEDICINE

## 2020-03-08 PROCEDURE — G0180 MD CERTIFICATION HHA PATIENT: HCPCS | Mod: ,,, | Performed by: FAMILY MEDICINE

## 2020-03-08 NOTE — PT/OT/SLP DISCHARGE
Occupational Therapy Discharge Summary    Brittany Haile  MRN: [id number]   Principal Problem: Community acquired pneumonia of right lower lobe of lung      Patient Discharged from acute Occupational Therapy on 3/6/20.  Please refer to prior OT note dated 3/6/20 for functional status.    Assessment:      Patient appropriate for care in another setting. Patient has not met goals.    Objective:     GOALS:   Multidisciplinary Problems     Occupational Therapy Goals        Problem: Occupational Therapy Goal    Goal Priority Disciplines Outcome Interventions   Occupational Therapy Goal     OT, PT/OT Ongoing, Progressing    Description:  Goals to be met by: 03/11/2020     Patient will increase functional independence with ADLs by performing:    UE Dressing with Set-up Assistance.  LE Dressing with Stand-by Assistance.  Grooming while standing at sink with Stand-by Assistance with no more than one seated rest break.- MET 3/6  Toileting from toilet with Stand-by Assistance for hygiene and clothing management.   Stand pivot transfers with Stand-by Assistance.  Step transfer with Stand-by Assistance  Toilet transfer to toilet with Stand-by Assistance.                       Reasons for Discontinuation of Therapy Services  Transfer to alternate level of care.      Plan:     Patient Discharged to: Home with Home Health Service    Celsa Treviño, [place], LOTR  3/8/2020

## 2020-03-09 DIAGNOSIS — F33.1 MODERATE RECURRENT MAJOR DEPRESSION: ICD-10-CM

## 2020-03-09 DIAGNOSIS — F41.9 ANXIETY: ICD-10-CM

## 2020-03-09 RX ORDER — ESCITALOPRAM OXALATE 10 MG/1
TABLET ORAL
Qty: 90 TABLET | Refills: 3 | Status: SHIPPED | OUTPATIENT
Start: 2020-03-09

## 2020-03-09 NOTE — TELEPHONE ENCOUNTER
----- Message from Sweetie Hallie sent at 3/9/2020  1:38 PM CDT -----  Contact: Heidi, daughter, [phone]  States patient was hospitalized for pneumonia last week and requests to schedule her hospital follow up appointment. Please advise.

## 2020-03-11 NOTE — PHYSICIAN QUERY
PT Name: Brittany Haile  MR #: [id number]     PHYSICIAN QUERY -  ELECTROLYTE CLARIFICATION      CDS: Rubens Mccullough RN CCDS               Contact information: [email or web address] or [phone]  This form is a permanent document in the medical record.     Query Date: March 11, 2020    By submitting this query, we are merely seeking further clarification of documentation to reflect the severity of illness of your patient. Please utilize your independent clinical judgment when addressing the question(s) below.    The Medical record reflects the following:     Indicators   Supporting Clinical Findings Location in Medical Record     x Lab Value(s)  3/3/2020 04:03 3/4/2020 05:39 3/5/2020 05:34   Potassium 3.4 (L) 3.2 (L) 3.2 (L)   Phosphorus 1.8 (L) 2.5 (L) 2.2 (L)   Magnesium 1.4 (L) 1.4 (L) 1.4 (L)    Lab values     x Treatment                                 Medication magnesium oxide tablet 800 mg PO  potassium chloride 10% oral solution 40 mEq PO  potassium, sodium phosphates 280-160-250 mg packet 2 packet PO  magnesium oxide tablet 800 mg PO x 2  potassium chloride 10% oral solution 40 mEq PO  potassium, sodium phosphates 280-160-250 mg packet 2 packet PO x 2 3/5/2020 Medications      3/6/2020 Medications    Other       Provider, please specify the diagnosis or diagnoses that correspond(s) to the above indicators. Jose all that apply:    [  X ] Hypokalemia   [  X ] Hypomagnesemia   [ X  ] Hypophosphatemia   [   ] Other electrolyte disturbance (please specify): _______   [   ]  Clinically Undetermined       Please document in your progress notes daily for the duration of treatment until resolved, and include in your discharge summary.

## 2020-03-11 NOTE — PHYSICIAN QUERY
PT Name: Brittany Haile  MR #: [id number]    Physician Query Form - Respiratory Condition Clarification      CDS: Rubens Mccullough RN CCDS               Contact information: [email or web address] or [phone]    This form is a permanent document in the medical record.    Query Date: March 11, 2020    By submitting this query, we are merely seeking further clarification of documentation. Please utilize your independent clinical judgment when addressing the question(s) below.    The Medical record contains the following   Indicators   Supporting Clinical Findings Location in Medical Record     x SOB, KEATING, Wheezing, Productive Cough, Use of Accessory Muscles, etc. short of breath, Effort normal. Tachypnea noted. No respiratory distress. 3/3/2020 H&P Moe Vaughan NP     x Acute/Chronic Illness COPD  Chronic diastolic heart failure  Community acquired pneumonia of right lower lobe of lung 3/3/2020 H&P Moe Vaughan NP     x Radiology Findings Right basilar infiltrate and pleural effusion could represent pneumonia.  Mild interstitial infiltrates in the right upper lobe also. 3/2/2020 Chest x-ray     Respiratory Distress or Failure      Hypoxia or Hypercapnia       x RR         ABGs         O2 sat RR 17-24  Pt found sleeping in bed without NC O2 in her nose, O2:76% on RA at rest, donned O2@3L:inc to 94%. 3/3/2020 Vitals  3/5/2020 Physical Therapy progress note    BiPAP/Intubation       x Supplemental O2 O2 @ 3L 3/3/2020 Vitals     x Home O2, Oxygen Dependence [facility] states pt home oxygen is supplied by [facility]  had increased O2 requirement to 3L.   3/4/2020 Case Management note  3/3/2020 H&P Moe Vaughan NP    Treatment      Other       Respiratory failure can be acute, chronic or both. It is generally further specified as hypoxic, hypercapnic or both. Lastly, it is important to identify an etiology, if known or suspected.   References::  https://www.acphospitalist.org/archives/2013/10/coding.htm  http://dilitronics.Projjix/acute-respiratory-failure-know     The clinical guidelines noted below are only system guidelines, and do not replace the providers clinical judgment.    Provider, please specify diagnosis or diagnoses associated with above clinical findings.   [ X  ] Chronic Respiratory Failure with Hypoxia -Continuous home oxygen use   [   ] Hypoxia Only   [   ] Other Respiratory Diagnosis (please specify): _________________________________   [   ]  Clinically Undetermined       Please document in your progress notes daily for the duration of treatment until resolved and include in your discharge summary.

## 2020-03-11 NOTE — PHYSICIAN QUERY
PT Name: Brittany Haile  MR #: [id number]  Physician Query Form - Renal Condition Clarification     CDS: Rubens Mccullough RN CCDS               Contact information: [email or web address] or [phone]    This form is a permanent document in the medical record.     QueryDate: March 11, 2020    By submitting this query, we are merely seeking further clarification of documentation. Please utilize your independent clinical judgment when addressing the question(s) below.    The Medical record contains the following:   Indicator Supporting Clinical Findings Location in Medical Record    Kidney (Renal) Insufficiency      Kidney (Renal) Failure / Injury      Nephrotoxic Agents       x BUN/Creatinine GFR  3/3/2020 04:03 3/4/2020 05:39 3/5/2020 05:34 3/6/2020 04:25   BUN, Bld 14 23 25 (H) 15   Creatinine 0.8 1.6 (H) 1.3 1.0   eGFR if non  >60 30 (A) 39 (A) 53 (A)    Lab values    Urine: Casts         Eosinophils      x Dehydration PCP noted her to appear clinically dehydrated  3/3/2020 H&P Moe Vaughan, NP    x Nausea/Vomiting nausea/vomiting 3/3/2020 H&P Moe Vaughan NP    Dialysis/CRRT      x Treatment: 0.9%  NaCl infusion @ 50cc/hr  0.9%  NaCl infusion @ 100cc/hr 3/3/2020 Medication  3/4-3/6/2020 Medication    x Other:  Chronic kidney disease, stage III (moderate)  Creatinine 1 at baseline, increased to 1.6  Will continue to monitor for acute decompensation  Will increase IVF to 100ml/hr 3/4/2020 Hospital Medicine progress notes Marcie Min MD   Acute Kidney Injury / Acute Renal Failure has different defining criteria. A generally accepted guideline  is:   A greater than 100% (2X) rise in serum creatinine from baseline* occurring during the course of a single hospital stay.   *Baseline as determined by the providers judgment and consideration of previous lab values and other documentation, if available.    A diagnosis of Acute Kidney Injury/ Acute Renal Failure should incorporate  abnormal labs and clinical findings that are clinically significant      References: 1. Marleny et al. Acute renal failure-definition, outcome measures, animal models, fluid therapy and information technology needs: the Second International Consensus Conference of the Acute Dialysis Quality Initiative (ADQI) Group. Crit Care 2004; 8:B204; 2. Joey et al. Acute Kidney Injury Network: report of an initiative to improve outcomes in acute kidney injury. Crit Care 2007; 11:R31; 3. Kidney Disease: Improving Global Outcomes (KDIGO). Acute Kidney Injury Work Group. KDIGO clinical practice guidelines for acute kidney injury. Kidney Int Suppl 2012; 2:1.    The clinical guidelines noted below is only a system guideline, it does not replace the providers clinical judgment.    Provider, please specify the diagnosis or diagnoses associated with above clinical findings.    [  X ] Unspecified Acute Kidney Failure/Injury      [   ] Other (please specify): _________________________________   [   ]  Clinically Undetermined       Please document in your progress notes daily for the duration of treatment until resolved and include in your discharge summary.

## 2020-03-23 ENCOUNTER — TELEPHONE (OUTPATIENT)
Dept: FAMILY MEDICINE | Facility: CLINIC | Age: 80
End: 2020-03-23

## 2020-03-23 ENCOUNTER — PATIENT MESSAGE (OUTPATIENT)
Dept: FAMILY MEDICINE | Facility: CLINIC | Age: 80
End: 2020-03-23

## 2020-03-24 NOTE — DISCHARGE SUMMARY
This note has been moved to another encounter. If you have any questions, please contact HIM Chart Correction at [phone].

## 2020-03-24 NOTE — DISCHARGE SUMMARY
[facility] Discharge Summary    Primary Team: [facility]  Attending Physician: Homer  Resident: Henny  Intern: Roscoe    Date of Admit: 3/3/2020  Date of Discharge: 3/3/2020    Discharge to: [facility]  Condition: Stable    Discharge Diagnoses     Patient Active Problem List   Diagnosis    History of stroke without residual deficits    Carotid stenosis    Hypothyroid    Hypertension    Anxiety    Incidental pulmonary nodule, > 3mm and < 8mm    Community acquired pneumonia of right lower lobe of lung    COPD (chronic obstructive pulmonary disease)    HLD (hyperlipidemia)    Chronic kidney disease, stage III (moderate)    Leg pain, bilateral    PVD (peripheral vascular disease) with claudication    Iron deficiency anemia due to chronic blood loss    Glucose intolerance (impaired glucose tolerance)    Subclavian artery stenosis, left    CVD (cerebrovascular disease)    Chest pain    Abdominal pain    Gastritis    Angina mesenteric    Nicotine dependence, cigarettes, w unsp disorders    Coronary artery disease of native artery of native heart with stable angina pectoris    Mesenteric angina    Stroke    Aortic atherosclerosis    Chronic diastolic heart failure    Chronic bilateral low back pain with bilateral sciatica    Lumbar spondylosis    Spinal stenosis of lumbar region with neurogenic claudication    DDD (degenerative disc disease), lumbar    Lumbar radiculopathy    Moderate recurrent major depression    Chronic obstructive pulmonary disease    Gastroesophageal reflux disease    Epistaxis    Anemia    Elevated troponin    Acute nonintractable headache    Non-intractable vomiting    Thrombocytopenia    Pneumonia of right lower lobe due to infectious organism    PNA (pneumonia)    Essential hypertension       Consultants and Procedures     Consultants:  None    Procedures:   None    Brief History of Present Illness   Patient was admitted to the [facility] service w/ community acquired PNA. Patient's family was notified while still in the ED that there were no beds available in the hospital at the time of admission, so family requested a transfer to another facility where beds were currently available. Patient was accepted at [facility] for admission. Admit orders To [facility] were cancelled and patient was later transferred to [facility].        78 yo with history of CVA, COPD on O2 PRN, HTN, PVD presents complaining of confusion for 3 days. History provided by family at bedside and patient. 5 days ago patient began having malaise, fatigue, and anorexia. 3 days ago developed chills, night sweats and became nauseous. Family noted that her voice was difficult to understand, and she had an episode of fecal incontinence yesterday which is not patients' baseline. She also became more short of breath and had increased O2 requirement to 3L.      This morning she sustained a mechanical fall out of her bed (height of 1-2 feet) where she did not lose consciousness and was brought to her PCP this afternoon to work up her fall. PCP noted her to appear clinically dehydrated and was concerned she may have a head injury and instructed her to come to the ED.      Patient has had 3-5 admissions for COPD exacerbations in the past, but has never been admitted to the ICU or intubated. Denies increased frequency of cough or sputum production. Denies chest pain, hemoptysis, recent travel, leg swelling, history of blood clots. No chest pain, diaphoresis. No orthopnea, decreased urine output. No sick contacts.      In the ED she was noted to be tachypneic, required 3L of O2 to keep O2 sats >92%, was febrile to 100.8 and tachycardic to 108. Basic labs, a CXR, a head CT was done and patient was given levofloxacin, 1L NS, and a breathing treatment.     Hospital Course By Problem with Pertinent Findings     Community Acquired Pneumonia of Right Lower Lobe   Pt has had  increased O2 requirement, fever to 100.8, CXR with RLL infiltrate, neutorphilic predom leukocytosis concerning for lobar pneumonia.   -Pt is high risk based on CURB-65: confusion, uremia, respiratory rate >30, age >65  -Continue Levofloxacin 750 QD IV for expected 5 day course for uncomplicated  -Add Oseltamavir 75 BID PO for empiric flu treatment  -3L O2 by NC to keep sats 92-99%  -f/u legionella and strep pneumo urine antigen  -f/u blood cultures     Chronic Obstructive Pulmonary Disease, not in Exacerbation  -Continue home fluticasone-salmeterol BID  -Continue home PRN albuterol for wheezing  -Continue home fluticasone propionate 50 mcg QD  -Continue home tiotropium 18mcg QD  -NC O2 as needed to keep sats >92%     Peripheral Vascular Disease with Claudication  Patient follows with Dr. Brooks, had SMA and celiac stenting and R CEA  -Continue home plavix 75 QD, Cilostazol 50 BID, Lipitor 40 QD      Heart Failure with Preserved Ejection Fraction, Grade I Diastolic Dysfunction  -TTE 10/2019 showed LVEF >60%, concentric LVH  -BNP mildly elevated to 211 above patient's baseline of 70   -No signs of volume overload at the moment, will hold off on diuresis     Hypertension  -Continue home valsartan 320 QD     Hypothyroidism  TSH 1.183 on admission, 0.509 4 months prior  -Continue home synthroid 88 QAC PO      Tobacco Abuse  Daily smoker, 68 PYH.   -Nicotine patches QD while inpatient     Depression  Continue home lexapro 10 QHS  -No active SI/HI     Hyperlipidemia  Lipid panel 7/2019 at goal  -Continue home lipitor 40 QD  Discharge Medications        Medication List      CONTINUE taking these medications    albuterol 90 mcg/actuation inhaler  Commonly known as:  PROVENTIL/VENTOLIN HFA  INHALE 1 TO 2 PUFFS BY MOUTH INTO THE LUNGS EVERY 6 HOURS AS NEEDED FOR WHEEZING OR SHORTNESS OF BREATH( RESCUE)     atorvastatin 40 MG tablet  Commonly known as:  LIPITOR  Take 1 tablet (40 mg total) by mouth once daily.     cilostazoL  50 MG Tab  Commonly known as:  PLETAL  TAKE 1 TABLET(50 MG) BY MOUTH TWICE DAILY     clopidogreL 75 mg tablet  Commonly known as:  PLAVIX  TAKE 1 TABLET BY MOUTH EVERY DAY     fluticasone propionate 50 mcg/actuation nasal spray  Commonly known as:  FLONASE  1 spray (50 mcg total) by Each Nostril route once daily.     fluticasone-salmeterol 250-50 mcg/dose 250-50 mcg/dose diskus inhaler  Commonly known as:  ADVAIR  Inhale 1 puff into the lungs 2 (two) times daily.     levothyroxine 88 MCG tablet  Commonly known as:  SYNTHROID  TAKE 1 TABLET(88 MCG) BY MOUTH EVERY DAY     pantoprazole 40 MG tablet  Commonly known as:  PROTONIX  TAKE 1 TABLET(40 MG) BY MOUTH EVERY DAY     tiotropium 18 mcg inhalation capsule  Commonly known as:  SPIRIVA WITH HANDIHALER  Inhale 1 capsule (18 mcg total) into the lungs once daily.     valsartan 320 MG tablet  Commonly known as:  DIOVAN  Take 1 tablet (320 mg total) by mouth once daily.        STOP taking these medications    escitalopram oxalate 10 MG tablet  Commonly known as:  LEXAPRO        ASK your doctor about these medications    azithromycin 500 MG tablet  Commonly known as:  ZITHROMAX  Take 1 tablet (500 mg total) by mouth once daily. for 3 days  Ask about: Should I take this medication?           Where to Get Your Medications      These medications were sent to [id number] - [address] [facility] [address] AT [place] [id number] [id number]    Phone:  [phone]   · azithromycin 500 MG tablet         Discharge Information:   Diet:  Cardiac    Physical Activity:  As tolerated    Instructions:  1. Take all medications as prescribed  2. Keep all follow-up appointments  3. Return to the hospital or call your primary care physicians if any worsening symptoms such as shortness of breath, fever, syncope occur.      Follow-Up Appointments:  PCP    Jovani Malhotra  \[facility]\"" Internal Medicine, HO-1

## 2020-03-26 ENCOUNTER — TELEPHONE (OUTPATIENT)
Dept: FAMILY MEDICINE | Facility: CLINIC | Age: 80
End: 2020-03-26

## 2020-03-30 ENCOUNTER — OFFICE VISIT (OUTPATIENT)
Dept: FAMILY MEDICINE | Facility: CLINIC | Age: 80
End: 2020-03-30
Attending: FAMILY MEDICINE
Payer: MEDICARE

## 2020-03-30 DIAGNOSIS — F33.1 MODERATE RECURRENT MAJOR DEPRESSION: Primary | ICD-10-CM

## 2020-03-30 DIAGNOSIS — F32.A DEPRESSION, UNSPECIFIED DEPRESSION TYPE: ICD-10-CM

## 2020-03-30 PROCEDURE — 99499 RISK ADDL DX/OHS AUDIT: ICD-10-PCS | Mod: 95,,, | Performed by: FAMILY MEDICINE

## 2020-03-30 PROCEDURE — 99214 PR OFFICE/OUTPT VISIT, EST, LEVL IV, 30-39 MIN: ICD-10-PCS | Mod: 95,,, | Performed by: FAMILY MEDICINE

## 2020-03-30 PROCEDURE — 1159F MED LIST DOCD IN RCRD: CPT | Mod: 95,S$GLB,, | Performed by: FAMILY MEDICINE

## 2020-03-30 PROCEDURE — 1101F PR PT FALLS ASSESS DOC 0-1 FALLS W/OUT INJ PAST YR: ICD-10-PCS | Mod: CPTII,95,S$GLB, | Performed by: FAMILY MEDICINE

## 2020-03-30 PROCEDURE — 99214 OFFICE O/P EST MOD 30 MIN: CPT | Mod: 95,,, | Performed by: FAMILY MEDICINE

## 2020-03-30 PROCEDURE — 99499 UNLISTED E&M SERVICE: CPT | Mod: 95,,, | Performed by: FAMILY MEDICINE

## 2020-03-30 PROCEDURE — 1159F PR MEDICATION LIST DOCUMENTED IN MEDICAL RECORD: ICD-10-PCS | Mod: 95,S$GLB,, | Performed by: FAMILY MEDICINE

## 2020-03-30 PROCEDURE — 1101F PT FALLS ASSESS-DOCD LE1/YR: CPT | Mod: CPTII,95,S$GLB, | Performed by: FAMILY MEDICINE

## 2020-03-30 RX ORDER — TRAZODONE HYDROCHLORIDE 50 MG/1
50 TABLET ORAL NIGHTLY
Qty: 30 TABLET | Refills: 2 | Status: SHIPPED | OUTPATIENT
Start: 2020-03-30 | End: 2020-06-20

## 2020-03-30 NOTE — PROGRESS NOTES
The patient location is: home  The chief complaint leading to consultation is: depression  Visit type: Virtual visit with synchronous audio and video  Total time spent with patient: 20 min  Each patient to whom he or she provides medical services by telemedicine is:  (1) informed of the relationship between the physician and patient and the respective role of any other health care provider with respect to management of the patient; and (2) notified that he or she may decline to receive medical services by telemedicine and may withdraw from such care at any time.    Notes: 79 yr old pleasant female with CVA, Hypothyroidism, HTN, HLD, COPD, GERD, carotid artery disease/stenosis, anxiety, presents to virtual/telemedicine visit for worsening depression and memory deficits. No SI/HI. She will moving out to [place] in few months and she is worried about that.        Lung nodules - several - found 6 months ago and treated with antibiotics and steroids - needs f/u CT for surveillance      CVA - had stroke many years ago and has no deficits - it was TIA and they could not find any problem - she is continuing to smoke and no desire to quit as of yet     CKD III - follows nephrology - labs UTD and reassuring                Hypothyroidism - controlled -   TSH                      0.354 (L)           07/29/2019                              -      on synthroid 88 mcg daily - compliant - no side effects        HTN - controlled- on Benicar 40 and norvasc 10 - no side effects - labs reassuring        HLD - controlled - on statin - follows cardiology -  LDLCALC                  78.4                07/29/2019                    PAD/PVD - CONTROLLED - FOLLOWS CARDIOLOGY     COPD - controlled - on advair daily and home oxygen - still smokes and no desire to quit - she drops her sats when she walks and she will benefit from ambulatory or portable oxygen        History as below - reviewed        Health maintenance  -labs UTD  -vaccines  due                                   Hypertension   This is a chronic problem. The current episode started more than 1 year ago. The problem has been gradually worsening since onset. The problem is resistant. Associated symptoms include headaches, malaise/fatigue and neck pain. Pertinent negatives include no anxiety, blurred vision, chest pain, orthopnea, palpitations, peripheral edema, PND or shortness of breath. Agents associated with hypertension include thyroid hormones. Risk factors for coronary artery disease include dyslipidemia and smoking/tobacco exposure. Past treatments include beta blockers. The current treatment provides moderate improvement. Compliance problems include exercise.  There is no history of angina, CAD/MI, CVA, left ventricular hypertrophy, PVD or retinopathy. Identifiable causes of hypertension include a thyroid problem. There is no history of chronic renal disease, coarctation of the aorta, hypercortisolism, hyperparathyroidism, pheochromocytoma or renovascular disease.   Leg Pain    There was no injury mechanism. The pain is present in the left leg and right leg. The quality of the pain is described as aching. The pain is at a severity of 8/10. The pain is severe. The pain has been intermittent since onset. Pertinent negatives include no inability to bear weight, loss of motion, muscle weakness or numbness. The symptoms are aggravated by movement. She has tried nothing for the symptoms. The treatment provided no relief.   Medication Refill   This is a chronic problem. The current episode started more than 1 year ago. The problem occurs constantly. The problem has been gradually improving. Associated symptoms include arthralgias, coughing, headaches, myalgias and neck pain. Pertinent negatives include no abdominal pain, chest pain, chills, congestion, diaphoresis, fatigue, joint swelling, nausea, numbness, rash, sore throat, vomiting or weakness. Nothing aggravates the symptoms.  Treatments tried: as below. The treatment provided significant relief.   Hyperlipidemia   This is a chronic problem. The current episode started more than 1 year ago. The problem is controlled. Recent lipid tests were reviewed and are normal. She has no history of chronic renal disease, diabetes, hypothyroidism or liver disease. There are no known factors aggravating her hyperlipidemia. Associated symptoms include leg pain and myalgias. Pertinent negatives include no chest pain or shortness of breath. Current antihyperlipidemic treatment includes statins. The current treatment provides significant improvement of lipids. There are no compliance problems.  Risk factors for coronary artery disease include dyslipidemia, hypertension and post-menopausal.   Thyroid Problem   Presents for follow-up visit. Patient reports no anxiety, cold intolerance, constipation, depressed mood, diaphoresis, diarrhea, dry skin, fatigue, hair loss, hoarse voice, menstrual problem, nail problem, palpitations, tremors or weight loss. The symptoms have been stable. Past treatments include levothyroxine. The treatment provided significant relief. Prior procedures include thyroid ultrasound. The following procedures have not been performed: thyroid FNA. Her past medical history is significant for hyperlipidemia. There is no history of diabetes, Graves' ophthalmopathy or neuropathy. There are no known risk factors.   Gastroesophageal Reflux   She complains of coughing and heartburn. She reports no abdominal pain, no chest pain, no choking, no dysphagia, no early satiety, no hoarse voice, no nausea, no sore throat, no tooth decay or no wheezing. This is a chronic problem. The current episode started more than 1 year ago. The problem occurs constantly. The problem has been gradually improving. The heartburn is located in the abdomen. The heartburn is of moderate intensity. The heartburn does not wake her from sleep. The heartburn does not limit  her activity. The heartburn doesn't change with position. Nothing aggravates the symptoms. Pertinent negatives include no fatigue, muscle weakness or weight loss. She has tried a PPI for the symptoms. The treatment provided significant relief. Past procedures do not include an abdominal ultrasound, esophageal manometry or H. pylori antibody titer. Past invasive treatments do not include gastroplasty, gastroplication or reflux surgery.   Anxiety   Presents for follow-up visit. Patient reports no chest pain, confusion, decreased concentration, depressed mood, dizziness, excessive worry, feeling of choking, impotence, irritability, nausea, nervous/anxious behavior, palpitations, shortness of breath or suicidal ideas. Symptoms occur occasionally. The severity of symptoms is mild. Nothing aggravates the symptoms. The quality of sleep is fair. Nighttime awakenings: occasional.      There are no known risk factors. Her past medical history is significant for anxiety/panic attacks. There is no history of anemia, asthma, bipolar disorder, CAD, chronic lung disease, depression, hyperthyroidism or suicide attempts. Past treatments include SSRIs. The treatment provided significant relief. Compliance with prior treatments has been good. Compliance with medications is %.   Neck Pain    This is a chronic problem. The current episode started more than 1 year ago. The problem occurs constantly. The problem has been gradually worsening. The pain is associated with nothing. The pain is present in the occipital region. The quality of the pain is described as shooting and cramping. The pain is at a severity of 7/10. The pain is severe. The symptoms are aggravated by bending, twisting and stress. The pain is worse during the night. Associated symptoms include headaches and leg pain. Pertinent negatives include no chest pain, numbness, trouble swallowing, weakness or weight loss. She has tried heat, muscle relaxants and NSAIDs for  the symptoms. The treatment provided mild relief.   Back Pain   This is a chronic problem. The current episode started more than 1 year ago. The problem occurs constantly. The problem has been gradually worsening since onset. The pain is present in the lumbar spine. The quality of the pain is described as aching. The pain radiates to the right foot and left foot. The pain is at a severity of 8/10. The pain is severe. The pain is worse during the night. The symptoms are aggravated by bending, sitting and twisting. Associated symptoms include headaches and leg pain. Pertinent negatives include no abdominal pain, chest pain, dysuria, numbness, pelvic pain, weakness or weight loss. She has tried heat, ice, muscle relaxant and NSAIDs for the symptoms. The treatment provided moderate relief.   Cough   This is a recurrent problem. The current episode started more than 1 month ago. The problem has been gradually worsening. The problem occurs constantly. The cough is non-productive. Associated symptoms include headaches, heartburn and myalgias. Pertinent negatives include no chest pain, chills, rash, rhinorrhea, sore throat, shortness of breath, weight loss or wheezing. Nothing aggravates the symptoms. She has tried nothing for the symptoms. The treatment provided no relief. Her past medical history is significant for environmental allergies. There is no history of asthma.      Review of Systems   Constitutional: Positive for malaise/fatigue. Negative for activity change, chills, diaphoresis, fatigue, irritability, unexpected weight change and weight loss.   HENT: Positive for hearing loss. Negative for congestion, ear discharge, hoarse voice, rhinorrhea, sore throat, trouble swallowing and voice change.    Eyes: Negative.  Negative for blurred vision, pain, discharge and visual disturbance.   Respiratory: Positive for cough. Negative for choking, chest tightness, shortness of breath and wheezing.    Cardiovascular:  Negative.  Negative for chest pain, palpitations, orthopnea and PND.   Gastrointestinal: Positive for heartburn. Negative for abdominal distention, abdominal pain, anal bleeding, blood in stool, constipation, diarrhea, dysphagia, nausea and vomiting.   Endocrine: Negative.  Negative for cold intolerance, polydipsia and polyuria.   Genitourinary: Negative.  Negative for decreased urine volume, difficulty urinating, dysuria, frequency, hematuria, impotence, menstrual problem, pelvic pain and vaginal pain.   Musculoskeletal: Positive for arthralgias, back pain, myalgias and neck pain. Negative for gait problem, joint swelling and muscle weakness.   Skin: Negative.  Negative for color change, pallor, rash and wound.   Allergic/Immunologic: Positive for environmental allergies. Negative for immunocompromised state.   Neurological: Positive for headaches. Negative for dizziness, tremors, seizures, speech difficulty, weakness and numbness.   Hematological: Negative.  Negative for adenopathy. Does not bruise/bleed easily.   Psychiatric/Behavioral: Negative.  Negative for agitation, confusion, decreased concentration, dysphoric mood, hallucinations, self-injury and suicidal ideas. The patient is not nervous/anxious.        PMH/PSH/FH/SH/MED/ALLERGY reviewed     /80 and HR 78    Diagnoses and all orders for this visit:    Moderate recurrent major depression  -     traZODone (DESYREL) 50 MG tablet; Take 1 tablet (50 mg total) by mouth every evening.    Depression, unspecified depression type  -     traZODone (DESYREL) 50 MG tablet; Take 1 tablet (50 mg total) by mouth every evening.      Depression  -uncontrolled  -trazodone 50 mg daily along with lexapro 10 mg. Side effects of medications have been discussed and patient agreed to proceed with treatment and understands the risks and benefits.    Spent adequate time in obtaining history and explaining differentials    RTC prn worsening        Answers for HPI/ROS submitted  by the patient on 3/27/2020   activity change: No  unexpected weight change: No  neck pain: No  hearing loss: Yes  rhinorrhea: No  trouble swallowing: No  eye discharge: No  visual disturbance: No  chest tightness: No  wheezing: No  chest pain: No  palpitations: No  blood in stool: No  constipation: No  vomiting: No  diarrhea: No  polydipsia: No  polyuria: No  difficulty urinating: No  hematuria: No  menstrual problem: No  dysuria: No  joint swelling: No  arthralgias: No  headaches: No  weakness: No  confusion: Yes  dysphoric mood: Yes

## 2020-04-01 ENCOUNTER — PATIENT MESSAGE (OUTPATIENT)
Dept: FAMILY MEDICINE | Facility: CLINIC | Age: 80
End: 2020-04-01

## 2020-04-01 DIAGNOSIS — K21.9 GASTROESOPHAGEAL REFLUX DISEASE, ESOPHAGITIS PRESENCE NOT SPECIFIED: ICD-10-CM

## 2020-04-01 RX ORDER — PANTOPRAZOLE SODIUM 40 MG/1
TABLET, DELAYED RELEASE ORAL
Qty: 90 TABLET | Refills: 3 | Status: SHIPPED | OUTPATIENT
Start: 2020-04-01 | End: 2022-08-19

## 2020-04-02 ENCOUNTER — EXTERNAL HOME HEALTH (OUTPATIENT)
Dept: HOME HEALTH SERVICES | Facility: HOSPITAL | Age: 80
End: 2020-04-02
Payer: MEDICARE

## 2020-04-22 NOTE — DISCHARGE SUMMARY
[facility] Hospital Medicine  Discharge Summary      Patient Name: Brittany Haile  MRN: [id number]  Admission Date: 3/3/2020  Hospital Length of Stay: 3 days  Discharge Date and Time: 3/6/2020  3:17 PM  Attending Physician: No att. providers found   Discharging Provider: Marcie Min MD  Primary Care Provider: Wayne Frazier MD      HPI:   The patient is a 78 yo with history of CVA, COPD on O2 PRN, HTN, PVD presents complaining of  weakness and confusion for 3 days.  Five days ago, the patient began having malaise, fatigue, and anorexia. Symptoms continued to progress to chills, night sweats and nausea/vomiting.  She also became more short of breath and had increased O2 requirement to 3L.  This morning she sustained a mechanical fall out of her bed where she did not lose consciousness and was brought to her PCP this afternoon to work up her fall. PCP noted her to appear clinically dehydrated and was concerned she may have a head injury and instructed her to come to the ED.  On ER workup, patient was found to have a lower lobe pneumonia and was admitted to the hospital, but was unable to placed in a bed.  The patient was transferred to [facility] for a room assignment.          Hospital Course:   Patient admitted and started on Rocephin and Zithromax.  Initial chest x-ray showed right basilar infiltrate and pleural effusion that could represent a pneumonia.  Mild interstitial  infiltrate in the right upper lobe also.  Her chest x-ray was repeated on March 3rd which showed mild progression of the right lower lobe infiltrate.  Patient also received DuoNebs throughout the hospital course.  Initially patient weak and somnolent but each day she gradually improved.  She was also found to have increased creatinine at 1.6.  With IV fluids increased to 100 mL/hr, her creatinine improved to 1.0.  Care was discussed with daughters throughout hospital course.  Patient was evaluated by physical and  occupational therapy.  It was recommended that patient be discharged with home health with PT/OT.  Patient and daughter in agreement with plan at time of discharge.  Patient was cleared for discharge on March 6 with a course of Zithromax.  Patient will have close follow-up with PCP and home health.    Consults:     No new Assessment & Plan notes have been filed under this hospital service since the last note was generated.  Service: Hospital Medicine    Final Active Diagnoses:    Diagnosis Date Noted POA    PRINCIPAL PROBLEM:  Community acquired pneumonia of right lower lobe of lung [J18.1] 08/06/2013 Yes    Essential hypertension [I10] 03/03/2020 Yes    Chronic diastolic heart failure [I50.32] 01/03/2018 Yes    Chronic kidney disease, stage III (moderate) [N18.3] 08/19/2015 Yes    HLD (hyperlipidemia) [E78.5] 06/04/2015 Yes    COPD (chronic obstructive pulmonary disease) [J44.9] 06/04/2015 Yes    Hypothyroid [E03.9] 04/12/2013 Yes      Problems Resolved During this Admission:       Discharged Condition: stable    Disposition: Home-Health Care [facility]    Follow Up:  Follow-up Information     [facility].    Specialty:  DME Provider  Why:  With questions regarding medical equipment - home oxygen  Contact information:  [address] [facility] [id number] [phone]             Wayne Frazier MD In 1 week.    Specialty:  Internal Medicine  Why:  For follow up after hospital discharge  Contact information:  [address]  [address]  [place] [address] [phone]             [facility].    Specialty:  Home Health Services  Why:  they will call you to schedule first appointment for home health  Contact information:  [address] [address] [id number] [phone]                 Patient Instructions:      Diet Cardiac     Activity as tolerated       Significant Diagnostic Studies: Labs: All labs within the past 24 hours have been reviewed    Pending Diagnostic Studies:     None          Medications:  Reconciled Home Medications:      Medication List      CONTINUE taking these medications    albuterol 90 mcg/actuation inhaler  Commonly known as:  PROVENTIL/VENTOLIN HFA  INHALE 1 TO 2 PUFFS BY MOUTH INTO THE LUNGS EVERY 6 HOURS AS NEEDED FOR WHEEZING OR SHORTNESS OF BREATH( RESCUE)     atorvastatin 40 MG tablet  Commonly known as:  LIPITOR  Take 1 tablet (40 mg total) by mouth once daily.     cilostazoL 50 MG Tab  Commonly known as:  PLETAL  TAKE 1 TABLET(50 MG) BY MOUTH TWICE DAILY     clopidogreL 75 mg tablet  Commonly known as:  PLAVIX  TAKE 1 TABLET BY MOUTH EVERY DAY     fluticasone propionate 50 mcg/actuation nasal spray  Commonly known as:  FLONASE  1 spray (50 mcg total) by Each Nostril route once daily.     fluticasone-salmeterol 250-50 mcg/dose 250-50 mcg/dose diskus inhaler  Commonly known as:  ADVAIR  Inhale 1 puff into the lungs 2 (two) times daily.     levothyroxine 88 MCG tablet  Commonly known as:  SYNTHROID  TAKE 1 TABLET(88 MCG) BY MOUTH EVERY DAY     tiotropium 18 mcg inhalation capsule  Commonly known as:  SPIRIVA WITH HANDIHALER  Inhale 1 capsule (18 mcg total) into the lungs once daily.     valsartan 320 MG tablet  Commonly known as:  DIOVAN  Take 1 tablet (320 mg total) by mouth once daily.        STOP taking these medications    escitalopram oxalate 10 MG tablet  Commonly known as:  LEXAPRO     pantoprazole 40 MG tablet  Commonly known as:  PROTONIX        ASK your doctor about these medications    azithromycin 500 MG tablet  Commonly known as:  ZITHROMAX  Take 1 tablet (500 mg total) by mouth once daily. for 3 days  Ask about: Should I take this medication?            Indwelling Lines/Drains at time of discharge:   Lines/Drains/Airways     None                 Time spent on the discharge of patient: >30 minutes  Patient was seen and examined on the date of discharge and determined to be suitable for discharge.         Marcie Min MD  Department of Hospital  Medicine  [facility]

## 2020-04-30 ENCOUNTER — DOCUMENT SCAN (OUTPATIENT)
Dept: HOME HEALTH SERVICES | Facility: HOSPITAL | Age: 80
End: 2020-04-30
Payer: MEDICARE

## 2020-04-30 ENCOUNTER — PATIENT MESSAGE (OUTPATIENT)
Dept: FAMILY MEDICINE | Facility: CLINIC | Age: 80
End: 2020-04-30

## 2020-04-30 DIAGNOSIS — Z99.81 OXYGEN DEPENDENT: ICD-10-CM

## 2020-04-30 DIAGNOSIS — J42 CHRONIC BRONCHITIS, UNSPECIFIED CHRONIC BRONCHITIS TYPE: Primary | ICD-10-CM

## 2020-05-08 DIAGNOSIS — E78.2 MIXED HYPERLIPIDEMIA: ICD-10-CM

## 2020-05-08 RX ORDER — ATORVASTATIN CALCIUM 40 MG/1
TABLET, FILM COATED ORAL
Qty: 90 TABLET | Refills: 3 | Status: SHIPPED | OUTPATIENT
Start: 2020-05-08

## 2020-05-14 ENCOUNTER — TELEPHONE (OUTPATIENT)
Dept: FAMILY MEDICINE | Facility: CLINIC | Age: 80
End: 2020-05-14

## 2020-05-14 DIAGNOSIS — I10 ESSENTIAL HYPERTENSION: ICD-10-CM

## 2020-05-14 DIAGNOSIS — E78.2 MIXED HYPERLIPIDEMIA: ICD-10-CM

## 2020-05-14 RX ORDER — VALSARTAN 320 MG/1
320 TABLET ORAL DAILY
Qty: 90 TABLET | Refills: 1 | Status: CANCELLED | OUTPATIENT
Start: 2020-05-14 | End: 2021-05-14

## 2020-05-14 NOTE — TELEPHONE ENCOUNTER
----- Message from Milli Ta sent at 5/14/2020  2:15 PM CDT -----  Contact: Skye with [employer]  would like to be called back regarding  Pt getting BP meds on back order  Pt is using [facility]     can be reached at  [phone]

## 2020-05-14 NOTE — TELEPHONE ENCOUNTER
Spoke to Skye at [employer] and states that the Valsartan is on back order. Skye is requesting another BP medication. Pls advise.

## 2020-05-15 RX ORDER — IRBESARTAN 300 MG/1
300 TABLET ORAL NIGHTLY
Qty: 90 TABLET | Refills: 3 | Status: SHIPPED | OUTPATIENT
Start: 2020-05-15 | End: 2022-08-19

## 2020-06-01 DIAGNOSIS — J44.9 CHRONIC OBSTRUCTIVE PULMONARY DISEASE, UNSPECIFIED COPD TYPE: ICD-10-CM

## 2020-06-01 RX ORDER — ALBUTEROL SULFATE 90 UG/1
AEROSOL, METERED RESPIRATORY (INHALATION)
Qty: 8.5 G | Refills: 2 | Status: SHIPPED | OUTPATIENT
Start: 2020-06-01

## 2020-06-01 NOTE — TELEPHONE ENCOUNTER
----- Message from Tatiana Olivas sent at 6/1/2020 11:58 AM CDT -----  Contact: [phone]/kostas Fairbanks  Type:  RX Refill Request    Who Called:  Daughter/Tiki  Refill or New Rx: refill   RX Name and Strength: albuterol (PROVENTIL/VENTOLIN HFA) 90 mcg/actuation inhaler  How is the patient currently taking it? (ex. 1XDay): INHALE 1 TO 2 PUFFS BY MOUTH INTO THE LUNGS EVERY 6 HOURS AS NEEDED FOR WHEEZING OR SHORTNESS OF BREATH( RESCUE)  Is this a 30 day or 90 day RX: 1/2 refills  Preferred Pharmacy with phone number: [id number] - [place] - [address] AT [facility] [address]  Local or Mail Order: l  Ordering Provider:   Would the patient rather a call back or a response via [facility]?  call  Best Call Back Number: [phone]/kostas Fairbanks  Additional Information:

## 2020-06-09 ENCOUNTER — TELEPHONE (OUTPATIENT)
Dept: FAMILY MEDICINE | Facility: CLINIC | Age: 80
End: 2020-06-09

## 2020-06-09 DIAGNOSIS — M25.561 CHRONIC PAIN OF RIGHT KNEE: Primary | ICD-10-CM

## 2020-06-09 DIAGNOSIS — G89.29 CHRONIC PAIN OF RIGHT KNEE: Primary | ICD-10-CM

## 2020-06-09 NOTE — TELEPHONE ENCOUNTER
Please continue ice packs also use aspercreme as needed which is OTC. If ortho appointment is delayed - schedule appt with me    Referral ortho done

## 2020-06-09 NOTE — TELEPHONE ENCOUNTER
Spoke to pt and states that she having right knee pain. Pt has been helping using ice packs. Pt states that it is difficult for her to walk. Pls advise.

## 2020-06-09 NOTE — TELEPHONE ENCOUNTER
Left msg for CB to please continue ice packs also use aspercreme as needed which is OTC. If ortho appointment is delayed schedule appt with me. Referral ortho done

## 2020-06-09 NOTE — TELEPHONE ENCOUNTER
----- Message from Beverly Kathleen sent at 6/9/2020  1:41 PM CDT -----  Contact: Self [phone]  Patient would like to speak with you about having knee pain. Please advise

## 2020-06-10 DIAGNOSIS — M25.561 RIGHT KNEE PAIN, UNSPECIFIED CHRONICITY: Primary | ICD-10-CM

## 2020-06-16 NOTE — PROGRESS NOTES
Subjective:     Brittany Haile     Chief Complaint   Patient presents with    Right Knee - Pain       HPI    Brittany is a 80 y.o. female coming in today for right knee pain that began about 2 week(s) ago, referred by self. Pt. describes the pain as a 2/10 currently, 10/10 at worst achy pain that does not radiate. There was not a fall/injury/ or trauma associated with the onset of symptoms. The pain is better with rest, tylenol and worse with walking, weight bearing. Pt typically ambulates with a cane at home. She presents today in a wheelchair. Pt. Denies any other musculoskeletal complaints at this time. Pt is moving to [place] on Sunday to live with her younger daughter. Pt. is accompanied by their daughter today, who was present throughout the visit.  offered, but pt and her daughter declined this service.       Joint instability? no  Mechanical locking/clicking? no  Affecting ADL's? yes  Affecting sleep? yes    Occupation: retired    Review of Systems   Constitutional: Negative for chills and fever.   HENT: Negative for hearing loss and tinnitus.    Eyes: Negative for blurred vision and photophobia.   Respiratory: Negative for cough and shortness of breath.    Cardiovascular: Negative for chest pain and leg swelling.   Gastrointestinal: Negative for abdominal pain, heartburn, nausea and vomiting.   Genitourinary: Negative for dysuria and hematuria.   Musculoskeletal: Positive for joint pain. Negative for back pain, falls, myalgias and neck pain.   Skin: Negative for rash.   Neurological: Negative for dizziness, tingling, focal weakness, weakness and headaches.   Endo/Heme/Allergies: Negative for environmental allergies. Does not bruise/bleed easily.   Psychiatric/Behavioral: Negative for depression. The patient is not nervous/anxious.        PAST MEDICAL HISTORY:   Past Medical History:   Diagnosis Date    Abdominal pain     CHF (congestive heart failure)     Chronic kidney disease, stage  III (moderate) 2015    COPD (chronic obstructive pulmonary disease)     COPD (chronic obstructive pulmonary disease)     CVA (cerebral infarction)     Esophagitis     Gastritis     GERD (gastroesophageal reflux disease)     GI bleed     Hyperlipidemia     Hypertension     Mesenteric angina     PVD (peripheral vascular disease) with claudication 10/13/2015    Stroke     Subclavian artery stenosis, left     Thyroid disease      PAST SURGICAL HISTORY:   Past Surgical History:   Procedure Laterality Date    CAROTID STENT       SECTION      COLONOSCOPY N/A 3/2/2017    Procedure: COLONOSCOPY;  Surgeon: Cecil Crooks MD;  Location: [facility] ENDO;  Service: Endoscopy;  Laterality: N/A;    COLONOSCOPY N/A 2019    Procedure: COLONOSCOPY/Suprep;  Surgeon: Barry Talbert MD;  Location: [facility] ENDO;  Service: Endoscopy;  Laterality: N/A;    CORONARY ANGIOPLASTY      EPIDURAL STEROID INJECTION INTO LUMBAR SPINE N/A 2018    Procedure: INJECTION, STEROID, SPINE, LUMBAR, EPIDURAL- L2-3 ORAL SEDATION;  Surgeon: Stiven Ibarra Jr., MD;  Location: [facility] PAIN MGT;  Service: Pain Management;  Laterality: N/A;    ESOPHAGOGASTRODUODENOSCOPY N/A 2019    Procedure: EGD (ESOPHAGOGASTRODUODENOSCOPY);  Surgeon: Barry Talbert MD;  Location: [facility] ENDO;  Service: Endoscopy;  Laterality: N/A;    GALLBLADDER SURGERY      HYSTERECTOMY      INTRALUMINAL GASTROINTESTINAL TRACT IMAGING VIA CAPSULE N/A 2019    Procedure: IMAGING PROCEDURE, GI TRACT, INTRALUMINAL, VIA CAPSULE;  Surgeon: Barry Talbert MD;  Location: [facility] ENDO;  Service: Endoscopy;  Laterality: N/A;    OOPHORECTOMY      SUPERIOR MESTENTERIC ARTERY STENT       FAMILY HISTORY:   Family History   Problem Relation Age of Onset    No Known Problems Mother     Cancer Sister         breast    Cancer Brother         throat cancer     SOCIAL HISTORY:   Social History     Socioeconomic History     Marital status:      Spouse name: Not on file    Number of children: 4    Years of education: Not on file    Highest education level: Not on file   Occupational History    Not on file   Social Needs    Financial resource strain: Not hard at all    Food insecurity     Worry: Never true     Inability: Never true    Transportation needs     Medical: No     Non-medical: No   Tobacco Use    Smoking status: Current Every Day Smoker     Packs/day: 0.50     Years: 60.00     Pack years: 30.00     Types: Cigarettes    Smokeless tobacco: Never Used    Tobacco comment: Pt declines enrollment in Tobacco Trust / Ambulatory Smoking Cessation program.    Substance and Sexual Activity    Alcohol use: No     Frequency: Never     Drinks per session: Patient refused     Binge frequency: Never    Drug use: No    Sexual activity: Not on file   Lifestyle    Physical activity     Days per week: 0 days     Minutes per session: Patient refused    Stress: Not at all   Relationships    Social connections     Talks on phone: More than three times a week     Gets together: Three times a week     Attends Orthodox service: Not on file     Active member of club or organization: No     Attends meetings of clubs or organizations: Never     Relationship status:    Other Topics Concern    Not on file   Social History Narrative    Lives with son, 1 story house       MEDICATIONS:   Current Outpatient Medications:     albuterol (PROVENTIL/VENTOLIN HFA) 90 mcg/actuation inhaler, INHALE 1 TO 2 PUFFS BY MOUTH INTO THE LUNGS EVERY 6 HOURS AS NEEDED FOR WHEEZING OR SHORTNESS OF BREATH( RESCUE), Disp: 8.5 g, Rfl: 2    atorvastatin (LIPITOR) 40 MG tablet, TAKE 1 TABLET BY MOUTH EVERY DAY, Disp: 90 tablet, Rfl: 3    cilostazoL (PLETAL) 50 MG Tab, TAKE 1 TABLET(50 MG) BY MOUTH TWICE DAILY, Disp: 180 tablet, Rfl: 3    clopidogrel (PLAVIX) 75 mg tablet, TAKE 1 TABLET BY MOUTH EVERY DAY, Disp: 90 tablet, Rfl: 3    escitalopram  oxalate (LEXAPRO) 10 MG tablet, TAKE 1 TABLET(10 MG) BY MOUTH EVERY NIGHT, Disp: 90 tablet, Rfl: 3    fluticasone propionate (FLONASE) 50 mcg/actuation nasal spray, 1 spray (50 mcg total) by Each Nostril route once daily., Disp: 16 g, Rfl: 2    fluticasone-salmeterol diskus inhaler 250-50 mcg, Inhale 1 puff into the lungs 2 (two) times daily., Disp: 60 each, Rfl: 10    irbesartan (AVAPRO) 300 MG tablet, Take 1 tablet (300 mg total) by mouth every evening., Disp: 90 tablet, Rfl: 3    levothyroxine (SYNTHROID) 88 MCG tablet, TAKE 1 TABLET(88 MCG) BY MOUTH EVERY DAY, Disp: 90 tablet, Rfl: 3    pantoprazole (PROTONIX) 40 MG tablet, TAKE 1 TABLET(40 MG) BY MOUTH EVERY DAY, Disp: 90 tablet, Rfl: 3    tiotropium (SPIRIVA WITH HANDIHALER) 18 mcg inhalation capsule, Inhale 1 capsule (18 mcg total) into the lungs once daily., Disp: 90 capsule, Rfl: 3    traZODone (DESYREL) 50 MG tablet, Take 1 tablet (50 mg total) by mouth every evening., Disp: 30 tablet, Rfl: 2    valsartan (DIOVAN) 320 MG tablet, Take 1 tablet (320 mg total) by mouth once daily., Disp: 90 tablet, Rfl: 3  No current facility-administered medications for this visit.   ALLERGIES: Review of patient's allergies indicates:  No Known Allergies    Objective:     VITAL SIGNS: BP (!) 160/80   LMP  (LMP Unknown)    General    Nursing note and vitals reviewed.  Constitutional: She is oriented to person, place, and time. She appears well-developed and well-nourished.   HENT:   Head: Normocephalic and atraumatic.   no nasal discharge, no external ear redness or discharge   Eyes:   EOM is full and smooth  No eye redness or discharge   Neck: Neck supple. No tracheal deviation present.   Cardiovascular: Normal rate.    2+ Radial pulse bilaterally  2+ Dorsalis Pedis pulse bilaterally  No LE edema appreciated   Pulmonary/Chest: Effort normal. No respiratory distress.   Abdominal: She exhibits no distension.   No rigidity   Neurological: She is alert and oriented to  person, place, and time. She exhibits normal muscle tone. Coordination normal.   See details below   Psychiatric: She has a normal mood and affect. Her behavior is normal.               MUSCULOSKELETAL EXAM    right KNEE EXAMINATION   Affected side is compared to contralateral knee     Observation:  No edema, erythema, ecchymosis, or effusion noted.  No muscle atrophy of the thighs and calves noted.  No obvious bony deformities noted.   + Genu varum noted bialterally.  No recurvatum noted.    No tibial internal/external torsion.    Posture:  Increased thoracic kyphosis and Anterior head carriage  Gait: Right antalgic, ambulated with cane  Tenderness:  Patella - none    Lateral joint line - none  Quad tendon - none   Medial joint line -+  Patellar tendon - none   Medial plica - none  Tibial tubercle - none   Lateral plica - none  Pes anserine - none   MCL prox - none  Distal ITB - none   MCL distal - none  MFC - none    LCL prox - none  LFC - none    LCL distal - none  Tibia - none    Fibula - none    No obvious bursae, plicae, popliteal cysts, or tendon derangement palpated.          ROM (* = with pain):   Active extension to 0° on left without hyperextension, lag, crepitus, or patellar J sign.   Active extension to 0° on right without hyperextension, lag, crepitus, or patellar J sign.  Active flexion to 135° on left and 130° on right*    Strength(* = with pain):  Knee Flexion - 5/5 on left and 5/5 on right  Knee Extension - 5/5 on left and 5/5 on right  Hip Flexion - 5/5 on left and 5/5 on right  Hip Extension - 5/5 on left and 5/5 on right  Ankle dorsiflexion - 5/5 on left and 5/5 on right  Ankle Plantarflexion - 5/5 on left and 5/5 on right    Patellofemoral Exam:   Patellar ballottement - negative  Bulge sign - negative  Patellar grind - negative    No patellar laxity with medial and lateral translation   No apprehension with medial and lateral patellar translation.     Meniscus Testing:     No pain with  terminal extension and flexion at joint lines.  Weis test - negative   Bounce home test - negative    Ligament Testing:  No laxity with anterior drawer.  No laxity with posterior drawer.    No laxity with varus testing at 0 and 30 degrees.  No laxity with valgus testing at 0 and 30 degrees.    IT band testing:  Zoltans test - negative   Noble Compression test - negative    Neurovascular Examination:   Sensation intact to light touch in the obturator, lateral/intermediate/medial/posterior femoral cutaneous, saphenous, and common peroneal nerves bilaterally.  Motor Function:    Fully intact motor function at hip, knee, foot and ankle.  DTRs: 2+/4 reflexes at L4 and S1 dermatomes. No clonus. Downgoing Babinski.   Negative seated straight leg raise bilaterally.    Pulses intact at the DP and PT arteries bilaterally.    Capillary refill intact <2 seconds in all toes bilaterally.    Large Joint Aspiration/Injection  Knee joint, right    Performed by: SAULO MONTES  Authorized by: SAULO MONTES  Consent Done?: Yes (Verbal)  Indications: Pain  Site marked: The procedure site was marked   Timeout: Prior to procedure the correct patient, procedure, and site was verified     Location: Knee joint, right  Prep: Patient was prepped with Chlorhexidine and alcohol.  Skin anesthetic: Ethyl Chloride spray was used prior to skin puncture.  Ultrasound Guidance for needle placement: yes  Procedure: After local anesthetic was applied, the 22G, 2.5 needle was used to enter the right knee joint capsule under US guidance. A 3 cc mixture of 1 cc of 40 mg/ml triamcinolone acetonide and 2 cc of 0.2% Naropin was injected into the right knee joint.   Approach: superiorlateral  Medications: 40 mg triamcinolone acetonide 40 mg/mL  Patient tolerance: Patient tolerated the procedure well with no immediate complications    Ultrasound guidance was used for needle localization. Images were saved and stored for documentation. Short and  long axis images of the anterior right knee were taken prior to injection. Dynamic visualization of the needle was continuous throughout the procedures.    Triamcinolone:  NDC: [address]-2  LOT: [id number]  EXP: 2021    IMAGIN. X-ray ordered due to right knee pain. (AP bilateral standing, PA bilateral standing in flexion, bilateral merchants, and  right lateral views) taken today.   2. X-ray images were reviewed personally by me and then directly with patient.  3. FINDINGS: X-ray images obtained demonstrate some medial compartment tibiofemoral joint space narrowing is observed bilaterally, more pronounced on the right than the left, with some marginal spurring of the medial tibial plateau on the right side appreciated.  No significant degree of patellofemoral joint space narrowing is suggested on either side.  Bilateral chondrocalcinosis is again seen.  Osseous structures demonstrate no evidence of recent or healing fracture or lytic destructive process.  Lateral projection of the right knee demonstrates no evidence of a joint effusion.  Incidental note is again made of extensive arterial vascular calcification in the soft tissues of the distal thigh/popliteal fossa/proximal calf on both sides.  4. IMPRESSION: Kellgren-Tanner grade 3 OA bilaterally, rigth worse than left, most pronounced at the medial tibiofemoral joint spaces with associated bilateral chondrocalcinosis.  Incidental finding of extensive arterial vascular calcification in the soft tissues of the distal thigh/popliteal fossa/proximal calf on both sides.      Assessment:      Encounter Diagnoses   Name Primary?    Primary osteoarthritis of right knee Yes    Chronic pain of right knee     Elevated blood pressure reading in office with diagnosis of hypertension           Plan:     1. Right knee pain secondary to moderate-severe DJD changes as noted on x-ray.  Pt. moving to [place] this  to live with her daughter.  Recommend  establishing care with a orthopedic physician there for continued follow-up.   - Discussed conservative therapy of OA with continued brace wear, HEP, injection therapy (corticosteroid, viscosupplementation, and PRP), Ice up to 20 minutes at a time, and OTC Tylenol for breakthrough pain vs. Referral to orthopedic surgery for discussion on TKA. Pt. Would like to continue with conservative treatment at this time.   - HEP given: Bilateral seated quadriceps strengthening exercise: Straight leg raises with hip neural, hip externally rotated, and then hip internally rotated. 10-15 reps in each plane, twice daily.  -  X-ray images of right knee taken today (AP bilateral standing, PA bilateral standing in flexion, bilateral merchants, and  right lateral views) showed Kellgren-Tanner grade 3 OA bilaterally, rigth worse than left, most pronounced at the medial tibiofemoral joint spaces with associated bilateral chondrocalcinosis.  Incidental finding of extensive arterial vascular calcification in the soft tissues of the distal thigh/popliteal fossa/proximal calf on both sides. Images were personally reviewed with patient.    2.  Elevated BP reading at today's visit. Pt. Is  compliant with their BP medication, taken daily as directed.  Recommend continued BP monitoring at home and follow-up with PCP established in [place] for further management.    3. Follow-up in as needed if pain deteriorates or new issue arises    4. Patient agreeable to today's plan and all questions were answered    This note is dictated using the M*Modal Fluency Direct word recognition program. There are word recognition mistakes that are occasionally missed on review.

## 2020-06-17 ENCOUNTER — HOSPITAL ENCOUNTER (OUTPATIENT)
Dept: RADIOLOGY | Facility: HOSPITAL | Age: 80
Discharge: HOME | End: 2020-06-17
Attending: NEUROMUSCULOSKELETAL MEDICINE & OMM
Payer: MEDICARE

## 2020-06-17 ENCOUNTER — OFFICE VISIT (OUTPATIENT)
Dept: ORTHOPEDICS | Facility: CLINIC | Age: 80
End: 2020-06-17
Payer: MEDICARE

## 2020-06-17 VITALS — SYSTOLIC BLOOD PRESSURE: 160 MMHG | DIASTOLIC BLOOD PRESSURE: 80 MMHG

## 2020-06-17 DIAGNOSIS — M25.561 CHRONIC PAIN OF RIGHT KNEE: ICD-10-CM

## 2020-06-17 DIAGNOSIS — M25.561 RIGHT KNEE PAIN, UNSPECIFIED CHRONICITY: ICD-10-CM

## 2020-06-17 DIAGNOSIS — M17.11 PRIMARY OSTEOARTHRITIS OF RIGHT KNEE: Primary | ICD-10-CM

## 2020-06-17 DIAGNOSIS — I10 ELEVATED BLOOD PRESSURE READING IN OFFICE WITH DIAGNOSIS OF HYPERTENSION: ICD-10-CM

## 2020-06-17 DIAGNOSIS — G89.29 CHRONIC PAIN OF RIGHT KNEE: ICD-10-CM

## 2020-06-17 PROCEDURE — 3079F PR MOST RECENT DIASTOLIC BLOOD PRESSURE 80-89 MM HG: ICD-10-PCS | Mod: CPTII,S$GLB,, | Performed by: NEUROMUSCULOSKELETAL MEDICINE & OMM

## 2020-06-17 PROCEDURE — 73562 X-RAY EXAM OF KNEE 3: CPT | Mod: TC,PO,LT

## 2020-06-17 PROCEDURE — 20611 DRAIN/INJ JOINT/BURSA W/US: CPT | Mod: RT,S$GLB,, | Performed by: NEUROMUSCULOSKELETAL MEDICINE & OMM

## 2020-06-17 PROCEDURE — 99999 PR PBB SHADOW E&M-EST. PATIENT-LVL III: ICD-10-PCS | Mod: PBBFAC,,, | Performed by: NEUROMUSCULOSKELETAL MEDICINE & OMM

## 2020-06-17 PROCEDURE — 3077F SYST BP >= 140 MM HG: CPT | Mod: CPTII,S$GLB,, | Performed by: NEUROMUSCULOSKELETAL MEDICINE & OMM

## 2020-06-17 PROCEDURE — 73564 X-RAY EXAM KNEE 4 OR MORE: CPT | Mod: 26,RT,, | Performed by: RADIOLOGY

## 2020-06-17 PROCEDURE — 99204 PR OFFICE/OUTPT VISIT, NEW, LEVL IV, 45-59 MIN: ICD-10-PCS | Mod: 25,S$GLB,, | Performed by: NEUROMUSCULOSKELETAL MEDICINE & OMM

## 2020-06-17 PROCEDURE — 99999 PR PBB SHADOW E&M-EST. PATIENT-LVL III: CPT | Mod: PBBFAC,,, | Performed by: NEUROMUSCULOSKELETAL MEDICINE & OMM

## 2020-06-17 PROCEDURE — 73562 XR KNEE ORTHO RIGHT WITH FLEXION: ICD-10-PCS | Mod: 26,LT,, | Performed by: RADIOLOGY

## 2020-06-17 PROCEDURE — 73562 X-RAY EXAM OF KNEE 3: CPT | Mod: 26,LT,, | Performed by: RADIOLOGY

## 2020-06-17 PROCEDURE — 3079F DIAST BP 80-89 MM HG: CPT | Mod: CPTII,S$GLB,, | Performed by: NEUROMUSCULOSKELETAL MEDICINE & OMM

## 2020-06-17 PROCEDURE — 99204 OFFICE O/P NEW MOD 45 MIN: CPT | Mod: 25,S$GLB,, | Performed by: NEUROMUSCULOSKELETAL MEDICINE & OMM

## 2020-06-17 PROCEDURE — 73564 XR KNEE ORTHO RIGHT WITH FLEXION: ICD-10-PCS | Mod: 26,RT,, | Performed by: RADIOLOGY

## 2020-06-17 PROCEDURE — 1159F MED LIST DOCD IN RCRD: CPT | Mod: S$GLB,,, | Performed by: NEUROMUSCULOSKELETAL MEDICINE & OMM

## 2020-06-17 PROCEDURE — 1101F PT FALLS ASSESS-DOCD LE1/YR: CPT | Mod: CPTII,S$GLB,, | Performed by: NEUROMUSCULOSKELETAL MEDICINE & OMM

## 2020-06-17 PROCEDURE — 3077F PR MOST RECENT SYSTOLIC BLOOD PRESSURE >= 140 MM HG: ICD-10-PCS | Mod: CPTII,S$GLB,, | Performed by: NEUROMUSCULOSKELETAL MEDICINE & OMM

## 2020-06-17 PROCEDURE — 1159F PR MEDICATION LIST DOCUMENTED IN MEDICAL RECORD: ICD-10-PCS | Mod: S$GLB,,, | Performed by: NEUROMUSCULOSKELETAL MEDICINE & OMM

## 2020-06-17 PROCEDURE — 20611 PR DRAIN/ASP/INJECT MAJOR JOINT/BURSA W/US GUIDANCE: ICD-10-PCS | Mod: RT,S$GLB,, | Performed by: NEUROMUSCULOSKELETAL MEDICINE & OMM

## 2020-06-17 PROCEDURE — 1101F PR PT FALLS ASSESS DOC 0-1 FALLS W/OUT INJ PAST YR: ICD-10-PCS | Mod: CPTII,S$GLB,, | Performed by: NEUROMUSCULOSKELETAL MEDICINE & OMM

## 2020-06-17 RX ORDER — TRIAMCINOLONE ACETONIDE 40 MG/ML
40 INJECTION, SUSPENSION INTRA-ARTICULAR; INTRAMUSCULAR
Status: COMPLETED | OUTPATIENT
Start: 2020-06-17 | End: 2020-06-17

## 2020-06-17 RX ADMIN — TRIAMCINOLONE ACETONIDE 40 MG: 40 INJECTION, SUSPENSION INTRA-ARTICULAR; INTRAMUSCULAR at 10:06

## 2020-06-17 NOTE — LETTER
June 17, 2020      Wayne Frazier MD  [address]  [address]  [place] [address]           [facility]  [address] [facility].  [place] [address]  Phone: [phone]          Patient: Brittany Haile   MR Number: [id number]   YOB: 1940   Date of Visit: 6/17/2020       Dear Dr. Wayne Frazier:    Thank you for referring Brittany Haile to me for evaluation. Attached you will find relevant portions of my assessment and plan of care.    If you have questions, please do not hesitate to call me. I look forward to following Brittany Haile along with you.    Sincerely,    Sarah Caban,     Enclosure  CC:  No Recipients    If you would like to receive this communication electronically, please contact [email or web address] or [phone] to request more information on [employer] access.    For providers and/or their staff who would like to refer a patient to [facility], please contact us through our one-stop-shop provider referral line, [facility], at [phone].    If you feel you have received this communication in error or would no longer like to receive these types of communications, please e-mail [email or web address]

## 2021-04-12 ENCOUNTER — PATIENT MESSAGE (OUTPATIENT)
Dept: ADMINISTRATIVE | Facility: HOSPITAL | Age: 81
End: 2021-04-12

## 2021-05-10 NOTE — TELEPHONE ENCOUNTER
----- Message from Sweetie Sterling sent at 8/29/2017 11:41 AM CDT -----  Contact: [place], daughter, [phone]  Would like to know if patient's son in law can  her prescription today. Please advise.   Undermining Type: Entire Wound

## 2021-07-06 ENCOUNTER — PATIENT MESSAGE (OUTPATIENT)
Dept: ADMINISTRATIVE | Facility: HOSPITAL | Age: 81
End: 2021-07-06

## 2022-08-19 ENCOUNTER — HOSPITAL ENCOUNTER (INPATIENT)
Facility: HOSPITAL | Age: 82
LOS: 2 days | Discharge: HOME | DRG: 190 | End: 2022-08-21
Attending: EMERGENCY MEDICINE | Admitting: INTERNAL MEDICINE
Payer: MEDICARE

## 2022-08-19 DIAGNOSIS — R53.1 WEAKNESS: ICD-10-CM

## 2022-08-19 DIAGNOSIS — I10 ESSENTIAL HYPERTENSION: ICD-10-CM

## 2022-08-19 DIAGNOSIS — Z86.73 HISTORY OF STROKE WITHOUT RESIDUAL DEFICITS: ICD-10-CM

## 2022-08-19 DIAGNOSIS — J18.9 PNEUMONIA OF RIGHT LOWER LOBE DUE TO INFECTIOUS ORGANISM: Primary | ICD-10-CM

## 2022-08-19 LAB
ALBUMIN SERPL BCP-MCNC: 2.8 G/DL (ref 3.5–5.2)
ALP SERPL-CCNC: 114 U/L (ref 55–135)
ALT SERPL W/O P-5'-P-CCNC: 7 U/L (ref 10–44)
ANION GAP SERPL CALC-SCNC: 8 MMOL/L (ref 8–16)
AST SERPL-CCNC: 10 U/L (ref 10–40)
BASOPHILS # BLD AUTO: 0.02 K/UL (ref 0–0.2)
BASOPHILS NFR BLD: 0.3 % (ref 0–1.9)
BILIRUB SERPL-MCNC: 0.4 MG/DL (ref 0.1–1)
BILIRUB UR QL STRIP: NEGATIVE
BNP SERPL-MCNC: 44 PG/ML (ref 0–99)
BUN SERPL-MCNC: 13 MG/DL (ref 8–23)
CALCIUM SERPL-MCNC: 8.7 MG/DL (ref 8.7–10.5)
CHLORIDE SERPL-SCNC: 103 MMOL/L (ref 95–110)
CK SERPL-CCNC: 28 U/L (ref 20–180)
CLARITY UR: CLEAR
CO2 SERPL-SCNC: 28 MMOL/L (ref 23–29)
COLOR UR: YELLOW
CREAT SERPL-MCNC: 0.8 MG/DL (ref 0.5–1.4)
CTP QC/QA: YES
DIFFERENTIAL METHOD: ABNORMAL
EOSINOPHIL # BLD AUTO: 0.1 K/UL (ref 0–0.5)
EOSINOPHIL NFR BLD: 2.1 % (ref 0–8)
ERYTHROCYTE [DISTWIDTH] IN BLOOD BY AUTOMATED COUNT: 14 % (ref 11.5–14.5)
EST. GFR  (NO RACE VARIABLE): >60 ML/MIN/1.73 M^2
GLUCOSE SERPL-MCNC: 100 MG/DL (ref 70–110)
GLUCOSE UR QL STRIP: NEGATIVE
HCT VFR BLD AUTO: 28.1 % (ref 37–48.5)
HGB BLD-MCNC: 9 G/DL (ref 12–16)
HGB UR QL STRIP: NEGATIVE
IMM GRANULOCYTES # BLD AUTO: 0.03 K/UL (ref 0–0.04)
IMM GRANULOCYTES NFR BLD AUTO: 0.4 % (ref 0–0.5)
KETONES UR QL STRIP: NEGATIVE
LACTATE SERPL-SCNC: 0.7 MMOL/L (ref 0.5–2.2)
LEUKOCYTE ESTERASE UR QL STRIP: NEGATIVE
LYMPHOCYTES # BLD AUTO: 0.9 K/UL (ref 1–4.8)
LYMPHOCYTES NFR BLD: 14.1 % (ref 18–48)
MAGNESIUM SERPL-MCNC: 1.9 MG/DL (ref 1.6–2.6)
MCH RBC QN AUTO: 28.7 PG (ref 27–31)
MCHC RBC AUTO-ENTMCNC: 32 G/DL (ref 32–36)
MCV RBC AUTO: 90 FL (ref 82–98)
MONOCYTES # BLD AUTO: 0.6 K/UL (ref 0.3–1)
MONOCYTES NFR BLD: 9.3 % (ref 4–15)
NEUTROPHILS # BLD AUTO: 4.9 K/UL (ref 1.8–7.7)
NEUTROPHILS NFR BLD: 73.8 % (ref 38–73)
NITRITE UR QL STRIP: NEGATIVE
NRBC BLD-RTO: 0 /100 WBC
PH UR STRIP: 6 [PH] (ref 5–8)
PLATELET # BLD AUTO: 461 K/UL (ref 150–450)
PMV BLD AUTO: 8.7 FL (ref 9.2–12.9)
POTASSIUM SERPL-SCNC: 4.1 MMOL/L (ref 3.5–5.1)
PROT SERPL-MCNC: 5.6 G/DL (ref 6–8.4)
PROT UR QL STRIP: NEGATIVE
RBC # BLD AUTO: 3.14 M/UL (ref 4–5.4)
SARS-COV-2 RDRP RESP QL NAA+PROBE: NEGATIVE
SODIUM SERPL-SCNC: 139 MMOL/L (ref 136–145)
SP GR UR STRIP: 1.01 (ref 1–1.03)
TROPONIN I SERPL DL<=0.01 NG/ML-MCNC: 0.01 NG/ML (ref 0–0.03)
TSH SERPL DL<=0.005 MIU/L-ACNC: 0.91 UIU/ML (ref 0.4–4)
URN SPEC COLLECT METH UR: NORMAL
UROBILINOGEN UR STRIP-ACNC: NEGATIVE EU/DL
WBC # BLD AUTO: 6.67 K/UL (ref 3.9–12.7)

## 2022-08-19 PROCEDURE — 82550 ASSAY OF CK (CPK): CPT | Performed by: EMERGENCY MEDICINE

## 2022-08-19 PROCEDURE — 83605 ASSAY OF LACTIC ACID: CPT | Performed by: EMERGENCY MEDICINE

## 2022-08-19 PROCEDURE — 63700000 PHARM REV CODE 250 ALT 637 W/O HCPCS: Performed by: EMERGENCY MEDICINE

## 2022-08-19 PROCEDURE — 94640 AIRWAY INHALATION TREATMENT: CPT

## 2022-08-19 PROCEDURE — 99285 EMERGENCY DEPT VISIT HI MDM: CPT | Mod: 25

## 2022-08-19 PROCEDURE — 93010 EKG 12-LEAD: ICD-10-PCS | Mod: ,,, | Performed by: INTERNAL MEDICINE

## 2022-08-19 PROCEDURE — 87040 BLOOD CULTURE FOR BACTERIA: CPT | Performed by: EMERGENCY MEDICINE

## 2022-08-19 PROCEDURE — 80053 COMPREHEN METABOLIC PANEL: CPT | Performed by: EMERGENCY MEDICINE

## 2022-08-19 PROCEDURE — 84484 ASSAY OF TROPONIN QUANT: CPT | Performed by: EMERGENCY MEDICINE

## 2022-08-19 PROCEDURE — 99900035 HC TECH TIME PER 15 MIN (STAT)

## 2022-08-19 PROCEDURE — 83880 ASSAY OF NATRIURETIC PEPTIDE: CPT | Performed by: EMERGENCY MEDICINE

## 2022-08-19 PROCEDURE — 96361 HYDRATE IV INFUSION ADD-ON: CPT

## 2022-08-19 PROCEDURE — 25000003 PHARM REV CODE 250: Performed by: EMERGENCY MEDICINE

## 2022-08-19 PROCEDURE — 96365 THER/PROPH/DIAG IV INF INIT: CPT

## 2022-08-19 PROCEDURE — 11000001 HC ACUTE MED/SURG PRIVATE ROOM

## 2022-08-19 PROCEDURE — 87205 SMEAR GRAM STAIN: CPT | Performed by: STUDENT IN AN ORGANIZED HEALTH CARE EDUCATION/TRAINING PROGRAM

## 2022-08-19 PROCEDURE — 63600175 PHARM REV CODE 636 W HCPCS: Performed by: STUDENT IN AN ORGANIZED HEALTH CARE EDUCATION/TRAINING PROGRAM

## 2022-08-19 PROCEDURE — 93010 ELECTROCARDIOGRAM REPORT: CPT | Mod: ,,, | Performed by: INTERNAL MEDICINE

## 2022-08-19 PROCEDURE — 93005 ELECTROCARDIOGRAM TRACING: CPT

## 2022-08-19 PROCEDURE — 87070 CULTURE OTHR SPECIMN AEROBIC: CPT | Performed by: STUDENT IN AN ORGANIZED HEALTH CARE EDUCATION/TRAINING PROGRAM

## 2022-08-19 PROCEDURE — 85025 COMPLETE CBC W/AUTO DIFF WBC: CPT | Performed by: EMERGENCY MEDICINE

## 2022-08-19 PROCEDURE — 25000003 PHARM REV CODE 250: Performed by: STUDENT IN AN ORGANIZED HEALTH CARE EDUCATION/TRAINING PROGRAM

## 2022-08-19 PROCEDURE — 87449 NOS EACH ORGANISM AG IA: CPT | Performed by: EMERGENCY MEDICINE

## 2022-08-19 PROCEDURE — 81003 URINALYSIS AUTO W/O SCOPE: CPT | Performed by: EMERGENCY MEDICINE

## 2022-08-19 PROCEDURE — 84443 ASSAY THYROID STIM HORMONE: CPT | Performed by: EMERGENCY MEDICINE

## 2022-08-19 PROCEDURE — U0002 COVID-19 LAB TEST NON-CDC: HCPCS | Performed by: EMERGENCY MEDICINE

## 2022-08-19 PROCEDURE — 63600175 PHARM REV CODE 636 W HCPCS: Performed by: EMERGENCY MEDICINE

## 2022-08-19 PROCEDURE — 83735 ASSAY OF MAGNESIUM: CPT | Performed by: EMERGENCY MEDICINE

## 2022-08-19 PROCEDURE — 94761 N-INVAS EAR/PLS OXIMETRY MLT: CPT

## 2022-08-19 RX ORDER — AMLODIPINE BESYLATE 5 MG/1
10 TABLET ORAL 2 TIMES DAILY
Status: DISCONTINUED | OUTPATIENT
Start: 2022-08-19 | End: 2022-08-21 | Stop reason: HOSPADM

## 2022-08-19 RX ORDER — SODIUM CHLORIDE 9 MG/ML
1000 INJECTION, SOLUTION INTRAVENOUS
Status: COMPLETED | OUTPATIENT
Start: 2022-08-19 | End: 2022-08-19

## 2022-08-19 RX ORDER — IBUPROFEN 200 MG
1 TABLET ORAL DAILY
Status: DISCONTINUED | OUTPATIENT
Start: 2022-08-19 | End: 2022-08-19

## 2022-08-19 RX ORDER — LEVOTHYROXINE SODIUM 88 UG/1
88 TABLET ORAL
Status: DISCONTINUED | OUTPATIENT
Start: 2022-08-20 | End: 2022-08-21 | Stop reason: HOSPADM

## 2022-08-19 RX ORDER — ATORVASTATIN CALCIUM 40 MG/1
40 TABLET, FILM COATED ORAL NIGHTLY
Status: DISCONTINUED | OUTPATIENT
Start: 2022-08-19 | End: 2022-08-21 | Stop reason: HOSPADM

## 2022-08-19 RX ORDER — ACETAMINOPHEN 500 MG
500 TABLET ORAL EVERY 6 HOURS PRN
COMMUNITY

## 2022-08-19 RX ORDER — AZITHROMYCIN 250 MG/1
500 TABLET, FILM COATED ORAL
Status: COMPLETED | OUTPATIENT
Start: 2022-08-19 | End: 2022-08-19

## 2022-08-19 RX ORDER — LOSARTAN POTASSIUM 50 MG/1
100 TABLET ORAL DAILY
Refills: 3 | Status: DISCONTINUED | OUTPATIENT
Start: 2022-08-20 | End: 2022-08-21 | Stop reason: HOSPADM

## 2022-08-19 RX ORDER — SODIUM CHLORIDE 0.9 % (FLUSH) 0.9 %
10 SYRINGE (ML) INJECTION EVERY 12 HOURS PRN
Status: DISCONTINUED | OUTPATIENT
Start: 2022-08-19 | End: 2022-08-21 | Stop reason: HOSPADM

## 2022-08-19 RX ORDER — TRAZODONE HYDROCHLORIDE 50 MG/1
50 TABLET ORAL NIGHTLY
Status: DISCONTINUED | OUTPATIENT
Start: 2022-08-19 | End: 2022-08-21 | Stop reason: HOSPADM

## 2022-08-19 RX ORDER — ENOXAPARIN SODIUM 100 MG/ML
40 INJECTION SUBCUTANEOUS EVERY 24 HOURS
Status: DISCONTINUED | OUTPATIENT
Start: 2022-08-19 | End: 2022-08-21 | Stop reason: HOSPADM

## 2022-08-19 RX ORDER — ALBUTEROL SULFATE 90 UG/1
2 AEROSOL, METERED RESPIRATORY (INHALATION) 2 TIMES DAILY
Status: DISCONTINUED | OUTPATIENT
Start: 2022-08-19 | End: 2022-08-21 | Stop reason: HOSPADM

## 2022-08-19 RX ORDER — AMLODIPINE BESYLATE 10 MG/1
10 TABLET ORAL 2 TIMES DAILY
COMMUNITY

## 2022-08-19 RX ORDER — CILOSTAZOL 50 MG/1
50 TABLET ORAL 2 TIMES DAILY
Status: DISCONTINUED | OUTPATIENT
Start: 2022-08-19 | End: 2022-08-21 | Stop reason: HOSPADM

## 2022-08-19 RX ORDER — AMOXICILLIN 250 MG
1 CAPSULE ORAL 2 TIMES DAILY
Status: DISCONTINUED | OUTPATIENT
Start: 2022-08-19 | End: 2022-08-21 | Stop reason: HOSPADM

## 2022-08-19 RX ORDER — ESCITALOPRAM OXALATE 10 MG/1
10 TABLET ORAL NIGHTLY
Status: DISCONTINUED | OUTPATIENT
Start: 2022-08-19 | End: 2022-08-21 | Stop reason: HOSPADM

## 2022-08-19 RX ORDER — FLUTICASONE PROPIONATE 50 MCG
1 SPRAY, SUSPENSION (ML) NASAL DAILY
Status: DISCONTINUED | OUTPATIENT
Start: 2022-08-20 | End: 2022-08-21 | Stop reason: HOSPADM

## 2022-08-19 RX ORDER — GINGER ROOT/GINGER ROOT EXT 262.5 MG
1 CAPSULE ORAL DAILY
COMMUNITY

## 2022-08-19 RX ORDER — LOPERAMIDE HCL 2 MG
2 TABLET ORAL 4 TIMES DAILY PRN
COMMUNITY

## 2022-08-19 RX ORDER — CLOPIDOGREL BISULFATE 75 MG/1
75 TABLET ORAL DAILY
Status: DISCONTINUED | OUTPATIENT
Start: 2022-08-20 | End: 2022-08-21 | Stop reason: HOSPADM

## 2022-08-19 RX ADMIN — ENOXAPARIN SODIUM 40 MG: 100 INJECTION SUBCUTANEOUS at 09:08

## 2022-08-19 RX ADMIN — TRAZODONE HYDROCHLORIDE 50 MG: 50 TABLET ORAL at 09:08

## 2022-08-19 RX ADMIN — SODIUM CHLORIDE 1000 ML: 0.9 INJECTION, SOLUTION INTRAVENOUS at 11:08

## 2022-08-19 RX ADMIN — ATORVASTATIN CALCIUM 40 MG: 40 TABLET, FILM COATED ORAL at 09:08

## 2022-08-19 RX ADMIN — AZITHROMYCIN MONOHYDRATE 500 MG: 250 TABLET ORAL at 01:08

## 2022-08-19 RX ADMIN — AMLODIPINE BESYLATE 10 MG: 5 TABLET ORAL at 09:08

## 2022-08-19 RX ADMIN — CILOSTAZOL 50 MG: 50 TABLET ORAL at 09:08

## 2022-08-19 RX ADMIN — CEFTRIAXONE SODIUM 1 G: 1 INJECTION, POWDER, FOR SOLUTION INTRAMUSCULAR; INTRAVENOUS at 01:08

## 2022-08-19 RX ADMIN — ESCITALOPRAM OXALATE 10 MG: 10 TABLET ORAL at 09:08

## 2022-08-19 NOTE — H&P
[facility] Medicine H&P Note     Admitting Team: [facility] Hospital[facility]  Attending Physician: Dr. Ramirez  Resident: Martin  Intern: Murray    Date of Admit: 2022    Chief Complaint     Progressive fatigue and SOB x3 days    Subjective:      History of Present Illness:  Pt is an 82 year old female with a PMHx of HTN, COPD, CKD stage III, remote CVA, GERD, HLD, PAD w/ claudication and hypothyroidism who presents to the ED today with complaints of progressive fatigue for the past 3-days.    History was taken from the pts. daughter with whom the patients lives.     Pt reports that she was in her normal state of health until 3-days ago. At baseline patient is able to walk using a cane across the room with only minimal shortness of breathe. She has been smoking a carton of cigarettes every two-three weeks for as long as she can remember. Three days ago pt suddenly started feeling weaker. She expressed that she has also been feeling progressively more short of breathe in addition to the weakness. Normally pt is on 2L O2 at home, but has now been requiring 3L. The weakness has progressed to the point where Ms. Haile is no longer getting out of bed. At this point pt was brought to the hospital. Pt denied any excessive cough of sputum production, fevers, chills, abdominal, or chest pain.  Of note pt did move from [place] ~ 1 week ago.    Past Medical History:  As listed above    Past Surgical History:  Carotid Stent    Colonoscopy (2019)  Coronary Angioplasty ()  DAMON L2-L3  EGD 2019    Allergies:  No known allergies.    Home Medications:  Atorvastatin 40mg  Irbesartan 300mg qD  Clopidogrel 75mg  Escitalopram 10mg  Amlodipine 5mg qD  Trazodone 50mg daily  Levothyroxine 88mcg    Family History:  Family History   Problem Relation Age of Onset    No Known Problems Mother     Cancer Sister         breast    Cancer Brother         throat cancer       Social History:  Smokin/2-1 pack/day  Alcohol: N/A  Drugs:  "N/A    Review of Systems:  Pertinent items are noted in HPI. All other systems are reviewed and are negative.    Health Maintaince :   Primary Care Physician: Dr. Frazier       Objective:   Last 24 Hour Vital Signs:  BP  Min: 134/60  Max: 157/70  Temp  Av.3 °F (36.8 °C)  Min: 98.3 °F (36.8 °C)  Max: 98.3 °F (36.8 °C)  Pulse  Av.6  Min: 79  Max: 87  Resp  Av.4  Min: 20  Max: 27  SpO2  Av.4 %  Min: 93 %  Max: 100 %  Height  Av' 2" (157.5 cm)  Min: 5' 2" (157.5 cm)  Max: 5' 2" (157.5 cm)  Weight  Av.7 kg (114 lb)  Min: 51.7 kg (114 lb)  Max: 51.7 kg (114 lb)  Body mass index is 20.85 kg/m².  No intake/output data recorded.    Physical Examination:  General appearance: alert, appears stated age, fatigued and no distress  Head: Normocephalic, without obvious abnormality, atraumatic  Throat: lips, mucosa, and tongue normal; teeth and gums normal  Back: symmetric, no curvature. ROM normal. No CVA tenderness.  Lungs: diminished breath sounds bilaterally and wheezes bilaterally  Heart: regular rate and rhythm, S1, S2 normal, no murmur, click, rub or gallop  Abdomen: soft, non-tender; bowel sounds normal; no masses,  no organomegaly  Skin: Mild redness at the sacrum;otherwise normal  Neurologic: a/o x3     Laboratory:  Most Recent Data:     Thyroid:   Lab Results   Component Value Date    TSH 0.909 2022    FREET4 1.17 2019     Urinalysis:   Lab Results   Component Value Date    LABURIN No growth 2017    COLORU Yellow 2022    SPECGRAV 1.010 2022    NITRITE Negative 2022    KETONESU Negative 2022    UROBILINOGEN Negative 2022    WBCUA 1 2020       Trended Lab Data:  Recent Labs   Lab 22  1116   WBC 6.67   HGB 9.0*   HCT 28.1*   *   MCV 90   RDW 14.0      K 4.1      CO2 28   BUN 13   CREATININE 0.8      PROT 5.6*   ALBUMIN 2.8*   BILITOT 0.4   AST 10   ALKPHOS 114   ALT 7*       Trended Cardiac Data:  Recent Labs   Lab " 08/19/22  1116   TROPONINI 0.015   BNP 44       Microbiology Data:  Bcx: ordered  Respiratory Cx-ordered    Other Results:  EKG (my interpretation): pending at this time. Per monitor patient seems to be in Normal sinus rhythm at a rate of 82    Radiology:  CXR (my read): significant bilateral haziness likely 2/2 to extensive smoking history. RLL opacification cannot rule out effusion vs consolidation.      Assessment:     Brittany Haile is a 82 y.o. female with Pt is an 82 year old female with a PMHx of HTN, COPD, CKD stage III, remote CVA, GERD, HLD, PAD w/ claudication and hypothyroidism who presents to the ED today with complaints of progressive fatigue for the past 3-days. In the ED CXR was concerning for possible pneumonia in the RLL. Pt was started on Azithromycin at which time medicine service was consulted.     Plan:     #R-sided Pneumonia associated w/ progressive fatigue  -on interview pt as satting 95% on 2L NC  -wbc 6.67 and pt afebrile  -CXR concerning for consolidation in the RLL  -bcx and sputum culture pending  -UA wnl  -started pt on Azithromycin and Rocephin     #COPD  -pt on 2L NC satting 95%   -counseled pt that O2 goals given her COPD are 88-92%  -contnue home albuterol inhaler BID    #PAD  -pt with significant hx of arterial atherosclerotic disease  -previous mesenteric and carotid stenting  -continue home Atorvastatin and Clopidogrel    #HTN  -on admit pt bp 134/60 on admit  -continue home amlodipine 5mg and losartan 100mg qD    #HLD  -continue home atorvastatin 40mg    #Hypothyroidism  -last TSH: .9 and freee T4: 1.17  -continue home Levothyroxine 88mg    Code Status:     Code:DNR/DNI  Diet: Cardiac Diet  DVT PPx: Lov 40 subQ    Henry Gao MD  hospitals Internal Medicine HO-1    hospitals Medicine Hospitalist Pager numbers:   hospitals Hospitalist Medicine Team A (Bradley/Kristin): [phone]  hospitals Hospitalist Medicine Team B (Homer/James):  [phone]

## 2022-08-19 NOTE — ED PROVIDER NOTES
Encounter Date: 8/19/2022    SCRIBE #1 NOTE: I, Elizabeth Aguillon, am scribing for, and in the presence of,  Nick Ferguson MD. I have scribed the following portions of the note - Other sections scribed: HPI, ROS, PE.       History     Chief Complaint   Patient presents with    Fatigue     Family reports pt has hx of COPD on 3 liters NC at all times, pt recently moved from [place], family states she decreased appetite and generalized weakness x 2-3 days. Denies fever, n/v/d       The patient is a 82 year old female with PMHx of COPD presenting to the ED with complaint of chronic generalized fatigue. Patient is accompanied by her daughter who states the patient's symptom have drastically worsened since they moved here a week ago. Daughter reports the patient woke up today with difficulty breathing. The daughter states the patient recently started having difficulty ambulating with cane and having a decreased appetite.  The patient spends most of her day resting on bed or couch. Patient is also on 3 liters of NC at all times.  Patient denies any current pain. Daughter denies fever, congestion, cough, V/D/N or other cold like symptoms. No other complains at this time.     The history is provided by the patient and daughter.     Fatigue  Associated symptoms include fatigue and weakness. Pertinent negatives include no abdominal pain, chest pain, congestion, coughing, fever, nausea or vomiting.       Review of patient's allergies indicates:  No Known Allergies  Past Medical History:   Diagnosis Date    Abdominal pain     CHF (congestive heart failure)     Chronic kidney disease, stage III (moderate) 8/19/2015    COPD (chronic obstructive pulmonary disease)     COPD (chronic obstructive pulmonary disease)     CVA (cerebral infarction)     Esophagitis     Gastritis     GERD (gastroesophageal reflux disease)     GI bleed     Hyperlipidemia     Hypertension     Mesenteric angina     PVD (peripheral vascular disease)  with claudication 10/13/2015    Stroke     Subclavian artery stenosis, left     Thyroid disease      Past Surgical History:   Procedure Laterality Date    CAROTID STENT       SECTION      COLONOSCOPY N/A 3/2/2017    Procedure: COLONOSCOPY;  Surgeon: Cecil Crooks MD;  Location: [facility] ENDO;  Service: Endoscopy;  Laterality: N/A;    COLONOSCOPY N/A 2019    Procedure: COLONOSCOPY/Suprep;  Surgeon: Barry Talbert MD;  Location: [facility] ENDO;  Service: Endoscopy;  Laterality: N/A;    CORONARY ANGIOPLASTY  2006    EPIDURAL STEROID INJECTION INTO LUMBAR SPINE N/A 2018    Procedure: INJECTION, STEROID, SPINE, LUMBAR, EPIDURAL- L2-3 ORAL SEDATION;  Surgeon: Stiven Ibarra Jr., MD;  Location: [facility] PAIN MGT;  Service: Pain Management;  Laterality: N/A;    ESOPHAGOGASTRODUODENOSCOPY N/A 2019    Procedure: EGD (ESOPHAGOGASTRODUODENOSCOPY);  Surgeon: Barry Talbert MD;  Location: [facility] ENDO;  Service: Endoscopy;  Laterality: N/A;    GALLBLADDER SURGERY      HYSTERECTOMY      INTRALUMINAL GASTROINTESTINAL TRACT IMAGING VIA CAPSULE N/A 2019    Procedure: IMAGING PROCEDURE, GI TRACT, INTRALUMINAL, VIA CAPSULE;  Surgeon: Barry Talbert MD;  Location: [facility] ENDO;  Service: Endoscopy;  Laterality: N/A;    OOPHORECTOMY      SUPERIOR MESTENTERIC ARTERY STENT       Family History   Problem Relation Age of Onset    No Known Problems Mother     Cancer Sister         breast    Cancer Brother         throat cancer     Social History     Tobacco Use    Smoking status: Current Every Day Smoker     Packs/day: 0.50     Years: 60.00     Pack years: 30.00     Types: Cigarettes    Smokeless tobacco: Never Used    Tobacco comment: Pt declines enrollment in [employer] / Ambulatory Smoking Cessation program.    Substance Use Topics    Alcohol use: No    Drug use: No     Review of Systems   Constitutional: Positive for activity change, appetite change (decreased) and  fatigue. Negative for fever.   HENT: Negative for congestion.    Respiratory: Negative for cough and shortness of breath.         Trouble breathing.   Cardiovascular: Negative for chest pain.   Gastrointestinal: Negative for abdominal pain, diarrhea, nausea and vomiting.   Neurological: Positive for weakness. Negative for syncope.   All other systems reviewed and are negative.      Physical Exam     Initial Vitals [08/19/22 1009]   BP Pulse Resp Temp SpO2   134/60 87 20 98.3 °F (36.8 °C) (!) 93 %      MAP       --         Physical Exam    Nursing note and vitals reviewed.  Constitutional: No distress.   HENT:   Head: Atraumatic.   Mouth/Throat: Mucous membranes are dry.   Eyes: Conjunctivae are normal.   Neck: Neck supple.   Cardiovascular: Normal rate, regular rhythm and normal heart sounds. Exam reveals no gallop and no friction rub.    No murmur heard.  Pulmonary/Chest:   Diminished breath sounds.   Abdominal: Abdomen is soft. There is no abdominal tenderness.   Musculoskeletal:         General: Normal range of motion.      Cervical back: Neck supple.      Right lower leg: No edema.      Left lower leg: No edema.     Neurological: She is alert.   Skin: Skin is warm and dry.   Psychiatric: Her behavior is normal.         ED Course   Procedures  Labs Reviewed   CBC W/ AUTO DIFFERENTIAL - Abnormal; Notable for the following components:       Result Value    RBC 3.14 (*)     Hemoglobin 9.0 (*)     Hematocrit 28.1 (*)     Platelets 461 (*)     MPV 8.7 (*)     Lymph # 0.9 (*)     Gran % 73.8 (*)     Lymph % 14.1 (*)     All other components within normal limits   COMPREHENSIVE METABOLIC PANEL - Abnormal; Notable for the following components:    Total Protein 5.6 (*)     Albumin 2.8 (*)     ALT 7 (*)     All other components within normal limits   CULTURE, BLOOD   CULTURE, BLOOD   CULTURE, RESPIRATORY   URINALYSIS   CK   TROPONIN I   B-TYPE NATRIURETIC PEPTIDE   TSH   MAGNESIUM   LACTIC ACID, PLASMA   SARS-COV-2 RDRP  GENE          Imaging Results          X-Ray Chest 1 View (Final result)  Result time 08/19/22 13:33:33    Final result by Christopher Fernandes MD (08/19/22 13:33:33)                 Impression:      1. Findings suggest edema noting small right pleural effusion.  Developing right lower lung zone consolidation not excluded.  Correlation and follow-up is advised.      Electronically signed by: Christopher Fernandes MD  Date:    08/19/2022  Time:    13:33             Narrative:    EXAMINATION:  XR CHEST 1 VIEW    CLINICAL HISTORY:  Weakness;    TECHNIQUE:  Single frontal view of the chest was performed.    COMPARISON:  None    FINDINGS:  The cardiomediastinal silhouette is prominent, magnified by technique noting calcification of the aorta..  There is obscuration of the right costophrenic angle suggesting effusion or atelectasis..  The trachea is midline.  The lungs are symmetrically expanded bilaterally with coarse central hilar interstitial attenuation bilaterally, somewhat more focally projected over the right lower lung zone..  No large focal consolidation seen.  There is no pneumothorax.  The osseous structures are remarkable for degenerative changes.  There is peripheral vascular calcification..                                 Medications   0.9%  NaCl infusion (0 mLs Intravenous Stopped 8/19/22 1344)   cefTRIAXone (ROCEPHIN) 1 g/50 mL D5W IVPB (0 g Intravenous Stopped 8/19/22 1425)   azithromycin tablet 500 mg (500 mg Oral Given 8/19/22 1354)     Medical Decision Making:   Initial Assessment:   The patient is a 82 year old female with PMHx of COPD presenting to the ED with complaint of chronic generalized fatigue.  Differential Diagnosis:   Differential Diagnosis includes, but is not limited to:  CVA/TIA, vertigo, anemia/blood loss, cardiogenic shock, arrhythmia, orthostatic hypotension, dehydration, medication side effect, vitamin deficiency, liver disease, hypothyroidism, drug intoxication/withdrawal, metabolic  derangement.    Clinical Tests:   Lab Tests: Ordered and Reviewed  Radiological Study: Ordered and Reviewed  ED Management:  Lab work is unremarkable.  Chest x-ray shows evidence of a right lower lobe pneumonia.  Blood cultures have been ordered as well as antibiotics.  Patient will be admitted by [facility] medicine for further treatment.          Scribe Attestation:   Scribe #1: I performed the above scribed service and the documentation accurately describes the services I performed. I attest to the accuracy of the note.                 Clinical Impression:   Final diagnoses:  [R53.1] Weakness  [J18.9] Pneumonia of right lower lobe due to infectious organism (Primary)          ED Disposition Condition    Observation        I, Dr. Nick Ferguson, personally performed the services described in this documentation. All medical record entries made by the scribe were at my direction and in my presence. I have reviewed the chart and agree that the record reflects my personal performance and is accurate and complete. Nick Ferguson MD.  2:51 PM 08/19/2022          Nick Ferguson MD  08/19/22 8972

## 2022-08-19 NOTE — ED TRIAGE NOTES
Brittany Haile, an 82 y.o. female presents to the ED with a complaint of fatigue and weakness. Daughter states that they just drove from [place] to move her back home a couple days ago. Daughter reports she not walking as much as she normally does and she seems un steady on her feet. Pt has a hx of COPD and daughter reports she seems more SOB. Unsure of fever.     Review of patient's allergies indicates:  No Known Allergies  Chief Complaint   Patient presents with    Fatigue     Family reports pt has hx of COPD on 3 liters NC at all times, pt recently moved from [place], family states she decreased appetite and generalized weakness x 2-3 days. Denies fever, n/v/d       Past Medical History:   Diagnosis Date    Abdominal pain     CHF (congestive heart failure)     Chronic kidney disease, stage III (moderate) 8/19/2015    COPD (chronic obstructive pulmonary disease)     COPD (chronic obstructive pulmonary disease)     CVA (cerebral infarction)     Esophagitis     Gastritis     GERD (gastroesophageal reflux disease)     GI bleed     Hyperlipidemia     Hypertension     Mesenteric angina     PVD (peripheral vascular disease) with claudication 10/13/2015    Stroke     Subclavian artery stenosis, left     Thyroid disease        Patient identifiers verified and correct.     LOC: The patient is awake, alert and aware of environment with an appropriate affect, the patient is oriented x 3 and speaking appropriately.     APPEARANCE: Patient appears comfortable and in no acute distress, patient is clean and well groomed.    HEENT: Head symmetrical. Eyes bilateral.  Bilateral ears without drainage. Bilateral nares patent, throat clear.    SKIN: The skin is warm and dry, color consistent with ethnicity, patient has normal skin turgor and moist mucus membranes, skin intact,    MUSCULOSKELETAL: Patient moving all extremities spontaneously, no swelling noted.    RESPIRATORY: Airway is open and patent, respirations are  spontaneous, patient has a normal effort and rate, no accessory muscle use noted.     CARDIAC: Patient has a normal rate and regular rhythm, no edema noted, capillary refill < 3 seconds.     GASTRO: Abdomen soft and distended.      NEURO: Pt opens eyes spontaneously pupils equal, round, and reactive. behavior appropriate to situation, follows commands, facial expression symmetrical,    NEUROVASCULAR: All extremities are warm and pink.     SOCIAL: Patient is accompanied by daughter and son.   Will continue to monitor.

## 2022-08-19 NOTE — PHARMACY MED REC
"Admission Medication History     The home medication history was taken by Brianna Roberts CPhT.    Medication history obtained from, Patient's Daughter Verified    You may go to "Admission" then "Reconcile Home Medications" tabs to review and/or act upon these items.      The home medication list has been updated by the Pharmacy department.    Please read ALL comments highlighted in yellow.    Please address this information as you see fit.     Feel free to contact us if you have any questions or require assistance.      The medications listed below were removed from the home medication list.  Please reorder if appropriate:  Patient reports no longer taking the following medication(s):   Advair 250-50 mcg    Protonix 40 mg   Spiriva 18 mcg   Valsartan 320 mg      Brianna Roberts CPhT.  Ext [phone]                .          "

## 2022-08-19 NOTE — Clinical Note
Diagnosis: Pneumonia of right lower lobe due to infectious organism [[id number]]   Future Attending Provider: DELANEY OBRIEN [[id number]]   Is the patient being sent to ED Observation?: No   Admitting Provider:: DELANEY OBRIEN [[id number]]

## 2022-08-19 NOTE — PLAN OF CARE
SW notified of pt's visit to ED. Of note going into the weekend, pt has a f/u with PCP Freddie currently scheduled in a week and a half.    Future Appointments   Date Time Provider Department Center   9/1/2022  4:20 PM Wayne Frazier MD [facility]        08/19/22 1539   Post-Acute Status   Post-Acute Authorization Other   Other Status Awaiting f/u Appts   Hospital Resources/Appts/Education Provided Appointments scheduled by Navigator/Coordinator;Appointments scheduled and added to AVS     Nahed Auguste, [facility]  ED Social Work  [phone]

## 2022-08-20 LAB
ANION GAP SERPL CALC-SCNC: 9 MMOL/L (ref 8–16)
BASOPHILS # BLD AUTO: 0.03 K/UL (ref 0–0.2)
BASOPHILS NFR BLD: 0.3 % (ref 0–1.9)
BUN SERPL-MCNC: 12 MG/DL (ref 8–23)
CALCIUM SERPL-MCNC: 8.3 MG/DL (ref 8.7–10.5)
CHLORIDE SERPL-SCNC: 104 MMOL/L (ref 95–110)
CO2 SERPL-SCNC: 26 MMOL/L (ref 23–29)
CREAT SERPL-MCNC: 0.7 MG/DL (ref 0.5–1.4)
DIFFERENTIAL METHOD: ABNORMAL
EOSINOPHIL # BLD AUTO: 0.1 K/UL (ref 0–0.5)
EOSINOPHIL NFR BLD: 1 % (ref 0–8)
ERYTHROCYTE [DISTWIDTH] IN BLOOD BY AUTOMATED COUNT: 13.9 % (ref 11.5–14.5)
EST. GFR  (NO RACE VARIABLE): >60 ML/MIN/1.73 M^2
GLUCOSE SERPL-MCNC: 105 MG/DL (ref 70–110)
HCT VFR BLD AUTO: 28.7 % (ref 37–48.5)
HGB BLD-MCNC: 9.1 G/DL (ref 12–16)
IMM GRANULOCYTES # BLD AUTO: 0.03 K/UL (ref 0–0.04)
IMM GRANULOCYTES NFR BLD AUTO: 0.3 % (ref 0–0.5)
LYMPHOCYTES # BLD AUTO: 1.2 K/UL (ref 1–4.8)
LYMPHOCYTES NFR BLD: 13 % (ref 18–48)
MAGNESIUM SERPL-MCNC: 1.8 MG/DL (ref 1.6–2.6)
MCH RBC QN AUTO: 28.4 PG (ref 27–31)
MCHC RBC AUTO-ENTMCNC: 31.7 G/DL (ref 32–36)
MCV RBC AUTO: 90 FL (ref 82–98)
MONOCYTES # BLD AUTO: 0.8 K/UL (ref 0.3–1)
MONOCYTES NFR BLD: 8.7 % (ref 4–15)
NEUTROPHILS # BLD AUTO: 6.8 K/UL (ref 1.8–7.7)
NEUTROPHILS NFR BLD: 76.7 % (ref 38–73)
NRBC BLD-RTO: 0 /100 WBC
PHOSPHATE SERPL-MCNC: 3.1 MG/DL (ref 2.7–4.5)
PLATELET # BLD AUTO: 480 K/UL (ref 150–450)
PMV BLD AUTO: 8.7 FL (ref 9.2–12.9)
POTASSIUM SERPL-SCNC: 3.9 MMOL/L (ref 3.5–5.1)
PROCALCITONIN SERPL IA-MCNC: 0.05 NG/ML
RBC # BLD AUTO: 3.2 M/UL (ref 4–5.4)
SODIUM SERPL-SCNC: 139 MMOL/L (ref 136–145)
WBC # BLD AUTO: 8.87 K/UL (ref 3.9–12.7)

## 2022-08-20 PROCEDURE — 25000003 PHARM REV CODE 250: Performed by: STUDENT IN AN ORGANIZED HEALTH CARE EDUCATION/TRAINING PROGRAM

## 2022-08-20 PROCEDURE — 99900035 HC TECH TIME PER 15 MIN (STAT)

## 2022-08-20 PROCEDURE — 97530 THERAPEUTIC ACTIVITIES: CPT

## 2022-08-20 PROCEDURE — 63600175 PHARM REV CODE 636 W HCPCS: Performed by: STUDENT IN AN ORGANIZED HEALTH CARE EDUCATION/TRAINING PROGRAM

## 2022-08-20 PROCEDURE — 25000003 PHARM REV CODE 250: Performed by: INTERNAL MEDICINE

## 2022-08-20 PROCEDURE — 97161 PT EVAL LOW COMPLEX 20 MIN: CPT

## 2022-08-20 PROCEDURE — 92610 EVALUATE SWALLOWING FUNCTION: CPT

## 2022-08-20 PROCEDURE — 27000221 HC OXYGEN, UP TO 24 HOURS

## 2022-08-20 PROCEDURE — 63600175 PHARM REV CODE 636 W HCPCS: Performed by: INTERNAL MEDICINE

## 2022-08-20 PROCEDURE — 85025 COMPLETE CBC W/AUTO DIFF WBC: CPT | Performed by: STUDENT IN AN ORGANIZED HEALTH CARE EDUCATION/TRAINING PROGRAM

## 2022-08-20 PROCEDURE — 84100 ASSAY OF PHOSPHORUS: CPT | Performed by: STUDENT IN AN ORGANIZED HEALTH CARE EDUCATION/TRAINING PROGRAM

## 2022-08-20 PROCEDURE — 80048 BASIC METABOLIC PNL TOTAL CA: CPT | Performed by: STUDENT IN AN ORGANIZED HEALTH CARE EDUCATION/TRAINING PROGRAM

## 2022-08-20 PROCEDURE — 83735 ASSAY OF MAGNESIUM: CPT | Performed by: STUDENT IN AN ORGANIZED HEALTH CARE EDUCATION/TRAINING PROGRAM

## 2022-08-20 PROCEDURE — 25000242 PHARM REV CODE 250 ALT 637 W/ HCPCS: Performed by: STUDENT IN AN ORGANIZED HEALTH CARE EDUCATION/TRAINING PROGRAM

## 2022-08-20 PROCEDURE — 97165 OT EVAL LOW COMPLEX 30 MIN: CPT

## 2022-08-20 PROCEDURE — 36415 COLL VENOUS BLD VENIPUNCTURE: CPT

## 2022-08-20 PROCEDURE — 84145 PROCALCITONIN (PCT): CPT

## 2022-08-20 PROCEDURE — 36415 COLL VENOUS BLD VENIPUNCTURE: CPT | Performed by: STUDENT IN AN ORGANIZED HEALTH CARE EDUCATION/TRAINING PROGRAM

## 2022-08-20 PROCEDURE — 97535 SELF CARE MNGMENT TRAINING: CPT

## 2022-08-20 PROCEDURE — 94761 N-INVAS EAR/PLS OXIMETRY MLT: CPT

## 2022-08-20 PROCEDURE — 94640 AIRWAY INHALATION TREATMENT: CPT

## 2022-08-20 PROCEDURE — 11000001 HC ACUTE MED/SURG PRIVATE ROOM

## 2022-08-20 RX ORDER — ACETAMINOPHEN 325 MG/1
650 TABLET ORAL ONCE AS NEEDED
Status: COMPLETED | OUTPATIENT
Start: 2022-08-20 | End: 2022-08-20

## 2022-08-20 RX ADMIN — CLOPIDOGREL 75 MG: 75 TABLET, FILM COATED ORAL at 08:08

## 2022-08-20 RX ADMIN — LEVOTHYROXINE SODIUM 88 MCG: 88 TABLET ORAL at 05:08

## 2022-08-20 RX ADMIN — ALBUTEROL SULFATE 2 PUFF: 90 AEROSOL, METERED RESPIRATORY (INHALATION) at 08:08

## 2022-08-20 RX ADMIN — DOCUSATE SODIUM AND SENNOSIDES 1 TABLET: 8.6; 5 TABLET, FILM COATED ORAL at 09:08

## 2022-08-20 RX ADMIN — VITAMIN C (ASCORBIC ACID), CARBONYL IRON, FOLIC ACID, VITAMIN B-12 (CYANOCOBALAMIN) 1 TABLET: 250; 25; 1; 100 TABLET ORAL at 09:08

## 2022-08-20 RX ADMIN — CILOSTAZOL 50 MG: 50 TABLET ORAL at 08:08

## 2022-08-20 RX ADMIN — ATORVASTATIN CALCIUM 40 MG: 40 TABLET, FILM COATED ORAL at 09:08

## 2022-08-20 RX ADMIN — VANCOMYCIN HYDROCHLORIDE 1250 MG: 1.25 INJECTION, POWDER, LYOPHILIZED, FOR SOLUTION INTRAVENOUS at 11:08

## 2022-08-20 RX ADMIN — ACETAMINOPHEN 650 MG: 325 TABLET ORAL at 11:08

## 2022-08-20 RX ADMIN — ENOXAPARIN SODIUM 40 MG: 100 INJECTION SUBCUTANEOUS at 04:08

## 2022-08-20 RX ADMIN — AZITHROMYCIN MONOHYDRATE 500 MG: 500 INJECTION, POWDER, LYOPHILIZED, FOR SOLUTION INTRAVENOUS at 01:08

## 2022-08-20 RX ADMIN — FLUTICASONE PROPIONATE 50 MCG: 50 SPRAY, METERED NASAL at 08:08

## 2022-08-20 RX ADMIN — CILOSTAZOL 50 MG: 50 TABLET ORAL at 09:08

## 2022-08-20 RX ADMIN — TRAZODONE HYDROCHLORIDE 50 MG: 50 TABLET ORAL at 09:08

## 2022-08-20 RX ADMIN — AMLODIPINE BESYLATE 10 MG: 5 TABLET ORAL at 09:08

## 2022-08-20 RX ADMIN — DOCUSATE SODIUM AND SENNOSIDES 1 TABLET: 8.6; 5 TABLET, FILM COATED ORAL at 08:08

## 2022-08-20 RX ADMIN — AMLODIPINE BESYLATE 10 MG: 5 TABLET ORAL at 08:08

## 2022-08-20 RX ADMIN — ESCITALOPRAM OXALATE 10 MG: 10 TABLET ORAL at 09:08

## 2022-08-20 RX ADMIN — CEFTRIAXONE 1 G: 1 INJECTION, SOLUTION INTRAVENOUS at 02:08

## 2022-08-20 RX ADMIN — LOSARTAN POTASSIUM 100 MG: 50 TABLET, FILM COATED ORAL at 08:08

## 2022-08-20 NOTE — PLAN OF CARE
Problem: SLP  Goal: SLP Goal  Description: STGs:  1. Pt will participate in clinical swallow assessment to determine safest diet level. - Met  2. Pt will tolerate a Regular/Thin liquid diet without any overt s/sx of aspiration. - Met   Outcome: Met   Bedside swallow study completed. Pt deemed safe to continue on Regular/Thin liquid diet given 1 bite/sip at a time, slow rate of intake, and supplemental oxygen while eating/drinking. No further ST needs.     11-15-17  appt set 3-16-18  Med pended

## 2022-08-20 NOTE — PLAN OF CARE
Problem: Occupational Therapy  Goal: Occupational Therapy Goal  Description: Goals to be met by: 09/20/22     Patient will increase functional independence with ADLs by performing:    UE Dressing with Set-up Assistance.  LE Dressing with Minimal Assistance.  Toileting from bedside commode with Stand-by Assistance for hygiene and clothing management.   Toilet transfer to bedside commode with Stand-by Assistance.  Upper extremity exercise program 3x15 reps per handout, with supervision.    Outcome: Ongoing, Progressing     Pt was agreeable to and participated in OT/PT evaluation.  Pt reports that she has had a gradual decline in function recently due to increasing SOB and weakness which resulted in her requiring physical assistance with ADLS and mobility.  Pt currently performs func mobility with SBA-total A and ADLs with set-up - mod A.  Pt noted with L sided weakness from prior CVA and increasing SOB as eval progressed.  Goals established to assist pt with returning to PLOF regarding ADLs and func mobility.  Pt will benefit from skilled OT services in order to increase her level of safety and independence with ADLs and mobility.        Melodie Arias, OT  8/20/2022

## 2022-08-20 NOTE — PT/OT/SLP EVAL
Physical Therapy Evaluation    Patient Name:  Brittany Haile   MRN:  [id number]    Recommendations:     Discharge Recommendations:  home health OT, home health PT   Discharge Equipment Recommendations: bedside commode, hospital bed   Barriers to discharge: None    Assessment:     Brittany Haile is a 82 y.o. female admitted with a medical diagnosis of <principal problem not specified>.  She presents with the following impairments/functional limitations:  weakness, impaired endurance, impaired sensation, impaired self care skills, impaired functional mobility, gait instability, impaired balance, decreased lower extremity function, decreased upper extremity function, decreased safety awareness, impaired coordination, impaired fine motor, impaired cardiopulmonary response to activity   Pt needs to improve tolerance to activity in order to return to PLOF..    Rehab Prognosis: Good; patient would benefit from acute skilled PT services to address these deficits and reach maximum level of function.    Recent Surgery: * No surgery found *      Plan:     During this hospitalization, patient to be seen 4 x/week to address the identified rehab impairments via gait training, therapeutic activities, therapeutic exercises, neuromuscular re-education and progress toward the following goals:    · Plan of Care Expires:  09/20/22    Subjective     Chief Complaint: SOB  Patient/Family Comments/goals: Return home  Pain/Comfort:  · Pain Rating 1: 0/10  · Pain Rating Post-Intervention 1: 0/10    Patients cultural, spiritual, Adventist conflicts given the current situation: no    Living Environment:  Pt lives with daughter in [facility] and no THE - t/s for bathing , son has home with shower with [facility], [facility] [place].  Prior to admission, patients level of function was Limited, pt has had recent decline in function and required assit with transfers and ADL's .  Equipment used at home: cane, quad, walker, rolling, wheelchair, rollator.  DME owned (not currently  used): none.  Upon discharge, patient will have assistance from [facility] .    Objective:     Communicated with Nurse prior to session.  Patient found HOB elevated with oxygen, telemetry  upon PT entry to room.    General Precautions: Standard, aspiration, fall, respiratory   Orthopedic Precautions:N/A   Braces: N/A  Respiratory Status: Nasal cannula, flow 2 L/min    Exams:  · Gross Motor Coordination:  WFL  · RLE ROM: WFL  · RLE Strength: WFL  · LLE ROM: WFL  · LLE Strength: WFL except 4/5    Functional Mobility:  · Bed Mobility:  Rolling Left:  supervision  · Rolling Right: supervision  · Scooting: total assistance  · Bridging: total assistance  · Supine to Sit: stand by assistance  · Sit to Supine: stand by assistance  · Transfers:     · Sit to Stand:  contact guard assistance with rolling walker  · Gait: 2ft side steps with RW with CGA    Therapeutic Activities and Exercises:   Pt performed bed mobility and supine to sit. Pt stood with RW with CGA. Pt performed side steps 2ft with RW CGA and frequent verbal cues. Pt performed sit to supine with SBA. Pt was very fatigued and needed total assist for scooting up in bed.    AM-PAC 6 CLICK MOBILITY  Total Score:18     Patient left HOB elevated with all lines intact and call button in reach.    GOALS:   Multidisciplinary Problems     Physical Therapy Goals        Problem: Physical Therapy    Goal Priority Disciplines Outcome Goal Variances Interventions   Physical Therapy Goal     PT, PT/OT Ongoing, Progressing     Description: Goals to be met by: 2022    Patient will increase functional independence with mobility by performin. Supine to sit with Modified [place]  2. Sit to supine with Modified [place]  3. Rolling to Left and Right with Modified [place].  4. Sit to stand transfer with Supervision  5. Bed to chair transfer with Supervision using Rolling Walker  6. Gait  x 25 feet with Supervision using Rolling Walker.                       History:     Past Medical History:   Diagnosis Date    Abdominal pain     CHF (congestive heart failure)     Chronic kidney disease, stage III (moderate) 2015    COPD (chronic obstructive pulmonary disease)     COPD (chronic obstructive pulmonary disease)     CVA (cerebral infarction)     Esophagitis     Gastritis     GERD (gastroesophageal reflux disease)     GI bleed     Hyperlipidemia     Hypertension     Mesenteric angina     PVD (peripheral vascular disease) with claudication 10/13/2015    Stroke     Subclavian artery stenosis, left     Thyroid disease        Past Surgical History:   Procedure Laterality Date    CAROTID STENT       SECTION      COLONOSCOPY N/A 3/2/2017    Procedure: COLONOSCOPY;  Surgeon: Cecil Crooks MD;  Location: [facility] ENDO;  Service: Endoscopy;  Laterality: N/A;    COLONOSCOPY N/A 2019    Procedure: COLONOSCOPY/Suprep;  Surgeon: Barry Talbert MD;  Location: [facility] ENDO;  Service: Endoscopy;  Laterality: N/A;    CORONARY ANGIOPLASTY      EPIDURAL STEROID INJECTION INTO LUMBAR SPINE N/A 2018    Procedure: INJECTION, STEROID, SPINE, LUMBAR, EPIDURAL- L2-3 ORAL SEDATION;  Surgeon: Stiven Ibarra Jr., MD;  Location: [facility] PAIN MGT;  Service: Pain Management;  Laterality: N/A;    ESOPHAGOGASTRODUODENOSCOPY N/A 2019    Procedure: EGD (ESOPHAGOGASTRODUODENOSCOPY);  Surgeon: Barry Talbert MD;  Location: [facility] ENDO;  Service: Endoscopy;  Laterality: N/A;    GALLBLADDER SURGERY      HYSTERECTOMY      INTRALUMINAL GASTROINTESTINAL TRACT IMAGING VIA CAPSULE N/A 2019    Procedure: IMAGING PROCEDURE, GI TRACT, INTRALUMINAL, VIA CAPSULE;  Surgeon: Barry Talbert MD;  Location: [facility] ENDO;  Service: Endoscopy;  Laterality: N/A;    OOPHORECTOMY      SUPERIOR MESTENTERIC ARTERY STENT         Time Tracking:     PT Received On: 22  PT Start Time: 1050     PT Stop Time: 1113  PT Total Time (min): 23  min     Billable Minutes: Evaluation 8 (Co Eval with OT) and Therapeutic Activity 15      08/20/2022

## 2022-08-20 NOTE — PROGRESS NOTES
\[facility]\"" Hospital Medicine Progress Note    Primary Team: \[facility]\"" Hospitalist Team B  Attending Physician: Clifton Ramirez MD  Resident: Martin  Intern: Murray    Subjective:      On interview this morning pt was comfortably resting in bed. She has no new medical complaints and said she feels as though she is getting better/stonger over the past day. Pt denied any active chest pain, abdominal pain, or SOB worse then at baseline.     Objective:     Last 24 Hour Vital Signs:  BP  Min: 139/87  Max: 183/74  Temp  Av °F (37.2 °C)  Min: 97.8 °F (36.6 °C)  Max: 99.9 °F (37.7 °C)  Pulse  Av.1  Min: 93  Max: 102  Resp  Av.3  Min: 16  Max: 32  SpO2  Av.8 %  Min: 93 %  Max: 97 %  Weight  Av.8 kg (120 lb 13 oz)  Min: 54.8 kg (120 lb 13 oz)  Max: 54.8 kg (120 lb 13 oz)  I/O last 3 completed shifts:  In: -   Out: 465 [Urine:465]    Physical Examination:  General appearance: alert, appears stated age, fatigued and no distress  Head: Normocephalic, without obvious abnormality, atraumatic  Throat: lips, mucosa, and tongue normal; teeth and gums normal  Back: symmetric, no curvature. ROM normal. No CVA tenderness.  Lungs: diminished breath sounds bilaterally and wheezes bilaterally  Heart: regular rate and rhythm, S1, S2 normal, no murmur, click, rub or gallop  Abdomen: soft, non-tender; bowel sounds normal; no masses,  no organomegaly  Skin: Mild redness at the sacrum;otherwise normal  Neurologic: a/o x3     Laboratory:  Laboratory Data Reviewed: yes  Pertinent Findings:  Hb.1  CMP wnl      Microbiology Data Reviewed: yes  Pertinent Findings:  Bcx: NGTD  Respiratory Cx: normal respiratory maria e    Other Results:  EKG (my interpretation): still pending    Current Medications:     Scheduled:   albuterol  2 puff Inhalation BID    amLODIPine  10 mg Oral BID    atorvastatin  40 mg Oral QHS    azithromycin  500 mg Intravenous Q24H    cefTRIAXone (ROCEPHIN) IVPB  1 g Intravenous Q24H    cilostazoL  50 mg Oral BID     clopidogreL  75 mg Oral Daily    enoxaparin  40 mg Subcutaneous Daily    EScitalopram oxalate  10 mg Oral QHS    fluticasone propionate  1 spray Each Nostril Daily    iron-vit c-b12-folic acid  1 tablet Oral Daily    levothyroxine  88 mcg Oral Before breakfast    losartan  100 mg Oral Daily    senna-docusate 8.6-50 mg  1 tablet Oral BID    traZODone  50 mg Oral QHS        PRN:  sodium chloride 0.9%    Antibiotics and Day Number of Therapy:  Rocephin x2 days  Azithromycin x2 days    Lines and Day Number of Therapy:  Justo     Assessment:     Brittany Haile is a 82 y.o. female with Pt is an 82 year old female with a PMHx of HTN, COPD, CKD stage III, remote CVA, GERD, HLD, PAD w/ claudication and hypothyroidism who presents to the ED with complaints of progressive fatigue for the past 3-days. In the ED CXR was concerning for possible pneumonia in the RLL. Pt was started on Azithromycin and rocephin at which time medicine service was consulted. Pt was seen by speech who cleared pt for a diet. Plan is to continue pt on abx at this time and to continue to work with PT/OT to build up strength.  Pt is stable and improving at this time.    Plan:     #R-sided Pneumonia associated w/ progressive fatigue  -on interview pt as satting 95% on 2L NC  -wbc 6.67 and pt afebrile  -CXR concerning for consolidation in the RLL  -bcx and sputum culture pending  -UA wnl  -continye pt on Azithromycin and Rocephin   -seen by speech, pt safe to continue cardiac diet at this time  -continue working with PT/OT-recommend HH PT at discharge     #COPD  -pt on 2L NC satting 95%   -counseled pt that O2 goals given her COPD are 88-92%  -contnue home albuterol inhaler BID     #PAD  -pt with significant hx of arterial atherosclerotic disease  -previous mesenteric and carotid stenting  -continue home Atorvastatin and Clopidogrel     #HTN  -on admit pt bp 134/60 on admit  -continue home amlodipine 5mg and losartan 100mg qD     #HLD  -continue  home atorvastatin 40mg     #Hypothyroidism  -last TSH: .9 and freee T4: 1.17  -continue home Levothyroxine 88mg    Henry Gao MD  [facility] Internal Medicine HO-1    [facility] Medicine Hospitalist Pager numbers:   [facility] Hospitalist Medicine Team A (Bradley/Kristin): [phone]  [facility] Hospitalist Medicine Team B (Homer/James):  [phone]

## 2022-08-20 NOTE — PLAN OF CARE
Problem: Physical Therapy  Goal: Physical Therapy Goal  Description: Goals to be met by: 2022    Patient will increase functional independence with mobility by performin. Supine to sit with Modified [place]  2. Sit to supine with Modified [place]  3. Rolling to Left and Right with Modified [place].  4. Sit to stand transfer with Supervision  5. Bed to chair transfer with Supervision using Rolling Walker  6. Gait  x 25 feet with Supervision using Rolling Walker.     Outcome: Ongoing, Progressing    Pt agreed to PT/OT Eval. Pt had prior weakness of left side after stroke. Pt reported being tired during evaluation and had SOB after session was completed. Goals to return to PLOF.

## 2022-08-20 NOTE — PLAN OF CARE
Problem: Adult Inpatient Plan of Care  Goal: Plan of Care Review  Outcome: Ongoing, Progressing  Goal: Absence of Hospital-Acquired Illness or Injury  Outcome: Ongoing, Progressing  Goal: Optimal Comfort and Wellbeing  Outcome: Ongoing, Progressing     Problem: Infection (Pneumonia)  Goal: Resolution of Infection Signs and Symptoms  Outcome: Ongoing, Progressing     Problem: Fall Injury Risk  Goal: Absence of Fall and Fall-Related Injury  Outcome: Ongoing, Progressing     Problem: Hypertension Comorbidity  Goal: Blood Pressure in Desired Range  Outcome: Ongoing, Progressing     Problem: Skin Injury Risk Increased  Goal: Skin Health and Integrity  Outcome: Ongoing, Progressing     POC reviewed with patient. All questions and concerns reviewed. For full assessment please refer to flowsheet. Fall/ safety precautions implemented & maintained. Bed locked in lowest position, bed alarm activated & audible, & call light in reach. Will continue to monitor.

## 2022-08-20 NOTE — PLAN OF CARE
Problem: Adult Inpatient Plan of Care  Goal: Plan of Care Review  Outcome: Ongoing, Progressing    VN cued into room to complete admit assessment. VIP model introduced; VN working alongside bedside treatment team.  Plan of care reviewed with daughter.  Informed of fall risk, fall precautions, call light within reach, side rails x2 elevated. Patient notified to ask staff for assistance. Verbalized complete understanding. Time allowed for questions. Will continue to monitor and intervene as needed. Chart review complete.

## 2022-08-20 NOTE — PT/OT/SLP EVAL
Occupational Therapy   Evaluation & Tx    Name: Brittany Haile  MRN: [id number]  Admitting Diagnosis:  <principal problem not specified>  Recent Surgery: * No surgery found *      Recommendations:     Discharge Recommendations: home health OT, home health PT  Discharge Equipment Recommendations:  bedside commode, hospital bed (hospital bed due to SOB and dx of COPD - pt reports breathes better with HOB raised)  Barriers to discharge:  None    Assessment:     Brittany Haile is a 82 y.o. female with a medical diagnosis of <principal problem not specified>.  She presents with the following performance deficits affecting function: weakness, impaired endurance, impaired sensation, impaired self care skills, impaired functional mobility, gait instability, impaired balance, decreased coordination, decreased upper extremity function, decreased lower extremity function, decreased safety awareness, decreased ROM, impaired coordination, impaired fine motor, impaired cardiopulmonary response to activity.  Pt was agreeable to and participated in OT/PT evaluation.  Pt reports that she has had a gradual decline in function recently due to increasing SOB and weakness which resulted in her requiring physical assistance with ADLS and mobility.  Pt currently performs func mobility with SBA-total A and ADLs with set-up - mod A.  Pt noted with L sided weakness from prior CVA and increasing SOB as eval progressed.  Goals established to assist pt with returning to PLOF regarding ADLs and func mobility.  Pt will benefit from skilled OT services in order to increase her level of safety and independence with ADLs and mobility.        Rehab Prognosis: Good; patient would benefit from acute skilled OT services to address these deficits and reach maximum level of function.       Plan:     Patient to be seen 4 x/week to address the above listed problems via self-care/home management, therapeutic activities, therapeutic exercises  · Plan of Care Expires:  09/19/22  · Plan of Care Reviewed with: patient    Subjective     Chief Complaint: SOB - easily fatigues  Patient/Family Comments/goals: return home    Occupational Profile:  Living Environment: pt lives with adult daughter in [facility] and no THE - t/s for bathing but says son's home has shower with [facility], [place] and [place]  Previous level of function: recently has increased weakness and SOB resulting in need for increased physical assist with ambulation, transfers, and ADLs (dressing and bathing)  Roles and Routines: mother, grandmother  Equipment Used at Home:  cane, quad, walker, rolling, wheelchair, rollator  Assistance upon Discharge: 24 hr available assist from children    Pain/Comfort:  · Pain Rating 1: 0/10  · Pain Rating Post-Intervention 1: 0/10    Patients cultural, spiritual, Mandaen conflicts given the current situation: no    Objective:     Communicated with: nurse prior to session.  Patient found HOB elevated with   upon OT entry to room.    **OT spoke with pt in [place] during eval and session, however, she also responded to English commands and states she is able to understand and speak English.     General Precautions: Standard, aspiration, fall, respiratory   Orthopedic Precautions:N/A   Braces: N/A  Respiratory Status: Nasal cannula, flow 2 L/min    Occupational Performance:    Bed Mobility:    · Patient completed Rolling/Turning to Right with supervision and with side rail  · Patient completed Scooting/Bridging with total assistance and 2 persons - due to weakness and SOB at end of session when asked to scoot to HOB  · Patient completed Supine to Sit with stand by assistance  · Patient completed Sit to Supine with stand by assistance    Functional Mobility/Transfers:  · Patient completed Sit <> Stand Transfer with contact guard assistance  with  rolling walker   · Functional Mobility: Pt too fatigued to walk around room, but able to stand and take side steps to left towards HOB.  Pt stood and sat back down  before being able to perform side steps.     Activities of Daily Living:  · Upper Body Dressing: set up A with hospital gown  · Lower Body Dressing: moderate assistance with socks - able to doff socks but needed A to cristobal    Cognitive/Visual Perceptual:  Cognitive/Psychosocial Skills:     -       Oriented to: Person, Place, Time and Situation (identified month correctly but not year)  -       Follows Commands/attention:Easily distracted and Follows one-step commands  -       Communication: clear/fluent and very [place]  -       Memory: No Deficits noted  -       Safety awareness/insight to disability: impaired   -       Mood/Affect/Coping skills/emotional control: Appropriate to situation    Physical Exam:  Balance: -       Sitting:  Static - Good; Dynamic - CGA when bending forward;  Standing = CGA  Postural examination/scapula alignment:    -       Rounded shoulders  -       Forward head  Skin integrity: Visible skin intact  Edema:  None noted  Sensation: -       Intact  Motor Planning: -       impaired/limited AROM and FM skills on L UE and impaired AROM on L LE  Dominant hand: -       right  Upper Extremity Range of Motion:     -       Right Upper Extremity: WFL  -       Left Upper Extremity: WFL except shoulder  Upper Extremity Strength:    -       Right Upper Extremity: WFL  -       Left Upper Extremity: impaired   Strength:    -       Right Upper Extremity: WFL  -       Left Upper Extremity: impaired    AMPAC 6 Click ADL:  AMPAC Total Score: 18    Treatment & Education:  · Pt completed ADLs and func mobility activities for tx session as noted above  · Pt noted to have increasing SOB and fatigue as session progressed - required total A to scoot up in bed at end of session  · Pt educated on role of OT and POC    Education:    Patient left HOB elevated with all lines intact and call button in reach    GOALS:   Multidisciplinary Problems     Occupational Therapy Goals        Problem: Occupational Therapy    Goal  Priority Disciplines Outcome Interventions   Occupational Therapy Goal     OT, PT/OT Ongoing, Progressing    Description: Goals to be met by: 22     Patient will increase functional independence with ADLs by performing:    UE Dressing with Set-up Assistance.  LE Dressing with Minimal Assistance.  Toileting from bedside commode with Stand-by Assistance for hygiene and clothing management.   Toilet transfer to bedside commode with Stand-by Assistance.  Upper extremity exercise program 3x15 reps per handout, with supervision.                     History:     Past Medical History:   Diagnosis Date    Abdominal pain     CHF (congestive heart failure)     Chronic kidney disease, stage III (moderate) 2015    COPD (chronic obstructive pulmonary disease)     COPD (chronic obstructive pulmonary disease)     CVA (cerebral infarction)     Esophagitis     Gastritis     GERD (gastroesophageal reflux disease)     GI bleed     Hyperlipidemia     Hypertension     Mesenteric angina     PVD (peripheral vascular disease) with claudication 10/13/2015    Stroke     Subclavian artery stenosis, left     Thyroid disease        Past Surgical History:   Procedure Laterality Date    CAROTID STENT       SECTION      COLONOSCOPY N/A 3/2/2017    Procedure: COLONOSCOPY;  Surgeon: Cecil Crooks MD;  Location: [facility] ENDO;  Service: Endoscopy;  Laterality: N/A;    COLONOSCOPY N/A 2019    Procedure: COLONOSCOPY/Suprep;  Surgeon: Barry Talbert MD;  Location: [facility] ENDO;  Service: Endoscopy;  Laterality: N/A;    CORONARY ANGIOPLASTY      EPIDURAL STEROID INJECTION INTO LUMBAR SPINE N/A 2018    Procedure: INJECTION, STEROID, SPINE, LUMBAR, EPIDURAL- L2-3 ORAL SEDATION;  Surgeon: Stiven Ibarra Jr., MD;  Location: [facility] PAIN MGT;  Service: Pain Management;  Laterality: N/A;    ESOPHAGOGASTRODUODENOSCOPY N/A 2019    Procedure: EGD (ESOPHAGOGASTRODUODENOSCOPY);  Surgeon:  Barry Talbert MD;  Location: [facility] ENDO;  Service: Endoscopy;  Laterality: N/A;    GALLBLADDER SURGERY      HYSTERECTOMY      INTRALUMINAL GASTROINTESTINAL TRACT IMAGING VIA CAPSULE N/A 9/18/2019    Procedure: IMAGING PROCEDURE, GI TRACT, INTRALUMINAL, VIA CAPSULE;  Surgeon: Barry Talbert MD;  Location: [facility] ENDO;  Service: Endoscopy;  Laterality: N/A;    OOPHORECTOMY      SUPERIOR MESTENTERIC ARTERY STENT         Time Tracking:     OT Date of Treatment: 08/20/22  OT Start Time: 1041  OT Stop Time: 1114  OT Total Time (min): 33 min    Billable Minutes:Evaluation 10 (coeval with PT)  Self Care/Home Management  10  Therapeutic Activity 13    8/20/2022

## 2022-08-20 NOTE — PT/OT/SLP EVAL
Speech Language Pathology Evaluation & Discharge Note  Bedside Swallow    Patient Name:  Brittany Haile   MRN:  [id number]  Admitting Diagnosis: <principal problem not specified>    Recommendations:                 General Recommendations:  Follow-up not indicated  Diet recommendations:  Regular, Thin   Aspiration Precautions: Slow rate of intake--allow breaks for breathing as needed,, 1 bite/sip at a time, Alternating bites/sips, Avoid talking while eating, HOB to 90 degrees, Monitor for s/s of aspiration, Small bites/sips, Standard aspiration precautions and Wear oxygen during intake   General Precautions: Standard, aspiration, respiratory  Communication strategies:  Pt is [place], speak loud/clear    History:     Past Medical History:   Diagnosis Date    Abdominal pain     CHF (congestive heart failure)     Chronic kidney disease, stage III (moderate) 2015    COPD (chronic obstructive pulmonary disease)     COPD (chronic obstructive pulmonary disease)     CVA (cerebral infarction)     Esophagitis     Gastritis     GERD (gastroesophageal reflux disease)     GI bleed     Hyperlipidemia     Hypertension     Mesenteric angina     PVD (peripheral vascular disease) with claudication 10/13/2015    Stroke     Subclavian artery stenosis, left     Thyroid disease        Past Surgical History:   Procedure Laterality Date    CAROTID STENT       SECTION      COLONOSCOPY N/A 3/2/2017    Procedure: COLONOSCOPY;  Surgeon: Cecil Crooks MD;  Location: [facility] ENDO;  Service: Endoscopy;  Laterality: N/A;    COLONOSCOPY N/A 2019    Procedure: COLONOSCOPY/Suprep;  Surgeon: Barry Talbert MD;  Location: [facility] ENDO;  Service: Endoscopy;  Laterality: N/A;    CORONARY ANGIOPLASTY      EPIDURAL STEROID INJECTION INTO LUMBAR SPINE N/A 2018    Procedure: INJECTION, STEROID, SPINE, LUMBAR, EPIDURAL- L2-3 ORAL SEDATION;  Surgeon: Stiven Ibarra Jr., MD;  Location: [facility];  Service:  "Pain Management;  Laterality: N/A;    ESOPHAGOGASTRODUODENOSCOPY N/A 9/11/2019    Procedure: EGD (ESOPHAGOGASTRODUODENOSCOPY);  Surgeon: Barry Talbert MD;  Location: [facility];  Service: Endoscopy;  Laterality: N/A;    GALLBLADDER SURGERY      HYSTERECTOMY      INTRALUMINAL GASTROINTESTINAL TRACT IMAGING VIA CAPSULE N/A 9/18/2019    Procedure: IMAGING PROCEDURE, GI TRACT, INTRALUMINAL, VIA CAPSULE;  Surgeon: Barry Talbert MD;  Location: [facility] ENDO;  Service: Endoscopy;  Laterality: N/A;    OOPHORECTOMY      SUPERIOR MESTENTERIC ARTERY STENT         Social History: Patient lives with dgtr.    Prior Intubation HX:  None this admit.    Modified Barium Swallow: none per EMR.     Chest X-Rays:   Impression:       1. Findings suggest edema noting small right pleural effusion.  Developing right lower lung zone consolidation not excluded.  Correlation and follow-up is advised.      Prior diet: Regular solids/Thin liquids, though preferred softer foods due to denture use.    Subjective     Pt seen at the bedside for clinical swallow assessment. SLP did check w/ RN, Mariel, prior to visit. Pt resting upon ST arrival, but awakened and was agreeable to exam. Moderate encouragement needed to accept PO trials as pt was not hungry.   Patient goals: "That's good!" during hard solid trials.      Pain/Comfort:  · Pain Rating 1: 0/10    Respiratory Status: Nasal cannula    Objective:     Oral Musculature Evaluation  · Oral Musculature: WFL  · Dentition: partials, uses dentures to eat  · Secretion Management: adequate  · Mucosal Quality: good  · Mandibular Strength and Mobility: WFL  · Oral Labial Strength and Mobility: WFL  · Lingual Strength and Mobility: WFL  · Velar Elevation: WFL  · Buccal Strength and Mobility: WFL  · Volitional Swallow:  (timely)  · Voice Prior to PO Intake:  (clear/dry)    Bedside Swallow Eval: Pt seated upright in bed with supplemental O2 in place. She required repetition of " ?s/commands, likely due to combination of hearing loss and English as second language. Pt self fed all trials.   Consistencies Assessed:  · Thin liquids : straw sips water x5  · Puree : tsp bites applesauce x2  · Solids : 1 inch bite cracker x4     Oral Phase:   · WFL: fair oral acceptance, good bolus stripping and bolus control, timely oral transit, functional rotary chew, no oral residue s/p swallow, and no anterior spillage    Pharyngeal Phase:   · delayed swallow initation (mild)  · no overt clinical signs/symptoms of aspiration ( voice remained clear/dry without coughing or choking observed)  · multiple spontaneous swallows per bite/sip indicating possible pharyngeal residue    Compensatory Strategies  · Slow rate   · 1 bite/sip at a time  · Allow rest breaks to recover breathing    Treatment: Pt is at baseline swallow function. No further ST needs at this time.     Assessment:     Brittany Haile is a 82 y.o. female admitted with progressive fatigue and SOB. SLP consulted due to possible aspiration PNA. Per bedside assessment, pt's oral and pharyngeal phases of the swallow deemed safe for an oral diet. REC: continue on Regular/Thin liquid diet, while adopting universal safe swallow precautions including slow rate of intake, 1 bite/sip at a time, and allow resting breaks during meals to recover breath:swallow ratio as needed. No further SLP needs. Pt to be discharged from SLP care this date.     Goals:   Multidisciplinary Problems     SLP Goals     Not on file          Multidisciplinary Problems (Resolved)        Problem: SLP    Goal Priority Disciplines Outcome   SLP Goal   (Resolved)     SLP Met   Description: STGs:  1. Pt will participate in clinical swallow assessment to determine safest diet level. - Met  2. Pt will tolerate a Regular/Thin liquid diet without any overt s/sx of aspiration. - Met                    Plan:     · Patient to be seen:      · Plan of Care expires:  08/20/22  · Plan of Care reviewed  with:  patient (RN and MD)   · SLP Follow-Up:  No       Discharge recommendations:  home   Barriers to Discharge:  None    Time Tracking:     SLP Treatment Date:   08/20/22  Speech Start Time:  0955  Speech Stop Time:  1007     Speech Total Time (min):  12 min    Billable Minutes: Eval Swallow and Oral Function 12 mins    08/20/2022

## 2022-08-20 NOTE — PLAN OF CARE
POC reviewed with the patient and daughter at bedside  VSS on 2L  AAOX4  Coughing independently  PIV intact  abx given per MAR  X1 assist to the restroom  Worked with PT  Bed low, locked and call light in reach

## 2022-08-21 VITALS
DIASTOLIC BLOOD PRESSURE: 72 MMHG | HEART RATE: 92 BPM | WEIGHT: 120.81 LBS | HEIGHT: 62 IN | RESPIRATION RATE: 18 BRPM | SYSTOLIC BLOOD PRESSURE: 142 MMHG | BODY MASS INDEX: 22.23 KG/M2 | OXYGEN SATURATION: 93 % | TEMPERATURE: 97 F

## 2022-08-21 LAB
ANION GAP SERPL CALC-SCNC: 8 MMOL/L (ref 8–16)
BASOPHILS # BLD AUTO: 0.02 K/UL (ref 0–0.2)
BASOPHILS NFR BLD: 0.2 % (ref 0–1.9)
BUN SERPL-MCNC: 16 MG/DL (ref 8–23)
CALCIUM SERPL-MCNC: 8.2 MG/DL (ref 8.7–10.5)
CHLORIDE SERPL-SCNC: 100 MMOL/L (ref 95–110)
CO2 SERPL-SCNC: 27 MMOL/L (ref 23–29)
CREAT SERPL-MCNC: 0.8 MG/DL (ref 0.5–1.4)
DIFFERENTIAL METHOD: ABNORMAL
EOSINOPHIL # BLD AUTO: 0.1 K/UL (ref 0–0.5)
EOSINOPHIL NFR BLD: 0.7 % (ref 0–8)
ERYTHROCYTE [DISTWIDTH] IN BLOOD BY AUTOMATED COUNT: 13.8 % (ref 11.5–14.5)
EST. GFR  (NO RACE VARIABLE): >60 ML/MIN/1.73 M^2
GLUCOSE SERPL-MCNC: 127 MG/DL (ref 70–110)
HCT VFR BLD AUTO: 26.9 % (ref 37–48.5)
HGB BLD-MCNC: 8.9 G/DL (ref 12–16)
IMM GRANULOCYTES # BLD AUTO: 0.05 K/UL (ref 0–0.04)
IMM GRANULOCYTES NFR BLD AUTO: 0.5 % (ref 0–0.5)
LYMPHOCYTES # BLD AUTO: 1.4 K/UL (ref 1–4.8)
LYMPHOCYTES NFR BLD: 13.4 % (ref 18–48)
MAGNESIUM SERPL-MCNC: 1.8 MG/DL (ref 1.6–2.6)
MCH RBC QN AUTO: 28.6 PG (ref 27–31)
MCHC RBC AUTO-ENTMCNC: 33.1 G/DL (ref 32–36)
MCV RBC AUTO: 87 FL (ref 82–98)
MONOCYTES # BLD AUTO: 1 K/UL (ref 0.3–1)
MONOCYTES NFR BLD: 9.6 % (ref 4–15)
NEUTROPHILS # BLD AUTO: 7.9 K/UL (ref 1.8–7.7)
NEUTROPHILS NFR BLD: 75.6 % (ref 38–73)
NRBC BLD-RTO: 0 /100 WBC
PHOSPHATE SERPL-MCNC: 2.8 MG/DL (ref 2.7–4.5)
PLATELET # BLD AUTO: 405 K/UL (ref 150–450)
PMV BLD AUTO: 8.5 FL (ref 9.2–12.9)
POTASSIUM SERPL-SCNC: 3.6 MMOL/L (ref 3.5–5.1)
RBC # BLD AUTO: 3.11 M/UL (ref 4–5.4)
SODIUM SERPL-SCNC: 135 MMOL/L (ref 136–145)
WBC # BLD AUTO: 10.42 K/UL (ref 3.9–12.7)

## 2022-08-21 PROCEDURE — 25000242 PHARM REV CODE 250 ALT 637 W/ HCPCS: Performed by: STUDENT IN AN ORGANIZED HEALTH CARE EDUCATION/TRAINING PROGRAM

## 2022-08-21 PROCEDURE — 84100 ASSAY OF PHOSPHORUS: CPT | Performed by: STUDENT IN AN ORGANIZED HEALTH CARE EDUCATION/TRAINING PROGRAM

## 2022-08-21 PROCEDURE — 63600175 PHARM REV CODE 636 W HCPCS: Performed by: STUDENT IN AN ORGANIZED HEALTH CARE EDUCATION/TRAINING PROGRAM

## 2022-08-21 PROCEDURE — 94640 AIRWAY INHALATION TREATMENT: CPT

## 2022-08-21 PROCEDURE — 80048 BASIC METABOLIC PNL TOTAL CA: CPT | Performed by: STUDENT IN AN ORGANIZED HEALTH CARE EDUCATION/TRAINING PROGRAM

## 2022-08-21 PROCEDURE — 85025 COMPLETE CBC W/AUTO DIFF WBC: CPT | Performed by: STUDENT IN AN ORGANIZED HEALTH CARE EDUCATION/TRAINING PROGRAM

## 2022-08-21 PROCEDURE — 25000003 PHARM REV CODE 250: Performed by: STUDENT IN AN ORGANIZED HEALTH CARE EDUCATION/TRAINING PROGRAM

## 2022-08-21 PROCEDURE — 94761 N-INVAS EAR/PLS OXIMETRY MLT: CPT

## 2022-08-21 PROCEDURE — 27000221 HC OXYGEN, UP TO 24 HOURS

## 2022-08-21 PROCEDURE — 36415 COLL VENOUS BLD VENIPUNCTURE: CPT | Performed by: STUDENT IN AN ORGANIZED HEALTH CARE EDUCATION/TRAINING PROGRAM

## 2022-08-21 PROCEDURE — 83735 ASSAY OF MAGNESIUM: CPT | Performed by: STUDENT IN AN ORGANIZED HEALTH CARE EDUCATION/TRAINING PROGRAM

## 2022-08-21 PROCEDURE — 99900035 HC TECH TIME PER 15 MIN (STAT)

## 2022-08-21 RX ORDER — LEVOFLOXACIN 500 MG/1
500 TABLET, FILM COATED ORAL DAILY
Qty: 5 TABLET | Refills: 0 | Status: SHIPPED | OUTPATIENT
Start: 2022-08-21 | End: 2022-08-26

## 2022-08-21 RX ADMIN — LOSARTAN POTASSIUM 100 MG: 50 TABLET, FILM COATED ORAL at 08:08

## 2022-08-21 RX ADMIN — CEFTRIAXONE 1 G: 1 INJECTION, SOLUTION INTRAVENOUS at 01:08

## 2022-08-21 RX ADMIN — AZITHROMYCIN MONOHYDRATE 500 MG: 500 INJECTION, POWDER, LYOPHILIZED, FOR SOLUTION INTRAVENOUS at 01:08

## 2022-08-21 RX ADMIN — CILOSTAZOL 50 MG: 50 TABLET ORAL at 08:08

## 2022-08-21 RX ADMIN — ALBUTEROL SULFATE 2 PUFF: 90 AEROSOL, METERED RESPIRATORY (INHALATION) at 07:08

## 2022-08-21 RX ADMIN — FLUTICASONE PROPIONATE 50 MCG: 50 SPRAY, METERED NASAL at 08:08

## 2022-08-21 RX ADMIN — AMLODIPINE BESYLATE 10 MG: 5 TABLET ORAL at 08:08

## 2022-08-21 RX ADMIN — LEVOTHYROXINE SODIUM 88 MCG: 88 TABLET ORAL at 05:08

## 2022-08-21 RX ADMIN — CLOPIDOGREL 75 MG: 75 TABLET, FILM COATED ORAL at 08:08

## 2022-08-21 RX ADMIN — DOCUSATE SODIUM AND SENNOSIDES 1 TABLET: 8.6; 5 TABLET, FILM COATED ORAL at 08:08

## 2022-08-21 RX ADMIN — VITAMIN C (ASCORBIC ACID), CARBONYL IRON, FOLIC ACID, VITAMIN B-12 (CYANOCOBALAMIN) 1 TABLET: 250; 25; 1; 100 TABLET ORAL at 08:08

## 2022-08-21 NOTE — PLAN OF CARE
AVS and educational attachments shared with patient and daughter via [employer]. Discussed thoroughly. Patient and daughter verbalized clear understanding using teach back method. Notified bedside nurse of completion of education. At present no distress noted. Patient to be discharged via w/c with escort service and family with all of patient's belonings. Will cont to be available to patient and family and intervene prn.

## 2022-08-21 NOTE — PROGRESS NOTES
Pharmacokinetic Initial Assessment: IV Vancomycin    Assessment/Plan:    Initiate intravenous vancomycin with loading dose of 1250 mg once followed by a maintenance dose of vancomycin 750mg IV every 24 hours  Desired empiric serum trough concentration is 15 to 20 mcg/mL  Draw vancomycin trough level 60 min prior to third dose on 8/22 at approximately 2230  Pharmacy will continue to follow and monitor vancomycin.      Please contact pharmacy at extension [phone] with any questions regarding this assessment.     Thank you for the consult,   Jennifer Benavides       Patient brief summary:  Brittany Haile is a 82 y.o. female initiated on antimicrobial therapy with IV Vancomycin for treatment of suspected bacteremia    Drug Allergies:   Review of patient's allergies indicates:  No Known Allergies    Actual Body Weight:   54kg    Renal Function:   Estimated Creatinine Clearance: 47.3 mL/min (based on SCr of 0.7 mg/dL).,     Dialysis Method (if applicable):  N/A    CBC (last 72 hours):  Recent Labs   Lab Result Units 08/19/22  1116 08/20/22  0355   WBC K/uL 6.67 8.87   Hemoglobin g/dL 9.0* 9.1*   Hematocrit % 28.1* 28.7*   Platelets K/uL 461* 480*   Gran % % 73.8* 76.7*   Lymph % % 14.1* 13.0*   Mono % % 9.3 8.7   Eosinophil % % 2.1 1.0   Basophil % % 0.3 0.3   Differential Method  Automated Automated       Metabolic Panel (last 72 hours):  Recent Labs   Lab Result Units 08/19/22  1116 08/19/22  1226 08/20/22  0355   Sodium mmol/L 139  --  139   Potassium mmol/L 4.1  --  3.9   Chloride mmol/L 103  --  104   CO2 mmol/L 28  --  26   Glucose mg/dL 100  --  105   Glucose, UA   --  Negative  --    BUN mg/dL 13  --  12   Creatinine mg/dL 0.8  --  0.7   Albumin g/dL 2.8*  --   --    Total Bilirubin mg/dL 0.4  --   --    Alkaline Phosphatase U/L 114  --   --    AST U/L 10  --   --    ALT U/L 7*  --   --    Magnesium mg/dL 1.9  --  1.8   Phosphorus mg/dL  --   --  3.1       Drug levels (last 3 results):  No results for input(s):  VANCOMYCINRA, VANCORANDOM, VANCOMYCINPE, VANCOPEAK, VANCOMYCINTR, VANCOTROUGH in the last 72 hours.    Microbiologic Results:  Microbiology Results (last 7 days)       Procedure Component Value Units Date/Time    Blood Culture #2 **CANNOT BE ORDERED STAT** [[id number]] Collected: 08/19/22 1358    Order Status: Completed Specimen: Blood from Peripheral, Forearm, Right Updated: 08/20/22 2233     Blood Culture, Routine Gram stain aer bottle: Gram positive cocci in clusters resembling Staph       Results called to and read back by: Nataliya Olivas RN 08/20/2022        22:32    Blood Culture #1 **CANNOT BE ORDERED STAT** [[id number]] Collected: 08/19/22 1358    Order Status: Completed Specimen: Blood from Peripheral, Forearm, Right Updated: 08/20/22 2012     Blood Culture, Routine No Growth to date      No Growth to date    Culture, Respiratory [[id number]] Collected: 08/19/22 1706    Order Status: Completed Specimen: Respiratory from Sputum Updated: 08/20/22 0927     Respiratory Culture Normal respiratory maria e     Gram Stain (Respiratory) <10 epithelial cells per low power field.     Gram Stain (Respiratory) Rare WBC's     Gram Stain (Respiratory) Many Gram negative rods     Gram Stain (Respiratory) Many Gram negative rods     Gram Stain (Respiratory) Rare Gram positive rods    Culture, Respiratory with Gram Stain [[id number]] Collected: 08/19/22 1706    Order Status: Canceled Specimen: Respiratory from Sputum     Gram Stain [[id number]] Collected: 08/19/22 1706    Order Status: Canceled Specimen: Body Fluid from Lung, Lingula

## 2022-08-21 NOTE — PLAN OF CARE
Safety maintained, bed alarm on and call bell in reach.Temp this am 98.8. Antibiotic therapy in progress. Encouraged weight shifting. Plan of care reviewed and questions answered. Daughter at bedside.No respiratory distress noted. Will continue to monitor Q 2 hr and prn.

## 2022-08-21 NOTE — NURSING
POC reviewed with the pt and daughter at bedside. VSS on 2L NC. No complaint of pain, nausea or vomiting. Ambulated upto the restroom. Dcd the IV, discharge papers given, VRN went through the discharge instruction, bedside commode delivered at bedside.

## 2022-08-21 NOTE — PLAN OF CARE
VN note: VN completed AVS and attachments and notified bedside nurse, Kali. Will cont to be available and intervene prn.

## 2022-08-21 NOTE — PLAN OF CARE
Appears to be asleep. Respiration 20/min easy and non-labored. No distress noted. Daughter remains at bedside.

## 2022-08-21 NOTE — PLAN OF CARE
08/21/22 1545   Final Note   Assessment Type Final Discharge Note   Anticipated Discharge Disposition Home   What phone number can be called within the next 1-3 days to see how you are doing after discharge? [phone]   Post-Acute Status   Discharge Delays None known at this time      spoke with patient's daughter, China, [phone], regarding discharge plan to home. Patient's daughter informed SW that pt lives with her. Pt is dependent and requires help and uses assistive equipment ( walker, cane, wheelchair) pt is retired and does not drive. Pt's daughter will transport her home at D/C.      SW received approval from [employer] to [place] [facility] [place]. Pt has an out of state insurance plan and daughter was informed if it isn't covered that pt will be billed. Daughter verbalized understanding.     SW also spoke with daughter regarding  services. Pt's out of state plan will not cover services , pt has a PCP appt scheduled for 9/1 and daughter will request referral through PCP , pt's [employer] insurance also becomes active on that day. No additional needs at this time.     Future Appointments   Date Time Provider Department Center   9/1/2022  4:20 PM Wayne Frazier MD [facility] GRAEME Young

## 2022-08-22 ENCOUNTER — PATIENT OUTREACH (OUTPATIENT)
Dept: ADMINISTRATIVE | Facility: CLINIC | Age: 82
End: 2022-08-22
Payer: MEDICARE

## 2022-08-22 LAB
BACTERIA BLD CULT: ABNORMAL
BACTERIA SPEC AEROBE CULT: NORMAL
BACTERIA SPEC AEROBE CULT: NORMAL
GRAM STN SPEC: NORMAL
L PNEUMO AG UR QL IA: NEGATIVE

## 2022-08-22 NOTE — PROGRESS NOTES
C3 nurse attempted to contact Brittany Haile for a TCC post hospital discharge follow up call. No answer. Left voicemail with callback information. The patient has a scheduled HOSFU appointment with Wayne Frazier on 9/1/22 @ [phone].

## 2022-08-23 ENCOUNTER — PATIENT MESSAGE (OUTPATIENT)
Dept: FAMILY MEDICINE | Facility: CLINIC | Age: 82
End: 2022-08-23
Payer: MEDICARE

## 2022-08-23 DIAGNOSIS — J42 CHRONIC BRONCHITIS, UNSPECIFIED CHRONIC BRONCHITIS TYPE: Primary | ICD-10-CM

## 2022-08-23 DIAGNOSIS — R09.02 HYPOXIA: ICD-10-CM

## 2022-08-23 NOTE — PROGRESS NOTES
2nd Attempt made to reach patient for TCC call. Left voicemail please call [phone] leave first name, last name, and  for Nurys to return call.

## 2022-08-23 NOTE — DISCHARGE SUMMARY
[facility] Internal Medicine Discharge Summary    Primary Team: [facility] Medicine B  Attending Physician: James  Resident: Carlos  Intern: Leif    Date of Admit: 8/19/2022  Date of Discharge: 8/21/2022    Discharge to: home  Condition: Stable    Discharge Diagnoses       Pneumonia due to infectious organism      Consultants and Procedures     Consultants: LURDES    Procedures: NA    Imaging: Chest xray    Brief History of Present Illness      Brittany Haile is a 82 y.o. female with a PMHx of HTN, COPD, CKD stage III, remote CVA, GERD, HLD, PAD w/ claudication and hypothyroidism who presents to the ED with complaints of progressive fatigue for the past 3-days. In the ED CXR was concerning for RLL pneumonia. Pt was started on Azithromycin and rocephin in ED.     For the full HPI please refer to the History & Physical from this admission.    Physical Examination:  General appearance: alert, appears stated age, fatigued and no distress  Head: Normocephalic, without obvious abnormality, atraumatic  Throat: lips, mucosa, and tongue normal; teeth and gums normal  Back: symmetric, no curvature. ROM normal. No CVA tenderness.  Lungs: diminished breath sounds bilaterally and wheezes bilaterally  Heart: regular rate and rhythm, S1, S2 normal, no murmur, click, rub or gallop  Abdomen: soft, non-tender; bowel sounds normal; no masses,  no organomegaly  Skin: Mild redness at the sacrum;otherwise normal  Neurologic: a/o x3     Hospital Course By Problem with Pertinent Findings     RLL PNA  - 95% on 2L NC  - no leukocytosis, afebrile  -CXR with RLL consolidation   -bcx with coag staff (contaminant) in 1 bottle and sputum culture NG  -UA wnl  -continued Azithromycin and Rocephin , transitioned to PO levofloxacin at discharge for 4 days  -seen by speech, pt safe to continue cardiac diet at this time  - HH PT at discharge     COPD  Chronic hypoxic respiratory failure  -pt on home 2L NC satting 95%   -counseled pt that O2 goals given  her COPD are 88-92%  -contnue home albuterol inhaler BID at discharge     PAD  -pt with significant hx of arterial atherosclerotic disease  -previous mesenteric and carotid stenting  -continue home Atorvastatin and Clopidogrel     HTN  -on admit pt bp 134/60 on admit  -continue home amlodipine 5mg and losartan 100mg qD     HLD  -continue home atorvastatin 40mg     Hypothyroidism  -last TSH: .9 and freee T4: 1.17  -continue home Levothyroxine 88mg    Discharge Medications        Medication List      START taking these medications    levoFLOXacin 500 MG tablet  Commonly known as: LEVAQUIN  Take 1 tablet (500 mg total) by mouth once daily. for 5 days        CHANGE how you take these medications    albuterol 90 mcg/actuation inhaler  Commonly known as: PROVENTIL/VENTOLIN HFA  INHALE 1 TO 2 PUFFS BY MOUTH INTO THE LUNGS EVERY 6 HOURS AS NEEDED FOR WHEEZING OR SHORTNESS OF BREATH( RESCUE)  What changed:   · how much to take  · how to take this  · when to take this  · additional instructions     atorvastatin 40 MG tablet  Commonly known as: LIPITOR  TAKE 1 TABLET BY MOUTH EVERY DAY  What changed: when to take this     fluticasone propionate 50 mcg/actuation nasal spray  Commonly known as: FLONASE  1 spray (50 mcg total) by Each Nostril route once daily.  What changed:   · when to take this  · reasons to take this        CONTINUE taking these medications    acetaminophen 500 MG tablet  Commonly known as: TYLENOL     amLODIPine 10 MG tablet  Commonly known as: NORVASC     cilostazoL 50 MG Tab  Commonly known as: PLETAL  TAKE 1 TABLET(50 MG) BY MOUTH TWICE DAILY     clopidogreL 75 mg tablet  Commonly known as: PLAVIX  TAKE 1 TABLET BY MOUTH EVERY DAY     EScitalopram oxalate 10 MG tablet  Commonly known as: LEXAPRO  TAKE 1 TABLET(10 MG) BY MOUTH EVERY NIGHT     IMODIUM A-D 2 mg Tab  Generic drug: loperamide     irbesartan 300 MG tablet  Commonly known as: AVAPRO  Take 1 tablet (300 mg total) by mouth every evening.     iron  bisglycinate chelate 28 mg iron Cap     levothyroxine 88 MCG tablet  Commonly known as: SYNTHROID  TAKE 1 TABLET(88 MCG) BY MOUTH EVERY DAY     traZODone 50 MG tablet  Commonly known as: DESYREL  TAKE 1 TABLET(50 MG) BY MOUTH EVERY EVENING           Where to Get Your Medications      These medications were sent to [id number] - [place] - [address] AT [facility]  [address] [address]    Phone: [phone]   · levoFLOXacin 500 MG tablet         Discharge Information:   Diet: cardiac    Physical Activity: As tolerated.    Instructions:  1. Take all medications as prescribed  2. Keep all follow-up appointments  3. Return to the hospital or call your primary care physicians if any worsening symptoms occur.    Follow-Up Appointments:  Future Appointments   Date Time Provider Department Center   9/1/2022  4:20 PM Wayne Frazier MD [facility] Shayy Gonzalez MD  08/23/2022 12:35 PM

## 2022-08-23 NOTE — PROGRESS NOTES
C3 nurse spoke with Brittany Haile's daughter, China, for a TCC post hospital discharge follow up call. The patient has a scheduled HOSFU appointment with Wayne Frazier  on 9/1/22 @ 1977.

## 2022-08-24 ENCOUNTER — ANESTHESIA (OUTPATIENT)
Dept: EMERGENCY MEDICINE | Facility: HOSPITAL | Age: 82
End: 2022-08-24
Payer: MEDICARE

## 2022-08-24 ENCOUNTER — HOSPITAL ENCOUNTER (OUTPATIENT)
Facility: HOSPITAL | Age: 82
Discharge: HOME | End: 2022-08-26
Attending: EMERGENCY MEDICINE | Admitting: INTERNAL MEDICINE
Payer: MEDICARE

## 2022-08-24 ENCOUNTER — ANESTHESIA EVENT (OUTPATIENT)
Dept: EMERGENCY MEDICINE | Facility: HOSPITAL | Age: 82
End: 2022-08-24
Payer: MEDICARE

## 2022-08-24 DIAGNOSIS — R06.00 DYSPNEA, UNSPECIFIED TYPE: ICD-10-CM

## 2022-08-24 DIAGNOSIS — R16.0 LIVER MASS: Primary | ICD-10-CM

## 2022-08-24 DIAGNOSIS — J90 PLEURAL EFFUSION, BILATERAL: ICD-10-CM

## 2022-08-24 DIAGNOSIS — D64.9 ANEMIA, UNSPECIFIED TYPE: ICD-10-CM

## 2022-08-24 DIAGNOSIS — M54.50 ACUTE LEFT-SIDED LOW BACK PAIN WITHOUT SCIATICA: ICD-10-CM

## 2022-08-24 DIAGNOSIS — E88.09 HYPOALBUMINEMIA: ICD-10-CM

## 2022-08-24 DIAGNOSIS — R10.9 LEFT FLANK PAIN: ICD-10-CM

## 2022-08-24 DIAGNOSIS — N17.9 ACUTE KIDNEY INJURY: ICD-10-CM

## 2022-08-24 DIAGNOSIS — J90 PLEURAL EFFUSION: ICD-10-CM

## 2022-08-24 DIAGNOSIS — R10.9 FLANK PAIN: ICD-10-CM

## 2022-08-24 LAB
ALBUMIN SERPL BCP-MCNC: 2.6 G/DL (ref 3.5–5.2)
ALP SERPL-CCNC: 112 U/L (ref 55–135)
ALT SERPL W/O P-5'-P-CCNC: 25 U/L (ref 10–44)
ANION GAP SERPL CALC-SCNC: 15 MMOL/L (ref 8–16)
AST SERPL-CCNC: 16 U/L (ref 10–40)
BACTERIA BLD CULT: NORMAL
BASOPHILS # BLD AUTO: 0.02 K/UL (ref 0–0.2)
BASOPHILS NFR BLD: 0.2 % (ref 0–1.9)
BILIRUB SERPL-MCNC: 0.2 MG/DL (ref 0.1–1)
BILIRUB UR QL STRIP: NEGATIVE
BNP SERPL-MCNC: 30 PG/ML (ref 0–99)
BUN SERPL-MCNC: 31 MG/DL (ref 8–23)
CALCIUM SERPL-MCNC: 8.4 MG/DL (ref 8.7–10.5)
CHLORIDE SERPL-SCNC: 102 MMOL/L (ref 95–110)
CHLORIDE UR-SCNC: 22 MMOL/L (ref 25–200)
CLARITY UR: CLEAR
CO2 SERPL-SCNC: 20 MMOL/L (ref 23–29)
COLOR UR: YELLOW
CREAT SERPL-MCNC: 1.6 MG/DL (ref 0.5–1.4)
CREAT UR-MCNC: 80.7 MG/DL (ref 15–325)
CTP QC/QA: YES
DIFFERENTIAL METHOD: ABNORMAL
EOSINOPHIL # BLD AUTO: 0.1 K/UL (ref 0–0.5)
EOSINOPHIL NFR BLD: 1.1 % (ref 0–8)
ERYTHROCYTE [DISTWIDTH] IN BLOOD BY AUTOMATED COUNT: 14.2 % (ref 11.5–14.5)
EST. GFR  (NO RACE VARIABLE): 32 ML/MIN/1.73 M^2
GLUCOSE SERPL-MCNC: 134 MG/DL (ref 70–110)
GLUCOSE UR QL STRIP: NEGATIVE
HCT VFR BLD AUTO: 28.5 % (ref 37–48.5)
HGB BLD-MCNC: 9.2 G/DL (ref 12–16)
HGB UR QL STRIP: NEGATIVE
IMM GRANULOCYTES # BLD AUTO: 0.07 K/UL (ref 0–0.04)
IMM GRANULOCYTES NFR BLD AUTO: 0.6 % (ref 0–0.5)
INR PPP: 1.1 (ref 0.8–1.2)
KETONES UR QL STRIP: NEGATIVE
LACTATE SERPL-SCNC: 0.6 MMOL/L (ref 0.5–2.2)
LEUKOCYTE ESTERASE UR QL STRIP: NEGATIVE
LYMPHOCYTES # BLD AUTO: 1.1 K/UL (ref 1–4.8)
LYMPHOCYTES NFR BLD: 9.3 % (ref 18–48)
MCH RBC QN AUTO: 28.1 PG (ref 27–31)
MCHC RBC AUTO-ENTMCNC: 32.3 G/DL (ref 32–36)
MCV RBC AUTO: 87 FL (ref 82–98)
MONOCYTES # BLD AUTO: 1.1 K/UL (ref 0.3–1)
MONOCYTES NFR BLD: 9.2 % (ref 4–15)
NEUTROPHILS # BLD AUTO: 9.1 K/UL (ref 1.8–7.7)
NEUTROPHILS NFR BLD: 79.6 % (ref 38–73)
NITRITE UR QL STRIP: NEGATIVE
NRBC BLD-RTO: 0 /100 WBC
PH UR STRIP: 5 [PH] (ref 5–8)
PLATELET # BLD AUTO: 488 K/UL (ref 150–450)
PMV BLD AUTO: 8.7 FL (ref 9.2–12.9)
POTASSIUM SERPL-SCNC: 3.8 MMOL/L (ref 3.5–5.1)
POTASSIUM UR-SCNC: 26 MMOL/L (ref 15–95)
PROT SERPL-MCNC: 5.7 G/DL (ref 6–8.4)
PROT UR QL STRIP: NEGATIVE
PROTHROMBIN TIME: 10.9 SEC (ref 9–12.5)
RBC # BLD AUTO: 3.27 M/UL (ref 4–5.4)
SARS-COV-2 RDRP RESP QL NAA+PROBE: NEGATIVE
SODIUM SERPL-SCNC: 137 MMOL/L (ref 136–145)
SODIUM UR-SCNC: 26 MMOL/L (ref 20–250)
SP GR UR STRIP: 1.01 (ref 1–1.03)
TROPONIN I SERPL DL<=0.01 NG/ML-MCNC: 0.02 NG/ML (ref 0–0.03)
URN SPEC COLLECT METH UR: NORMAL
UROBILINOGEN UR STRIP-ACNC: NEGATIVE EU/DL
WBC # BLD AUTO: 11.4 K/UL (ref 3.9–12.7)

## 2022-08-24 PROCEDURE — 36000 PLACE NEEDLE IN VEIN: CPT | Performed by: STUDENT IN AN ORGANIZED HEALTH CARE EDUCATION/TRAINING PROGRAM

## 2022-08-24 PROCEDURE — 85610 PROTHROMBIN TIME: CPT | Performed by: EMERGENCY MEDICINE

## 2022-08-24 PROCEDURE — 63600175 PHARM REV CODE 636 W HCPCS: Performed by: STUDENT IN AN ORGANIZED HEALTH CARE EDUCATION/TRAINING PROGRAM

## 2022-08-24 PROCEDURE — 84466 ASSAY OF TRANSFERRIN: CPT

## 2022-08-24 PROCEDURE — 84484 ASSAY OF TROPONIN QUANT: CPT | Performed by: EMERGENCY MEDICINE

## 2022-08-24 PROCEDURE — G0378 HOSPITAL OBSERVATION PER HR: HCPCS

## 2022-08-24 PROCEDURE — U0002 COVID-19 LAB TEST NON-CDC: HCPCS | Performed by: STUDENT IN AN ORGANIZED HEALTH CARE EDUCATION/TRAINING PROGRAM

## 2022-08-24 PROCEDURE — 83605 ASSAY OF LACTIC ACID: CPT | Performed by: STUDENT IN AN ORGANIZED HEALTH CARE EDUCATION/TRAINING PROGRAM

## 2022-08-24 PROCEDURE — 25000003 PHARM REV CODE 250: Performed by: STUDENT IN AN ORGANIZED HEALTH CARE EDUCATION/TRAINING PROGRAM

## 2022-08-24 PROCEDURE — 84133 ASSAY OF URINE POTASSIUM: CPT | Performed by: STUDENT IN AN ORGANIZED HEALTH CARE EDUCATION/TRAINING PROGRAM

## 2022-08-24 PROCEDURE — 82728 ASSAY OF FERRITIN: CPT

## 2022-08-24 PROCEDURE — 63600175 PHARM REV CODE 636 W HCPCS: Performed by: EMERGENCY MEDICINE

## 2022-08-24 PROCEDURE — 96372 THER/PROPH/DIAG INJ SC/IM: CPT | Performed by: STUDENT IN AN ORGANIZED HEALTH CARE EDUCATION/TRAINING PROGRAM

## 2022-08-24 PROCEDURE — 81003 URINALYSIS AUTO W/O SCOPE: CPT | Performed by: STUDENT IN AN ORGANIZED HEALTH CARE EDUCATION/TRAINING PROGRAM

## 2022-08-24 PROCEDURE — 96374 THER/PROPH/DIAG INJ IV PUSH: CPT

## 2022-08-24 PROCEDURE — 82570 ASSAY OF URINE CREATININE: CPT | Performed by: STUDENT IN AN ORGANIZED HEALTH CARE EDUCATION/TRAINING PROGRAM

## 2022-08-24 PROCEDURE — 85025 COMPLETE CBC W/AUTO DIFF WBC: CPT | Performed by: EMERGENCY MEDICINE

## 2022-08-24 PROCEDURE — 80053 COMPREHEN METABOLIC PANEL: CPT | Performed by: EMERGENCY MEDICINE

## 2022-08-24 PROCEDURE — 99285 EMERGENCY DEPT VISIT HI MDM: CPT | Mod: 25

## 2022-08-24 PROCEDURE — 82436 ASSAY OF URINE CHLORIDE: CPT | Performed by: STUDENT IN AN ORGANIZED HEALTH CARE EDUCATION/TRAINING PROGRAM

## 2022-08-24 PROCEDURE — 84300 ASSAY OF URINE SODIUM: CPT | Performed by: STUDENT IN AN ORGANIZED HEALTH CARE EDUCATION/TRAINING PROGRAM

## 2022-08-24 PROCEDURE — 83880 ASSAY OF NATRIURETIC PEPTIDE: CPT | Performed by: EMERGENCY MEDICINE

## 2022-08-24 PROCEDURE — 25000003 PHARM REV CODE 250: Performed by: EMERGENCY MEDICINE

## 2022-08-24 PROCEDURE — 51798 US URINE CAPACITY MEASURE: CPT

## 2022-08-24 RX ORDER — TRAZODONE HYDROCHLORIDE 50 MG/1
50 TABLET ORAL NIGHTLY
Status: DISCONTINUED | OUTPATIENT
Start: 2022-08-24 | End: 2022-08-26 | Stop reason: HOSPADM

## 2022-08-24 RX ORDER — ENOXAPARIN SODIUM 100 MG/ML
30 INJECTION SUBCUTANEOUS EVERY 24 HOURS
Status: DISCONTINUED | OUTPATIENT
Start: 2022-08-24 | End: 2022-08-26 | Stop reason: HOSPADM

## 2022-08-24 RX ORDER — LEVOTHYROXINE SODIUM 88 UG/1
88 TABLET ORAL DAILY
Status: DISCONTINUED | OUTPATIENT
Start: 2022-08-25 | End: 2022-08-26 | Stop reason: HOSPADM

## 2022-08-24 RX ORDER — LEVOFLOXACIN 750 MG/1
750 TABLET ORAL
Status: DISCONTINUED | OUTPATIENT
Start: 2022-08-24 | End: 2022-08-26 | Stop reason: HOSPADM

## 2022-08-24 RX ORDER — CLOPIDOGREL BISULFATE 75 MG/1
75 TABLET ORAL DAILY
Status: DISCONTINUED | OUTPATIENT
Start: 2022-08-25 | End: 2022-08-26 | Stop reason: HOSPADM

## 2022-08-24 RX ORDER — LIDOCAINE 50 MG/G
1 PATCH TOPICAL
Status: DISCONTINUED | OUTPATIENT
Start: 2022-08-24 | End: 2022-08-26 | Stop reason: HOSPADM

## 2022-08-24 RX ORDER — IPRATROPIUM BROMIDE AND ALBUTEROL SULFATE 2.5; .5 MG/3ML; MG/3ML
3 SOLUTION RESPIRATORY (INHALATION) EVERY 6 HOURS PRN
Status: DISCONTINUED | OUTPATIENT
Start: 2022-08-24 | End: 2022-08-26 | Stop reason: HOSPADM

## 2022-08-24 RX ORDER — ESCITALOPRAM OXALATE 10 MG/1
10 TABLET ORAL NIGHTLY
Status: DISCONTINUED | OUTPATIENT
Start: 2022-08-24 | End: 2022-08-26 | Stop reason: HOSPADM

## 2022-08-24 RX ORDER — ACETAMINOPHEN 500 MG
1000 TABLET ORAL
Status: COMPLETED | OUTPATIENT
Start: 2022-08-24 | End: 2022-08-24

## 2022-08-24 RX ORDER — ATORVASTATIN CALCIUM 40 MG/1
40 TABLET, FILM COATED ORAL DAILY
Status: DISCONTINUED | OUTPATIENT
Start: 2022-08-25 | End: 2022-08-26 | Stop reason: HOSPADM

## 2022-08-24 RX ORDER — ACETAMINOPHEN 500 MG
500 TABLET ORAL EVERY 6 HOURS PRN
Status: DISCONTINUED | OUTPATIENT
Start: 2022-08-24 | End: 2022-08-26 | Stop reason: HOSPADM

## 2022-08-24 RX ORDER — SODIUM CHLORIDE 0.9 % (FLUSH) 0.9 %
10 SYRINGE (ML) INJECTION EVERY 12 HOURS PRN
Status: DISCONTINUED | OUTPATIENT
Start: 2022-08-24 | End: 2022-08-26 | Stop reason: HOSPADM

## 2022-08-24 RX ORDER — IBUPROFEN 200 MG
16 TABLET ORAL
Status: DISCONTINUED | OUTPATIENT
Start: 2022-08-24 | End: 2022-08-26 | Stop reason: HOSPADM

## 2022-08-24 RX ORDER — NALOXONE HCL 0.4 MG/ML
0.02 VIAL (ML) INJECTION
Status: DISCONTINUED | OUTPATIENT
Start: 2022-08-24 | End: 2022-08-26 | Stop reason: HOSPADM

## 2022-08-24 RX ORDER — CILOSTAZOL 50 MG/1
50 TABLET ORAL 2 TIMES DAILY
Status: DISCONTINUED | OUTPATIENT
Start: 2022-08-24 | End: 2022-08-26 | Stop reason: HOSPADM

## 2022-08-24 RX ORDER — GLUCAGON 1 MG
1 KIT INJECTION
Status: DISCONTINUED | OUTPATIENT
Start: 2022-08-24 | End: 2022-08-26 | Stop reason: HOSPADM

## 2022-08-24 RX ORDER — IBUPROFEN 200 MG
24 TABLET ORAL
Status: DISCONTINUED | OUTPATIENT
Start: 2022-08-24 | End: 2022-08-26 | Stop reason: HOSPADM

## 2022-08-24 RX ORDER — ONDANSETRON 2 MG/ML
4 INJECTION INTRAMUSCULAR; INTRAVENOUS
Status: COMPLETED | OUTPATIENT
Start: 2022-08-24 | End: 2022-08-24

## 2022-08-24 RX ADMIN — ONDANSETRON 4 MG: 2 INJECTION INTRAMUSCULAR; INTRAVENOUS at 06:08

## 2022-08-24 RX ADMIN — ACETAMINOPHEN 1000 MG: 500 TABLET ORAL at 04:08

## 2022-08-24 RX ADMIN — TRAZODONE HYDROCHLORIDE 50 MG: 50 TABLET ORAL at 10:08

## 2022-08-24 RX ADMIN — ENOXAPARIN SODIUM 30 MG: 30 INJECTION SUBCUTANEOUS at 10:08

## 2022-08-24 RX ADMIN — ESCITALOPRAM OXALATE 10 MG: 10 TABLET ORAL at 10:08

## 2022-08-24 RX ADMIN — LIDOCAINE 1 PATCH: 50 PATCH CUTANEOUS at 10:08

## 2022-08-24 RX ADMIN — CILOSTAZOL 50 MG: 50 TABLET ORAL at 10:08

## 2022-08-24 NOTE — ANESTHESIA PROCEDURE NOTES
Peripheral IV Insertion    Diagnosis: I87.9 Disorder of vein, unspecified.    Patient location during procedure: ED    Staffing  Authorizing Provider: Ari Young MD  Performing Provider: Kurtis Johnston MD    Peripheral IV Insertion  Skin Prep: chlorhexidine gluconate  Local Infiltration: none  Orientation: right  Location: forearm  Catheter Type: peripheral IV (single lumen)  Catheter Size: 22 G  Catheter placement by Ultrasound guidance. Heme positive aspiration all ports.   Vessel Caliber: small, patent, compressibility normal  Needle advanced into vessel with real time Ultrasound guidance.Insertion Attempts: 1  Assessment  Dressing: secured with tape and tegaderm  Patient: Tolerated well  Line flushed easily.

## 2022-08-24 NOTE — ED PROVIDER NOTES
"Encounter Date: 8/24/2022    SCRIBE #1 NOTE: I, Renan Reyes, am scribing for, and in the presence of,  Ana Moreno MD. I have scribed the following portions of the note - Other sections scribed: HPI, ROS, PE.       History     Chief Complaint   Patient presents with    Back Pain     Mid/ upper back pain with swelling to left mid back, denies falls/ trauma      Brittany Haile is a 82 y.o. female with a PMHx of COPD, hypertension, GERD, and congestive heart failure who presents to the ED for evaluation of left sided upper back pain, onset 2 days ago. Pt's daughter notes her pain worsened today. Pt's daughter notes moving worsens the pain. Pt's daughter notes she has spent the last few days lying in bed after being diagnosed with Pneumonia. Pt lives with her daughter and son-in-law. Pt's daughter notes she uses 2 L home oxygen at all times. Pt's daughter notes she is able to walk with assistance, feed herself, and wash herself with supervision. Pt's daughter also notes "puffiness" on the left side of the back. Pt's daughter denies any allergies. Pt's daughter denies any history of diabetes mellitus. Pt's daughter denies difficulty urinating or fever.    The history is provided by the patient and a relative. No  was used.     Review of patient's allergies indicates:  No Known Allergies  Past Medical History:   Diagnosis Date    Abdominal pain     CHF (congestive heart failure)     Chronic kidney disease, stage III (moderate) 8/19/2015    COPD (chronic obstructive pulmonary disease)     COPD (chronic obstructive pulmonary disease)     CVA (cerebral infarction)     Esophagitis     Gastritis     GERD (gastroesophageal reflux disease)     GI bleed     Hyperlipidemia     Hypertension     Mesenteric angina     PVD (peripheral vascular disease) with claudication 10/13/2015    Stroke     Subclavian artery stenosis, left     Thyroid disease      Past Surgical History:   Procedure " Laterality Date    CAROTID STENT       SECTION      COLONOSCOPY N/A 3/2/2017    Procedure: COLONOSCOPY;  Surgeon: Cecil Crooks MD;  Location: [facility] ENDO;  Service: Endoscopy;  Laterality: N/A;    COLONOSCOPY N/A 2019    Procedure: COLONOSCOPY/Suprep;  Surgeon: Barry Talbert MD;  Location: [facility] ENDO;  Service: Endoscopy;  Laterality: N/A;    CORONARY ANGIOPLASTY  2006    EPIDURAL STEROID INJECTION INTO LUMBAR SPINE N/A 2018    Procedure: INJECTION, STEROID, SPINE, LUMBAR, EPIDURAL- L2-3 ORAL SEDATION;  Surgeon: Stiven Ibarra Jr., MD;  Location: [facility] PAIN MGT;  Service: Pain Management;  Laterality: N/A;    ESOPHAGOGASTRODUODENOSCOPY N/A 2019    Procedure: EGD (ESOPHAGOGASTRODUODENOSCOPY);  Surgeon: Barry Talbert MD;  Location: [facility] ENDO;  Service: Endoscopy;  Laterality: N/A;    GALLBLADDER SURGERY      HYSTERECTOMY      INTRALUMINAL GASTROINTESTINAL TRACT IMAGING VIA CAPSULE N/A 2019    Procedure: IMAGING PROCEDURE, GI TRACT, INTRALUMINAL, VIA CAPSULE;  Surgeon: Barry Talbert MD;  Location: [facility] ENDO;  Service: Endoscopy;  Laterality: N/A;    OOPHORECTOMY      SUPERIOR MESTENTERIC ARTERY STENT       Family History   Problem Relation Age of Onset    No Known Problems Mother     Cancer Sister         breast    Cancer Brother         throat cancer     Social History     Tobacco Use    Smoking status: Current Every Day Smoker     Packs/day: 0.50     Years: 60.00     Pack years: 30.00     Types: Cigarettes    Smokeless tobacco: Never Used    Tobacco comment: Pt declines enrollment in [employer] / Ambulatory Smoking Cessation program.    Substance Use Topics    Alcohol use: No    Drug use: No     Review of Systems   Constitutional: Negative for fever.   Genitourinary: Negative for difficulty urinating.   Musculoskeletal: Positive for back pain.   All other systems reviewed and are negative.      Physical Exam     Initial Vitals    BP Pulse Resp Temp SpO2   08/24/22 1509 08/24/22 1509 08/24/22 1509 08/24/22 1509 08/24/22 1848   (!) 118/52 88 (!) 22 98.1 °F (36.7 °C) 95 %      MAP       --                Physical Exam    Nursing note and vitals reviewed.  Constitutional: She appears well-developed and well-nourished. No distress.   Nasal cannula noted upon exam.   HENT:   Head: Normocephalic and atraumatic.   Left Ear: Hearing normal.   Nose: Nose normal.   Mouth/Throat: Uvula is midline and oropharynx is clear and moist.   Eyes: Conjunctivae, EOM and lids are normal.   Neck: Trachea normal. Neck supple.   Normal range of motion.  Cardiovascular: Normal rate and regular rhythm.   Abdominal:   Mild tenderness upon palpation to the left flank.   Musculoskeletal:      Cervical back: Normal range of motion and neck supple.      Comments: Moderate swelling to the left flank area with no erythema or warmth. +1 pitting edema bilaterally to the lower extremities.     Lymphadenopathy:     She has no cervical adenopathy.   Neurological: She is alert. GCS eye subscore is 4. GCS verbal subscore is 5. GCS motor subscore is 6.   Skin: Skin is dry.   Psychiatric: She has a normal mood and affect. Her speech is normal and behavior is normal.         ED Course   Procedures  Labs Reviewed   CBC W/ AUTO DIFFERENTIAL - Abnormal; Notable for the following components:       Result Value    RBC 3.27 (*)     Hemoglobin 9.2 (*)     Hematocrit 28.5 (*)     Platelets 488 (*)     MPV 8.7 (*)     Immature Granulocytes 0.6 (*)     Gran # (ANC) 9.1 (*)     Immature Grans (Abs) 0.07 (*)     Mono # 1.1 (*)     Gran % 79.6 (*)     Lymph % 9.3 (*)     All other components within normal limits   COMPREHENSIVE METABOLIC PANEL - Abnormal; Notable for the following components:    CO2 20 (*)     Glucose 134 (*)     BUN 31 (*)     Creatinine 1.6 (*)     Calcium 8.4 (*)     Total Protein 5.7 (*)     Albumin 2.6 (*)     eGFR 32 (*)     All other components within normal limits    TROPONIN I   B-TYPE NATRIURETIC PEPTIDE   PROTIME-INR   LACTIC ACID, PLASMA   SARS-COV-2 RDRP GENE    Narrative:     This test utilizes isothermal nucleic acid amplification   technology to detect the SARS-CoV-2 RdRp nucleic acid segment.   The analytical sensitivity (limit of detection) is 125 genome   equivalents/mL.   A POSITIVE result implies infection with the SARS-CoV-2 virus;   the patient is presumed to be contagious.     A NEGATIVE result means that SARS-CoV-2 nucleic acids are not   present above the limit of detection. A NEGATIVE result should be   treated as presumptive. It does not rule out the possibility of   COVID-19 and should not be the sole basis for treatment decisions.   If COVID-19 is strongly suspected based on clinical and exposure   history, re-testing using an alternate molecular assay should be   considered.   This test is only for use under the Food and Drug   Administration s Emergency Use Authorization (EUA).   Commercial kits are provided by [employer].   Performance characteristics of the EUA have been independently   verified by [facility] Department of   Pathology and Laboratory Medicine.   _________________________________________________________________   The authorized Fact Sheet for Healthcare Providers and the authorized Fact   Sheet for Patients of the ID NOW COVID-19 are available on the FDA   website:     https://www.fda.gov/media/326629/download  https://www.fda.gov/media/817588/download                 Imaging Results          US Retroperitoneal Complete (Final result)  Result time 08/24/22 22:07:00    Final result by Ok Olivas DO (08/24/22 22:07:00)                 Impression:      No hydronephrosis.    Echogenic layering material within the urinary bladder which may be related to infection or blood products, correlate with urinalysis.    Bilateral pleural effusions.    Medical renal disease.      Electronically signed by: Ok  Deisy  Date:    08/24/2022  Time:    22:07             Narrative:    EXAMINATION:  US RETROPERITONEAL COMPLETE    CLINICAL HISTORY:  acute kidney injury; Acute kidney failure, unspecified    TECHNIQUE:  Ultrasound of the kidneys and urinary bladder was performed including color flow and Doppler evaluation of the kidneys.    COMPARISON:  Renal ultrasound from 09/05/2015.    FINDINGS:  Right kidney: The right kidney measures 10.5 cm. There is cortical thinning resistive index measures 0.79.  No mass. No renal stone. No hydronephrosis.    Left kidney: The left kidney measures 8.5 cm.  There is cortical thinning.  Resistive index measures 0.68.  There are 2 anechoic simple cysts measuring 1.0 x 0.9 x 0.8 cm in the superior pole and 2.7 x 2.5 x 2.3 cm in the midpole parapelvic region.  No renal stone. No hydronephrosis.    The urinary bladder is distended.  There is echogenic layering material.    There are bilateral pleural effusions.                               X-Ray Chest 1 View (Final result)  Result time 08/24/22 21:05:50    Final result by Ok Olivas DO (08/24/22 21:05:50)                 Impression:      Small bilateral pleural effusions with right greater than left bibasilar airspace opacities which may represent atelectasis or consolidations.    Nonspecific interstitial prominence suspicious for pulmonary edema, similar to prior.      Electronically signed by: Ok Olivas  Date:    08/24/2022  Time:    21:05             Narrative:    EXAMINATION:  XR CHEST 1 VIEW    CLINICAL HISTORY:  shortness of breath; Pleural effusion, not elsewhere classified    TECHNIQUE:  Single frontal view of the chest was performed.    COMPARISON:  08/19/2022.    FINDINGS:  There are small bilateral pleural effusions.  There are right greater than left bibasilar opacities.  There is nonspecific interstitial prominence in the bilateral lungs, similar to prior.  There is no pneumothorax.    The cardiac silhouette is enlarged.   There are atherosclerotic calcifications of the aortic arch.    Visualized osseous structures demonstrate degenerative changes.                                CT Chest Abdomen Pelvis Without Contrast (XPD) (Final result)  Result time 08/24/22 19:02:16   Procedure changed from CT Chest Abdomen Pelvis With Contrast (xpd)     Final result by Jesús Nelson MD (08/24/22 19:02:16)                 Impression:      1. Small nodular, patchy and ground-glass attenuation foci in the lungs are nonspecific though could represent metastatic disease.  See above comments.  The largest area measures approximately 2.6 cm in the right lower lobe.  This could represent focal consolidation or atelectasis associated with right lower lobe pneumonia also.  Follow-up recommended.  2. Bibasilar pleural effusions right greater than left.  3. Small pericardial effusion.  4. Large heterogeneous hepatic mass in the right lobe suspicious for primary or metastatic disease.  There are other smaller hypodense foci in the right hepatic lobe suspicious for metastatic disease also.  Follow-up recommended.  5. 3.2 cm right adrenal mass suspicious for metastatic disease.  Follow-up recommended.  6. Diverticulosis of the sigmoid colon.  7. Severe atherosclerotic changes of the aorta, celiac, SMA, renal and iliac arteries.  See above comments.  Follow-up recommended.  8. Mild right inguinal adenopathy could represent metastatic disease.  9. Degenerative changes of the lumbar spine.  10.  This report was flagged in Epic as abnormal.      Electronically signed by: Jesús Nelson  Date:    08/24/2022  Time:    19:02             Narrative:    EXAMINATION:  CT CHEST ABDOMEN PELVIS WITHOUT CONTRAST(XPD)    CLINICAL HISTORY:  flank swelling;    TECHNIQUE:  Low dose axial, sagittal and coronal reformations were performed from the thoracic inlet to the pubic symphysis without the use the IV contrast.  No oral contrast was  given.    COMPARISON:  10/23/2019    FINDINGS:  Chest:    Heart and great vessels: The heart is normal size.  There is a small pericardial effusion.    Adenopathy: None demonstrated.    Lungs: 1 cm ovoid focus of mild ground-glass attenuation in right upper lobe near the apex, similar to the prior study.    Few stable tiny 2-3 mm pulmonary nodules in the upper lobes bilaterally and superior left lower lobe.  This is seen in left upper lobe on axial 26, axial 50, left lower lobe axial 76, right upper lobe axial 64.  Small 6 mm pleural base nodule is noted in the anterior right middle lobe on axial 284 series 6.    4 mm pulmonary nodule in the superior right lower lobe on axial 125.    1.6 cm nodular airspace disease in the right lower lobe laterally on axial 197.    2.6 cm ovoid airspace disease right lower lobe on axial 298.    Minimal subcentimeter ground-glass attenuation left upper lobe on axial 43 of series 6.    Minimal interstitial and ground-glass attenuation left upper lobe on axial 1 1.    2 mm left upper lobe nodule axial 70 of series 6    Mild to moderate right posterior pleural effusion and small left pleural effusion.  There is bibasilar adjacent airspace disease right greater than left could be associated with atelectasis and consolidation.  Pneumonia is a consideration.    Abdomen:    Liver: There is a large heterogeneous hepatic mass centrally in the right hepatic lobe measuring approximately 8.0 x 8.4 cm on axial 43.  This could represent primary or metastatic disease.  Follow-up is recommended.  Study is limited by lack of IV contrast.    There are other smaller hypodense foci in the right hepatic lobe inferiorly suspicious for metastatic disease.    Gallbladder and biliary: Within normal limits.    Spleen: Within normal limits.    Pancreas: Within normal limits.    Adrenals: 3.2 cm right adrenal mass is suspicious for metastatic disease.    Kidneys: Fluid density probable renal cyst on the left.   No stone, soft tissue mass or hydronephrosis bilaterally on limited visualization.    Bowel: No evidence of bowel obstruction.  There is diverticulosis of the sigmoid colon.  No evidence of acute diverticulitis.  Retained feces throughout the colon.    The appendix is not identified.    Peritoneum: No ascites or pneumoperitoneum.    Abdominal Adenopathy: None.    Vasculature: Severe atherosclerotic changes of the aorta, celiac, SMA, renal and iliac arteries.  Probable flow-limiting stenosis.  Limited characterization on this noncontrast study    Pelvis:    Urinary bladder: Unremarkable.    Status post hysterectomy.    Pelvis adenopathy: 1.2 cm mildly enlarged right inguinal lymph node on axial 155 of series 3    Bones: No acute findings.    Grade 1 retrolisthesis of L2 on L3.  Moderate central canal narrowing.  Degenerative disc space narrowing most prominent at L2-3.    Miscellaneous: None.                                 Medications   acetaminophen tablet 500 mg (500 mg Oral Given 8/25/22 0522)   atorvastatin tablet 40 mg (40 mg Oral Given 8/25/22 0825)   cilostazoL tablet 50 mg (50 mg Oral Given 8/25/22 2125)   clopidogreL tablet 75 mg (75 mg Oral Given 8/25/22 0825)   EScitalopram oxalate tablet 10 mg (10 mg Oral Given 8/25/22 2125)   levoFLOXacin tablet 750 mg (750 mg Oral Not Given 8/24/22 2207)   levothyroxine tablet 88 mcg (88 mcg Oral Given 8/25/22 0521)   traZODone tablet 50 mg (50 mg Oral Given 8/25/22 2125)   sodium chloride 0.9% flush 10 mL (has no administration in time range)   naloxone 0.4 mg/mL injection 0.02 mg (has no administration in time range)   glucose chewable tablet 16 g (has no administration in time range)   glucose chewable tablet 24 g (has no administration in time range)   glucagon (human recombinant) injection 1 mg (has no administration in time range)   enoxaparin injection 30 mg (30 mg Subcutaneous Given 8/25/22 1654)   albuterol-ipratropium 2.5 mg-0.5 mg/3 mL nebulizer solution 3  mL (has no administration in time range)   LIDOcaine 5 % patch 1 patch (1 patch Transdermal Patch Applied 8/25/22 2124)   dextrose 10% bolus 125 mL (has no administration in time range)   dextrose 10% bolus 250 mL (has no administration in time range)   morphine 10 mg/5 mL solution 10 mg (10 mg Oral Not Given 8/25/22 2139)   ondansetron injection 4 mg (4 mg Intravenous Given 8/24/22 1808)   acetaminophen tablet 1,000 mg (1,000 mg Oral Given 8/24/22 1619)   HYDROcodone-acetaminophen 5-325 mg per tablet 1 tablet (1 tablet Oral Given 8/25/22 1132)   morphine 10 mg/5 mL solution 5 mg (5 mg Oral Given 8/25/22 1930)     Medical Decision Making:   History:   Old Medical Records: I decided to obtain old medical records.  Independently Interpreted Test(s):   I have ordered and independently interpreted X-rays - see prior notes.  Clinical Tests:   Lab Tests: Reviewed and Ordered  Radiological Study: Ordered and Reviewed  ED Management:  Extensive discussion regarding CT chest abdomen and pelvis findings.  Pt and family states she is DNR. Admit for palliitve consult, dyspnea,  and pleural effsions          Scribe Attestation:   Scribe #1: I performed the above scribed service and the documentation accurately describes the services I performed. I attest to the accuracy of the note.                     Clinical Impression:   Final diagnoses:  [R06.00] Dyspnea, unspecified type  [J90] Pleural effusion, bilateral  [M54.50] Acute left-sided low back pain without sciatica          ED Disposition Condition    Observation        I, Dr. Ana Moreno, personally performed the services described in this documentation. All medical record entries made by the scribe were at my direction and in my presence. I have reviewed the chart and agree that the record reflects my personal performance and is accurate and complete. Ana Moreno MD.  4:01 PM 08/24/2022        Ana Moreno MD  08/25/22 2210

## 2022-08-25 PROBLEM — Z51.5 PALLIATIVE CARE ENCOUNTER: Status: ACTIVE | Noted: 2022-08-25

## 2022-08-25 PROBLEM — R16.0 LIVER MASS: Status: ACTIVE | Noted: 2022-08-25

## 2022-08-25 LAB
ALBUMIN SERPL BCP-MCNC: 2.3 G/DL (ref 3.5–5.2)
ALP SERPL-CCNC: 103 U/L (ref 55–135)
ALT SERPL W/O P-5'-P-CCNC: 19 U/L (ref 10–44)
ANION GAP SERPL CALC-SCNC: 12 MMOL/L (ref 8–16)
AST SERPL-CCNC: 12 U/L (ref 10–40)
BASOPHILS # BLD AUTO: 0.02 K/UL (ref 0–0.2)
BASOPHILS NFR BLD: 0.2 % (ref 0–1.9)
BILIRUB SERPL-MCNC: 0.2 MG/DL (ref 0.1–1)
BUN SERPL-MCNC: 29 MG/DL (ref 8–23)
CALCIUM SERPL-MCNC: 8.8 MG/DL (ref 8.7–10.5)
CHLORIDE SERPL-SCNC: 103 MMOL/L (ref 95–110)
CO2 SERPL-SCNC: 25 MMOL/L (ref 23–29)
CREAT SERPL-MCNC: 1.4 MG/DL (ref 0.5–1.4)
DIFFERENTIAL METHOD: ABNORMAL
EOSINOPHIL # BLD AUTO: 0.1 K/UL (ref 0–0.5)
EOSINOPHIL NFR BLD: 0.5 % (ref 0–8)
ERYTHROCYTE [DISTWIDTH] IN BLOOD BY AUTOMATED COUNT: 14.3 % (ref 11.5–14.5)
EST. GFR  (NO RACE VARIABLE): 37 ML/MIN/1.73 M^2
FERRITIN SERPL-MCNC: 444 NG/ML (ref 20–300)
FOLATE SERPL-MCNC: 14.7 NG/ML (ref 4–24)
GLUCOSE SERPL-MCNC: 114 MG/DL (ref 70–110)
HCT VFR BLD AUTO: 27.2 % (ref 37–48.5)
HGB BLD-MCNC: 8.4 G/DL (ref 12–16)
IMM GRANULOCYTES # BLD AUTO: 0.08 K/UL (ref 0–0.04)
IMM GRANULOCYTES NFR BLD AUTO: 0.7 % (ref 0–0.5)
IRON SERPL-MCNC: 15 UG/DL (ref 30–160)
LYMPHOCYTES # BLD AUTO: 1 K/UL (ref 1–4.8)
LYMPHOCYTES NFR BLD: 9 % (ref 18–48)
MAGNESIUM SERPL-MCNC: 1.8 MG/DL (ref 1.6–2.6)
MCH RBC QN AUTO: 27.5 PG (ref 27–31)
MCHC RBC AUTO-ENTMCNC: 30.9 G/DL (ref 32–36)
MCV RBC AUTO: 89 FL (ref 82–98)
MONOCYTES # BLD AUTO: 0.8 K/UL (ref 0.3–1)
MONOCYTES NFR BLD: 7.3 % (ref 4–15)
NEUTROPHILS # BLD AUTO: 9.1 K/UL (ref 1.8–7.7)
NEUTROPHILS NFR BLD: 82.3 % (ref 38–73)
NRBC BLD-RTO: 0 /100 WBC
PHOSPHATE SERPL-MCNC: 3.9 MG/DL (ref 2.7–4.5)
PLATELET # BLD AUTO: 460 K/UL (ref 150–450)
PMV BLD AUTO: 8.6 FL (ref 9.2–12.9)
POTASSIUM SERPL-SCNC: 4.1 MMOL/L (ref 3.5–5.1)
PROT SERPL-MCNC: 5.7 G/DL (ref 6–8.4)
RBC # BLD AUTO: 3.05 M/UL (ref 4–5.4)
SATURATED IRON: 7 % (ref 20–50)
SODIUM SERPL-SCNC: 140 MMOL/L (ref 136–145)
TOTAL IRON BINDING CAPACITY: 210 UG/DL (ref 250–450)
TRANSFERRIN SERPL-MCNC: 142 MG/DL (ref 200–375)
VIT B12 SERPL-MCNC: 304 PG/ML (ref 210–950)
WBC # BLD AUTO: 11.1 K/UL (ref 3.9–12.7)

## 2022-08-25 PROCEDURE — 82746 ASSAY OF FOLIC ACID SERUM: CPT | Performed by: STUDENT IN AN ORGANIZED HEALTH CARE EDUCATION/TRAINING PROGRAM

## 2022-08-25 PROCEDURE — 25000003 PHARM REV CODE 250

## 2022-08-25 PROCEDURE — 99205 OFFICE O/P NEW HI 60 MIN: CPT | Mod: ,,,

## 2022-08-25 PROCEDURE — 51798 US URINE CAPACITY MEASURE: CPT

## 2022-08-25 PROCEDURE — 97535 SELF CARE MNGMENT TRAINING: CPT

## 2022-08-25 PROCEDURE — 82607 VITAMIN B-12: CPT | Performed by: STUDENT IN AN ORGANIZED HEALTH CARE EDUCATION/TRAINING PROGRAM

## 2022-08-25 PROCEDURE — 99497 ADVNCD CARE PLAN 30 MIN: CPT | Mod: 25,,,

## 2022-08-25 PROCEDURE — 83735 ASSAY OF MAGNESIUM: CPT | Performed by: STUDENT IN AN ORGANIZED HEALTH CARE EDUCATION/TRAINING PROGRAM

## 2022-08-25 PROCEDURE — G0378 HOSPITAL OBSERVATION PER HR: HCPCS

## 2022-08-25 PROCEDURE — 99214 OFFICE O/P EST MOD 30 MIN: CPT | Mod: 95,,, | Performed by: INTERNAL MEDICINE

## 2022-08-25 PROCEDURE — 27000221 HC OXYGEN, UP TO 24 HOURS

## 2022-08-25 PROCEDURE — 25000003 PHARM REV CODE 250: Performed by: STUDENT IN AN ORGANIZED HEALTH CARE EDUCATION/TRAINING PROGRAM

## 2022-08-25 PROCEDURE — 99900035 HC TECH TIME PER 15 MIN (STAT)

## 2022-08-25 PROCEDURE — 92610 EVALUATE SWALLOWING FUNCTION: CPT

## 2022-08-25 PROCEDURE — 99497 PR ADVNCD CARE PLAN 30 MIN: ICD-10-PCS | Mod: 25,,,

## 2022-08-25 PROCEDURE — 85025 COMPLETE CBC W/AUTO DIFF WBC: CPT | Performed by: STUDENT IN AN ORGANIZED HEALTH CARE EDUCATION/TRAINING PROGRAM

## 2022-08-25 PROCEDURE — 84100 ASSAY OF PHOSPHORUS: CPT | Performed by: STUDENT IN AN ORGANIZED HEALTH CARE EDUCATION/TRAINING PROGRAM

## 2022-08-25 PROCEDURE — 96372 THER/PROPH/DIAG INJ SC/IM: CPT | Performed by: STUDENT IN AN ORGANIZED HEALTH CARE EDUCATION/TRAINING PROGRAM

## 2022-08-25 PROCEDURE — 80053 COMPREHEN METABOLIC PANEL: CPT | Performed by: STUDENT IN AN ORGANIZED HEALTH CARE EDUCATION/TRAINING PROGRAM

## 2022-08-25 PROCEDURE — 36415 COLL VENOUS BLD VENIPUNCTURE: CPT | Performed by: STUDENT IN AN ORGANIZED HEALTH CARE EDUCATION/TRAINING PROGRAM

## 2022-08-25 PROCEDURE — 94761 N-INVAS EAR/PLS OXIMETRY MLT: CPT

## 2022-08-25 PROCEDURE — 99205 PR OFFICE/OUTPT VISIT, NEW, LEVL V, 60-74 MIN: ICD-10-PCS | Mod: ,,,

## 2022-08-25 PROCEDURE — 63600175 PHARM REV CODE 636 W HCPCS: Performed by: STUDENT IN AN ORGANIZED HEALTH CARE EDUCATION/TRAINING PROGRAM

## 2022-08-25 PROCEDURE — 99214 PR OFFICE/OUTPT VISIT, EST, LEVL IV, 30-39 MIN: ICD-10-PCS | Mod: 95,,, | Performed by: INTERNAL MEDICINE

## 2022-08-25 RX ORDER — OXYCODONE HYDROCHLORIDE 5 MG/1
5 TABLET ORAL ONCE
Status: DISCONTINUED | OUTPATIENT
Start: 2022-08-25 | End: 2022-08-25

## 2022-08-25 RX ORDER — MORPHINE SULFATE ORAL SOLUTION 10 MG/5ML
5 SOLUTION ORAL
Status: DISCONTINUED | OUTPATIENT
Start: 2022-08-25 | End: 2022-08-25

## 2022-08-25 RX ORDER — HYDROCODONE BITARTRATE AND ACETAMINOPHEN 5; 325 MG/1; MG/1
1 TABLET ORAL ONCE
Status: COMPLETED | OUTPATIENT
Start: 2022-08-25 | End: 2022-08-25

## 2022-08-25 RX ORDER — MORPHINE SULFATE ORAL SOLUTION 10 MG/5ML
10 SOLUTION ORAL
Status: DISCONTINUED | OUTPATIENT
Start: 2022-08-25 | End: 2022-08-26 | Stop reason: HOSPADM

## 2022-08-25 RX ORDER — MORPHINE SULFATE ORAL SOLUTION 10 MG/5ML
5 SOLUTION ORAL ONCE
Status: COMPLETED | OUTPATIENT
Start: 2022-08-25 | End: 2022-08-25

## 2022-08-25 RX ADMIN — MORPHINE SULFATE 5 MG: 10 SOLUTION ORAL at 03:08

## 2022-08-25 RX ADMIN — MORPHINE SULFATE 5 MG: 10 SOLUTION ORAL at 07:08

## 2022-08-25 RX ADMIN — CILOSTAZOL 50 MG: 50 TABLET ORAL at 08:08

## 2022-08-25 RX ADMIN — HYDROCODONE BITARTRATE AND ACETAMINOPHEN 1 TABLET: 5; 325 TABLET ORAL at 11:08

## 2022-08-25 RX ADMIN — CILOSTAZOL 50 MG: 50 TABLET ORAL at 09:08

## 2022-08-25 RX ADMIN — ATORVASTATIN CALCIUM 40 MG: 40 TABLET, FILM COATED ORAL at 08:08

## 2022-08-25 RX ADMIN — CLOPIDOGREL 75 MG: 75 TABLET, FILM COATED ORAL at 08:08

## 2022-08-25 RX ADMIN — ESCITALOPRAM OXALATE 10 MG: 10 TABLET ORAL at 09:08

## 2022-08-25 RX ADMIN — TRAZODONE HYDROCHLORIDE 50 MG: 50 TABLET ORAL at 09:08

## 2022-08-25 RX ADMIN — LEVOTHYROXINE SODIUM 88 MCG: 88 TABLET ORAL at 05:08

## 2022-08-25 RX ADMIN — MORPHINE SULFATE 5 MG: 10 SOLUTION ORAL at 05:08

## 2022-08-25 RX ADMIN — ENOXAPARIN SODIUM 30 MG: 30 INJECTION SUBCUTANEOUS at 04:08

## 2022-08-25 RX ADMIN — ACETAMINOPHEN 500 MG: 500 TABLET ORAL at 05:08

## 2022-08-25 NOTE — PLAN OF CARE
VN Note: Labs, notes, orders, care plan review will be available as needed.   Problem: Adult Inpatient Plan of Care  Goal: Plan of Care Review  Outcome: Ongoing, Progressing

## 2022-08-25 NOTE — NURSING
Report received from WHITNEY Drake at bedside. Upon entering patients room, pt appears asleep. Breathing even and non-labored. Tele monitor on. Daughter at bedside. Safety maintained. Bed locked and in lowest position. Call light and personal items within reach. Bed alarm on and activated.

## 2022-08-25 NOTE — SUBJECTIVE & OBJECTIVE
VIRTUAL TELENOTE    Start time:12:00pm  Chief complaint: metastatic cancer  The patient location is: [facility]  The patient arrived at: 12:00pm  Present with the patient at the time of the telemed/virtual assessment: sister, family members    End time:  12:15pm    Total time spent with patient: 15 minutes (25 minutes to see patient and write note)    The attending portion of this evaluation, treatment, and documentation was performed per Kamron Cortez MD via Telemedicine AudioVisual using the secure [employer] software platform with 2 way audio/video. The provider was located off-site and the patient is located in the hospital. The aforementioned video software was utilized to document the relevant history and physical exam.    - she endorses fatigue, dyspnea upon exertion, cough, back pain, weakness. Patient's daughter, whom the patient lives with, states that she does not think patient wants to proceed with biopsy.    Oncology Treatment Plan:   [Could not find a treatment plan. This SmartLink may be configured incorrectly. Contact a  for help.]    Medications:  Continuous Infusions:  Scheduled Meds:   atorvastatin  40 mg Oral Daily    cilostazoL  50 mg Oral BID    clopidogreL  75 mg Oral Daily    enoxaparin  30 mg Subcutaneous Daily    EScitalopram oxalate  10 mg Oral QHS    levoFLOXacin  750 mg Oral Q48H    levothyroxine  88 mcg Oral Daily    LIDOcaine  1 patch Transdermal Q24H    traZODone  50 mg Oral QHS     PRN Meds:acetaminophen, albuterol-ipratropium, dextrose 10%, dextrose 10%, glucagon (human recombinant), glucose, glucose, naloxone, sodium chloride 0.9%     Review of patient's allergies indicates:  No Known Allergies     Past Medical History:   Diagnosis Date    Abdominal pain     CHF (congestive heart failure)     Chronic kidney disease, stage III (moderate) 8/19/2015    COPD (chronic obstructive pulmonary disease)     COPD (chronic obstructive pulmonary disease)     CVA (cerebral infarction)      Esophagitis     Gastritis     GERD (gastroesophageal reflux disease)     GI bleed     Hyperlipidemia     Hypertension     Mesenteric angina     PVD (peripheral vascular disease) with claudication 10/13/2015    Stroke     Subclavian artery stenosis, left     Thyroid disease      Past Surgical History:   Procedure Laterality Date    CAROTID STENT       SECTION      COLONOSCOPY N/A 3/2/2017    Procedure: COLONOSCOPY;  Surgeon: Cecil Crooks MD;  Location: [facility] ENDO;  Service: Endoscopy;  Laterality: N/A;    COLONOSCOPY N/A 2019    Procedure: COLONOSCOPY/Suprep;  Surgeon: Barry Talbert MD;  Location: [facility] ENDO;  Service: Endoscopy;  Laterality: N/A;    CORONARY ANGIOPLASTY      EPIDURAL STEROID INJECTION INTO LUMBAR SPINE N/A 2018    Procedure: INJECTION, STEROID, SPINE, LUMBAR, EPIDURAL- L2-3 ORAL SEDATION;  Surgeon: Stiven Ibarra Jr., MD;  Location: [facility] PAIN MGT;  Service: Pain Management;  Laterality: N/A;    ESOPHAGOGASTRODUODENOSCOPY N/A 2019    Procedure: EGD (ESOPHAGOGASTRODUODENOSCOPY);  Surgeon: Barry Talbert MD;  Location: [facility] ENDO;  Service: Endoscopy;  Laterality: N/A;    GALLBLADDER SURGERY      HYSTERECTOMY      INTRALUMINAL GASTROINTESTINAL TRACT IMAGING VIA CAPSULE N/A 2019    Procedure: IMAGING PROCEDURE, GI TRACT, INTRALUMINAL, VIA CAPSULE;  Surgeon: Barry Talbert MD;  Location: [facility];  Service: Endoscopy;  Laterality: N/A;    OOPHORECTOMY      SUPERIOR MESTENTERIC ARTERY STENT       Family History       Problem Relation (Age of Onset)    Cancer Sister, Brother    No Known Problems Mother          Tobacco Use    Smoking status: Current Every Day Smoker     Packs/day: 0.50     Years: 60.00     Pack years: 30.00     Types: Cigarettes    Smokeless tobacco: Never Used    Tobacco comment: Pt declines enrollment in [employer] / Ambulatory Smoking Cessation program.    Substance and Sexual Activity    Alcohol use: No    Drug  use: No    Sexual activity: Not on file       Review of Systems   Constitutional:  Positive for fatigue.   HENT:  Negative for sore throat.    Eyes:  Negative for visual disturbance.   Respiratory:  Positive for cough and shortness of breath.    Cardiovascular:  Negative for chest pain.   Gastrointestinal:  Positive for abdominal pain. Negative for constipation, diarrhea, nausea and vomiting.   Genitourinary:  Negative for dysuria.   Musculoskeletal:  Positive for back pain.   Skin:  Negative for rash.   Neurological:  Positive for weakness. Negative for headaches.   Hematological:  Negative for adenopathy.   Psychiatric/Behavioral:  The patient is not nervous/anxious.    Objective:     Vital Signs (Most Recent):  Temp: 98.9 °F (37.2 °C) (08/25/22 1143)  Pulse: 84 (08/25/22 1143)  Resp: 18 (08/25/22 1143)  BP: (!) 128/59 (08/25/22 1143)  SpO2: (!) 92 % (08/25/22 0800)   Vital Signs (24h Range):  Temp:  [98.1 °F (36.7 °C)-98.9 °F (37.2 °C)] 98.9 °F (37.2 °C)  Pulse:  [80-96] 84  Resp:  [16-22] 18  SpO2:  [90 %-96 %] 92 %  BP: (112-136)/(52-61) 128/59     Weight: 51 kg (112 lb 7 oz)  Body mass index is 21.24 kg/m².  Body surface area is 1.48 meters squared.    No intake or output data in the 24 hours ending 08/25/22 1213    Physical Exam  Deferred due to telemedicine visit.    Significant Labs:   Labs have been reviewed.    Lab Results   Component Value Date    WBC 11.10 08/25/2022    HGB 8.4 (L) 08/25/2022    HCT 27.2 (L) 08/25/2022    MCV 89 08/25/2022     (H) 08/25/2022       CMP  Sodium   Date Value Ref Range Status   08/25/2022 140 136 - 145 mmol/L Final     Potassium   Date Value Ref Range Status   08/25/2022 4.1 3.5 - 5.1 mmol/L Final     Chloride   Date Value Ref Range Status   08/25/2022 103 95 - 110 mmol/L Final     CO2   Date Value Ref Range Status   08/25/2022 25 23 - 29 mmol/L Final     Glucose   Date Value Ref Range Status   08/25/2022 114 (H) 70 - 110 mg/dL Final     BUN   Date Value Ref Range  Status   08/25/2022 29 (H) 8 - 23 mg/dL Final     Creatinine   Date Value Ref Range Status   08/25/2022 1.4 0.5 - 1.4 mg/dL Final     Calcium   Date Value Ref Range Status   08/25/2022 8.8 8.7 - 10.5 mg/dL Final     Total Protein   Date Value Ref Range Status   08/25/2022 5.7 (L) 6.0 - 8.4 g/dL Final     Albumin   Date Value Ref Range Status   08/25/2022 2.3 (L) 3.5 - 5.2 g/dL Final     Total Bilirubin   Date Value Ref Range Status   08/25/2022 0.2 0.1 - 1.0 mg/dL Final     Comment:     For infants and newborns, interpretation of results should be based  on gestational age, weight and in agreement with clinical  observations.    Premature Infant recommended reference ranges:  Up to 24 hours.............<8.0 mg/dL  Up to 48 hours............<12.0 mg/dL  3-5 days..................<15.0 mg/dL  6-29 days.................<15.0 mg/dL       Alkaline Phosphatase   Date Value Ref Range Status   08/25/2022 103 55 - 135 U/L Final     AST   Date Value Ref Range Status   08/25/2022 12 10 - 40 U/L Final     ALT   Date Value Ref Range Status   08/25/2022 19 10 - 44 U/L Final     Anion Gap   Date Value Ref Range Status   08/25/2022 12 8 - 16 mmol/L Final     eGFR if    Date Value Ref Range Status   03/06/2020 >60 >60 mL/min/1.73 m^2 Final     eGFR if non    Date Value Ref Range Status   03/06/2020 53 (A) >60 mL/min/1.73 m^2 Final     Comment:     Calculation used to obtain the estimated glomerular filtration  rate (eGFR) is the CKD-EPI equation.            Diagnostic Results:  CT chest/abdomen/pelvis (8/24/22): I have personally reviewed the images    Chest:     Heart and great vessels: The heart is normal size.  There is a small pericardial effusion.     Adenopathy: None demonstrated.     Lungs: 1 cm ovoid focus of mild ground-glass attenuation in right upper lobe near the apex, similar to the prior study.     Few stable tiny 2-3 mm pulmonary nodules in the upper lobes bilaterally and superior left  lower lobe.  This is seen in left upper lobe on axial 26, axial 50, left lower lobe axial 76, right upper lobe axial 64.  Small 6 mm pleural base nodule is noted in the anterior right middle lobe on axial 284 series 6.     4 mm pulmonary nodule in the superior right lower lobe on axial 125.     1.6 cm nodular airspace disease in the right lower lobe laterally on axial 197.     2.6 cm ovoid airspace disease right lower lobe on axial 298.     Minimal subcentimeter ground-glass attenuation left upper lobe on axial 43 of series 6.     Minimal interstitial and ground-glass attenuation left upper lobe on axial 1 1.     2 mm left upper lobe nodule axial 70 of series 6     Mild to moderate right posterior pleural effusion and small left pleural effusion.  There is bibasilar adjacent airspace disease right greater than left could be associated with atelectasis and consolidation.  Pneumonia is a consideration.     Abdomen:     Liver: There is a large heterogeneous hepatic mass centrally in the right hepatic lobe measuring approximately 8.0 x 8.4 cm on axial 43.  This could represent primary or metastatic disease.  Follow-up is recommended.  Study is limited by lack of IV contrast.     There are other smaller hypodense foci in the right hepatic lobe inferiorly suspicious for metastatic disease.     Gallbladder and biliary: Within normal limits.     Spleen: Within normal limits.     Pancreas: Within normal limits.     Adrenals: 3.2 cm right adrenal mass is suspicious for metastatic disease.     Kidneys: Fluid density probable renal cyst on the left.  No stone, soft tissue mass or hydronephrosis bilaterally on limited visualization.     Bowel: No evidence of bowel obstruction.  There is diverticulosis of the sigmoid colon.  No evidence of acute diverticulitis.  Retained feces throughout the colon.     The appendix is not identified.     Peritoneum: No ascites or pneumoperitoneum.     Abdominal Adenopathy: None.     Vasculature:  Severe atherosclerotic changes of the aorta, celiac, SMA, renal and iliac arteries.  Probable flow-limiting stenosis.  Limited characterization on this noncontrast study     Pelvis:     Urinary bladder: Unremarkable.     Status post hysterectomy.     Pelvis adenopathy: 1.2 cm mildly enlarged right inguinal lymph node on axial 155 of series 3     Bones: No acute findings.     Grade 1 retrolisthesis of L2 on L3.  Moderate central canal narrowing.  Degenerative disc space narrowing most prominent at L2-3.     Miscellaneous: None.     Impression:     1. Small nodular, patchy and ground-glass attenuation foci in the lungs are nonspecific though could represent metastatic disease.  See above comments.  The largest area measures approximately 2.6 cm in the right lower lobe.  This could represent focal consolidation or atelectasis associated with right lower lobe pneumonia also.  Follow-up recommended.  2. Bibasilar pleural effusions right greater than left.  3. Small pericardial effusion.  4. Large heterogeneous hepatic mass in the right lobe suspicious for primary or metastatic disease.  There are other smaller hypodense foci in the right hepatic lobe suspicious for metastatic disease also.  Follow-up recommended.  5. 3.2 cm right adrenal mass suspicious for metastatic disease.  Follow-up recommended.  6. Diverticulosis of the sigmoid colon.  7. Severe atherosclerotic changes of the aorta, celiac, SMA, renal and iliac arteries.  See above comments.  Follow-up recommended.  8. Mild right inguinal adenopathy could represent metastatic disease.  9. Degenerative changes of the lumbar spine.

## 2022-08-25 NOTE — PLAN OF CARE
ST orders for swallowing eval received this date, dtr present and denies dysphagia hx at home. Pt will benefit from boost clear for added caloric support. Pt may continue on current cardiac diet.

## 2022-08-25 NOTE — PROGRESS NOTES
Pharmacist Renal Dose Adjustment Note    Brittany Haile is a 82 y.o. female being treated with the medication levofloxacin    Patient Data:    Vital Signs (Most Recent):  Temp: 98.4 °F (36.9 °C) (08/24/22 1933)  Pulse: 80 (08/24/22 1933)  Resp: 16 (08/24/22 1933)  BP: 131/60 (08/24/22 1933)  SpO2: 96 % (08/24/22 1933) Vital Signs (72h Range):  Temp:  [98.1 °F (36.7 °C)-98.4 °F (36.9 °C)]   Pulse:  [80-88]   Resp:  [16-22]   BP: (112-131)/(52-60)   SpO2:  [95 %-96 %]      Recent Labs   Lab 08/20/22  0355 08/21/22  0328 08/24/22  1649   CREATININE 0.7 0.8 1.6*     Serum creatinine: 1.6 mg/dL (H) 08/24/22 1649  Estimated creatinine clearance: 20.7 mL/min (A)    Medication: levofloxacin dose: 500 mg frequency q24h will be changed to medication: levofloxacin dose: 750 mg  frequency:q48h    Pharmacist's Name: Linden Garcia, PharmD, [facility]  Pharmacist's Extension: [phone]

## 2022-08-25 NOTE — PLAN OF CARE
SW met with pt via [employer] to complete assessment. Pt is AxO 3 with confusion and able to verbally answer assessment questions. Pt has daughter Tiki at bedside. Tiki reports to be pt oldest daughter [phone].   Pt is reported to live at home with her youngest daughter China. Pt reports to need assistance with ADL's with an increase need recently. Pt is able to feed self but require preparation assistance. Pt is able to bathe self but require standby assistance. Pt reports to have walker, cane,  and oxygen in the home. Pt is reported to be on 2L nasal cannula currently in the hospital and while at home. Pt is reported to wear glasses while at home as well. Pt will have daughters to transport home at time of discharge. Pt confirmed PCP to be Dr. Frazier and to want follow up with PCP since already scheduled on their own. SW updated whiteboard with [facility] name and contact information. SW confirmed pt understanding of Observation unit and expected discharge plan. SW will continue to follow pt throughout care and assist with any discharge needs.         08/25/22 1010   Discharge Planning   Assessment Type Discharge Planning Brief Assessment   Resource/Environmental Concerns none   Support Systems Family members;Children   Equipment Currently Used at Home walker, standard;cane, straight;oxygen   Current Living Arrangements home/apartment/condo   Patient/Family Anticipates Transition to home with family   Patient/Family Anticipated Services at Transition none   DME Needed Upon Discharge  none   Discharge Plan A Home with family     Future Appointments   Date Time Provider Department Center   9/1/2022  4:20 PM Wayne Frazier MD [facility] Case Management  [phone]

## 2022-08-25 NOTE — HPI
82 year-old female with nicotine dependence, recently admitted for pneumonia, was admitted on 8/24/22 for back pain. Imaging revealed lung masses/liver mass/adrenal mass concerning for metastatic cancer. Consult is for likely metastatic cancer.

## 2022-08-25 NOTE — CONSULTS
[facility] Surg  Palliative Medicine  Consult Note    Patient Name: Brittany Haile  MRN: [id number]  Admission Date: 8/24/2022  Hospital Length of Stay: 0 days  Code Status: DNR   Attending Provider: Agapito Coronel MD  Consulting Provider: Katie Coffman NP  Primary Care Physician: Wayne Frazier MD  Principal Problem:<principal problem not specified>    Patient information was obtained from patient, relative(s), ER records and primary team.      Inpatient consult to Palliative Care  Consult performed by: Katie Coffman NP  Consult ordered by: Henry Gao MD  Reason for consult: Goals of care/ Advanced care planning         Assessment/Plan:     Palliative care encounter  At time of initial consult pt resting in bed with daughters and grandchildren at bedside.  Family receptive to palliative visit at this time. Pt has four children and multiple grandchildren and great grandchildren.  Pt recently moved back to [place] from [place] and lives with her daughter China.  Pt is not receiving any home health services at this time.      Per family, pt has been in a state of decline over the past few months. Pt is becoming more dependent on others and now requires assistance with all ADLS.  Family also reports pt having decreased PO intake over the past few weeks.  Of note, pt was recently admitted for pneumonia and discharged on 8/21/2022.  Family and patient report intermittent severe back pain that began after discharge.  Upon this admit, imaging revealed lung masses/liver mass/ adrenal mass concerning for metastatic cancer.  See Dr Morocho notes.  We discussed these new findings as well as treatment goals.  Family is in agreement that the patient would not want to move forward with cancer directed care or diagnostics as it would not change their focus.  Family would like to focus on the comfort of the pt.  The family stressed the importance of the patient remaining at home with them as well as remain comfortable.  We  "discussed hospice by name and what hospice may offer the patient and family. Family in agreement that enrolling in home hospice aligns with their care goals.  Consents signed with hospice company and equipment will be delivered tomorrow to the pts home.      Discussion had as to wether the possibly discussed thoracentesis would offer the patient pain relief as well as allow for cytology to be sent off.  Family in agreement that if this is a palliative measure they would like to have further information.  If this was discussed as a diagnostic option, the family would decline.  Family informed me that pulmonology was consulted and they planned to discuss this further with them.  Defer this topic to pulm.  Family has a clear comfort goals and has a realistic grasp on the pts prognosis.     Per conversation with admitting team regarding pts pain, I recommend liquid morphine 5mg Q2 for moderate pain or 10 mg Q2 for severe pain.   Given her age the pt may benefit from trying smaller more frequent doses for symptom control. Liquid dosing assists in finding a dosing regime focused on simplicity and comfort for the pt under home hospice. Also, Oxycodone maybe stimulating and raises risk for delirium for this patient so I would avoid if possible.     Recommendations-  -Pt enroll in home hospice to meet care goals   -liquid morphine 5mg Q2 for moderate pain or 10 mg Q2 for severe pain  -pt remains a DNR            Left flank pain  -PRN Tylenol 500mg   -Lidocaine patch and heating pad  -U/A negative for nitrites, leukocytes, WBC's or RBC's   -Troponin negative x1  -1x dose Norco 5, will reassess pt and tailor pain control regimen accordingly  --liquid morphine 5mg Q2 for moderate pain or 10 mg Q2 for severe pain    Pleural effusion  Management per primary care        Subjective:     HPI:   Per chart, "Brittany Haile is a 82 y.o. female who has a past medical history of COPD, CKDIII, GERD, HLD, HTN, GERN, CHF, PAD with claudication " "and recent RLL PNA (discharged 08/21). The patient presented to [facility] on 08/24/22 with a primary complain of left sided back pain. In the ED, CT Abdomen concerning for bilateral (R>L) pleural effusions.      Patients daughter and son in law at bedside, majority of information obtained per daughter (care giver) as patient is very hard of hearing. Daughter states she was in her normal state of health until Monday, when patient began experiencing severe pain. Patient's daughter and sitter have noticed general moaning and pain with moving patient. She states the pain is localized to her L back. She states this morning she also noticed increased fluid on patient, started on upper back and has moved to flanks. Patient has baseline shortness of breath 2/2 COPD requiring 2L O2 at home, no increased shortness of breath noted. Daughter says patient has had very little PO intake since moving her 2 weeks ago from [place]. Daughter denies her mother endorsing difficulty urinating, dysuria, fever, chills, abdominal pain, chest pain, nausea, or vomiting.      Of note, patient was recently admitted to [facility] for RLL Pneumonia from 08/19-08/21. At that time, CXR showed RLL consolidation. Patient was given azitrhomycin and roceph while inpatient, discharged on PO levofloxacin x 4 days. Patient has been compliant with home ABX, not completed as patient was discharged two days ago. "        Interval History: Imaging revealed lung masses/liver mass/adrenal mass concerning for metastatic cancer. Dr Cortez consulted.      Past Medical History:   Diagnosis Date    Abdominal pain     CHF (congestive heart failure)     Chronic kidney disease, stage III (moderate) 8/19/2015    COPD (chronic obstructive pulmonary disease)     COPD (chronic obstructive pulmonary disease)     CVA (cerebral infarction)     Esophagitis     Gastritis     GERD (gastroesophageal reflux disease)     GI bleed     Hyperlipidemia  "    Hypertension     Mesenteric angina     PVD (peripheral vascular disease) with claudication 10/13/2015    Stroke     Subclavian artery stenosis, left     Thyroid disease        Past Surgical History:   Procedure Laterality Date    CAROTID STENT       SECTION      COLONOSCOPY N/A 3/2/2017    Procedure: COLONOSCOPY;  Surgeon: Cecil Crooks MD;  Location: [facility] ENDO;  Service: Endoscopy;  Laterality: N/A;    COLONOSCOPY N/A 2019    Procedure: COLONOSCOPY/Suprep;  Surgeon: Barry Talbert MD;  Location: [facility] ENDO;  Service: Endoscopy;  Laterality: N/A;    CORONARY ANGIOPLASTY  2006    EPIDURAL STEROID INJECTION INTO LUMBAR SPINE N/A 2018    Procedure: INJECTION, STEROID, SPINE, LUMBAR, EPIDURAL- L2-3 ORAL SEDATION;  Surgeon: Stiven Ibarra Jr., MD;  Location: [facility] PAIN MGT;  Service: Pain Management;  Laterality: N/A;    ESOPHAGOGASTRODUODENOSCOPY N/A 2019    Procedure: EGD (ESOPHAGOGASTRODUODENOSCOPY);  Surgeon: Barry Talbert MD;  Location: [facility] ENDO;  Service: Endoscopy;  Laterality: N/A;    GALLBLADDER SURGERY      HYSTERECTOMY      INTRALUMINAL GASTROINTESTINAL TRACT IMAGING VIA CAPSULE N/A 2019    Procedure: IMAGING PROCEDURE, GI TRACT, INTRALUMINAL, VIA CAPSULE;  Surgeon: Barry Talbert MD;  Location: [facility] ENDO;  Service: Endoscopy;  Laterality: N/A;    OOPHORECTOMY      SUPERIOR MESTENTERIC ARTERY STENT         Review of patient's allergies indicates:  No Known Allergies    Medications:  Continuous Infusions:  Scheduled Meds:   atorvastatin  40 mg Oral Daily    cilostazoL  50 mg Oral BID    clopidogreL  75 mg Oral Daily    enoxaparin  30 mg Subcutaneous Daily    EScitalopram oxalate  10 mg Oral QHS    levoFLOXacin  750 mg Oral Q48H    levothyroxine  88 mcg Oral Daily    LIDOcaine  1 patch Transdermal Q24H    traZODone  50 mg Oral QHS     PRN Meds:acetaminophen, albuterol-ipratropium, dextrose 10%, dextrose 10%, glucagon  (human recombinant), glucose, glucose, naloxone, sodium chloride 0.9%    Family History       Problem Relation (Age of Onset)    Cancer Sister, Brother    No Known Problems Mother          Tobacco Use    Smoking status: Current Every Day Smoker     Packs/day: 0.50     Years: 60.00     Pack years: 30.00     Types: Cigarettes    Smokeless tobacco: Never Used    Tobacco comment: Pt declines enrollment in [employer] / Ambulatory Smoking Cessation program.    Substance and Sexual Activity    Alcohol use: No    Drug use: No    Sexual activity: Not on file       Review of Systems   Constitutional:  Positive for activity change, appetite change and fatigue.   Respiratory:  Positive for cough and shortness of breath. Negative for wheezing.    Cardiovascular:  Positive for leg swelling.   Gastrointestinal:  Negative for abdominal distention and abdominal pain.   Musculoskeletal:  Positive for back pain.   Skin:  Negative for rash.   Neurological:  Negative for headaches.   Objective:     Vital Signs (Most Recent):  Temp: 98.9 °F (37.2 °C) (08/25/22 1143)  Pulse: 85 (08/25/22 1246)  Resp: 18 (08/25/22 1143)  BP: (!) 128/59 (08/25/22 1143)  SpO2: (!) 92 % (08/25/22 0800)   Vital Signs (24h Range):  Temp:  [98.1 °F (36.7 °C)-98.9 °F (37.2 °C)] 98.9 °F (37.2 °C)  Pulse:  [80-96] 85  Resp:  [16-22] 18  SpO2:  [90 %-96 %] 92 %  BP: (112-136)/(52-61) 128/59     Weight: 51 kg (112 lb 7 oz)  Body mass index is 21.24 kg/m².    Physical Exam  Constitutional:       Appearance: She is ill-appearing (chronically).   HENT:      Head: Normocephalic.      Mouth/Throat:      Mouth: Mucous membranes are dry.   Cardiovascular:      Rate and Rhythm: Normal rate.      Pulses: Normal pulses.   Pulmonary:      Effort: Pulmonary effort is normal.      Breath sounds: Decreased air movement present. Rales present. No wheezing.   Abdominal:      Palpations: Abdomen is soft.   Musculoskeletal:      Right lower leg: Edema present.      Left lower  leg: Edema present.   Skin:     General: Skin is warm.      Capillary Refill: Capillary refill takes 2 to 3 seconds.      Coloration: Skin is pale.   Neurological:      Mental Status: She is alert. Mental status is at baseline.   Psychiatric:         Mood and Affect: Mood normal.       Review of Symptoms      Symptom Assessment (ESAS 0-10 Scale)  Pain:  0  Dyspnea:  0  Anxiety:  0  Nausea:  0  Depression:  0  Anorexia:  0  Fatigue:  0  Insomnia:  0  Restlessness:  0  Agitation:  0         Performance Status:  40    Living Arrangements:  Lives with family and Lives in home    Psychosocial/Cultural: Pt lives with her daughter China.  Pt recently moved back to [place] from [place]. Pt uses a cane and walker.      Spiritual:  F - Genesis and Belief:  Lutheran   I - Importance:  Moderate   A - Address in Care:  Home [employer] group support prn      Time-Based Charting:  Yes  Chart Review: 12 minutes  Face to Face: 25 minutes  Symptom Assessment: 11 minutes  Coordination of Care: 13 minutes  Discharge Plannin minutes  Advance Care Plannin minutes  Goals of Care: 9 minutes    Total Time Spent: 91 minutes      Advance Care Planning   Advance Directives:   Living Will: Yes        Copy on chart: Yes    [place]: No    Do Not Resuscitate Status: Yes    Medical Power of : Yes      Decision Making:  Patient answered questions and Family answered questions       Significant Labs: All pertinent labs within the past 24 hours have been reviewed.  CBC:   Recent Labs   Lab 22  0811   WBC 11.10   HGB 8.4*   HCT 27.2*   MCV 89   *     BMP:  Recent Labs   Lab 22  0811   *      K 4.1      CO2 25   BUN 29*   CREATININE 1.4   CALCIUM 8.8   MG 1.8     LFT:  Lab Results   Component Value Date    AST 12 2022    ALKPHOS 103 2022    BILITOT 0.2 2022     Albumin:   Albumin   Date Value Ref Range Status   2022 2.3 (L) 3.5 - 5.2 g/dL Final     Protein:   Total Protein    Date Value Ref Range Status   08/25/2022 5.7 (L) 6.0 - 8.4 g/dL Final     Lactic acid:   Lab Results   Component Value Date    LACTATE 0.6 08/24/2022    LACTATE 0.7 08/19/2022       Significant Imaging: I have reviewed all pertinent imaging results/findings within the past 24 hours.        > 50% of 91 min visit spent in chart review, face to face discussion of goals of care,  symptom assessment, coordination of care and emotional support.    Katie Coffman NP  Palliative Medicine  [facility]      Advance Care Planning     Date: 08/25/2022    [facility]  I engaged the patient and family in a conversation about advance care planning and we specifically addressed what the goals of care would be moving forward, in light of the patient's change in clinical status, specifically CKD with suspected metastatic cancer.  We did specifically address the patient's likely prognosis, which is fair .  We explored the patient's values and preferences for future care.  The patient and family endorses that what is most important right now is to focus on spending time at home, avoiding the hospital, symptom/pain control, quality of life, even if it means sacrificing a little time and comfort and QOL     Accordingly, we have decided that the best plan to meet the patient's goals includes enrolling in hospice care    I did explain the role for hospice care at this stage of the patient's illness, including its ability to help the patient live with the best quality of life possible.  We will be making a hospice referral.    I spent a total of 18 minutes engaging the patient in this advance care planning discussion.

## 2022-08-25 NOTE — H&P
[facility] Medicine H&P Note     Admitting Team: [facility] Hospitalist Team B   Attending Physician: Agapito Coronel MD  Resident: Danny   Intern: Romel     Date of Admit: 2022    Chief Complaint     Back pain x 3 days     Subjective:      History of Present Illness:  Brittany Haile is a 82 y.o. female who has a past medical history of COPD, CKDIII, GERD, HLD, HTN, GERN, CHF, PAD with claudication and recent RLL PNA (discharged ). The patient presented to [facility] on 22 with a primary complain of left sided back pain. In the ED, CT Abdomen concerning for bilateral (R>L) pleural effusions.     Patients daughter and son in law at bedside, majority of information obtained per daughter (care giver) as patient is very hard of hearing. Daughter states she was in her normal state of health until Monday, when patient began experiencing severe pain. Patient's daughter and sitter have noticed general moaning and pain with moving patient. She states the pain is localized to her L back. She states this morning she also noticed increased fluid on patient, started on upper back and has moved to flanks. Patient has baseline shortness of breath 2/2 COPD requiring 2L O2 at home, no increased shortness of breath noted. Daughter says patient has had very little PO intake since moving her 2 weeks ago from [place]. Daughter denies her mother endorsing difficulty urinating, dysuria, fever, chills, abdominal pain, chest pain, nausea, or vomiting.     Of note, patient was recently admitted to [facility] B for RLL Pneumonia from -. At that time, CXR showed RLL consolidation. Patient was given azitrhomycin and roceph while inpatient, discharged on PO levofloxacin x 4 days. Patient has been compliant with home ABX, not completed as patient was discharged two days ago.     Past Medical History:  As above     Past Surgical History:  Carotid Stent    Colonoscopy (2019)  Coronary Angioplasty  ()  DAMON L2-L3  EGD 2019  Hysterectomy w/oopherectomy   Cholecystectomy  SMA stent   Allergies:  No known allergies     Home Medications:  Atorvastatin 40mg  Irbesartan 300mg qD  Clopidogrel 75mg  Escitalopram 10mg  Amlodipine 10mg qD  Cilosatazol 50mg BID   Trazodone 50mg daily  Levothyroxine 88mcg  PRN albuterol  PRN Flonase   Levofloxacin (discharge on - expected to finish )     Family History:  Family History   Problem Relation Age of Onset    No Known Problems Mother     Cancer Sister         breast    Cancer Brother         throat cancer       Social History:  Social History     Tobacco Use    Smoking status: Current Every Day Smoker     Packs/day: 0.50     Years: 60.00     Pack years: 30.00     Types: Cigarettes    Smokeless tobacco: Never Used    Tobacco comment: Pt declines enrollment in Tobacco Trust / Ambulatory Smoking Cessation program.    Substance Use Topics    Alcohol use: No    Drug use: No       Review of Systems:  Pertinent items are noted in HPI. All other systems are reviewed and are negative.    Health Maintaince :   Primary Care Physician: Freddie     Immunizations:   TDap : up to date ()   Flu : not up to date   Pna up to date (PCV 23 , PCV 13 )     Cancer Screening:  PAP: s/p hysterectomy   MMG: not up to date ()   Colonoscopy: last , repeat not recommended 2/2 age      Objective:   Last 24 Hour Vital Signs:  BP  Min: 112/55  Max: 131/60  Temp  Av.3 °F (36.8 °C)  Min: 98.1 °F (36.7 °C)  Max: 98.4 °F (36.9 °C)  Pulse  Av.7  Min: 80  Max: 88  Resp  Av  Min: 16  Max: 22  SpO2  Av.5 %  Min: 95 %  Max: 96 %  Weight  Av.7 kg (114 lb)  Min: 51.7 kg (114 lb)  Max: 51.7 kg (114 lb)  Body mass index is 20.85 kg/m².  No intake/output data recorded.    Physical Examination:  Physical Exam  Constitutional:       Appearance: Normal appearance.      Comments: Hard of hearing, no acute distress   HENT:      Head: Normocephalic and atraumatic.    Eyes:      Extraocular Movements: Extraocular movements intact.      Conjunctiva/sclera: Conjunctivae normal.      Pupils: Pupils are equal, round, and reactive to light.      Comments: Arcus senilis    Neck:      Comments: JVD 5  Cardiovascular:      Rate and Rhythm: Normal rate and regular rhythm.      Pulses: Normal pulses.      Heart sounds: Normal heart sounds. No murmur heard.  Pulmonary:      Effort: Pulmonary effort is normal.      Comments: Bibasilar crackles (R>L)   Abdominal:      Tenderness: There is no right CVA tenderness, left CVA tenderness, guarding or rebound.      Comments: Flank edema    Musculoskeletal:         General: Normal range of motion.      Cervical back: Normal range of motion.      Right lower leg: Edema present.      Left lower leg: Edema present.      Comments: +1 bilateral LE edema   nontender to palpation along paraspinal, SI joint, or flanks    Skin:     Comments: No sacral edema    Neurological:      General: No focal deficit present.      Mental Status: Mental status is at baseline.       Laboratory:  Most Recent Data:  CBC:   Lab Results   Component Value Date    WBC 11.40 08/24/2022    HGB 9.2 (L) 08/24/2022    HCT 28.5 (L) 08/24/2022     (H) 08/24/2022    MCV 87 08/24/2022    RDW 14.2 08/24/2022     WBC Differential: 79.6 % N,  0.6 % Bands, 9.3 % L, 9.2 % M, 1.1 % Eo, 0.2 % Baso    BMP:   Lab Results   Component Value Date     08/24/2022    K 3.8 08/24/2022     08/24/2022    CO2 20 (L) 08/24/2022    BUN 31 (H) 08/24/2022    CREATININE 1.6 (H) 08/24/2022     (H) 08/24/2022    CALCIUM 8.4 (L) 08/24/2022    MG 1.8 08/21/2022    PHOS 2.8 08/21/2022     LFTs:   Lab Results   Component Value Date    PROT 5.7 (L) 08/24/2022    ALBUMIN 2.6 (L) 08/24/2022    BILITOT 0.2 08/24/2022    AST 16 08/24/2022    ALKPHOS 112 08/24/2022    ALT 25 08/24/2022     Coags:   Lab Results   Component Value Date    INR 1.1 08/24/2022     FLP:   Lab Results   Component Value  Date    CHOL 107 (L) 03/03/2020    CHOL 107 (L) 03/03/2020    HDL 46 03/03/2020    HDL 46 03/03/2020    LDLCALC 49.2 (L) 03/03/2020    LDLCALC 49.2 (L) 03/03/2020    TRIG 59 03/03/2020    TRIG 59 03/03/2020    CHOLHDL 43.0 03/03/2020    CHOLHDL 43.0 03/03/2020     DM:   Lab Results   Component Value Date    HGBA1C 5.6 07/29/2019    HGBA1C 5.6 07/25/2018    HGBA1C 5.7 (H) 01/03/2018    LDLCALC 49.2 (L) 03/03/2020    LDLCALC 49.2 (L) 03/03/2020    CREATININE 1.6 (H) 08/24/2022     Thyroid:   Lab Results   Component Value Date    TSH 0.909 08/19/2022    FREET4 1.17 07/29/2019     Anemia:   Lab Results   Component Value Date    IRON 108 01/21/2020    TIBC 334 01/21/2020    FERRITIN 187 01/21/2020    XVLDYHJR18 272 01/18/2017    FOLATE 9.2 01/20/2016     Cardiac:   Lab Results   Component Value Date    TROPONINI 0.017 08/24/2022    BNP 30 08/24/2022     Urinalysis:   Lab Results   Component Value Date    LABURIN No growth 04/20/2017    COLORU Yellow 08/19/2022    SPECGRAV 1.010 08/19/2022    NITRITE Negative 08/19/2022    KETONESU Negative 08/19/2022    UROBILINOGEN Negative 08/19/2022    WBCUA 1 03/02/2020       Trended Lab Data:  Recent Labs   Lab 08/19/22  1116 08/20/22  0355 08/21/22  0328 08/24/22  1649   WBC 6.67 8.87 10.42 11.40   HGB 9.0* 9.1* 8.9* 9.2*   HCT 28.1* 28.7* 26.9* 28.5*   * 480* 405 488*   MCV 90 90 87 87   RDW 14.0 13.9 13.8 14.2    139 135* 137   K 4.1 3.9 3.6 3.8    104 100 102   CO2 28 26 27 20*   BUN 13 12 16 31*   CREATININE 0.8 0.7 0.8 1.6*    105 127* 134*   PROT 5.6*  --   --  5.7*   ALBUMIN 2.8*  --   --  2.6*   BILITOT 0.4  --   --  0.2   AST 10  --   --  16   ALKPHOS 114  --   --  112   ALT 7*  --   --  25       Trended Cardiac Data:  Recent Labs   Lab 08/19/22  1116 08/24/22  1649   TROPONINI 0.015 0.017   BNP 44 30       Microbiology Data:  N/A     Other Results:  EKG (my interpretation): sinus rhythm     Radiology:  CT Chest/Abdomen : nodular, foci in lungs.  Bibasilar pleural effusions (R>L), small pericardial effusion, large hepatic mass, adrenal mass, inguinal adenopathy. Severe atherosclerotic changes in arteries (aorta, celiac, SMA, renal, iliacs)     Assessment:     Brittany Haile is a 82 y.o. female with:  Patient Active Problem List    Diagnosis Date Noted    Pleural effusion 08/24/2022    Left flank pain 08/24/2022    PNA (pneumonia) 03/03/2020    Essential hypertension 03/03/2020    Pneumonia due to infectious organism 03/02/2020    Thrombocytopenia 01/21/2020    Elevated troponin     Acute nonintractable headache     Non-intractable vomiting     Anemia 09/11/2019    Epistaxis 05/13/2019    Chronic obstructive pulmonary disease 01/31/2019    Gastroesophageal reflux disease 01/31/2019    Moderate recurrent major depression 07/25/2018    Lumbar radiculopathy 05/03/2018    Chronic bilateral low back pain with bilateral sciatica 04/20/2018    Lumbar spondylosis 04/20/2018    Spinal stenosis of lumbar region with neurogenic claudication 04/20/2018    DDD (degenerative disc disease), lumbar 04/20/2018    Chronic diastolic heart failure 01/03/2018    Stroke 10/03/2017    Aortic atherosclerosis 10/03/2017    Mesenteric angina 04/17/2017    Angina mesenteric 04/13/2017    Nicotine dependence, cigarettes, w unsp disorders 04/13/2017    Coronary artery disease of native artery of native heart with stable angina pectoris 04/13/2017    Abdominal pain 03/02/2017    Gastritis 03/02/2017    CVD (cerebrovascular disease) 10/14/2016    Chest pain 10/14/2016    Subclavian artery stenosis, left 04/15/2016    Glucose intolerance (impaired glucose tolerance) 11/11/2015    Iron deficiency anemia due to chronic blood loss 10/22/2015    Leg pain, bilateral 10/13/2015    PVD (peripheral vascular disease) with claudication 10/13/2015    Chronic kidney disease, stage III (moderate) 08/19/2015    COPD (chronic obstructive pulmonary disease) 06/04/2015     HLD (hyperlipidemia) 06/04/2015    Community acquired pneumonia of right lower lobe of lung 08/06/2013    Incidental pulmonary nodule, > 3mm and < 8mm 08/01/2013    History of stroke without residual deficits 04/12/2013    Carotid stenosis 04/12/2013    Hypothyroid 04/12/2013    Hypertension 04/12/2013    Anxiety 04/12/2013        Plan:   Pleural Effusions  -recently admitted with RLL consolidation, documented as PNA and treated with ABX  -CT Chest/Abdomen showing bilateral effusions (R>L)  -CXR pending   -etiologies possible: parapnuemonic effusions, malignant, PNA  -concern for malignancy given CT Abdomen findings of pulmonic foci, adrenal mass, and hepatic mass  -pulmonology consultation in the morning pending clinical correlation, feel thoracentesis is warranted to rule out malignancy     Back Pain  -PRN Tylenol 500mg  -lidocaine patch and heating pad   -U/A and Uc pending   -troponin negative x 1   -EKG pending     CJ on CKD3  -Cr 1.6 (baseline ~ 1)  -urine lights ([facility], [facility], [facility], [facility]) and renal ultrasound pending  -U/A and Uc pending   -bladder scan pending   -continue to monitor with daily CMP     Normocytic anemia  -H/H 9.2, 28.5 (consistent with baseline)  -MCV 87   -hx iron deficiency anemia, on home iron supplementation   -iron studies, B12, B9, ferritin pending  -continue to monitor with daily CBC    RLL PNA  -continue discharged levofloxacin x 2 days     COPD  Chronic hypoxic respiratory failure  -pt on home 2L NC satting 96%   -counseled pt that O2 goals given her COPD are 88-92%  -contnue home albuterol inhaler PRN      PAD  -pt with significant hx of arterial atherosclerotic disease  -previous mesenteric and carotid stenting  -continue home Atorvastatin and Clopidogrel     HTN  -on admit pt bp 134/60 on admit  -continue home amlodipine 5mg and losartan 100mg qD     HLD  -continue home atorvastatin 40mg     Hypothyroidism  -last TSH: .9 and freee T4: 1.17  -continue home Levothyroxine  88mg    Code: DNR   Diet: Cardiac + Boost   PPX: Lovenox, SCDs     Dispo: pending work up of pleural effusions and resolution of CJ     Adam Urena MD  [facility] -HO1     [facility] Medicine Hospitalist Pager numbers:   [facility] Hospitalist Medicine Team A (Bradley/Kristin): [phone]  [facility] Hospitalist Medicine Team B (Homer/James):  [phone]

## 2022-08-25 NOTE — HPI
"Per chart, "Brittany Haile is a 82 y.o. female who has a past medical history of COPD, CKDIII, GERD, HLD, HTN, GERN, CHF, PAD with claudication and recent RLL PNA (discharged 08/21). The patient presented to [facility] on 08/24/22 with a primary complain of left sided back pain. In the ED, CT Abdomen concerning for bilateral (R>L) pleural effusions.      Patients daughter and son in law at bedside, majority of information obtained per daughter (care giver) as patient is very hard of hearing. Daughter states she was in her normal state of health until Monday, when patient began experiencing severe pain. Patient's daughter and sitter have noticed general moaning and pain with moving patient. She states the pain is localized to her L back. She states this morning she also noticed increased fluid on patient, started on upper back and has moved to flanks. Patient has baseline shortness of breath 2/2 COPD requiring 2L O2 at home, no increased shortness of breath noted. Daughter says patient has had very little PO intake since moving her 2 weeks ago from [place]. Daughter denies her mother endorsing difficulty urinating, dysuria, fever, chills, abdominal pain, chest pain, nausea, or vomiting.      Of note, patient was recently admitted to [facility] Team B for RLL Pneumonia from 08/19-08/21. At that time, CXR showed RLL consolidation. Patient was given azitrhomycin and roceph while inpatient, discharged on PO levofloxacin x 4 days. Patient has been compliant with home ABX, not completed as patient was discharged two days ago. "  "

## 2022-08-25 NOTE — SUBJECTIVE & OBJECTIVE
Interval History: Imaging revealed lung masses/liver mass/adrenal mass concerning for metastatic cancer. Dr Cortez consulted.      Past Medical History:   Diagnosis Date    Abdominal pain     CHF (congestive heart failure)     Chronic kidney disease, stage III (moderate) 2015    COPD (chronic obstructive pulmonary disease)     COPD (chronic obstructive pulmonary disease)     CVA (cerebral infarction)     Esophagitis     Gastritis     GERD (gastroesophageal reflux disease)     GI bleed     Hyperlipidemia     Hypertension     Mesenteric angina     PVD (peripheral vascular disease) with claudication 10/13/2015    Stroke     Subclavian artery stenosis, left     Thyroid disease        Past Surgical History:   Procedure Laterality Date    CAROTID STENT       SECTION      COLONOSCOPY N/A 3/2/2017    Procedure: COLONOSCOPY;  Surgeon: Cecil Crooks MD;  Location: [facility] ENDO;  Service: Endoscopy;  Laterality: N/A;    COLONOSCOPY N/A 2019    Procedure: COLONOSCOPY/Suprep;  Surgeon: Barry Talbert MD;  Location: [facility] ENDO;  Service: Endoscopy;  Laterality: N/A;    CORONARY ANGIOPLASTY  2006    EPIDURAL STEROID INJECTION INTO LUMBAR SPINE N/A 2018    Procedure: INJECTION, STEROID, SPINE, LUMBAR, EPIDURAL- L2-3 ORAL SEDATION;  Surgeon: Stiven Ibarra Jr., MD;  Location: [facility] PAIN MGT;  Service: Pain Management;  Laterality: N/A;    ESOPHAGOGASTRODUODENOSCOPY N/A 2019    Procedure: EGD (ESOPHAGOGASTRODUODENOSCOPY);  Surgeon: Barry Talbert MD;  Location: [facility] ENDO;  Service: Endoscopy;  Laterality: N/A;    GALLBLADDER SURGERY      HYSTERECTOMY      INTRALUMINAL GASTROINTESTINAL TRACT IMAGING VIA CAPSULE N/A 2019    Procedure: IMAGING PROCEDURE, GI TRACT, INTRALUMINAL, VIA CAPSULE;  Surgeon: Barry Talbert MD;  Location: [facility] ENDO;  Service: Endoscopy;  Laterality: N/A;    OOPHORECTOMY      SUPERIOR MESTENTERIC ARTERY STENT         Review of patient's allergies  indicates:  No Known Allergies    Medications:  Continuous Infusions:  Scheduled Meds:   atorvastatin  40 mg Oral Daily    cilostazoL  50 mg Oral BID    clopidogreL  75 mg Oral Daily    enoxaparin  30 mg Subcutaneous Daily    EScitalopram oxalate  10 mg Oral QHS    levoFLOXacin  750 mg Oral Q48H    levothyroxine  88 mcg Oral Daily    LIDOcaine  1 patch Transdermal Q24H    traZODone  50 mg Oral QHS     PRN Meds:acetaminophen, albuterol-ipratropium, dextrose 10%, dextrose 10%, glucagon (human recombinant), glucose, glucose, naloxone, sodium chloride 0.9%    Family History       Problem Relation (Age of Onset)    Cancer Sister, Brother    No Known Problems Mother          Tobacco Use    Smoking status: Current Every Day Smoker     Packs/day: 0.50     Years: 60.00     Pack years: 30.00     Types: Cigarettes    Smokeless tobacco: Never Used    Tobacco comment: Pt declines enrollment in Tobacco Trust / Ambulatory Smoking Cessation program.    Substance and Sexual Activity    Alcohol use: No    Drug use: No    Sexual activity: Not on file       Review of Systems   Constitutional:  Positive for activity change, appetite change and fatigue.   Respiratory:  Positive for cough and shortness of breath. Negative for wheezing.    Cardiovascular:  Positive for leg swelling.   Gastrointestinal:  Negative for abdominal distention and abdominal pain.   Musculoskeletal:  Positive for back pain.   Skin:  Negative for rash.   Neurological:  Negative for headaches.   Objective:     Vital Signs (Most Recent):  Temp: 98.9 °F (37.2 °C) (08/25/22 1143)  Pulse: 85 (08/25/22 1246)  Resp: 18 (08/25/22 1143)  BP: (!) 128/59 (08/25/22 1143)  SpO2: (!) 92 % (08/25/22 0800)   Vital Signs (24h Range):  Temp:  [98.1 °F (36.7 °C)-98.9 °F (37.2 °C)] 98.9 °F (37.2 °C)  Pulse:  [80-96] 85  Resp:  [16-22] 18  SpO2:  [90 %-96 %] 92 %  BP: (112-136)/(52-61) 128/59     Weight: 51 kg (112 lb 7 oz)  Body mass index is 21.24 kg/m².    Physical  Exam  Constitutional:       Appearance: She is ill-appearing (chronically).   HENT:      Head: Normocephalic.      Mouth/Throat:      Mouth: Mucous membranes are dry.   Cardiovascular:      Rate and Rhythm: Normal rate.      Pulses: Normal pulses.   Pulmonary:      Effort: Pulmonary effort is normal.      Breath sounds: Decreased air movement present. Rales present. No wheezing.   Abdominal:      Palpations: Abdomen is soft.   Musculoskeletal:      Right lower leg: Edema present.      Left lower leg: Edema present.   Skin:     General: Skin is warm.      Capillary Refill: Capillary refill takes 2 to 3 seconds.      Coloration: Skin is pale.   Neurological:      Mental Status: She is alert. Mental status is at baseline.   Psychiatric:         Mood and Affect: Mood normal.       Review of Symptoms      Symptom Assessment (ESAS 0-10 Scale)  Pain:  0  Dyspnea:  0  Anxiety:  0  Nausea:  0  Depression:  0  Anorexia:  0  Fatigue:  0  Insomnia:  0  Restlessness:  0  Agitation:  0         Performance Status:  40    Living Arrangements:  Lives with family and Lives in home    Psychosocial/Cultural: Pt lives with her daughter China.  Pt recently moved back to [place] from [place]. Pt uses a cane and walker.      Spiritual:  F - Genesis and Belief:  Sikhism   I - Importance:  Moderate   A - Address in Care:  Home Taoist group support prn      Time-Based Charting:  Yes  Chart Review: 12 minutes  Face to Face: 25 minutes  Symptom Assessment: 11 minutes  Coordination of Care: 13 minutes  Discharge Plannin minutes  Advance Care Plannin minutes  Goals of Care: 9 minutes    Total Time Spent: 91 minutes      Advance Care Planning   Advance Directives:   Living Will: Yes        Copy on chart: Yes    LaPOST: No    Do Not Resuscitate Status: Yes    Medical Power of : Yes      Decision Making:  Patient answered questions and Family answered questions       Significant Labs: All pertinent labs within the past 24 hours  have been reviewed.  CBC:   Recent Labs   Lab 08/25/22  0811   WBC 11.10   HGB 8.4*   HCT 27.2*   MCV 89   *     BMP:  Recent Labs   Lab 08/25/22  0811   *      K 4.1      CO2 25   BUN 29*   CREATININE 1.4   CALCIUM 8.8   MG 1.8     LFT:  Lab Results   Component Value Date    AST 12 08/25/2022    ALKPHOS 103 08/25/2022    BILITOT 0.2 08/25/2022     Albumin:   Albumin   Date Value Ref Range Status   08/25/2022 2.3 (L) 3.5 - 5.2 g/dL Final     Protein:   Total Protein   Date Value Ref Range Status   08/25/2022 5.7 (L) 6.0 - 8.4 g/dL Final     Lactic acid:   Lab Results   Component Value Date    LACTATE 0.6 08/24/2022    LACTATE 0.7 08/19/2022       Significant Imaging: I have reviewed all pertinent imaging results/findings within the past 24 hours.

## 2022-08-25 NOTE — NURSING
Patient arrived to room 418 transferred from emergency room per stretcher. Head to toe assessment complete. No acute distress noted at this time. Vital signs stable. Patient remains stable daughter at bedside. Will continue to monitor.

## 2022-08-25 NOTE — PROGRESS NOTES
[facility]           Pharmacy        Current Drug Shortage     Due to national backorder and [facility] is critically low on inventory of Dextrose 50% (D50) Syringes and Vials, pharmacy has automatically switched from D50% to D10% IVPB at the equivalent dose until resolution of the shortage per P&T approved protocol.               Linden Garcia PharmD, [facility]  [phone]

## 2022-08-25 NOTE — ASSESSMENT & PLAN NOTE
At time of initial consult pt resting in bed with daughters and grandchildren at bedside.  Family receptive to palliative visit at this time. Pt has four children and multiple grandchildren and great grandchildren.  Pt recently moved back to [place] from [place] and lives with her daughter China.  Pt is not receiving any home health services at this time.      Per family, pt has been in a state of decline over the past few months. Pt is becoming more dependent on others and now requires assistance with all ADLS.  Family also reports pt having decreased PO intake over the past few weeks.  Of note, pt was recently admitted for pneumonia and discharged on 8/21/2022.  Family and patient report intermittent severe back pain that began after discharge.  Upon this admit, imaging revealed lung masses/liver mass/ adrenal mass concerning for metastatic cancer.  See Dr Morocho notes.  We discussed these new findings as well as treatment goals.  Family is in agreement that the patient would not want to move forward with cancer directed care or diagnostics as it would not change their focus.  Family would like to focus on the comfort of the pt.  The family stressed the importance of the patient remaining at home with them as well as remain comfortable.  We discussed hospice by name and what hospice may offer the patient and family. Family in agreement that enrolling in home hospice aligns with their care goals.  Consents signed with hospice company and equipment will be delivered tomorrow to the pts home.      Discussion had as to wether the possibly discussed thoracentesis would offer the patient pain relief as well as allow for cytology to be sent off.  Family in agreement that if this is a palliative measure they would like to have further information.  If this was discussed as a diagnostic option, the family would decline.  Family informed me that pulmonology was consulted and they planned to discuss this further with  them.  Defer this topic to pulm.  Family has a clear comfort goals and has a realistic grasp on the pts prognosis.     Per conversation with admitting team regarding pts pain, I recommend liquid morphine 5mg Q2 for moderate pain or 10 mg Q2 for severe pain.   Given her age the pt may benefit from trying smaller more frequent doses for symptom control. Liquid dosing assists in finding a dosing regime focused on simplicity and comfort for the pt under home hospice. Also, Oxycodone maybe stimulating and raises risk for delirium for this patient so I would avoid if possible.     Recommendations-  -Pt enroll in home hospice to meet care goals   -liquid morphine 5mg Q2 for moderate pain or 10 mg Q2 for severe pain  -pt remains a DNR

## 2022-08-25 NOTE — CONSULTS
[facility] Surg  Hematology/Oncology  Consult Note    Patient Name: Brittany Haile  MRN: [id number]  Admission Date: 8/24/2022  Hospital Length of Stay: 0 days  Code Status: DNR   Attending Provider: Agapito Coronel MD  Consulting Provider: Kamron Cortez MD  Primary Care Physician: Wayne Frazier MD  Principal Problem:<principal problem not specified>    Inpatient consult to Telemedicine - Oncology  Consult performed by: Kamron Cortez MD  Consult ordered by: Kamron Cortez MD  Reason for consult: liver/lung/adrenal masses concerning for metastatic cancer        Subjective:     HPI:  82 year-old female with nicotine dependence, recently admitted for pneumonia, was admitted on 8/24/22 for back pain. Imaging revealed lung masses/liver mass/adrenal mass concerning for metastatic cancer. Consult is for likely metastatic cancer.      VIRTUAL TELENOTE    Start time:12:00pm  Chief complaint: metastatic cancer  [facility]  The patient arrived at: 12:00pm  Present with the patient at the time of the telemed/virtual assessment: sister, family members    End time:  12:15pm    Total time spent with patient: 15 minutes (25 minutes to see patient and write note)    The attending portion of this evaluation, treatment, and documentation was performed per Kamron Cortez MD via Telemedicine AudioVisual using the secure Ylopo software platform with 2 way audio/video. The provider was located off-site and the patient is located in the hospital. The aforementioned video software was utilized to document the relevant history and physical exam.    - she endorses fatigue, dyspnea upon exertion, cough, back pain, weakness. Patient's daughter, whom the patient lives with, states that she does not think patient wants to proceed with biopsy.    Oncology Treatment Plan:   [Could not find a treatment plan. This SmartLink may be configured incorrectly. Contact a  for help.]    Medications:  Continuous  Infusions:  Scheduled Meds:   atorvastatin  40 mg Oral Daily    cilostazoL  50 mg Oral BID    clopidogreL  75 mg Oral Daily    enoxaparin  30 mg Subcutaneous Daily    EScitalopram oxalate  10 mg Oral QHS    levoFLOXacin  750 mg Oral Q48H    levothyroxine  88 mcg Oral Daily    LIDOcaine  1 patch Transdermal Q24H    traZODone  50 mg Oral QHS     PRN Meds:acetaminophen, albuterol-ipratropium, dextrose 10%, dextrose 10%, glucagon (human recombinant), glucose, glucose, naloxone, sodium chloride 0.9%     Review of patient's allergies indicates:  No Known Allergies     Past Medical History:   Diagnosis Date    Abdominal pain     CHF (congestive heart failure)     Chronic kidney disease, stage III (moderate) 2015    COPD (chronic obstructive pulmonary disease)     COPD (chronic obstructive pulmonary disease)     CVA (cerebral infarction)     Esophagitis     Gastritis     GERD (gastroesophageal reflux disease)     GI bleed     Hyperlipidemia     Hypertension     Mesenteric angina     PVD (peripheral vascular disease) with claudication 10/13/2015    Stroke     Subclavian artery stenosis, left     Thyroid disease      Past Surgical History:   Procedure Laterality Date    CAROTID STENT       SECTION      COLONOSCOPY N/A 3/2/2017    Procedure: COLONOSCOPY;  Surgeon: Cecil Crooks MD;  Location: [facility] ENDO;  Service: Endoscopy;  Laterality: N/A;    COLONOSCOPY N/A 2019    Procedure: COLONOSCOPY/Suprep;  Surgeon: Barry Talbert MD;  Location: [facility] ENDO;  Service: Endoscopy;  Laterality: N/A;    CORONARY ANGIOPLASTY      EPIDURAL STEROID INJECTION INTO LUMBAR SPINE N/A 2018    Procedure: INJECTION, STEROID, SPINE, LUMBAR, EPIDURAL- L2-3 ORAL SEDATION;  Surgeon: Stiven Ibarra Jr., MD;  Location: [facility] PAIN MGT;  Service: Pain Management;  Laterality: N/A;    ESOPHAGOGASTRODUODENOSCOPY N/A 2019    Procedure: EGD (ESOPHAGOGASTRODUODENOSCOPY);  Surgeon:  Barry Talbert MD;  Location: [facility] ENDO;  Service: Endoscopy;  Laterality: N/A;    GALLBLADDER SURGERY      HYSTERECTOMY      INTRALUMINAL GASTROINTESTINAL TRACT IMAGING VIA CAPSULE N/A 9/18/2019    Procedure: IMAGING PROCEDURE, GI TRACT, INTRALUMINAL, VIA CAPSULE;  Surgeon: Barry Talbert MD;  Location: [facility] ENDO;  Service: Endoscopy;  Laterality: N/A;    OOPHORECTOMY      SUPERIOR MESTENTERIC ARTERY STENT       Family History       Problem Relation (Age of Onset)    Cancer Sister, Brother    No Known Problems Mother          Tobacco Use    Smoking status: Current Every Day Smoker     Packs/day: 0.50     Years: 60.00     Pack years: 30.00     Types: Cigarettes    Smokeless tobacco: Never Used    Tobacco comment: Pt declines enrollment in [employer] / Ambulatory Smoking Cessation program.    Substance and Sexual Activity    Alcohol use: No    Drug use: No    Sexual activity: Not on file       Review of Systems   Constitutional:  Positive for fatigue.   HENT:  Negative for sore throat.    Eyes:  Negative for visual disturbance.   Respiratory:  Positive for cough and shortness of breath.    Cardiovascular:  Negative for chest pain.   Gastrointestinal:  Positive for abdominal pain. Negative for constipation, diarrhea, nausea and vomiting.   Genitourinary:  Negative for dysuria.   Musculoskeletal:  Positive for back pain.   Skin:  Negative for rash.   Neurological:  Positive for weakness. Negative for headaches.   Hematological:  Negative for adenopathy.   Psychiatric/Behavioral:  The patient is not nervous/anxious.    Objective:     Vital Signs (Most Recent):  Temp: 98.9 °F (37.2 °C) (08/25/22 1143)  Pulse: 84 (08/25/22 1143)  Resp: 18 (08/25/22 1143)  BP: (!) 128/59 (08/25/22 1143)  SpO2: (!) 92 % (08/25/22 0800)   Vital Signs (24h Range):  Temp:  [98.1 °F (36.7 °C)-98.9 °F (37.2 °C)] 98.9 °F (37.2 °C)  Pulse:  [80-96] 84  Resp:  [16-22] 18  SpO2:  [90 %-96 %] 92 %  BP:  (112-136)/(52-61) 128/59     Weight: 51 kg (112 lb 7 oz)  Body mass index is 21.24 kg/m².  Body surface area is 1.48 meters squared.    No intake or output data in the 24 hours ending 08/25/22 1213    Physical Exam  Deferred due to telemedicine visit.    Significant Labs:   Labs have been reviewed.    Lab Results   Component Value Date    WBC 11.10 08/25/2022    HGB 8.4 (L) 08/25/2022    HCT 27.2 (L) 08/25/2022    MCV 89 08/25/2022     (H) 08/25/2022       CMP  Sodium   Date Value Ref Range Status   08/25/2022 140 136 - 145 mmol/L Final     Potassium   Date Value Ref Range Status   08/25/2022 4.1 3.5 - 5.1 mmol/L Final     Chloride   Date Value Ref Range Status   08/25/2022 103 95 - 110 mmol/L Final     CO2   Date Value Ref Range Status   08/25/2022 25 23 - 29 mmol/L Final     Glucose   Date Value Ref Range Status   08/25/2022 114 (H) 70 - 110 mg/dL Final     BUN   Date Value Ref Range Status   08/25/2022 29 (H) 8 - 23 mg/dL Final     Creatinine   Date Value Ref Range Status   08/25/2022 1.4 0.5 - 1.4 mg/dL Final     Calcium   Date Value Ref Range Status   08/25/2022 8.8 8.7 - 10.5 mg/dL Final     Total Protein   Date Value Ref Range Status   08/25/2022 5.7 (L) 6.0 - 8.4 g/dL Final     Albumin   Date Value Ref Range Status   08/25/2022 2.3 (L) 3.5 - 5.2 g/dL Final     Total Bilirubin   Date Value Ref Range Status   08/25/2022 0.2 0.1 - 1.0 mg/dL Final     Comment:     For infants and newborns, interpretation of results should be based  on gestational age, weight and in agreement with clinical  observations.    Premature Infant recommended reference ranges:  Up to 24 hours.............<8.0 mg/dL  Up to 48 hours............<12.0 mg/dL  3-5 days..................<15.0 mg/dL  6-29 days.................<15.0 mg/dL       Alkaline Phosphatase   Date Value Ref Range Status   08/25/2022 103 55 - 135 U/L Final     AST   Date Value Ref Range Status   08/25/2022 12 10 - 40 U/L Final     ALT   Date Value Ref Range Status    08/25/2022 19 10 - 44 U/L Final     Anion Gap   Date Value Ref Range Status   08/25/2022 12 8 - 16 mmol/L Final     eGFR if    Date Value Ref Range Status   03/06/2020 >60 >60 mL/min/1.73 m^2 Final     eGFR if non    Date Value Ref Range Status   03/06/2020 53 (A) >60 mL/min/1.73 m^2 Final     Comment:     Calculation used to obtain the estimated glomerular filtration  rate (eGFR) is the CKD-EPI equation.            Diagnostic Results:  CT chest/abdomen/pelvis (8/24/22): I have personally reviewed the images    Chest:     Heart and great vessels: The heart is normal size.  There is a small pericardial effusion.     Adenopathy: None demonstrated.     Lungs: 1 cm ovoid focus of mild ground-glass attenuation in right upper lobe near the apex, similar to the prior study.     Few stable tiny 2-3 mm pulmonary nodules in the upper lobes bilaterally and superior left lower lobe.  This is seen in left upper lobe on axial 26, axial 50, left lower lobe axial 76, right upper lobe axial 64.  Small 6 mm pleural base nodule is noted in the anterior right middle lobe on axial 284 series 6.     4 mm pulmonary nodule in the superior right lower lobe on axial 125.     1.6 cm nodular airspace disease in the right lower lobe laterally on axial 197.     2.6 cm ovoid airspace disease right lower lobe on axial 298.     Minimal subcentimeter ground-glass attenuation left upper lobe on axial 43 of series 6.     Minimal interstitial and ground-glass attenuation left upper lobe on axial 1 1.     2 mm left upper lobe nodule axial 70 of series 6     Mild to moderate right posterior pleural effusion and small left pleural effusion.  There is bibasilar adjacent airspace disease right greater than left could be associated with atelectasis and consolidation.  Pneumonia is a consideration.     Abdomen:     Liver: There is a large heterogeneous hepatic mass centrally in the right hepatic lobe measuring approximately  8.0 x 8.4 cm on axial 43.  This could represent primary or metastatic disease.  Follow-up is recommended.  Study is limited by lack of IV contrast.     There are other smaller hypodense foci in the right hepatic lobe inferiorly suspicious for metastatic disease.     Gallbladder and biliary: Within normal limits.     Spleen: Within normal limits.     Pancreas: Within normal limits.     Adrenals: 3.2 cm right adrenal mass is suspicious for metastatic disease.     Kidneys: Fluid density probable renal cyst on the left.  No stone, soft tissue mass or hydronephrosis bilaterally on limited visualization.     Bowel: No evidence of bowel obstruction.  There is diverticulosis of the sigmoid colon.  No evidence of acute diverticulitis.  Retained feces throughout the colon.     The appendix is not identified.     Peritoneum: No ascites or pneumoperitoneum.     Abdominal Adenopathy: None.     Vasculature: Severe atherosclerotic changes of the aorta, celiac, SMA, renal and iliac arteries.  Probable flow-limiting stenosis.  Limited characterization on this noncontrast study     Pelvis:     Urinary bladder: Unremarkable.     Status post hysterectomy.     Pelvis adenopathy: 1.2 cm mildly enlarged right inguinal lymph node on axial 155 of series 3     Bones: No acute findings.     Grade 1 retrolisthesis of L2 on L3.  Moderate central canal narrowing.  Degenerative disc space narrowing most prominent at L2-3.     Miscellaneous: None.     Impression:     1. Small nodular, patchy and ground-glass attenuation foci in the lungs are nonspecific though could represent metastatic disease.  See above comments.  The largest area measures approximately 2.6 cm in the right lower lobe.  This could represent focal consolidation or atelectasis associated with right lower lobe pneumonia also.  Follow-up recommended.  2. Bibasilar pleural effusions right greater than left.  3. Small pericardial effusion.  4. Large heterogeneous hepatic mass in the  right lobe suspicious for primary or metastatic disease.  There are other smaller hypodense foci in the right hepatic lobe suspicious for metastatic disease also.  Follow-up recommended.  5. 3.2 cm right adrenal mass suspicious for metastatic disease.  Follow-up recommended.  6. Diverticulosis of the sigmoid colon.  7. Severe atherosclerotic changes of the aorta, celiac, SMA, renal and iliac arteries.  See above comments.  Follow-up recommended.  8. Mild right inguinal adenopathy could represent metastatic disease.  9. Degenerative changes of the lumbar spine.        Assessment/Plan:     Liver mass  - CT imaging (8/24/22) reveals lung/liver/adrenal masses concerning for metastatic cancer. I am concerned that she has metastatic lung cancer, given her nicotine use and pattern of imaging (adrenal metastases are seen in lung cancer).  - I discussed workup for malignancy, which includes a biopsy. She may be undergoing a thoracentesis (pulmonology has been consulted).  - if patient is willing to proceed with workup, I recommend a biopsy of the liver lesion, which would give us both diagnosis and staging information. Family is hesitant to proceed with biopsy.  - recommend palliative care consult. They can discuss symptom control and firm up patient's/family's desires regarding workup and desire for treatment. From my discussion, it sounds like patient would not want to proceed with palliative therapy if offered.  - hold off on MRI brain since patient and family are deciding whether to proceed with further workup.        Thank you for your consult.     Kamron Cortez MD  Hematology/Oncology  [facility] Surg

## 2022-08-25 NOTE — ED NOTES
Dr. Moreno at bedside with WHITNEY Plasencia and WHITNEY Billingsley. Dr. Moreno discussing labs and scans. Also discussing admission to hospital for further observation. Daughter did verbalize that patient was a DNR. Verbally patient did confirm with Dr. Moreno that she did not was CPR or intubation if needed.

## 2022-08-25 NOTE — ASSESSMENT & PLAN NOTE
-PRN Tylenol 500mg   -Lidocaine patch and heating pad  -U/A negative for nitrites, leukocytes, WBC's or RBC's   -Troponin negative x1  -1x dose Norco 5, will reassess pt and tailor pain control regimen accordingly  --liquid morphine 5mg Q2 for moderate pain or 10 mg Q2 for severe pain

## 2022-08-25 NOTE — PROGRESS NOTES
08/25/22 0548   Admission   Initial VN Admission Questions Complete   Communication Issues? Patient Hearing   Safety/Activity   Safety Promotion/Fall Prevention Fall Risk reviewed with patient/family;instructed to call staff for mobility   VIRTUAL NURSE: Admission questions completed with patient's daughter Tiki at this time as pt is [facility]. Care plan and safety precautions reviewed. No complaints verbalized,  no needs expressed. Instructed to use call light for assistance, she verbalized understanding.

## 2022-08-25 NOTE — PT/OT/SLP EVAL
Speech Language Pathology Evaluation  Bedside Swallow  Education       Patient Name:  Brittany Haile   MRN:  [id number]  Admitting Diagnosis: <principal problem not specified> AMS     Recommendations:                 General Recommendations:  no further ST needs  Diet recommendations:  Regular, Thin   Aspiration Precautions: Standard precautions, whole meds, encourage 4-5 small meals per day and encourage boost clear in between meals for caloric support   General Precautions: Standard, fall  Communication strategies:  Speaks Romanian and some English    History per MD      History of Present Illness:  Brittany Haile is a 82 y.o. female who has a past medical history of COPD, CKDIII, GERD, HLD, HTN, GERN, CHF, PAD with claudication and recent RLL PNA (discharged 08/21). The patient presented to [facility] on 08/24/22 with a primary complain of left sided back pain. In the ED, CT Abdomen concerning for bilateral (R>L) pleural effusions.      Patients daughter and son in law at bedside, majority of information obtained per daughter (care giver) as patient is very hard of hearing. Daughter states she was in her normal state of health until Monday, when patient began experiencing severe pain. Patient's daughter and sitter have noticed general moaning and pain with moving patient. She states the pain is localized to her L back. She states this morning she also noticed increased fluid on patient, started on upper back and has moved to flanks. Patient has baseline shortness of breath 2/2 COPD requiring 2L O2 at home, no increased shortness of breath noted. Daughter says patient has had very little PO intake since moving her 2 weeks ago from [place]. Daughter denies her mother endorsing difficulty urinating, dysuria, fever, chills, abdominal pain, chest pain, nausea, or vomiting.      Of note, patient was recently admitted to [facility] for RLL Pneumonia from 08/19-08/21. At that time, CXR showed RLL consolidation.  Patient was given azitrhomycin and roceph while inpatient, discharged on PO levofloxacin x 4 days. Patient has been compliant with home ABX, not completed as patient was discharged two days ago.       Past Medical History:   Diagnosis Date    Abdominal pain     CHF (congestive heart failure)     Chronic kidney disease, stage III (moderate) 2015    COPD (chronic obstructive pulmonary disease)     COPD (chronic obstructive pulmonary disease)     CVA (cerebral infarction)     Esophagitis     Gastritis     GERD (gastroesophageal reflux disease)     GI bleed     Hyperlipidemia     Hypertension     Mesenteric angina     PVD (peripheral vascular disease) with claudication 10/13/2015    Stroke     Subclavian artery stenosis, left     Thyroid disease        Past Surgical History:   Procedure Laterality Date    CAROTID STENT       SECTION      COLONOSCOPY N/A 3/2/2017    Procedure: COLONOSCOPY;  Surgeon: Cecil Crooks MD;  Location: [facility] ENDO;  Service: Endoscopy;  Laterality: N/A;    COLONOSCOPY N/A 2019    Procedure: COLONOSCOPY/Suprep;  Surgeon: Barry Talbert MD;  Location: [facility] ENDO;  Service: Endoscopy;  Laterality: N/A;    CORONARY ANGIOPLASTY  2006    EPIDURAL STEROID INJECTION INTO LUMBAR SPINE N/A 2018    Procedure: INJECTION, STEROID, SPINE, LUMBAR, EPIDURAL- L2-3 ORAL SEDATION;  Surgeon: Stiven Ibarra Jr., MD;  Location: [facility] PAIN MGT;  Service: Pain Management;  Laterality: N/A;    ESOPHAGOGASTRODUODENOSCOPY N/A 2019    Procedure: EGD (ESOPHAGOGASTRODUODENOSCOPY);  Surgeon: Barry Talbert MD;  Location: [facility] ENDO;  Service: Endoscopy;  Laterality: N/A;    GALLBLADDER SURGERY      HYSTERECTOMY      INTRALUMINAL GASTROINTESTINAL TRACT IMAGING VIA CAPSULE N/A 2019    Procedure: IMAGING PROCEDURE, GI TRACT, INTRALUMINAL, VIA CAPSULE;  Surgeon: Barry Talbert MD;  Location: [facility] ENDO;  Service: Endoscopy;  Laterality:  "N/A;    OOPHORECTOMY      SUPERIOR MESTENTERIC ARTERY STENT         Social History: Patient lives at home with dtr.     Prior Intubation HX:  N/a     Modified Barium Swallow: none per EMR     Chest X-Rays: Small bilateral pleural effusions with right greater than left bibasilar airspace opacities which may represent atelectasis or consolidations.     Prior diet: regular and thin liquids     Subjective     Pt seen at bedside for clinical swallow eval. Dtr at bedside and denies hx of dysphagia.     Patient goals: per dtr "She sometimes says she is not hungry."    Pain/Comfort:  · Pain Rating 1: 0/10    Respiratory Status: NC    Objective:   Pt seen in room for swallow eval. Pt awake and able to converse with clinician in [place]. Pt is very [place].   SLP did offer pt free of charge  services prior to eval. Pt declined free of charge  services. Pt cited she was comfortable with SLP speaking in his native language of [place].     Oral Musculature Evaluation  · Oral Musculature: WFL  · Dentition: present and adequate  · Secretion Management: adequate  · Mucosal Quality: adequate  · Mandibular Strength and Mobility: WFL  · Oral Labial Strength and Mobility: WFL  · Lingual Strength and Mobility: WFL  · Buccal Strength and Mobility: WFL  · Volitional Cough: elicited  · Volitional Swallow: timely swallow  · Voice Prior to PO Intake: clear voice    Bedside Swallow Eval:   Consistencies Assessed:  · Thin liquids water by tsp, cup and straw  · Puree pudding   · Soft solids diced pears     Oral Phase:   · WFL    Pharyngeal Phase:   · no overt clinical signs/symptoms of aspiration  · no overt clinical signs/symptoms of pharyngeal dysphagia  · multiple spontaneous swallows    Compensatory Strategies  · Slow rate  · Alternate sips and bites  · Encourage PO  · Attempt 4-5 small meals per day    Treatment: Educated pt and dtr on role of SLP, diet recs, swallow precautions and suggestions provided on how to " increase PO intake.   Dtr indicated understanding of all info.     Assessment:     Brittany Haile is a 82 y.o. admitted with AMS female who presents with an SLP diagnosis of wfl swallowing function.    Goals:   Multidisciplinary Problems     SLP Goals     Not on file                Plan:     · Patient to be seen:      · Plan of Care expires:     · Plan of Care reviewed with:  patient, daughter   · SLP Follow-Up:  No       Discharge recommendations:   (home with family)   Barriers to Discharge:  none    Time Tracking:     SLP Treatment Date:   08/25/22  Speech Start Time:  0945  Speech Stop Time:  1006     Speech Total Time (min):  21 min    Billable Minutes: Eval Swallow and Oral Function 12 and Self Care/Home Management Training 9    08/25/2022

## 2022-08-25 NOTE — PROGRESS NOTES
"[facility] Medicine Progress Note    Admitting Team: [facility] Hospitalist Team B  Attending Physician: Agapito Coronel MD  Resident: Martin  Intern: Murray     Date of Admit: 2022    Chief Complaint     Back & arm pain with swelling on the arm and left shoulder/back.     Subjective:     Mrs. Haile was resting in bed with one of her daughters at bedside. She began to moan and grimace when getting her vitals taken as she had to move her arm which causes her a lot of pain.  Mrs. Haile complained that her legs feel swollen when she walks and that she feels like her left leg is more swollen than her right. Mrs. Haile moaned intermittently throughout her exam/interview over the span of 15 minutes.      Objective:   Last 24 Hour Vital Signs:  BP  Min: 112/55  Max: 136/60  Temp  Av.3 °F (36.8 °C)  Min: 98.1 °F (36.7 °C)  Max: 98.8 °F (37.1 °C)  Pulse  Av  Min: 80  Max: 96  Resp  Av.5  Min: 16  Max: 22  SpO2  Av.7 %  Min: 90 %  Max: 96 %  Height  Av' 1" (154.9 cm)  Min: 5' 1" (154.9 cm)  Max: 5' 1" (154.9 cm)  Weight  Av.4 kg (113 lb 3.5 oz)  Min: 51 kg (112 lb 7 oz)  Max: 51.7 kg (114 lb)  Body mass index is 21.24 kg/m².  No intake/output data recorded.    Physical Examination:  Physical Exam  Constitutional:       Appearance: Normal appearance. She is normal weight.   HENT:      Head: Normocephalic.      Right Ear: Tympanic membrane, ear canal and external ear normal.      Left Ear: Tympanic membrane, ear canal and external ear normal.      Ears:      Comments: Pt is hard of hearing bilaterally      Nose: Nose normal.      Mouth/Throat:      Mouth: Mucous membranes are moist.      Pharynx: Oropharynx is clear.   Eyes:      Extraocular Movements: Extraocular movements intact.      Conjunctiva/sclera: Conjunctivae normal.      Pupils: Pupils are equal, round, and reactive to light.   Cardiovascular:      Rate and Rhythm: Normal rate and regular rhythm.      Pulses: Normal pulses.      Heart sounds: " Normal heart sounds.   Pulmonary:      Effort: Pulmonary effort is normal.      Breath sounds: Rales present.      Comments: Bibasilar crackles (R>L)   Abdominal:      General: Bowel sounds are normal.      Palpations: Abdomen is soft.      Comments: Flank Edema    Musculoskeletal:      Cervical back: Normal range of motion.      Right lower leg: Edema present.      Left lower leg: Edema present.      Comments: 1+ pitting edema bilateral LE   Edema on posterior left shoulder    Skin:     General: Skin is warm and dry.      Capillary Refill: Capillary refill takes 2 to 3 seconds.      Coloration: Skin is pale.   Neurological:      General: No focal deficit present.      Mental Status: She is alert and oriented to person, place, and time. Mental status is at baseline.   Psychiatric:         Mood and Affect: Mood normal.         Behavior: Behavior normal.         Thought Content: Thought content normal.       Laboratory:    Trended Lab Data:    Hb.4  Iron Panel: Iron 15, TIBC 210, Ferritin 444  INR: 1.1  Otherwise CBC and CMP within expected limits.    Other Results:  EKG (my interpretation): Sinus rhythm (2022)     Radiology:    CT Chest/Abdomen (My interpretation): nodular, foci seen in bilateral lungs fields. Bibasilar pleural effusions (R>L), small pericardial effusion, large hepatic mass, adrenal mass, inguinal adenopathy. Severe atherosclerotic changes in arteries (aorta, celiac, SMA, renal, iliacs)     US Retroperitoneal (My interpretation): Left Renal cyst. No hydronephrosis. Bilateral pleural effusions.     CXR (My interpretation): Enlarged cardiac silhouette with atherosclerotic calcifications of the aortic arch. Small bilateral pleural effusions.    Assessment:     Mrs. Haile is a 84 year old woman with a past medical history of COPD, CKD III, GERD, HLD, HTN, CHF, PAD w/Claudication. She was recently discharged on PO levofloxacin x4 days 22 following admission for PNA on 2022. Pt was  compliant with antibiotics, however, only took 2 of the 4 days before representing to the ED. Patient's daughter said that Mrs. Haile began moaning on pain on Monday every time she moved or had to be moved. Due to concern of pyelonephritis in the ED, a CT scan was ordered which shows likely metastatic disease with lesions present in the lungs, liver, and R-adrenal gland. Pulm/Crit has been consulted for possible pleural effusion which will be sent for cytology. In addition Hematology/Oncology has been consulted for recommendations regarding further work-up and management.    Plan:       #Pleural effusions  -Recently admitted with RLL consolidation, documented as PNA and tx with ABX  -CT Chest/Abdomen showing bilateral effusions (R>L)  -etiologies concerning for: parapneumonic effusions, malignant, PNA   -CXR showing small bilateral pleural effusions   -Concern for malignancy due to CT abdomen findings of pulmonic foci, adrenal mass, and hepatic mass  -Pulmonology/Heme Onc consulted   -Thoracentesis likely warranted to rule out malignancy, further work-up pending heme/onc reccs    #Back Pain   -PRN Tylenol 500mg   -Lidocaine patch and heating pad  -U/A negative for nitrites, leukocytes, WBC's or RBC's   -Troponin negative x1  -1x dose Norco 5, will reassess pt and tailor pain control regimen accordingly    #CJ on CKD III  -Cr 1.6 downtreding to 1.4 today (Baseline ~1.1)   -Urine lights (Ucr 80, UK 26, Emmie 26, Ucl 22)  -Renal ultrasound showing L renal cyst but no hydronephrosis at this time   -pt requiring CIC in ED, and has not been able to void since being admitted last night  -pending bladder scan if >400cc's will require catheretization for bladder relief    #Normocytic Anemia  -H/H 9.2, 28.5 (Consistent with baseline)   -MCV 87  -Hx of DELIA, on home iron supplementation  -Iron Panel: iron 15, TIBC 210 Ferritin 410  -b12 and folate pending     #RLL PNA  -Continue discharged levofloxacin x2 days  -satting 92% on  home 2L O2    #COPD Chronic hypoxic resp. Failure   -put pt on home 2L O2 NC satting 96%  -O2 goals are 88-92% given her COPD status  -Continue home albuterol inhaler PRN     #PAD   -pt has significant hx of arterial atherosclerotic disease   -previous mesenteric and carotid stenting   -Continue home atorvastatin and clopidogrel    #HTN  -Continue home amlodipine 5mg and losartan 100mg qD    #HLD   -Continue home atorvastatin 40mg    #Hypothyroid  -Last TSH:0.9 and free T4: 1.17   -Continue home levothyroxine 88mg     Code Status:     DNR   Diet: Cardiac + Boost   PPX: Lovenox, SCDs    Dispo: Pending pleural effusion work up    Henry Gao MD  [facility] Internal Medicine HO-I    [facility] Medicine Hospitalist Pager numbers:   [facility] Hospitalist Medicine Team A (Bradley/Kristin): [phone]  [facility] Hospitalist Medicine Team B (Hmoer/James):  [phone]

## 2022-08-25 NOTE — ASSESSMENT & PLAN NOTE
- CT imaging (8/24/22) reveals lung/liver/adrenal masses concerning for metastatic cancer. I am concerned that she has metastatic lung cancer, given her nicotine use and pattern of imaging (adrenal metastases are seen in lung cancer).  - I discussed workup for malignancy, which includes a biopsy. She may be undergoing a thoracentesis (pulmonology has been consulted).  - if patient is willing to proceed with workup, I recommend a biopsy of the liver lesion, which would give us both diagnosis and staging information. Family is hesitant to proceed with biopsy.  - recommend palliative care consult. They can discuss symptom control and firm up patient's/family's desires regarding workup and desire for treatment. From my discussion, it sounds like patient would not want to proceed with palliative therapy if offered.  - hold off on MRI brain since patient and family are deciding whether to proceed with further workup.

## 2022-08-26 VITALS
BODY MASS INDEX: 21.23 KG/M2 | WEIGHT: 112.44 LBS | SYSTOLIC BLOOD PRESSURE: 125 MMHG | RESPIRATION RATE: 18 BRPM | HEART RATE: 88 BPM | HEIGHT: 61 IN | TEMPERATURE: 99 F | OXYGEN SATURATION: 92 % | DIASTOLIC BLOOD PRESSURE: 58 MMHG

## 2022-08-26 PROCEDURE — 25000003 PHARM REV CODE 250: Performed by: STUDENT IN AN ORGANIZED HEALTH CARE EDUCATION/TRAINING PROGRAM

## 2022-08-26 PROCEDURE — G0378 HOSPITAL OBSERVATION PER HR: HCPCS

## 2022-08-26 RX ORDER — LIDOCAINE 50 MG/G
1 PATCH TOPICAL DAILY
Qty: 12 PATCH | Refills: 0 | Status: SHIPPED | OUTPATIENT
Start: 2022-08-26

## 2022-08-26 RX ORDER — MORPHINE SULFATE ORAL SOLUTION 10 MG/5ML
10 SOLUTION ORAL
Qty: 100 ML | Refills: 0 | Status: SHIPPED | OUTPATIENT
Start: 2022-08-26 | End: 2022-08-26 | Stop reason: SDUPTHER

## 2022-08-26 RX ORDER — MORPHINE SULFATE ORAL SOLUTION 10 MG/5ML
5 SOLUTION ORAL
Qty: 100 ML | Refills: 0 | Status: SHIPPED | OUTPATIENT
Start: 2022-08-26 | End: 2022-08-31

## 2022-08-26 RX ADMIN — MORPHINE SULFATE 10 MG: 10 SOLUTION ORAL at 09:08

## 2022-08-26 RX ADMIN — CLOPIDOGREL 75 MG: 75 TABLET, FILM COATED ORAL at 09:08

## 2022-08-26 RX ADMIN — MORPHINE SULFATE 10 MG: 10 SOLUTION ORAL at 12:08

## 2022-08-26 RX ADMIN — LEVOTHYROXINE SODIUM 88 MCG: 88 TABLET ORAL at 06:08

## 2022-08-26 NOTE — PLAN OF CARE
D/C recs noted. Pt family has decided to admit pt into Hospice care with [facility]. Pt will focus on comfort care. Pt does not have follow up appointments as care will be overseen by hospice physician in the home unless not able to be managed. Family has received DME from hospice. Family has 24/7 phone number for support and symptom management.     At time of discharge pt will be transported by Ambulance stretcher to her daughter [place] home in [place].     DME at discharge: Pt did not receive DME from [facility] but has did receive DME from [facility] to include hospital bed, oxygen concentrator and tank, nebulizer, Shower chair, bedside table, and suction.     SW spoke with [facility] and informed of pt discharge so hospice could meet family at home for admission/tuck in visit.      08/26/22 1102   Final Note   Assessment Type Final Discharge Note   Anticipated Discharge Disposition [facility]osp[facility]   What phone number can be called within the next 1-3 days to see how you are doing after discharge? [phone]   Hospital Resources/Appts/Education Provided Provided patient/caregiver with written discharge plan information   Post-Acute Status   Hospice Status Set-up Complete/Auth obtained     TERESA Rosales  [place] Case Management  [phone]

## 2022-08-26 NOTE — PLAN OF CARE
JOANNE sent pt clinicals to [facility] as pt family has chosen this company for pt. Pt is to have DME delivered to daughter [place] home. Pt to have hospital bed, bedside table, shower chair, oxygen concentrator and tank, nebulizer, and suction. At time of dicharge SW to inform [facility] and family to have sybilck in visit.        08/26/22 1032   Post-Acute Status   Post-Acute Authorization Hospice   Hospice Status Set-up Complete/Auth obtained   Hospital Resources/Appts/Education Provided Appointments scheduled and added to AVS   Discharge Delays None known at this time   Discharge Plan   Discharge Plan A Home with family;Hospice/home     No future appointments.  Pt to have home hospice and be followed by hospice physician.    TERESA Rosales Case Management  [phone]

## 2022-08-26 NOTE — NURSING
Pt and daughter at bedside refusing vital signs at this time so pt can sleep further. Pt still on telemetry w/o any ectopy. No acute distress with even unlabored respirations noted. Pt and daughter educated on interpretation of vital signs to monitor for any potential decline in pt condition. Pt and daughter verbalize understanding and still refuses at this time. Oc Franco MD notified.

## 2022-08-26 NOTE — NURSING
Instructed pt daughter at bedside in positioning for comfort and using pillows for soft support when turning the pt to clean her. Pt crying in pain holding her right side, offered pain mediation and pt and daughter agreeable.

## 2022-08-26 NOTE — NURSING
Pt daughter refused to allow vital signs to be taken this am and is holding meds at present as pt is sleeping and her daughter reports that she is going home today with home hospice.

## 2022-08-26 NOTE — NURSING
Pt and daughter at bedside refusing blood work at this time so pt can sleep further. Pt still on telemetry w/o any ectopy. No acute distress with even unlabored respirations noted. Pt and family educated on purpose of obtaining blood work to monitor and treat any fluid/electrolyte imbalance and monitor for any potential decline in pt condition. Pt and daughter verbalize understanding and still refuses at this time. Pt and daughter willing to allow attempt at a later time when mother is awake. Oc Franco MD notified.

## 2022-08-26 NOTE — NURSING
[facility] and pt is being transferred home with hospice care.

## 2022-08-26 NOTE — PLAN OF CARE
[facility]  Department of Hospital Medicine  [address] [place] [address]  [phone] after hours  [phone] fax    HOSPICE  ORDERS    08/26/2022    Admit to Hospice:  Home Service Inpatient Service  patient's home.)      Diagnoses:   Active Hospital Problems    Diagnosis  POA    Liver mass [R16.0]  Yes    Palliative care encounter [Z51.5]  Not Applicable    Pleural effusion [J90]  Unknown    Left flank pain [R10.9]  Yes    PVD (peripheral vascular disease) with claudication [I73.9]  Yes         Diminished pedal pulses  JUAN CARLOS R 0.67 and L 0.88 5/2017      Bilateral SFA disease on ultrasound 4/2017        Chronic kidney disease, stage III (moderate) [N18.30]  Yes    COPD (chronic obstructive pulmonary disease) [J44.9]  Yes    Community acquired pneumonia of right lower lobe of lung [J18.9]  Yes    Hypertension [I10]  Yes      Resolved Hospital Problems   No resolved problems to display.       Hospice Qualifying Diagnoses: Metastatic cancer       Patient has a life expectancy < 6 months due to:  1) Primary Hospice Diagnosis:  Metastatic Cancer  2) Comorbid Conditions Contributing to Decline:  Chronic tobacco use, COPD, CHF, HTN, PAD    Vital Signs: Routine per Hospice Protocol.    Code Status: DNR    Allergies: No known allergies    Diet: Cardiac    Activities: As tolerated    Goals of Care Treatment Preferences:  Code Status: DNR    Living Will: Yes     What is most important right now is to focus on spending time at home, avoiding the hospital, symptom/pain control, quality of life, even if it means sacrificing a little time, comfort and QOL .  Accordingly, we have decided that the best plan to meet the patient's goals includes enrolling in hospice care.      Nursing: Per Hospice Routine.        Routine Skin for Bedridden Patients: Apply moisture barrier cream to all skin folds and   wet areas in perineal area daily and after baths and all bowel  movements.      Oxygen: 2L home O2 (baseline)    Other Miscellaneous Care: None      Medications:        Medication List      START taking these medications    LIDOcaine 5 %  Commonly known as: LIDODERM  Place 1 patch onto the skin once daily. Remove & Discard patch within 12 hours or as directed by MD     morphine 10 mg/5 mL solution  Take 5 mLs (10 mg total) by mouth every 2 (two) hours as needed for Pain.        CHANGE how you take these medications    albuterol 90 mcg/actuation inhaler  Commonly known as: PROVENTIL/VENTOLIN HFA  INHALE 1 TO 2 PUFFS BY MOUTH INTO THE LUNGS EVERY 6 HOURS AS NEEDED FOR WHEEZING OR SHORTNESS OF BREATH( RESCUE)  What changed:   · how much to take  · how to take this  · when to take this  · additional instructions     atorvastatin 40 MG tablet  Commonly known as: LIPITOR  TAKE 1 TABLET BY MOUTH EVERY DAY  What changed: when to take this     fluticasone propionate 50 mcg/actuation nasal spray  Commonly known as: FLONASE  1 spray (50 mcg total) by Each Nostril route once daily.  What changed:   · when to take this  · reasons to take this        CONTINUE taking these medications    acetaminophen 500 MG tablet  Commonly known as: TYLENOL  Take 500 mg by mouth every 6 (six) hours as needed for Pain.     amLODIPine 10 MG tablet  Commonly known as: NORVASC  Take 10 mg by mouth 2 (two) times daily.     cilostazoL 50 MG Tab  Commonly known as: PLETAL  TAKE 1 TABLET(50 MG) BY MOUTH TWICE DAILY     clopidogreL 75 mg tablet  Commonly known as: PLAVIX  TAKE 1 TABLET BY MOUTH EVERY DAY     EScitalopram oxalate 10 MG tablet  Commonly known as: LEXAPRO  TAKE 1 TABLET(10 MG) BY MOUTH EVERY NIGHT     irbesartan 300 MG tablet  Commonly known as: AVAPRO  Take 1 tablet (300 mg total) by mouth every evening.     iron bisglycinate chelate 28 mg iron Cap  Take 1 capsule by mouth once daily.     levoFLOXacin 500 MG tablet  Commonly known as: LEVAQUIN  Take 1 tablet (500 mg total) by mouth once daily. for 5  days     levothyroxine 88 MCG tablet  Commonly known as: SYNTHROID  TAKE 1 TABLET(88 MCG) BY MOUTH EVERY DAY     loperamide 2 mg Tab  Commonly known as: IMODIUM A-D  Take 2 mg by mouth 4 (four) times daily as needed.     traZODone 50 MG tablet  Commonly known as: DESYREL  TAKE 1 TABLET(50 MG) BY MOUTH EVERY EVENING          Continue Prior home medications    DIABETES CARE:Patient not diabetic    Future Orders:  Hospice Medical Director may dictate new orders for comfortable care measures & sign death certificate.        _________________________________  Damien Salazar MD  08/26/2022

## 2022-08-27 NOTE — DISCHARGE SUMMARY
[facility] Medicine Discharge Summary    Primary Team: [facility] Hospitalist Team B  Attending Physician: Madelin att. providers found  Resident: Martin  Intern: Murray    Date of Admit: 8/24/2022  Date of Discharge: 8/26/2022    Discharge to: Home  Condition: Stable     Discharge Diagnoses     COPD  Metastatic Lung Caner    Consultants and Procedures     Consultants:  Hematology/Oncology  Palliative    Imaging:  US Retroperitoneal   CXR  CT Chest Abdomen    Brief History of Present Illness      Brittany Haile is a 82 y.o. female who has a past medical history of COPD, CKDIII, GERD, HLD, HTN, GERN, CHF, PAD with claudication and recent RLL PNA (discharged 08/21). The patient presented to [facility] on 08/24/22 with a primary complain of left sided back pain. In the ED, CT Abdomen concerning for bilateral (R>L) pleural effusions.      Patients daughter and son in law at bedside, majority of information obtained per daughter (care giver) as patient is very hard of hearing. Daughter states she was in her normal state of health until Monday, when patient began experiencing severe pain. Patient's daughter and sitter have noticed general moaning and pain with moving patient. She states the pain is localized to her L back. She states this morning she also noticed increased fluid on patient, started on upper back and has moved to flanks. Patient has baseline shortness of breath 2/2 COPD requiring 2L O2 at home, no increased shortness of breath noted. Daughter says patient has had very little PO intake since moving her 2 weeks ago from [place]. Daughter denies her mother endorsing difficulty urinating, dysuria, fever, chills, abdominal pain, chest pain, nausea, or vomiting.     For the full HPI please refer to the History & Physical from this admission.    Hospital Course By Problem with Pertinent Findings     At end of stay patient differed any further work-up of malignancy and decided to proceed with palliative care.  Home hospice was set up and patient was promptly discharged after meeting with the Palliative team. All home medications were discontinued and patient was discharged with PO liquid morphine PRN for pain control as well as lidocaine patches.    #Pleural effusions  -Recently admitted with RLL consolidation, documented as PNA and tx with ABX  -CT Chest/Abdomen showing bilateral effusions (R>L)  -etiologies concerning for: parapneumonic effusions, malignant, PNA   -CXR showing small bilateral pleural effusions   -Concern for malignancy due to CT abdomen findings of pulmonic foci, adrenal mass, and hepatic mass  -Pulmonology/Heme Onc consulted   -Thoracentesis likely warranted to rule out malignancy, further work-up pending heme/onc reccs     #Back Pain   -PRN Tylenol 500mg   -Lidocaine patch and heating pad  -U/A negative for nitrites, leukocytes, WBC's or RBC's   -Troponin negative x1  -1x dose Norco 5, will reassess pt and tailor pain control regimen accordingly     #CJ on CKD III  -Cr 1.6 downtreding to 1.4 today (Baseline ~1.1)   -Urine lights (Ucr 80, UK 26, Emmie 26, Ucl 22)  -Renal ultrasound showing L renal cyst but no hydronephrosis at this time      #Normocytic Anemia  -H/H 9.2, 28.5 (Consistent with baseline)   -MCV 87  -Hx of DELIA, on home iron supplementation  -Iron Panel: iron 15, TIBC 210 Ferritin 410     #RLL PNA  -Continue discharged levofloxacin x2 days  -satting 92% on home 2L O2     #COPD Chronic hypoxic resp. Failure   -put pt on home 2L O2 NC satting 96%  -O2 goals are 88-92% given her COPD status  -Continue home albuterol inhaler PRN      #PAD   -pt has significant hx of arterial atherosclerotic disease   -previous mesenteric and carotid stenting   -Continue home atorvastatin and clopidogrel     #HTN  -Continue home amlodipine 5mg and losartan 100mg qD     #HLD   -Continue home atorvastatin 40mg     #Hypothyroid  -Last TSH:0.9 and free T4: 1.17   -Continue home levothyroxine 88mg     Discharge  Medications        Medication List        START taking these medications      LIDOcaine 5 %  Commonly known as: LIDODERM  Place 1 patch onto the skin once daily. Remove & Discard patch within 12 hours or as directed by MD     morphine 10 mg/5 mL solution  Take 2.5 mLs (5 mg total) by mouth every 2 (two) hours as needed for Pain (Patient may take 5mg first dose and uptitrate to 10mg if pain is not controlled. Caution giving to patient if is breathing slowly as it can cause respiratory depression).            CHANGE how you take these medications      albuterol 90 mcg/actuation inhaler  Commonly known as: PROVENTIL/VENTOLIN HFA  INHALE 1 TO 2 PUFFS BY MOUTH INTO THE LUNGS EVERY 6 HOURS AS NEEDED FOR WHEEZING OR SHORTNESS OF BREATH( RESCUE)  What changed:   how much to take  how to take this  when to take this  additional instructions     atorvastatin 40 MG tablet  Commonly known as: LIPITOR  TAKE 1 TABLET BY MOUTH EVERY DAY  What changed: when to take this     fluticasone propionate 50 mcg/actuation nasal spray  Commonly known as: FLONASE  1 spray (50 mcg total) by Each Nostril route once daily.  What changed:   when to take this  reasons to take this            CONTINUE taking these medications      acetaminophen 500 MG tablet  Commonly known as: TYLENOL     amLODIPine 10 MG tablet  Commonly known as: NORVASC     cilostazoL 50 MG Tab  Commonly known as: PLETAL  TAKE 1 TABLET(50 MG) BY MOUTH TWICE DAILY     clopidogreL 75 mg tablet  Commonly known as: PLAVIX  TAKE 1 TABLET BY MOUTH EVERY DAY     EScitalopram oxalate 10 MG tablet  Commonly known as: LEXAPRO  TAKE 1 TABLET(10 MG) BY MOUTH EVERY NIGHT     irbesartan 300 MG tablet  Commonly known as: AVAPRO  Take 1 tablet (300 mg total) by mouth every evening.     iron bisglycinate chelate 28 mg iron Cap     levothyroxine 88 MCG tablet  Commonly known as: SYNTHROID  TAKE 1 TABLET(88 MCG) BY MOUTH EVERY DAY     loperamide 2 mg Tab  Commonly known as: IMODIUM A-D      traZODone 50 MG tablet  Commonly known as: DESYREL  TAKE 1 TABLET(50 MG) BY MOUTH EVERY EVENING            ASK your doctor about these medications      levoFLOXacin 500 MG tablet  Commonly known as: LEVAQUIN  Take 1 tablet (500 mg total) by mouth once daily. for 5 days  Ask about: Should I take this medication?               Where to Get Your Medications        These medications were sent to [facility]  [address], [place] [address]      Hours: Mon-Fri, 8a-5:30p Phone: [phone]   LIDOcaine 5 %       You can get these medications from any pharmacy    Bring a paper prescription for each of these medications  morphine 10 mg/5 mL solution         Discharge Information:   Diet:  Regular    Physical Activity:  As tolerated             Instructions:  1. Take all medications as prescribed  2. Keep all follow-up appointments  3. Return to the hospital or call your primary care physicians if any worsening symptoms such as fever, chest pain, shortness of breath, return of symptoms, or any other concerns.    Follow-Up Appointments:  None    Henry Gao MD  [facility] Internal Medicine, -1

## 2022-08-27 NOTE — PROGRESS NOTES
[facility] Medicine Progress Note    Admitting Team: [facility] Hospitalist Team B  Attending Physician: Homer RIVERA  Resident: Martin  Intern: Murray     Date of Admit: 8/24/2022      Subjective:     Patient was feeling fine this morning with complaints of shortness of breath but denied feeling in pain. She was surrounded by family and they reported feeling ready to go home with the home hospice.     Objective:   Last 24 Hour Vital Signs:  No data recorded  Body mass index is 21.24 kg/m².  I/O last 3 completed shifts:  In: 120 [P.O.:120]  Out: -     Physical Examination:  Physical Exam  Constitutional:       Appearance: Normal appearance. She is ill-appearing.   HENT:      Head: Normocephalic.      Right Ear: Tympanic membrane, ear canal and external ear normal.      Left Ear: Tympanic membrane, ear canal and external ear normal.      Ears:      Comments: Pt is hard of hearing bilaterally      Nose: Nose normal.      Mouth/Throat:      Mouth: Mucous membranes are moist.      Pharynx: Oropharynx is clear.   Eyes:      Extraocular Movements: Extraocular movements intact.      Conjunctiva/sclera: Conjunctivae normal.      Pupils: Pupils are equal, round, and reactive to light.   Cardiovascular:      Rate and Rhythm: Normal rate and regular rhythm.      Pulses: Normal pulses.      Heart sounds: Normal heart sounds.   Pulmonary:      Effort: Pulmonary effort is normal.      Breath sounds: Rales present.      Comments: Bibasilar crackles (R>L)   Abdominal:      General: Bowel sounds are normal.      Palpations: Abdomen is soft.      Comments: Flank Edema    Musculoskeletal:      Cervical back: Normal range of motion.      Right lower leg: Edema present.      Left lower leg: Edema present.      Comments: 1+ pitting edema bilateral LE   Edema on posterior left shoulder    Skin:     General: Skin is warm and dry.      Capillary Refill: Capillary refill takes 2 to 3 seconds.      Coloration: Skin is pale.   Neurological:      General:  No focal deficit present.      Mental Status: She is alert and oriented to person, place, and time. Mental status is at baseline.   Psychiatric:         Mood and Affect: Mood normal.         Behavior: Behavior normal.         Thought Content: Thought content normal.     Laboratory:    Trended Lab Data:    Hb.4  Iron Panel: Iron 15, TIBC 210, Ferritin 444  INR: 1.1  Otherwise CBC and CMP within expected limits.    Other Results:  EKG (my interpretation): Sinus rhythm (2022)     Radiology:    CT Chest/Abdomen (My interpretation): nodular, foci seen in bilateral lungs fields. Bibasilar pleural effusions (R>L), small pericardial effusion, large hepatic mass, adrenal mass, inguinal adenopathy. Severe atherosclerotic changes in arteries (aorta, celiac, SMA, renal, iliacs)     US Retroperitoneal (My interpretation): Left Renal cyst. No hydronephrosis. Bilateral pleural effusions.     CXR (My interpretation): Enlarged cardiac silhouette with atherosclerotic calcifications of the aortic arch. Small bilateral pleural effusions.    Assessment:     Mrs. Haile is a 84 year old woman with a past medical history of COPD, CKD III, GERD, HLD, HTN, CHF, PAD w/Claudication. She was recently discharged on PO levofloxacin x4 days 22 following admission for PNA on 2022. Pt was compliant with antibiotics, however, only took 2 of the 4 days before representing to the ED. Patient's daughter said that Mrs. Haile began moaning on pain on Monday every time she moved or had to be moved. Due to concern of pyelonephritis in the ED, a CT scan was ordered which shows likely metastatic disease with lesions present in the lungs, liver, and R-adrenal gland. Pulm/Crit has been consulted for possible pleural effusion which will be sent for cytology. In addition Hematology/Oncology has been consulted for recommendations regarding further work-up and management.    Plan:     #Pleural effusions likely 2/2 Cancer  -Recently admitted with  RLL consolidation, documented as PNA and tx with ABX  -CT Chest/Abdomen showing bilateral effusions (R>L)  -CXR showing small bilateral pleural effusions   -Concern for malignancy due to CT abdomen findings of pulmonic foci, adrenal mass, and hepatic mass  -Thoracentesis was deferred as family does not want further intervention    #Back Pain   -PRN Tylenol 500mg   -Lidocaine patch and heating pad  -U/A negative for nitrites, leukocytes, WBC's or RBC's   -Troponin negative x1  - Possibly 2/2 metastatic cancer  - Will treat pain PRN    #CJ on CKD III  -Cr 1.6 downtreding to 1.4 today (Baseline ~1.1)   -Urine lights (Ucr 80, UK 26, Emmie 26, Ucl 22)  -Renal ultrasound showing L renal cyst but no hydronephrosis at this time   -pt requiring CIC in ED, and has not been able to void since being admitted last night  -bladder scan if >400cc's will require catheretization for bladder relief    #Normocytic Anemia  -H/H 9.2, 28.5 (Consistent with baseline)   -MCV 87  -Hx of DELIA, on home iron supplementation  -Iron Panel: iron 15, TIBC 210 Ferritin 410    #RLL PNA  -Continue discharged levofloxacin x2 days  -satting 92% on home 2L O2    #COPD Chronic hypoxic resp. Failure   -put pt on home 2L O2 NC satting 96%  -O2 goals are 88-92% given her COPD status  -Continue home albuterol inhaler PRN     #PAD   -pt has significant hx of arterial atherosclerotic disease   -previous mesenteric and carotid stenting   -Continue home atorvastatin and clopidogrel    #HTN  -Continue home amlodipine 5mg and losartan 100mg qD    #HLD   -Continue home atorvastatin 40mg    #Hypothyroid  -Last TSH:0.9 and free T4: 1.17   -Continue home levothyroxine 88mg     Code Status:     DNR   Diet: Cardiac + Boost   PPX: Lovenox, SCDs    Dispo: Patient to go home with home hospice today    Damien Salazar MD  [facility]    [facility] Medicine Hospitalist Pager numbers:   [facility] Hospitalist Medicine Team A (Bradley/Kristin): [phone]  [facility] Hospitalist Medicine  Team B (Homer/James):  [phone]

## 2022-08-29 ENCOUNTER — TELEPHONE (OUTPATIENT)
Dept: PHARMACY | Facility: CLINIC | Age: 82
End: 2022-08-29
Payer: MEDICARE

## 2022-10-30 PROBLEM — N17.9 ACUTE KIDNEY INJURY: Status: ACTIVE | Noted: 2022-10-30

## 2022-11-21 PROBLEM — J18.9 PNEUMONIA DUE TO INFECTIOUS ORGANISM: Status: RESOLVED | Noted: 2020-03-02 | Resolved: 2022-11-21

## 2023-01-30 PROBLEM — N17.9 ACUTE KIDNEY INJURY: Status: RESOLVED | Noted: 2022-10-30 | Resolved: 2023-01-30

## 2023-03-15 ENCOUNTER — PATIENT OUTREACH (OUTPATIENT)
Dept: ADMINISTRATIVE | Facility: HOSPITAL | Age: 83
End: 2023-03-15
Payer: MEDICARE